# Patient Record
Sex: FEMALE | Race: BLACK OR AFRICAN AMERICAN | NOT HISPANIC OR LATINO | URBAN - METROPOLITAN AREA
[De-identification: names, ages, dates, MRNs, and addresses within clinical notes are randomized per-mention and may not be internally consistent; named-entity substitution may affect disease eponyms.]

---

## 2017-01-15 ENCOUNTER — INPATIENT (INPATIENT)
Facility: HOSPITAL | Age: 77
LOS: 5 days | Discharge: HOME | End: 2017-01-21
Attending: INTERNAL MEDICINE | Admitting: INTERNAL MEDICINE
Payer: MEDICARE

## 2017-01-15 VITALS
RESPIRATION RATE: 18 BRPM | OXYGEN SATURATION: 93 % | DIASTOLIC BLOOD PRESSURE: 122 MMHG | TEMPERATURE: 100 F | HEART RATE: 93 BPM | SYSTOLIC BLOOD PRESSURE: 152 MMHG

## 2017-01-15 DIAGNOSIS — R06.09 OTHER FORMS OF DYSPNEA: ICD-10-CM

## 2017-01-15 LAB
ALBUMIN SERPL ELPH-MCNC: 3.9 G/DL — SIGNIFICANT CHANGE UP (ref 3.3–5)
ALP SERPL-CCNC: 50 U/L — SIGNIFICANT CHANGE UP (ref 40–120)
ALT FLD-CCNC: 8 U/L — SIGNIFICANT CHANGE UP (ref 4–33)
APPEARANCE UR: SIGNIFICANT CHANGE UP
APTT BLD: 27.5 SEC — SIGNIFICANT CHANGE UP (ref 27.5–37.4)
AST SERPL-CCNC: 21 U/L — SIGNIFICANT CHANGE UP (ref 4–32)
B PERT DNA SPEC QL NAA+PROBE: SIGNIFICANT CHANGE UP
BASE EXCESS BLDV CALC-SCNC: -2.5 MMOL/L — SIGNIFICANT CHANGE UP
BASOPHILS # BLD AUTO: 0.01 K/UL — SIGNIFICANT CHANGE UP (ref 0–0.2)
BASOPHILS NFR BLD AUTO: 0.2 % — SIGNIFICANT CHANGE UP (ref 0–2)
BILIRUB SERPL-MCNC: 0.2 MG/DL — SIGNIFICANT CHANGE UP (ref 0.2–1.2)
BILIRUB UR-MCNC: NEGATIVE — SIGNIFICANT CHANGE UP
BLOOD GAS VENOUS - CREATININE: 3.63 MG/DL — HIGH (ref 0.5–1.3)
BLOOD UR QL VISUAL: NEGATIVE — SIGNIFICANT CHANGE UP
BUN SERPL-MCNC: 48 MG/DL — HIGH (ref 7–23)
C PNEUM DNA SPEC QL NAA+PROBE: NOT DETECTED — SIGNIFICANT CHANGE UP
CALCIUM SERPL-MCNC: 10.1 MG/DL — SIGNIFICANT CHANGE UP (ref 8.4–10.5)
CHLORIDE BLDV-SCNC: 105 MMOL/L — SIGNIFICANT CHANGE UP (ref 96–108)
CHLORIDE SERPL-SCNC: 102 MMOL/L — SIGNIFICANT CHANGE UP (ref 98–107)
CK MB BLD-MCNC: 5 NG/ML — HIGH (ref 1–4.7)
CK SERPL-CCNC: 106 U/L — SIGNIFICANT CHANGE UP (ref 25–170)
CO2 SERPL-SCNC: 19 MMOL/L — LOW (ref 22–31)
COLOR SPEC: YELLOW — SIGNIFICANT CHANGE UP
CREAT SERPL-MCNC: 3.5 MG/DL — HIGH (ref 0.5–1.3)
EOSINOPHIL # BLD AUTO: 0.02 K/UL — SIGNIFICANT CHANGE UP (ref 0–0.5)
EOSINOPHIL NFR BLD AUTO: 0.4 % — SIGNIFICANT CHANGE UP (ref 0–6)
FLUAV H1 2009 PAND RNA SPEC QL NAA+PROBE: POSITIVE — HIGH
FLUAV H1 RNA SPEC QL NAA+PROBE: NOT DETECTED — SIGNIFICANT CHANGE UP
FLUAV H3 RNA SPEC QL NAA+PROBE: NOT DETECTED — SIGNIFICANT CHANGE UP
FLUBV RNA SPEC QL NAA+PROBE: NOT DETECTED — SIGNIFICANT CHANGE UP
GAS PNL BLDV: 134 MMOL/L — LOW (ref 136–146)
GLUCOSE BLDV-MCNC: 139 — HIGH (ref 70–99)
GLUCOSE SERPL-MCNC: 147 MG/DL — HIGH (ref 70–99)
GLUCOSE UR-MCNC: 150 — SIGNIFICANT CHANGE UP
HADV DNA SPEC QL NAA+PROBE: NOT DETECTED — SIGNIFICANT CHANGE UP
HCO3 BLDV-SCNC: 22 MMOL/L — SIGNIFICANT CHANGE UP (ref 20–27)
HCOV 229E RNA SPEC QL NAA+PROBE: NOT DETECTED — SIGNIFICANT CHANGE UP
HCOV HKU1 RNA SPEC QL NAA+PROBE: NOT DETECTED — SIGNIFICANT CHANGE UP
HCOV NL63 RNA SPEC QL NAA+PROBE: NOT DETECTED — SIGNIFICANT CHANGE UP
HCOV OC43 RNA SPEC QL NAA+PROBE: NOT DETECTED — SIGNIFICANT CHANGE UP
HCT VFR BLD CALC: 32.6 % — LOW (ref 34.5–45)
HCT VFR BLDV CALC: 30.3 % — LOW (ref 34.5–45)
HGB BLD-MCNC: 9.7 G/DL — LOW (ref 11.5–15.5)
HGB BLDV-MCNC: 9.8 G/DL — LOW (ref 11.5–15.5)
HMPV RNA SPEC QL NAA+PROBE: NOT DETECTED — SIGNIFICANT CHANGE UP
HPIV1 RNA SPEC QL NAA+PROBE: NOT DETECTED — SIGNIFICANT CHANGE UP
HPIV2 RNA SPEC QL NAA+PROBE: NOT DETECTED — SIGNIFICANT CHANGE UP
HPIV3 RNA SPEC QL NAA+PROBE: NOT DETECTED — SIGNIFICANT CHANGE UP
HPIV4 RNA SPEC QL NAA+PROBE: NOT DETECTED — SIGNIFICANT CHANGE UP
IMM GRANULOCYTES NFR BLD AUTO: 0.2 % — SIGNIFICANT CHANGE UP (ref 0–1.5)
INR BLD: 1.06 — SIGNIFICANT CHANGE UP (ref 0.87–1.18)
KETONES UR-MCNC: NEGATIVE — SIGNIFICANT CHANGE UP
LACTATE BLDV-MCNC: 1.2 MMOL/L — SIGNIFICANT CHANGE UP (ref 0.5–2)
LEUKOCYTE ESTERASE UR-ACNC: NEGATIVE — SIGNIFICANT CHANGE UP
LYMPHOCYTES # BLD AUTO: 0.6 K/UL — LOW (ref 1–3.3)
LYMPHOCYTES # BLD AUTO: 10.7 % — LOW (ref 13–44)
M PNEUMO DNA SPEC QL NAA+PROBE: NOT DETECTED — SIGNIFICANT CHANGE UP
MCHC RBC-ENTMCNC: 22.4 PG — LOW (ref 27–34)
MCHC RBC-ENTMCNC: 29.8 % — LOW (ref 32–36)
MCV RBC AUTO: 75.3 FL — LOW (ref 80–100)
MONOCYTES # BLD AUTO: 0.31 K/UL — SIGNIFICANT CHANGE UP (ref 0–0.9)
MONOCYTES NFR BLD AUTO: 5.5 % — SIGNIFICANT CHANGE UP (ref 2–14)
NEUTROPHILS # BLD AUTO: 4.66 K/UL — SIGNIFICANT CHANGE UP (ref 1.8–7.4)
NEUTROPHILS NFR BLD AUTO: 83 % — HIGH (ref 43–77)
NITRITE UR-MCNC: NEGATIVE — SIGNIFICANT CHANGE UP
NT-PROBNP SERPL-SCNC: SIGNIFICANT CHANGE UP PG/ML
PCO2 BLDV: 42 MMHG — SIGNIFICANT CHANGE UP (ref 41–51)
PH BLDV: 7.35 PH — SIGNIFICANT CHANGE UP (ref 7.32–7.43)
PH UR: 6 — SIGNIFICANT CHANGE UP (ref 4.6–8)
PLATELET # BLD AUTO: 147 K/UL — LOW (ref 150–400)
PMV BLD: 10.1 FL — SIGNIFICANT CHANGE UP (ref 7–13)
PO2 BLDV: 41 MMHG — HIGH (ref 35–40)
POTASSIUM BLDV-SCNC: 5.1 MMOL/L — HIGH (ref 3.4–4.5)
POTASSIUM SERPL-MCNC: 4.7 MMOL/L — SIGNIFICANT CHANGE UP (ref 3.5–5.3)
POTASSIUM SERPL-SCNC: 4.7 MMOL/L — SIGNIFICANT CHANGE UP (ref 3.5–5.3)
PROT SERPL-MCNC: 7 G/DL — SIGNIFICANT CHANGE UP (ref 6–8.3)
PROT UR-MCNC: 500 — HIGH
PROTHROM AB SERPL-ACNC: 12.1 SEC — SIGNIFICANT CHANGE UP (ref 10–13.1)
RBC # BLD: 4.33 M/UL — SIGNIFICANT CHANGE UP (ref 3.8–5.2)
RBC # FLD: 15 % — HIGH (ref 10.3–14.5)
RSV RNA SPEC QL NAA+PROBE: NOT DETECTED — SIGNIFICANT CHANGE UP
RV+EV RNA SPEC QL NAA+PROBE: NOT DETECTED — SIGNIFICANT CHANGE UP
SAO2 % BLDV: 71.8 % — SIGNIFICANT CHANGE UP (ref 60–85)
SODIUM SERPL-SCNC: 137 MMOL/L — SIGNIFICANT CHANGE UP (ref 135–145)
SP GR SPEC: 1.02 — SIGNIFICANT CHANGE UP (ref 1–1.03)
SQUAMOUS # UR AUTO: SIGNIFICANT CHANGE UP
TROPONIN T SERPL-MCNC: 0.28 NG/ML — HIGH (ref 0–0.06)
UROBILINOGEN FLD QL: NORMAL E.U. — SIGNIFICANT CHANGE UP (ref 0.1–0.2)
WBC # BLD: 5.61 K/UL — SIGNIFICANT CHANGE UP (ref 3.8–10.5)
WBC # FLD AUTO: 5.61 K/UL — SIGNIFICANT CHANGE UP (ref 3.8–10.5)
WBC UR QL: SIGNIFICANT CHANGE UP (ref 0–?)

## 2017-01-15 PROCEDURE — 71010: CPT | Mod: 26

## 2017-01-15 RX ORDER — ASPIRIN/CALCIUM CARB/MAGNESIUM 324 MG
324 TABLET ORAL DAILY
Qty: 0 | Refills: 0 | Status: DISCONTINUED | OUTPATIENT
Start: 2017-01-15 | End: 2017-01-16

## 2017-01-15 RX ORDER — ACETAMINOPHEN 500 MG
650 TABLET ORAL ONCE
Qty: 0 | Refills: 0 | Status: COMPLETED | OUTPATIENT
Start: 2017-01-15 | End: 2017-01-15

## 2017-01-15 RX ORDER — SODIUM CHLORIDE 9 MG/ML
1000 INJECTION INTRAMUSCULAR; INTRAVENOUS; SUBCUTANEOUS ONCE
Qty: 0 | Refills: 0 | Status: COMPLETED | OUTPATIENT
Start: 2017-01-15 | End: 2017-01-15

## 2017-01-15 RX ADMIN — Medication 650 MILLIGRAM(S): at 20:34

## 2017-01-15 RX ADMIN — SODIUM CHLORIDE 500 MILLILITER(S): 9 INJECTION INTRAMUSCULAR; INTRAVENOUS; SUBCUTANEOUS at 20:34

## 2017-01-15 NOTE — H&P ADULT. - PROBLEM SELECTOR PLAN 1
Differential could be underline URI(Flu positive) vs mild volume overload vs possible ACS  Will admit to telemetry, serial EKG, serial CE prn for chest pain/SOB  f/u MD note   HgbA1C, TSH, lipid profile, CBC, CMP in am   TTE ordered to evaluate LVEF   Continue with Aspirin 81mg and Plavix 75mg daily

## 2017-01-15 NOTE — ED PROVIDER NOTE - OBJECTIVE STATEMENT
78 yo female with PMH of CABG, HTN, anemia, presents to the ED with URI symptoms x 3 days and SOB today. +productive cough, chest pain when coughing, rhinorrhea and congestion x 3 days, SOB and ONOFRE since today. States she had difficulty walking up her 8 stairs to her home today and can walk about 1/2 a city block before having to stop for SOB. +nausea and vomiting x1. Denies LOC, palpitations, back pain, headache, abdominal pain, diarrhea, constipation, trauma.

## 2017-01-15 NOTE — H&P ADULT. - HISTORY OF PRESENT ILLNESS
78 y/o female with a PMHx of HTN, anemia, DM, CAD s/p CABG x3 (2007), stents x3, presents to ED with productive cough, sneezing, rhinitis, congestion and headache and fatigue x 3 days. Pt reports SOB and pleuritic chest pain started today.  Pt reports fever of 100.7 orally and intermittent chills. Pt reports difficulty walking up her stairs at home and can walk half a block before having to catch her breath.  Pt reports 1 episode of nausea and vomiting today after trying to eat, and 1 episode of diarrhea. Pt denies changes in vision, lightheadedness, dizziness, numbness, tingling, sore throat, ear pain, sinus pressure, palpitations, abdominal pain, dysuria, urinary frequency/urgency, rash, recent travel, swelling, claudication. 76 y/o F with PMH of CAD(s/p 3V-CABG and 3 stents), HTN, Anemia, Gout, ESRD(with left AVF but not on dialysis, still makes urine), DM, HLD presented with productive cough, SOB and diffuse body ache. As per the patient her grand-daughter was sick last week with the common cold with abdominal pain. Patient stated that she has been experiencing productive cough with yellow sputum production, with SOB and generalized weakness and fatigue. Patient stated that each day she was getting worse and then decided to come to the ER. Patient also reported a fever of 100.2 and then 100.7 this morning. On Thursday patient developed SOB with minimal exertion, and with climbing a flight of stairs. Patient also endorsed decreased PO intake and multiple episodes of nausea and one episode of diarrhea earlier today. Patient denied any CP, abdominal pain, dysuria, recent travel, pleuritic or positional chest pain.     On ED admission EKG revealed Sinus rhythm with 1st degree AV block at a rate of 82, with LBBB with Qtc of 502, CE x 1: Trop: 0.28, CKMB: 5.0, BNP: 10273, RVP: Influenza positive, H&H: 9.7/32.2, Plt: 144, BUN/Cr: 48/3.50, Gluc: 147, UA: Negative. Prelim CXR: Rotated film. No focal consolidation. Patient received 1x dose of Aspirin 324mg in the ED. When examined patient is resting in the stretcher and complained of cough, denied any CP or SOB.             76 y/o female with a PMHx of HTN, anemia, DM, CAD s/p CABG x3 (2007), stents x3, presents to ED with productive cough, sneezing, rhinitis, congestion and headache and fatigue x 3 days. Pt reports SOB and pleuritic chest pain started today.  Pt reports fever of 100.7 orally and intermittent chills. Pt reports difficulty walking up her stairs at home and can walk half a block before having to catch her breath.  Pt reports 1 episode of nausea and vomiting today after trying to eat, and 1 episode of diarrhea. Pt denies changes in vision, lightheadedness, dizziness, numbness, tingling, sore throat, ear pain, sinus pressure, palpitations, abdominal pain, dysuria, urinary frequency/urgency, rash, recent travel, swelling, claudication.

## 2017-01-15 NOTE — H&P ADULT. - GASTROINTESTINAL DETAILS
normal/soft/nontender/bowel sounds normal/no masses palpable/no distention no distention/nontender/no rebound tenderness/soft/no rigidity/no guarding/normal/bowel sounds normal

## 2017-01-15 NOTE — H&P ADULT. - NEGATIVE GASTROINTESTINAL SYMPTOMS
no abdominal pain no melena/no abdominal pain/no hematochezia/no change in bowel habits/no constipation

## 2017-01-15 NOTE — H&P ADULT. - NEGATIVE ENMT SYMPTOMS
no hearing difficulty/no tinnitus/no ear pain/no throat pain/no sinus symptoms no sinus symptoms/no ear pain/no nasal congestion/no hearing difficulty/no vertigo/no tinnitus/no nasal discharge

## 2017-01-15 NOTE — ED ADULT NURSE NOTE - OBJECTIVE STATEMENT
Pt presents to room 11, A&Ox3, ambulatory at baseline without assistance, coming in for evaluation of cough, shortness of breath, weakness and fever (Tmax 101.7) last tylenol 4 hours ago.  Pt states symptoms began 2 days ago and have gotten progressively worse.  States chest pain present with coughing.  Pt denies any dizziness, nausea, palpitations, diarrhea, constipation, or chills. Pt presents to room 11, A&Ox3, ambulatory at baseline with a cane, coming in for evaluation of cough, shortness of breath, weakness and fever (Tmax 101.7) last tylenol 4 hours ago.  Pt states symptoms began 2 days ago and have gotten progressively worse.  States chest pain present with coughing.  Pt denies any dizziness, nausea, palpitations, diarrhea, constipation, or chills.  Pt has left av fistula x 3 years, never been dialyzed.  rectal temp 101.2, no skin breakdown.  IV established in right ac with a 20g, labs drawn and sent, call bell in reach, side rails up, bed in locked position, md evaluation in progress, pt on telemetry-NSR @ 82 noted, will continue to monitor.

## 2017-01-15 NOTE — ED ADULT NURSE NOTE - PMH
Anemia Associated with Chronic Renal Failure    Arthritis    CAD (coronary artery disease)    Diabetes    Gout    HTN (Hypertension)

## 2017-01-15 NOTE — H&P ADULT. - PROBLEM SELECTOR PLAN 3
RVP positive for Influenza   Will start patient on Tamiflu 30mg every other day for a total of 5 doses  On droplet precautions

## 2017-01-15 NOTE — H&P ADULT. - PROBLEM SELECTOR PLAN 2
On admission EKG: Sinus rhythm with 1st degree AV block at a rate of 82, with LBBB with Qtc of 502 unchanged from 11/12. Negative for Sgarbossa criteria   Will trend cardiac enzymes x 2 more as per attending, and if elevated will start patient on heparin drip  Continue with Aspirin 81mg and Plavix 75mg daily

## 2017-01-15 NOTE — H&P ADULT. - NEGATIVE OPHTHALMOLOGIC SYMPTOMS
no lacrimation L/no loss of vision L/no discharge R/no loss of vision R/no lacrimation R/no photophobia/no blurred vision R/no diplopia/no discharge L/no blurred vision L no blurred vision L/no lacrimation L/no discharge L/no lacrimation R/no diplopia/no photophobia/no discharge R/no blurred vision R

## 2017-01-15 NOTE — H&P ADULT. - NEGATIVE NEUROLOGICAL SYMPTOMS
no syncope/no weakness/no paresthesias/no focal seizures/no generalized seizures/no transient paralysis no difficulty walking/no vertigo/no paresthesias/no generalized seizures/no confusion/no transient paralysis/no headache/no hemiparesis/no focal seizures/no loss of consciousness/no syncope/no tremors/no loss of sensation/no weakness

## 2017-01-15 NOTE — H&P ADULT. - NEUROLOGICAL DETAILS
responds to verbal commands/sensation intact/alert and oriented x 3/cranial nerves intact/normal strength/no spontaneous movement responds to verbal commands/sensation intact/deep reflexes intact/responds to pain/alert and oriented x 3

## 2017-01-15 NOTE — ED PROVIDER NOTE - MEDICAL DECISION MAKING DETAILS
76 yo female with PMH of CABG, HTN, anemia, presents to the ED with URI symptoms x 3 days and SOB today. Initial plan: EKG, labs including cardiac labs, CXR, RVP Chris att: 78 yo female with PMH of CABG, HTN, anemia, presents to the ED with URI symptoms x 3 days and SOB today. DDX pna, viral, nstemi Initial plan: EKG, labs including cardiac labs, CXR, RVP

## 2017-01-15 NOTE — H&P ADULT. - MUSCULOSKELETAL
details… No joint pain, swelling or deformity; no limitation of movement no joint erythema/no calf tenderness/no joint warmth/no joint swelling detailed exam

## 2017-01-15 NOTE — ED PROVIDER NOTE - ATTENDING CONTRIBUTION TO CARE
Dr. Perez: I have personally seen and examined this patient at the bedside. I have fully participated in the care of this patient. I have reviewed all pertinent clinical information, including history, physical exam, plan and the Resident's note and agree except as noted. 76 yo female with PMH of CABG, HTN, anemia, presents to the ED with URI symptoms x 3 days and SOB today. DDX pna, viral, nstemi Initial plan: EKG, labs including cardiac labs, CXR, RVP, admit for severe dyspnea on exertion.

## 2017-01-15 NOTE — H&P ADULT. - PROBLEM SELECTOR PLAN 4
BUN/Cr: 48/3.50 with AVF in the LUE not currently on dialysis  Avoid nephrotoxic medications   Consider Renal consult in am(sees Dr. Orellana as outpatient)

## 2017-01-15 NOTE — H&P ADULT. - NEGATIVE MUSCULOSKELETAL SYMPTOMS
no muscle cramps/no arthritis/no joint swelling/no myalgia/no arthralgia/no muscle weakness no arthralgia/no joint swelling/no arthritis/no stiffness/no muscle cramps/no muscle weakness/no neck pain

## 2017-01-15 NOTE — ED PROVIDER NOTE - CARE PLAN
Principal Discharge DX:	Dyspnea on exertion  Secondary Diagnosis:	CAD (coronary artery disease)  Secondary Diagnosis:	S/P CABG (coronary artery bypass graft)

## 2017-01-15 NOTE — H&P ADULT. - RS GEN PE MLT RESP DETAILS PC
breath sounds equal/respirations non-labored/clear to auscultation bilaterally/no subcutaneous emphysema/no wheezes/no intercostal retractions/no rhonchi/no chest wall tenderness/normal/no rales no wheezes/normal/rales/airway patent/rhonchi/respirations non-labored/no intercostal retractions/no chest wall tenderness

## 2017-01-15 NOTE — H&P ADULT. - ASSESSMENT
76 y/o F with PMH of CAD(s/p 3V-CABG and 3 stents), HTN, Anemia, Gout, ESRD(with left AVF but not on dialysis, still makes urine), DM, HLD presented with productive cough, SOB and diffuse body ache. R/o Flu    +EKG with LBBB but negative for Sgarbossa, with positive troponin-As per Dr. Damico trend enzymes if elevated then start heparin drip, otherwise Aspirin and Plavix for now  +Flu Positive-On Tamiflu and Droplet precautions

## 2017-01-15 NOTE — H&P ADULT. - NEGATIVE CARDIOVASCULAR SYMPTOMS
no palpitations/no paroxysmal nocturnal dyspnea/no claudication/no peripheral edema no orthopnea/no chest pain/no peripheral edema/no paroxysmal nocturnal dyspnea/no palpitations

## 2017-01-15 NOTE — H&P ADULT. - NEGATIVE GENERAL GENITOURINARY SYMPTOMS
no dysuria/no urinary hesitancy/no flank pain L/no flank pain R/normal urinary frequency/no incontinence/no hematuria no hematuria/no flank pain L/no flank pain R/no renal colic/no dysuria

## 2017-01-16 DIAGNOSIS — R94.31 ABNORMAL ELECTROCARDIOGRAM [ECG] [EKG]: ICD-10-CM

## 2017-01-16 DIAGNOSIS — E11.9 TYPE 2 DIABETES MELLITUS WITHOUT COMPLICATIONS: ICD-10-CM

## 2017-01-16 DIAGNOSIS — I12.0 HYPERTENSIVE CHRONIC KIDNEY DISEASE WITH STAGE 5 CHRONIC KIDNEY DISEASE OR END STAGE RENAL DISEASE: ICD-10-CM

## 2017-01-16 DIAGNOSIS — Z41.8 ENCOUNTER FOR OTHER PROCEDURES FOR PURPOSES OTHER THAN REMEDYING HEALTH STATE: ICD-10-CM

## 2017-01-16 DIAGNOSIS — I10 ESSENTIAL (PRIMARY) HYPERTENSION: ICD-10-CM

## 2017-01-16 DIAGNOSIS — J11.1 INFLUENZA DUE TO UNIDENTIFIED INFLUENZA VIRUS WITH OTHER RESPIRATORY MANIFESTATIONS: ICD-10-CM

## 2017-01-16 DIAGNOSIS — M10.9 GOUT, UNSPECIFIED: ICD-10-CM

## 2017-01-16 DIAGNOSIS — R06.09 OTHER FORMS OF DYSPNEA: ICD-10-CM

## 2017-01-16 LAB
ALBUMIN SERPL ELPH-MCNC: 3.4 G/DL — SIGNIFICANT CHANGE UP (ref 3.3–5)
ALP SERPL-CCNC: 43 U/L — SIGNIFICANT CHANGE UP (ref 40–120)
ALT FLD-CCNC: 8 U/L — SIGNIFICANT CHANGE UP (ref 4–33)
AST SERPL-CCNC: 22 U/L — SIGNIFICANT CHANGE UP (ref 4–32)
BILIRUB SERPL-MCNC: 0.2 MG/DL — SIGNIFICANT CHANGE UP (ref 0.2–1.2)
BUN SERPL-MCNC: 46 MG/DL — HIGH (ref 7–23)
CALCIUM SERPL-MCNC: 9.7 MG/DL — SIGNIFICANT CHANGE UP (ref 8.4–10.5)
CHLORIDE SERPL-SCNC: 103 MMOL/L — SIGNIFICANT CHANGE UP (ref 98–107)
CHOLEST SERPL-MCNC: 186 MG/DL — SIGNIFICANT CHANGE UP (ref 120–199)
CK MB BLD-MCNC: 3.7 — HIGH (ref 0–2.5)
CK MB BLD-MCNC: 6.35 NG/ML — HIGH (ref 1–4.7)
CK MB BLD-MCNC: 6.35 NG/ML — HIGH (ref 1–4.7)
CK MB BLD-MCNC: SIGNIFICANT CHANGE UP (ref 0–2.5)
CK SERPL-CCNC: 149 U/L — SIGNIFICANT CHANGE UP (ref 25–170)
CK SERPL-CCNC: 170 U/L — SIGNIFICANT CHANGE UP (ref 25–170)
CO2 SERPL-SCNC: 18 MMOL/L — LOW (ref 22–31)
CREAT SERPL-MCNC: 3.39 MG/DL — HIGH (ref 0.5–1.3)
GLUCOSE SERPL-MCNC: 108 MG/DL — HIGH (ref 70–99)
HBA1C BLD-MCNC: 6.7 % — HIGH (ref 4–5.6)
HCT VFR BLD CALC: 29.9 % — LOW (ref 34.5–45)
HDLC SERPL-MCNC: 57 MG/DL — SIGNIFICANT CHANGE UP (ref 45–65)
HGB BLD-MCNC: 9.1 G/DL — LOW (ref 11.5–15.5)
LIPID PNL WITH DIRECT LDL SERPL: 117 MG/DL — SIGNIFICANT CHANGE UP
MAGNESIUM SERPL-MCNC: 1.8 MG/DL — SIGNIFICANT CHANGE UP (ref 1.6–2.6)
MCHC RBC-ENTMCNC: 22.8 PG — LOW (ref 27–34)
MCHC RBC-ENTMCNC: 30.4 % — LOW (ref 32–36)
MCV RBC AUTO: 74.9 FL — LOW (ref 80–100)
PHOSPHATE SERPL-MCNC: 3.9 MG/DL — SIGNIFICANT CHANGE UP (ref 2.5–4.5)
PLATELET # BLD AUTO: 132 K/UL — LOW (ref 150–400)
PMV BLD: 9.9 FL — SIGNIFICANT CHANGE UP (ref 7–13)
POTASSIUM SERPL-MCNC: 4.1 MMOL/L — SIGNIFICANT CHANGE UP (ref 3.5–5.3)
POTASSIUM SERPL-SCNC: 4.1 MMOL/L — SIGNIFICANT CHANGE UP (ref 3.5–5.3)
PROT SERPL-MCNC: 6.3 G/DL — SIGNIFICANT CHANGE UP (ref 6–8.3)
RBC # BLD: 3.99 M/UL — SIGNIFICANT CHANGE UP (ref 3.8–5.2)
RBC # FLD: 15.1 % — HIGH (ref 10.3–14.5)
SODIUM SERPL-SCNC: 137 MMOL/L — SIGNIFICANT CHANGE UP (ref 135–145)
SPECIMEN SOURCE: SIGNIFICANT CHANGE UP
SPECIMEN SOURCE: SIGNIFICANT CHANGE UP
TRIGL SERPL-MCNC: 71 MG/DL — SIGNIFICANT CHANGE UP (ref 10–149)
TROPONIN T SERPL-MCNC: 0.24 NG/ML — HIGH (ref 0–0.06)
TROPONIN T SERPL-MCNC: 0.27 NG/ML — HIGH (ref 0–0.06)
TSH SERPL-MCNC: 0.88 UIU/ML — SIGNIFICANT CHANGE UP (ref 0.27–4.2)
WBC # BLD: 3.48 K/UL — LOW (ref 3.8–10.5)
WBC # FLD AUTO: 3.48 K/UL — LOW (ref 3.8–10.5)

## 2017-01-16 PROCEDURE — 99222 1ST HOSP IP/OBS MODERATE 55: CPT

## 2017-01-16 RX ORDER — CARVEDILOL PHOSPHATE 80 MG/1
25 CAPSULE, EXTENDED RELEASE ORAL EVERY 12 HOURS
Qty: 0 | Refills: 0 | Status: DISCONTINUED | OUTPATIENT
Start: 2017-01-16 | End: 2017-01-21

## 2017-01-16 RX ORDER — DEXTROSE 50 % IN WATER 50 %
25 SYRINGE (ML) INTRAVENOUS ONCE
Qty: 0 | Refills: 0 | Status: DISCONTINUED | OUTPATIENT
Start: 2017-01-16 | End: 2017-01-21

## 2017-01-16 RX ORDER — INSULIN LISPRO 100/ML
VIAL (ML) SUBCUTANEOUS
Qty: 0 | Refills: 0 | Status: DISCONTINUED | OUTPATIENT
Start: 2017-01-16 | End: 2017-01-21

## 2017-01-16 RX ORDER — GLUCAGON INJECTION, SOLUTION 0.5 MG/.1ML
1 INJECTION, SOLUTION SUBCUTANEOUS ONCE
Qty: 0 | Refills: 0 | Status: DISCONTINUED | OUTPATIENT
Start: 2017-01-16 | End: 2017-01-21

## 2017-01-16 RX ORDER — DEXTROSE 50 % IN WATER 50 %
12.5 SYRINGE (ML) INTRAVENOUS ONCE
Qty: 0 | Refills: 0 | Status: DISCONTINUED | OUTPATIENT
Start: 2017-01-16 | End: 2017-01-21

## 2017-01-16 RX ORDER — DEXTROSE 50 % IN WATER 50 %
1 SYRINGE (ML) INTRAVENOUS ONCE
Qty: 0 | Refills: 0 | Status: DISCONTINUED | OUTPATIENT
Start: 2017-01-16 | End: 2017-01-21

## 2017-01-16 RX ORDER — SODIUM CHLORIDE 9 MG/ML
1000 INJECTION, SOLUTION INTRAVENOUS
Qty: 0 | Refills: 0 | Status: DISCONTINUED | OUTPATIENT
Start: 2017-01-16 | End: 2017-01-21

## 2017-01-16 RX ORDER — ASPIRIN/CALCIUM CARB/MAGNESIUM 324 MG
81 TABLET ORAL DAILY
Qty: 0 | Refills: 0 | Status: DISCONTINUED | OUTPATIENT
Start: 2017-01-16 | End: 2017-01-21

## 2017-01-16 RX ORDER — DOXAZOSIN MESYLATE 4 MG
2 TABLET ORAL AT BEDTIME
Qty: 0 | Refills: 0 | Status: DISCONTINUED | OUTPATIENT
Start: 2017-01-16 | End: 2017-01-21

## 2017-01-16 RX ORDER — HYDRALAZINE HCL 50 MG
1 TABLET ORAL
Qty: 0 | Refills: 0 | COMMUNITY

## 2017-01-16 RX ORDER — DOXAZOSIN MESYLATE 4 MG
2 TABLET ORAL AT BEDTIME
Qty: 0 | Refills: 0 | Status: DISCONTINUED | OUTPATIENT
Start: 2017-01-16 | End: 2017-01-16

## 2017-01-16 RX ORDER — HYDRALAZINE HCL 50 MG
100 TABLET ORAL THREE TIMES A DAY
Qty: 0 | Refills: 0 | Status: DISCONTINUED | OUTPATIENT
Start: 2017-01-16 | End: 2017-01-21

## 2017-01-16 RX ORDER — ISOSORBIDE MONONITRATE 60 MG/1
60 TABLET, EXTENDED RELEASE ORAL DAILY
Qty: 0 | Refills: 0 | Status: DISCONTINUED | OUTPATIENT
Start: 2017-01-16 | End: 2017-01-21

## 2017-01-16 RX ORDER — ACETAMINOPHEN 500 MG
650 TABLET ORAL EVERY 6 HOURS
Qty: 0 | Refills: 0 | Status: DISCONTINUED | OUTPATIENT
Start: 2017-01-16 | End: 2017-01-21

## 2017-01-16 RX ORDER — CARVEDILOL PHOSPHATE 80 MG/1
1 CAPSULE, EXTENDED RELEASE ORAL
Qty: 0 | Refills: 0 | COMMUNITY

## 2017-01-16 RX ORDER — TERAZOSIN HYDROCHLORIDE 10 MG/1
0 CAPSULE ORAL
Qty: 0 | Refills: 0 | COMMUNITY

## 2017-01-16 RX ORDER — ATORVASTATIN CALCIUM 80 MG/1
40 TABLET, FILM COATED ORAL AT BEDTIME
Qty: 0 | Refills: 0 | Status: DISCONTINUED | OUTPATIENT
Start: 2017-01-16 | End: 2017-01-21

## 2017-01-16 RX ORDER — SODIUM BICARBONATE 1 MEQ/ML
650 SYRINGE (ML) INTRAVENOUS
Qty: 0 | Refills: 0 | Status: DISCONTINUED | OUTPATIENT
Start: 2017-01-16 | End: 2017-01-21

## 2017-01-16 RX ORDER — HEPARIN SODIUM 5000 [USP'U]/ML
5000 INJECTION INTRAVENOUS; SUBCUTANEOUS EVERY 12 HOURS
Qty: 0 | Refills: 0 | Status: DISCONTINUED | OUTPATIENT
Start: 2017-01-16 | End: 2017-01-21

## 2017-01-16 RX ORDER — CHOLECALCIFEROL (VITAMIN D3) 125 MCG
1000 CAPSULE ORAL DAILY
Qty: 0 | Refills: 0 | Status: DISCONTINUED | OUTPATIENT
Start: 2017-01-16 | End: 2017-01-21

## 2017-01-16 RX ORDER — CLOPIDOGREL BISULFATE 75 MG/1
75 TABLET, FILM COATED ORAL DAILY
Qty: 0 | Refills: 0 | Status: DISCONTINUED | OUTPATIENT
Start: 2017-01-16 | End: 2017-01-21

## 2017-01-16 RX ORDER — FUROSEMIDE 40 MG
40 TABLET ORAL DAILY
Qty: 0 | Refills: 0 | Status: DISCONTINUED | OUTPATIENT
Start: 2017-01-16 | End: 2017-01-17

## 2017-01-16 RX ORDER — CALCITRIOL 0.5 UG/1
0.25 CAPSULE ORAL DAILY
Qty: 0 | Refills: 0 | Status: DISCONTINUED | OUTPATIENT
Start: 2017-01-16 | End: 2017-01-21

## 2017-01-16 RX ORDER — INSULIN LISPRO 100/ML
VIAL (ML) SUBCUTANEOUS AT BEDTIME
Qty: 0 | Refills: 0 | Status: DISCONTINUED | OUTPATIENT
Start: 2017-01-16 | End: 2017-01-21

## 2017-01-16 RX ORDER — ALLOPURINOL 300 MG
100 TABLET ORAL DAILY
Qty: 0 | Refills: 0 | Status: DISCONTINUED | OUTPATIENT
Start: 2017-01-16 | End: 2017-01-21

## 2017-01-16 RX ADMIN — CARVEDILOL PHOSPHATE 25 MILLIGRAM(S): 80 CAPSULE, EXTENDED RELEASE ORAL at 20:20

## 2017-01-16 RX ADMIN — Medication 100 MILLIGRAM(S): at 05:32

## 2017-01-16 RX ADMIN — ATORVASTATIN CALCIUM 40 MILLIGRAM(S): 80 TABLET, FILM COATED ORAL at 21:03

## 2017-01-16 RX ADMIN — Medication 650 MILLIGRAM(S): at 20:20

## 2017-01-16 RX ADMIN — CLOPIDOGREL BISULFATE 75 MILLIGRAM(S): 75 TABLET, FILM COATED ORAL at 13:12

## 2017-01-16 RX ADMIN — CARVEDILOL PHOSPHATE 25 MILLIGRAM(S): 80 CAPSULE, EXTENDED RELEASE ORAL at 05:31

## 2017-01-16 RX ADMIN — Medication 650 MILLIGRAM(S): at 14:32

## 2017-01-16 RX ADMIN — Medication 650 MILLIGRAM(S): at 05:31

## 2017-01-16 RX ADMIN — Medication 0.2 MILLIGRAM(S): at 21:03

## 2017-01-16 RX ADMIN — Medication 40 MILLIGRAM(S): at 05:31

## 2017-01-16 RX ADMIN — Medication 650 MILLIGRAM(S): at 13:52

## 2017-01-16 RX ADMIN — Medication 100 MILLIGRAM(S): at 14:35

## 2017-01-16 RX ADMIN — Medication 30 MILLIGRAM(S): at 13:13

## 2017-01-16 RX ADMIN — Medication 1000 UNIT(S): at 13:12

## 2017-01-16 RX ADMIN — HEPARIN SODIUM 5000 UNIT(S): 5000 INJECTION INTRAVENOUS; SUBCUTANEOUS at 05:32

## 2017-01-16 RX ADMIN — Medication 100 MILLIGRAM(S): at 14:32

## 2017-01-16 RX ADMIN — CALCITRIOL 0.25 MICROGRAM(S): 0.5 CAPSULE ORAL at 13:12

## 2017-01-16 RX ADMIN — Medication 100 MILLIGRAM(S): at 21:03

## 2017-01-16 RX ADMIN — Medication 2 MILLIGRAM(S): at 21:03

## 2017-01-16 RX ADMIN — HEPARIN SODIUM 5000 UNIT(S): 5000 INJECTION INTRAVENOUS; SUBCUTANEOUS at 20:20

## 2017-01-16 RX ADMIN — Medication 81 MILLIGRAM(S): at 13:12

## 2017-01-16 RX ADMIN — Medication 200 MILLIGRAM(S): at 09:59

## 2017-01-16 RX ADMIN — ISOSORBIDE MONONITRATE 60 MILLIGRAM(S): 60 TABLET, EXTENDED RELEASE ORAL at 13:13

## 2017-01-17 LAB
BUN SERPL-MCNC: 48 MG/DL — HIGH (ref 7–23)
CALCIUM SERPL-MCNC: 9.6 MG/DL — SIGNIFICANT CHANGE UP (ref 8.4–10.5)
CHLORIDE SERPL-SCNC: 102 MMOL/L — SIGNIFICANT CHANGE UP (ref 98–107)
CO2 SERPL-SCNC: 17 MMOL/L — LOW (ref 22–31)
CREAT SERPL-MCNC: 3.5 MG/DL — HIGH (ref 0.5–1.3)
GLUCOSE SERPL-MCNC: 75 MG/DL — SIGNIFICANT CHANGE UP (ref 70–99)
HCT VFR BLD CALC: 29.3 % — LOW (ref 34.5–45)
HGB BLD-MCNC: 9.1 G/DL — LOW (ref 11.5–15.5)
MAGNESIUM SERPL-MCNC: 1.9 MG/DL — SIGNIFICANT CHANGE UP (ref 1.6–2.6)
MCHC RBC-ENTMCNC: 22.8 PG — LOW (ref 27–34)
MCHC RBC-ENTMCNC: 31.1 % — LOW (ref 32–36)
MCV RBC AUTO: 73.4 FL — LOW (ref 80–100)
PLATELET # BLD AUTO: 131 K/UL — LOW (ref 150–400)
PMV BLD: 10.8 FL — SIGNIFICANT CHANGE UP (ref 7–13)
POTASSIUM SERPL-MCNC: 4.8 MMOL/L — SIGNIFICANT CHANGE UP (ref 3.5–5.3)
POTASSIUM SERPL-SCNC: 4.8 MMOL/L — SIGNIFICANT CHANGE UP (ref 3.5–5.3)
RBC # BLD: 3.99 M/UL — SIGNIFICANT CHANGE UP (ref 3.8–5.2)
RBC # FLD: 15.2 % — HIGH (ref 10.3–14.5)
SODIUM SERPL-SCNC: 136 MMOL/L — SIGNIFICANT CHANGE UP (ref 135–145)
SPECIMEN SOURCE: SIGNIFICANT CHANGE UP
WBC # BLD: 3.5 K/UL — LOW (ref 3.8–10.5)
WBC # FLD AUTO: 3.5 K/UL — LOW (ref 3.8–10.5)

## 2017-01-17 PROCEDURE — 71020: CPT | Mod: 26

## 2017-01-17 PROCEDURE — 99233 SBSQ HOSP IP/OBS HIGH 50: CPT | Mod: GC

## 2017-01-17 PROCEDURE — 99222 1ST HOSP IP/OBS MODERATE 55: CPT

## 2017-01-17 RX ORDER — FUROSEMIDE 40 MG
60 TABLET ORAL DAILY
Qty: 0 | Refills: 0 | Status: DISCONTINUED | OUTPATIENT
Start: 2017-01-17 | End: 2017-01-18

## 2017-01-17 RX ORDER — FUROSEMIDE 40 MG
40 TABLET ORAL DAILY
Qty: 0 | Refills: 0 | Status: DISCONTINUED | OUTPATIENT
Start: 2017-01-17 | End: 2017-01-17

## 2017-01-17 RX ADMIN — Medication 60 MILLIGRAM(S): at 18:30

## 2017-01-17 RX ADMIN — HEPARIN SODIUM 5000 UNIT(S): 5000 INJECTION INTRAVENOUS; SUBCUTANEOUS at 07:50

## 2017-01-17 RX ADMIN — ATORVASTATIN CALCIUM 40 MILLIGRAM(S): 80 TABLET, FILM COATED ORAL at 22:50

## 2017-01-17 RX ADMIN — Medication 0.2 MILLIGRAM(S): at 22:50

## 2017-01-17 RX ADMIN — CARVEDILOL PHOSPHATE 25 MILLIGRAM(S): 80 CAPSULE, EXTENDED RELEASE ORAL at 18:30

## 2017-01-17 RX ADMIN — ISOSORBIDE MONONITRATE 60 MILLIGRAM(S): 60 TABLET, EXTENDED RELEASE ORAL at 11:54

## 2017-01-17 RX ADMIN — Medication 650 MILLIGRAM(S): at 00:33

## 2017-01-17 RX ADMIN — Medication 40 MILLIGRAM(S): at 11:54

## 2017-01-17 RX ADMIN — HEPARIN SODIUM 5000 UNIT(S): 5000 INJECTION INTRAVENOUS; SUBCUTANEOUS at 18:30

## 2017-01-17 RX ADMIN — Medication 40 MILLIGRAM(S): at 06:29

## 2017-01-17 RX ADMIN — Medication 650 MILLIGRAM(S): at 06:30

## 2017-01-17 RX ADMIN — Medication: at 12:06

## 2017-01-17 RX ADMIN — Medication 100 MILLIGRAM(S): at 06:29

## 2017-01-17 RX ADMIN — Medication 650 MILLIGRAM(S): at 22:44

## 2017-01-17 RX ADMIN — Medication 100 MILLIGRAM(S): at 22:50

## 2017-01-17 RX ADMIN — Medication 100 MILLIGRAM(S): at 11:54

## 2017-01-17 RX ADMIN — Medication 81 MILLIGRAM(S): at 11:54

## 2017-01-17 RX ADMIN — Medication 2 MILLIGRAM(S): at 22:50

## 2017-01-17 RX ADMIN — Medication 100 MILLIGRAM(S): at 14:24

## 2017-01-17 RX ADMIN — Medication 1000 UNIT(S): at 11:54

## 2017-01-17 RX ADMIN — Medication 650 MILLIGRAM(S): at 11:54

## 2017-01-17 RX ADMIN — CARVEDILOL PHOSPHATE 25 MILLIGRAM(S): 80 CAPSULE, EXTENDED RELEASE ORAL at 06:29

## 2017-01-17 RX ADMIN — CLOPIDOGREL BISULFATE 75 MILLIGRAM(S): 75 TABLET, FILM COATED ORAL at 11:54

## 2017-01-17 RX ADMIN — CALCITRIOL 0.25 MICROGRAM(S): 0.5 CAPSULE ORAL at 11:54

## 2017-01-18 LAB
BACTERIA UR CULT: SIGNIFICANT CHANGE UP
BUN SERPL-MCNC: 54 MG/DL — HIGH (ref 7–23)
CALCIUM SERPL-MCNC: 10.1 MG/DL — SIGNIFICANT CHANGE UP (ref 8.4–10.5)
CHLORIDE SERPL-SCNC: 100 MMOL/L — SIGNIFICANT CHANGE UP (ref 98–107)
CO2 SERPL-SCNC: 19 MMOL/L — LOW (ref 22–31)
CREAT SERPL-MCNC: 3.72 MG/DL — HIGH (ref 0.5–1.3)
GLUCOSE SERPL-MCNC: 89 MG/DL — SIGNIFICANT CHANGE UP (ref 70–99)
HCT VFR BLD CALC: 30.5 % — LOW (ref 34.5–45)
HGB BLD-MCNC: 9.2 G/DL — LOW (ref 11.5–15.5)
MCHC RBC-ENTMCNC: 22.4 PG — LOW (ref 27–34)
MCHC RBC-ENTMCNC: 30.2 % — LOW (ref 32–36)
MCV RBC AUTO: 74.4 FL — LOW (ref 80–100)
PLATELET # BLD AUTO: 128 K/UL — LOW (ref 150–400)
PMV BLD: 10.6 FL — SIGNIFICANT CHANGE UP (ref 7–13)
POTASSIUM SERPL-MCNC: 4.4 MMOL/L — SIGNIFICANT CHANGE UP (ref 3.5–5.3)
POTASSIUM SERPL-SCNC: 4.4 MMOL/L — SIGNIFICANT CHANGE UP (ref 3.5–5.3)
RBC # BLD: 4.1 M/UL — SIGNIFICANT CHANGE UP (ref 3.8–5.2)
RBC # FLD: 15.4 % — HIGH (ref 10.3–14.5)
SODIUM SERPL-SCNC: 136 MMOL/L — SIGNIFICANT CHANGE UP (ref 135–145)
WBC # BLD: 3.37 K/UL — LOW (ref 3.8–10.5)
WBC # FLD AUTO: 3.37 K/UL — LOW (ref 3.8–10.5)

## 2017-01-18 RX ORDER — SODIUM CHLORIDE 9 MG/ML
1000 INJECTION INTRAMUSCULAR; INTRAVENOUS; SUBCUTANEOUS
Qty: 0 | Refills: 0 | Status: DISCONTINUED | OUTPATIENT
Start: 2017-01-18 | End: 2017-01-18

## 2017-01-18 RX ORDER — ACETAMINOPHEN 500 MG
650 TABLET ORAL EVERY 6 HOURS
Qty: 0 | Refills: 0 | Status: DISCONTINUED | OUTPATIENT
Start: 2017-01-18 | End: 2017-01-21

## 2017-01-18 RX ORDER — SODIUM CHLORIDE 9 MG/ML
250 INJECTION INTRAMUSCULAR; INTRAVENOUS; SUBCUTANEOUS ONCE
Qty: 0 | Refills: 0 | Status: COMPLETED | OUTPATIENT
Start: 2017-01-18 | End: 2017-01-18

## 2017-01-18 RX ORDER — SODIUM CHLORIDE 9 MG/ML
259 INJECTION INTRAMUSCULAR; INTRAVENOUS; SUBCUTANEOUS ONCE
Qty: 0 | Refills: 0 | Status: DISCONTINUED | OUTPATIENT
Start: 2017-01-18 | End: 2017-01-18

## 2017-01-18 RX ADMIN — Medication 1000 UNIT(S): at 12:19

## 2017-01-18 RX ADMIN — ATORVASTATIN CALCIUM 40 MILLIGRAM(S): 80 TABLET, FILM COATED ORAL at 22:10

## 2017-01-18 RX ADMIN — Medication 100 MILLIGRAM(S): at 05:34

## 2017-01-18 RX ADMIN — ISOSORBIDE MONONITRATE 60 MILLIGRAM(S): 60 TABLET, EXTENDED RELEASE ORAL at 12:19

## 2017-01-18 RX ADMIN — Medication 60 MILLIGRAM(S): at 05:35

## 2017-01-18 RX ADMIN — SODIUM CHLORIDE 1000 MILLILITER(S): 9 INJECTION INTRAMUSCULAR; INTRAVENOUS; SUBCUTANEOUS at 15:00

## 2017-01-18 RX ADMIN — Medication 0.2 MILLIGRAM(S): at 22:10

## 2017-01-18 RX ADMIN — Medication 200 MILLIGRAM(S): at 00:02

## 2017-01-18 RX ADMIN — Medication 81 MILLIGRAM(S): at 12:18

## 2017-01-18 RX ADMIN — Medication 100 MILLIGRAM(S): at 13:25

## 2017-01-18 RX ADMIN — Medication 650 MILLIGRAM(S): at 12:19

## 2017-01-18 RX ADMIN — Medication 650 MILLIGRAM(S): at 17:36

## 2017-01-18 RX ADMIN — Medication 200 MILLIGRAM(S): at 15:56

## 2017-01-18 RX ADMIN — Medication 650 MILLIGRAM(S): at 05:35

## 2017-01-18 RX ADMIN — Medication 100 MILLIGRAM(S): at 12:18

## 2017-01-18 RX ADMIN — Medication 30 MILLIGRAM(S): at 12:19

## 2017-01-18 RX ADMIN — HEPARIN SODIUM 5000 UNIT(S): 5000 INJECTION INTRAVENOUS; SUBCUTANEOUS at 17:36

## 2017-01-18 RX ADMIN — Medication 650 MILLIGRAM(S): at 11:18

## 2017-01-18 RX ADMIN — Medication 100 MILLIGRAM(S): at 22:10

## 2017-01-18 RX ADMIN — HEPARIN SODIUM 5000 UNIT(S): 5000 INJECTION INTRAVENOUS; SUBCUTANEOUS at 05:35

## 2017-01-18 RX ADMIN — Medication 650 MILLIGRAM(S): at 12:20

## 2017-01-18 RX ADMIN — Medication 650 MILLIGRAM(S): at 00:02

## 2017-01-18 RX ADMIN — CALCITRIOL 0.25 MICROGRAM(S): 0.5 CAPSULE ORAL at 12:19

## 2017-01-18 RX ADMIN — Medication 2 MILLIGRAM(S): at 22:10

## 2017-01-18 RX ADMIN — CLOPIDOGREL BISULFATE 75 MILLIGRAM(S): 75 TABLET, FILM COATED ORAL at 12:19

## 2017-01-19 LAB
BUN SERPL-MCNC: 53 MG/DL — HIGH (ref 7–23)
CALCIUM SERPL-MCNC: 10 MG/DL — SIGNIFICANT CHANGE UP (ref 8.4–10.5)
CHLORIDE SERPL-SCNC: 100 MMOL/L — SIGNIFICANT CHANGE UP (ref 98–107)
CO2 SERPL-SCNC: 22 MMOL/L — SIGNIFICANT CHANGE UP (ref 22–31)
CREAT SERPL-MCNC: 3.49 MG/DL — HIGH (ref 0.5–1.3)
GLUCOSE SERPL-MCNC: 94 MG/DL — SIGNIFICANT CHANGE UP (ref 70–99)
HCT VFR BLD CALC: 27.6 % — LOW (ref 34.5–45)
HGB BLD-MCNC: 8.5 G/DL — LOW (ref 11.5–15.5)
MCHC RBC-ENTMCNC: 22.8 PG — LOW (ref 27–34)
MCHC RBC-ENTMCNC: 30.8 % — LOW (ref 32–36)
MCV RBC AUTO: 74 FL — LOW (ref 80–100)
PLATELET # BLD AUTO: 121 K/UL — LOW (ref 150–400)
PMV BLD: 9.9 FL — SIGNIFICANT CHANGE UP (ref 7–13)
POTASSIUM SERPL-MCNC: 4 MMOL/L — SIGNIFICANT CHANGE UP (ref 3.5–5.3)
POTASSIUM SERPL-SCNC: 4 MMOL/L — SIGNIFICANT CHANGE UP (ref 3.5–5.3)
RBC # BLD: 3.73 M/UL — LOW (ref 3.8–5.2)
RBC # FLD: 15.3 % — HIGH (ref 10.3–14.5)
SODIUM SERPL-SCNC: 135 MMOL/L — SIGNIFICANT CHANGE UP (ref 135–145)
WBC # BLD: 3.95 K/UL — SIGNIFICANT CHANGE UP (ref 3.8–10.5)
WBC # FLD AUTO: 3.95 K/UL — SIGNIFICANT CHANGE UP (ref 3.8–10.5)

## 2017-01-19 PROCEDURE — 99233 SBSQ HOSP IP/OBS HIGH 50: CPT | Mod: GC

## 2017-01-19 PROCEDURE — 93306 TTE W/DOPPLER COMPLETE: CPT | Mod: 26

## 2017-01-19 RX ORDER — ACETYLCYSTEINE 200 MG/ML
1200 VIAL (ML) MISCELLANEOUS
Qty: 0 | Refills: 0 | Status: COMPLETED | OUTPATIENT
Start: 2017-01-19 | End: 2017-01-21

## 2017-01-19 RX ADMIN — ISOSORBIDE MONONITRATE 60 MILLIGRAM(S): 60 TABLET, EXTENDED RELEASE ORAL at 12:11

## 2017-01-19 RX ADMIN — Medication 100 MILLIGRAM(S): at 21:11

## 2017-01-19 RX ADMIN — Medication 100 MILLIGRAM(S): at 15:50

## 2017-01-19 RX ADMIN — Medication 1200 MILLIGRAM(S): at 21:18

## 2017-01-19 RX ADMIN — CARVEDILOL PHOSPHATE 25 MILLIGRAM(S): 80 CAPSULE, EXTENDED RELEASE ORAL at 05:27

## 2017-01-19 RX ADMIN — Medication 100 MILLIGRAM(S): at 12:10

## 2017-01-19 RX ADMIN — Medication 200 MILLIGRAM(S): at 21:11

## 2017-01-19 RX ADMIN — CALCITRIOL 0.25 MICROGRAM(S): 0.5 CAPSULE ORAL at 12:11

## 2017-01-19 RX ADMIN — Medication 81 MILLIGRAM(S): at 12:10

## 2017-01-19 RX ADMIN — Medication 650 MILLIGRAM(S): at 12:11

## 2017-01-19 RX ADMIN — CARVEDILOL PHOSPHATE 25 MILLIGRAM(S): 80 CAPSULE, EXTENDED RELEASE ORAL at 17:23

## 2017-01-19 RX ADMIN — HEPARIN SODIUM 5000 UNIT(S): 5000 INJECTION INTRAVENOUS; SUBCUTANEOUS at 05:27

## 2017-01-19 RX ADMIN — Medication 200 MILLIGRAM(S): at 00:08

## 2017-01-19 RX ADMIN — Medication 200 MILLIGRAM(S): at 12:39

## 2017-01-19 RX ADMIN — Medication 650 MILLIGRAM(S): at 05:27

## 2017-01-19 RX ADMIN — CLOPIDOGREL BISULFATE 75 MILLIGRAM(S): 75 TABLET, FILM COATED ORAL at 12:10

## 2017-01-19 RX ADMIN — Medication 100 MILLIGRAM(S): at 05:27

## 2017-01-19 RX ADMIN — Medication 650 MILLIGRAM(S): at 00:08

## 2017-01-19 RX ADMIN — HEPARIN SODIUM 5000 UNIT(S): 5000 INJECTION INTRAVENOUS; SUBCUTANEOUS at 17:23

## 2017-01-19 RX ADMIN — Medication 650 MILLIGRAM(S): at 17:23

## 2017-01-19 RX ADMIN — Medication 0.2 MILLIGRAM(S): at 21:11

## 2017-01-19 RX ADMIN — Medication 650 MILLIGRAM(S): at 23:47

## 2017-01-19 RX ADMIN — Medication 1000 UNIT(S): at 12:11

## 2017-01-19 RX ADMIN — ATORVASTATIN CALCIUM 40 MILLIGRAM(S): 80 TABLET, FILM COATED ORAL at 21:11

## 2017-01-19 RX ADMIN — Medication 2 MILLIGRAM(S): at 21:11

## 2017-01-20 LAB
ANISOCYTOSIS BLD QL: SLIGHT — SIGNIFICANT CHANGE UP
BACTERIA BLD CULT: SIGNIFICANT CHANGE UP
BACTERIA BLD CULT: SIGNIFICANT CHANGE UP
BASOPHILS # BLD AUTO: 0.02 K/UL — SIGNIFICANT CHANGE UP (ref 0–0.2)
BASOPHILS NFR BLD AUTO: 0.8 % — SIGNIFICANT CHANGE UP (ref 0–2)
BASOPHILS NFR SPEC: 0 % — SIGNIFICANT CHANGE UP (ref 0–2)
BUN SERPL-MCNC: 51 MG/DL — HIGH (ref 7–23)
CALCIUM SERPL-MCNC: 10.1 MG/DL — SIGNIFICANT CHANGE UP (ref 8.4–10.5)
CHLORIDE SERPL-SCNC: 101 MMOL/L — SIGNIFICANT CHANGE UP (ref 98–107)
CO2 SERPL-SCNC: 21 MMOL/L — LOW (ref 22–31)
CREAT SERPL-MCNC: 3.44 MG/DL — HIGH (ref 0.5–1.3)
EOSINOPHIL # BLD AUTO: 0.1 K/UL — SIGNIFICANT CHANGE UP (ref 0–0.5)
EOSINOPHIL NFR BLD AUTO: 4 % — SIGNIFICANT CHANGE UP (ref 0–6)
EOSINOPHIL NFR FLD: 1.8 % — SIGNIFICANT CHANGE UP (ref 0–6)
GIANT PLATELETS BLD QL SMEAR: PRESENT — SIGNIFICANT CHANGE UP
GLUCOSE SERPL-MCNC: 105 MG/DL — HIGH (ref 70–99)
HCT VFR BLD CALC: 28.4 % — LOW (ref 34.5–45)
HGB BLD-MCNC: 8.8 G/DL — LOW (ref 11.5–15.5)
HYPOCHROMIA BLD QL: SIGNIFICANT CHANGE UP
IMM GRANULOCYTES NFR BLD AUTO: 0.4 % — SIGNIFICANT CHANGE UP (ref 0–1.5)
LYMPHOCYTES # BLD AUTO: 1.21 K/UL — SIGNIFICANT CHANGE UP (ref 1–3.3)
LYMPHOCYTES # BLD AUTO: 49 % — HIGH (ref 13–44)
LYMPHOCYTES NFR SPEC AUTO: 36 % — SIGNIFICANT CHANGE UP (ref 13–44)
MAGNESIUM SERPL-MCNC: 1.9 MG/DL — SIGNIFICANT CHANGE UP (ref 1.6–2.6)
MCHC RBC-ENTMCNC: 22.9 PG — LOW (ref 27–34)
MCHC RBC-ENTMCNC: 31 % — LOW (ref 32–36)
MCV RBC AUTO: 73.8 FL — LOW (ref 80–100)
MICROCYTES BLD QL: SLIGHT — SIGNIFICANT CHANGE UP
MONOCYTES # BLD AUTO: 0.28 K/UL — SIGNIFICANT CHANGE UP (ref 0–0.9)
MONOCYTES NFR BLD AUTO: 11.3 % — SIGNIFICANT CHANGE UP (ref 2–14)
MONOCYTES NFR BLD: 8.8 % — SIGNIFICANT CHANGE UP (ref 2–9)
MYELOCYTES NFR BLD: 0.9 % — HIGH (ref 0–0)
NEUTROPHIL AB SER-ACNC: 50.7 % — SIGNIFICANT CHANGE UP (ref 43–77)
NEUTROPHILS # BLD AUTO: 0.85 K/UL — LOW (ref 1.8–7.4)
NEUTROPHILS NFR BLD AUTO: 34.5 % — LOW (ref 43–77)
PLATELET # BLD AUTO: 123 K/UL — LOW (ref 150–400)
PLATELET COUNT - ESTIMATE: SIGNIFICANT CHANGE UP
PMV BLD: 10.1 FL — SIGNIFICANT CHANGE UP (ref 7–13)
POTASSIUM SERPL-MCNC: 4.4 MMOL/L — SIGNIFICANT CHANGE UP (ref 3.5–5.3)
POTASSIUM SERPL-SCNC: 4.4 MMOL/L — SIGNIFICANT CHANGE UP (ref 3.5–5.3)
RBC # BLD: 3.85 M/UL — SIGNIFICANT CHANGE UP (ref 3.8–5.2)
RBC # FLD: 15.2 % — HIGH (ref 10.3–14.5)
SODIUM SERPL-SCNC: 139 MMOL/L — SIGNIFICANT CHANGE UP (ref 135–145)
VARIANT LYMPHS # BLD: 1.8 % — SIGNIFICANT CHANGE UP
WBC # BLD: 2.47 K/UL — LOW (ref 3.8–10.5)
WBC # FLD AUTO: 2.47 K/UL — LOW (ref 3.8–10.5)

## 2017-01-20 RX ORDER — HYDRALAZINE HCL 50 MG
10 TABLET ORAL ONCE
Qty: 0 | Refills: 0 | Status: COMPLETED | OUTPATIENT
Start: 2017-01-20 | End: 2017-01-20

## 2017-01-20 RX ORDER — SODIUM CHLORIDE 9 MG/ML
1000 INJECTION INTRAMUSCULAR; INTRAVENOUS; SUBCUTANEOUS
Qty: 0 | Refills: 0 | Status: DISCONTINUED | OUTPATIENT
Start: 2017-01-20 | End: 2017-01-21

## 2017-01-20 RX ORDER — MIDAZOLAM HYDROCHLORIDE 1 MG/ML
1 INJECTION, SOLUTION INTRAMUSCULAR; INTRAVENOUS ONCE
Qty: 0 | Refills: 0 | Status: DISCONTINUED | OUTPATIENT
Start: 2017-01-20 | End: 2017-01-20

## 2017-01-20 RX ORDER — LABETALOL HCL 100 MG
10 TABLET ORAL ONCE
Qty: 0 | Refills: 0 | Status: COMPLETED | OUTPATIENT
Start: 2017-01-20 | End: 2017-01-20

## 2017-01-20 RX ADMIN — Medication 1200 MILLIGRAM(S): at 17:29

## 2017-01-20 RX ADMIN — Medication 200 MILLIGRAM(S): at 06:08

## 2017-01-20 RX ADMIN — MIDAZOLAM HYDROCHLORIDE 1 MILLIGRAM(S): 1 INJECTION, SOLUTION INTRAMUSCULAR; INTRAVENOUS at 14:09

## 2017-01-20 RX ADMIN — Medication 1200 MILLIGRAM(S): at 06:08

## 2017-01-20 RX ADMIN — Medication 0.2 MILLIGRAM(S): at 21:29

## 2017-01-20 RX ADMIN — Medication 2 MILLIGRAM(S): at 21:29

## 2017-01-20 RX ADMIN — Medication 650 MILLIGRAM(S): at 06:09

## 2017-01-20 RX ADMIN — ATORVASTATIN CALCIUM 40 MILLIGRAM(S): 80 TABLET, FILM COATED ORAL at 21:29

## 2017-01-20 RX ADMIN — Medication 650 MILLIGRAM(S): at 17:28

## 2017-01-20 RX ADMIN — Medication 100 MILLIGRAM(S): at 21:29

## 2017-01-20 RX ADMIN — Medication 100 MILLIGRAM(S): at 13:49

## 2017-01-20 RX ADMIN — Medication 10 MILLIGRAM(S): at 12:43

## 2017-01-20 RX ADMIN — Medication 100 MILLIGRAM(S): at 06:08

## 2017-01-20 RX ADMIN — CARVEDILOL PHOSPHATE 25 MILLIGRAM(S): 80 CAPSULE, EXTENDED RELEASE ORAL at 17:28

## 2017-01-20 RX ADMIN — Medication 200 MILLIGRAM(S): at 21:29

## 2017-01-20 RX ADMIN — CLOPIDOGREL BISULFATE 75 MILLIGRAM(S): 75 TABLET, FILM COATED ORAL at 08:38

## 2017-01-20 RX ADMIN — Medication 10 MILLIGRAM(S): at 13:04

## 2017-01-20 RX ADMIN — ISOSORBIDE MONONITRATE 60 MILLIGRAM(S): 60 TABLET, EXTENDED RELEASE ORAL at 13:48

## 2017-01-20 RX ADMIN — Medication 81 MILLIGRAM(S): at 08:38

## 2017-01-20 RX ADMIN — CARVEDILOL PHOSPHATE 25 MILLIGRAM(S): 80 CAPSULE, EXTENDED RELEASE ORAL at 06:08

## 2017-01-21 VITALS
SYSTOLIC BLOOD PRESSURE: 142 MMHG | RESPIRATION RATE: 18 BRPM | DIASTOLIC BLOOD PRESSURE: 42 MMHG | TEMPERATURE: 99 F | OXYGEN SATURATION: 100 % | HEART RATE: 57 BPM

## 2017-01-21 LAB
BASOPHILS # BLD AUTO: 0.01 K/UL — SIGNIFICANT CHANGE UP (ref 0–0.2)
BASOPHILS NFR BLD AUTO: 0.3 % — SIGNIFICANT CHANGE UP (ref 0–2)
BUN SERPL-MCNC: 52 MG/DL — HIGH (ref 7–23)
CALCIUM SERPL-MCNC: 10 MG/DL — SIGNIFICANT CHANGE UP (ref 8.4–10.5)
CHLORIDE SERPL-SCNC: 100 MMOL/L — SIGNIFICANT CHANGE UP (ref 98–107)
CO2 SERPL-SCNC: 22 MMOL/L — SIGNIFICANT CHANGE UP (ref 22–31)
CREAT SERPL-MCNC: 3.34 MG/DL — HIGH (ref 0.5–1.3)
EOSINOPHIL # BLD AUTO: 0.11 K/UL — SIGNIFICANT CHANGE UP (ref 0–0.5)
EOSINOPHIL NFR BLD AUTO: 3.5 % — SIGNIFICANT CHANGE UP (ref 0–6)
GLUCOSE SERPL-MCNC: 81 MG/DL — SIGNIFICANT CHANGE UP (ref 70–99)
HCT VFR BLD CALC: 28 % — LOW (ref 34.5–45)
HGB BLD-MCNC: 8.6 G/DL — LOW (ref 11.5–15.5)
IMM GRANULOCYTES NFR BLD AUTO: 0.3 % — SIGNIFICANT CHANGE UP (ref 0–1.5)
LYMPHOCYTES # BLD AUTO: 1.18 K/UL — SIGNIFICANT CHANGE UP (ref 1–3.3)
LYMPHOCYTES # BLD AUTO: 37.8 % — SIGNIFICANT CHANGE UP (ref 13–44)
MAGNESIUM SERPL-MCNC: 1.9 MG/DL — SIGNIFICANT CHANGE UP (ref 1.6–2.6)
MCHC RBC-ENTMCNC: 22.5 PG — LOW (ref 27–34)
MCHC RBC-ENTMCNC: 30.7 % — LOW (ref 32–36)
MCV RBC AUTO: 73.3 FL — LOW (ref 80–100)
MONOCYTES # BLD AUTO: 0.38 K/UL — SIGNIFICANT CHANGE UP (ref 0–0.9)
MONOCYTES NFR BLD AUTO: 12.2 % — SIGNIFICANT CHANGE UP (ref 2–14)
NEUTROPHILS # BLD AUTO: 1.43 K/UL — LOW (ref 1.8–7.4)
NEUTROPHILS NFR BLD AUTO: 45.9 % — SIGNIFICANT CHANGE UP (ref 43–77)
PLATELET # BLD AUTO: 139 K/UL — LOW (ref 150–400)
PMV BLD: 10.1 FL — SIGNIFICANT CHANGE UP (ref 7–13)
POTASSIUM SERPL-MCNC: 4.1 MMOL/L — SIGNIFICANT CHANGE UP (ref 3.5–5.3)
POTASSIUM SERPL-SCNC: 4.1 MMOL/L — SIGNIFICANT CHANGE UP (ref 3.5–5.3)
RBC # BLD: 3.82 M/UL — SIGNIFICANT CHANGE UP (ref 3.8–5.2)
RBC # FLD: 15.1 % — HIGH (ref 10.3–14.5)
SODIUM SERPL-SCNC: 136 MMOL/L — SIGNIFICANT CHANGE UP (ref 135–145)
WBC # BLD: 3.12 K/UL — LOW (ref 3.8–10.5)
WBC # FLD AUTO: 3.12 K/UL — LOW (ref 3.8–10.5)

## 2017-01-21 PROCEDURE — 99232 SBSQ HOSP IP/OBS MODERATE 35: CPT | Mod: GC

## 2017-01-21 RX ORDER — FUROSEMIDE 40 MG
1 TABLET ORAL
Qty: 30 | Refills: 0 | COMMUNITY

## 2017-01-21 RX ORDER — ALLOPURINOL 300 MG
2.5 TABLET ORAL
Qty: 0 | Refills: 0 | COMMUNITY
Start: 2017-01-21

## 2017-01-21 RX ORDER — ALLOPURINOL 300 MG
1 TABLET ORAL
Qty: 0 | Refills: 0 | COMMUNITY
Start: 2017-01-21

## 2017-01-21 RX ORDER — CARVEDILOL PHOSPHATE 80 MG/1
1 CAPSULE, EXTENDED RELEASE ORAL
Qty: 60 | Refills: 0 | OUTPATIENT
Start: 2017-01-21 | End: 2017-02-20

## 2017-01-21 RX ORDER — SODIUM BICARBONATE 1 MEQ/ML
1 SYRINGE (ML) INTRAVENOUS
Qty: 0 | Refills: 0 | COMMUNITY

## 2017-01-21 RX ORDER — SODIUM BICARBONATE 1 MEQ/ML
1 SYRINGE (ML) INTRAVENOUS
Qty: 0 | Refills: 0 | COMMUNITY
Start: 2017-01-21

## 2017-01-21 RX ORDER — ALLOPURINOL 300 MG
1 TABLET ORAL
Qty: 0 | Refills: 0 | COMMUNITY

## 2017-01-21 RX ADMIN — Medication 650 MILLIGRAM(S): at 00:59

## 2017-01-21 RX ADMIN — HEPARIN SODIUM 5000 UNIT(S): 5000 INJECTION INTRAVENOUS; SUBCUTANEOUS at 05:19

## 2017-01-21 RX ADMIN — Medication 650 MILLIGRAM(S): at 05:19

## 2017-01-21 RX ADMIN — Medication 1000 UNIT(S): at 11:09

## 2017-01-21 RX ADMIN — Medication 650 MILLIGRAM(S): at 11:10

## 2017-01-21 RX ADMIN — Medication 100 MILLIGRAM(S): at 11:09

## 2017-01-21 RX ADMIN — CALCITRIOL 0.25 MICROGRAM(S): 0.5 CAPSULE ORAL at 11:09

## 2017-01-21 RX ADMIN — Medication 1200 MILLIGRAM(S): at 05:18

## 2017-01-21 RX ADMIN — ISOSORBIDE MONONITRATE 60 MILLIGRAM(S): 60 TABLET, EXTENDED RELEASE ORAL at 11:10

## 2017-01-21 RX ADMIN — CLOPIDOGREL BISULFATE 75 MILLIGRAM(S): 75 TABLET, FILM COATED ORAL at 11:10

## 2017-01-21 RX ADMIN — Medication 81 MILLIGRAM(S): at 11:09

## 2017-01-21 RX ADMIN — CARVEDILOL PHOSPHATE 25 MILLIGRAM(S): 80 CAPSULE, EXTENDED RELEASE ORAL at 05:19

## 2017-01-21 RX ADMIN — Medication 100 MILLIGRAM(S): at 05:19

## 2017-01-21 RX ADMIN — Medication 30 MILLIGRAM(S): at 11:10

## 2017-01-21 RX ADMIN — Medication 200 MILLIGRAM(S): at 11:10

## 2017-01-21 NOTE — DISCHARGE NOTE ADULT - SECONDARY DIAGNOSIS.
HTN (Hypertension) Gout Diabetes CKD stage 5 secondary to hypertension CAD (coronary artery disease) Aortic stenosis, severe

## 2017-01-21 NOTE — DISCHARGE NOTE ADULT - MEDICATION SUMMARY - MEDICATIONS TO CHANGE
I will SWITCH the dose or number of times a day I take the medications listed below when I get home from the hospital:    hydrALAZINE 25 mg tablet  -- 1 tab(s) by mouth 2 times a day x 30 days    hydrALAZINE 100 mg oral tablet  -- 1 tab(s) by mouth 4 times a day    allopurinol 100 mg oral tablet  -- 1 tab(s) by mouth 2 times a day

## 2017-01-21 NOTE — DISCHARGE NOTE ADULT - MEDICATION SUMMARY - MEDICATIONS TO TAKE
I will START or STAY ON the medications listed below when I get home from the hospital:    Aspir-Low 81 mg enteric coated tablet  -- 1 tab(s) by mouth once a day x 30 days   -- Indication: For CAD (coronary artery disease)    sodium bicarbonate 650 mg oral tablet  -- 1 tab(s) by mouth 4 times a day  -- Indication: For Supplement    cloNIDine 0.2 mg oral tablet  -- 1 tab(s) by mouth once a day (at bedtime)  -- Indication: For HTN (Hypertension)    terazosin 2 mg oral capsule  -- 1 cap(s) by mouth once a day (at bedtime)  -- Indication: For HTN (Hypertension)    Imdur 60 mg oral tablet, extended release  -- 1 tab(s) by mouth once a day (in the morning)  -- Indication: For CAD (coronary artery disease)    Onglyza 2.5 mg oral tablet  -- 1 tab(s) by mouth , As Needed  -- Indication: For Diabetes    allopurinol 100 mg oral tablet  -- 1 tab(s) by mouth once a day  -- Indication: For Gout    Crestor 10 mg oral tablet  -- 1 tab(s) by mouth once a day (at bedtime)  -- Indication: For HLD    Plavix 75 mg tablet  -- 1 tab(s) by mouth once a day x 30 days   -- Indication: For CAD (coronary artery disease)    benzonatate 100 mg oral capsule  -- 1 cap(s) by mouth 3 times a day  -- May cause drowsiness.  Alcohol may intensify this effect.  Use care when operating dangerous machinery.  Swallow whole.  Do not crush.    -- Indication: For Cough    oseltamivir 30 mg oral capsule  -- 1 cap(s) by mouth every 48 hours  -- Check with your doctor before becoming pregnant.  Finish all this medication unless otherwise directed by prescriber.    -- Indication: For influenza    carvedilol 25 mg oral tablet  -- 1 tab(s) by mouth every 12 hours  -- Indication: For HTN (Hypertension)    furosemide 40 mg tablet  -- 1 tab(s) by mouth once a day  Do not start till 1/24  -- Indication: For Fluid overload    hydrALAZINE 100 mg oral tablet  -- 1 tab(s) by mouth 3 times a day  -- Indication: For HTN (Hypertension)    calcitriol 0.25 mcg oral capsule  -- 1 cap(s) by mouth once a day  -- Indication: For Supplement    Vitamin D3 1000 intl units oral capsule  -- 1 cap(s) by mouth once a day  -- Indication: For Supplement

## 2017-01-21 NOTE — DISCHARGE NOTE ADULT - PATIENT PORTAL LINK FT
“You can access the [facility] Patient Portal, offered by [facility], by registering with the following website: [email or web address]”

## 2017-01-21 NOTE — DISCHARGE NOTE ADULT - PLAN OF CARE
treat with medication finish course of Tamiflu  Wash your hands often. ...  Cover your mouth when you sneeze or cough. ...  Clean shared items with a germ-killing . ...  Wear a mask over your mouth and nose if you are sick or are near anyone who is sick.  Stay away from others if you are sick.  Influenza vaccine helps prevent influenza (flu). low salt diet  Continue current meds continue current meds low carb diet  continue current meds Do not restart lasix till 1/24-Tuesday  Keep scheduled appt for Dr. Dixon on 2/8  Check kidney function by bloodwork in 1 week with your doctor Follow up with Dr. Damico as scheduled for outpatient GEMMA

## 2017-01-21 NOTE — DISCHARGE NOTE ADULT - CARE PROVIDER_API CALL
Markell Dixon), Internal Medicine; Nephrology  [address] [place] [address]  Phone: [phone]  Fax: [phone]    Casey Damico), Cardiovascular Disease; Internal Medicine; Interventional Cardiology; Nuclear Cardiology  [address]  [place] [address]  Phone: [phone]  Fax: [phone]

## 2017-01-21 NOTE — DISCHARGE NOTE ADULT - CARE PROVIDERS DIRECT ADDRESSES
[email or web address],DirectAddress_Unknown

## 2017-01-21 NOTE — DISCHARGE NOTE ADULT - MEDICATION SUMMARY - MEDICATIONS TO STOP TAKING
I will STOP taking the medications listed below when I get home from the hospital:    furosemide 20 mg tablet  -- 1 tab(s) by mouth once a day (in the evening) x 30 days

## 2017-01-21 NOTE — DISCHARGE NOTE ADULT - CARE PLAN
Principal Discharge DX:	Influenza  Goal:	treat with medication  Instructions for follow-up, activity and diet:	finish course of Tamiflu  Wash your hands often. ...  Cover your mouth when you sneeze or cough. ...  Clean shared items with a germ-killing . ...  Wear a mask over your mouth and nose if you are sick or are near anyone who is sick.  Stay away from others if you are sick.  Influenza vaccine helps prevent influenza (flu).  Secondary Diagnosis:	HTN (Hypertension)  Instructions for follow-up, activity and diet:	low salt diet  Continue current meds  Secondary Diagnosis:	Gout  Instructions for follow-up, activity and diet:	continue current meds  Secondary Diagnosis:	Diabetes  Instructions for follow-up, activity and diet:	low carb diet  continue current meds  Secondary Diagnosis:	CKD stage 5 secondary to hypertension  Instructions for follow-up, activity and diet:	Do not restart lasix till 1/24-Tuesday  Keep scheduled appt for Dr. Dixon on 2/8  Check kidney function by bloodwork in 1 week with your doctor  Secondary Diagnosis:	CAD (coronary artery disease)  Instructions for follow-up, activity and diet:	continue current meds  Secondary Diagnosis:	Aortic stenosis, severe  Instructions for follow-up, activity and diet:	Follow up with Dr. Damico as scheduled for outpatient GEMMA

## 2017-01-21 NOTE — DISCHARGE NOTE ADULT - HOSPITAL COURSE
76 y/o F with PMH of CAD(s/p 3V-CABG and 3 stents), HTN, Anemia, Gout, ESRD(with left AVF but not on dialysis, still makes urine), DM, HLD presented with productive cough, SOB and diffuse body ache. As per the patient her grand-daughter was sick last week with the common cold with abdominal pain. Patient stated that she has been experiencing productive cough with yellow sputum production, with SOB and generalized weakness and fatigue. Patient stated that each day she was getting worse and then decided to come to the ER. Patient also reported a fever of 100.2 and then 100.7 this morning. On Thursday patient developed SOB with minimal exertion, and with climbing a flight of stairs. Patient also endorsed decreased PO intake and multiple episodes of nausea and one episode of diarrhea earlier today. Patient denied any CP, abdominal pain, dysuria, recent travel, pleuritic or positional chest pain.   On ED admission EKG revealed Sinus rhythm with 1st degree AV block at a rate of 82, with LBBB with Qtc of 502, CE x 1: Trop: 0.28, CKMB: 5.0, BNP: 77945, RVP: Influenza positive, H&H: 9.7/32.2, Plt: 144, BUN/Cr: 48/3.50, Gluc: 147, UA: Negative. Prelim CXR: Rotated film. No focal consolidation. Patient received 1x dose of Aspirin 324mg in the ED. When examined patient is resting in the stretcher and complained of cough, denied any CP or SOB.   The following was the hospital course:  EKG: NSR at 82 bpm with 1st degree AV block, LBBB, QTc 502  CE x2: Trop 0.28-->0.27-->0.24, CK negative  CXR: Clear lungs   ProBNP: 46618  H/H: 9.7/32.6  Platelets: 147  BUN/Cr: 48/3.50  Glucose: 147  HgA1C: 6.7  UA: Negative  RVP: Influenza AH3  1/16 ID consulted  1/16 Cards note: CAD. CKD. CV stable. ID eval. Tamiflu. Renal eval. Echo. Pulm eval. Trop elevation likely chronic secondary to CAD and CKD.   1/16 Renal consult (house): CKD stage V. Long standing HTN and DM. Pt with stable renal function. No plan for acute HD. Monitor BMP. Strict I&Os. Avoid nephrotoxins. Metabolic acidosis in setting of CKD. Continue oral bicarb. Anemia in setting of CKD. Continue Aranesp outpatient. Consider increasing Imdur.   1/17 medicine consult Dr York following   Pul Dr Tay Following   1/17 Cardio increased Lasix to 60 PO daily  1/18 Repeat CXR Clear, Urine cx NEG.  Lasix dose reduced 2/2 c/o dizziness and rising in Cr.  Orthostatics were positive-pt. was given a fluid bolus 250 NS.  Repeat orthostatics were negative but patient not feeling back to baseline.  Lasix held for AM  2D Echo:   1. Mitral annular calcification, otherwise normal mitral  valve. Mild mitral regurgitation.  2. Calcified trileaflet aortic valve with decreased  opening. Peak transaortic valve gradient equals 78 mm Hg,  mean transaortic valve gradient equals 39 mm Hg, estimated  aortic valve area equals 0.7 sqcm (by continuity equation),  consistent with severe aortic stenosis. Moderate aortic  regurgitation.  3. Moderately dilated left atrium.  LA volume index = 44  cc/m2.  4. Mild concentric left ventricular hypertrophy.  5. Normal left ventricular systolic function. No segmental  wall motion abnormalities.  6. Mild diastolic dysfunction (Stage I).  7. Normal right ventricular size and function.  *** No previous Echo exam.  1/19 Renal: Stable CKD 5, Orthostasis agree with holding Lasix and resume for discharge.   1/19 Med: Orthostasis resolved, Severe AS needs GEMMA, CKD.   1/20 CATH: SVG to OM1 patent, LIMA to LAD patent, SVG to RCA patent; PA 49/20, PCW 15, CI 4.52, CO 7.45; RFA/V sheaths removed, post cath with persitent 's given labetalol 10mg IVx2, hydralazine 10mg IV x1, Hydralazine 100mg po x1 and Versed 1mg IV x1 with improvmenet   1/20 Med: Severe AS, Cath, GEMMA + possible TAVR.   Blood cultures from 1/15 neg x 48hrs  1/21 The patient was cleared for DC by attending.  She will follow up with Dr. Damico the cardiologist for outpatient GEMMA and follow up with Dr. Dixon her renal doctor.

## 2017-01-23 ENCOUNTER — OUTPATIENT (OUTPATIENT)
Dept: OUTPATIENT SERVICES | Facility: HOSPITAL | Age: 77
LOS: 1 days | Discharge: HOME | End: 2017-01-23

## 2017-01-23 DIAGNOSIS — D46.1 REFRACTORY ANEMIA WITH RING SIDEROBLASTS: ICD-10-CM

## 2017-01-24 ENCOUNTER — RESULT REVIEW (OUTPATIENT)
Age: 77
End: 2017-01-24

## 2017-01-25 ENCOUNTER — APPOINTMENT (OUTPATIENT)
Dept: INFUSION THERAPY | Facility: HOSPITAL | Age: 77
End: 2017-01-25

## 2017-01-25 ENCOUNTER — APPOINTMENT (OUTPATIENT)
Dept: HEMATOLOGY ONCOLOGY | Facility: CLINIC | Age: 77
End: 2017-01-25

## 2017-01-25 VITALS
SYSTOLIC BLOOD PRESSURE: 111 MMHG | WEIGHT: 141.09 LBS | RESPIRATION RATE: 16 BRPM | HEART RATE: 57 BPM | OXYGEN SATURATION: 97 % | TEMPERATURE: 98.7 F | BODY MASS INDEX: 26.66 KG/M2 | DIASTOLIC BLOOD PRESSURE: 42 MMHG

## 2017-01-25 LAB
HCT VFR BLD CALC: 26.8 % — LOW (ref 34.5–45)
HGB BLD-MCNC: 8.4 G/DL — LOW (ref 11.5–15.5)
MCHC RBC-ENTMCNC: 23.3 PG — LOW (ref 27–34)
MCHC RBC-ENTMCNC: 31.3 GM/DL — LOW (ref 32–36)
MCV RBC AUTO: 74.4 FL — LOW (ref 80–100)
PLATELET # BLD AUTO: 186 K/UL — SIGNIFICANT CHANGE UP (ref 150–400)
RBC # BLD: 3.61 M/UL — LOW (ref 3.8–5.2)
RBC # FLD: 13.9 % — SIGNIFICANT CHANGE UP (ref 10.3–14.5)
WBC # BLD: 3.6 K/UL — LOW (ref 3.8–10.5)
WBC # FLD AUTO: 3.6 K/UL — LOW (ref 3.8–10.5)

## 2017-01-26 DIAGNOSIS — N18.5 CHRONIC KIDNEY DISEASE, STAGE 5: ICD-10-CM

## 2017-01-27 ENCOUNTER — APPOINTMENT (OUTPATIENT)
Dept: CV DIAGNOSITCS | Facility: HOSPITAL | Age: 77
End: 2017-01-27

## 2017-01-27 ENCOUNTER — OUTPATIENT (OUTPATIENT)
Dept: OUTPATIENT SERVICES | Facility: HOSPITAL | Age: 77
LOS: 1 days | End: 2017-01-27
Payer: MEDICARE

## 2017-01-27 DIAGNOSIS — R07.89 OTHER CHEST PAIN: ICD-10-CM

## 2017-01-27 PROCEDURE — 93312 ECHO TRANSESOPHAGEAL: CPT | Mod: 26

## 2017-01-27 PROCEDURE — 93320 DOPPLER ECHO COMPLETE: CPT | Mod: 26,GC

## 2017-01-27 PROCEDURE — 93325 DOPPLER ECHO COLOR FLOW MAPG: CPT | Mod: 26,GC

## 2017-02-08 NOTE — ED ADULT NURSE REASSESSMENT NOTE - NS ED NURSE REASSESS COMMENT FT1
PSR pt needs follow up apt for further refills   alert no distress  report given to Kathleen in [facility]   SR on scope    Droplet precautions maintained  right 20g heplock intact

## 2017-02-15 ENCOUNTER — APPOINTMENT (OUTPATIENT)
Dept: NEPHROLOGY | Facility: CLINIC | Age: 77
End: 2017-02-15

## 2017-02-15 ENCOUNTER — LABORATORY RESULT (OUTPATIENT)
Age: 77
End: 2017-02-15

## 2017-02-15 VITALS
HEIGHT: 61 IN | BODY MASS INDEX: 26.81 KG/M2 | HEART RATE: 63 BPM | WEIGHT: 142 LBS | OXYGEN SATURATION: 99 % | DIASTOLIC BLOOD PRESSURE: 64 MMHG | SYSTOLIC BLOOD PRESSURE: 140 MMHG

## 2017-02-16 LAB
25(OH)D3 SERPL-MCNC: 35.7 NG/ML
ALBUMIN SERPL ELPH-MCNC: 4 G/DL
ANION GAP SERPL CALC-SCNC: 16 MMOL/L
APPEARANCE: CLEAR
BACTERIA: ABNORMAL
BASOPHILS # BLD AUTO: 0.05 K/UL
BASOPHILS NFR BLD AUTO: 1.7 %
BILIRUBIN URINE: NEGATIVE
BLOOD URINE: NEGATIVE
BUN SERPL-MCNC: 54 MG/DL
CALCIUM SERPL-MCNC: 9.6 MG/DL
CALCIUM SERPL-MCNC: 9.6 MG/DL
CHLORIDE SERPL-SCNC: 101 MMOL/L
CO2 SERPL-SCNC: 22 MMOL/L
COLOR: YELLOW
CREAT SERPL-MCNC: 3.87 MG/DL
CREAT SPEC-SCNC: 84 MG/DL
CREAT/PROT UR: 1.2 RATIO
EOSINOPHIL # BLD AUTO: 0.17 K/UL
EOSINOPHIL NFR BLD AUTO: 6.1 %
GLUCOSE QUALITATIVE U: 100 MG/DL
GLUCOSE SERPL-MCNC: 120 MG/DL
HCT VFR BLD CALC: 30.2 %
HGB BLD-MCNC: 8.6 G/DL
HYALINE CASTS: 3 /LPF
KETONES URINE: NEGATIVE
LEUKOCYTE ESTERASE URINE: ABNORMAL
LYMPHOCYTES # BLD AUTO: 0.55 K/UL
LYMPHOCYTES NFR BLD AUTO: 20 %
MAN DIFF?: NORMAL
MCHC RBC-ENTMCNC: 22.9 PG
MCHC RBC-ENTMCNC: 28.5 GM/DL
MCV RBC AUTO: 80.3 FL
MICROSCOPIC-UA: NORMAL
MONOCYTES # BLD AUTO: 0.28 K/UL
MONOCYTES NFR BLD AUTO: 10.4 %
NEUTROPHILS # BLD AUTO: 1.59 K/UL
NEUTROPHILS NFR BLD AUTO: 58.3 %
NITRITE URINE: NEGATIVE
PARATHYROID HORMONE INTACT: 140 PG/ML
PH URINE: 6.5
PHOSPHATE SERPL-MCNC: 4.5 MG/DL
PLATELET # BLD AUTO: 218 K/UL
POTASSIUM SERPL-SCNC: 4.3 MMOL/L
PROT UR-MCNC: 104 MG/DL
PROTEIN URINE: 100 MG/DL
RBC # BLD: 3.76 M/UL
RBC # FLD: 17.9 %
RED BLOOD CELLS URINE: 3 /HPF
SODIUM SERPL-SCNC: 139 MMOL/L
SPECIFIC GRAVITY URINE: 1.02
SQUAMOUS EPITHELIAL CELLS: 6 /HPF
UROBILINOGEN URINE: NORMAL MG/DL
WBC # FLD AUTO: 2.73 K/UL
WHITE BLOOD CELLS URINE: 5 /HPF

## 2017-02-21 ENCOUNTER — NON-APPOINTMENT (OUTPATIENT)
Age: 77
End: 2017-02-21

## 2017-02-21 ENCOUNTER — APPOINTMENT (OUTPATIENT)
Dept: CARDIOTHORACIC SURGERY | Facility: CLINIC | Age: 77
End: 2017-02-21

## 2017-02-21 VITALS
RESPIRATION RATE: 14 BRPM | WEIGHT: 139 LBS | SYSTOLIC BLOOD PRESSURE: 182 MMHG | DIASTOLIC BLOOD PRESSURE: 65 MMHG | HEIGHT: 61 IN | TEMPERATURE: 98.2 F | BODY MASS INDEX: 26.24 KG/M2 | OXYGEN SATURATION: 100 % | HEART RATE: 80 BPM

## 2017-03-03 ENCOUNTER — APPOINTMENT (OUTPATIENT)
Dept: CARDIOLOGY | Facility: HOSPITAL | Age: 77
End: 2017-03-03

## 2017-03-06 ENCOUNTER — APPOINTMENT (OUTPATIENT)
Dept: CARDIOLOGY | Facility: HOSPITAL | Age: 77
End: 2017-03-06

## 2017-03-06 ENCOUNTER — OUTPATIENT (OUTPATIENT)
Dept: OUTPATIENT SERVICES | Facility: HOSPITAL | Age: 77
LOS: 1 days | Discharge: HOME | End: 2017-03-06

## 2017-03-06 DIAGNOSIS — D46.1 REFRACTORY ANEMIA WITH RING SIDEROBLASTS: ICD-10-CM

## 2017-03-08 ENCOUNTER — APPOINTMENT (OUTPATIENT)
Dept: INFUSION THERAPY | Facility: HOSPITAL | Age: 77
End: 2017-03-08

## 2017-03-08 ENCOUNTER — APPOINTMENT (OUTPATIENT)
Dept: HEMATOLOGY ONCOLOGY | Facility: CLINIC | Age: 77
End: 2017-03-08

## 2017-03-22 ENCOUNTER — APPOINTMENT (OUTPATIENT)
Dept: CARDIOLOGY | Facility: HOSPITAL | Age: 77
End: 2017-03-22

## 2017-03-22 ENCOUNTER — OUTPATIENT (OUTPATIENT)
Dept: OUTPATIENT SERVICES | Facility: HOSPITAL | Age: 77
LOS: 1 days | End: 2017-03-22
Payer: MEDICARE

## 2017-03-22 DIAGNOSIS — I35.0 NONRHEUMATIC AORTIC (VALVE) STENOSIS: ICD-10-CM

## 2017-03-22 DIAGNOSIS — R07.9 CHEST PAIN, UNSPECIFIED: ICD-10-CM

## 2017-03-22 PROCEDURE — 75572 CT HRT W/3D IMAGE: CPT

## 2017-03-22 PROCEDURE — 75572 CT HRT W/3D IMAGE: CPT | Mod: 26

## 2017-03-27 ENCOUNTER — CLINICAL ADVICE (OUTPATIENT)
Age: 77
End: 2017-03-27

## 2017-03-27 ENCOUNTER — APPOINTMENT (OUTPATIENT)
Dept: ULTRASOUND IMAGING | Facility: HOSPITAL | Age: 77
End: 2017-03-27

## 2017-03-27 ENCOUNTER — OUTPATIENT (OUTPATIENT)
Dept: OUTPATIENT SERVICES | Facility: HOSPITAL | Age: 77
LOS: 1 days | End: 2017-03-27
Payer: MEDICARE

## 2017-03-27 VITALS
DIASTOLIC BLOOD PRESSURE: 82 MMHG | RESPIRATION RATE: 16 BRPM | TEMPERATURE: 98 F | SYSTOLIC BLOOD PRESSURE: 140 MMHG | HEART RATE: 56 BPM | WEIGHT: 145.51 LBS | OXYGEN SATURATION: 100 % | HEIGHT: 61 IN

## 2017-03-27 DIAGNOSIS — I77.0 ARTERIOVENOUS FISTULA, ACQUIRED: Chronic | ICD-10-CM

## 2017-03-27 DIAGNOSIS — Z01.818 ENCOUNTER FOR OTHER PREPROCEDURAL EXAMINATION: ICD-10-CM

## 2017-03-27 DIAGNOSIS — I35.0 NONRHEUMATIC AORTIC (VALVE) STENOSIS: ICD-10-CM

## 2017-03-27 DIAGNOSIS — I10 ESSENTIAL (PRIMARY) HYPERTENSION: ICD-10-CM

## 2017-03-27 DIAGNOSIS — E11.9 TYPE 2 DIABETES MELLITUS WITHOUT COMPLICATIONS: ICD-10-CM

## 2017-03-27 DIAGNOSIS — Z90.49 ACQUIRED ABSENCE OF OTHER SPECIFIED PARTS OF DIGESTIVE TRACT: Chronic | ICD-10-CM

## 2017-03-27 DIAGNOSIS — N18.4 CHRONIC KIDNEY DISEASE, STAGE 4 (SEVERE): ICD-10-CM

## 2017-03-27 LAB
ANION GAP SERPL CALC-SCNC: 16 MMOL/L — SIGNIFICANT CHANGE UP (ref 5–17)
BLD GP AB SCN SERPL QL: NEGATIVE — SIGNIFICANT CHANGE UP
BUN SERPL-MCNC: 54 MG/DL — HIGH (ref 7–23)
CALCIUM SERPL-MCNC: 9.9 MG/DL — SIGNIFICANT CHANGE UP (ref 8.4–10.5)
CHLORIDE SERPL-SCNC: 103 MMOL/L — SIGNIFICANT CHANGE UP (ref 96–108)
CO2 SERPL-SCNC: 21 MMOL/L — LOW (ref 22–31)
CREAT SERPL-MCNC: 4.09 MG/DL — HIGH (ref 0.5–1.3)
GLUCOSE SERPL-MCNC: 98 MG/DL — SIGNIFICANT CHANGE UP (ref 70–99)
HCT VFR BLD CALC: 22.4 % — LOW (ref 34.5–45)
HGB BLD-MCNC: 6.8 G/DL — CRITICAL LOW (ref 11.5–15.5)
MCHC RBC-ENTMCNC: 23.3 PG — LOW (ref 27–34)
MCHC RBC-ENTMCNC: 30.4 GM/DL — LOW (ref 32–36)
MCV RBC AUTO: 76.7 FL — LOW (ref 80–100)
MRSA PCR RESULT.: SIGNIFICANT CHANGE UP
PLATELET # BLD AUTO: 160 K/UL — SIGNIFICANT CHANGE UP (ref 150–400)
POTASSIUM SERPL-MCNC: 4.8 MMOL/L — SIGNIFICANT CHANGE UP (ref 3.5–5.3)
POTASSIUM SERPL-SCNC: 4.8 MMOL/L — SIGNIFICANT CHANGE UP (ref 3.5–5.3)
RBC # BLD: 2.92 M/UL — LOW (ref 3.8–5.2)
RBC # FLD: 15.4 % — HIGH (ref 10.3–14.5)
RH IG SCN BLD-IMP: POSITIVE — SIGNIFICANT CHANGE UP
S AUREUS DNA NOSE QL NAA+PROBE: SIGNIFICANT CHANGE UP
SODIUM SERPL-SCNC: 140 MMOL/L — SIGNIFICANT CHANGE UP (ref 135–145)
WBC # BLD: 2.92 K/UL — LOW (ref 3.8–10.5)
WBC # FLD AUTO: 2.92 K/UL — LOW (ref 3.8–10.5)

## 2017-03-27 PROCEDURE — 71020: CPT | Mod: 26

## 2017-03-27 PROCEDURE — 93880 EXTRACRANIAL BILAT STUDY: CPT | Mod: 26

## 2017-03-27 RX ORDER — HYDRALAZINE HCL 50 MG
1 TABLET ORAL
Qty: 0 | Refills: 0 | COMMUNITY

## 2017-03-27 RX ORDER — CHOLECALCIFEROL (VITAMIN D3) 125 MCG
1 CAPSULE ORAL
Qty: 0 | Refills: 0 | COMMUNITY

## 2017-03-27 RX ORDER — SODIUM CHLORIDE 9 MG/ML
3 INJECTION INTRAMUSCULAR; INTRAVENOUS; SUBCUTANEOUS EVERY 8 HOURS
Qty: 0 | Refills: 0 | Status: DISCONTINUED | OUTPATIENT
Start: 2017-03-30 | End: 2017-04-02

## 2017-03-27 RX ORDER — ISOSORBIDE MONONITRATE 60 MG/1
1 TABLET, EXTENDED RELEASE ORAL
Qty: 0 | Refills: 0 | COMMUNITY

## 2017-03-27 RX ORDER — CEFUROXIME AXETIL 250 MG
1500 TABLET ORAL ONCE
Qty: 0 | Refills: 0 | Status: COMPLETED | OUTPATIENT
Start: 2017-03-30 | End: 2017-03-30

## 2017-03-27 NOTE — H&P PST ADULT - ACTIVITY
grocery shopping, carries bags, can walk 2 blocks, one flight of stairs slow grocery shopping, carries bags, can walk 1 block with cane, then has to stop 2/2 SOB

## 2017-03-27 NOTE — H&P PST ADULT - NSANTHOSAYNRD_GEN_A_CORE
No. DANIELA screening performed.  STOP BANG Legend: 0-2 = LOW Risk; 3-4 = INTERMEDIATE Risk; 5-8 = HIGH Risk

## 2017-03-27 NOTE — H&P PST ADULT - PSH
S/P CABG (coronary artery bypass graft)  triple in 2007  Stented coronary artery  s/p 3 stents A-V fistula  left arm  S/P CABG (coronary artery bypass graft)  triple in 2007  S/P cholecystectomy    Stented coronary artery  s/p 3 stents, then CABG 2007

## 2017-03-27 NOTE — H&P PST ADULT - VISION (WITH CORRECTIVE LENSES IF THE PATIENT USUALLY WEARS THEM):
Normal vision: sees adequately in most situations; can see medication labels, newsprint/corrective glasses

## 2017-03-27 NOTE — H&P PST ADULT - MALLAMPATI CLASS
Class III - visualization of the soft palate and the base of the uvula Class IV (difficult) - the soft palate is not visible at all

## 2017-03-27 NOTE — H&P PST ADULT - HISTORY OF PRESENT ILLNESS
76 y/o F with PMH of CAD(s/p 3V-CABG and 3 stents), HTN, Anemia, Gout, ESRD(with left AVF but not on dialysis, still makes urine), DM, HLD presented with productive cough, SOB and diffuse body ache. As per the patient her grand-daughter was sick last week with the common cold with abdominal pain. Patient stated that she has been experiencing productive cough with yellow sputum production, with SOB and generalized weakness and fatigue. Patient stated that each day she was getting worse and then decided to come to the ER. Patient also reported a fever of 100.2 and then 100.7 this morning. On Thursday patient developed SOB with minimal exertion, and with climbing a flight of stairs. Patient also endorsed decreased PO intake and multiple episodes of nausea and one episode of diarrhea earlier today. Patient denied any CP, abdominal pain, dysuria, recent travel, pleuritic or positional chest pain.     On ED admission EKG revealed Sinus rhythm with 1st degree AV block at a rate of 82, with LBBB with Qtc of 502, CE x 1: Trop: 0.28, CKMB: 5.0, BNP: 12834, RVP: Influenza positive, H&H: 9.7/32.2, Plt: 144, BUN/Cr: 48/3.50, Gluc: 147, UA: Negative. Prelim CXR: Rotated film. No focal consolidation. Patient received 1x dose of Aspirin 324mg in the ED. When examined patient is resting in the stretcher and complained of cough, denied any CP or SOB.             76 y/o female with a PMHx of HTN, anemia, DM, CAD s/p CABG x3 (2007), stents x3, presents to ED with productive cough, sneezing, rhinitis, congestion and headache and fatigue x 3 days. Pt reports SOB and pleuritic chest pain started today.  Pt reports fever of 100.7 orally and intermittent chills. Pt reports difficulty walking up her stairs at home and can walk half a block before having to catch her breath.  Pt reports 1 episode of nausea and vomiting today after trying to eat, and 1 episode of diarrhea. Pt denies changes in vision, lightheadedness, dizziness, numbness, tingling, sore throat, ear pain, sinus pressure, palpitations, abdominal pain, dysuria, urinary frequency/urgency, rash, recent travel, swelling, claudication. 76 yo black female, PMH CAD s/p cardiac stents X3, CABG X 3 2007 (In [place]), CKD Stage IV s/p AVF on left arm - not on dialysis at this time, HTN, DM2. Pt. reports increased SOB since she fell ill on January 2017 and hospitalized for the flu at [facility]. Pt. reports that she has to stop after walking one block to catch her breath, had GEMMA and Cardiac Cath in January - which revealed severe AS. Pt. presents to PST today for TAVR. Pt. denies recent fever, chills, chest pain, changes in bowel/urinary habits. 76 yo black female, PMH CAD s/p cardiac stents X3, CABG X 3 2007 (In [place]), CKD Stage IV s/p AVF on left arm - not on dialysis at this time, HTN, DM2. Pt. reports increased SOB since she fell ill on January 2017 and hospitalized for the flu at [facility]. Pt. reports that she has to stop after walking one block to catch her breath, had GEMMA and Cardiac Cath in January - which revealed severe AS. Pt. presents to PST today for TAVR. Pt. denies recent fever, chills, chest pain, changes in bowel/urinary habits.     Lab called at 1645 with an H&H 6.8/22.4. Pt. was called and denies any palpitations, dizziness. Pt. will come back tomorrow morning (3/28/17) for a CBC repeat. TELLO Dubois in CT Surgery is aware and agrees with plan. 78 yo black female, PMH CAD s/p cardiac stents X3, CABG X 3 2007, CKD Stage IV s/p AVF on left arm - not on dialysis at this time, HTN, DM2. Pt. reports increased SOB since she fell ill on January 2017 and hospitalized for the flu at [facility]. Pt. reports that she has to stop after walking one block to catch her breath, had GEMMA and Cardiac Cath in January - which revealed severe AS. Pt. presents to PST today for TAVR. Pt. denies recent fever, chills, chest pain, changes in bowel/urinary habits.     Lab called at 1645 with an H&H 6.8/22.4. Pt. was called and denies any palpitations, dizziness, or signs of any active bleed. Pt. agreed to come back tomorrow morning (3/28/17) for a CBC repeat. TELLO Dubois in CT Surgery is aware and agrees with plan.

## 2017-03-27 NOTE — H&P PST ADULT - PMH
Anemia Associated with Chronic Renal Failure    Arthritis    CAD (coronary artery disease)    Diabetes    Gout    HTN (Hypertension) Anemia Associated with Chronic Renal Failure    Arthritis    CAD (coronary artery disease)    CKD (chronic kidney disease) stage 4, GFR 15-29 ml/min    DM2 (diabetes mellitus, type 2)    Gout    HTN (Hypertension)    Osteoarthritis  bilateral knees  Severe aortic stenosis Anemia Associated with Chronic Renal Failure    Arthritis    CAD (coronary artery disease)    CKD (chronic kidney disease) stage 4, GFR 15-29 ml/min    DM2 (diabetes mellitus, type 2)    Gout    HTN (Hypertension)    Osteoarthritis  bilateral knees  Severe aortic stenosis    Thyroid nodule  awaiting FNB in the near future

## 2017-03-27 NOTE — H&P PST ADULT - PROBLEM SELECTOR PLAN 1
TAVR   Pre-op education, including Chlorhexidine soap, provided - all questions answered  As per Silvino, in CT Sx, pt. will continue ASA and Plavix during the alexis-op period and am of surgery   CXR, carotid dopplers done today  Pt. had PFT re-scheduled for tomorrow 3/28/17

## 2017-03-27 NOTE — H&P PST ADULT - CIGARETTES, PACKS/DAY
Bedside and Verbal shift change report given to Roseanna Mckeon RN by Margery Mcardle, RN. Report included the following information SBAR, Kardex, Intake/Output, MAR and Recent Results.
TRANSFER - OUT REPORT:    Verbal report given to Gerardo Schulz RN (name) on Lendel Snellen  being transferred to (unit) for routine progression of care       Report consisted of patients Situation, Background, Assessment and   Recommendations(SBAR). Information from the following report(s) SBAR, ED Summary, [facility] and Recent Results was reviewed with the receiving nurse. Lines:   Peripheral IV 03/18/17 Left Antecubital (Active)       Peripheral IV 03/18/17 Left Wrist (Active)        Opportunity for questions and clarification was provided.       Patient transported with:   [facility]
1

## 2017-03-27 NOTE — H&P PST ADULT - NEGATIVE CARDIOVASCULAR SYMPTOMS
no paroxysmal nocturnal dyspnea/no chest pain/no claudication/no orthopnea/no peripheral edema/no palpitations

## 2017-03-28 ENCOUNTER — APPOINTMENT (OUTPATIENT)
Dept: PULMONOLOGY | Facility: CLINIC | Age: 77
End: 2017-03-28

## 2017-03-28 LAB
HCT VFR BLD CALC: 23 % — LOW (ref 34.5–45)
HGB BLD-MCNC: 7.3 G/DL — LOW (ref 11.5–15.5)
MCHC RBC-ENTMCNC: 24.7 PG — LOW (ref 27–34)
MCHC RBC-ENTMCNC: 31.7 GM/DL — LOW (ref 32–36)
MCV RBC AUTO: 77.8 FL — LOW (ref 80–100)
PLATELET # BLD AUTO: 121 K/UL — LOW (ref 150–400)
RBC # BLD: 2.96 M/UL — LOW (ref 3.8–5.2)
RBC # FLD: 13.8 % — SIGNIFICANT CHANGE UP (ref 10.3–14.5)
WBC # BLD: 2.4 K/UL — LOW (ref 3.8–10.5)
WBC # FLD AUTO: 2.4 K/UL — LOW (ref 3.8–10.5)

## 2017-03-29 ENCOUNTER — APPOINTMENT (OUTPATIENT)
Dept: NEPHROLOGY | Facility: CLINIC | Age: 77
End: 2017-03-29

## 2017-03-30 ENCOUNTER — INPATIENT (INPATIENT)
Facility: HOSPITAL | Age: 77
LOS: 2 days | Discharge: HOME | DRG: 267 | End: 2017-04-02
Attending: THORACIC SURGERY (CARDIOTHORACIC VASCULAR SURGERY) | Admitting: THORACIC SURGERY (CARDIOTHORACIC VASCULAR SURGERY)
Payer: MEDICARE

## 2017-03-30 ENCOUNTER — APPOINTMENT (OUTPATIENT)
Dept: CARDIOTHORACIC SURGERY | Facility: HOSPITAL | Age: 77
End: 2017-03-30

## 2017-03-30 VITALS
WEIGHT: 140.21 LBS | RESPIRATION RATE: 18 BRPM | HEART RATE: 90 BPM | HEIGHT: 61 IN | TEMPERATURE: 98 F | DIASTOLIC BLOOD PRESSURE: 69 MMHG | OXYGEN SATURATION: 99 % | SYSTOLIC BLOOD PRESSURE: 165 MMHG

## 2017-03-30 DIAGNOSIS — I35.0 NONRHEUMATIC AORTIC (VALVE) STENOSIS: ICD-10-CM

## 2017-03-30 DIAGNOSIS — Z90.49 ACQUIRED ABSENCE OF OTHER SPECIFIED PARTS OF DIGESTIVE TRACT: Chronic | ICD-10-CM

## 2017-03-30 DIAGNOSIS — I77.0 ARTERIOVENOUS FISTULA, ACQUIRED: Chronic | ICD-10-CM

## 2017-03-30 LAB
ALBUMIN SERPL ELPH-MCNC: 3.8 G/DL — SIGNIFICANT CHANGE UP (ref 3.3–5)
ALP SERPL-CCNC: 36 U/L — LOW (ref 40–120)
ALT FLD-CCNC: 7 U/L RC — LOW (ref 10–45)
ANION GAP SERPL CALC-SCNC: 17 MMOL/L — SIGNIFICANT CHANGE UP (ref 5–17)
ANISOCYTOSIS BLD QL: SLIGHT — SIGNIFICANT CHANGE UP
APTT BLD: 27.6 SEC — SIGNIFICANT CHANGE UP (ref 27.5–37.4)
AST SERPL-CCNC: 15 U/L — SIGNIFICANT CHANGE UP (ref 10–40)
BASOPHILS # BLD AUTO: 0 K/UL — SIGNIFICANT CHANGE UP (ref 0–0.2)
BASOPHILS NFR BLD AUTO: 0.6 % — SIGNIFICANT CHANGE UP (ref 0–2)
BILIRUB SERPL-MCNC: 0.5 MG/DL — SIGNIFICANT CHANGE UP (ref 0.2–1.2)
BUN SERPL-MCNC: 43 MG/DL — HIGH (ref 7–23)
BURR CELLS BLD QL SMEAR: PRESENT — SIGNIFICANT CHANGE UP
CALCIUM SERPL-MCNC: 9.3 MG/DL — SIGNIFICANT CHANGE UP (ref 8.4–10.5)
CHLORIDE SERPL-SCNC: 106 MMOL/L — SIGNIFICANT CHANGE UP (ref 96–108)
CO2 SERPL-SCNC: 18 MMOL/L — LOW (ref 22–31)
CREAT SERPL-MCNC: 3.21 MG/DL — HIGH (ref 0.5–1.3)
DACRYOCYTES BLD QL SMEAR: SLIGHT — SIGNIFICANT CHANGE UP
ELLIPTOCYTES BLD QL SMEAR: SLIGHT — SIGNIFICANT CHANGE UP
EOSINOPHIL # BLD AUTO: 0.1 K/UL — SIGNIFICANT CHANGE UP (ref 0–0.5)
EOSINOPHIL NFR BLD AUTO: 3.4 % — SIGNIFICANT CHANGE UP (ref 0–6)
GAS PNL BLDA: SIGNIFICANT CHANGE UP
GLUCOSE SERPL-MCNC: 129 MG/DL — HIGH (ref 70–99)
HCT VFR BLD CALC: 23.4 % — LOW (ref 34.5–45)
HCT VFR BLD CALC: 26.9 % — LOW (ref 34.5–45)
HGB BLD-MCNC: 7.4 G/DL — LOW (ref 11.5–15.5)
HGB BLD-MCNC: 8.8 G/DL — LOW (ref 11.5–15.5)
INR BLD: 1.17 RATIO — HIGH (ref 0.88–1.16)
LYMPHOCYTES # BLD AUTO: 0.8 K/UL — LOW (ref 1–3.3)
LYMPHOCYTES # BLD AUTO: 26.1 % — SIGNIFICANT CHANGE UP (ref 13–44)
MCHC RBC-ENTMCNC: 24.2 PG — LOW (ref 27–34)
MCHC RBC-ENTMCNC: 26 PG — LOW (ref 27–34)
MCHC RBC-ENTMCNC: 31.5 GM/DL — LOW (ref 32–36)
MCHC RBC-ENTMCNC: 32.8 GM/DL — SIGNIFICANT CHANGE UP (ref 32–36)
MCV RBC AUTO: 76.8 FL — LOW (ref 80–100)
MCV RBC AUTO: 79.1 FL — LOW (ref 80–100)
MICROCYTES BLD QL: SLIGHT — SIGNIFICANT CHANGE UP
MONOCYTES # BLD AUTO: 0.2 K/UL — SIGNIFICANT CHANGE UP (ref 0–0.9)
MONOCYTES NFR BLD AUTO: 6.6 % — SIGNIFICANT CHANGE UP (ref 2–14)
NEUTROPHILS # BLD AUTO: 1.8 K/UL — SIGNIFICANT CHANGE UP (ref 1.8–7.4)
NEUTROPHILS NFR BLD AUTO: 63.3 % — SIGNIFICANT CHANGE UP (ref 43–77)
OVALOCYTES BLD QL SMEAR: SLIGHT — SIGNIFICANT CHANGE UP
PLAT MORPH BLD: NORMAL — SIGNIFICANT CHANGE UP
PLATELET # BLD AUTO: 134 K/UL — LOW (ref 150–400)
PLATELET # BLD AUTO: 94 K/UL — LOW (ref 150–400)
POIKILOCYTOSIS BLD QL AUTO: SLIGHT — SIGNIFICANT CHANGE UP
POTASSIUM SERPL-MCNC: 4 MMOL/L — SIGNIFICANT CHANGE UP (ref 3.5–5.3)
POTASSIUM SERPL-MCNC: 4.9 MMOL/L — SIGNIFICANT CHANGE UP (ref 3.5–5.3)
POTASSIUM SERPL-SCNC: 4 MMOL/L — SIGNIFICANT CHANGE UP (ref 3.5–5.3)
POTASSIUM SERPL-SCNC: 4.9 MMOL/L — SIGNIFICANT CHANGE UP (ref 3.5–5.3)
PROT SERPL-MCNC: 6.1 G/DL — SIGNIFICANT CHANGE UP (ref 6–8.3)
PROTHROM AB SERPL-ACNC: 12.8 SEC — HIGH (ref 9.8–12.7)
RBC # BLD: 3.05 M/UL — LOW (ref 3.8–5.2)
RBC # BLD: 3.4 M/UL — LOW (ref 3.8–5.2)
RBC # FLD: 13.6 % — SIGNIFICANT CHANGE UP (ref 10.3–14.5)
RBC # FLD: 14.6 % — HIGH (ref 10.3–14.5)
RBC BLD AUTO: ABNORMAL
RH IG SCN BLD-IMP: POSITIVE — SIGNIFICANT CHANGE UP
SCHISTOCYTES BLD QL AUTO: SLIGHT — SIGNIFICANT CHANGE UP
SODIUM SERPL-SCNC: 141 MMOL/L — SIGNIFICANT CHANGE UP (ref 135–145)
SPHEROCYTES BLD QL SMEAR: SLIGHT — SIGNIFICANT CHANGE UP
WBC # BLD: 2.9 K/UL — LOW (ref 3.8–10.5)
WBC # BLD: 2.9 K/UL — LOW (ref 3.8–10.5)
WBC # FLD AUTO: 2.9 K/UL — LOW (ref 3.8–10.5)
WBC # FLD AUTO: 2.9 K/UL — LOW (ref 3.8–10.5)

## 2017-03-30 PROCEDURE — 93010 ELECTROCARDIOGRAM REPORT: CPT

## 2017-03-30 PROCEDURE — 71010: CPT | Mod: 26

## 2017-03-30 PROCEDURE — 99222 1ST HOSP IP/OBS MODERATE 55: CPT

## 2017-03-30 PROCEDURE — 33361 REPLACE AORTIC VALVE PERQ: CPT | Mod: 62,Q0

## 2017-03-30 RX ORDER — INSULIN LISPRO 100/ML
VIAL (ML) SUBCUTANEOUS
Qty: 0 | Refills: 0 | Status: DISCONTINUED | OUTPATIENT
Start: 2017-03-30 | End: 2017-04-02

## 2017-03-30 RX ORDER — INSULIN LISPRO 100/ML
VIAL (ML) SUBCUTANEOUS AT BEDTIME
Qty: 0 | Refills: 0 | Status: DISCONTINUED | OUTPATIENT
Start: 2017-03-30 | End: 2017-04-02

## 2017-03-30 RX ORDER — FUROSEMIDE 40 MG
40 TABLET ORAL DAILY
Qty: 0 | Refills: 0 | Status: DISCONTINUED | OUTPATIENT
Start: 2017-03-31 | End: 2017-03-31

## 2017-03-30 RX ORDER — PANTOPRAZOLE SODIUM 20 MG/1
40 TABLET, DELAYED RELEASE ORAL DAILY
Qty: 0 | Refills: 0 | Status: DISCONTINUED | OUTPATIENT
Start: 2017-03-30 | End: 2017-04-01

## 2017-03-30 RX ORDER — ATORVASTATIN CALCIUM 80 MG/1
40 TABLET, FILM COATED ORAL AT BEDTIME
Qty: 0 | Refills: 0 | Status: DISCONTINUED | OUTPATIENT
Start: 2017-03-30 | End: 2017-04-02

## 2017-03-30 RX ORDER — ALLOPURINOL 300 MG
100 TABLET ORAL
Qty: 0 | Refills: 0 | Status: DISCONTINUED | OUTPATIENT
Start: 2017-03-31 | End: 2017-04-02

## 2017-03-30 RX ORDER — SODIUM CHLORIDE 9 MG/ML
1000 INJECTION, SOLUTION INTRAVENOUS
Qty: 0 | Refills: 0 | Status: DISCONTINUED | OUTPATIENT
Start: 2017-03-30 | End: 2017-04-02

## 2017-03-30 RX ORDER — DOCUSATE SODIUM 100 MG
100 CAPSULE ORAL THREE TIMES A DAY
Qty: 0 | Refills: 0 | Status: DISCONTINUED | OUTPATIENT
Start: 2017-03-30 | End: 2017-04-02

## 2017-03-30 RX ORDER — CHLORHEXIDINE GLUCONATE 213 G/1000ML
15 SOLUTION TOPICAL
Qty: 0 | Refills: 0 | Status: DISCONTINUED | OUTPATIENT
Start: 2017-03-30 | End: 2017-03-30

## 2017-03-30 RX ORDER — GLUCAGON INJECTION, SOLUTION 0.5 MG/.1ML
1 INJECTION, SOLUTION SUBCUTANEOUS ONCE
Qty: 0 | Refills: 0 | Status: DISCONTINUED | OUTPATIENT
Start: 2017-03-30 | End: 2017-04-02

## 2017-03-30 RX ORDER — CALCITRIOL 0.5 UG/1
0.25 CAPSULE ORAL DAILY
Qty: 0 | Refills: 0 | Status: DISCONTINUED | OUTPATIENT
Start: 2017-03-31 | End: 2017-04-02

## 2017-03-30 RX ORDER — DEXTROSE 50 % IN WATER 50 %
12.5 SYRINGE (ML) INTRAVENOUS ONCE
Qty: 0 | Refills: 0 | Status: DISCONTINUED | OUTPATIENT
Start: 2017-03-30 | End: 2017-04-02

## 2017-03-30 RX ORDER — CEFUROXIME AXETIL 250 MG
1500 TABLET ORAL ONCE
Qty: 0 | Refills: 0 | Status: COMPLETED | OUTPATIENT
Start: 2017-03-31 | End: 2017-03-31

## 2017-03-30 RX ORDER — DEXTROSE 50 % IN WATER 50 %
1 SYRINGE (ML) INTRAVENOUS ONCE
Qty: 0 | Refills: 0 | Status: DISCONTINUED | OUTPATIENT
Start: 2017-03-30 | End: 2017-04-02

## 2017-03-30 RX ORDER — DEXTROSE 50 % IN WATER 50 %
25 SYRINGE (ML) INTRAVENOUS ONCE
Qty: 0 | Refills: 0 | Status: DISCONTINUED | OUTPATIENT
Start: 2017-03-30 | End: 2017-04-02

## 2017-03-30 RX ORDER — FENTANYL CITRATE 50 UG/ML
25 INJECTION INTRAVENOUS ONCE
Qty: 0 | Refills: 0 | Status: DISCONTINUED | OUTPATIENT
Start: 2017-03-30 | End: 2017-03-30

## 2017-03-30 RX ORDER — NICARDIPINE HYDROCHLORIDE 30 MG/1
3 CAPSULE, EXTENDED RELEASE ORAL
Qty: 40 | Refills: 0 | Status: DISCONTINUED | OUTPATIENT
Start: 2017-03-30 | End: 2017-04-01

## 2017-03-30 RX ORDER — CLOPIDOGREL BISULFATE 75 MG/1
75 TABLET, FILM COATED ORAL DAILY
Qty: 0 | Refills: 0 | Status: DISCONTINUED | OUTPATIENT
Start: 2017-03-30 | End: 2017-04-02

## 2017-03-30 RX ORDER — SODIUM BICARBONATE 1 MEQ/ML
650 SYRINGE (ML) INTRAVENOUS
Qty: 0 | Refills: 0 | Status: DISCONTINUED | OUTPATIENT
Start: 2017-03-31 | End: 2017-04-02

## 2017-03-30 RX ORDER — ASPIRIN/CALCIUM CARB/MAGNESIUM 324 MG
81 TABLET ORAL DAILY
Qty: 0 | Refills: 0 | Status: DISCONTINUED | OUTPATIENT
Start: 2017-03-30 | End: 2017-04-02

## 2017-03-30 RX ADMIN — Medication 100 MILLIGRAM(S): at 21:09

## 2017-03-30 RX ADMIN — CLOPIDOGREL BISULFATE 75 MILLIGRAM(S): 75 TABLET, FILM COATED ORAL at 20:33

## 2017-03-30 RX ADMIN — ATORVASTATIN CALCIUM 40 MILLIGRAM(S): 80 TABLET, FILM COATED ORAL at 22:15

## 2017-03-30 RX ADMIN — NICARDIPINE HYDROCHLORIDE 15 MG/HR: 30 CAPSULE, EXTENDED RELEASE ORAL at 14:55

## 2017-03-30 RX ADMIN — FENTANYL CITRATE 25 MICROGRAM(S): 50 INJECTION INTRAVENOUS at 16:15

## 2017-03-30 RX ADMIN — FENTANYL CITRATE 25 MICROGRAM(S): 50 INJECTION INTRAVENOUS at 16:30

## 2017-03-30 RX ADMIN — SODIUM CHLORIDE 3 MILLILITER(S): 9 INJECTION INTRAMUSCULAR; INTRAVENOUS; SUBCUTANEOUS at 21:08

## 2017-03-30 RX ADMIN — Medication 81 MILLIGRAM(S): at 20:33

## 2017-03-30 NOTE — BRIEF OPERATIVE NOTE - OPERATION/FINDINGS
tavr percutaneously right femoral percutaneously  29 core valve tavr percutaneously right femoral percutaneously  29 core valve temporary pacemaker

## 2017-03-30 NOTE — PATIENT PROFILE ADULT. - VISION (WITH CORRECTIVE LENSES IF THE PATIENT USUALLY WEARS THEM):
corrective glasses/Normal vision: sees adequately in most situations; can see medication labels, newsprint

## 2017-03-30 NOTE — AIRWAY REMOVAL NOTE  ADULT & PEDS - ARTIFICAL AIRWAY REMOVAL COMMENTS
Pt was extubated as per Dr. Cheney.  Pt was identified by name, MRN and date of birth.  Present during the extubation was Dr Cheney and Marylin Best RN  Pt tolerated exxtubation well.

## 2017-03-30 NOTE — PATIENT PROFILE ADULT. - PMH
Anemia Associated with Chronic Renal Failure    Arthritis    CAD (coronary artery disease)    CKD (chronic kidney disease) stage 4, GFR 15-29 ml/min    DM2 (diabetes mellitus, type 2)    Gout    HTN (Hypertension)    Osteoarthritis  bilateral knees  Severe aortic stenosis    Thyroid nodule  awaiting FNB in the near future

## 2017-03-31 LAB
ALBUMIN SERPL ELPH-MCNC: 3.6 G/DL — SIGNIFICANT CHANGE UP (ref 3.3–5)
ALBUMIN SERPL ELPH-MCNC: 3.8 G/DL — SIGNIFICANT CHANGE UP (ref 3.3–5)
ALP SERPL-CCNC: 37 U/L — LOW (ref 40–120)
ALP SERPL-CCNC: 38 U/L — LOW (ref 40–120)
ALT FLD-CCNC: 6 U/L RC — LOW (ref 10–45)
ALT FLD-CCNC: 8 U/L RC — LOW (ref 10–45)
ANION GAP SERPL CALC-SCNC: 14 MMOL/L — SIGNIFICANT CHANGE UP (ref 5–17)
ANION GAP SERPL CALC-SCNC: 17 MMOL/L — SIGNIFICANT CHANGE UP (ref 5–17)
APTT BLD: 25.3 SEC — LOW (ref 27.5–37.4)
APTT BLD: 27.9 SEC — SIGNIFICANT CHANGE UP (ref 27.5–37.4)
AST SERPL-CCNC: 17 U/L — SIGNIFICANT CHANGE UP (ref 10–40)
AST SERPL-CCNC: 20 U/L — SIGNIFICANT CHANGE UP (ref 10–40)
BASOPHILS # BLD AUTO: 0 K/UL — SIGNIFICANT CHANGE UP (ref 0–0.2)
BASOPHILS NFR BLD AUTO: 0 % — SIGNIFICANT CHANGE UP (ref 0–2)
BILIRUB SERPL-MCNC: 0.3 MG/DL — SIGNIFICANT CHANGE UP (ref 0.2–1.2)
BILIRUB SERPL-MCNC: 0.4 MG/DL — SIGNIFICANT CHANGE UP (ref 0.2–1.2)
BUN SERPL-MCNC: 40 MG/DL — HIGH (ref 7–23)
BUN SERPL-MCNC: 44 MG/DL — HIGH (ref 7–23)
CALCIUM SERPL-MCNC: 9.2 MG/DL — SIGNIFICANT CHANGE UP (ref 8.4–10.5)
CALCIUM SERPL-MCNC: 9.3 MG/DL — SIGNIFICANT CHANGE UP (ref 8.4–10.5)
CHLORIDE SERPL-SCNC: 105 MMOL/L — SIGNIFICANT CHANGE UP (ref 96–108)
CHLORIDE SERPL-SCNC: 107 MMOL/L — SIGNIFICANT CHANGE UP (ref 96–108)
CO2 SERPL-SCNC: 19 MMOL/L — LOW (ref 22–31)
CO2 SERPL-SCNC: 20 MMOL/L — LOW (ref 22–31)
CREAT SERPL-MCNC: 3.25 MG/DL — HIGH (ref 0.5–1.3)
CREAT SERPL-MCNC: 3.45 MG/DL — HIGH (ref 0.5–1.3)
EOSINOPHIL # BLD AUTO: 0 K/UL — SIGNIFICANT CHANGE UP (ref 0–0.5)
EOSINOPHIL NFR BLD AUTO: 0.2 % — SIGNIFICANT CHANGE UP (ref 0–6)
GAS PNL BLDA: SIGNIFICANT CHANGE UP
GLUCOSE SERPL-MCNC: 135 MG/DL — HIGH (ref 70–99)
GLUCOSE SERPL-MCNC: 75 MG/DL — SIGNIFICANT CHANGE UP (ref 70–99)
HCT VFR BLD CALC: 25.7 % — LOW (ref 34.5–45)
HCT VFR BLD CALC: 26.4 % — LOW (ref 34.5–45)
HGB BLD-MCNC: 8.5 G/DL — LOW (ref 11.5–15.5)
HGB BLD-MCNC: 8.6 G/DL — LOW (ref 11.5–15.5)
INR BLD: 1.07 RATIO — SIGNIFICANT CHANGE UP (ref 0.88–1.16)
INR BLD: 1.09 RATIO — SIGNIFICANT CHANGE UP (ref 0.88–1.16)
LYMPHOCYTES # BLD AUTO: 0.5 K/UL — LOW (ref 1–3.3)
LYMPHOCYTES # BLD AUTO: 9.8 % — LOW (ref 13–44)
MCHC RBC-ENTMCNC: 25.9 PG — LOW (ref 27–34)
MCHC RBC-ENTMCNC: 26.1 PG — LOW (ref 27–34)
MCHC RBC-ENTMCNC: 32.8 GM/DL — SIGNIFICANT CHANGE UP (ref 32–36)
MCHC RBC-ENTMCNC: 33 GM/DL — SIGNIFICANT CHANGE UP (ref 32–36)
MCV RBC AUTO: 79 FL — LOW (ref 80–100)
MCV RBC AUTO: 79.2 FL — LOW (ref 80–100)
MONOCYTES # BLD AUTO: 0.2 K/UL — SIGNIFICANT CHANGE UP (ref 0–0.9)
MONOCYTES NFR BLD AUTO: 4.3 % — SIGNIFICANT CHANGE UP (ref 2–14)
NEUTROPHILS # BLD AUTO: 4.7 K/UL — SIGNIFICANT CHANGE UP (ref 1.8–7.4)
NEUTROPHILS NFR BLD AUTO: 85.7 % — HIGH (ref 43–77)
PLATELET # BLD AUTO: 103 K/UL — LOW (ref 150–400)
PLATELET # BLD AUTO: 99 K/UL — LOW (ref 150–400)
POTASSIUM SERPL-MCNC: 4.1 MMOL/L — SIGNIFICANT CHANGE UP (ref 3.5–5.3)
POTASSIUM SERPL-MCNC: 4.2 MMOL/L — SIGNIFICANT CHANGE UP (ref 3.5–5.3)
POTASSIUM SERPL-SCNC: 4.1 MMOL/L — SIGNIFICANT CHANGE UP (ref 3.5–5.3)
POTASSIUM SERPL-SCNC: 4.2 MMOL/L — SIGNIFICANT CHANGE UP (ref 3.5–5.3)
PROT SERPL-MCNC: 5.9 G/DL — LOW (ref 6–8.3)
PROT SERPL-MCNC: 6.2 G/DL — SIGNIFICANT CHANGE UP (ref 6–8.3)
PROTHROM AB SERPL-ACNC: 11.6 SEC — SIGNIFICANT CHANGE UP (ref 9.8–12.7)
PROTHROM AB SERPL-ACNC: 11.9 SEC — SIGNIFICANT CHANGE UP (ref 9.8–12.7)
RBC # BLD: 3.26 M/UL — LOW (ref 3.8–5.2)
RBC # BLD: 3.33 M/UL — LOW (ref 3.8–5.2)
RBC # FLD: 14.5 % — SIGNIFICANT CHANGE UP (ref 10.3–14.5)
RBC # FLD: 14.8 % — HIGH (ref 10.3–14.5)
SODIUM SERPL-SCNC: 139 MMOL/L — SIGNIFICANT CHANGE UP (ref 135–145)
SODIUM SERPL-SCNC: 143 MMOL/L — SIGNIFICANT CHANGE UP (ref 135–145)
TSH SERPL-MCNC: 1.06 UIU/ML — SIGNIFICANT CHANGE UP (ref 0.27–4.2)
WBC # BLD: 4.2 K/UL — SIGNIFICANT CHANGE UP (ref 3.8–10.5)
WBC # BLD: 5.5 K/UL — SIGNIFICANT CHANGE UP (ref 3.8–10.5)
WBC # FLD AUTO: 4.2 K/UL — SIGNIFICANT CHANGE UP (ref 3.8–10.5)
WBC # FLD AUTO: 5.5 K/UL — SIGNIFICANT CHANGE UP (ref 3.8–10.5)

## 2017-03-31 PROCEDURE — 99232 SBSQ HOSP IP/OBS MODERATE 35: CPT

## 2017-03-31 PROCEDURE — 71010: CPT | Mod: 26,77

## 2017-03-31 PROCEDURE — 33208 INSRT HEART PM ATRIAL & VENT: CPT | Mod: KX

## 2017-03-31 PROCEDURE — 71010: CPT | Mod: 26

## 2017-03-31 PROCEDURE — 93010 ELECTROCARDIOGRAM REPORT: CPT

## 2017-03-31 PROCEDURE — 93306 TTE W/DOPPLER COMPLETE: CPT | Mod: 26

## 2017-03-31 RX ORDER — ACETAMINOPHEN 500 MG
1000 TABLET ORAL ONCE
Qty: 0 | Refills: 0 | Status: COMPLETED | OUTPATIENT
Start: 2017-03-31 | End: 2017-03-31

## 2017-03-31 RX ORDER — SODIUM BICARBONATE 1 MEQ/ML
50 SYRINGE (ML) INTRAVENOUS ONCE
Qty: 0 | Refills: 0 | Status: COMPLETED | OUTPATIENT
Start: 2017-03-31 | End: 2017-03-31

## 2017-03-31 RX ORDER — HYDRALAZINE HCL 50 MG
25 TABLET ORAL ONCE
Qty: 0 | Refills: 0 | Status: COMPLETED | OUTPATIENT
Start: 2017-03-31 | End: 2017-03-31

## 2017-03-31 RX ORDER — CARVEDILOL PHOSPHATE 80 MG/1
12.5 CAPSULE, EXTENDED RELEASE ORAL EVERY 12 HOURS
Qty: 0 | Refills: 0 | Status: DISCONTINUED | OUTPATIENT
Start: 2017-03-31 | End: 2017-04-02

## 2017-03-31 RX ORDER — HYDRALAZINE HCL 50 MG
5 TABLET ORAL ONCE
Qty: 0 | Refills: 0 | Status: COMPLETED | OUTPATIENT
Start: 2017-03-31 | End: 2017-03-31

## 2017-03-31 RX ORDER — DARBEPOETIN ALFA IN POLYSORBAT 200MCG/0.4
40 PEN INJECTOR (ML) SUBCUTANEOUS ONCE
Qty: 0 | Refills: 0 | Status: COMPLETED | OUTPATIENT
Start: 2017-03-31 | End: 2017-03-31

## 2017-03-31 RX ORDER — VANCOMYCIN HCL 1 G
1000 VIAL (EA) INTRAVENOUS EVERY 12 HOURS
Qty: 0 | Refills: 0 | Status: DISCONTINUED | OUTPATIENT
Start: 2017-04-01 | End: 2017-04-01

## 2017-03-31 RX ORDER — HYDRALAZINE HCL 50 MG
50 TABLET ORAL EVERY 6 HOURS
Qty: 0 | Refills: 0 | Status: DISCONTINUED | OUTPATIENT
Start: 2017-03-31 | End: 2017-04-02

## 2017-03-31 RX ADMIN — SODIUM CHLORIDE 3 MILLILITER(S): 9 INJECTION INTRAMUSCULAR; INTRAVENOUS; SUBCUTANEOUS at 22:08

## 2017-03-31 RX ADMIN — Medication 100 MILLIGRAM(S): at 19:23

## 2017-03-31 RX ADMIN — Medication 81 MILLIGRAM(S): at 19:23

## 2017-03-31 RX ADMIN — SODIUM CHLORIDE 3 MILLILITER(S): 9 INJECTION INTRAMUSCULAR; INTRAVENOUS; SUBCUTANEOUS at 13:08

## 2017-03-31 RX ADMIN — SODIUM CHLORIDE 3 MILLILITER(S): 9 INJECTION INTRAMUSCULAR; INTRAVENOUS; SUBCUTANEOUS at 05:04

## 2017-03-31 RX ADMIN — ATORVASTATIN CALCIUM 40 MILLIGRAM(S): 80 TABLET, FILM COATED ORAL at 22:09

## 2017-03-31 RX ADMIN — Medication 5 MILLIGRAM(S): at 19:20

## 2017-03-31 RX ADMIN — Medication 100 MILLIGRAM(S): at 05:07

## 2017-03-31 RX ADMIN — Medication 100 MILLIGRAM(S): at 22:12

## 2017-03-31 RX ADMIN — Medication 650 MILLIGRAM(S): at 19:23

## 2017-03-31 RX ADMIN — Medication 650 MILLIGRAM(S): at 05:07

## 2017-03-31 RX ADMIN — Medication 40 MILLIGRAM(S): at 05:07

## 2017-03-31 RX ADMIN — Medication 40 MICROGRAM(S): at 11:12

## 2017-03-31 RX ADMIN — Medication 50 MILLIGRAM(S): at 19:02

## 2017-03-31 RX ADMIN — CLOPIDOGREL BISULFATE 75 MILLIGRAM(S): 75 TABLET, FILM COATED ORAL at 19:23

## 2017-03-31 RX ADMIN — Medication 100 MILLIGRAM(S): at 11:41

## 2017-03-31 RX ADMIN — PANTOPRAZOLE SODIUM 40 MILLIGRAM(S): 20 TABLET, DELAYED RELEASE ORAL at 11:41

## 2017-03-31 RX ADMIN — Medication 400 MILLIGRAM(S): at 15:45

## 2017-03-31 RX ADMIN — Medication 50 MILLIGRAM(S): at 11:51

## 2017-03-31 RX ADMIN — Medication 50 MILLIEQUIVALENT(S): at 03:52

## 2017-03-31 RX ADMIN — Medication 25 MILLIGRAM(S): at 05:07

## 2017-03-31 RX ADMIN — CARVEDILOL PHOSPHATE 12.5 MILLIGRAM(S): 80 CAPSULE, EXTENDED RELEASE ORAL at 22:09

## 2017-03-31 RX ADMIN — Medication 0.2 MILLIGRAM(S): at 22:12

## 2017-03-31 RX ADMIN — CALCITRIOL 0.25 MICROGRAM(S): 0.5 CAPSULE ORAL at 19:23

## 2017-03-31 RX ADMIN — Medication 1000 MILLIGRAM(S): at 16:15

## 2017-03-31 NOTE — DIETITIAN INITIAL EVALUATION ADULT. - DIET TYPE
DASH/TLC (sodium and cholesterol restricted diet)/renal replacement pts:no protein restr,no conc K & phos, low sodium/consistent carbohydrate (no snacks)

## 2017-03-31 NOTE — DIETITIAN INITIAL EVALUATION ADULT. - NS AS NUTRI INTERV MEALS SNACK
Encourage PO intake with all meals. Provide food preferences within therapeutic diet when requested. Recommend change diet to consistent CHO, no concentrated potassium, no concentrated phosphorus, DASH when medically feasible.

## 2017-03-31 NOTE — DIETITIAN INITIAL EVALUATION ADULT. - ENERGY NEEDS
Ht:5ft1in, Wt:140.8 (dosing), BMI:26.7,   IBW:105pounds (+/-10%), 134%IBW  Pertinent Information: Pt s/p TAVR (POD#1), plan for PPM placement as per chart. No pressure ulcers or edema noted.

## 2017-03-31 NOTE — DIETITIAN INITIAL EVALUATION ADULT. - OTHER INFO
Pt currently NPO for a procedure, pt states she has a good appetite. Denies GI distress with last BM passed 2 days go and reports no difficulty chewing/swallowing. No known food allergies or intolerances reported. Supplementation PTA consisted of calcitriol, iron, and vitamin D. Pt report self monitoring morning fasting blood glucose levels daily with results typically 95-99mg/dL. Pt receptive to instruction on diet education on nutrition therapy for CKD and DM, accepted written materials.

## 2017-03-31 NOTE — DIETITIAN INITIAL EVALUATION ADULT. - NS AS NUTRI INTERV ED CONTENT
Purpose of the nutrition education/Nutrition relationship to health/disease/Provided verbal and written instruction on consistent CHO, low sodium, low potassium, low phosphorus diet. Reviewed food sources of potassium and phosphorus./Recommended modifications

## 2017-03-31 NOTE — DIETITIAN INITIAL EVALUATION ADULT. - ADHERENCE
fair/diabetic, low sodium/potassium/phosphorus diet. Pt able to state some foods high in potassium and phosphorus that she avoids. Pt admits to occasionally eating concentrated sweets and regular soda (but only drinks clear soda due to phosphorus). Pt states she is not on a fluid restriction as instructed by her physician.

## 2017-04-01 LAB
ALBUMIN SERPL ELPH-MCNC: 3.2 G/DL — LOW (ref 3.3–5)
ALP SERPL-CCNC: 36 U/L — LOW (ref 40–120)
ALT FLD-CCNC: 7 U/L RC — LOW (ref 10–45)
ANION GAP SERPL CALC-SCNC: 14 MMOL/L — SIGNIFICANT CHANGE UP (ref 5–17)
AST SERPL-CCNC: 18 U/L — SIGNIFICANT CHANGE UP (ref 10–40)
BILIRUB SERPL-MCNC: 0.2 MG/DL — SIGNIFICANT CHANGE UP (ref 0.2–1.2)
BUN SERPL-MCNC: 41 MG/DL — HIGH (ref 7–23)
CALCIUM SERPL-MCNC: 8.9 MG/DL — SIGNIFICANT CHANGE UP (ref 8.4–10.5)
CHLORIDE SERPL-SCNC: 107 MMOL/L — SIGNIFICANT CHANGE UP (ref 96–108)
CO2 SERPL-SCNC: 19 MMOL/L — LOW (ref 22–31)
CREAT SERPL-MCNC: 3.23 MG/DL — HIGH (ref 0.5–1.3)
GLUCOSE SERPL-MCNC: 162 MG/DL — HIGH (ref 70–99)
HCT VFR BLD CALC: 24.7 % — LOW (ref 34.5–45)
HGB BLD-MCNC: 8.2 G/DL — LOW (ref 11.5–15.5)
MCHC RBC-ENTMCNC: 26.4 PG — LOW (ref 27–34)
MCHC RBC-ENTMCNC: 33.2 GM/DL — SIGNIFICANT CHANGE UP (ref 32–36)
MCV RBC AUTO: 79.4 FL — LOW (ref 80–100)
PLATELET # BLD AUTO: 102 K/UL — LOW (ref 150–400)
POTASSIUM SERPL-MCNC: 4 MMOL/L — SIGNIFICANT CHANGE UP (ref 3.5–5.3)
POTASSIUM SERPL-SCNC: 4 MMOL/L — SIGNIFICANT CHANGE UP (ref 3.5–5.3)
PROT SERPL-MCNC: 5.4 G/DL — LOW (ref 6–8.3)
RBC # BLD: 3.11 M/UL — LOW (ref 3.8–5.2)
RBC # FLD: 14.4 % — SIGNIFICANT CHANGE UP (ref 10.3–14.5)
SODIUM SERPL-SCNC: 140 MMOL/L — SIGNIFICANT CHANGE UP (ref 135–145)
VANCOMYCIN TROUGH SERPL-MCNC: 12.5 UG/ML — SIGNIFICANT CHANGE UP (ref 10–20)
WBC # BLD: 4.7 K/UL — SIGNIFICANT CHANGE UP (ref 3.8–10.5)
WBC # FLD AUTO: 4.7 K/UL — SIGNIFICANT CHANGE UP (ref 3.8–10.5)

## 2017-04-01 PROCEDURE — 71010: CPT | Mod: 26

## 2017-04-01 RX ORDER — METOPROLOL TARTRATE 50 MG
2.5 TABLET ORAL ONCE
Qty: 0 | Refills: 0 | Status: COMPLETED | OUTPATIENT
Start: 2017-04-01 | End: 2017-04-01

## 2017-04-01 RX ORDER — ACETAMINOPHEN 500 MG
650 TABLET ORAL EVERY 6 HOURS
Qty: 0 | Refills: 0 | Status: DISCONTINUED | OUTPATIENT
Start: 2017-04-01 | End: 2017-04-02

## 2017-04-01 RX ORDER — PANTOPRAZOLE SODIUM 20 MG/1
40 TABLET, DELAYED RELEASE ORAL
Qty: 0 | Refills: 0 | Status: DISCONTINUED | OUTPATIENT
Start: 2017-04-01 | End: 2017-04-02

## 2017-04-01 RX ORDER — VANCOMYCIN HCL 1 G
1000 VIAL (EA) INTRAVENOUS ONCE
Qty: 0 | Refills: 0 | Status: DISCONTINUED | OUTPATIENT
Start: 2017-04-01 | End: 2017-04-01

## 2017-04-01 RX ORDER — ACETAMINOPHEN 500 MG
650 TABLET ORAL ONCE
Qty: 0 | Refills: 0 | Status: COMPLETED | OUTPATIENT
Start: 2017-04-01 | End: 2017-04-01

## 2017-04-01 RX ADMIN — Medication 2.5 MILLIGRAM(S): at 09:15

## 2017-04-01 RX ADMIN — Medication 100 MILLIGRAM(S): at 15:19

## 2017-04-01 RX ADMIN — Medication 650 MILLIGRAM(S): at 08:00

## 2017-04-01 RX ADMIN — CARVEDILOL PHOSPHATE 12.5 MILLIGRAM(S): 80 CAPSULE, EXTENDED RELEASE ORAL at 05:59

## 2017-04-01 RX ADMIN — Medication 50 MILLIGRAM(S): at 11:12

## 2017-04-01 RX ADMIN — Medication 0.2 MILLIGRAM(S): at 21:42

## 2017-04-01 RX ADMIN — Medication 50 MILLIGRAM(S): at 05:58

## 2017-04-01 RX ADMIN — Medication 650 MILLIGRAM(S): at 06:34

## 2017-04-01 RX ADMIN — CARVEDILOL PHOSPHATE 12.5 MILLIGRAM(S): 80 CAPSULE, EXTENDED RELEASE ORAL at 17:48

## 2017-04-01 RX ADMIN — ATORVASTATIN CALCIUM 40 MILLIGRAM(S): 80 TABLET, FILM COATED ORAL at 21:42

## 2017-04-01 RX ADMIN — Medication 50 MILLIGRAM(S): at 00:11

## 2017-04-01 RX ADMIN — PANTOPRAZOLE SODIUM 40 MILLIGRAM(S): 20 TABLET, DELAYED RELEASE ORAL at 06:34

## 2017-04-01 RX ADMIN — Medication 50 MILLIGRAM(S): at 17:47

## 2017-04-01 RX ADMIN — Medication 100 MILLIGRAM(S): at 11:08

## 2017-04-01 RX ADMIN — Medication 81 MILLIGRAM(S): at 11:12

## 2017-04-01 RX ADMIN — Medication 2: at 11:41

## 2017-04-01 RX ADMIN — Medication 100 MILLIGRAM(S): at 05:58

## 2017-04-01 RX ADMIN — Medication 650 MILLIGRAM(S): at 08:30

## 2017-04-01 RX ADMIN — Medication 100 MILLIGRAM(S): at 21:42

## 2017-04-01 RX ADMIN — SODIUM CHLORIDE 3 MILLILITER(S): 9 INJECTION INTRAMUSCULAR; INTRAVENOUS; SUBCUTANEOUS at 05:53

## 2017-04-01 RX ADMIN — CLOPIDOGREL BISULFATE 75 MILLIGRAM(S): 75 TABLET, FILM COATED ORAL at 11:12

## 2017-04-01 RX ADMIN — Medication 50 MILLIGRAM(S): at 21:42

## 2017-04-01 RX ADMIN — SODIUM CHLORIDE 3 MILLILITER(S): 9 INJECTION INTRAMUSCULAR; INTRAVENOUS; SUBCUTANEOUS at 21:40

## 2017-04-01 RX ADMIN — SODIUM CHLORIDE 3 MILLILITER(S): 9 INJECTION INTRAMUSCULAR; INTRAVENOUS; SUBCUTANEOUS at 15:17

## 2017-04-01 RX ADMIN — Medication 650 MILLIGRAM(S): at 17:48

## 2017-04-01 RX ADMIN — CALCITRIOL 0.25 MICROGRAM(S): 0.5 CAPSULE ORAL at 11:08

## 2017-04-01 RX ADMIN — Medication 100 MILLIGRAM(S): at 17:48

## 2017-04-02 ENCOUNTER — TRANSCRIPTION ENCOUNTER (OUTPATIENT)
Age: 77
End: 2017-04-02

## 2017-04-02 VITALS
HEART RATE: 96 BPM | RESPIRATION RATE: 18 BRPM | DIASTOLIC BLOOD PRESSURE: 65 MMHG | TEMPERATURE: 98 F | SYSTOLIC BLOOD PRESSURE: 122 MMHG | OXYGEN SATURATION: 100 %

## 2017-04-02 LAB
ANION GAP SERPL CALC-SCNC: 14 MMOL/L — SIGNIFICANT CHANGE UP (ref 5–17)
BASOPHILS # BLD AUTO: 0 K/UL — SIGNIFICANT CHANGE UP (ref 0–0.2)
BASOPHILS NFR BLD AUTO: 0.3 % — SIGNIFICANT CHANGE UP (ref 0–2)
BUN SERPL-MCNC: 43 MG/DL — HIGH (ref 7–23)
CALCIUM SERPL-MCNC: 10 MG/DL — SIGNIFICANT CHANGE UP (ref 8.4–10.5)
CHLORIDE SERPL-SCNC: 106 MMOL/L — SIGNIFICANT CHANGE UP (ref 96–108)
CO2 SERPL-SCNC: 20 MMOL/L — LOW (ref 22–31)
CREAT SERPL-MCNC: 3.25 MG/DL — HIGH (ref 0.5–1.3)
EOSINOPHIL # BLD AUTO: 0.1 K/UL — SIGNIFICANT CHANGE UP (ref 0–0.5)
EOSINOPHIL NFR BLD AUTO: 2.6 % — SIGNIFICANT CHANGE UP (ref 0–6)
GLUCOSE SERPL-MCNC: 105 MG/DL — HIGH (ref 70–99)
HCT VFR BLD CALC: 27 % — LOW (ref 34.5–45)
HGB BLD-MCNC: 8.7 G/DL — LOW (ref 11.5–15.5)
LYMPHOCYTES # BLD AUTO: 1 K/UL — SIGNIFICANT CHANGE UP (ref 1–3.3)
LYMPHOCYTES # BLD AUTO: 20.3 % — SIGNIFICANT CHANGE UP (ref 13–44)
MCHC RBC-ENTMCNC: 26.2 PG — LOW (ref 27–34)
MCHC RBC-ENTMCNC: 32.1 GM/DL — SIGNIFICANT CHANGE UP (ref 32–36)
MCV RBC AUTO: 81.6 FL — SIGNIFICANT CHANGE UP (ref 80–100)
MONOCYTES # BLD AUTO: 0.5 K/UL — SIGNIFICANT CHANGE UP (ref 0–0.9)
MONOCYTES NFR BLD AUTO: 11 % — SIGNIFICANT CHANGE UP (ref 2–14)
NEUTROPHILS # BLD AUTO: 3.1 K/UL — SIGNIFICANT CHANGE UP (ref 1.8–7.4)
NEUTROPHILS NFR BLD AUTO: 65.8 % — SIGNIFICANT CHANGE UP (ref 43–77)
PLATELET # BLD AUTO: 95 K/UL — LOW (ref 150–400)
POTASSIUM SERPL-MCNC: 4.4 MMOL/L — SIGNIFICANT CHANGE UP (ref 3.5–5.3)
POTASSIUM SERPL-SCNC: 4.4 MMOL/L — SIGNIFICANT CHANGE UP (ref 3.5–5.3)
RBC # BLD: 3.32 M/UL — LOW (ref 3.8–5.2)
RBC # FLD: 14.8 % — HIGH (ref 10.3–14.5)
SODIUM SERPL-SCNC: 140 MMOL/L — SIGNIFICANT CHANGE UP (ref 135–145)
WBC # BLD: 4.8 K/UL — SIGNIFICANT CHANGE UP (ref 3.8–10.5)
WBC # FLD AUTO: 4.8 K/UL — SIGNIFICANT CHANGE UP (ref 3.8–10.5)

## 2017-04-02 PROCEDURE — 85014 HEMATOCRIT: CPT

## 2017-04-02 PROCEDURE — 94002 VENT MGMT INPAT INIT DAY: CPT

## 2017-04-02 PROCEDURE — C1751: CPT

## 2017-04-02 PROCEDURE — 93306 TTE W/DOPPLER COMPLETE: CPT

## 2017-04-02 PROCEDURE — 87640 STAPH A DNA AMP PROBE: CPT

## 2017-04-02 PROCEDURE — C1726: CPT

## 2017-04-02 PROCEDURE — 99238 HOSP IP/OBS DSCHRG MGMT 30/<: CPT

## 2017-04-02 PROCEDURE — P9016: CPT

## 2017-04-02 PROCEDURE — C1898: CPT

## 2017-04-02 PROCEDURE — C1769: CPT

## 2017-04-02 PROCEDURE — 86850 RBC ANTIBODY SCREEN: CPT

## 2017-04-02 PROCEDURE — 31720 CLEARANCE OF AIRWAYS: CPT

## 2017-04-02 PROCEDURE — C1889: CPT

## 2017-04-02 PROCEDURE — 87641 MR-STAPH DNA AMP PROBE: CPT

## 2017-04-02 PROCEDURE — 85730 THROMBOPLASTIN TIME PARTIAL: CPT

## 2017-04-02 PROCEDURE — 86900 BLOOD TYPING SEROLOGIC ABO: CPT

## 2017-04-02 PROCEDURE — 86923 COMPATIBILITY TEST ELECTRIC: CPT

## 2017-04-02 PROCEDURE — 71046 X-RAY EXAM CHEST 2 VIEWS: CPT

## 2017-04-02 PROCEDURE — C1725: CPT

## 2017-04-02 PROCEDURE — 86901 BLOOD TYPING SEROLOGIC RH(D): CPT

## 2017-04-02 PROCEDURE — 82947 ASSAY GLUCOSE BLOOD QUANT: CPT

## 2017-04-02 PROCEDURE — C1760: CPT

## 2017-04-02 PROCEDURE — 93880 EXTRACRANIAL BILAT STUDY: CPT

## 2017-04-02 PROCEDURE — 80202 ASSAY OF VANCOMYCIN: CPT

## 2017-04-02 PROCEDURE — 82435 ASSAY OF BLOOD CHLORIDE: CPT

## 2017-04-02 PROCEDURE — 82330 ASSAY OF CALCIUM: CPT

## 2017-04-02 PROCEDURE — 85610 PROTHROMBIN TIME: CPT

## 2017-04-02 PROCEDURE — 83605 ASSAY OF LACTIC ACID: CPT

## 2017-04-02 PROCEDURE — 36430 TRANSFUSION BLD/BLD COMPNT: CPT

## 2017-04-02 PROCEDURE — 33208 INSRT HEART PM ATRIAL & VENT: CPT | Mod: KX

## 2017-04-02 PROCEDURE — C1785: CPT

## 2017-04-02 PROCEDURE — 82803 BLOOD GASES ANY COMBINATION: CPT

## 2017-04-02 PROCEDURE — 93005 ELECTROCARDIOGRAM TRACING: CPT

## 2017-04-02 PROCEDURE — C1892: CPT

## 2017-04-02 PROCEDURE — 84443 ASSAY THYROID STIM HORMONE: CPT

## 2017-04-02 PROCEDURE — 85027 COMPLETE CBC AUTOMATED: CPT

## 2017-04-02 PROCEDURE — G0463: CPT

## 2017-04-02 PROCEDURE — 82565 ASSAY OF CREATININE: CPT

## 2017-04-02 PROCEDURE — 80053 COMPREHEN METABOLIC PANEL: CPT

## 2017-04-02 PROCEDURE — 71045 X-RAY EXAM CHEST 1 VIEW: CPT

## 2017-04-02 PROCEDURE — 80048 BASIC METABOLIC PNL TOTAL CA: CPT

## 2017-04-02 PROCEDURE — P9047: CPT

## 2017-04-02 PROCEDURE — C1887: CPT

## 2017-04-02 PROCEDURE — 84132 ASSAY OF SERUM POTASSIUM: CPT

## 2017-04-02 PROCEDURE — C1894: CPT

## 2017-04-02 PROCEDURE — 84295 ASSAY OF SERUM SODIUM: CPT

## 2017-04-02 RX ORDER — CARVEDILOL PHOSPHATE 80 MG/1
1 CAPSULE, EXTENDED RELEASE ORAL
Qty: 60 | Refills: 0 | OUTPATIENT
Start: 2017-04-02 | End: 2017-05-02

## 2017-04-02 RX ORDER — DOCUSATE SODIUM 100 MG
1 CAPSULE ORAL
Qty: 21 | Refills: 0 | OUTPATIENT
Start: 2017-04-02 | End: 2017-04-09

## 2017-04-02 RX ORDER — HYDRALAZINE HCL 50 MG
1 TABLET ORAL
Qty: 120 | Refills: 0 | OUTPATIENT
Start: 2017-04-02 | End: 2017-05-02

## 2017-04-02 RX ORDER — HYDRALAZINE HCL 50 MG
1 TABLET ORAL
Qty: 0 | Refills: 0 | COMMUNITY

## 2017-04-02 RX ORDER — ACETAMINOPHEN 500 MG
2 TABLET ORAL
Qty: 0 | Refills: 0 | COMMUNITY
Start: 2017-04-02

## 2017-04-02 RX ADMIN — Medication 100 MILLIGRAM(S): at 05:32

## 2017-04-02 RX ADMIN — CLOPIDOGREL BISULFATE 75 MILLIGRAM(S): 75 TABLET, FILM COATED ORAL at 11:13

## 2017-04-02 RX ADMIN — PANTOPRAZOLE SODIUM 40 MILLIGRAM(S): 20 TABLET, DELAYED RELEASE ORAL at 05:32

## 2017-04-02 RX ADMIN — Medication 650 MILLIGRAM(S): at 05:32

## 2017-04-02 RX ADMIN — CARVEDILOL PHOSPHATE 12.5 MILLIGRAM(S): 80 CAPSULE, EXTENDED RELEASE ORAL at 05:32

## 2017-04-02 RX ADMIN — Medication 50 MILLIGRAM(S): at 11:13

## 2017-04-02 RX ADMIN — SODIUM CHLORIDE 3 MILLILITER(S): 9 INJECTION INTRAMUSCULAR; INTRAVENOUS; SUBCUTANEOUS at 05:27

## 2017-04-02 RX ADMIN — Medication 50 MILLIGRAM(S): at 05:32

## 2017-04-02 RX ADMIN — Medication 100 MILLIGRAM(S): at 08:14

## 2017-04-02 RX ADMIN — Medication 650 MILLIGRAM(S): at 05:31

## 2017-04-02 RX ADMIN — Medication 650 MILLIGRAM(S): at 06:05

## 2017-04-02 RX ADMIN — Medication 81 MILLIGRAM(S): at 11:12

## 2017-04-02 RX ADMIN — CALCITRIOL 0.25 MICROGRAM(S): 0.5 CAPSULE ORAL at 11:12

## 2017-04-02 NOTE — DISCHARGE NOTE ADULT - PROVIDER TOKENS
TOKCHERI:'[id number]' TOKEN:'[id number]:'[id number]:'[id number]:'[id number]'

## 2017-04-02 NOTE — DISCHARGE NOTE ADULT - ADDITIONAL INSTRUCTIONS
Follow up with Dr. Emery in 1 week at [facility] 1st floor CTS office. Call [phone] to schedule appointment. Follow up with Dr. Emery in 1 week at [facility] 1st floor CTS office. Call [phone] to schedule appointment.  Follow up with  in 30 days for repeat echocardiogram. Call [phone] to schedule appointment.  Follow up with EP clinic for PPM wound check.  Dr. Dario Knott  - PCP and cardiology  Dr. Solorzano - nephrologist  Follow up with Dr. Emery in 1 week at [facility] 1st floor CTS office. Call [phone] to schedule appointment.  Follow up with Dr. Davalos in 30 days for repeat echocardiogram. Call [phone] to schedule appointment.  Follow up with EP clinic for PPM wound check in 1 week. Call [phone] to schedule appointment.  Follow up with Dr. Dario Knott PCP and cardiologist in 1-2 weeks. Call [phone] to schedule appointment.  Follow up with Dr. Dixon - nephrologist in 1-2 weeks. Call [phone] to schedule appointment.

## 2017-04-02 NOTE — DISCHARGE NOTE ADULT - HOSPITAL COURSE
76 y/o female s/p 3/30/17 TAVR.   PMHx - CAD s/p cardiac stents X3, CABG X 3 2007, CKD Stage IV s/p AVF on left arm - not on dialysis at this time, HTN, DM2. This is a 76 y/o female s/p 3/30/17 TAVR.   PMHx - CAD s/p cardiac stents X3, CABG X 3 2007, CKD Stage IV s/p AVF on left arm - not on dialysis at this time, HTN, DM2.   Post-op Course:  LBBB s/p right sided PPM. EP following. Pt resumed on beta-blockers.  Renal following for CKD.  3/31 TTE: EF 66%, mild MR, negative pericardial effusion.  Discharged home on 4/2/17 per Dr. Emery.

## 2017-04-02 NOTE — DISCHARGE NOTE ADULT - MEDICATION SUMMARY - MEDICATIONS TO CHANGE
I will SWITCH the dose or number of times a day I take the medications listed below when I get home from the hospital:    carvedilol 25 mg oral tablet  -- 1 tab(s) by mouth every 12 hours    hydrALAZINE 100 mg oral tablet  -- 1 tab(s) by mouth 4 times a day

## 2017-04-02 NOTE — DISCHARGE NOTE ADULT - NS AS ACTIVITY OBS
Stairs allowed/Walking-Indoors allowed/Sex allowed/Walking-Outdoors allowed/Driving allowed/Showering allowed/Return to Work/School allowed

## 2017-04-02 NOTE — DISCHARGE NOTE ADULT - CARE PROVIDERS DIRECT ADDRESSES
,[email or web address]

## 2017-04-02 NOTE — DISCHARGE NOTE ADULT - CARE PROVIDER_API CALL
Taj Emery), Thoracic and Cardiac Surgery  [address] [place] [address]  Phone: [phone]  Fax: [phone] Taj Emery (MD), Thoracic and Cardiac Surgery  [address] [place] [address]  Phone: [phone]  Fax: [phone]    Kb Davalos (MD), Cardiovascular Disease; Interventional Cardiology  [address] [place] [address]  Phone: [phone]  Fax: [phone]    Alek Gay (MD), Cardiac Electrophysiology; Cardiology; Internal Medicine  [address] [place] [address]  Phone: [phone]  Fax: [phone]    Markell Dixon), Internal Medicine; Nephrology  [address] [place] [address]  Phone: [phone]  Fax: [phone]

## 2017-04-02 NOTE — DISCHARGE NOTE ADULT - INSTRUCTIONS
Regular diet - low fat, low cholesterol, no added salt. Diabetic diet - low fat, low cholesterol, no added salt. Report signs and symptoms of distress, such as chest pain, shortness of breath  to your health-care provider promptly; if necessary call 911. Take medications as instructed. Keep follow-up appointments as scheduled.

## 2017-04-02 NOTE — DISCHARGE NOTE ADULT - PATIENT PORTAL LINK FT
“You can access the FollowHealth Patient Portal, offered by [facility], by registering with the following website: [email or web address]”

## 2017-04-02 NOTE — DISCHARGE NOTE ADULT - MEDICATION SUMMARY - MEDICATIONS TO TAKE
I will START or STAY ON the medications listed below when I get home from the hospital:    Iron  -- 65 milligram(s) by mouth once a day  -- Indication: For Supplement    acetaminophen 325 mg oral tablet  -- 2 tab(s) by mouth every 6 hours, As needed, Moderate Pain (4 - 6)  -- Indication: For pain    Aspir-Low 81 mg enteric coated tablet  -- 1 tab(s) by mouth once a day   -- Indication: For Anti-platelet    sodium bicarbonate 650 mg oral tablet  -- 1 tab(s) by mouth 2 times a day  -- Indication: For CKD    cloNIDine 0.2 mg oral tablet  -- 1 tab(s) by mouth once a day (at bedtime)  -- Indication: For blood pressure    terazosin 2 mg oral capsule  -- 1 cap(s) by mouth once a day (at bedtime)  -- Indication: For blood pressure    Imdur 60 mg oral tablet, extended release  -- 1 tab(s) by mouth 2 times a day  -- Indication: For blood pressure    Onglyza 2.5 mg oral tablet  -- 1 tab(s) by mouth , As Needed - for FBS >100  -- Indication: For Diabetes    allopurinol 100 mg oral tablet  -- 2.5 tab(s) by mouth once a day  -- Indication: For gout    Crestor 10 mg oral tablet  -- 1 tab(s) by mouth once a day (at bedtime)  -- Indication: For Cholesterol    Plavix 75 mg tablet  -- 1 tab(s) by mouth once a day   -- Indication: For Anti-platelet    carvedilol 12.5 mg oral tablet  -- 1 tab(s) by mouth every 12 hours  -- Indication: For heart rate    furosemide 40 mg tablet  -- 1 tab(s) by mouth once a day  -- Indication: For Diuretic    docusate sodium 100 mg oral capsule  -- 1 cap(s) by mouth 3 times a day  -- Indication: For Stool softener    hydrALAZINE 50 mg oral tablet  -- 1 tab(s) by mouth every 6 hours  -- Indication: For blood pressure    calcitriol 0.25 mcg oral capsule  -- 1 cap(s) by mouth once a day  -- Indication: For Supplement

## 2017-04-02 NOTE — DISCHARGE NOTE ADULT - HOME CARE AGENCY
[facility]- visiting nurse. [phone]. They will call you to set up 1st appointment, requested for 4/3.

## 2017-04-02 NOTE — DISCHARGE NOTE ADULT - PLAN OF CARE
Complete Recovery 1. Daily Shower  2. Weight yourself daily and notify any weight gain greater than 2-3 pounds in 24 hours.  3. Regular diet - low fat, low cholesterol, no added salt. 1. Daily shower. Keep PPM site incision dry for 5 days.  2. Weight yourself daily and notify any weight gain greater than 2-3 pounds in 24 hours.  3. Diabetic diet - low fat, low cholesterol, no added salt.

## 2017-04-02 NOTE — DISCHARGE NOTE ADULT - CARE PLAN
Principal Discharge DX:	S/P TAVR (transcatheter aortic valve replacement)  Goal:	Complete Recovery  Instructions for follow-up, activity and diet:	1. Daily Shower  2. Weight yourself daily and notify any weight gain greater than 2-3 pounds in 24 hours.  3. Regular diet - low fat, low cholesterol, no added salt. Principal Discharge DX:	S/P TAVR (transcatheter aortic valve replacement)  Goal:	Complete Recovery  Instructions for follow-up, activity and diet:	1. Daily shower. Keep PPM site incision dry for 5 days.  2. Weight yourself daily and notify any weight gain greater than 2-3 pounds in 24 hours.  3. Diabetic diet - low fat, low cholesterol, no added salt.

## 2017-04-06 RX ORDER — ACETAMINOPHEN 325 MG/1
325 TABLET, FILM COATED ORAL
Refills: 0 | Status: ACTIVE | COMMUNITY

## 2017-04-06 RX ORDER — ACETYLCYSTEINE 200 MG/ML
20 SOLUTION ORAL; RESPIRATORY (INHALATION)
Qty: 24 | Refills: 1 | Status: DISCONTINUED | COMMUNITY
Start: 2017-02-21 | End: 2017-04-06

## 2017-04-06 RX ORDER — METHYLPREDNISOLONE 4 MG/1
4 TABLET ORAL
Qty: 21 | Refills: 0 | Status: DISCONTINUED | COMMUNITY
Start: 2017-01-24

## 2017-04-06 RX ORDER — OSELTAMIVIR PHOSPHATE 30 MG/1
30 CAPSULE ORAL
Qty: 2 | Refills: 0 | Status: DISCONTINUED | COMMUNITY
Start: 2017-01-21

## 2017-04-07 ENCOUNTER — APPOINTMENT (OUTPATIENT)
Dept: ELECTROPHYSIOLOGY | Facility: CLINIC | Age: 77
End: 2017-04-07

## 2017-04-07 VITALS
SYSTOLIC BLOOD PRESSURE: 179 MMHG | DIASTOLIC BLOOD PRESSURE: 81 MMHG | OXYGEN SATURATION: 100 % | HEART RATE: 81 BPM | HEIGHT: 61 IN

## 2017-04-07 DIAGNOSIS — I44.30 UNSPECIFIED ATRIOVENTRICULAR BLOCK: ICD-10-CM

## 2017-04-11 ENCOUNTER — APPOINTMENT (OUTPATIENT)
Dept: CARDIOTHORACIC SURGERY | Facility: CLINIC | Age: 77
End: 2017-04-11

## 2017-04-11 ENCOUNTER — NON-APPOINTMENT (OUTPATIENT)
Age: 77
End: 2017-04-11

## 2017-04-11 VITALS
HEIGHT: 61 IN | HEART RATE: 60 BPM | DIASTOLIC BLOOD PRESSURE: 62 MMHG | OXYGEN SATURATION: 99 % | TEMPERATURE: 98.9 F | BODY MASS INDEX: 24.55 KG/M2 | RESPIRATION RATE: 15 BRPM | SYSTOLIC BLOOD PRESSURE: 146 MMHG | WEIGHT: 130 LBS

## 2017-04-11 DIAGNOSIS — Z09 ENCOUNTER FOR FOLLOW-UP EXAMINATION AFTER COMPLETED TREATMENT FOR CONDITIONS OTHER THAN MALIGNANT NEOPLASM: ICD-10-CM

## 2017-04-11 RX ORDER — DOCUSATE SODIUM 100 MG/1
100 CAPSULE ORAL 3 TIMES DAILY
Refills: 0 | Status: DISCONTINUED | COMMUNITY
End: 2017-04-11

## 2017-04-11 RX ORDER — CHROMIUM 200 MCG
TABLET ORAL
Refills: 0 | Status: DISCONTINUED | COMMUNITY
End: 2017-04-11

## 2017-04-14 ENCOUNTER — OUTPATIENT (OUTPATIENT)
Dept: OUTPATIENT SERVICES | Facility: HOSPITAL | Age: 77
LOS: 1 days | Discharge: HOME | End: 2017-04-14

## 2017-04-14 DIAGNOSIS — I77.0 ARTERIOVENOUS FISTULA, ACQUIRED: Chronic | ICD-10-CM

## 2017-04-14 DIAGNOSIS — D46.1 REFRACTORY ANEMIA WITH RING SIDEROBLASTS: ICD-10-CM

## 2017-04-14 DIAGNOSIS — Z90.49 ACQUIRED ABSENCE OF OTHER SPECIFIED PARTS OF DIGESTIVE TRACT: Chronic | ICD-10-CM

## 2017-04-18 ENCOUNTER — RESULT REVIEW (OUTPATIENT)
Age: 77
End: 2017-04-18

## 2017-04-19 ENCOUNTER — APPOINTMENT (OUTPATIENT)
Dept: INFUSION THERAPY | Facility: HOSPITAL | Age: 77
End: 2017-04-19

## 2017-04-19 ENCOUNTER — APPOINTMENT (OUTPATIENT)
Dept: HEMATOLOGY ONCOLOGY | Facility: CLINIC | Age: 77
End: 2017-04-19

## 2017-04-19 VITALS
DIASTOLIC BLOOD PRESSURE: 80 MMHG | RESPIRATION RATE: 16 BRPM | TEMPERATURE: 98.2 F | SYSTOLIC BLOOD PRESSURE: 140 MMHG | BODY MASS INDEX: 27.08 KG/M2 | WEIGHT: 143.3 LBS | HEART RATE: 64 BPM

## 2017-04-19 LAB
HCT VFR BLD CALC: 24.8 % — LOW (ref 34.5–45)
HGB BLD-MCNC: 8 G/DL — LOW (ref 11.5–15.5)
MCHC RBC-ENTMCNC: 25.8 PG — LOW (ref 27–34)
MCHC RBC-ENTMCNC: 32.1 G/DL — SIGNIFICANT CHANGE UP (ref 32–36)
MCV RBC AUTO: 80.2 FL — SIGNIFICANT CHANGE UP (ref 80–100)
PLATELET # BLD AUTO: 168 K/UL — SIGNIFICANT CHANGE UP (ref 150–400)
RBC # BLD: 3.09 M/UL — LOW (ref 3.8–5.2)
RBC # FLD: 14.4 % — SIGNIFICANT CHANGE UP (ref 10.3–14.5)
WBC # BLD: 3.4 K/UL — LOW (ref 3.8–10.5)
WBC # FLD AUTO: 3.4 K/UL — LOW (ref 3.8–10.5)

## 2017-04-20 DIAGNOSIS — N18.5 CHRONIC KIDNEY DISEASE, STAGE 5: ICD-10-CM

## 2017-04-20 DIAGNOSIS — D63.1 ANEMIA IN CHRONIC KIDNEY DISEASE: ICD-10-CM

## 2017-05-01 ENCOUNTER — APPOINTMENT (OUTPATIENT)
Dept: CARDIOLOGY | Facility: CLINIC | Age: 77
End: 2017-05-01

## 2017-05-01 ENCOUNTER — NON-APPOINTMENT (OUTPATIENT)
Age: 77
End: 2017-05-01

## 2017-05-01 ENCOUNTER — APPOINTMENT (OUTPATIENT)
Dept: CV DIAGNOSITCS | Facility: HOSPITAL | Age: 77
End: 2017-05-01

## 2017-05-01 ENCOUNTER — OUTPATIENT (OUTPATIENT)
Dept: OUTPATIENT SERVICES | Facility: HOSPITAL | Age: 77
LOS: 1 days | End: 2017-05-01
Payer: MEDICARE

## 2017-05-01 VITALS
BODY MASS INDEX: 27 KG/M2 | HEIGHT: 61 IN | WEIGHT: 143 LBS | SYSTOLIC BLOOD PRESSURE: 195 MMHG | OXYGEN SATURATION: 98 % | HEART RATE: 81 BPM | DIASTOLIC BLOOD PRESSURE: 68 MMHG

## 2017-05-01 DIAGNOSIS — I25.10 ATHEROSCLEROTIC HEART DISEASE OF NATIVE CORONARY ARTERY W/OUT ANGINA PECTORIS: ICD-10-CM

## 2017-05-01 DIAGNOSIS — I77.0 ARTERIOVENOUS FISTULA, ACQUIRED: Chronic | ICD-10-CM

## 2017-05-01 DIAGNOSIS — Z90.49 ACQUIRED ABSENCE OF OTHER SPECIFIED PARTS OF DIGESTIVE TRACT: Chronic | ICD-10-CM

## 2017-05-01 DIAGNOSIS — I35.0 NONRHEUMATIC AORTIC (VALVE) STENOSIS: ICD-10-CM

## 2017-05-01 PROCEDURE — 93306 TTE W/DOPPLER COMPLETE: CPT

## 2017-05-01 PROCEDURE — 93306 TTE W/DOPPLER COMPLETE: CPT | Mod: 26

## 2017-05-02 ENCOUNTER — APPOINTMENT (OUTPATIENT)
Dept: NEPHROLOGY | Facility: CLINIC | Age: 77
End: 2017-05-02

## 2017-05-02 VITALS
WEIGHT: 141.09 LBS | HEIGHT: 61 IN | SYSTOLIC BLOOD PRESSURE: 154 MMHG | HEART RATE: 80 BPM | DIASTOLIC BLOOD PRESSURE: 73 MMHG | BODY MASS INDEX: 26.64 KG/M2 | OXYGEN SATURATION: 99 %

## 2017-05-04 ENCOUNTER — NON-APPOINTMENT (OUTPATIENT)
Age: 77
End: 2017-05-04

## 2017-05-26 ENCOUNTER — OUTPATIENT (OUTPATIENT)
Dept: OUTPATIENT SERVICES | Facility: HOSPITAL | Age: 77
LOS: 1 days | Discharge: HOME | End: 2017-05-26

## 2017-05-26 ENCOUNTER — EMERGENCY (EMERGENCY)
Facility: HOSPITAL | Age: 77
LOS: 1 days | Discharge: HOME | End: 2017-05-26
Attending: EMERGENCY MEDICINE | Admitting: EMERGENCY MEDICINE
Payer: MEDICARE

## 2017-05-26 VITALS
OXYGEN SATURATION: 100 % | HEART RATE: 86 BPM | SYSTOLIC BLOOD PRESSURE: 192 MMHG | RESPIRATION RATE: 16 BRPM | DIASTOLIC BLOOD PRESSURE: 61 MMHG | TEMPERATURE: 99 F

## 2017-05-26 VITALS
DIASTOLIC BLOOD PRESSURE: 90 MMHG | HEART RATE: 95 BPM | OXYGEN SATURATION: 98 % | RESPIRATION RATE: 17 BRPM | SYSTOLIC BLOOD PRESSURE: 191 MMHG | TEMPERATURE: 98 F

## 2017-05-26 DIAGNOSIS — I77.0 ARTERIOVENOUS FISTULA, ACQUIRED: Chronic | ICD-10-CM

## 2017-05-26 DIAGNOSIS — D46.1 REFRACTORY ANEMIA WITH RING SIDEROBLASTS: ICD-10-CM

## 2017-05-26 DIAGNOSIS — Z90.49 ACQUIRED ABSENCE OF OTHER SPECIFIED PARTS OF DIGESTIVE TRACT: Chronic | ICD-10-CM

## 2017-05-26 LAB
ALBUMIN SERPL ELPH-MCNC: 4.2 G/DL — SIGNIFICANT CHANGE UP (ref 3.3–5)
ALP SERPL-CCNC: 49 U/L — SIGNIFICANT CHANGE UP (ref 40–120)
ALT FLD-CCNC: 4 U/L — SIGNIFICANT CHANGE UP (ref 4–33)
APTT BLD: 29.2 SEC — SIGNIFICANT CHANGE UP (ref 27.5–37.4)
AST SERPL-CCNC: 15 U/L — SIGNIFICANT CHANGE UP (ref 4–32)
BASOPHILS # BLD AUTO: 0.02 K/UL — SIGNIFICANT CHANGE UP (ref 0–0.2)
BASOPHILS NFR BLD AUTO: 0.5 % — SIGNIFICANT CHANGE UP (ref 0–2)
BILIRUB SERPL-MCNC: 0.2 MG/DL — SIGNIFICANT CHANGE UP (ref 0.2–1.2)
BUN SERPL-MCNC: 64 MG/DL — HIGH (ref 7–23)
CALCIUM SERPL-MCNC: 10.7 MG/DL — HIGH (ref 8.4–10.5)
CHLORIDE SERPL-SCNC: 102 MMOL/L — SIGNIFICANT CHANGE UP (ref 98–107)
CK MB BLD-MCNC: 1.63 NG/ML — SIGNIFICANT CHANGE UP (ref 1–4.7)
CK SERPL-CCNC: 43 U/L — SIGNIFICANT CHANGE UP (ref 25–170)
CO2 SERPL-SCNC: 22 MMOL/L — SIGNIFICANT CHANGE UP (ref 22–31)
CREAT SERPL-MCNC: 3.61 MG/DL — HIGH (ref 0.5–1.3)
EOSINOPHIL # BLD AUTO: 0.16 K/UL — SIGNIFICANT CHANGE UP (ref 0–0.5)
EOSINOPHIL NFR BLD AUTO: 4 % — SIGNIFICANT CHANGE UP (ref 0–6)
GLUCOSE SERPL-MCNC: 110 MG/DL — HIGH (ref 70–99)
HCT VFR BLD CALC: 31.5 % — LOW (ref 34.5–45)
HGB BLD-MCNC: 9.4 G/DL — LOW (ref 11.5–15.5)
IMM GRANULOCYTES NFR BLD AUTO: 0.3 % — SIGNIFICANT CHANGE UP (ref 0–1.5)
INR BLD: 0.93 — SIGNIFICANT CHANGE UP (ref 0.88–1.17)
LYMPHOCYTES # BLD AUTO: 1.25 K/UL — SIGNIFICANT CHANGE UP (ref 1–3.3)
LYMPHOCYTES # BLD AUTO: 31.4 % — SIGNIFICANT CHANGE UP (ref 13–44)
MCHC RBC-ENTMCNC: 23.4 PG — LOW (ref 27–34)
MCHC RBC-ENTMCNC: 29.8 % — LOW (ref 32–36)
MCV RBC AUTO: 78.6 FL — LOW (ref 80–100)
MONOCYTES # BLD AUTO: 0.26 K/UL — SIGNIFICANT CHANGE UP (ref 0–0.9)
MONOCYTES NFR BLD AUTO: 6.5 % — SIGNIFICANT CHANGE UP (ref 2–14)
NEUTROPHILS # BLD AUTO: 2.28 K/UL — SIGNIFICANT CHANGE UP (ref 1.8–7.4)
NEUTROPHILS NFR BLD AUTO: 57.3 % — SIGNIFICANT CHANGE UP (ref 43–77)
PLATELET # BLD AUTO: 155 K/UL — SIGNIFICANT CHANGE UP (ref 150–400)
PMV BLD: 9.4 FL — SIGNIFICANT CHANGE UP (ref 7–13)
POTASSIUM SERPL-MCNC: 4.3 MMOL/L — SIGNIFICANT CHANGE UP (ref 3.5–5.3)
POTASSIUM SERPL-SCNC: 4.3 MMOL/L — SIGNIFICANT CHANGE UP (ref 3.5–5.3)
PROT SERPL-MCNC: 7.4 G/DL — SIGNIFICANT CHANGE UP (ref 6–8.3)
PROTHROM AB SERPL-ACNC: 10.4 SEC — SIGNIFICANT CHANGE UP (ref 9.8–13.1)
RBC # BLD: 4.01 M/UL — SIGNIFICANT CHANGE UP (ref 3.8–5.2)
RBC # FLD: 14.5 % — SIGNIFICANT CHANGE UP (ref 10.3–14.5)
SODIUM SERPL-SCNC: 142 MMOL/L — SIGNIFICANT CHANGE UP (ref 135–145)
TROPONIN T SERPL-MCNC: < 0.06 NG/ML — SIGNIFICANT CHANGE UP (ref 0–0.06)
WBC # BLD: 3.98 K/UL — SIGNIFICANT CHANGE UP (ref 3.8–10.5)
WBC # FLD AUTO: 3.98 K/UL — SIGNIFICANT CHANGE UP (ref 3.8–10.5)

## 2017-05-26 PROCEDURE — 99285 EMERGENCY DEPT VISIT HI MDM: CPT | Mod: GC

## 2017-05-26 PROCEDURE — 71020: CPT | Mod: 26

## 2017-05-26 NOTE — ED ADULT NURSE NOTE - CAS ELECT INFOMATION PROVIDED
Pt. BP elevated throughout ED stay without accompanying symptoms; pt. verbalized she did not take her medications today; states she will take it when she gets home after discharge.

## 2017-05-26 NOTE — ED PROVIDER NOTE - ATTENDING CONTRIBUTION TO CARE
77f, ppm  insertion 2 months ago, tavr. presents due to pain over the ppm site. started yesterday, but worse today so came to ED. last >6 h ago. pain only when she moves. no pain at rest. no radiation. non-exertional or pleuritic. no f/c, sob, palps, syncope. ros otherwise neg. exam, vs unremarkable. nad. tender only over ppm site, reproduces her pain. lungs clear, adbo benign, legs normal. Seen by Dr. Damico in ED. ok with trop x 21, cxr which are normal and agrees with d/c. I have no concern for acs. advised to f/u in the next week weith her ppm MD to see if any further mgmt needed. no need for admission.

## 2017-05-26 NOTE — ED ADULT TRIAGE NOTE - CHIEF COMPLAINT QUOTE
pt s/p ppm placement on 4/30 c/o of Right sided cp pain. pt has a/v fistulla to Left forearm, states she has never received dialysis.

## 2017-05-26 NOTE — ED ADULT NURSE NOTE - PSH
A-V fistula  left arm  S/P CABG (coronary artery bypass graft)  triple in 2007  S/P cholecystectomy    Stented coronary artery  s/p 3 stents, then CABG 2007

## 2017-05-26 NOTE — ED ADULT NURSE NOTE - OBJECTIVE STATEMENT
Pt received to rm 1, a&ox3, referred by cardiologist for eval of R sided chest pain since yesterday - pt states pain is at the location of pacemaker that was placed on 3/31/17, worsening with movement and repositioning. Denies any sob, syncope, palpitations, or chest pain at present. Labs drawn and sent, IVL placed. NSR on cm. EKG performed in triage. L fistula in place to LUE from many years ago as per pt, never used for dilaysis before. Will monitor.

## 2017-05-26 NOTE — ED PROVIDER NOTE - OBJECTIVE STATEMENT
78 y/o female w/ hx of  TAVR 3/2017,  CAD s/p cardiac stents X3, CABG X 3 2007, CKD Stage IV s/p AVF on left arm, HTN, DM2, LBBB s/p right sided PPM presents w/ worsening right chest pain symptoms started yesterday described as dull-sharp worse w/ movement, and radiating to back. No new numbness or tingling. No pleurisy. No recent sickness or travel hx. No hx of blood clots.

## 2017-05-31 ENCOUNTER — APPOINTMENT (OUTPATIENT)
Dept: INFUSION THERAPY | Facility: HOSPITAL | Age: 77
End: 2017-05-31

## 2017-05-31 ENCOUNTER — APPOINTMENT (OUTPATIENT)
Dept: HEMATOLOGY ONCOLOGY | Facility: CLINIC | Age: 77
End: 2017-05-31

## 2017-05-31 ENCOUNTER — RESULT REVIEW (OUTPATIENT)
Age: 77
End: 2017-05-31

## 2017-05-31 VITALS
HEART RATE: 60 BPM | BODY MASS INDEX: 27.08 KG/M2 | SYSTOLIC BLOOD PRESSURE: 182 MMHG | RESPIRATION RATE: 16 BRPM | TEMPERATURE: 98.5 F | WEIGHT: 143.3 LBS | OXYGEN SATURATION: 100 % | DIASTOLIC BLOOD PRESSURE: 64 MMHG

## 2017-05-31 DIAGNOSIS — N25.81 SECONDARY HYPERPARATHYROIDISM OF RENAL ORIGIN: ICD-10-CM

## 2017-05-31 DIAGNOSIS — Z95.2 PRESENCE OF PROSTHETIC HEART VALVE: ICD-10-CM

## 2017-05-31 LAB
HCT VFR BLD CALC: 29.1 % — LOW (ref 34.5–45)
HGB BLD-MCNC: 9.3 G/DL — LOW (ref 11.5–15.5)
MCHC RBC-ENTMCNC: 24.9 PG — LOW (ref 27–34)
MCHC RBC-ENTMCNC: 32 G/DL — SIGNIFICANT CHANGE UP (ref 32–36)
MCV RBC AUTO: 77.8 FL — LOW (ref 80–100)
PLATELET # BLD AUTO: 125 K/UL — LOW (ref 150–400)
RBC # BLD: 3.74 M/UL — LOW (ref 3.8–5.2)
RBC # FLD: 13.3 % — SIGNIFICANT CHANGE UP (ref 10.3–14.5)
WBC # BLD: 3.4 K/UL — LOW (ref 3.8–10.5)
WBC # FLD AUTO: 3.4 K/UL — LOW (ref 3.8–10.5)

## 2017-06-06 ENCOUNTER — NON-APPOINTMENT (OUTPATIENT)
Age: 77
End: 2017-06-06

## 2017-06-06 ENCOUNTER — APPOINTMENT (OUTPATIENT)
Dept: ELECTROPHYSIOLOGY | Facility: CLINIC | Age: 77
End: 2017-06-06

## 2017-06-06 VITALS
SYSTOLIC BLOOD PRESSURE: 173 MMHG | HEART RATE: 86 BPM | HEIGHT: 61 IN | WEIGHT: 143 LBS | DIASTOLIC BLOOD PRESSURE: 70 MMHG | BODY MASS INDEX: 27 KG/M2 | OXYGEN SATURATION: 99 %

## 2017-06-07 ENCOUNTER — APPOINTMENT (OUTPATIENT)
Dept: NEPHROLOGY | Facility: CLINIC | Age: 77
End: 2017-06-07

## 2017-06-07 VITALS
BODY MASS INDEX: 27 KG/M2 | OXYGEN SATURATION: 98 % | HEIGHT: 61 IN | SYSTOLIC BLOOD PRESSURE: 148 MMHG | DIASTOLIC BLOOD PRESSURE: 67 MMHG | HEART RATE: 60 BPM | WEIGHT: 143 LBS

## 2017-06-21 ENCOUNTER — RESULT REVIEW (OUTPATIENT)
Age: 77
End: 2017-06-21

## 2017-06-21 ENCOUNTER — APPOINTMENT (OUTPATIENT)
Dept: HEMATOLOGY ONCOLOGY | Facility: CLINIC | Age: 77
End: 2017-06-21

## 2017-06-21 ENCOUNTER — APPOINTMENT (OUTPATIENT)
Dept: INFUSION THERAPY | Facility: HOSPITAL | Age: 77
End: 2017-06-21

## 2017-06-21 VITALS
TEMPERATURE: 98.5 F | HEART RATE: 66 BPM | OXYGEN SATURATION: 98 % | WEIGHT: 145.06 LBS | HEIGHT: 60.98 IN | RESPIRATION RATE: 16 BRPM | BODY MASS INDEX: 27.39 KG/M2 | DIASTOLIC BLOOD PRESSURE: 62 MMHG | SYSTOLIC BLOOD PRESSURE: 156 MMHG

## 2017-06-21 LAB
HCT VFR BLD CALC: 21.9 % — LOW (ref 34.5–45)
HGB BLD-MCNC: 7.2 G/DL — LOW (ref 11.5–15.5)
MCHC RBC-ENTMCNC: 25.5 PG — LOW (ref 27–34)
MCHC RBC-ENTMCNC: 32.8 G/DL — SIGNIFICANT CHANGE UP (ref 32–36)
MCV RBC AUTO: 77.5 FL — LOW (ref 80–100)
PLATELET # BLD AUTO: 124 K/UL — LOW (ref 150–400)
RBC # BLD: 2.83 M/UL — LOW (ref 3.8–5.2)
RBC # FLD: 13.3 % — SIGNIFICANT CHANGE UP (ref 10.3–14.5)
WBC # BLD: 3.4 K/UL — LOW (ref 3.8–10.5)
WBC # FLD AUTO: 3.4 K/UL — LOW (ref 3.8–10.5)

## 2017-06-22 DIAGNOSIS — N18.5 CHRONIC KIDNEY DISEASE, STAGE 5: ICD-10-CM

## 2017-06-22 DIAGNOSIS — D63.1 ANEMIA IN CHRONIC KIDNEY DISEASE: ICD-10-CM

## 2017-07-07 ENCOUNTER — APPOINTMENT (OUTPATIENT)
Dept: ELECTROPHYSIOLOGY | Facility: CLINIC | Age: 77
End: 2017-07-07

## 2017-07-21 ENCOUNTER — OUTPATIENT (OUTPATIENT)
Dept: OUTPATIENT SERVICES | Facility: HOSPITAL | Age: 77
LOS: 1 days | Discharge: HOME | End: 2017-07-21

## 2017-07-21 DIAGNOSIS — I77.0 ARTERIOVENOUS FISTULA, ACQUIRED: Chronic | ICD-10-CM

## 2017-07-21 DIAGNOSIS — Z90.49 ACQUIRED ABSENCE OF OTHER SPECIFIED PARTS OF DIGESTIVE TRACT: Chronic | ICD-10-CM

## 2017-07-21 DIAGNOSIS — D46.1 REFRACTORY ANEMIA WITH RING SIDEROBLASTS: ICD-10-CM

## 2017-07-26 ENCOUNTER — RESULT REVIEW (OUTPATIENT)
Age: 77
End: 2017-07-26

## 2017-07-26 ENCOUNTER — APPOINTMENT (OUTPATIENT)
Dept: HEMATOLOGY ONCOLOGY | Facility: CLINIC | Age: 77
End: 2017-07-26

## 2017-07-26 ENCOUNTER — APPOINTMENT (OUTPATIENT)
Dept: INFUSION THERAPY | Facility: HOSPITAL | Age: 77
End: 2017-07-26

## 2017-07-26 VITALS
BODY MASS INDEX: 27.3 KG/M2 | TEMPERATURE: 98.8 F | WEIGHT: 144.4 LBS | RESPIRATION RATE: 16 BRPM | HEART RATE: 60 BPM | SYSTOLIC BLOOD PRESSURE: 170 MMHG | DIASTOLIC BLOOD PRESSURE: 63 MMHG | OXYGEN SATURATION: 99 %

## 2017-07-26 VITALS — SYSTOLIC BLOOD PRESSURE: 150 MMHG | DIASTOLIC BLOOD PRESSURE: 70 MMHG

## 2017-07-26 LAB
HCT VFR BLD CALC: 27.2 % — LOW (ref 34.5–45)
HGB BLD-MCNC: 8.7 G/DL — LOW (ref 11.5–15.5)
MCHC RBC-ENTMCNC: 24.7 PG — LOW (ref 27–34)
MCHC RBC-ENTMCNC: 32.1 G/DL — SIGNIFICANT CHANGE UP (ref 32–36)
MCV RBC AUTO: 77.1 FL — LOW (ref 80–100)
PLATELET # BLD AUTO: 130 K/UL — LOW (ref 150–400)
RBC # BLD: 3.53 M/UL — LOW (ref 3.8–5.2)
RBC # FLD: 13.6 % — SIGNIFICANT CHANGE UP (ref 10.3–14.5)
WBC # BLD: 3.1 K/UL — LOW (ref 3.8–10.5)
WBC # FLD AUTO: 3.1 K/UL — LOW (ref 3.8–10.5)

## 2017-07-27 DIAGNOSIS — N18.5 CHRONIC KIDNEY DISEASE, STAGE 5: ICD-10-CM

## 2017-07-27 DIAGNOSIS — D63.1 ANEMIA IN CHRONIC KIDNEY DISEASE: ICD-10-CM

## 2017-08-03 ENCOUNTER — RX RENEWAL (OUTPATIENT)
Age: 77
End: 2017-08-03

## 2017-08-09 ENCOUNTER — APPOINTMENT (OUTPATIENT)
Dept: NEPHROLOGY | Facility: CLINIC | Age: 77
End: 2017-08-09
Payer: MEDICARE

## 2017-08-09 VITALS
HEART RATE: 83 BPM | DIASTOLIC BLOOD PRESSURE: 71 MMHG | WEIGHT: 145 LBS | BODY MASS INDEX: 27.38 KG/M2 | OXYGEN SATURATION: 100 % | SYSTOLIC BLOOD PRESSURE: 136 MMHG | HEIGHT: 60.95 IN

## 2017-08-09 PROCEDURE — 99215 OFFICE O/P EST HI 40 MIN: CPT | Mod: GC

## 2017-08-14 DIAGNOSIS — R74.8 ABNORMAL LEVELS OF OTHER SERUM ENZYMES: ICD-10-CM

## 2017-08-28 ENCOUNTER — OUTPATIENT (OUTPATIENT)
Dept: OUTPATIENT SERVICES | Facility: HOSPITAL | Age: 77
LOS: 1 days | Discharge: HOME | End: 2017-08-28

## 2017-08-28 DIAGNOSIS — D46.1 REFRACTORY ANEMIA WITH RING SIDEROBLASTS: ICD-10-CM

## 2017-08-28 DIAGNOSIS — I77.0 ARTERIOVENOUS FISTULA, ACQUIRED: Chronic | ICD-10-CM

## 2017-08-28 DIAGNOSIS — Z90.49 ACQUIRED ABSENCE OF OTHER SPECIFIED PARTS OF DIGESTIVE TRACT: Chronic | ICD-10-CM

## 2017-09-05 ENCOUNTER — APPOINTMENT (OUTPATIENT)
Dept: ELECTROPHYSIOLOGY | Facility: CLINIC | Age: 77
End: 2017-09-05

## 2017-09-06 ENCOUNTER — RESULT REVIEW (OUTPATIENT)
Age: 77
End: 2017-09-06

## 2017-09-06 ENCOUNTER — APPOINTMENT (OUTPATIENT)
Dept: HEMATOLOGY ONCOLOGY | Facility: CLINIC | Age: 77
End: 2017-09-06
Payer: MEDICARE

## 2017-09-06 ENCOUNTER — APPOINTMENT (OUTPATIENT)
Dept: INFUSION THERAPY | Facility: HOSPITAL | Age: 77
End: 2017-09-06

## 2017-09-06 VITALS
TEMPERATURE: 98.9 F | OXYGEN SATURATION: 98 % | RESPIRATION RATE: 16 BRPM | BODY MASS INDEX: 27.25 KG/M2 | HEART RATE: 84 BPM | WEIGHT: 143.96 LBS | SYSTOLIC BLOOD PRESSURE: 160 MMHG | DIASTOLIC BLOOD PRESSURE: 70 MMHG

## 2017-09-06 LAB
HCT VFR BLD CALC: 27.8 % — LOW (ref 34.5–45)
HGB BLD-MCNC: 9 G/DL — LOW (ref 11.5–15.5)
MCHC RBC-ENTMCNC: 24.7 PG — LOW (ref 27–34)
MCHC RBC-ENTMCNC: 32.3 G/DL — SIGNIFICANT CHANGE UP (ref 32–36)
MCV RBC AUTO: 76.4 FL — LOW (ref 80–100)
PLATELET # BLD AUTO: 120 K/UL — LOW (ref 150–400)
RBC # BLD: 3.64 M/UL — LOW (ref 3.8–5.2)
RBC # FLD: 13.8 % — SIGNIFICANT CHANGE UP (ref 10.3–14.5)
WBC # BLD: 3.4 K/UL — LOW (ref 3.8–10.5)
WBC # FLD AUTO: 3.4 K/UL — LOW (ref 3.8–10.5)

## 2017-09-06 PROCEDURE — 99215 OFFICE O/P EST HI 40 MIN: CPT

## 2017-09-07 DIAGNOSIS — I10 ESSENTIAL (PRIMARY) HYPERTENSION: ICD-10-CM

## 2017-10-04 ENCOUNTER — APPOINTMENT (OUTPATIENT)
Dept: NEPHROLOGY | Facility: CLINIC | Age: 77
End: 2017-10-04
Payer: MEDICARE

## 2017-10-04 VITALS
BODY MASS INDEX: 27.7 KG/M2 | HEART RATE: 80 BPM | DIASTOLIC BLOOD PRESSURE: 68 MMHG | OXYGEN SATURATION: 98 % | SYSTOLIC BLOOD PRESSURE: 122 MMHG | WEIGHT: 141.09 LBS | HEIGHT: 60 IN

## 2017-10-04 PROCEDURE — 99213 OFFICE O/P EST LOW 20 MIN: CPT | Mod: GC

## 2017-10-04 RX ORDER — SODIUM BICARBONATE 650 MG/1
650 TABLET ORAL
Qty: 270 | Refills: 3 | Status: DISCONTINUED | COMMUNITY
Start: 2017-02-15 | End: 2017-10-04

## 2017-10-26 ENCOUNTER — OUTPATIENT (OUTPATIENT)
Dept: OUTPATIENT SERVICES | Facility: HOSPITAL | Age: 77
LOS: 1 days | Discharge: HOME | End: 2017-10-26

## 2017-10-26 DIAGNOSIS — Z90.49 ACQUIRED ABSENCE OF OTHER SPECIFIED PARTS OF DIGESTIVE TRACT: Chronic | ICD-10-CM

## 2017-10-26 DIAGNOSIS — I77.0 ARTERIOVENOUS FISTULA, ACQUIRED: Chronic | ICD-10-CM

## 2017-10-26 DIAGNOSIS — D46.1 REFRACTORY ANEMIA WITH RING SIDEROBLASTS: ICD-10-CM

## 2017-11-01 ENCOUNTER — APPOINTMENT (OUTPATIENT)
Dept: INFUSION THERAPY | Facility: HOSPITAL | Age: 77
End: 2017-11-01

## 2017-11-01 ENCOUNTER — APPOINTMENT (OUTPATIENT)
Dept: HEMATOLOGY ONCOLOGY | Facility: CLINIC | Age: 77
End: 2017-11-01
Payer: MEDICARE

## 2017-11-01 ENCOUNTER — RESULT REVIEW (OUTPATIENT)
Age: 77
End: 2017-11-01

## 2017-11-01 VITALS
SYSTOLIC BLOOD PRESSURE: 169 MMHG | BODY MASS INDEX: 27.99 KG/M2 | DIASTOLIC BLOOD PRESSURE: 67 MMHG | TEMPERATURE: 98.8 F | RESPIRATION RATE: 16 BRPM | HEART RATE: 69 BPM | OXYGEN SATURATION: 97 % | WEIGHT: 143.3 LBS

## 2017-11-01 LAB
FERRITIN SERPL-MCNC: 438 NG/ML
HCT VFR BLD CALC: 25 % — LOW (ref 34.5–45)
HGB BLD-MCNC: 7.9 G/DL — LOW (ref 11.5–15.5)
MCHC RBC-ENTMCNC: 23.8 PG — LOW (ref 27–34)
MCHC RBC-ENTMCNC: 31.4 G/DL — LOW (ref 32–36)
MCV RBC AUTO: 75.9 FL — LOW (ref 80–100)
PLATELET # BLD AUTO: 130 K/UL — LOW (ref 150–400)
RBC # BLD: 3.3 M/UL — LOW (ref 3.8–5.2)
RBC # FLD: 14 % — SIGNIFICANT CHANGE UP (ref 10.3–14.5)
WBC # BLD: 3.2 K/UL — LOW (ref 3.8–10.5)
WBC # FLD AUTO: 3.2 K/UL — LOW (ref 3.8–10.5)

## 2017-11-01 PROCEDURE — 99215 OFFICE O/P EST HI 40 MIN: CPT

## 2017-11-02 DIAGNOSIS — N18.5 CHRONIC KIDNEY DISEASE, STAGE 5: ICD-10-CM

## 2017-11-02 DIAGNOSIS — D63.1 ANEMIA IN CHRONIC KIDNEY DISEASE: ICD-10-CM

## 2017-11-02 LAB
ALBUMIN SERPL ELPH-MCNC: 4.1 G/DL
ALP BLD-CCNC: 42 U/L
ALT SERPL-CCNC: 6 U/L
ANION GAP SERPL CALC-SCNC: 19 MMOL/L
AST SERPL-CCNC: 13 U/L
BILIRUB SERPL-MCNC: 0.3 MG/DL
BUN SERPL-MCNC: 68 MG/DL
CALCIUM SERPL-MCNC: 10.8 MG/DL
CHLORIDE SERPL-SCNC: 104 MMOL/L
CO2 SERPL-SCNC: 21 MMOL/L
CREAT SERPL-MCNC: 4.41 MG/DL
GLUCOSE SERPL-MCNC: 197 MG/DL
IRON SATN MFR SERPL: 35 %
IRON SERPL-MCNC: 86 UG/DL
POTASSIUM SERPL-SCNC: 4.9 MMOL/L
PROT SERPL-MCNC: 6.6 G/DL
SODIUM SERPL-SCNC: 144 MMOL/L
TIBC SERPL-MCNC: 244 UG/DL
UIBC SERPL-MCNC: 158 UG/DL

## 2017-11-22 ENCOUNTER — APPOINTMENT (OUTPATIENT)
Dept: ORTHOPEDIC SURGERY | Facility: CLINIC | Age: 77
End: 2017-11-22
Payer: MEDICARE

## 2017-11-22 VITALS
HEART RATE: 60 BPM | TEMPERATURE: 98.3 F | BODY MASS INDEX: 28.07 KG/M2 | SYSTOLIC BLOOD PRESSURE: 156 MMHG | WEIGHT: 143 LBS | DIASTOLIC BLOOD PRESSURE: 74 MMHG | HEIGHT: 60 IN

## 2017-11-22 PROCEDURE — 20610 DRAIN/INJ JOINT/BURSA W/O US: CPT | Mod: 50

## 2017-11-22 PROCEDURE — 73562 X-RAY EXAM OF KNEE 3: CPT | Mod: 50

## 2017-11-22 PROCEDURE — 99204 OFFICE O/P NEW MOD 45 MIN: CPT | Mod: 25

## 2017-11-30 ENCOUNTER — OUTPATIENT (OUTPATIENT)
Dept: OUTPATIENT SERVICES | Facility: HOSPITAL | Age: 77
LOS: 1 days | End: 2017-11-30
Payer: MEDICARE

## 2017-11-30 DIAGNOSIS — Z51.89 ENCOUNTER FOR OTHER SPECIFIED AFTERCARE: ICD-10-CM

## 2017-11-30 DIAGNOSIS — M17.0 BILATERAL PRIMARY OSTEOARTHRITIS OF KNEE: ICD-10-CM

## 2017-11-30 DIAGNOSIS — Z90.49 ACQUIRED ABSENCE OF OTHER SPECIFIED PARTS OF DIGESTIVE TRACT: Chronic | ICD-10-CM

## 2017-11-30 DIAGNOSIS — I77.0 ARTERIOVENOUS FISTULA, ACQUIRED: Chronic | ICD-10-CM

## 2017-12-05 ENCOUNTER — NON-APPOINTMENT (OUTPATIENT)
Age: 77
End: 2017-12-05

## 2017-12-05 ENCOUNTER — APPOINTMENT (OUTPATIENT)
Dept: ELECTROPHYSIOLOGY | Facility: CLINIC | Age: 77
End: 2017-12-05
Payer: MEDICARE

## 2017-12-05 VITALS — OXYGEN SATURATION: 100 % | DIASTOLIC BLOOD PRESSURE: 70 MMHG | SYSTOLIC BLOOD PRESSURE: 192 MMHG | HEART RATE: 61 BPM

## 2017-12-05 PROCEDURE — 93280 PM DEVICE PROGR EVAL DUAL: CPT

## 2017-12-06 ENCOUNTER — APPOINTMENT (OUTPATIENT)
Dept: NEPHROLOGY | Facility: CLINIC | Age: 77
End: 2017-12-06
Payer: MEDICARE

## 2017-12-06 VITALS
WEIGHT: 147.71 LBS | OXYGEN SATURATION: 96 % | DIASTOLIC BLOOD PRESSURE: 74 MMHG | HEIGHT: 60 IN | HEART RATE: 69 BPM | BODY MASS INDEX: 29 KG/M2 | SYSTOLIC BLOOD PRESSURE: 152 MMHG

## 2017-12-06 PROCEDURE — G0008: CPT | Mod: GC

## 2017-12-06 PROCEDURE — 90686 IIV4 VACC NO PRSV 0.5 ML IM: CPT | Mod: GC

## 2017-12-06 PROCEDURE — 99214 OFFICE O/P EST MOD 30 MIN: CPT | Mod: 25,GC

## 2017-12-07 ENCOUNTER — OUTPATIENT (OUTPATIENT)
Dept: OUTPATIENT SERVICES | Facility: HOSPITAL | Age: 77
LOS: 1 days | Discharge: HOME | End: 2017-12-07

## 2017-12-07 DIAGNOSIS — I77.0 ARTERIOVENOUS FISTULA, ACQUIRED: Chronic | ICD-10-CM

## 2017-12-07 DIAGNOSIS — N18.5 CHRONIC KIDNEY DISEASE, STAGE 5: ICD-10-CM

## 2017-12-07 DIAGNOSIS — D46.1 REFRACTORY ANEMIA WITH RING SIDEROBLASTS: ICD-10-CM

## 2017-12-07 DIAGNOSIS — Z90.49 ACQUIRED ABSENCE OF OTHER SPECIFIED PARTS OF DIGESTIVE TRACT: Chronic | ICD-10-CM

## 2017-12-13 ENCOUNTER — APPOINTMENT (OUTPATIENT)
Dept: INFUSION THERAPY | Facility: HOSPITAL | Age: 77
End: 2017-12-13

## 2017-12-13 ENCOUNTER — APPOINTMENT (OUTPATIENT)
Dept: HEMATOLOGY ONCOLOGY | Facility: CLINIC | Age: 77
End: 2017-12-13
Payer: MEDICARE

## 2017-12-13 ENCOUNTER — APPOINTMENT (OUTPATIENT)
Dept: HEMATOLOGY ONCOLOGY | Facility: CLINIC | Age: 77
End: 2017-12-13

## 2017-12-13 ENCOUNTER — RESULT REVIEW (OUTPATIENT)
Age: 77
End: 2017-12-13

## 2017-12-13 VITALS
BODY MASS INDEX: 29.62 KG/M2 | SYSTOLIC BLOOD PRESSURE: 167 MMHG | TEMPERATURE: 97.7 F | RESPIRATION RATE: 16 BRPM | OXYGEN SATURATION: 100 % | HEART RATE: 68 BPM | WEIGHT: 151.68 LBS | DIASTOLIC BLOOD PRESSURE: 69 MMHG

## 2017-12-13 LAB
HCT VFR BLD CALC: 24.9 % — LOW (ref 34.5–45)
HGB BLD-MCNC: 7.8 G/DL — LOW (ref 11.5–15.5)
MCHC RBC-ENTMCNC: 24.5 PG — LOW (ref 27–34)
MCHC RBC-ENTMCNC: 31.4 G/DL — LOW (ref 32–36)
MCV RBC AUTO: 78.1 FL — LOW (ref 80–100)
PLATELET # BLD AUTO: 106 K/UL — LOW (ref 150–400)
RBC # BLD: 3.19 M/UL — LOW (ref 3.8–5.2)
RBC # FLD: 14.4 % — SIGNIFICANT CHANGE UP (ref 10.3–14.5)
WBC # BLD: 4.1 K/UL — SIGNIFICANT CHANGE UP (ref 3.8–10.5)
WBC # FLD AUTO: 4.1 K/UL — SIGNIFICANT CHANGE UP (ref 3.8–10.5)

## 2017-12-13 PROCEDURE — 99214 OFFICE O/P EST MOD 30 MIN: CPT

## 2017-12-14 DIAGNOSIS — D63.1 ANEMIA IN CHRONIC KIDNEY DISEASE: ICD-10-CM

## 2017-12-29 ENCOUNTER — OUTPATIENT (OUTPATIENT)
Dept: OUTPATIENT SERVICES | Facility: HOSPITAL | Age: 77
LOS: 1 days | Discharge: HOME | End: 2017-12-29

## 2017-12-29 DIAGNOSIS — D46.1 REFRACTORY ANEMIA WITH RING SIDEROBLASTS: ICD-10-CM

## 2017-12-29 DIAGNOSIS — Z90.49 ACQUIRED ABSENCE OF OTHER SPECIFIED PARTS OF DIGESTIVE TRACT: Chronic | ICD-10-CM

## 2017-12-29 DIAGNOSIS — N18.5 CHRONIC KIDNEY DISEASE, STAGE 5: ICD-10-CM

## 2017-12-29 DIAGNOSIS — I77.0 ARTERIOVENOUS FISTULA, ACQUIRED: Chronic | ICD-10-CM

## 2018-01-03 ENCOUNTER — APPOINTMENT (OUTPATIENT)
Dept: ORTHOPEDIC SURGERY | Facility: CLINIC | Age: 78
End: 2018-01-03
Payer: MEDICARE

## 2018-01-03 VITALS
HEIGHT: 60 IN | TEMPERATURE: 98.4 F | WEIGHT: 150 LBS | DIASTOLIC BLOOD PRESSURE: 76 MMHG | BODY MASS INDEX: 29.45 KG/M2 | SYSTOLIC BLOOD PRESSURE: 161 MMHG | HEART RATE: 60 BPM

## 2018-01-03 PROCEDURE — 99213 OFFICE O/P EST LOW 20 MIN: CPT

## 2018-01-09 ENCOUNTER — RESULT REVIEW (OUTPATIENT)
Age: 78
End: 2018-01-09

## 2018-01-09 ENCOUNTER — APPOINTMENT (OUTPATIENT)
Dept: INFUSION THERAPY | Facility: HOSPITAL | Age: 78
End: 2018-01-09

## 2018-01-09 ENCOUNTER — APPOINTMENT (OUTPATIENT)
Dept: HEMATOLOGY ONCOLOGY | Facility: CLINIC | Age: 78
End: 2018-01-09
Payer: MEDICARE

## 2018-01-09 ENCOUNTER — APPOINTMENT (OUTPATIENT)
Dept: NEPHROLOGY | Facility: CLINIC | Age: 78
End: 2018-01-09

## 2018-01-09 VITALS
RESPIRATION RATE: 16 BRPM | SYSTOLIC BLOOD PRESSURE: 190 MMHG | OXYGEN SATURATION: 100 % | BODY MASS INDEX: 28.85 KG/M2 | TEMPERATURE: 99 F | WEIGHT: 147.71 LBS | DIASTOLIC BLOOD PRESSURE: 71 MMHG | HEART RATE: 77 BPM

## 2018-01-09 VITALS — SYSTOLIC BLOOD PRESSURE: 165 MMHG | DIASTOLIC BLOOD PRESSURE: 90 MMHG

## 2018-01-09 LAB
BASOPHILS # BLD AUTO: 0 K/UL — SIGNIFICANT CHANGE UP (ref 0–0.2)
BASOPHILS NFR BLD AUTO: 0.9 % — SIGNIFICANT CHANGE UP (ref 0–2)
EOSINOPHIL # BLD AUTO: 0.2 K/UL — SIGNIFICANT CHANGE UP (ref 0–0.5)
EOSINOPHIL NFR BLD AUTO: 5.6 % — SIGNIFICANT CHANGE UP (ref 0–6)
HCT VFR BLD CALC: 27.5 % — LOW (ref 34.5–45)
HGB BLD-MCNC: 8.4 G/DL — LOW (ref 11.5–15.5)
LYMPHOCYTES # BLD AUTO: 0.8 K/UL — LOW (ref 1–3.3)
LYMPHOCYTES # BLD AUTO: 29.6 % — SIGNIFICANT CHANGE UP (ref 13–44)
MCHC RBC-ENTMCNC: 24.4 PG — LOW (ref 27–34)
MCHC RBC-ENTMCNC: 30.7 G/DL — LOW (ref 32–36)
MCV RBC AUTO: 79.3 FL — LOW (ref 80–100)
MONOCYTES # BLD AUTO: 0.2 K/UL — SIGNIFICANT CHANGE UP (ref 0–0.9)
MONOCYTES NFR BLD AUTO: 9 % — SIGNIFICANT CHANGE UP (ref 2–14)
NEUTROPHILS # BLD AUTO: 1.5 K/UL — LOW (ref 1.8–7.4)
NEUTROPHILS NFR BLD AUTO: 54.9 % — SIGNIFICANT CHANGE UP (ref 43–77)
PLAT MORPH BLD: NORMAL — SIGNIFICANT CHANGE UP
PLATELET # BLD AUTO: 136 K/UL — LOW (ref 150–400)
RBC # BLD: 3.47 M/UL — LOW (ref 3.8–5.2)
RBC # FLD: 14.9 % — HIGH (ref 10.3–14.5)
RBC BLD AUTO: SIGNIFICANT CHANGE UP
WBC # BLD: 2.8 K/UL — LOW (ref 3.8–10.5)
WBC # FLD AUTO: 2.8 K/UL — LOW (ref 3.8–10.5)

## 2018-01-09 PROCEDURE — 99215 OFFICE O/P EST HI 40 MIN: CPT

## 2018-01-16 DIAGNOSIS — N18.4 CHRONIC KIDNEY DISEASE, STAGE 4 (SEVERE): ICD-10-CM

## 2018-01-16 DIAGNOSIS — D63.1 ANEMIA IN CHRONIC KIDNEY DISEASE: ICD-10-CM

## 2018-02-06 ENCOUNTER — APPOINTMENT (OUTPATIENT)
Dept: NEPHROLOGY | Facility: CLINIC | Age: 78
End: 2018-02-06
Payer: MEDICARE

## 2018-02-06 ENCOUNTER — LABORATORY RESULT (OUTPATIENT)
Age: 78
End: 2018-02-06

## 2018-02-06 VITALS
HEIGHT: 60 IN | SYSTOLIC BLOOD PRESSURE: 149 MMHG | DIASTOLIC BLOOD PRESSURE: 70 MMHG | WEIGHT: 143.3 LBS | BODY MASS INDEX: 28.13 KG/M2 | OXYGEN SATURATION: 98 % | HEART RATE: 72 BPM

## 2018-02-06 LAB
ALBUMIN SERPL ELPH-MCNC: 3.9 G/DL
ANION GAP SERPL CALC-SCNC: 16 MMOL/L
BUN SERPL-MCNC: 58 MG/DL
CALCIUM SERPL-MCNC: 10 MG/DL
CHLORIDE SERPL-SCNC: 103 MMOL/L
CO2 SERPL-SCNC: 21 MMOL/L
CREAT SERPL-MCNC: 4.07 MG/DL
GLUCOSE SERPL-MCNC: 136 MG/DL
PHOSPHATE SERPL-MCNC: 4.2 MG/DL
POTASSIUM SERPL-SCNC: 4.7 MMOL/L
SODIUM SERPL-SCNC: 140 MMOL/L

## 2018-02-06 PROCEDURE — 99214 OFFICE O/P EST MOD 30 MIN: CPT | Mod: GC

## 2018-02-07 LAB
BASOPHILS # BLD AUTO: 0.05 K/UL
BASOPHILS NFR BLD AUTO: 1.8 %
CALCIUM SERPL-MCNC: 10 MG/DL
EOSINOPHIL # BLD AUTO: 0.05 K/UL
EOSINOPHIL NFR BLD AUTO: 1.8
HAV IGM SER QL: NONREACTIVE
HBV CORE IGM SER QL: NONREACTIVE
HBV SURFACE AG SER QL: NONREACTIVE
HCT VFR BLD CALC: 30.6 %
HCV AB SER QL: NONREACTIVE
HCV S/CO RATIO: 0.08 S/CO
HGB BLD-MCNC: 9.1 G/DL
LYMPHOCYTES # BLD AUTO: 0.85 K/UL
LYMPHOCYTES NFR BLD AUTO: 32.4 %
MAN DIFF?: NORMAL
MCHC RBC-ENTMCNC: 23.3 PG
MCHC RBC-ENTMCNC: 29.7 GM/DL
MCV RBC AUTO: 78.5 FL
MONOCYTES # BLD AUTO: 0.23 K/UL
MONOCYTES NFR BLD AUTO: 9 %
NEUTROPHILS # BLD AUTO: 1.39 K/UL
NEUTROPHILS NFR BLD AUTO: 53.2 %
PARATHYROID HORMONE INTACT: 168 PG/ML
PLATELET # BLD AUTO: 166 K/UL
RBC # BLD: 3.9 M/UL
RBC # FLD: 15.3 %
WBC # FLD AUTO: 2.61 K/UL

## 2018-02-14 ENCOUNTER — APPOINTMENT (OUTPATIENT)
Dept: ORTHOPEDIC SURGERY | Facility: CLINIC | Age: 78
End: 2018-02-14
Payer: MEDICARE

## 2018-02-14 PROCEDURE — 99214 OFFICE O/P EST MOD 30 MIN: CPT

## 2018-02-15 ENCOUNTER — OUTPATIENT (OUTPATIENT)
Dept: OUTPATIENT SERVICES | Facility: HOSPITAL | Age: 78
LOS: 1 days | Discharge: HOME | End: 2018-02-15

## 2018-02-15 DIAGNOSIS — I77.0 ARTERIOVENOUS FISTULA, ACQUIRED: Chronic | ICD-10-CM

## 2018-02-15 DIAGNOSIS — D63.1 ANEMIA IN CHRONIC KIDNEY DISEASE: ICD-10-CM

## 2018-02-15 DIAGNOSIS — N18.4 CHRONIC KIDNEY DISEASE, STAGE 4 (SEVERE): ICD-10-CM

## 2018-02-15 DIAGNOSIS — Z90.49 ACQUIRED ABSENCE OF OTHER SPECIFIED PARTS OF DIGESTIVE TRACT: Chronic | ICD-10-CM

## 2018-02-15 DIAGNOSIS — D46.1 REFRACTORY ANEMIA WITH RING SIDEROBLASTS: ICD-10-CM

## 2018-02-21 ENCOUNTER — RESULT REVIEW (OUTPATIENT)
Age: 78
End: 2018-02-21

## 2018-02-21 ENCOUNTER — APPOINTMENT (OUTPATIENT)
Dept: INFUSION THERAPY | Facility: HOSPITAL | Age: 78
End: 2018-02-21

## 2018-02-21 ENCOUNTER — APPOINTMENT (OUTPATIENT)
Dept: HEMATOLOGY ONCOLOGY | Facility: CLINIC | Age: 78
End: 2018-02-21
Payer: MEDICARE

## 2018-02-21 VITALS
WEIGHT: 145 LBS | HEART RATE: 66 BPM | SYSTOLIC BLOOD PRESSURE: 140 MMHG | OXYGEN SATURATION: 100 % | TEMPERATURE: 98.9 F | BODY MASS INDEX: 28.32 KG/M2 | RESPIRATION RATE: 16 BRPM | DIASTOLIC BLOOD PRESSURE: 70 MMHG

## 2018-02-21 LAB
BASOPHILS # BLD AUTO: 0 K/UL — SIGNIFICANT CHANGE UP (ref 0–0.2)
BASOPHILS NFR BLD AUTO: 0 % — SIGNIFICANT CHANGE UP (ref 0–2)
EOSINOPHIL # BLD AUTO: 0.1 K/UL — SIGNIFICANT CHANGE UP (ref 0–0.5)
EOSINOPHIL NFR BLD AUTO: 3.5 % — SIGNIFICANT CHANGE UP (ref 0–6)
HCT VFR BLD CALC: 27.3 % — LOW (ref 34.5–45)
HGB BLD-MCNC: 8.7 G/DL — LOW (ref 11.5–15.5)
LYMPHOCYTES # BLD AUTO: 1 K/UL — SIGNIFICANT CHANGE UP (ref 1–3.3)
LYMPHOCYTES # BLD AUTO: 32.7 % — SIGNIFICANT CHANGE UP (ref 13–44)
MCHC RBC-ENTMCNC: 24.6 PG — LOW (ref 27–34)
MCHC RBC-ENTMCNC: 31.8 G/DL — LOW (ref 32–36)
MCV RBC AUTO: 77.4 FL — LOW (ref 80–100)
MONOCYTES # BLD AUTO: 0.3 K/UL — SIGNIFICANT CHANGE UP (ref 0–0.9)
MONOCYTES NFR BLD AUTO: 9.1 % — SIGNIFICANT CHANGE UP (ref 2–14)
NEUTROPHILS # BLD AUTO: 1.6 K/UL — LOW (ref 1.8–7.4)
NEUTROPHILS NFR BLD AUTO: 54.6 % — SIGNIFICANT CHANGE UP (ref 43–77)
PLATELET # BLD AUTO: 105 K/UL — LOW (ref 150–400)
RBC # BLD: 3.52 M/UL — LOW (ref 3.8–5.2)
RBC # FLD: 13.6 % — SIGNIFICANT CHANGE UP (ref 10.3–14.5)
WBC # BLD: 2.9 K/UL — LOW (ref 3.8–10.5)
WBC # FLD AUTO: 2.9 K/UL — LOW (ref 3.8–10.5)

## 2018-02-21 PROCEDURE — 99214 OFFICE O/P EST MOD 30 MIN: CPT

## 2018-02-22 DIAGNOSIS — D63.8 ANEMIA IN OTHER CHRONIC DISEASES CLASSIFIED ELSEWHERE: ICD-10-CM

## 2018-03-24 PROCEDURE — G8979: CPT | Mod: CK

## 2018-03-24 PROCEDURE — 97140 MANUAL THERAPY 1/> REGIONS: CPT

## 2018-03-24 PROCEDURE — G8978: CPT | Mod: CL

## 2018-03-24 PROCEDURE — 97110 THERAPEUTIC EXERCISES: CPT

## 2018-03-24 PROCEDURE — 97162 PT EVAL MOD COMPLEX 30 MIN: CPT

## 2018-03-24 PROCEDURE — 97010 HOT OR COLD PACKS THERAPY: CPT

## 2018-03-28 ENCOUNTER — INPATIENT (INPATIENT)
Facility: HOSPITAL | Age: 78
LOS: 3 days | Discharge: HOME | End: 2018-04-01
Attending: INTERNAL MEDICINE | Admitting: INTERNAL MEDICINE
Payer: MEDICARE

## 2018-03-28 VITALS
OXYGEN SATURATION: 100 % | DIASTOLIC BLOOD PRESSURE: 67 MMHG | RESPIRATION RATE: 18 BRPM | SYSTOLIC BLOOD PRESSURE: 205 MMHG | TEMPERATURE: 98 F | HEART RATE: 72 BPM

## 2018-03-28 DIAGNOSIS — Z29.9 ENCOUNTER FOR PROPHYLACTIC MEASURES, UNSPECIFIED: ICD-10-CM

## 2018-03-28 DIAGNOSIS — Z95.2 PRESENCE OF PROSTHETIC HEART VALVE: Chronic | ICD-10-CM

## 2018-03-28 DIAGNOSIS — I25.10 ATHEROSCLEROTIC HEART DISEASE OF NATIVE CORONARY ARTERY WITHOUT ANGINA PECTORIS: ICD-10-CM

## 2018-03-28 DIAGNOSIS — Z95.0 PRESENCE OF CARDIAC PACEMAKER: Chronic | ICD-10-CM

## 2018-03-28 DIAGNOSIS — R06.02 SHORTNESS OF BREATH: ICD-10-CM

## 2018-03-28 DIAGNOSIS — I77.0 ARTERIOVENOUS FISTULA, ACQUIRED: Chronic | ICD-10-CM

## 2018-03-28 DIAGNOSIS — N18.5 CHRONIC KIDNEY DISEASE, STAGE 5: ICD-10-CM

## 2018-03-28 DIAGNOSIS — E11.9 TYPE 2 DIABETES MELLITUS WITHOUT COMPLICATIONS: ICD-10-CM

## 2018-03-28 DIAGNOSIS — I16.0 HYPERTENSIVE URGENCY: ICD-10-CM

## 2018-03-28 DIAGNOSIS — Z90.49 ACQUIRED ABSENCE OF OTHER SPECIFIED PARTS OF DIGESTIVE TRACT: Chronic | ICD-10-CM

## 2018-03-28 DIAGNOSIS — D63.1 ANEMIA IN CHRONIC KIDNEY DISEASE: ICD-10-CM

## 2018-03-28 LAB
ALBUMIN SERPL ELPH-MCNC: 4.3 G/DL — SIGNIFICANT CHANGE UP (ref 3.3–5)
ALP SERPL-CCNC: 50 U/L — SIGNIFICANT CHANGE UP (ref 40–120)
ALT FLD-CCNC: 8 U/L — SIGNIFICANT CHANGE UP (ref 4–33)
AST SERPL-CCNC: 14 U/L — SIGNIFICANT CHANGE UP (ref 4–32)
BASOPHILS # BLD AUTO: 0.02 K/UL — SIGNIFICANT CHANGE UP (ref 0–0.2)
BASOPHILS NFR BLD AUTO: 0.6 % — SIGNIFICANT CHANGE UP (ref 0–2)
BILIRUB SERPL-MCNC: 0.2 MG/DL — SIGNIFICANT CHANGE UP (ref 0.2–1.2)
BUN SERPL-MCNC: 54 MG/DL — HIGH (ref 7–23)
CALCIUM SERPL-MCNC: 10 MG/DL — SIGNIFICANT CHANGE UP (ref 8.4–10.5)
CHLORIDE SERPL-SCNC: 100 MMOL/L — SIGNIFICANT CHANGE UP (ref 98–107)
CK SERPL-CCNC: 48 U/L — SIGNIFICANT CHANGE UP (ref 25–170)
CO2 SERPL-SCNC: 24 MMOL/L — SIGNIFICANT CHANGE UP (ref 22–31)
CREAT SERPL-MCNC: 3.97 MG/DL — HIGH (ref 0.5–1.3)
EOSINOPHIL # BLD AUTO: 0.1 K/UL — SIGNIFICANT CHANGE UP (ref 0–0.5)
EOSINOPHIL NFR BLD AUTO: 2.8 % — SIGNIFICANT CHANGE UP (ref 0–6)
GLUCOSE SERPL-MCNC: 124 MG/DL — HIGH (ref 70–99)
HCT VFR BLD CALC: 32.1 % — LOW (ref 34.5–45)
HGB BLD-MCNC: 9.3 G/DL — LOW (ref 11.5–15.5)
IMM GRANULOCYTES # BLD AUTO: 0.01 # — SIGNIFICANT CHANGE UP
IMM GRANULOCYTES NFR BLD AUTO: 0.3 % — SIGNIFICANT CHANGE UP (ref 0–1.5)
LYMPHOCYTES # BLD AUTO: 0.99 K/UL — LOW (ref 1–3.3)
LYMPHOCYTES # BLD AUTO: 27.3 % — SIGNIFICANT CHANGE UP (ref 13–44)
MCHC RBC-ENTMCNC: 23 PG — LOW (ref 27–34)
MCHC RBC-ENTMCNC: 29 % — LOW (ref 32–36)
MCV RBC AUTO: 79.5 FL — LOW (ref 80–100)
MONOCYTES # BLD AUTO: 0.25 K/UL — SIGNIFICANT CHANGE UP (ref 0–0.9)
MONOCYTES NFR BLD AUTO: 6.9 % — SIGNIFICANT CHANGE UP (ref 2–14)
NEUTROPHILS # BLD AUTO: 2.26 K/UL — SIGNIFICANT CHANGE UP (ref 1.8–7.4)
NEUTROPHILS NFR BLD AUTO: 62.1 % — SIGNIFICANT CHANGE UP (ref 43–77)
NRBC # FLD: 0 — SIGNIFICANT CHANGE UP
PLATELET # BLD AUTO: 163 K/UL — SIGNIFICANT CHANGE UP (ref 150–400)
PMV BLD: 11 FL — SIGNIFICANT CHANGE UP (ref 7–13)
POTASSIUM SERPL-MCNC: 4.3 MMOL/L — SIGNIFICANT CHANGE UP (ref 3.5–5.3)
POTASSIUM SERPL-SCNC: 4.3 MMOL/L — SIGNIFICANT CHANGE UP (ref 3.5–5.3)
PROT SERPL-MCNC: 7.2 G/DL — SIGNIFICANT CHANGE UP (ref 6–8.3)
RBC # BLD: 4.04 M/UL — SIGNIFICANT CHANGE UP (ref 3.8–5.2)
RBC # FLD: 14.4 % — SIGNIFICANT CHANGE UP (ref 10.3–14.5)
SODIUM SERPL-SCNC: 140 MMOL/L — SIGNIFICANT CHANGE UP (ref 135–145)
TROPONIN T SERPL-MCNC: < 0.06 NG/ML — SIGNIFICANT CHANGE UP (ref 0–0.06)
TROPONIN T SERPL-MCNC: < 0.06 NG/ML — SIGNIFICANT CHANGE UP (ref 0–0.06)
WBC # BLD: 3.63 K/UL — LOW (ref 3.8–10.5)
WBC # FLD AUTO: 3.63 K/UL — LOW (ref 3.8–10.5)

## 2018-03-28 PROCEDURE — 71045 X-RAY EXAM CHEST 1 VIEW: CPT | Mod: 26

## 2018-03-28 RX ORDER — FUROSEMIDE 40 MG
40 TABLET ORAL DAILY
Qty: 0 | Refills: 0 | Status: DISCONTINUED | OUTPATIENT
Start: 2018-03-28 | End: 2018-03-30

## 2018-03-28 RX ORDER — INSULIN LISPRO 100/ML
VIAL (ML) SUBCUTANEOUS
Qty: 0 | Refills: 0 | Status: DISCONTINUED | OUTPATIENT
Start: 2018-03-28 | End: 2018-04-01

## 2018-03-28 RX ORDER — DOXAZOSIN MESYLATE 4 MG
2 TABLET ORAL AT BEDTIME
Qty: 0 | Refills: 0 | Status: DISCONTINUED | OUTPATIENT
Start: 2018-03-28 | End: 2018-04-01

## 2018-03-28 RX ORDER — ACETAMINOPHEN 500 MG
650 TABLET ORAL ONCE
Qty: 0 | Refills: 0 | Status: COMPLETED | OUTPATIENT
Start: 2018-03-28 | End: 2018-03-28

## 2018-03-28 RX ORDER — ASPIRIN/CALCIUM CARB/MAGNESIUM 324 MG
81 TABLET ORAL DAILY
Qty: 0 | Refills: 0 | Status: DISCONTINUED | OUTPATIENT
Start: 2018-03-29 | End: 2018-04-01

## 2018-03-28 RX ORDER — SODIUM BICARBONATE 1 MEQ/ML
650 SYRINGE (ML) INTRAVENOUS
Qty: 0 | Refills: 0 | Status: DISCONTINUED | OUTPATIENT
Start: 2018-03-28 | End: 2018-04-01

## 2018-03-28 RX ORDER — DEXTROSE 50 % IN WATER 50 %
1 SYRINGE (ML) INTRAVENOUS ONCE
Qty: 0 | Refills: 0 | Status: DISCONTINUED | OUTPATIENT
Start: 2018-03-28 | End: 2018-04-01

## 2018-03-28 RX ORDER — HYDRALAZINE HCL 50 MG
100 TABLET ORAL EVERY 12 HOURS
Qty: 0 | Refills: 0 | Status: DISCONTINUED | OUTPATIENT
Start: 2018-03-28 | End: 2018-03-30

## 2018-03-28 RX ORDER — DOCUSATE SODIUM 100 MG
100 CAPSULE ORAL THREE TIMES A DAY
Qty: 0 | Refills: 0 | Status: DISCONTINUED | OUTPATIENT
Start: 2018-03-28 | End: 2018-04-01

## 2018-03-28 RX ORDER — CALCITRIOL 0.5 UG/1
1 CAPSULE ORAL
Qty: 0 | Refills: 0 | COMMUNITY

## 2018-03-28 RX ORDER — ATORVASTATIN CALCIUM 80 MG/1
10 TABLET, FILM COATED ORAL AT BEDTIME
Qty: 0 | Refills: 0 | Status: DISCONTINUED | OUTPATIENT
Start: 2018-03-28 | End: 2018-03-28

## 2018-03-28 RX ORDER — HEPARIN SODIUM 5000 [USP'U]/ML
5000 INJECTION INTRAVENOUS; SUBCUTANEOUS EVERY 12 HOURS
Qty: 0 | Refills: 0 | Status: DISCONTINUED | OUTPATIENT
Start: 2018-03-28 | End: 2018-04-01

## 2018-03-28 RX ORDER — ISOSORBIDE MONONITRATE 60 MG/1
90 TABLET, EXTENDED RELEASE ORAL DAILY
Qty: 0 | Refills: 0 | Status: DISCONTINUED | OUTPATIENT
Start: 2018-03-28 | End: 2018-03-29

## 2018-03-28 RX ORDER — CLOPIDOGREL BISULFATE 75 MG/1
75 TABLET, FILM COATED ORAL DAILY
Qty: 0 | Refills: 0 | Status: DISCONTINUED | OUTPATIENT
Start: 2018-03-28 | End: 2018-04-01

## 2018-03-28 RX ORDER — DEXTROSE 50 % IN WATER 50 %
25 SYRINGE (ML) INTRAVENOUS ONCE
Qty: 0 | Refills: 0 | Status: DISCONTINUED | OUTPATIENT
Start: 2018-03-28 | End: 2018-04-01

## 2018-03-28 RX ORDER — CALCITRIOL 0.5 UG/1
0.25 CAPSULE ORAL
Qty: 0 | Refills: 0 | Status: DISCONTINUED | OUTPATIENT
Start: 2018-03-28 | End: 2018-04-01

## 2018-03-28 RX ORDER — INSULIN LISPRO 100/ML
VIAL (ML) SUBCUTANEOUS AT BEDTIME
Qty: 0 | Refills: 0 | Status: DISCONTINUED | OUTPATIENT
Start: 2018-03-28 | End: 2018-04-01

## 2018-03-28 RX ORDER — CARVEDILOL PHOSPHATE 80 MG/1
37.5 CAPSULE, EXTENDED RELEASE ORAL EVERY 12 HOURS
Qty: 0 | Refills: 0 | Status: DISCONTINUED | OUTPATIENT
Start: 2018-03-28 | End: 2018-03-30

## 2018-03-28 RX ORDER — GLUCAGON INJECTION, SOLUTION 0.5 MG/.1ML
1 INJECTION, SOLUTION SUBCUTANEOUS ONCE
Qty: 0 | Refills: 0 | Status: DISCONTINUED | OUTPATIENT
Start: 2018-03-28 | End: 2018-04-01

## 2018-03-28 RX ORDER — DEXTROSE 50 % IN WATER 50 %
12.5 SYRINGE (ML) INTRAVENOUS ONCE
Qty: 0 | Refills: 0 | Status: DISCONTINUED | OUTPATIENT
Start: 2018-03-28 | End: 2018-04-01

## 2018-03-28 RX ORDER — SODIUM CHLORIDE 9 MG/ML
1000 INJECTION, SOLUTION INTRAVENOUS
Qty: 0 | Refills: 0 | Status: DISCONTINUED | OUTPATIENT
Start: 2018-03-28 | End: 2018-04-01

## 2018-03-28 RX ORDER — ASPIRIN/CALCIUM CARB/MAGNESIUM 324 MG
162 TABLET ORAL ONCE
Qty: 0 | Refills: 0 | Status: COMPLETED | OUTPATIENT
Start: 2018-03-28 | End: 2018-03-28

## 2018-03-28 RX ORDER — ATORVASTATIN CALCIUM 80 MG/1
40 TABLET, FILM COATED ORAL AT BEDTIME
Qty: 0 | Refills: 0 | Status: DISCONTINUED | OUTPATIENT
Start: 2018-03-28 | End: 2018-03-28

## 2018-03-28 RX ORDER — ASPIRIN/CALCIUM CARB/MAGNESIUM 324 MG
162 TABLET ORAL ONCE
Qty: 0 | Refills: 0 | Status: DISCONTINUED | OUTPATIENT
Start: 2018-03-28 | End: 2018-03-28

## 2018-03-28 RX ORDER — ATORVASTATIN CALCIUM 80 MG/1
40 TABLET, FILM COATED ORAL AT BEDTIME
Qty: 0 | Refills: 0 | Status: DISCONTINUED | OUTPATIENT
Start: 2018-03-28 | End: 2018-04-01

## 2018-03-28 RX ORDER — ALLOPURINOL 300 MG
100 TABLET ORAL DAILY
Qty: 0 | Refills: 0 | Status: DISCONTINUED | OUTPATIENT
Start: 2018-03-28 | End: 2018-04-01

## 2018-03-28 RX ADMIN — Medication 100 MILLIGRAM(S): at 18:35

## 2018-03-28 RX ADMIN — ATORVASTATIN CALCIUM 40 MILLIGRAM(S): 80 TABLET, FILM COATED ORAL at 22:00

## 2018-03-28 RX ADMIN — CARVEDILOL PHOSPHATE 37.5 MILLIGRAM(S): 80 CAPSULE, EXTENDED RELEASE ORAL at 18:35

## 2018-03-28 RX ADMIN — Medication 650 MILLIGRAM(S): at 17:35

## 2018-03-28 RX ADMIN — Medication 650 MILLIGRAM(S): at 18:47

## 2018-03-28 RX ADMIN — Medication 162 MILLIGRAM(S): at 17:35

## 2018-03-28 NOTE — H&P ADULT - NEGATIVE OPHTHALMOLOGIC SYMPTOMS
no blurred vision L/no photophobia/no discharge L/no blurred vision R/no discharge R/no lacrimation L/no lacrimation R

## 2018-03-28 NOTE — ED PROVIDER NOTE - MEDICAL DECISION MAKING DETAILS
78 Y f with hx HTN DM CKD who presents with dizziness and HTN outpatient still hypertensive here. DDx: Uncontrolled HTN, worsening renal failure, hypertensive emergency. Pt with no complaints concerning for HTN emergency at this point, given hx of CAD and risk factors likely admission for workup.

## 2018-03-28 NOTE — H&P ADULT - PROBLEM SELECTOR PLAN 6
Plan per cardio consult:  cv stable   no s/s of decompensated HF on exam   denies cp/sob/nugent on exam  EKG pending  Trops pending   f/u cxr report   resume low dose ASA, plavix, statin, BB,

## 2018-03-28 NOTE — H&P ADULT - PROBLEM SELECTOR PLAN 1
Plan as per cardio consult.  1. HTN   BP noted to be elevated despite compliance with current medications  EKG pending  monitor Cardiac enzymes   Admit to tele  no evidence of decompensated CHF on exam, cv stable    increase coreg to 37.5 mg BID, hydralazine to 100 mg po BID, Imdur 90 mg po Daily  continue with clonidine

## 2018-03-28 NOTE — ED PROVIDER NOTE - ATTENDING CONTRIBUTION TO CARE
Attending Note (Katharine): patient complaining of dizziness and uncontrolled htn at home.  denies chest pain.  dizziness is vaguely described. also complaining of shortness of breath.  normal neuro exam.  +cad.   high risk acs.  admit.

## 2018-03-28 NOTE — H&P ADULT - GASTROINTESTINAL DETAILS
bowel sounds normal/no bruit/no rebound tenderness/no masses palpable/nontender/soft/no rigidity/no guarding/no organomegaly

## 2018-03-28 NOTE — H&P ADULT - PSH
A-V fistula  left arm  History of pacemaker    S/P AVR  TAVR  S/P CABG (coronary artery bypass graft)  triple in 2007  S/P cholecystectomy    Stented coronary artery  s/p 3 stents, then CABG 2007

## 2018-03-28 NOTE — ED PROVIDER NOTE - PHYSICAL EXAMINATION
Neuro: A&Ox4, MS +5/5 in UE and LE BL, finger to nose smooth and rapid, gross sensation intact in UE and LE BL, gait smooth and coordinated, negative rhomberg, negative pronator drift

## 2018-03-28 NOTE — CONSULT NOTE ADULT - ATTENDING COMMENTS
Patient seen and examined, agree with the above assessment and plan by TRISHA Prieto.  Pt presenting with dizziness and headache due hypertensive urgency  Increase Carvedilol to 37.5mg Q12  Increase hydralzine to 100mg PO BID  Continue clonidine  Increase imdur to 90 mg daily  Admit for observation  If blood pressure improves will consider DC tomorrow

## 2018-03-28 NOTE — H&P ADULT - NEGATIVE ENMT SYMPTOMS
no tinnitus/no sinus symptoms/no nasal discharge/no ear pain/no nasal obstruction/no nose bleeds/no recurrent cold sores/no post-nasal discharge/no nasal congestion

## 2018-03-28 NOTE — H&P ADULT - NEUROLOGICAL DETAILS
normal strength/no spontaneous movement/sensation intact/alert and oriented x 3/responds to verbal commands

## 2018-03-28 NOTE — CONSULT NOTE ADULT - SUBJECTIVE AND OBJECTIVE BOX
CARDIOLOGY CONSULT - Dr. Damico     CHIEF COMPLAINT: HTN    HPI: 78 Y F with PMHx as stated below presents with Elevated BP. Pt. was at physical therapy this am when she started to feel dizzy and c/o upset stomach. She checked her BP which was elevated SBP > 200. Prior to arriving to [facility], Pt. was told to take extra dose of carvedilol 25mg and 50mg of hydralazine. BP did not improve and patient was told to come to ED. Pt. states she is compliant with her medications. Pt. denies cp/sob/nugent, denies palpitations, orthopnea, lower extremity edema Pt. c/o Slight headache. Denies vision changes, fever, recent illness, abdominal pain.  ROS otherwise negative.       PAST MEDICAL & SURGICAL HISTORY:  Thyroid nodule: awaiting FNB in the near future  Severe aortic stenosis  Osteoarthritis: bilateral knees  CKD (chronic kidney disease) stage 4, GFR 15-29 ml/min  DM2 (diabetes mellitus, type 2)  Arthritis  CAD (coronary artery disease)  Gout  Anemia Associated with Chronic Renal Failure  HTN (Hypertension)  A-V fistula: left arm  S/P cholecystectomy  Stented coronary artery: s/p 3 stents, then CABG 2007  S/P CABG (coronary artery bypass graft): triple in 2007          PREVIOUS DIAGNOSTIC TESTING:    [ ] Echocardiogram:  < from: Transthoracic Echocardiogram (05.01.17 @ 13:26) >  Conclusions:  1. Mitral annular calcification, otherwise normal mitral  valve. Mild mitral regurgitation.  2. Transcatheter aortic valve replacement. Peak transaortic  valve gradient equals 23 mm Hg, mean transaortic valve  gradient equals 11 mm Hg, which is probably normal in the  presence of a transcatheter aortic valve replacement. Trace  paravalvular aortic regurgitation.  3. Mild concentric left ventricular hypertrophy.  4. Normal left ventricular systolic function. The basal  inferior andbasal inferoseptum are mildly hypokinetic.  5. Normal right ventricular size and function.  6. Estimated pulmonary artery systolic pressure equals 52  mm Hg, assuming right atrial pressure equals 8 mm Hg,  consistent with moderate pulmonary pressures.  *** Compared with echocardiogram of 3/31/2017, estimated PA  systolic pressures are slightly higher on today's study.    < end of copied text >    [ ]  Catheterization:<.    < from: Cardiac Cath Lab - Adult (03.30.17 @ 10:49) >  LEFT LOWER EXTREMITY VESSELS: Left common iliac: The vessel was medium to  large. Left common iliac: The vessel was patent. Left internal iliac: The  vessel was patent. Left external iliac: The vessel was patent. Left common  femoral: The vessel was patent. Proximal left superficial femoral: The  vessel was patent. Proximal left deep femoral: The vessel was patent.  RIGHT LOWER EXTREMITY VESSELS: Right common iliac: Normal. Right lower  extremity vessels: Right common iliac: The vessel was patent. Right  internal iliac: The vessel was patent. Right external iliac: The vessel  was patent. Right common femoral: The vessel was patent. Angiography  showed moderate atherosclerosis. Proximal right superficial femoral: The  vessel was patent. Proximal right deep femoral: The vessel was patent.  COMPLICATIONS: There were no complications.  DIAGNOSTIC IMPRESSIONS: Diagnostic recommendations: Proceed with TAVR with  a 29mm Evolute transcatheter valve in context of symptomatic, severe  aortic stenosis. The patient was evaluated by 2 cardiac surgeons on our  Heart Team .  Interventional Impression: Successful TAVR with a 29mm Evolute right  femoral percutaneous access; there was no PVL post TAVR as assessed by  GEMMA, aortography, and hemodynamics.  DIAGNOSTIC RECOMMENDATIONS: Post TAVR management in the CTU; TTE in the  morning  Continue a low dose aspirin and Clopidogrel 75mg po daily.  INTERVENTIONAL IMPRESSIONS: Diagnostic recommendations: Proceed with TAVR  with a 29mm Evolute transcatheter valve in context of symptomatic, severe  aortic stenosis. The patient was evaluated by 2 cardiac surgeons on our  Heart Team .  Interventional Impression: Successful TAVR with a 29mm Evolute right  femoral percutaneous access; there was no PVL post TAVR as assessed by  GEMMA, aortography, and hemodynamics.    < end of copied text >      [ ] Stress Test:  	    MEDICATIONS:  MEDICATIONS  (STANDING):      FAMILY HISTORY:  No pertinent family history in first degree relatives      SOCIAL HISTORY:    [ x] Non-smoker  [ ] Smoker  [ ] Alcohol    Allergies    codeine (Other)  Lortab (Other)  morphine (Other)  Percocet 10/325 (Other)  Reglan (Other; Flushing (Skin))  sulfa drugs (Flushing (Skin))    Intolerances    	    REVIEW OF SYSTEMS:  CONSTITUTIONAL: No fever, weight loss, or fatigue  EYES: No eye pain, visual disturbances, or discharge  ENMT:  No difficulty hearing, tinnitus, vertigo; No sinus or throat pain  NECK: No pain or stiffness  RESPIRATORY: No cough, wheezing, chills or hemoptysis; No Shortness of Breath  CARDIOVASCULAR: No chest pain, palpitations, passing out, complains of dizziness prior to elevated BP this am  GASTROINTESTINAL: No abdominal or epigastric pain. No nausea, vomiting, or hematemesis; No diarrhea or constipation. No melena or hematochezia.  GENITOURINARY: No dysuria, frequency, hematuria, or incontinence  NEUROLOGICAL: c/o slight headaches, denies memory loss, loss of strength, numbness, or tremors  SKIN: No itching, burning, rashes, or lesions   	    	    PHYSICAL EXAM:  T(C): 36.7 (03-28-18 @ 11:52), Max: 36.7 (03-28-18 @ 11:52)  HR: 78 (03-28-18 @ 13:10) (72 - 78)  BP: 195/70 (03-28-18 @ 13:10) (195/70 - 205/67)  RR: 16 (03-28-18 @ 13:10) (16 - 18)  SpO2: 100% (03-28-18 @ 13:10) (100% - 100%)  Wt(kg): --  I&O's Summary      Appearance: Normal	  Psychiatry: A & O x 3, Mood & affect appropriate  HEENT:   Normal oral mucosa, PERRL, EOMI	  Lymphatic: No lymphadenopathy  Cardiovascular: Normal S1 S2,RRR, No JVD, +sys murmur  Respiratory: Lungs clear to auscultation	  Gastrointestinal:  Soft, Non-tender, + BS	  Skin: No rashes, No ecchymoses, No cyanosis	  Neurologic: Non-focal  Extremities: Normal range of motion, No clubbing, cyanosis or edema  Vascular: Peripheral pulses palpable 2+ bilaterally    TELEMETRY: 	    ECG: pending  RADIOLOGY:  OTHER: 	  	  LABS:	 	    CARDIAC MARKERS:    trops pending                             9.3    3.63  )-----------( 163      ( 28 Mar 2018 13:10 )             32.1     03-28    140  |  100  |  54<H>  ----------------------------<  124<H>  4.3   |  24  |  3.97<H>    Ca    10.0      28 Mar 2018 13:20    TPro  7.2  /  Alb  4.3  /  TBili  0.2  /  DBili  x   /  AST  14  /  ALT  8   /  AlkPhos  50  03-28      proBNP:   Lipid Profile:   HgA1c:   TSH:

## 2018-03-28 NOTE — ED PROVIDER NOTE - OBJECTIVE STATEMENT
Pt is 78 Y F with hx HTN, CKD with fistula but never used, DM who presents with dizziness and elevated BP. Pt states was dizzy at PT today and found to be hypertensive so sent to ED. Symptoms now resolved but worried about BP. Spoke with  before ed visit told to take extra medications, 50 mg hydralazine and 25 carvedilol. She normally takes 100 hydralazine, 25 carvedilol and 60 isosorbide every morning. She admits to SOB and mild HA here in the ED. She denies fever, nausea, vomiting, chest pain, numbness, weakness, tingling.

## 2018-03-28 NOTE — H&P ADULT - RS GEN PE MLT RESP DETAILS PC
breath sounds equal/no rales/clear to auscultation bilaterally/airway patent/no subcutaneous emphysema/no rhonchi/no chest wall tenderness/no intercostal retractions/no wheezes/good air movement/respirations non-labored

## 2018-03-28 NOTE — ED ADULT TRIAGE NOTE - CHIEF COMPLAINT QUOTE
Pt was at physical therapy this morning and was told her BP was 220/80. Spoke to Dr Saba who advised her to take carvedilol 25mg and hydralazine 50mg and then to go to ED. /67

## 2018-03-28 NOTE — ED ADULT NURSE NOTE - OBJECTIVE STATEMENT
Patient received AA&Ox3 sent by PMD for dizziness and elevated BP. Patient sent to ED after elevated BP did not resolve after taking extra BP medications per MD. Patient denies chest pain, N/V, SOB, fever, chills, dyspnea, dizziness, weakness at this time. Patient presents to ED with AVF to LUE but pt. states that she is not on HD. Patient ambulates with cane, skin intact. 20g PIV in place to right AC, labs drawn, medications administered per orders. Bed in lowest position, side rails up, NAD noted - will continue to monitor.

## 2018-03-28 NOTE — H&P ADULT - HISTORY OF PRESENT ILLNESS
78F, ambulates with a cane, has a history of HTN, CKD stage 5, with a LUE fistula which has never been used, DM2, CAD s/p CABG, PCI (3/2017), Aortic stenosis s/p TAVR/PPM (3/2017),  experienced dizziness, SOB and HA at PT and was found to have an elevated SBP > 200. She called her cardiologist and was advised to go to an ED and take extra dose of carvedilol 25mg and Hydralazine 50mg.  The pt denies chest pain, SOB, ONOFRE,  palpitations, orthopnea, lower extremity edema, headache, vision changes, fever, recent illness, abdominal pain, nausea, vomit, dysuria. In the Ed, the pt was seen by the edu team.  Their consult and recommendations are in the chart.

## 2018-03-28 NOTE — ED PROVIDER NOTE - NOTES
Pt does have extensive cardiac hx, aware that she is having SOB and that we would like to have her admitted. They agree with that and NP will call me back shortly.

## 2018-03-28 NOTE — H&P ADULT - NSHPLABSRESULTS_GEN_ALL_CORE
CXR preliminary Clear lungs. No pleural effusions or pneumothorax.  H & H= 9.3/ 32.1  BUN/ Creatinine= 54/ 3.97. Stage 5  CE negative x 1  EKG NSR @ 64b/ min , TWI 1,2, AVL,AVF, V4 to V6, QT/ QTC= 460/ 474

## 2018-03-28 NOTE — CONSULT NOTE ADULT - ASSESSMENT
78 y F with PMHx HTN, HLD, CAD s/p CABG, PCI (3/2017), Aortic stenosis s/p TAVR/PPM (3/2017) presenting with uncontrolled HTN.     1. HTN   BP noted to be elevated despite compliance with current medications  EKG pending  monitor Cardiac enzymes   Admit to tele  no evidence of decompensated CHF on exam, cv stable    resume outpatient antihypertensive meds   if BP remains elevated, can up titrate hydralazine to 100mg TID     2. CAD s/p CABG, PCI   cv stable   no s/s of decompensated HF on exam   denies cp/sob/nugent on exam  EKG pending  Trops pending   f/u cxr report   resume low dose ASA, plavix, statin, BB,     3. Aortic Stenosis s/p TAVR/ppm   Stable  resume current medication regimen     4. CKD  renal f/u 78 y F with PMHx HTN, HLD, CAD s/p CABG, PCI (3/2017), Aortic stenosis s/p TAVR/PPM (3/2017) presenting with uncontrolled HTN.     1. HTN   BP noted to be elevated despite compliance with current medications  EKG pending  monitor Cardiac enzymes   Admit to tele  no evidence of decompensated CHF on exam, cv stable    increase coreg to 37.5 mg BID, hydralazine to 100 mg po BID, Imdur 90 mg po Daily  continue with clonidine       2. CAD s/p CABG, PCI   cv stable   no s/s of decompensated HF on exam   denies cp/sob/nugent on exam  EKG pending  Trops pending   f/u cxr report   resume low dose ASA, plavix, statin, BB,     3. Aortic Stenosis s/p TAVR/ppm   Stable  resume current medication regimen     4. CKD  renal f/u

## 2018-03-29 ENCOUNTER — TRANSCRIPTION ENCOUNTER (OUTPATIENT)
Age: 78
End: 2018-03-29

## 2018-03-29 DIAGNOSIS — N18.5 CHRONIC KIDNEY DISEASE, STAGE 5: ICD-10-CM

## 2018-03-29 DIAGNOSIS — I10 ESSENTIAL (PRIMARY) HYPERTENSION: ICD-10-CM

## 2018-03-29 LAB
BUN SERPL-MCNC: 56 MG/DL — HIGH (ref 7–23)
CALCIUM SERPL-MCNC: 9.8 MG/DL — SIGNIFICANT CHANGE UP (ref 8.4–10.5)
CHLORIDE SERPL-SCNC: 103 MMOL/L — SIGNIFICANT CHANGE UP (ref 98–107)
CHOLEST SERPL-MCNC: 160 MG/DL — SIGNIFICANT CHANGE UP (ref 120–199)
CO2 SERPL-SCNC: 23 MMOL/L — SIGNIFICANT CHANGE UP (ref 22–31)
CREAT SERPL-MCNC: 4.02 MG/DL — HIGH (ref 0.5–1.3)
GLUCOSE SERPL-MCNC: 95 MG/DL — SIGNIFICANT CHANGE UP (ref 70–99)
HBA1C BLD-MCNC: 5.2 % — SIGNIFICANT CHANGE UP (ref 4–5.6)
HCT VFR BLD CALC: 29.8 % — LOW (ref 34.5–45)
HDLC SERPL-MCNC: 54 MG/DL — SIGNIFICANT CHANGE UP (ref 45–65)
HGB BLD-MCNC: 8.7 G/DL — LOW (ref 11.5–15.5)
LIPID PNL WITH DIRECT LDL SERPL: 92 MG/DL — SIGNIFICANT CHANGE UP
MAGNESIUM SERPL-MCNC: 2.2 MG/DL — SIGNIFICANT CHANGE UP (ref 1.6–2.6)
MCHC RBC-ENTMCNC: 22.8 PG — LOW (ref 27–34)
MCHC RBC-ENTMCNC: 29.2 % — LOW (ref 32–36)
MCV RBC AUTO: 78.2 FL — LOW (ref 80–100)
NRBC # FLD: 0 — SIGNIFICANT CHANGE UP
PHOSPHATE SERPL-MCNC: 3.9 MG/DL — SIGNIFICANT CHANGE UP (ref 2.5–4.5)
PLATELET # BLD AUTO: 131 K/UL — LOW (ref 150–400)
PMV BLD: 9.8 FL — SIGNIFICANT CHANGE UP (ref 7–13)
POTASSIUM SERPL-MCNC: 4.5 MMOL/L — SIGNIFICANT CHANGE UP (ref 3.5–5.3)
POTASSIUM SERPL-SCNC: 4.5 MMOL/L — SIGNIFICANT CHANGE UP (ref 3.5–5.3)
RBC # BLD: 3.81 M/UL — SIGNIFICANT CHANGE UP (ref 3.8–5.2)
RBC # FLD: 14.4 % — SIGNIFICANT CHANGE UP (ref 10.3–14.5)
SODIUM SERPL-SCNC: 139 MMOL/L — SIGNIFICANT CHANGE UP (ref 135–145)
TRIGL SERPL-MCNC: 64 MG/DL — SIGNIFICANT CHANGE UP (ref 10–149)
TSH SERPL-MCNC: 1.21 UIU/ML — SIGNIFICANT CHANGE UP (ref 0.27–4.2)
WBC # BLD: 2.81 K/UL — LOW (ref 3.8–10.5)
WBC # FLD AUTO: 2.81 K/UL — LOW (ref 3.8–10.5)

## 2018-03-29 PROCEDURE — 99223 1ST HOSP IP/OBS HIGH 75: CPT | Mod: GC

## 2018-03-29 RX ORDER — ACETAMINOPHEN 500 MG
650 TABLET ORAL ONCE
Qty: 0 | Refills: 0 | Status: COMPLETED | OUTPATIENT
Start: 2018-03-29 | End: 2018-03-29

## 2018-03-29 RX ORDER — ISOSORBIDE MONONITRATE 60 MG/1
60 TABLET, EXTENDED RELEASE ORAL
Qty: 0 | Refills: 0 | Status: DISCONTINUED | OUTPATIENT
Start: 2018-03-29 | End: 2018-03-30

## 2018-03-29 RX ADMIN — Medication 650 MILLIGRAM(S): at 05:18

## 2018-03-29 RX ADMIN — ATORVASTATIN CALCIUM 40 MILLIGRAM(S): 80 TABLET, FILM COATED ORAL at 22:37

## 2018-03-29 RX ADMIN — Medication 100 MILLIGRAM(S): at 05:18

## 2018-03-29 RX ADMIN — Medication 100 MILLIGRAM(S): at 12:39

## 2018-03-29 RX ADMIN — Medication 81 MILLIGRAM(S): at 12:40

## 2018-03-29 RX ADMIN — ISOSORBIDE MONONITRATE 60 MILLIGRAM(S): 60 TABLET, EXTENDED RELEASE ORAL at 17:37

## 2018-03-29 RX ADMIN — Medication 2 MILLIGRAM(S): at 22:38

## 2018-03-29 RX ADMIN — Medication 650 MILLIGRAM(S): at 10:56

## 2018-03-29 RX ADMIN — CARVEDILOL PHOSPHATE 37.5 MILLIGRAM(S): 80 CAPSULE, EXTENDED RELEASE ORAL at 17:36

## 2018-03-29 RX ADMIN — Medication 650 MILLIGRAM(S): at 17:37

## 2018-03-29 RX ADMIN — Medication 650 MILLIGRAM(S): at 11:37

## 2018-03-29 RX ADMIN — HEPARIN SODIUM 5000 UNIT(S): 5000 INJECTION INTRAVENOUS; SUBCUTANEOUS at 05:18

## 2018-03-29 RX ADMIN — CLOPIDOGREL BISULFATE 75 MILLIGRAM(S): 75 TABLET, FILM COATED ORAL at 12:39

## 2018-03-29 RX ADMIN — CARVEDILOL PHOSPHATE 37.5 MILLIGRAM(S): 80 CAPSULE, EXTENDED RELEASE ORAL at 05:17

## 2018-03-29 RX ADMIN — Medication 100 MILLIGRAM(S): at 17:36

## 2018-03-29 RX ADMIN — HEPARIN SODIUM 5000 UNIT(S): 5000 INJECTION INTRAVENOUS; SUBCUTANEOUS at 17:36

## 2018-03-29 RX ADMIN — Medication 40 MILLIGRAM(S): at 05:17

## 2018-03-29 RX ADMIN — Medication 0.3 MILLIGRAM(S): at 22:38

## 2018-03-29 RX ADMIN — ISOSORBIDE MONONITRATE 90 MILLIGRAM(S): 60 TABLET, EXTENDED RELEASE ORAL at 10:13

## 2018-03-29 NOTE — CONSULT NOTE ADULT - PROBLEM SELECTOR RECOMMENDATION 3
Pt with Uncontrolled hypertension on admission which had improved with medication. However, Pt with Elevated BP this AM. Monitor BP closely BP not well controlled despite multiple BP medications. Dose of BP medications recently adjusted. Low salt diet. Monitor BP closely

## 2018-03-29 NOTE — DISCHARGE NOTE ADULT - MEDICATION SUMMARY - MEDICATIONS TO STOP TAKING
I will STOP taking the medications listed below when I get home from the hospital:    hydrALAZINE 50 mg oral tablet  -- 1 tab(s) by mouth once a day in the afternoon

## 2018-03-29 NOTE — CONSULT NOTE ADULT - ASSESSMENT
78 year old female with a PMH of HTN, CKD Stage V (with a LUE fistula in place for 6 years, not used), DM Type II, CAD s/p CABG, PCI (3/2017), Aortic stenosis s/p TAVR/PPM (3/2017) admitted for evaluation of elevated BP and dizziness. Pt. with advanced CKD stage 5 admitted for HTN management.

## 2018-03-29 NOTE — CONSULT NOTE ADULT - PROBLEM SELECTOR RECOMMENDATION 2
Pt with Anemia in the setting of CKD stage V. Pt receives Aranesp 200mg [id number] by hematology as outpatient. Monitor Hb closely Pt. with anemia in the setting of advanced CKD stage 5. Pt. receives outpatient JEISON treatment by her hematologist. Monitor Hb

## 2018-03-29 NOTE — DISCHARGE NOTE ADULT - PLAN OF CARE
To maintain a normal blood pressure to prevent heart attack, stroke and renal failure. Low sodium and fat diet, continue anti-hypertensive medications, and follow up with primary care physician. To maintain a normal blood glucose level and HgA1C level < 5.7 and to prevent diabetic complications such as uncontrolled diabetes, diabetic coma, blindness, renal failure, and amputations. Monitor finger sticks pre-meal and bedtime, low salt, fat and carbohydrate diet, minimize glucose intake.  Exercise daily for at least 30 minutes and weight loss.  Follow up with primary care physician and endocrinologist for routine Hemoglobin A1C checks and management.  Follow up with your ophthalmologist for routine yearly vision exams. To be asymptomatic, to reduce risks factors such as hypertension, diabetes and hyperlipidemia to lower the risk of blood clots formation; and to prevent complications of coronary artery disease such as worsening chest pain, heart attack and death. Continue aspirin and Plavix, do not stop unless instructed by your physician.  Continue low salt, fat, cholesterol and carbohydrate diet. Follow up with cardiologist and primary care physician's routine appointment. To prevent shortness of breath, fluid overload, and electrolytes imbalance, and to slow down worsening kidney disease. Continue blood pressure, cholesterol and diabetic medications. Goal of hemoglobin A1C (HgbA1C) < 7%.  Avoid nephrotoxic drugs such as nonsteroidal anti-inflammatory agents (NSAIDs).   Please follow up with your nephrologist to monitor your kidney function, continue with low protein and potassium diet. Low sodium and fat diet, continue anti-hypertensive medications as prescribed, and follow up with primary care physician. Continue meclizine as needed. Please follow up with your primary care doctor for further management.

## 2018-03-29 NOTE — CONSULT NOTE ADULT - PROBLEM SELECTOR RECOMMENDATION 9
Pt with CKD stage V. Pt follows closely with her outpatient nephrologist Dr. Dixon. Her baseline Scr is 4-4.4 in office. Pt currently with Scr stable at 4.02. Pt currently clinically stable. Continue to monitor BMP Pt. with advanced CKD stage 5 in setting of HTN and DM. Scr elevated but stable at 4.02 today. Baseline Scr usually ranges from 4-4.4 as outpatient. Pt. clinically stable. Continue to monitor labs and urine output. Avoid potential nephrotoxins

## 2018-03-29 NOTE — DISCHARGE NOTE ADULT - SECONDARY DIAGNOSIS.
DM2 (diabetes mellitus, type 2) CAD (coronary artery disease) Chronic kidney disease (CKD), stage V Dizziness

## 2018-03-29 NOTE — DISCHARGE NOTE ADULT - PROVIDER TOKENS
TOKEN:'[id number]' TOKEN:'[id number]',FREE:[LAST:[Dr. Garcia - renal],PHONE:[(   )    -],FAX:[(   )    -]]

## 2018-03-29 NOTE — DISCHARGE NOTE ADULT - CARE PLAN
Principal Discharge DX:	Hypertensive urgency  Goal:	To maintain a normal blood pressure to prevent heart attack, stroke and renal failure.  Assessment and plan of treatment:	Low sodium and fat diet, continue anti-hypertensive medications, and follow up with primary care physician.  Secondary Diagnosis:	DM2 (diabetes mellitus, type 2)  Goal:	To maintain a normal blood glucose level and HgA1C level < 5.7 and to prevent diabetic complications such as uncontrolled diabetes, diabetic coma, blindness, renal failure, and amputations.  Assessment and plan of treatment:	Monitor finger sticks pre-meal and bedtime, low salt, fat and carbohydrate diet, minimize glucose intake.  Exercise daily for at least 30 minutes and weight loss.  Follow up with primary care physician and endocrinologist for routine Hemoglobin A1C checks and management.  Follow up with your ophthalmologist for routine yearly vision exams.  Secondary Diagnosis:	CAD (coronary artery disease)  Goal:	To be asymptomatic, to reduce risks factors such as hypertension, diabetes and hyperlipidemia to lower the risk of blood clots formation; and to prevent complications of coronary artery disease such as worsening chest pain, heart attack and death.  Assessment and plan of treatment:	Continue aspirin and Plavix, do not stop unless instructed by your physician.  Continue low salt, fat, cholesterol and carbohydrate diet. Follow up with cardiologist and primary care physician's routine appointment.  Secondary Diagnosis:	Chronic kidney disease (CKD), stage V  Goal:	To prevent shortness of breath, fluid overload, and electrolytes imbalance, and to slow down worsening kidney disease.  Assessment and plan of treatment:	Continue blood pressure, cholesterol and diabetic medications. Goal of hemoglobin A1C (HgbA1C) < 7%.  Avoid nephrotoxic drugs such as nonsteroidal anti-inflammatory agents (NSAIDs).   Please follow up with your nephrologist to monitor your kidney function, continue with low protein and potassium diet. Principal Discharge DX:	Hypertensive urgency  Goal:	To maintain a normal blood pressure to prevent heart attack, stroke and renal failure.  Assessment and plan of treatment:	Low sodium and fat diet, continue anti-hypertensive medications as prescribed, and follow up with primary care physician.  Secondary Diagnosis:	DM2 (diabetes mellitus, type 2)  Goal:	To maintain a normal blood glucose level and HgA1C level < 5.7 and to prevent diabetic complications such as uncontrolled diabetes, diabetic coma, blindness, renal failure, and amputations.  Assessment and plan of treatment:	Monitor finger sticks pre-meal and bedtime, low salt, fat and carbohydrate diet, minimize glucose intake.  Exercise daily for at least 30 minutes and weight loss.  Follow up with primary care physician and endocrinologist for routine Hemoglobin A1C checks and management.  Follow up with your ophthalmologist for routine yearly vision exams.  Secondary Diagnosis:	CAD (coronary artery disease)  Goal:	To be asymptomatic, to reduce risks factors such as hypertension, diabetes and hyperlipidemia to lower the risk of blood clots formation; and to prevent complications of coronary artery disease such as worsening chest pain, heart attack and death.  Assessment and plan of treatment:	Continue aspirin and Plavix, do not stop unless instructed by your physician.  Continue low salt, fat, cholesterol and carbohydrate diet. Follow up with cardiologist and primary care physician's routine appointment.  Secondary Diagnosis:	Chronic kidney disease (CKD), stage V  Goal:	To prevent shortness of breath, fluid overload, and electrolytes imbalance, and to slow down worsening kidney disease.  Assessment and plan of treatment:	Continue blood pressure, cholesterol and diabetic medications. Goal of hemoglobin A1C (HgbA1C) < 7%.  Avoid nephrotoxic drugs such as nonsteroidal anti-inflammatory agents (NSAIDs).   Please follow up with your nephrologist to monitor your kidney function, continue with low protein and potassium diet.  Secondary Diagnosis:	Dizziness  Assessment and plan of treatment:	Continue meclizine as needed. Please follow up with your primary care doctor for further management.

## 2018-03-29 NOTE — DISCHARGE NOTE ADULT - MEDICATION SUMMARY - MEDICATIONS TO TAKE
I will START or STAY ON the medications listed below when I get home from the hospital:    aspirin 81 mg oral delayed release tablet  -- 1 tab(s) by mouth once a day  -- Indication: For CAD (coronary artery disease)    sodium bicarbonate 650 mg oral tablet  -- 1 tab(s) by mouth 2 times a day  -- Indication: For Antacid    cloNIDine 0.2 mg oral tablet  -- 1 tab(s) by mouth 2 times a day  -- Indication: For Hypertension, unspecified type    terazosin 2 mg oral capsule  -- 1 cap(s) by mouth once a day (at bedtime)  -- Indication: For Hypertension, unspecified type    Imdur 60 mg oral tablet, extended release  -- 1 tab(s) by mouth 2 times a day  -- Indication: For Hypertension, unspecified type    Onglyza 2.5 mg oral tablet  -- 1 tab(s) by mouth , As Needed - for FBS >100  -- Indication: For DM2 (diabetes mellitus, type 2)    meclizine 25 mg oral tablet  -- 1 tab(s) by mouth once a day, As needed, Dizziness  -- Indication: For Dizziness    allopurinol 100 mg oral tablet  -- 1 tab(s) by mouth once a day  -- Indication: For Gout    Crestor 10 mg oral tablet  -- 1 tab(s) by mouth once a day (at bedtime)  -- Indication: For HLD    Plavix 75 mg tablet  -- 1 tab(s) by mouth once a day   -- Indication: For CAD (coronary artery disease)    carvedilol 25 mg oral tablet  -- 1 tab(s) by mouth 2 times a day  -- Indication: For Hypertension, unspecified type    furosemide 40 mg tablet  -- 1 tab(s) by mouth once a day  -- Indication: For ChF    docusate sodium 100 mg oral capsule  -- 1 cap(s) by mouth 3 times a day, As Needed  -- Indication: For Constipation     hydrALAZINE 100 mg oral tablet  -- 1 tab(s) by mouth 2 times a day in the morning and evening   -- Indication: For Hypertension, unspecified type    calcitriol 0.25 mcg oral capsule  -- 1 cap(s) by mouth 3 times a week on Monday, Wednesday, and Friday   -- Indication: For Supplement

## 2018-03-29 NOTE — DISCHARGE NOTE ADULT - MEDICATION SUMMARY - MEDICATIONS TO CHANGE
I will SWITCH the dose or number of times a day I take the medications listed below when I get home from the hospital:    cloNIDine 0.2 mg oral tablet  -- 1 tab(s) by mouth once a day (at bedtime)

## 2018-03-29 NOTE — PROGRESS NOTE ADULT - ASSESSMENT
78 y F with PMHx HTN, HLD, CAD s/p CABG, PCI (3/2017), Aortic stenosis s/p TAVR/PPM (3/2017) presenting with uncontrolled HTN.     1. HTN   sbp still remains slightly elevated  Increase clonidine to 0.3mg BID  change imdur to 60mg BID  continue with hydralazine, lasix and coreg doses   EKG no evidence of ACS  C/E negative                                                                                                                                                                                                No evidence of decompensated CHF on exam, cv stable    d/c home      2. CAD s/p CABG, PCI   cv stable   no s/s of decompensated HF on exam   denies cp/sob/nugent on exam  continue with current cardiac meds    3. Aortic Stenosis s/p TAVR/ppm   Stable  continue with current medication regimen     4. CKD  renal f/u 78 y F with PMHx HTN, HLD, CAD s/p CABG, PCI (3/2017), Aortic stenosis s/p TAVR/PPM (3/2017) presenting with uncontrolled HTN.     1. HTN   sbp still remains slightly elevated  Increase clonidine to 0.3mg BID  change imdur to 60mg BID  continue with hydralazine, lasix and coreg doses   EKG no evidence of ACS  C/E negative                                                                                                                                                                                                No evidence of decompensated CHF on exam, cv stable          2. CAD s/p CABG, PCI   cv stable   no s/s of decompensated HF on exam   denies cp/sob/nugent on exam  continue with current cardiac meds    3. Aortic Stenosis s/p TAVR/ppm   Stable  continue with current medication regimen     4. CKD  renal f/u      Dc home, f/u appointment for 4/10/18

## 2018-03-29 NOTE — CONSULT NOTE ADULT - SUBJECTIVE AND OBJECTIVE BOX
[facility] DIVISION OF KIDNEY DISEASES AND HYPERTENSION -- INITIAL CONSULT NOTE  --------------------------------------------------------------------------------    HPI: 78 year old female with a PMH of HTN, CKD Stage V (with a LUE fistula in place for 6 years, not used), DM Type II, CAD s/p CABG, PCI (3/2017), Aortic stenosis s/p TAVR/PPM (3/2017) admitted for evaluation of elevated BP and dizziness. Pt felt dizzy at PT and was found to have an elevated BP. She states she called her cardiologist and was advised take her dose of oral carvedilol 25mg and Hydralazine 50mg. BP remained elevated therefore she was instructed to go to the ER. Pt outpatient nephrologist is Dr. Dixon. Pt was last seen in nephrology clinic on 2/6/18 with her Scr stable at 4.07. Pt admits to compliance with her medications. Pt had a LUE AVF which was placed 6 years prior however it has not been used. Pt states she never had a renal biopsy in the past. Pt seen and examined in the ER. Pt currently denies CP, SOB or LE edema.     PAST HISTORY  --------------------------------------------------------------------------------  PAST MEDICAL & SURGICAL HISTORY:  Thyroid nodule: awaiting FNB in the near future  Severe aortic stenosis  Osteoarthritis: bilateral knees  CKD (chronic kidney disease) stage 4, GFR 15-29 ml/min  DM2 (diabetes mellitus, type 2)  Arthritis  CAD (coronary artery disease)  Gout  Anemia Associated with Chronic Renal Failure  HTN (Hypertension)  S/P AVR: TAVR  History of pacemaker  A-V fistula: left arm  S/P cholecystectomy  Stented coronary artery: s/p 3 stents, then CABG 2007  S/P CABG (coronary artery bypass graft): triple in 2007    FAMILY HISTORY:  No pertinent family history in first degree relatives    PAST SOCIAL HISTORY:    ALLERGIES & MEDICATIONS  --------------------------------------------------------------------------------  Allergies    codeine (Other)  Lortab (Other)  morphine (Other)  Percocet 10/325 (Other)  Reglan (Other; Flushing (Skin))  sulfa drugs (Flushing (Skin))    Intolerances      Standing Inpatient Medications  allopurinol 100 milliGRAM(s) Oral daily  aspirin enteric coated 81 milliGRAM(s) Oral daily  atorvastatin 40 milliGRAM(s) Oral at bedtime  calcitriol   Capsule 0.25 MICROGram(s) Oral <User Schedule>  carvedilol 37.5 milliGRAM(s) Oral every 12 hours  cloNIDine 0.2 milliGRAM(s) Oral at bedtime  clopidogrel Tablet 75 milliGRAM(s) Oral daily  dextrose 5%. 1000 milliLiter(s) IV Continuous <Continuous>  dextrose 50% Injectable 12.5 Gram(s) IV Push once  dextrose 50% Injectable 25 Gram(s) IV Push once  dextrose 50% Injectable 25 Gram(s) IV Push once  doxazosin 2 milliGRAM(s) Oral at bedtime  furosemide    Tablet 40 milliGRAM(s) Oral daily  heparin  Injectable 5000 Unit(s) SubCutaneous every 12 hours  hydrALAZINE 100 milliGRAM(s) Oral every 12 hours  insulin lispro (HumaLOG) corrective regimen sliding scale   SubCutaneous three times a day before meals  insulin lispro (HumaLOG) corrective regimen sliding scale   SubCutaneous at bedtime  isosorbide   mononitrate ER Tablet (IMDUR) 90 milliGRAM(s) Oral daily  sodium bicarbonate 650 milliGRAM(s) Oral two times a day    PRN Inpatient Medications  dextrose Gel 1 Dose(s) Oral once PRN  docusate sodium 100 milliGRAM(s) Oral three times a day PRN  glucagon  Injectable 1 milliGRAM(s) IntraMuscular once PRN      REVIEW OF SYSTEMS  --------------------------------------------------------------------------------  Gen: No weakness  Skin: No rashes  Head/Eyes/Ears/Mouth: No headache  Respiratory: No dyspnea  CV: No chest pain, PND, orthopnea  GI: No abdominal pain, diarrhea  : No increased frequency  MSK: No edema  Neuro: + dizziness/lightheadedness  Heme: No easy bruising or bleeding    All other systems were reviewed and are negative, except as noted.    VITALS/PHYSICAL EXAM  --------------------------------------------------------------------------------  T(C): 37 (03-29-18 @ 05:15), Max: 37.4 (03-28-18 @ 17:41)  HR: 74 (03-29-18 @ 05:15) (62 - 78)  BP: 180/80 (03-29-18 @ 05:15) (130/48 - 205/67)  RR: 16 (03-29-18 @ 05:15) (16 - 18)  SpO2: 100% (03-29-18 @ 05:15) (97% - 100%)  Wt(kg): --  Height (cm): 154.94 (03-28-18 @ 20:45)  Weight (kg): 65.8 (03-28-18 @ 20:45)  BMI (kg/m2): 27.4 (03-28-18 @ 20:45)  BSA (m2): 1.65 (03-28-18 @ 20:45)      Physical Exam:  	Gen: NAD, well-appearing  	HEENT: supple neck no JVD  	Pulm: CTA B/L  	CV: RRR, S1S2; no rub  	Abd: +BS, soft, nontender/nondistended  	: No suprapubic tenderness  	UE: Warm, no asterixis  	LE: Warm, no edema  	Psych: Normal affect and mood  	Skin: Warm, without rashes  	Vascular access: + LUE AVF, + thrill + bruit, skin intact     LABS/STUDIES  --------------------------------------------------------------------------------              8.7    2.81  >-----------<  131      [03-29-18 @ 06:38]              29.8     139  |  103  |  56  ----------------------------<  95      [03-29-18 @ 06:38]  4.5   |  23  |  4.02        Ca     9.8     [03-29-18 @ 06:38]      Mg     2.2     [03-29-18 @ 06:38]      Phos  3.9     [03-29-18 @ 06:38]    TPro  7.2  /  Alb  4.3  /  TBili  0.2  /  DBili  x   /  AST  14  /  ALT  8   /  AlkPhos  50  [03-28-18 @ 13:20]      Troponin < 0.06      [03-28-18 @ 20:39]  CK 48      [03-28-18 @ 20:39]    Creatinine Trend:  SCr 4.02 [03-29 @ 06:38]  SCr 3.97 [03-28 @ 13:20]    Urinalysis - [01-15-17 @ 21:00]      Color YELLOW / Appearance HAZY / SG 1.016 / pH 6.0      Gluc 150 / Ketone NEGATIVE  / Bili NEGATIVE / Urobili NORMAL       Blood NEGATIVE / Protein 500 / Leuk Est NEGATIVE / Nitrite NEGATIVE      RBC  / WBC 2-5 / Hyaline  / Gran  / Sq Epi FEW / Non Sq Epi  / Bacteria       HbA1c 5.2      [03-29-18 @ 06:38]  TSH 1.21      [03-29-18 @ 06:38]  Lipid: chol 160, TG 64, HDL 54, LDL 92      [03-29-18 @ 06:38] [facility] DIVISION OF KIDNEY DISEASES AND HYPERTENSION -- INITIAL CONSULT NOTE  --------------------------------------------------------------------------------    HPI: 78 year old female with a PMH of HTN, CKD Stage V (with a LUE fistula in place for 6 years, not used), DM Type II, CAD s/p CABG, PCI (3/2017), Aortic stenosis s/p TAVR/PPM (3/2017) admitted for evaluation of elevated BP and dizziness. Pt felt dizzy at PT and was found to have an elevated BP. She states she called her cardiologist and was advised take her a dose of oral BP medication. BP remained elevated therefore she was instructed to go to the ER. Pt outpatient nephrologist is Dr. Dixon. Pt was last seen in nephrology clinic on 2/6/18 with her Scr stable at 4.07. Pt admits to compliance with her medications. Pt had a LUE AVF which was placed 6 years prior however it has not been used. Pt states she never had a renal biopsy in the past. Pt seen and examined in the ER. Pt currently denies CP, SOB or LE edema.     PAST HISTORY  --------------------------------------------------------------------------------  PAST MEDICAL & SURGICAL HISTORY:  Thyroid nodule: awaiting FNB in the near future  Severe aortic stenosis  Osteoarthritis: bilateral knees  CKD (chronic kidney disease) stage 4, GFR 15-29 ml/min  DM2 (diabetes mellitus, type 2)  Arthritis  CAD (coronary artery disease)  Gout  Anemia Associated with Chronic Renal Failure  HTN (Hypertension)  S/P AVR: TAVR  History of pacemaker  A-V fistula: left arm  S/P cholecystectomy  Stented coronary artery: s/p 3 stents, then CABG 2007  S/P CABG (coronary artery bypass graft): triple in 2007    FAMILY HISTORY:  No pertinent family history in first degree relatives    PAST SOCIAL HISTORY:    ALLERGIES & MEDICATIONS  --------------------------------------------------------------------------------  Allergies    codeine (Other)  Lortab (Other)  morphine (Other)  Percocet 10/325 (Other)  Reglan (Other; Flushing (Skin))  sulfa drugs (Flushing (Skin))    Intolerances      Standing Inpatient Medications  allopurinol 100 milliGRAM(s) Oral daily  aspirin enteric coated 81 milliGRAM(s) Oral daily  atorvastatin 40 milliGRAM(s) Oral at bedtime  calcitriol   Capsule 0.25 MICROGram(s) Oral <User Schedule>  carvedilol 37.5 milliGRAM(s) Oral every 12 hours  cloNIDine 0.2 milliGRAM(s) Oral at bedtime  clopidogrel Tablet 75 milliGRAM(s) Oral daily  dextrose 5%. 1000 milliLiter(s) IV Continuous <Continuous>  dextrose 50% Injectable 12.5 Gram(s) IV Push once  dextrose 50% Injectable 25 Gram(s) IV Push once  dextrose 50% Injectable 25 Gram(s) IV Push once  doxazosin 2 milliGRAM(s) Oral at bedtime  furosemide    Tablet 40 milliGRAM(s) Oral daily  heparin  Injectable 5000 Unit(s) SubCutaneous every 12 hours  hydrALAZINE 100 milliGRAM(s) Oral every 12 hours  insulin lispro (HumaLOG) corrective regimen sliding scale   SubCutaneous three times a day before meals  insulin lispro (HumaLOG) corrective regimen sliding scale   SubCutaneous at bedtime  isosorbide   mononitrate ER Tablet (IMDUR) 90 milliGRAM(s) Oral daily  sodium bicarbonate 650 milliGRAM(s) Oral two times a day    PRN Inpatient Medications  dextrose Gel 1 Dose(s) Oral once PRN  docusate sodium 100 milliGRAM(s) Oral three times a day PRN  glucagon  Injectable 1 milliGRAM(s) IntraMuscular once PRN      REVIEW OF SYSTEMS  --------------------------------------------------------------------------------  Gen: No weakness  Skin: No rashes  Head/Eyes/Ears/Mouth: No headache  Respiratory: No dyspnea  CV: No chest pain, PND, orthopnea  GI: No abdominal pain, diarrhea  : No increased frequency  MSK: No edema  Neuro: + dizziness/lightheadedness  Heme: No easy bruising or bleeding    All other systems were reviewed and are negative, except as noted.    VITALS/PHYSICAL EXAM  --------------------------------------------------------------------------------  T(C): 37 (03-29-18 @ 05:15), Max: 37.4 (03-28-18 @ 17:41)  HR: 74 (03-29-18 @ 05:15) (62 - 78)  BP: 180/80 (03-29-18 @ 05:15) (130/48 - 205/67)  RR: 16 (03-29-18 @ 05:15) (16 - 18)  SpO2: 100% (03-29-18 @ 05:15) (97% - 100%)  Wt(kg): --  Height (cm): 154.94 (03-28-18 @ 20:45)  Weight (kg): 65.8 (03-28-18 @ 20:45)  BMI (kg/m2): 27.4 (03-28-18 @ 20:45)  BSA (m2): 1.65 (03-28-18 @ 20:45)      Physical Exam:  	Gen: NAD, well-appearing  	HEENT: supple neck no JVD  	Pulm: CTA B/L  	CV: RRR, S1S2; no rub  	Abd: +BS, soft, nontender/nondistended  	: No suprapubic tenderness  	UE: Warm, no asterixis  	LE: Warm, no edema  	Psych: Normal affect and mood  	Skin: Warm, without rashes  	Vascular access: + LUE AVF, + thrill + bruit, skin intact     LABS/STUDIES  --------------------------------------------------------------------------------              8.7    2.81  >-----------<  131      [03-29-18 @ 06:38]              29.8     139  |  103  |  56  ----------------------------<  95      [03-29-18 @ 06:38]  4.5   |  23  |  4.02        Ca     9.8     [03-29-18 @ 06:38]      Mg     2.2     [03-29-18 @ 06:38]      Phos  3.9     [03-29-18 @ 06:38]    TPro  7.2  /  Alb  4.3  /  TBili  0.2  /  DBili  x   /  AST  14  /  ALT  8   /  AlkPhos  50  [03-28-18 @ 13:20]      Troponin < 0.06      [03-28-18 @ 20:39]  CK 48      [03-28-18 @ 20:39]    Creatinine Trend:  SCr 4.02 [03-29 @ 06:38]  SCr 3.97 [03-28 @ 13:20]    Urinalysis - [01-15-17 @ 21:00]      Color YELLOW / Appearance HAZY / SG 1.016 / pH 6.0      Gluc 150 / Ketone NEGATIVE  / Bili NEGATIVE / Urobili NORMAL       Blood NEGATIVE / Protein 500 / Leuk Est NEGATIVE / Nitrite NEGATIVE      RBC  / WBC 2-5 / Hyaline  / Gran  / Sq Epi FEW / Non Sq Epi  / Bacteria       HbA1c 5.2      [03-29-18 @ 06:38]  TSH 1.21      [03-29-18 @ 06:38]  Lipid: chol 160, TG 64, HDL 54, LDL 92      [03-29-18 @ 06:38] [facility] DIVISION OF KIDNEY DISEASES AND HYPERTENSION -- INITIAL CONSULT NOTE  --------------------------------------------------------------------------------  HPI: 78-year-old female with CKD Stage 5 s/p LUE AVF placement ~6 years ago, type 2 DM, HTN, CAD s/p CABG, PCI (3/2017), Aortic stenosis s/p TAVR/PPM (3/2017) admitted for elevated BP management. Pt. says that she felt dizzy at PT and was found to have an elevated BP. She states she called her cardiologist and was advised to take a dose of oral BP medication. BP remained elevated therefore she was instructed to go to the ER. Pt.'s outpatient nephrologist is Dr. Dixon. Pt. was last seen in nephrology clinic on 2/6/18 with stable Scr of 4.07. Pt. admits to compliance with her medications. Pt. feels well but complaints of intermittent HA. Pt. denies CP, SOB or LE edema.     PAST HISTORY  --------------------------------------------------------------------------------  PAST MEDICAL & SURGICAL HISTORY:  Thyroid nodule: awaiting FNB in the near future  Severe aortic stenosis  Osteoarthritis: bilateral knees  CKD (chronic kidney disease) stage 4, GFR 15-29 ml/min  DM2 (diabetes mellitus, type 2)  Arthritis  CAD (coronary artery disease)  Gout  Anemia Associated with Chronic Renal Failure  HTN (Hypertension)  S/P AVR: TAVR  History of pacemaker  A-V fistula: left arm  S/P cholecystectomy  Stented coronary artery: s/p 3 stents, then CABG 2007  S/P CABG (coronary artery bypass graft): triple in 2007    FAMILY HISTORY:  No pertinent family history in first degree relatives    PAST SOCIAL HISTORY:    ALLERGIES & MEDICATIONS  --------------------------------------------------------------------------------  Allergies    codeine (Other)  Lortab (Other)  morphine (Other)  Percocet 10/325 (Other)  Reglan (Other; Flushing (Skin))  sulfa drugs (Flushing (Skin))    Intolerances    Standing Inpatient Medications  allopurinol 100 milliGRAM(s) Oral daily  aspirin enteric coated 81 milliGRAM(s) Oral daily  atorvastatin 40 milliGRAM(s) Oral at bedtime  calcitriol   Capsule 0.25 MICROGram(s) Oral <User Schedule>  carvedilol 37.5 milliGRAM(s) Oral every 12 hours  cloNIDine 0.2 milliGRAM(s) Oral at bedtime  clopidogrel Tablet 75 milliGRAM(s) Oral daily  dextrose 5%. 1000 milliLiter(s) IV Continuous <Continuous>  dextrose 50% Injectable 12.5 Gram(s) IV Push once  dextrose 50% Injectable 25 Gram(s) IV Push once  dextrose 50% Injectable 25 Gram(s) IV Push once  doxazosin 2 milliGRAM(s) Oral at bedtime  furosemide    Tablet 40 milliGRAM(s) Oral daily  heparin  Injectable 5000 Unit(s) SubCutaneous every 12 hours  hydrALAZINE 100 milliGRAM(s) Oral every 12 hours  insulin lispro (HumaLOG) corrective regimen sliding scale   SubCutaneous three times a day before meals  insulin lispro (HumaLOG) corrective regimen sliding scale   SubCutaneous at bedtime  isosorbide   mononitrate ER Tablet (IMDUR) 90 milliGRAM(s) Oral daily  sodium bicarbonate 650 milliGRAM(s) Oral two times a day  REVIEW OF SYSTEMS  --------------------------------------------------------------------------------  Gen: No weakness  Skin: No rashs  Head: +headache  Respiratory: No dyspnea  CV: No chest pain  GI: No abdominal pain  MSK: No LE edema  Neuro: See HPI  Heme: No easy bruising or bleeding    All other systems were reviewed and are negative, except as noted.    VITALS/PHYSICAL EXAM  --------------------------------------------------------------------------------  T(C): 37 (03-29-18 @ 05:15), Max: 37.4 (03-28-18 @ 17:41)  HR: 74 (03-29-18 @ 05:15) (62 - 78)  BP: 180/80 (03-29-18 @ 05:15) (130/48 - 205/67)  RR: 16 (03-29-18 @ 05:15) (16 - 18)  SpO2: 100% (03-29-18 @ 05:15) (97% - 100%)  Wt(kg): --  Height (cm): 154.94 (03-28-18 @ 20:45)  Weight (kg): 65.8 (03-28-18 @ 20:45)  BMI (kg/m2): 27.4 (03-28-18 @ 20:45)  BSA (m2): 1.65 (03-28-18 @ 20:45)    Physical Exam:  	Gen: NAD, well-appearing  	HEENT: no JVD  	Pulm: CTA B/L  	CV: RRR, S1S2; no rub  	Abd: +BS, soft, nontender/nondistended  	: No suprapubic tenderness  	UE: Warm, no asterixis  	LE: Warm, no edema  	Psych: Normal affect and mood  	Skin: Warm  	Vascular access: LUE AVF, + thrill + bruit, skin intact     LABS/STUDIES  --------------------------------------------------------------------------------              8.7    2.81  >-----------<  131      [03-29-18 @ 06:38]              29.8     139  |  103  |  56  ----------------------------<  95      [03-29-18 @ 06:38]  4.5   |  23  |  4.02        Ca     9.8     [03-29-18 @ 06:38]      Mg     2.2     [03-29-18 @ 06:38]      Phos  3.9     [03-29-18 @ 06:38]    TPro  7.2  /  Alb  4.3  /  TBili  0.2  /  DBili  x   /  AST  14  /  ALT  8   /  AlkPhos  50  [03-28-18 @ 13:20]    Troponin < 0.06      [03-28-18 @ 20:39]  CK 48      [03-28-18 @ 20:39]    Creatinine Trend:  SCr 4.02 [03-29 @ 06:38]  SCr 3.97 [03-28 @ 13:20]    HbA1c 5.2      [03-29-18 @ 06:38] [facility] DIVISION OF KIDNEY DISEASES AND HYPERTENSION -- INITIAL CONSULT NOTE  --------------------------------------------------------------------------------  HPI: 78-year-old female with CKD Stage 5 s/p LUE AVF placement ~6 years ago, type 2 DM, HTN, CAD s/p CABG, PCI (3/2017), Aortic stenosis s/p TAVR/PPM (3/2017) admitted for elevated BP management. Pt. says that she felt dizzy at PT and was found to have an elevated BP. She states she called her cardiologist and was advised to take a dose of oral BP medication. BP remained elevated therefore she was instructed to go to the ER. Pt.'s outpatient nephrologist is Dr. Dixon. Pt. was last seen in nephrology clinic on 2/6/18 with stable Scr of 4.07. Pt. admits to compliance with her medications. Pt. feels well but complaints of intermittent HA. Pt. denies CP, SOB or LE edema.     PAST HISTORY  --------------------------------------------------------------------------------  PAST MEDICAL & SURGICAL HISTORY:  Thyroid nodule: awaiting FNB in the near future  Severe aortic stenosis  Osteoarthritis: bilateral knees  CKD (chronic kidney disease) stage 4, GFR 15-29 ml/min  DM2 (diabetes mellitus, type 2)  Arthritis  CAD (coronary artery disease)  Gout  Anemia Associated with Chronic Renal Failure  HTN (Hypertension)  S/P AVR: TAVR  History of pacemaker  A-V fistula: left arm  S/P cholecystectomy  Stented coronary artery: s/p 3 stents, then CABG 2007  S/P CABG (coronary artery bypass graft): triple in 2007    FAMILY HISTORY:  No pertinent family history in first degree relatives    PAST SOCIAL HISTORY:    ALLERGIES & MEDICATIONS  --------------------------------------------------------------------------------  Allergies    codeine (Other)  Lortab (Other)  morphine (Other)  Percocet 10/325 (Other)  Reglan (Other; Flushing (Skin))  sulfa drugs (Flushing (Skin))    Intolerances    Standing Inpatient Medications  allopurinol 100 milliGRAM(s) Oral daily  aspirin enteric coated 81 milliGRAM(s) Oral daily  atorvastatin 40 milliGRAM(s) Oral at bedtime  calcitriol   Capsule 0.25 MICROGram(s) Oral <User Schedule>  carvedilol 37.5 milliGRAM(s) Oral every 12 hours  cloNIDine 0.2 milliGRAM(s) Oral at bedtime  clopidogrel Tablet 75 milliGRAM(s) Oral daily  dextrose 5%. 1000 milliLiter(s) IV Continuous <Continuous>  dextrose 50% Injectable 12.5 Gram(s) IV Push once  dextrose 50% Injectable 25 Gram(s) IV Push once  dextrose 50% Injectable 25 Gram(s) IV Push once  doxazosin 2 milliGRAM(s) Oral at bedtime  furosemide    Tablet 40 milliGRAM(s) Oral daily  heparin  Injectable 5000 Unit(s) SubCutaneous every 12 hours  hydrALAZINE 100 milliGRAM(s) Oral every 12 hours  insulin lispro (HumaLOG) corrective regimen sliding scale   SubCutaneous three times a day before meals  insulin lispro (HumaLOG) corrective regimen sliding scale   SubCutaneous at bedtime  isosorbide   mononitrate ER Tablet (IMDUR) 90 milliGRAM(s) Oral daily  sodium bicarbonate 650 milliGRAM(s) Oral two times a day    REVIEW OF SYSTEMS  --------------------------------------------------------------------------------  Gen: No weakness  Skin: No rashs  Head: +headache  Respiratory: No dyspnea  CV: No chest pain  GI: No abdominal pain  MSK: No LE edema  Neuro: See HPI  Heme: No easy bruising or bleeding    All other systems were reviewed and are negative, except as noted.    VITALS/PHYSICAL EXAM  --------------------------------------------------------------------------------  T(C): 37 (03-29-18 @ 05:15), Max: 37.4 (03-28-18 @ 17:41)  HR: 74 (03-29-18 @ 05:15) (62 - 78)  BP: 180/80 (03-29-18 @ 05:15) (130/48 - 205/67)  RR: 16 (03-29-18 @ 05:15) (16 - 18)  SpO2: 100% (03-29-18 @ 05:15) (97% - 100%)  Wt(kg): --  Height (cm): 154.94 (03-28-18 @ 20:45)  Weight (kg): 65.8 (03-28-18 @ 20:45)  BMI (kg/m2): 27.4 (03-28-18 @ 20:45)  BSA (m2): 1.65 (03-28-18 @ 20:45)    Physical Exam:  	Gen: NAD, well-appearing  	HEENT: no JVD  	Pulm: CTA B/L  	CV: RRR, S1S2; no rub  	Abd: +BS, soft, nontender/nondistended  	: No suprapubic tenderness  	UE: Warm, no asterixis  	LE: Warm, no edema  	Psych: Normal affect and mood  	Skin: Warm  	Vascular access: LUE AVF, + thrill + bruit, skin intact     LABS/STUDIES  --------------------------------------------------------------------------------              8.7    2.81  >-----------<  131      [03-29-18 @ 06:38]              29.8     139  |  103  |  56  ----------------------------<  95      [03-29-18 @ 06:38]  4.5   |  23  |  4.02        Ca     9.8     [03-29-18 @ 06:38]      Mg     2.2     [03-29-18 @ 06:38]      Phos  3.9     [03-29-18 @ 06:38]    TPro  7.2  /  Alb  4.3  /  TBili  0.2  /  DBili  x   /  AST  14  /  ALT  8   /  AlkPhos  50  [03-28-18 @ 13:20]    Troponin < 0.06      [03-28-18 @ 20:39]  CK 48      [03-28-18 @ 20:39]    Creatinine Trend:  SCr 4.02 [03-29 @ 06:38]  SCr 3.97 [03-28 @ 13:20]    HbA1c 5.2      [03-29-18 @ 06:38]

## 2018-03-29 NOTE — PROGRESS NOTE ADULT - SUBJECTIVE AND OBJECTIVE BOX
CARDIOLOGY FOLLOW UP - Dr. Damico    CC denies cp/sob/nugent, pt. states she is feeling better       PHYSICAL EXAM:  T(C): 37.2 (03-29-18 @ 12:26), Max: 37.4 (03-28-18 @ 17:41)  HR: 76 (03-29-18 @ 12:26) (60 - 76)  BP: 170/69 (03-29-18 @ 12:26) (130/48 - 182/70)  RR: 18 (03-29-18 @ 12:26) (16 - 18)  SpO2: 100% (03-29-18 @ 12:26) (97% - 100%)  Wt(kg): --  I&O's Summary      Appearance: Normal	  Cardiovascular: Normal S1 S2,RRR, No JVD, +sys murmur  Respiratory: Lungs clear to auscultation	  Gastrointestinal:  Soft, Non-tender, + BS	  Extremities: Normal range of motion, No clubbing, cyanosis or edema        MEDICATIONS  (STANDING):  allopurinol 100 milliGRAM(s) Oral daily  aspirin enteric coated 81 milliGRAM(s) Oral daily  atorvastatin 40 milliGRAM(s) Oral at bedtime  calcitriol   Capsule 0.25 MICROGram(s) Oral <User Schedule>  carvedilol 37.5 milliGRAM(s) Oral every 12 hours  cloNIDine 0.2 milliGRAM(s) Oral at bedtime  clopidogrel Tablet 75 milliGRAM(s) Oral daily  dextrose 5%. 1000 milliLiter(s) (50 mL/Hr) IV Continuous <Continuous>  dextrose 50% Injectable 12.5 Gram(s) IV Push once  dextrose 50% Injectable 25 Gram(s) IV Push once  dextrose 50% Injectable 25 Gram(s) IV Push once  doxazosin 2 milliGRAM(s) Oral at bedtime  furosemide    Tablet 40 milliGRAM(s) Oral daily  heparin  Injectable 5000 Unit(s) SubCutaneous every 12 hours  hydrALAZINE 100 milliGRAM(s) Oral every 12 hours  insulin lispro (HumaLOG) corrective regimen sliding scale   SubCutaneous three times a day before meals  insulin lispro (HumaLOG) corrective regimen sliding scale   SubCutaneous at bedtime  isosorbide   mononitrate ER Tablet (IMDUR) 90 milliGRAM(s) Oral daily  sodium bicarbonate 650 milliGRAM(s) Oral two times a day      TELEMETRY: AV paced 60 	    ECG:  	  RADIOLOGY:   DIAGNOSTIC TESTING:  [ ] Echocardiogram:  [ ]  Catheterization:  [ ] Stress Test:    OTHER: 	    LABS:	 	                                8.7    2.81  )-----------( 131      ( 29 Mar 2018 06:38 )             29.8     03-29    139  |  103  |  56<H>  ----------------------------<  95  4.5   |  23  |  4.02<H>    Ca    9.8      29 Mar 2018 06:38  Phos  3.9     03-29  Mg     2.2     03-29    TPro  7.2  /  Alb  4.3  /  TBili  0.2  /  DBili  x   /  AST  14  /  ALT  8   /  AlkPhos  50  03-28

## 2018-03-29 NOTE — DISCHARGE NOTE ADULT - HOSPITAL COURSE
78F ambulates with a cane, has a history of HTN, CKD stage 5, with a LUE fistula which has never been used, DM2, CAD s/p CABG, PCI (3/2017), Aortic stenosis s/p TAVR/PPM (3/2017),  experienced dizziness, SOB and HA at PT and was found to have an elevated SBP > 200. She called her cardiologist and was advised to go to an ED and take extra dose of carvedilol 25mg and Hydralazine 50mg.    On admission: CXR: clear lungs. No pleural effusions or pneumothorax. H & H: 9.3/32.1. BUN/Creatinine, 54/ 3.97. Stage 5. ACS ruled out by negative cardiac enzymes. EKG NSR @ 64b/ min, TWI 1,2, AVL, AVF, V4 to V6, QT/ QTC= 460/ 474.    Renal: Pt with CKD stage V. Pt follows closely with her outpatient nephrologist Dr. Dixon. Her baseline Scr is 4-4.4 in office. Pt currently with Scr stable at 4.02. Pt currently clinically stable. Continue to monitor BMP. Anemia, gets Aranesp as outpt with hematologist.      ***incomplete 78F ambulates with a cane, has a history of HTN, CKD stage 5, with a LUE fistula which has never been used, DM2, CAD s/p CABG, PCI (3/2017), Aortic stenosis s/p TAVR/PPM (3/2017),  experienced dizziness, SOB and HA at PT and was found to have an elevated SBP > 200. She called her cardiologist and was advised to go to an ED and take extra dose of carvedilol 25mg and Hydralazine 50mg.    On admission: CXR: clear lungs. No pleural effusions or pneumothorax. H & H: 9.3/32.1. BUN/Creatinine, 54/ 3.97. Stage 5. ACS ruled out by negative cardiac enzymes. EKG NSR @ 64b/ min, TWI 1,2, AVL, AVF, V4 to V6, QT/ QTC= 460/ 474.    + HTN   bp overall improving   continue with clonidine to 0.2mg BID  continue with coreg  25mg BID    continue imdur to 60mg BID  continue with hydralazine, lasix  EKG no evidence of ACS  C/E negative                                                                                                                                                                                                No evidence of decompensated CHF on exam, cv stable      +CAD s/p CABG, PCI, cv stable, no s/s of decompensated HF on exam, denies cp/sob/nugent on exam  continue with current cardiac meds    + Aortic Stenosis s/p TAVR/ppm - Stable, continue with current medication regimen     +CKD - renal f/u  Renal: Pt with CKD stage V. Pt follows closely with her outpatient nephrologist Dr. Dixon. Her baseline Scr is 4-4.4 in office. Pt currently with Scr stable at 4.02. Pt currently clinically stable. Continue to monitor BMP. Anemia, gets Aranesp as outpt with hematologist.    +Dizziness - likely vertigo, bp stable, meclizine 25 mg PO PRN statrted 78F ambulates with a cane, has a history of HTN, CKD stage 5, with a LUE fistula which has never been used, DM2, CAD s/p CABG, PCI (3/2017), Aortic stenosis s/p TAVR/PPM (3/2017),  experienced dizziness, SOB and HA at PT and was found to have an elevated SBP > 200. She called her cardiologist and was advised to go to an ED and take extra dose of carvedilol 25mg and Hydralazine 50mg.    On admission: CXR: clear lungs. No pleural effusions or pneumothorax. H & H: 9.3/32.1. BUN/Creatinine, 54/ 3.97. Stage 5. ACS ruled out by negative cardiac enzymes. EKG NSR @ 64b/ min, TWI 1,2, AVL, AVF, V4 to V6, QT/ QTC= 460/ 474.    + HTN   bp overall improving   continue with clonidine to 0.2mg BID  continue with coreg  25mg BID    continue imdur to 60mg BID  continue with hydralazine, lasix  EKG no evidence of ACS  C/E negative                                                                                                                                                                                                No evidence of decompensated CHF on exam, cv stable      +CAD s/p CABG, PCI, cv stable, no s/s of decompensated HF on exam, denies cp/sob/nugent on exam  continue with current cardiac meds    + Aortic Stenosis s/p TAVR/ppm - Stable, continue with current medication regimen     +CKD - renal f/u  Renal: Pt with CKD stage V. Pt follows closely with her outpatient nephrologist Dr. Dixon. Her baseline Scr is 4-4.4 in office. Pt currently with Scr stable at 4.02. Pt currently clinically stable. Continue to monitor BMP. Anemia, gets Aranesp as outpt with hematologist.    +Dizziness - likely vertigo, bp stable, meclizine 25 mg PO PRN statrted with improvement     Discussed case with Dr. Still, pt cleared for discharge.

## 2018-03-29 NOTE — DISCHARGE NOTE ADULT - CARE PROVIDER_API CALL
Estrada Damico), Cardiology; Internal Medicine  [address] [facility] [address]  [address]  [place] [address]  Phone: [phone]  Fax: [phone] Estrada Damico), Cardiology; Internal Medicine  [address] [facility]  [address]  [place] [address]  Phone: [phone]  Fax: [phone]    Dr. Garcia - renal,   Phone: (   )    -  Fax: (   )    -

## 2018-03-29 NOTE — DISCHARGE NOTE ADULT - PATIENT PORTAL LINK FT
You can access the [facility] Patient Portal, offered by [facility] [place], by registering with the following website: [email or web address]

## 2018-03-30 LAB
BUN SERPL-MCNC: 66 MG/DL — HIGH (ref 7–23)
CALCIUM SERPL-MCNC: 9.7 MG/DL — SIGNIFICANT CHANGE UP (ref 8.4–10.5)
CHLORIDE SERPL-SCNC: 101 MMOL/L — SIGNIFICANT CHANGE UP (ref 98–107)
CO2 SERPL-SCNC: 23 MMOL/L — SIGNIFICANT CHANGE UP (ref 22–31)
CREAT SERPL-MCNC: 4.34 MG/DL — HIGH (ref 0.5–1.3)
GLUCOSE BLDC GLUCOMTR-MCNC: 112 MG/DL — HIGH (ref 70–99)
GLUCOSE BLDC GLUCOMTR-MCNC: 118 MG/DL — HIGH (ref 70–99)
GLUCOSE BLDC GLUCOMTR-MCNC: 131 MG/DL — HIGH (ref 70–99)
GLUCOSE BLDC GLUCOMTR-MCNC: 167 MG/DL — HIGH (ref 70–99)
GLUCOSE BLDC GLUCOMTR-MCNC: 63 MG/DL — LOW (ref 70–99)
GLUCOSE SERPL-MCNC: 108 MG/DL — HIGH (ref 70–99)
HCT VFR BLD CALC: 28 % — LOW (ref 34.5–45)
HGB BLD-MCNC: 8 G/DL — LOW (ref 11.5–15.5)
MAGNESIUM SERPL-MCNC: 2.4 MG/DL — SIGNIFICANT CHANGE UP (ref 1.6–2.6)
MCHC RBC-ENTMCNC: 22.7 PG — LOW (ref 27–34)
MCHC RBC-ENTMCNC: 28.6 % — LOW (ref 32–36)
MCV RBC AUTO: 79.5 FL — LOW (ref 80–100)
NRBC # FLD: 0 — SIGNIFICANT CHANGE UP
PLATELET # BLD AUTO: 127 K/UL — LOW (ref 150–400)
PMV BLD: 10.3 FL — SIGNIFICANT CHANGE UP (ref 7–13)
POTASSIUM SERPL-MCNC: 4.8 MMOL/L — SIGNIFICANT CHANGE UP (ref 3.5–5.3)
POTASSIUM SERPL-SCNC: 4.8 MMOL/L — SIGNIFICANT CHANGE UP (ref 3.5–5.3)
RBC # BLD: 3.52 M/UL — LOW (ref 3.8–5.2)
RBC # FLD: 14.4 % — SIGNIFICANT CHANGE UP (ref 10.3–14.5)
SODIUM SERPL-SCNC: 136 MMOL/L — SIGNIFICANT CHANGE UP (ref 135–145)
WBC # BLD: 2.42 K/UL — LOW (ref 3.8–10.5)
WBC # FLD AUTO: 2.42 K/UL — LOW (ref 3.8–10.5)

## 2018-03-30 PROCEDURE — 99232 SBSQ HOSP IP/OBS MODERATE 35: CPT | Mod: GC

## 2018-03-30 RX ORDER — FUROSEMIDE 40 MG
40 TABLET ORAL DAILY
Qty: 0 | Refills: 0 | Status: DISCONTINUED | OUTPATIENT
Start: 2018-03-30 | End: 2018-04-01

## 2018-03-30 RX ORDER — HYDRALAZINE HCL 50 MG
100 TABLET ORAL EVERY 12 HOURS
Qty: 0 | Refills: 0 | Status: DISCONTINUED | OUTPATIENT
Start: 2018-03-30 | End: 2018-04-01

## 2018-03-30 RX ORDER — CARVEDILOL PHOSPHATE 80 MG/1
25 CAPSULE, EXTENDED RELEASE ORAL EVERY 12 HOURS
Qty: 0 | Refills: 0 | Status: DISCONTINUED | OUTPATIENT
Start: 2018-03-30 | End: 2018-04-01

## 2018-03-30 RX ORDER — ISOSORBIDE MONONITRATE 60 MG/1
60 TABLET, EXTENDED RELEASE ORAL
Qty: 0 | Refills: 0 | Status: DISCONTINUED | OUTPATIENT
Start: 2018-03-30 | End: 2018-04-01

## 2018-03-30 RX ORDER — ACETAMINOPHEN 500 MG
650 TABLET ORAL ONCE
Qty: 0 | Refills: 0 | Status: COMPLETED | OUTPATIENT
Start: 2018-03-30 | End: 2018-03-30

## 2018-03-30 RX ADMIN — Medication 40 MILLIGRAM(S): at 05:50

## 2018-03-30 RX ADMIN — HEPARIN SODIUM 5000 UNIT(S): 5000 INJECTION INTRAVENOUS; SUBCUTANEOUS at 05:50

## 2018-03-30 RX ADMIN — Medication 2: at 12:53

## 2018-03-30 RX ADMIN — Medication 100 MILLIGRAM(S): at 12:53

## 2018-03-30 RX ADMIN — CARVEDILOL PHOSPHATE 37.5 MILLIGRAM(S): 80 CAPSULE, EXTENDED RELEASE ORAL at 05:50

## 2018-03-30 RX ADMIN — Medication 650 MILLIGRAM(S): at 17:20

## 2018-03-30 RX ADMIN — CALCITRIOL 0.25 MICROGRAM(S): 0.5 CAPSULE ORAL at 10:45

## 2018-03-30 RX ADMIN — HEPARIN SODIUM 5000 UNIT(S): 5000 INJECTION INTRAVENOUS; SUBCUTANEOUS at 17:21

## 2018-03-30 RX ADMIN — Medication 2 MILLIGRAM(S): at 22:54

## 2018-03-30 RX ADMIN — Medication 650 MILLIGRAM(S): at 17:21

## 2018-03-30 RX ADMIN — Medication 650 MILLIGRAM(S): at 18:20

## 2018-03-30 RX ADMIN — Medication 650 MILLIGRAM(S): at 05:51

## 2018-03-30 RX ADMIN — Medication 0.3 MILLIGRAM(S): at 05:50

## 2018-03-30 RX ADMIN — ATORVASTATIN CALCIUM 40 MILLIGRAM(S): 80 TABLET, FILM COATED ORAL at 22:54

## 2018-03-30 RX ADMIN — Medication 100 MILLIGRAM(S): at 07:33

## 2018-03-30 RX ADMIN — ISOSORBIDE MONONITRATE 60 MILLIGRAM(S): 60 TABLET, EXTENDED RELEASE ORAL at 05:51

## 2018-03-30 RX ADMIN — CLOPIDOGREL BISULFATE 75 MILLIGRAM(S): 75 TABLET, FILM COATED ORAL at 12:53

## 2018-03-30 RX ADMIN — Medication 81 MILLIGRAM(S): at 12:53

## 2018-03-30 NOTE — PROGRESS NOTE ADULT - PROBLEM SELECTOR PLAN 3
BP well controlled overnight and this morning on current medications. Low salt diet. Monitor BP closely. BP better controlled this morning. Low salt diet. Monitor BP

## 2018-03-30 NOTE — CHART NOTE - NSCHARTNOTEFT_GEN_A_CORE
Alerted by RN, Pt with dizziness upon standing, /45mmhg. She is now currently resting comfortably in bed without complaints. Currently denies CP, dizziness, weakness, fatigue, HA, lightheadedness, change in vision, palpitations. T(C): 36.3 , Max: 37.3 T(F): 97.3  Max: 99.2 HR: 59  (59 - 65) BP: 131/52  (108/45 - 182/67) RR: 18  (18 - 18) SpO2: 99% (99% - 100%). Physical exam is unremarkable and Pt is without neurologic deficits. Case discussed with Dr. Damico, recommend decrease Coreg to 25mg BID, decrease Clonidine to 0.2mg BID and adjust antihypertensive hold parameters to hold for SBP <140. Dr. Still aware and agrees to plan. BP improving 131/52mmhg, will continue to monitor closely.

## 2018-03-30 NOTE — PROGRESS NOTE ADULT - ASSESSMENT
Problem/Plan - 1:  ·  Problem: Hypertensive urgency.  Plan: Plan as per cardio consult.  BP noted to be elevated despite compliance with current medications  titrate as needed  cards fu    Problem/Plan - 2:  ·  Problem: Shortness of breath.  Plan: O2 PRN.     Problem/Plan - 3:  ·  Problem: Stage 5 chronic kidney disease not on chronic dialysis.  Plan: Call Renal Consult     dc planning in am if stable

## 2018-03-30 NOTE — PROGRESS NOTE ADULT - SUBJECTIVE AND OBJECTIVE BOX
[facility] DIVISION OF KIDNEY DISEASES AND HYPERTENSION -- FOLLOW UP NOTE  --------------------------------------------------------------------------------    HPI: 78-year-old female with CKD Stage 5 s/p LUE AVF placement ~6 years ago, type 2 DM, HTN, CAD s/p CABG, PCI (3/2017), Aortic stenosis s/p TAVR/PPM (3/2017) admitted for elevated BP management. Pt. says that she felt dizzy at PT and was found to have an elevated BP. She states she called her cardiologist and was advised to take a dose of oral BP medication. BP remained elevated therefore she was instructed to go to the ER. Pt.'s outpatient nephrologist is Dr. Dixon. Pt. was last seen in nephrology clinic on 2/6/18 with stable Scr of 4.07. Pt. admits to compliance with her medications. Pt. feels well but complaints of HA overnight. Pt. denies CP, SOB or LE edema.     PAST HISTORY  --------------------------------------------------------------------------------  No significant changes to PMH, PSH, FHx, SHx, unless otherwise noted    ALLERGIES & MEDICATIONS  --------------------------------------------------------------------------------  Allergies    codeine (Other)  Lortab (Other)  morphine (Other)  Percocet 10/325 (Other)  Reglan (Other; Flushing (Skin))  sulfa drugs (Flushing (Skin))    Intolerances      Standing Inpatient Medications  allopurinol 100 milliGRAM(s) Oral daily  aspirin enteric coated 81 milliGRAM(s) Oral daily  atorvastatin 40 milliGRAM(s) Oral at bedtime  calcitriol   Capsule 0.25 MICROGram(s) Oral <User Schedule>  carvedilol 37.5 milliGRAM(s) Oral every 12 hours  cloNIDine 0.3 milliGRAM(s) Oral two times a day  clopidogrel Tablet 75 milliGRAM(s) Oral daily  dextrose 5%. 1000 milliLiter(s) IV Continuous <Continuous>  dextrose 50% Injectable 12.5 Gram(s) IV Push once  dextrose 50% Injectable 25 Gram(s) IV Push once  dextrose 50% Injectable 25 Gram(s) IV Push once  doxazosin 2 milliGRAM(s) Oral at bedtime  furosemide    Tablet 40 milliGRAM(s) Oral daily  heparin  Injectable 5000 Unit(s) SubCutaneous every 12 hours  hydrALAZINE 100 milliGRAM(s) Oral every 12 hours  insulin lispro (HumaLOG) corrective regimen sliding scale   SubCutaneous three times a day before meals  insulin lispro (HumaLOG) corrective regimen sliding scale   SubCutaneous at bedtime  isosorbide   mononitrate ER Tablet (IMDUR) 60 milliGRAM(s) Oral <User Schedule>  sodium bicarbonate 650 milliGRAM(s) Oral two times a day    PRN Inpatient Medications  dextrose Gel 1 Dose(s) Oral once PRN  docusate sodium 100 milliGRAM(s) Oral three times a day PRN  glucagon  Injectable 1 milliGRAM(s) IntraMuscular once PRN      REVIEW OF SYSTEMS  --------------------------------------------------------------------------------  Gen: No weakness  Skin: No rashs  Head: +headache  Respiratory: No dyspnea  CV: No chest pain  GI: No abdominal pain  MSK: No LE edema  Neuro: See HPI  Heme: No easy bruising or bleeding    All other systems were reviewed and are negative, except as noted.    VITALS/PHYSICAL EXAM  --------------------------------------------------------------------------------  T(C): 37.3 (03-29-18 @ 20:43), Max: 37.3 (03-29-18 @ 20:43)  HR: 60 (03-30-18 @ 07:32) (60 - 76)  BP: 119/62 (03-30-18 @ 07:32) (114/48 - 182/70)  RR: 18 (03-30-18 @ 05:47) (17 - 18)  SpO2: 99% (03-30-18 @ 05:47) (99% - 100%)  Wt(kg): --  Height (cm): 154.94 (03-28-18 @ 20:45)  Weight (kg): 65.8 (03-28-18 @ 20:45)  BMI (kg/m2): 27.4 (03-28-18 @ 20:45)  BSA (m2): 1.65 (03-28-18 @ 20:45)    Physical Exam:  	Gen: NAD, well-appearing  	HEENT: no JVD  	Pulm: CTA B/L  	CV: RRR, S1S2; no rub  	Abd: +BS, soft, nontender/nondistended  	: No suprapubic tenderness  	UE: Warm, no asterixis  	LE: Warm, no edema  	Psych: Normal affect and mood  	Skin: Warm  	Vascular access: LUE AVF, + thrill + bruit, skin intact     LABS/STUDIES  --------------------------------------------------------------------------------              8.0    2.42  >-----------<  127      [03-30-18 @ 06:33]              28.0     136  |  101  |  66  ----------------------------<  108      [03-30-18 @ 06:33]  4.8   |  23  |  4.34        Ca     9.7     [03-30-18 @ 06:33]      Mg     2.4     [03-30-18 @ 06:33]      Phos  3.9     [03-29-18 @ 06:38]    TPro  7.2  /  Alb  4.3  /  TBili  0.2  /  DBili  x   /  AST  14  /  ALT  8   /  AlkPhos  50  [03-28-18 @ 13:20]    Troponin < 0.06      [03-28-18 @ 20:39]  CK 48      [03-28-18 @ 20:39]    Creatinine Trend:  SCr 4.34 [03-30 @ 06:33]  SCr 4.02 [03-29 @ 06:38]  SCr 3.97 [03-28 @ 13:20]    HbA1c 5.2      [03-29-18 @ 06:38]  TSH 1.21      [03-29-18 @ 06:38]  Lipid: chol 160, TG 64, HDL 54, LDL 92      [03-29-18 @ 06:38] [facility] DIVISION OF KIDNEY DISEASES AND HYPERTENSION -- FOLLOW UP NOTE  --------------------------------------------------------------------------------  HPI: 78-year-old female with CKD Stage 5 s/p LUE AVF placement ~6 years ago, type 2 DM, HTN, CAD s/p CABG, PCI (3/2017), Aortic stenosis s/p TAVR/PPM (3/2017) admitted for elevated BP management. Pt.'s outpatient nephrologist is Dr. Dixon. Pt. was last seen in nephrology clinic on 2/6/18 with stable Scr of 4.07. Pt. admits to compliance with her medications. Pt. feels better and wants to go home. Pt. denies CP, SOB or LE edema.     PAST HISTORY  --------------------------------------------------------------------------------  No significant changes to PMH, PSH, FHx, SHx, unless otherwise noted    ALLERGIES & MEDICATIONS  --------------------------------------------------------------------------------  Allergies    codeine (Other)  Lortab (Other)  morphine (Other)  Percocet 10/325 (Other)  Reglan (Other; Flushing (Skin))  sulfa drugs (Flushing (Skin))    Intolerances    Standing Inpatient Medications  allopurinol 100 milliGRAM(s) Oral daily  aspirin enteric coated 81 milliGRAM(s) Oral daily  atorvastatin 40 milliGRAM(s) Oral at bedtime  calcitriol   Capsule 0.25 MICROGram(s) Oral <User Schedule>  carvedilol 37.5 milliGRAM(s) Oral every 12 hours  cloNIDine 0.3 milliGRAM(s) Oral two times a day  clopidogrel Tablet 75 milliGRAM(s) Oral daily  dextrose 5%. 1000 milliLiter(s) IV Continuous <Continuous>  dextrose 50% Injectable 12.5 Gram(s) IV Push once  dextrose 50% Injectable 25 Gram(s) IV Push once  dextrose 50% Injectable 25 Gram(s) IV Push once  doxazosin 2 milliGRAM(s) Oral at bedtime  furosemide    Tablet 40 milliGRAM(s) Oral daily  heparin  Injectable 5000 Unit(s) SubCutaneous every 12 hours  hydrALAZINE 100 milliGRAM(s) Oral every 12 hours  insulin lispro (HumaLOG) corrective regimen sliding scale   SubCutaneous three times a day before meals  insulin lispro (HumaLOG) corrective regimen sliding scale   SubCutaneous at bedtime  isosorbide   mononitrate ER Tablet (IMDUR) 60 milliGRAM(s) Oral <User Schedule>  sodium bicarbonate 650 milliGRAM(s) Oral two times a day    REVIEW OF SYSTEMS  --------------------------------------------------------------------------------  Gen: No weakness  Skin: No rash  Head: +intermittent headache  Respiratory: No dyspnea  CV: No chest pain  GI: No abdominal pain  MSK: No LE edema  Neuro: See HPI  Heme: No bleeding    All other systems were reviewed and are negative, except as noted.    VITALS/PHYSICAL EXAM  --------------------------------------------------------------------------------  T(C): 37.3 (03-29-18 @ 20:43), Max: 37.3 (03-29-18 @ 20:43)  HR: 60 (03-30-18 @ 07:32) (60 - 76)  BP: 119/62 (03-30-18 @ 07:32) (114/48 - 182/70)  RR: 18 (03-30-18 @ 05:47) (17 - 18)  SpO2: 99% (03-30-18 @ 05:47) (99% - 100%)  Wt(kg): --  Height (cm): 154.94 (03-28-18 @ 20:45)  Weight (kg): 65.8 (03-28-18 @ 20:45)  BMI (kg/m2): 27.4 (03-28-18 @ 20:45)  BSA (m2): 1.65 (03-28-18 @ 20:45)    Physical Exam:  	Gen: NAD, well-appearing  	HEENT: no JVD  	Pulm: CTA B/L  	CV: RRR, S1S2; no rub  	Abd: +BS, soft, nontender/nondistended  	: No suprapubic tenderness  	UE: Warm, no asterixis  	LE: Warm, no edema  	Psych: Normal affect and mood  	Skin: Warm  	Vascular access: LUE AVF, + thrill + bruit, skin intact     LABS/STUDIES  --------------------------------------------------------------------------------              8.0    2.42  >-----------<  127      [03-30-18 @ 06:33]              28.0     136  |  101  |  66  ----------------------------<  108      [03-30-18 @ 06:33]  4.8   |  23  |  4.34        Ca     9.7     [03-30-18 @ 06:33]      Mg     2.4     [03-30-18 @ 06:33]      Phos  3.9     [03-29-18 @ 06:38]    TPro  7.2  /  Alb  4.3  /  TBili  0.2  /  DBili  x   /  AST  14  /  ALT  8   /  AlkPhos  50  [03-28-18 @ 13:20]    Troponin < 0.06      [03-28-18 @ 20:39]  CK 48      [03-28-18 @ 20:39]    Creatinine Trend:  SCr 4.34 [03-30 @ 06:33]  SCr 4.02 [03-29 @ 06:38]  SCr 3.97 [03-28 @ 13:20]    HbA1c 5.2      [03-29-18 @ 06:38]

## 2018-03-30 NOTE — PROGRESS NOTE ADULT - PROBLEM SELECTOR PLAN 2
Pt. with anemia in the setting of advanced CKD stage 5. Pt. receives outpatient JEISON treatment by her hematologist. Monitor Hb. Pt. with anemia in the setting of advanced CKD stage 5. Pt. receives outpatient JEISON treatment by her hematologist. Monitor Hb

## 2018-03-30 NOTE — PROGRESS NOTE ADULT - ASSESSMENT
78 y F with PMHx HTN, HLD, CAD s/p CABG, PCI (3/2017), Aortic stenosis s/p TAVR/PPM (3/2017) presenting with uncontrolled HTN.     1. HTN   bp low-normal, pt. asymtomatic   clonidine decreased to 0,2mg BID  coreg decreased to 25mg BID    continue  imdur to 60mg BID  continue with hydralazine, lasix  EKG no evidence of ACS  C/E negative                                                                                                                                                                                                No evidence of decompensated CHF on exam, cv stable          2. CAD s/p CABG, PCI   cv stable   no s/s of decompensated HF on exam   denies cp/sob/nugent on exam  continue with current cardiac meds    3. Aortic Stenosis s/p TAVR/ppm   Stable  continue with current medication regimen     4. CKD  renal f/u      Dc home, f/u appointment for 4/10/18 78 y F with PMHx HTN, HLD, CAD s/p CABG, PCI (3/2017), Aortic stenosis s/p TAVR/PPM (3/2017) presenting with uncontrolled HTN.     1. HTN   bp low-normal, pt. symptomatic at time, repeat improved    clonidine decreased to 0,2mg BID  coreg decreased to 25mg BID    continue imdur to 60mg BID  continue with hydralazine, lasix  EKG no evidence of ACS  C/E negative                                                                                                                                                                                                No evidence of decompensated CHF on exam, cv stable          2. CAD s/p CABG, PCI   cv stable   no s/s of decompensated HF on exam   denies cp/sob/nugent on exam  continue with current cardiac meds    3. Aortic Stenosis s/p TAVR/ppm   Stable  continue with current medication regimen     4. CKD  renal f/u      Dc home, f/u appointment for 4/10/18

## 2018-03-30 NOTE — PROGRESS NOTE ADULT - PROBLEM SELECTOR PLAN 1
pcp would like pt to follow up with her hepatologist. Please contact pt to schedule appt.   Thank you.   KIET Dillard     Pt. with advanced CKD stage 5 in setting of HTN and DM. Scr elevated but stable at 4.34 today. Baseline Scr usually ranges from 4-4.4 as outpatient. Pt. clinically stable. Continue to monitor labs and urine output. Avoid potential nephrotoxins. Pt. with advanced CKD stage 5 in setting of HTN and DM. Scr elevated but stable at 4.34 today. Baseline Scr usually ranges from 4-4.4 as outpatient. Pt. clinically stable. Continue to monitor labs and urine output. Avoid potential nephrotoxins

## 2018-03-30 NOTE — PROGRESS NOTE ADULT - SUBJECTIVE AND OBJECTIVE BOX
CARDIOLOGY FOLLOW UP - Dr. Damico    CC denies cp/sob/nugent        PHYSICAL EXAM:  T(C): 36.3 (03-30-18 @ 10:45), Max: 37.3 (03-29-18 @ 20:43)  HR: 59 (03-30-18 @ 14:38) (59 - 65)  BP: 131/52 (03-30-18 @ 14:38) (108/45 - 182/67)  RR: 18 (03-30-18 @ 14:38) (18 - 18)  SpO2: 99% (03-30-18 @ 14:38) (99% - 100%)  Wt(kg): --  I&O's Summary      Appearance: Normal	  Cardiovascular: Normal S1 S2,RRR, No JVD, +sys murmur   Respiratory: Lungs clear to auscultation	  Gastrointestinal:  Soft, Non-tender, + BS	  Extremities: Normal range of motion, No clubbing, cyanosis or edema        MEDICATIONS  (STANDING):  allopurinol 100 milliGRAM(s) Oral daily  aspirin enteric coated 81 milliGRAM(s) Oral daily  atorvastatin 40 milliGRAM(s) Oral at bedtime  calcitriol   Capsule 0.25 MICROGram(s) Oral <User Schedule>  carvedilol 25 milliGRAM(s) Oral every 12 hours  cloNIDine 0.2 milliGRAM(s) Oral two times a day  clopidogrel Tablet 75 milliGRAM(s) Oral daily  dextrose 5%. 1000 milliLiter(s) (50 mL/Hr) IV Continuous <Continuous>  dextrose 50% Injectable 12.5 Gram(s) IV Push once  dextrose 50% Injectable 25 Gram(s) IV Push once  dextrose 50% Injectable 25 Gram(s) IV Push once  doxazosin 2 milliGRAM(s) Oral at bedtime  furosemide    Tablet 40 milliGRAM(s) Oral daily  heparin  Injectable 5000 Unit(s) SubCutaneous every 12 hours  hydrALAZINE 100 milliGRAM(s) Oral every 12 hours  insulin lispro (HumaLOG) corrective regimen sliding scale   SubCutaneous three times a day before meals  insulin lispro (HumaLOG) corrective regimen sliding scale   SubCutaneous at bedtime  isosorbide   mononitrate ER Tablet (IMDUR) 60 milliGRAM(s) Oral <User Schedule>  sodium bicarbonate 650 milliGRAM(s) Oral two times a day      TELEMETRY: 	    ECG:  	  RADIOLOGY:   DIAGNOSTIC TESTING:  [ ] Echocardiogram:  [ ]  Catheterization:  [ ] Stress Test:    OTHER: 	    LABS:	 	                                8.0    2.42  )-----------( 127      ( 30 Mar 2018 06:33 )             28.0     03-30    136  |  101  |  66<H>  ----------------------------<  108<H>  4.8   |  23  |  4.34<H>    Ca    9.7      30 Mar 2018 06:33  Phos  3.9     03-29  Mg     2.4     03-30 CARDIOLOGY FOLLOW UP - Dr. Damico    CC denies cp/sob/nugent        PHYSICAL EXAM:  T(C): 36.3 (03-30-18 @ 10:45), Max: 37.3 (03-29-18 @ 20:43)  HR: 59 (03-30-18 @ 14:38) (59 - 65)  BP: 131/52 (03-30-18 @ 14:38) (108/45 - 182/67)  RR: 18 (03-30-18 @ 14:38) (18 - 18)  SpO2: 99% (03-30-18 @ 14:38) (99% - 100%)  Wt(kg): --  I&O's Summary      Appearance: Normal	  Cardiovascular: Normal S1 S2,RRR, No JVD, +sys murmur   Respiratory: Lungs clear to auscultation	  Gastrointestinal:  Soft, Non-tender, + BS	  Extremities: Normal range of motion, No clubbing, cyanosis or edema        MEDICATIONS  (STANDING):  allopurinol 100 milliGRAM(s) Oral daily  aspirin enteric coated 81 milliGRAM(s) Oral daily  atorvastatin 40 milliGRAM(s) Oral at bedtime  calcitriol   Capsule 0.25 MICROGram(s) Oral <User Schedule>  carvedilol 25 milliGRAM(s) Oral every 12 hours  cloNIDine 0.2 milliGRAM(s) Oral two times a day  clopidogrel Tablet 75 milliGRAM(s) Oral daily  dextrose 5%. 1000 milliLiter(s) (50 mL/Hr) IV Continuous <Continuous>  dextrose 50% Injectable 12.5 Gram(s) IV Push once  dextrose 50% Injectable 25 Gram(s) IV Push once  dextrose 50% Injectable 25 Gram(s) IV Push once  doxazosin 2 milliGRAM(s) Oral at bedtime  furosemide    Tablet 40 milliGRAM(s) Oral daily  heparin  Injectable 5000 Unit(s) SubCutaneous every 12 hours  hydrALAZINE 100 milliGRAM(s) Oral every 12 hours  insulin lispro (HumaLOG) corrective regimen sliding scale   SubCutaneous three times a day before meals  insulin lispro (HumaLOG) corrective regimen sliding scale   SubCutaneous at bedtime  isosorbide   mononitrate ER Tablet (IMDUR) 60 milliGRAM(s) Oral <User Schedule>  sodium bicarbonate 650 milliGRAM(s) Oral two times a day      TELEMETRY: AV paced 60	    ECG:  	   RADIOLOGY:   DIAGNOSTIC TESTING:  [ ] Echocardiogram:  [ ]  Catheterization:  [ ] Stress Test:    OTHER: 	    LABS:	 	                                8.0    2.42  )-----------( 127      ( 30 Mar 2018 06:33 )             28.0     03-30    136  |  101  |  66<H>  ----------------------------<  108<H>  4.8   |  23  |  4.34<H>    Ca    9.7      30 Mar 2018 06:33  Phos  3.9     03-29  Mg     2.4     03-30

## 2018-03-30 NOTE — PROGRESS NOTE ADULT - SUBJECTIVE AND OBJECTIVE BOX
Patient is a 78y old  Female who presents with a chief complaint of dizziness, elevated BP, SOB (29 Mar 2018 12:38)      INTERVAL HPI/OVERNIGHT EVENTS:  T(C): 36.3 (03-30-18 @ 10:45), Max: 37.3 (03-29-18 @ 20:43)  HR: 60 (03-30-18 @ 17:16) (59 - 65)  BP: 144/64 (03-30-18 @ 17:16) (108/45 - 144/64)  RR: 18 (03-30-18 @ 17:16) (18 - 18)  SpO2: 100% (03-30-18 @ 17:16) (99% - 100%)  Wt(kg): --  I&O's Summary      LABS:                        8.0    2.42  )-----------( 127      ( 30 Mar 2018 06:33 )             28.0     03-30    136  |  101  |  66<H>  ----------------------------<  108<H>  4.8   |  23  |  4.34<H>    Ca    9.7      30 Mar 2018 06:33  Phos  3.9     03-29  Mg     2.4     03-30          CAPILLARY BLOOD GLUCOSE      POCT Blood Glucose.: 63 mg/dL (30 Mar 2018 17:13)  POCT Blood Glucose.: 167 mg/dL (30 Mar 2018 12:03)  POCT Blood Glucose.: 131 mg/dL (30 Mar 2018 08:43)  POCT Blood Glucose.: 96 mg/dL (29 Mar 2018 22:22)            MEDICATIONS  (STANDING):  allopurinol 100 milliGRAM(s) Oral daily  aspirin enteric coated 81 milliGRAM(s) Oral daily  atorvastatin 40 milliGRAM(s) Oral at bedtime  calcitriol   Capsule 0.25 MICROGram(s) Oral <User Schedule>  carvedilol 25 milliGRAM(s) Oral every 12 hours  cloNIDine 0.2 milliGRAM(s) Oral two times a day  clopidogrel Tablet 75 milliGRAM(s) Oral daily  dextrose 5%. 1000 milliLiter(s) (50 mL/Hr) IV Continuous <Continuous>  dextrose 50% Injectable 12.5 Gram(s) IV Push once  dextrose 50% Injectable 25 Gram(s) IV Push once  dextrose 50% Injectable 25 Gram(s) IV Push once  doxazosin 2 milliGRAM(s) Oral at bedtime  furosemide    Tablet 40 milliGRAM(s) Oral daily  heparin  Injectable 5000 Unit(s) SubCutaneous every 12 hours  hydrALAZINE 100 milliGRAM(s) Oral every 12 hours  insulin lispro (HumaLOG) corrective regimen sliding scale   SubCutaneous three times a day before meals  insulin lispro (HumaLOG) corrective regimen sliding scale   SubCutaneous at bedtime  isosorbide   mononitrate ER Tablet (IMDUR) 60 milliGRAM(s) Oral <User Schedule>  sodium bicarbonate 650 milliGRAM(s) Oral two times a day    MEDICATIONS  (PRN):  dextrose Gel 1 Dose(s) Oral once PRN Blood Glucose LESS THAN 70 milliGRAM(s)/deciliter  docusate sodium 100 milliGRAM(s) Oral three times a day PRN Constipation  glucagon  Injectable 1 milliGRAM(s) IntraMuscular once PRN Glucose LESS THAN 70 milligrams/deciliter          PHYSICAL EXAM:  GENERAL: NAD, well-groomed, well-developed  HEAD:  Atraumatic, Normocephalic  CHEST/LUNG: Clear to percussion bilaterally; No rales, rhonchi, wheezing, or rubs  HEART: Regular rate and rhythm; No murmurs, rubs, or gallops  ABDOMEN: Soft, Nontender, Nondistended; Bowel sounds present  EXTREMITIES:  2+ Peripheral Pulses, No clubbing, cyanosis, or edema  LYMPH: No lymphadenopathy noted  SKIN: No rashes or lesions    Care Discussed with Consultants/Other Providers [+ ] YES  [ ] NO

## 2018-03-31 LAB
BLD GP AB SCN SERPL QL: NEGATIVE — SIGNIFICANT CHANGE UP
BUN SERPL-MCNC: 73 MG/DL — HIGH (ref 7–23)
CALCIUM SERPL-MCNC: 9.7 MG/DL — SIGNIFICANT CHANGE UP (ref 8.4–10.5)
CHLORIDE SERPL-SCNC: 102 MMOL/L — SIGNIFICANT CHANGE UP (ref 98–107)
CO2 SERPL-SCNC: 24 MMOL/L — SIGNIFICANT CHANGE UP (ref 22–31)
CREAT SERPL-MCNC: 4.46 MG/DL — HIGH (ref 0.5–1.3)
GLUCOSE BLDC GLUCOMTR-MCNC: 106 MG/DL — HIGH (ref 70–99)
GLUCOSE BLDC GLUCOMTR-MCNC: 121 MG/DL — HIGH (ref 70–99)
GLUCOSE BLDC GLUCOMTR-MCNC: 141 MG/DL — HIGH (ref 70–99)
GLUCOSE BLDC GLUCOMTR-MCNC: 75 MG/DL — SIGNIFICANT CHANGE UP (ref 70–99)
GLUCOSE SERPL-MCNC: 92 MG/DL — SIGNIFICANT CHANGE UP (ref 70–99)
HCT VFR BLD CALC: 28.6 % — LOW (ref 34.5–45)
HGB BLD-MCNC: 8.4 G/DL — LOW (ref 11.5–15.5)
MAGNESIUM SERPL-MCNC: 2.5 MG/DL — SIGNIFICANT CHANGE UP (ref 1.6–2.6)
MCHC RBC-ENTMCNC: 23 PG — LOW (ref 27–34)
MCHC RBC-ENTMCNC: 29.4 % — LOW (ref 32–36)
MCV RBC AUTO: 78.1 FL — LOW (ref 80–100)
NRBC # FLD: 0 — SIGNIFICANT CHANGE UP
PHOSPHATE SERPL-MCNC: 3.9 MG/DL — SIGNIFICANT CHANGE UP (ref 2.5–4.5)
PLATELET # BLD AUTO: 127 K/UL — LOW (ref 150–400)
PMV BLD: 10.5 FL — SIGNIFICANT CHANGE UP (ref 7–13)
POTASSIUM SERPL-MCNC: 4.8 MMOL/L — SIGNIFICANT CHANGE UP (ref 3.5–5.3)
POTASSIUM SERPL-SCNC: 4.8 MMOL/L — SIGNIFICANT CHANGE UP (ref 3.5–5.3)
RBC # BLD: 3.66 M/UL — LOW (ref 3.8–5.2)
RBC # FLD: 14.4 % — SIGNIFICANT CHANGE UP (ref 10.3–14.5)
RH IG SCN BLD-IMP: POSITIVE — SIGNIFICANT CHANGE UP
SODIUM SERPL-SCNC: 136 MMOL/L — SIGNIFICANT CHANGE UP (ref 135–145)
WBC # BLD: 2.6 K/UL — LOW (ref 3.8–10.5)
WBC # FLD AUTO: 2.6 K/UL — LOW (ref 3.8–10.5)

## 2018-03-31 RX ORDER — MECLIZINE HCL 12.5 MG
25 TABLET ORAL DAILY
Qty: 0 | Refills: 0 | Status: DISCONTINUED | OUTPATIENT
Start: 2018-03-31 | End: 2018-04-01

## 2018-03-31 RX ORDER — ACETAMINOPHEN 500 MG
650 TABLET ORAL EVERY 6 HOURS
Qty: 0 | Refills: 0 | Status: DISCONTINUED | OUTPATIENT
Start: 2018-03-31 | End: 2018-04-01

## 2018-03-31 RX ADMIN — Medication 0.2 MILLIGRAM(S): at 07:35

## 2018-03-31 RX ADMIN — Medication 650 MILLIGRAM(S): at 13:46

## 2018-03-31 RX ADMIN — Medication 0.2 MILLIGRAM(S): at 17:28

## 2018-03-31 RX ADMIN — Medication 650 MILLIGRAM(S): at 17:27

## 2018-03-31 RX ADMIN — Medication 650 MILLIGRAM(S): at 06:51

## 2018-03-31 RX ADMIN — CLOPIDOGREL BISULFATE 75 MILLIGRAM(S): 75 TABLET, FILM COATED ORAL at 11:43

## 2018-03-31 RX ADMIN — CARVEDILOL PHOSPHATE 25 MILLIGRAM(S): 80 CAPSULE, EXTENDED RELEASE ORAL at 17:28

## 2018-03-31 RX ADMIN — ISOSORBIDE MONONITRATE 60 MILLIGRAM(S): 60 TABLET, EXTENDED RELEASE ORAL at 17:28

## 2018-03-31 RX ADMIN — ISOSORBIDE MONONITRATE 60 MILLIGRAM(S): 60 TABLET, EXTENDED RELEASE ORAL at 06:50

## 2018-03-31 RX ADMIN — Medication 2 MILLIGRAM(S): at 21:07

## 2018-03-31 RX ADMIN — ATORVASTATIN CALCIUM 40 MILLIGRAM(S): 80 TABLET, FILM COATED ORAL at 21:07

## 2018-03-31 RX ADMIN — HEPARIN SODIUM 5000 UNIT(S): 5000 INJECTION INTRAVENOUS; SUBCUTANEOUS at 06:49

## 2018-03-31 RX ADMIN — HEPARIN SODIUM 5000 UNIT(S): 5000 INJECTION INTRAVENOUS; SUBCUTANEOUS at 17:28

## 2018-03-31 RX ADMIN — Medication 100 MILLIGRAM(S): at 09:42

## 2018-03-31 RX ADMIN — Medication 650 MILLIGRAM(S): at 12:46

## 2018-03-31 RX ADMIN — CARVEDILOL PHOSPHATE 25 MILLIGRAM(S): 80 CAPSULE, EXTENDED RELEASE ORAL at 06:54

## 2018-03-31 RX ADMIN — Medication 81 MILLIGRAM(S): at 11:43

## 2018-03-31 RX ADMIN — Medication 40 MILLIGRAM(S): at 06:49

## 2018-03-31 RX ADMIN — Medication 100 MILLIGRAM(S): at 11:43

## 2018-03-31 RX ADMIN — Medication 100 MILLIGRAM(S): at 17:28

## 2018-03-31 RX ADMIN — Medication 25 MILLIGRAM(S): at 13:47

## 2018-03-31 RX ADMIN — Medication 100 MILLIGRAM(S): at 06:50

## 2018-03-31 RX ADMIN — Medication 100 MILLIGRAM(S): at 17:27

## 2018-03-31 NOTE — DIETITIAN INITIAL EVALUATION ADULT. - OTHER INFO
----- Message from Laine King sent at 1/13/2017 10:36 AM CST -----  Contact: Pt [phone]  Patient is experiencing frequent urination and pressure. Patient states she feels like sh can't empty her bladder. Daughter dropped of urine sample.   
Prescription of Cipro sent in do not culture  
Pt notified that rx called into pharmacy  
Pt urine cloudy  Large leukocytes and trace protein and trace blood  Please advise  Culture?  Treat?    
Patient seen for consult.  Patient was unaware of weight increase.  Patient reports stable weight at 145 pounds for 6 months and prior to this a long term trend of intentional weight loss.  In the hospital patient reports taking 60-75% of tray, however, she reports that this is her normal level of intake.  Patient had no additional preferences.  Current diet order with 60 gram protein restriction was explained to patient.  Education was also provided on T2DM, low salt was encouraged, and a food list for Potassium content was provided, as patient mentioned having high potassium in the past.  Patient reports no BM since last Thursday, and patient is on a bowel regimen  Patient No known food allergies.

## 2018-03-31 NOTE — DIETITIAN INITIAL EVALUATION ADULT. - DIET TYPE
DASH/TLC (sodium and cholesterol restricted diet)/consistent carbohydrate (no snacks)/regular/60 gm protein/Low sodium

## 2018-03-31 NOTE — DIETITIAN INITIAL EVALUATION ADULT. - NS AS NUTRI INTERV ED CONTENT
Encouraged low salt diet and foods to watch from diet recall, provided handout on T2DM and portions, explained her current 60g protein restriction, and provided refresh on T2DM.  Handouts provided for T2DM and a food list for Potassium content, as patient mentioned she had high potassium in the past./Nutrition relationship to health/disease/Recommended modifications

## 2018-03-31 NOTE — DIETITIAN INITIAL EVALUATION ADULT. - ORAL INTAKE PTA
fair/Patient reports normal appetite and intake with some days better than others.  Patient reports intentionally changing to a healthier diet to loose weight over the last 6 years.  Typical foods include pasta with meat sauce, pasta with shrimp, burgers and hot dogs, bagels, blake with eggs and cheese, and cereal.

## 2018-03-31 NOTE — PROGRESS NOTE ADULT - ASSESSMENT
Problem/Plan - 1:  ·  Problem: Hypertensive urgency.  Plan: Plan as per cardio consult.  titrate as needed  cards fu  will follow    Problem/Plan - 2:  ·  Problem: Shortness of breath.  Plan: O2 PRN.     Problem/Plan - 3:  ·  Problem: Stage 5 chronic kidney disease not on chronic dialysis.  Plan: Call Renal Consult

## 2018-03-31 NOTE — PROGRESS NOTE ADULT - SUBJECTIVE AND OBJECTIVE BOX
CARDIOLOGY FOLLOW UP - Dr. Damico    CC no chest pain or sob   c.o dizziness when turning her head       PHYSICAL EXAM:  T(C): --  HR: 60 (03-31-18 @ 06:46) (59 - 60)  BP: 166/71 (03-31-18 @ 06:46) (131/52 - 166/71)  RR: 18 (03-31-18 @ 06:46) (18 - 18)  SpO2: 100% (03-31-18 @ 06:46) (99% - 100%)  Wt(kg): --  I&O's Summary      Appearance: Normal	  Cardiovascular: Normal S1 S2,RRR, No JVD, No murmurs  Respiratory: Lungs clear to auscultation	  Gastrointestinal:  Soft, Non-tender, + BS	  Extremities: Normal range of motion, No clubbing, cyanosis or edema        MEDICATIONS  (STANDING):  allopurinol 100 milliGRAM(s) Oral daily  aspirin enteric coated 81 milliGRAM(s) Oral daily  atorvastatin 40 milliGRAM(s) Oral at bedtime  calcitriol   Capsule 0.25 MICROGram(s) Oral <User Schedule>  carvedilol 25 milliGRAM(s) Oral every 12 hours  cloNIDine 0.2 milliGRAM(s) Oral two times a day  clopidogrel Tablet 75 milliGRAM(s) Oral daily  dextrose 5%. 1000 milliLiter(s) (50 mL/Hr) IV Continuous <Continuous>  dextrose 50% Injectable 12.5 Gram(s) IV Push once  dextrose 50% Injectable 25 Gram(s) IV Push once  dextrose 50% Injectable 25 Gram(s) IV Push once  doxazosin 2 milliGRAM(s) Oral at bedtime  furosemide    Tablet 40 milliGRAM(s) Oral daily  heparin  Injectable 5000 Unit(s) SubCutaneous every 12 hours  hydrALAZINE 100 milliGRAM(s) Oral every 12 hours  insulin lispro (HumaLOG) corrective regimen sliding scale   SubCutaneous three times a day before meals  insulin lispro (HumaLOG) corrective regimen sliding scale   SubCutaneous at bedtime  isosorbide   mononitrate ER Tablet (IMDUR) 60 milliGRAM(s) Oral <User Schedule>  sodium bicarbonate 650 milliGRAM(s) Oral two times a day      TELEMETRY: av paced 	    ECG:  	  RADIOLOGY:   DIAGNOSTIC TESTING:  [ ] Echocardiogram:  [ ]  Catheterization:  [ ] Stress Test:    OTHER: 	    LABS:	 	                                8.4    2.60  )-----------( 127      ( 31 Mar 2018 06:18 )             28.6     03-31    136  |  102  |  73<H>  ----------------------------<  92  4.8   |  24  |  4.46<H>    Ca    9.7      31 Mar 2018 06:18  Phos  3.9     03-31  Mg     2.5     03-31

## 2018-03-31 NOTE — PROGRESS NOTE ADULT - ATTENDING COMMENTS
agree with NP note above  cont current tx  meclizine prn
Agree with above NP note.  cv stable  feels better  sbp still elevated  some meds increased  cont to monitor   will add norvasc martín if needed
Agree with above NP note.  cv stable  bp improved   bp lower this am due to inc in bp meds  now better  d/c planning

## 2018-03-31 NOTE — PROGRESS NOTE ADULT - SUBJECTIVE AND OBJECTIVE BOX
Patient is a 78y old  Female who presents with a chief complaint of dizziness, elevated BP, SOB (29 Mar 2018 12:38)  still with dizziness      INTERVAL HPI/OVERNIGHT EVENTS:  T(C): --  HR: 60 (03-31-18 @ 06:46) (59 - 60)  BP: 166/71 (03-31-18 @ 06:46) (131/52 - 166/71)  RR: 18 (03-31-18 @ 06:46) (18 - 18)  SpO2: 100% (03-31-18 @ 06:46) (99% - 100%)  Wt(kg): --  I&O's Summary      LABS:                        8.4    2.60  )-----------( 127      ( 31 Mar 2018 06:18 )             28.6     03-31    136  |  102  |  73<H>  ----------------------------<  92  4.8   |  24  |  4.46<H>    Ca    9.7      31 Mar 2018 06:18  Phos  3.9     03-31  Mg     2.5     03-31          CAPILLARY BLOOD GLUCOSE      POCT Blood Glucose.: 141 mg/dL (31 Mar 2018 12:25)  POCT Blood Glucose.: 106 mg/dL (31 Mar 2018 08:29)  POCT Blood Glucose.: 118 mg/dL (30 Mar 2018 21:31)  POCT Blood Glucose.: 112 mg/dL (30 Mar 2018 19:25)  POCT Blood Glucose.: 63 mg/dL (30 Mar 2018 17:13)            MEDICATIONS  (STANDING):  allopurinol 100 milliGRAM(s) Oral daily  aspirin enteric coated 81 milliGRAM(s) Oral daily  atorvastatin 40 milliGRAM(s) Oral at bedtime  calcitriol   Capsule 0.25 MICROGram(s) Oral <User Schedule>  carvedilol 25 milliGRAM(s) Oral every 12 hours  cloNIDine 0.2 milliGRAM(s) Oral two times a day  clopidogrel Tablet 75 milliGRAM(s) Oral daily  dextrose 5%. 1000 milliLiter(s) (50 mL/Hr) IV Continuous <Continuous>  dextrose 50% Injectable 12.5 Gram(s) IV Push once  dextrose 50% Injectable 25 Gram(s) IV Push once  dextrose 50% Injectable 25 Gram(s) IV Push once  doxazosin 2 milliGRAM(s) Oral at bedtime  furosemide    Tablet 40 milliGRAM(s) Oral daily  heparin  Injectable 5000 Unit(s) SubCutaneous every 12 hours  hydrALAZINE 100 milliGRAM(s) Oral every 12 hours  insulin lispro (HumaLOG) corrective regimen sliding scale   SubCutaneous three times a day before meals  insulin lispro (HumaLOG) corrective regimen sliding scale   SubCutaneous at bedtime  isosorbide   mononitrate ER Tablet (IMDUR) 60 milliGRAM(s) Oral <User Schedule>  sodium bicarbonate 650 milliGRAM(s) Oral two times a day    MEDICATIONS  (PRN):  acetaminophen   Tablet. 650 milliGRAM(s) Oral every 6 hours PRN mild, moderate, pain  dextrose Gel 1 Dose(s) Oral once PRN Blood Glucose LESS THAN 70 milliGRAM(s)/deciliter  docusate sodium 100 milliGRAM(s) Oral three times a day PRN Constipation  glucagon  Injectable 1 milliGRAM(s) IntraMuscular once PRN Glucose LESS THAN 70 milligrams/deciliter  meclizine 25 milliGRAM(s) Oral daily PRN Dizziness          PHYSICAL EXAM:  GENERAL: NAD, well-groomed, well-developed  HEAD:  Atraumatic, Normocephalic  CHEST/LUNG: Clear to percussion bilaterally; No rales, rhonchi, wheezing, or rubs  HEART: Regular rate and rhythm; No murmurs, rubs, or gallops  ABDOMEN: Soft, Nontender, Nondistended; Bowel sounds present  EXTREMITIES:  2+ Peripheral Pulses, No clubbing, cyanosis, or edema  LYMPH: No lymphadenopathy noted  SKIN: No rashes or lesions    Care Discussed with Consultants/Other Providers [+ ] YES  [ ] NO

## 2018-03-31 NOTE — DIETITIAN INITIAL EVALUATION ADULT. - ENERGY NEEDS
Ht: 61", Wt: 156.7 pounds,  pounds, %%, .6  No edema, Aniket Score 21, skin intact.  Fluids per medical team.

## 2018-03-31 NOTE — PROGRESS NOTE ADULT - ASSESSMENT
78 y F with PMHx HTN, HLD, CAD s/p CABG, PCI (3/2017), Aortic stenosis s/p TAVR/PPM (3/2017) presenting with uncontrolled HTN.     1. HTN   bp overall improving   continue with clonidine to 0.2mg BID  continue with coreg  25mg BID    continue imdur to 60mg BID  continue with hydralazine, lasix  EKG no evidence of ACS  C/E negative                                                                                                                                                                                                No evidence of decompensated CHF on exam, cv stable        2. CAD s/p CABG, PCI   cv stable   no s/s of decompensated HF on exam   denies cp/sob/nugent on exam  continue with current cardiac meds    3. Aortic Stenosis s/p TAVR/ppm   Stable  continue with current medication regimen     4. CKD  renal f/u    5. dizziness   likely vertigo   bp stable   start meclizine 12.5 mg PO PRN    Dc planning, f/u appointment for 4/10/18 78 y F with PMHx HTN, HLD, CAD s/p CABG, PCI (3/2017), Aortic stenosis s/p TAVR/PPM (3/2017) presenting with uncontrolled HTN.     1. HTN   bp overall improving   continue with clonidine to 0.2mg BID  continue with coreg  25mg BID    continue imdur to 60mg BID  continue with hydralazine, lasix  EKG no evidence of ACS  C/E negative                                                                                                                                                                                                No evidence of decompensated CHF on exam, cv stable        2. CAD s/p CABG, PCI   cv stable   no s/s of decompensated HF on exam   denies cp/sob/nugent on exam  continue with current cardiac meds    3. Aortic Stenosis s/p TAVR/ppm   Stable  continue with current medication regimen     4. CKD  renal f/u    5. dizziness   likely vertigo   bp stable   start meclizine 25 mg PO PRN    Dc planning, f/u appointment for 4/10/18

## 2018-03-31 NOTE — DIETITIAN INITIAL EVALUATION ADULT. - ADHERENCE
good/Watches diet for her kidneys.  Finger sticks at home are monitored and if over 100 she takes a pill.

## 2018-04-01 VITALS
DIASTOLIC BLOOD PRESSURE: 56 MMHG | SYSTOLIC BLOOD PRESSURE: 166 MMHG | RESPIRATION RATE: 18 BRPM | TEMPERATURE: 98 F | OXYGEN SATURATION: 100 % | HEART RATE: 77 BPM

## 2018-04-01 LAB
BUN SERPL-MCNC: 77 MG/DL — HIGH (ref 7–23)
CALCIUM SERPL-MCNC: 9.1 MG/DL — SIGNIFICANT CHANGE UP (ref 8.4–10.5)
CHLORIDE SERPL-SCNC: 102 MMOL/L — SIGNIFICANT CHANGE UP (ref 98–107)
CO2 SERPL-SCNC: 23 MMOL/L — SIGNIFICANT CHANGE UP (ref 22–31)
CREAT SERPL-MCNC: 4.63 MG/DL — HIGH (ref 0.5–1.3)
GLUCOSE BLDC GLUCOMTR-MCNC: 106 MG/DL — HIGH (ref 70–99)
GLUCOSE BLDC GLUCOMTR-MCNC: 148 MG/DL — HIGH (ref 70–99)
GLUCOSE BLDC GLUCOMTR-MCNC: 72 MG/DL — SIGNIFICANT CHANGE UP (ref 70–99)
GLUCOSE SERPL-MCNC: 191 MG/DL — HIGH (ref 70–99)
HCT VFR BLD CALC: 25.9 % — LOW (ref 34.5–45)
HGB BLD-MCNC: 7.6 G/DL — LOW (ref 11.5–15.5)
MAGNESIUM SERPL-MCNC: 2.4 MG/DL — SIGNIFICANT CHANGE UP (ref 1.6–2.6)
MCHC RBC-ENTMCNC: 23.2 PG — LOW (ref 27–34)
MCHC RBC-ENTMCNC: 29.3 % — LOW (ref 32–36)
MCV RBC AUTO: 79.2 FL — LOW (ref 80–100)
NRBC # FLD: 0 — SIGNIFICANT CHANGE UP
PHOSPHATE SERPL-MCNC: 4.5 MG/DL — SIGNIFICANT CHANGE UP (ref 2.5–4.5)
PLATELET # BLD AUTO: 110 K/UL — LOW (ref 150–400)
PMV BLD: 10 FL — SIGNIFICANT CHANGE UP (ref 7–13)
POTASSIUM SERPL-MCNC: 4.9 MMOL/L — SIGNIFICANT CHANGE UP (ref 3.5–5.3)
POTASSIUM SERPL-SCNC: 4.9 MMOL/L — SIGNIFICANT CHANGE UP (ref 3.5–5.3)
RBC # BLD: 3.27 M/UL — LOW (ref 3.8–5.2)
RBC # FLD: 14.4 % — SIGNIFICANT CHANGE UP (ref 10.3–14.5)
SODIUM SERPL-SCNC: 137 MMOL/L — SIGNIFICANT CHANGE UP (ref 135–145)
WBC # BLD: 2.24 K/UL — LOW (ref 3.8–10.5)
WBC # FLD AUTO: 2.24 K/UL — LOW (ref 3.8–10.5)

## 2018-04-01 PROCEDURE — 99232 SBSQ HOSP IP/OBS MODERATE 35: CPT | Mod: GC

## 2018-04-01 RX ORDER — HYDRALAZINE HCL 50 MG
1 TABLET ORAL
Qty: 0 | Refills: 0 | COMMUNITY

## 2018-04-01 RX ORDER — MECLIZINE HCL 12.5 MG
1 TABLET ORAL
Qty: 7 | Refills: 0 | OUTPATIENT
Start: 2018-04-01 | End: 2018-04-07

## 2018-04-01 RX ADMIN — Medication 650 MILLIGRAM(S): at 17:08

## 2018-04-01 RX ADMIN — Medication 650 MILLIGRAM(S): at 07:02

## 2018-04-01 RX ADMIN — Medication 100 MILLIGRAM(S): at 17:08

## 2018-04-01 RX ADMIN — Medication 81 MILLIGRAM(S): at 12:32

## 2018-04-01 RX ADMIN — Medication 100 MILLIGRAM(S): at 12:32

## 2018-04-01 RX ADMIN — Medication 0.2 MILLIGRAM(S): at 17:07

## 2018-04-01 RX ADMIN — Medication 40 MILLIGRAM(S): at 05:50

## 2018-04-01 RX ADMIN — CARVEDILOL PHOSPHATE 25 MILLIGRAM(S): 80 CAPSULE, EXTENDED RELEASE ORAL at 05:50

## 2018-04-01 RX ADMIN — HEPARIN SODIUM 5000 UNIT(S): 5000 INJECTION INTRAVENOUS; SUBCUTANEOUS at 05:51

## 2018-04-01 RX ADMIN — CARVEDILOL PHOSPHATE 25 MILLIGRAM(S): 80 CAPSULE, EXTENDED RELEASE ORAL at 17:07

## 2018-04-01 RX ADMIN — HEPARIN SODIUM 5000 UNIT(S): 5000 INJECTION INTRAVENOUS; SUBCUTANEOUS at 17:07

## 2018-04-01 RX ADMIN — ISOSORBIDE MONONITRATE 60 MILLIGRAM(S): 60 TABLET, EXTENDED RELEASE ORAL at 17:08

## 2018-04-01 RX ADMIN — CLOPIDOGREL BISULFATE 75 MILLIGRAM(S): 75 TABLET, FILM COATED ORAL at 12:32

## 2018-04-01 RX ADMIN — Medication 25 MILLIGRAM(S): at 13:47

## 2018-04-01 RX ADMIN — Medication 100 MILLIGRAM(S): at 05:49

## 2018-04-01 RX ADMIN — ISOSORBIDE MONONITRATE 60 MILLIGRAM(S): 60 TABLET, EXTENDED RELEASE ORAL at 05:51

## 2018-04-01 RX ADMIN — Medication 0.2 MILLIGRAM(S): at 05:50

## 2018-04-01 NOTE — PROGRESS NOTE ADULT - PROBLEM SELECTOR PLAN 2
Pt. with anemia in the setting of advanced CKD stage 5. Pt. receives outpatient JEISON treatment by her hematologist. Monitor Hb

## 2018-04-01 NOTE — PROGRESS NOTE ADULT - ASSESSMENT
Problem/Plan - 1:  ·  Problem: Hypertensive urgency.  Plan: Plan as per cardio consult.  titrate as needed  cards fu  will follow    Problem/Plan - 2:  ·  Problem: dizziness Plan resolved with meclizine    Problem/Plan - 3:  ·  Problem: Stage 5 chronic kidney disease not on chronic dialysis.  Plan: renal fu     dc planning today

## 2018-04-01 NOTE — PROGRESS NOTE ADULT - SUBJECTIVE AND OBJECTIVE BOX
[facility] Division of Kidney Diseases & Hypertension  FOLLOW UP NOTE  [phone]--------------------------------------------------------------------------------    HPI: 78-year-old female with CKD Stage 5 s/p LUE AVF placement ~6 years ago, type 2 DM, HTN, CAD s/p CABG, PCI (3/2017), Aortic stenosis s/p TAVR/PPM (3/2017) admitted for elevated BP management. Pt.'s outpatient nephrologist is Dr. Dixon. Pt. was last seen in nephrology clinic on 2/6/18 with stable Scr of 4.07. Pt. admits to compliance with her medications. Pt. denies CP, SOB or LE edema.     PAST HISTORY  --------------------------------------------------------------------------------  No significant changes to PMH, PSH, FHx, SHx, unless otherwise noted    ALLERGIES & MEDICATIONS  --------------------------------------------------------------------------------  Allergies    codeine (Other)  Lortab (Other)  morphine (Other)  Percocet 10/325 (Other)  Reglan (Other; Flushing (Skin))  sulfa drugs (Flushing (Skin))    Intolerances      Standing Inpatient Medications  allopurinol 100 milliGRAM(s) Oral daily  aspirin enteric coated 81 milliGRAM(s) Oral daily  atorvastatin 40 milliGRAM(s) Oral at bedtime  calcitriol   Capsule 0.25 MICROGram(s) Oral <User Schedule>  carvedilol 25 milliGRAM(s) Oral every 12 hours  cloNIDine 0.2 milliGRAM(s) Oral two times a day  clopidogrel Tablet 75 milliGRAM(s) Oral daily  dextrose 5%. 1000 milliLiter(s) IV Continuous <Continuous>  dextrose 50% Injectable 12.5 Gram(s) IV Push once  dextrose 50% Injectable 25 Gram(s) IV Push once  dextrose 50% Injectable 25 Gram(s) IV Push once  doxazosin 2 milliGRAM(s) Oral at bedtime  furosemide    Tablet 40 milliGRAM(s) Oral daily  heparin  Injectable 5000 Unit(s) SubCutaneous every 12 hours  hydrALAZINE 100 milliGRAM(s) Oral every 12 hours  insulin lispro (HumaLOG) corrective regimen sliding scale   SubCutaneous three times a day before meals  insulin lispro (HumaLOG) corrective regimen sliding scale   SubCutaneous at bedtime  isosorbide   mononitrate ER Tablet (IMDUR) 60 milliGRAM(s) Oral <User Schedule>  sodium bicarbonate 650 milliGRAM(s) Oral two times a day    PRN Inpatient Medications  acetaminophen   Tablet. 650 milliGRAM(s) Oral every 6 hours PRN  dextrose Gel 1 Dose(s) Oral once PRN  docusate sodium 100 milliGRAM(s) Oral three times a day PRN  glucagon  Injectable 1 milliGRAM(s) IntraMuscular once PRN  meclizine 25 milliGRAM(s) Oral daily PRN      REVIEW OF SYSTEMS  --------------------------------------------------------------------------------    Gen: No weakness  Skin: No rash  Head: +intermittent headache  Respiratory: No dyspnea  CV: No chest pain  GI: No abdominal pain  MSK: No LE edema  Neuro: See HPI  Heme: No bleeding      VITALS/PHYSICAL EXAM  --------------------------------------------------------------------------------  T(C): 36.8 (04-01-18 @ 05:45), Max: 37.1 (03-31-18 @ 17:10)  HR: 71 (04-01-18 @ 05:45) (71 - 75)  BP: 151/68 (04-01-18 @ 05:45) (113/60 - 165/79)  RR: 18 (04-01-18 @ 05:45) (18 - 18)  SpO2: 100% (04-01-18 @ 05:45) (100% - 100%)  Wt(kg): --        Physical Exam:  	  Gen: NAD, well-appearing  	HEENT: no JVD  	Pulm: CTA B/L  	CV: RRR, S1S2; no rub  	Abd: +BS, soft, nontender/nondistended  	: No suprapubic tenderness  	UE: Warm, no asterixis  	LE: Warm, no edema  	Psych: Normal affect and mood  	Skin: Warm  	Vascular access: LUE AVF, + thrill + bruit, skin intact     LABS/STUDIES  --------------------------------------------------------------------------------              8.4    2.60  >-----------<  127      [03-31-18 @ 06:18]              28.6     136  |  102  |  73  ----------------------------<  92      [03-31-18 @ 06:18]  4.8   |  24  |  4.46        Ca     9.7     [03-31-18 @ 06:18]      Mg     2.5     [03-31-18 @ 06:18]      Phos  3.9     [03-31-18 @ 06:18]            Creatinine Trend:  SCr 4.46 [03-31 @ 06:18]  SCr 4.34 [03-30 @ 06:33]  SCr 4.02 [03-29 @ 06:38]  SCr 3.97 [03-28 @ 13:20]        HbA1c 5.2      [03-29-18 @ 06:38]  TSH 1.21      [03-29-18 @ 06:38]  Lipid: chol 160, TG 64, HDL 54, LDL 92      [03-29-18 @ 06:38] [facility] Division of Kidney Diseases & Hypertension  FOLLOW UP NOTE  [phone]--------------------------------------------------------------------------------    HPI: 78-year-old female with CKD Stage 5 s/p LUE AVF placement ~6 years ago, type 2 DM, HTN, CAD s/p CABG, PCI (3/2017), aortic stenosis s/p TAVR/PPM (3/2017) admitted for elevated BP management. Pt.'s outpatient nephrologist is Dr. Dixon. Pt. was last seen in nephrology clinic on 2/6/18. Pt. feels well and denies CP, SOB or LE edema.     PAST HISTORY  --------------------------------------------------------------------------------  No significant changes to PMH, PSH, FHx, SHx, unless otherwise noted    ALLERGIES & MEDICATIONS  --------------------------------------------------------------------------------  Allergies    codeine (Other)  Lortab (Other)  morphine (Other)  Percocet 10/325 (Other)  Reglan (Other; Flushing (Skin))  sulfa drugs (Flushing (Skin))    Intolerances    Standing Inpatient Medications  allopurinol 100 milliGRAM(s) Oral daily  aspirin enteric coated 81 milliGRAM(s) Oral daily  atorvastatin 40 milliGRAM(s) Oral at bedtime  calcitriol   Capsule 0.25 MICROGram(s) Oral <User Schedule>  carvedilol 25 milliGRAM(s) Oral every 12 hours  cloNIDine 0.2 milliGRAM(s) Oral two times a day  clopidogrel Tablet 75 milliGRAM(s) Oral daily  dextrose 5%. 1000 milliLiter(s) IV Continuous <Continuous>  dextrose 50% Injectable 12.5 Gram(s) IV Push once  dextrose 50% Injectable 25 Gram(s) IV Push once  dextrose 50% Injectable 25 Gram(s) IV Push once  doxazosin 2 milliGRAM(s) Oral at bedtime  furosemide    Tablet 40 milliGRAM(s) Oral daily  heparin  Injectable 5000 Unit(s) SubCutaneous every 12 hours  hydrALAZINE 100 milliGRAM(s) Oral every 12 hours  insulin lispro (HumaLOG) corrective regimen sliding scale   SubCutaneous three times a day before meals  insulin lispro (HumaLOG) corrective regimen sliding scale   SubCutaneous at bedtime  isosorbide   mononitrate ER Tablet (IMDUR) 60 milliGRAM(s) Oral <User Schedule>  sodium bicarbonate 650 milliGRAM(s) Oral two times a day    PRN Inpatient Medications  acetaminophen   Tablet. 650 milliGRAM(s) Oral every 6 hours PRN  dextrose Gel 1 Dose(s) Oral once PRN  docusate sodium 100 milliGRAM(s) Oral three times a day PRN  glucagon  Injectable 1 milliGRAM(s) IntraMuscular once PRN  meclizine 25 milliGRAM(s) Oral daily PRN    REVIEW OF SYSTEMS  --------------------------------------------------------------------------------    Gen: No weakness  Skin: No rash  Head: +intermittent headache  Respiratory: No dyspnea  CV: No chest pain  GI: No abdominal pain  MSK: No LE edema  Neuro: See HPI  Heme: No bleeding    VITALS/PHYSICAL EXAM  --------------------------------------------------------------------------------  T(C): 36.8 (04-01-18 @ 05:45), Max: 37.1 (03-31-18 @ 17:10)  HR: 71 (04-01-18 @ 05:45) (71 - 75)  BP: 151/68 (04-01-18 @ 05:45) (113/60 - 165/79)  RR: 18 (04-01-18 @ 05:45) (18 - 18)  SpO2: 100% (04-01-18 @ 05:45) (100% - 100%)  Wt(kg): --    Physical Exam:  	Gen: NAD, well-appearing  	HEENT: no JVD  	Pulm: CTA B/L  	CV: S1S2+  	Abd: +BS, soft, nontender/nondistended  	: No suprapubic tenderness  	UE: Warm, no asterixis  	LE: Warm, no edema  	Psych: Normal affect and mood  	Skin: Warm  	Vascular access: LUE AVF, + thrill + bruit, skin intact     LABS/STUDIES  --------------------------------------------------------------------------------              8.4    2.60  >-----------<  127      [03-31-18 @ 06:18]              28.6     136  |  102  |  73  ----------------------------<  92      [03-31-18 @ 06:18]  4.8   |  24  |  4.46        Ca     9.7     [03-31-18 @ 06:18]      Mg     2.5     [03-31-18 @ 06:18]      Phos  3.9     [03-31-18 @ 06:18]    Creatinine Trend:  SCr 4.46 [03-31 @ 06:18]  SCr 4.34 [03-30 @ 06:33]  SCr 4.02 [03-29 @ 06:38]  SCr 3.97 [03-28 @ 13:20]

## 2018-04-01 NOTE — PROGRESS NOTE ADULT - SUBJECTIVE AND OBJECTIVE BOX
Patient is a 78y old  Female who presents with a chief complaint of dizziness, elevated BP, SOB (29 Mar 2018 12:38)  no dizziness present    INTERVAL HPI/OVERNIGHT EVENTS:  T(C): 36.7 (04-01-18 @ 16:55), Max: 36.8 (03-31-18 @ 20:30)  HR: 77 (04-01-18 @ 16:55) (71 - 77)  BP: 166/56 (04-01-18 @ 16:55) (113/60 - 166/56)  RR: 18 (04-01-18 @ 16:55) (18 - 18)  SpO2: 100% (04-01-18 @ 16:55) (100% - 100%)  Wt(kg): --  I&O's Summary      LABS:                        7.6    2.24  )-----------( 110      ( 01 Apr 2018 10:13 )             25.9     04-01    137  |  102  |  77<H>  ----------------------------<  191<H>  4.9   |  23  |  4.63<H>    Ca    9.1      01 Apr 2018 10:13  Phos  4.5     04-01  Mg     2.4     04-01          CAPILLARY BLOOD GLUCOSE      POCT Blood Glucose.: 72 mg/dL (01 Apr 2018 17:27)  POCT Blood Glucose.: 148 mg/dL (01 Apr 2018 12:13)  POCT Blood Glucose.: 106 mg/dL (01 Apr 2018 08:34)  POCT Blood Glucose.: 121 mg/dL (31 Mar 2018 21:20)            MEDICATIONS  (STANDING):  allopurinol 100 milliGRAM(s) Oral daily  aspirin enteric coated 81 milliGRAM(s) Oral daily  atorvastatin 40 milliGRAM(s) Oral at bedtime  calcitriol   Capsule 0.25 MICROGram(s) Oral <User Schedule>  carvedilol 25 milliGRAM(s) Oral every 12 hours  cloNIDine 0.2 milliGRAM(s) Oral two times a day  clopidogrel Tablet 75 milliGRAM(s) Oral daily  dextrose 5%. 1000 milliLiter(s) (50 mL/Hr) IV Continuous <Continuous>  dextrose 50% Injectable 12.5 Gram(s) IV Push once  dextrose 50% Injectable 25 Gram(s) IV Push once  dextrose 50% Injectable 25 Gram(s) IV Push once  doxazosin 2 milliGRAM(s) Oral at bedtime  furosemide    Tablet 40 milliGRAM(s) Oral daily  heparin  Injectable 5000 Unit(s) SubCutaneous every 12 hours  hydrALAZINE 100 milliGRAM(s) Oral every 12 hours  insulin lispro (HumaLOG) corrective regimen sliding scale   SubCutaneous three times a day before meals  insulin lispro (HumaLOG) corrective regimen sliding scale   SubCutaneous at bedtime  isosorbide   mononitrate ER Tablet (IMDUR) 60 milliGRAM(s) Oral <User Schedule>  sodium bicarbonate 650 milliGRAM(s) Oral two times a day    MEDICATIONS  (PRN):  acetaminophen   Tablet. 650 milliGRAM(s) Oral every 6 hours PRN mild, moderate, pain  dextrose Gel 1 Dose(s) Oral once PRN Blood Glucose LESS THAN 70 milliGRAM(s)/deciliter  docusate sodium 100 milliGRAM(s) Oral three times a day PRN Constipation  glucagon  Injectable 1 milliGRAM(s) IntraMuscular once PRN Glucose LESS THAN 70 milligrams/deciliter  meclizine 25 milliGRAM(s) Oral daily PRN Dizziness          PHYSICAL EXAM:  GENERAL: NAD, well-groomed, well-developed  HEAD:  Atraumatic, Normocephalic  CHEST/LUNG: Clear to percussion bilaterally; No rales, rhonchi, wheezing, or rubs  HEART: Regular rate and rhythm; No murmurs, rubs, or gallops  ABDOMEN: Soft, Nontender, Nondistended; Bowel sounds present  EXTREMITIES:  2+ Peripheral Pulses, No clubbing, cyanosis, or edema  LYMPH: No lymphadenopathy noted  SKIN: No rashes or lesions    Care Discussed with Consultants/Other Providers [+ ] YES  [ ] NO

## 2018-04-01 NOTE — PROGRESS NOTE ADULT - ASSESSMENT
78 y F with PMHx HTN, HLD, CAD s/p CABG, PCI (3/2017), Aortic stenosis s/p TAVR/PPM (3/2017) presenting with uncontrolled HTN.     1. HTN   bp overall improving   continue with clonidine to 0.2mg BID  continue with coreg  25mg BID    continue imdur to 60mg BID  continue with hydralazine, lasix  EKG no evidence of ACS  C/E negative                                                                                                                                                                                                No evidence of decompensated CHF on exam, cv stable        2. CAD s/p CABG, PCI   cv stable   no s/s of decompensated HF on exam   denies cp/sob/nugent on exam  continue with current cardiac meds    3. Aortic Stenosis s/p TAVR/ppm   Stable  continue with current medication regimen     4. CKD  renal f/u    5. dizziness   likely vertigo   bp stable   start meclizine 25 mg PO PRN    Dc planning, f/u appointment for 4/10/18

## 2018-04-01 NOTE — PROGRESS NOTE ADULT - PROBLEM SELECTOR PLAN 1
Pt. with advanced CKD stage 5 in setting of HTN and DM. Scr elevated but stable at 4.46 yesterday. Baseline Scr usually ranges from 4-4.4 as outpatient. Pt. clinically stable. Continue to monitor labs and urine output. Avoid potential nephrotoxins Pt. with advanced CKD stage 5 in setting of HTN and DM. Scr elevated at 4.46 yesterday. Baseline Scr usually ranges from 4-4.4 as outpatient. Pt. clinically stable. Continue to monitor labs and urine output. Avoid potential nephrotoxins

## 2018-04-01 NOTE — PROGRESS NOTE ADULT - SUBJECTIVE AND OBJECTIVE BOX
CC: no new complaints    TELEMETRY:     PHYSICAL EXAM:    T(C): 36.8 (04-01-18 @ 05:45), Max: 37.1 (03-31-18 @ 17:10)  HR: 71 (04-01-18 @ 05:45) (71 - 75)  BP: 151/68 (04-01-18 @ 05:45) (113/60 - 165/79)  RR: 18 (04-01-18 @ 05:45) (18 - 18)  SpO2: 100% (04-01-18 @ 05:45) (100% - 100%)  Wt(kg): --  I&O's Summary      Appearance: Normal	  Cardiovascular: Normal S1 S2,RRR, No JVD, No murmurs  Respiratory: Lungs clear to auscultation	  Gastrointestinal:  Soft, Non-tender, + BS	  Extremities: Normal range of motion, No clubbing, cyanosis or edema  Vascular: Peripheral pulses palpable 2+ bilaterally     LABS:	 	                          8.4    2.60  )-----------( 127      ( 31 Mar 2018 06:18 )             28.6     03-31    136  |  102  |  73<H>  ----------------------------<  92  4.8   |  24  |  4.46<H>    Ca    9.7      31 Mar 2018 06:18  Phos  3.9     03-31  Mg     2.5     03-31            CARDIAC MARKERS:

## 2018-04-02 ENCOUNTER — OUTPATIENT (OUTPATIENT)
Dept: OUTPATIENT SERVICES | Facility: HOSPITAL | Age: 78
LOS: 1 days | Discharge: HOME | End: 2018-04-02

## 2018-04-02 DIAGNOSIS — Z95.2 PRESENCE OF PROSTHETIC HEART VALVE: Chronic | ICD-10-CM

## 2018-04-02 DIAGNOSIS — Z95.0 PRESENCE OF CARDIAC PACEMAKER: Chronic | ICD-10-CM

## 2018-04-02 DIAGNOSIS — I77.0 ARTERIOVENOUS FISTULA, ACQUIRED: Chronic | ICD-10-CM

## 2018-04-02 DIAGNOSIS — N18.4 CHRONIC KIDNEY DISEASE, STAGE 4 (SEVERE): ICD-10-CM

## 2018-04-02 DIAGNOSIS — D46.1 REFRACTORY ANEMIA WITH RING SIDEROBLASTS: ICD-10-CM

## 2018-04-02 DIAGNOSIS — Z90.49 ACQUIRED ABSENCE OF OTHER SPECIFIED PARTS OF DIGESTIVE TRACT: Chronic | ICD-10-CM

## 2018-04-02 DIAGNOSIS — D63.1 ANEMIA IN CHRONIC KIDNEY DISEASE: ICD-10-CM

## 2018-04-04 ENCOUNTER — APPOINTMENT (OUTPATIENT)
Dept: HEMATOLOGY ONCOLOGY | Facility: CLINIC | Age: 78
End: 2018-04-04

## 2018-04-06 ENCOUNTER — RESULT REVIEW (OUTPATIENT)
Age: 78
End: 2018-04-06

## 2018-04-06 ENCOUNTER — APPOINTMENT (OUTPATIENT)
Dept: INFUSION THERAPY | Facility: HOSPITAL | Age: 78
End: 2018-04-06

## 2018-04-06 LAB
HCT VFR BLD CALC: 26.2 % — LOW (ref 34.5–45)
HGB BLD-MCNC: 8.3 G/DL — LOW (ref 11.5–15.5)
MCHC RBC-ENTMCNC: 24.2 PG — LOW (ref 27–34)
MCHC RBC-ENTMCNC: 31.7 G/DL — LOW (ref 32–36)
MCV RBC AUTO: 76.5 FL — LOW (ref 80–100)
PLATELET # BLD AUTO: 121 K/UL — LOW (ref 150–400)
RBC # BLD: 3.43 M/UL — LOW (ref 3.8–5.2)
RBC # FLD: 13.6 % — SIGNIFICANT CHANGE UP (ref 10.3–14.5)
WBC # BLD: 3.7 K/UL — LOW (ref 3.8–10.5)
WBC # FLD AUTO: 3.7 K/UL — LOW (ref 3.8–10.5)

## 2018-04-11 ENCOUNTER — APPOINTMENT (OUTPATIENT)
Dept: ORTHOPEDIC SURGERY | Facility: CLINIC | Age: 78
End: 2018-04-11

## 2018-04-18 ENCOUNTER — RESULT REVIEW (OUTPATIENT)
Age: 78
End: 2018-04-18

## 2018-04-18 ENCOUNTER — APPOINTMENT (OUTPATIENT)
Dept: HEMATOLOGY ONCOLOGY | Facility: CLINIC | Age: 78
End: 2018-04-18
Payer: MEDICARE

## 2018-04-18 ENCOUNTER — APPOINTMENT (OUTPATIENT)
Dept: INFUSION THERAPY | Facility: HOSPITAL | Age: 78
End: 2018-04-18

## 2018-04-18 VITALS
OXYGEN SATURATION: 99 % | HEART RATE: 90 BPM | RESPIRATION RATE: 16 BRPM | TEMPERATURE: 97.9 F | WEIGHT: 149.47 LBS | DIASTOLIC BLOOD PRESSURE: 70 MMHG | BODY MASS INDEX: 29.19 KG/M2 | SYSTOLIC BLOOD PRESSURE: 166 MMHG

## 2018-04-18 LAB
HCT VFR BLD CALC: 24.7 % — LOW (ref 34.5–45)
HGB BLD-MCNC: 7.9 G/DL — LOW (ref 11.5–15.5)
MCHC RBC-ENTMCNC: 24.3 PG — LOW (ref 27–34)
MCHC RBC-ENTMCNC: 32.1 G/DL — SIGNIFICANT CHANGE UP (ref 32–36)
MCV RBC AUTO: 75.6 FL — LOW (ref 80–100)
PLATELET # BLD AUTO: 113 K/UL — LOW (ref 150–400)
RBC # BLD: 3.26 M/UL — LOW (ref 3.8–5.2)
RBC # FLD: 13.8 % — SIGNIFICANT CHANGE UP (ref 10.3–14.5)
WBC # BLD: 3 K/UL — LOW (ref 3.8–10.5)
WBC # FLD AUTO: 3 K/UL — LOW (ref 3.8–10.5)

## 2018-04-18 PROCEDURE — 99214 OFFICE O/P EST MOD 30 MIN: CPT

## 2018-04-24 ENCOUNTER — APPOINTMENT (OUTPATIENT)
Dept: NEPHROLOGY | Facility: CLINIC | Age: 78
End: 2018-04-24
Payer: MEDICARE

## 2018-04-24 VITALS
BODY MASS INDEX: 29.45 KG/M2 | OXYGEN SATURATION: 98 % | SYSTOLIC BLOOD PRESSURE: 129 MMHG | DIASTOLIC BLOOD PRESSURE: 52 MMHG | WEIGHT: 150 LBS | HEIGHT: 60 IN | HEART RATE: 61 BPM

## 2018-04-24 PROCEDURE — 99214 OFFICE O/P EST MOD 30 MIN: CPT | Mod: 25,GC

## 2018-04-24 PROCEDURE — 90732 PPSV23 VACC 2 YRS+ SUBQ/IM: CPT | Mod: GC

## 2018-04-24 PROCEDURE — G0009: CPT | Mod: GC

## 2018-04-24 RX ORDER — CALCIUM CARBONATE 500 MG/1
500 TABLET, CHEWABLE ORAL
Qty: 100 | Refills: 0 | Status: DISCONTINUED | COMMUNITY
Start: 2017-08-14 | End: 2018-04-24

## 2018-04-24 RX ORDER — METHYLPRED ACET/NACL,ISO-OS/PF 40 MG/ML
40 VIAL (ML) INJECTION
Qty: 2 | Refills: 0 | Status: DISCONTINUED | COMMUNITY
Start: 2017-11-22 | End: 2018-04-24

## 2018-05-11 ENCOUNTER — OUTPATIENT (OUTPATIENT)
Dept: OUTPATIENT SERVICES | Facility: HOSPITAL | Age: 78
LOS: 1 days | Discharge: HOME | End: 2018-05-11

## 2018-05-11 DIAGNOSIS — Z95.0 PRESENCE OF CARDIAC PACEMAKER: Chronic | ICD-10-CM

## 2018-05-11 DIAGNOSIS — N18.4 CHRONIC KIDNEY DISEASE, STAGE 4 (SEVERE): ICD-10-CM

## 2018-05-11 DIAGNOSIS — Z90.49 ACQUIRED ABSENCE OF OTHER SPECIFIED PARTS OF DIGESTIVE TRACT: Chronic | ICD-10-CM

## 2018-05-11 DIAGNOSIS — D63.1 ANEMIA IN CHRONIC KIDNEY DISEASE: ICD-10-CM

## 2018-05-11 DIAGNOSIS — D46.1 REFRACTORY ANEMIA WITH RING SIDEROBLASTS: ICD-10-CM

## 2018-05-11 DIAGNOSIS — Z95.2 PRESENCE OF PROSTHETIC HEART VALVE: Chronic | ICD-10-CM

## 2018-05-11 DIAGNOSIS — I77.0 ARTERIOVENOUS FISTULA, ACQUIRED: Chronic | ICD-10-CM

## 2018-05-16 ENCOUNTER — APPOINTMENT (OUTPATIENT)
Dept: HEMATOLOGY ONCOLOGY | Facility: CLINIC | Age: 78
End: 2018-05-16
Payer: MEDICARE

## 2018-05-16 ENCOUNTER — RESULT REVIEW (OUTPATIENT)
Age: 78
End: 2018-05-16

## 2018-05-16 ENCOUNTER — APPOINTMENT (OUTPATIENT)
Dept: INFUSION THERAPY | Facility: HOSPITAL | Age: 78
End: 2018-05-16

## 2018-05-16 VITALS
BODY MASS INDEX: 29.36 KG/M2 | SYSTOLIC BLOOD PRESSURE: 113 MMHG | OXYGEN SATURATION: 96 % | WEIGHT: 150.33 LBS | RESPIRATION RATE: 16 BRPM | TEMPERATURE: 98.4 F | HEART RATE: 67 BPM | DIASTOLIC BLOOD PRESSURE: 61 MMHG

## 2018-05-16 LAB
BASOPHILS # BLD AUTO: 0 K/UL — SIGNIFICANT CHANGE UP (ref 0–0.2)
BASOPHILS NFR BLD AUTO: 0.1 % — SIGNIFICANT CHANGE UP (ref 0–2)
EOSINOPHIL # BLD AUTO: 0.1 K/UL — SIGNIFICANT CHANGE UP (ref 0–0.5)
EOSINOPHIL NFR BLD AUTO: 3.8 % — SIGNIFICANT CHANGE UP (ref 0–6)
HCT VFR BLD CALC: 25.2 % — LOW (ref 34.5–45)
HGB BLD-MCNC: 8.1 G/DL — LOW (ref 11.5–15.5)
LYMPHOCYTES # BLD AUTO: 0.9 K/UL — LOW (ref 1–3.3)
LYMPHOCYTES # BLD AUTO: 32.7 % — SIGNIFICANT CHANGE UP (ref 13–44)
MCHC RBC-ENTMCNC: 24.9 PG — LOW (ref 27–34)
MCHC RBC-ENTMCNC: 32.3 G/DL — SIGNIFICANT CHANGE UP (ref 32–36)
MCV RBC AUTO: 77.3 FL — LOW (ref 80–100)
MONOCYTES # BLD AUTO: 0.2 K/UL — SIGNIFICANT CHANGE UP (ref 0–0.9)
MONOCYTES NFR BLD AUTO: 7.6 % — SIGNIFICANT CHANGE UP (ref 2–14)
NEUTROPHILS # BLD AUTO: 1.5 K/UL — LOW (ref 1.8–7.4)
NEUTROPHILS NFR BLD AUTO: 55.8 % — SIGNIFICANT CHANGE UP (ref 43–77)
PLAT MORPH BLD: NORMAL — SIGNIFICANT CHANGE UP
PLATELET # BLD AUTO: 143 K/UL — LOW (ref 150–400)
RBC # BLD: 3.26 M/UL — LOW (ref 3.8–5.2)
RBC # FLD: 14.3 % — SIGNIFICANT CHANGE UP (ref 10.3–14.5)
RBC BLD AUTO: SIGNIFICANT CHANGE UP
WBC # BLD: 2.8 K/UL — LOW (ref 3.8–10.5)
WBC # FLD AUTO: 2.8 K/UL — LOW (ref 3.8–10.5)

## 2018-05-16 PROCEDURE — 99215 OFFICE O/P EST HI 40 MIN: CPT

## 2018-05-21 ENCOUNTER — RX RENEWAL (OUTPATIENT)
Age: 78
End: 2018-05-21

## 2018-06-11 ENCOUNTER — OUTPATIENT (OUTPATIENT)
Dept: OUTPATIENT SERVICES | Facility: HOSPITAL | Age: 78
LOS: 1 days | Discharge: HOME | End: 2018-06-11

## 2018-06-11 DIAGNOSIS — I77.0 ARTERIOVENOUS FISTULA, ACQUIRED: Chronic | ICD-10-CM

## 2018-06-11 DIAGNOSIS — Z95.0 PRESENCE OF CARDIAC PACEMAKER: Chronic | ICD-10-CM

## 2018-06-11 DIAGNOSIS — Z90.49 ACQUIRED ABSENCE OF OTHER SPECIFIED PARTS OF DIGESTIVE TRACT: Chronic | ICD-10-CM

## 2018-06-11 DIAGNOSIS — Z95.2 PRESENCE OF PROSTHETIC HEART VALVE: Chronic | ICD-10-CM

## 2018-06-11 DIAGNOSIS — D63.1 ANEMIA IN CHRONIC KIDNEY DISEASE: ICD-10-CM

## 2018-06-27 ENCOUNTER — APPOINTMENT (OUTPATIENT)
Dept: NEPHROLOGY | Facility: CLINIC | Age: 78
End: 2018-06-27
Payer: MEDICARE

## 2018-06-27 VITALS
BODY MASS INDEX: 29.45 KG/M2 | SYSTOLIC BLOOD PRESSURE: 161 MMHG | HEIGHT: 60 IN | DIASTOLIC BLOOD PRESSURE: 83 MMHG | OXYGEN SATURATION: 98 % | HEART RATE: 80 BPM | WEIGHT: 150 LBS

## 2018-06-27 PROCEDURE — 99214 OFFICE O/P EST MOD 30 MIN: CPT | Mod: GC

## 2018-06-29 ENCOUNTER — APPOINTMENT (OUTPATIENT)
Dept: INFUSION THERAPY | Facility: HOSPITAL | Age: 78
End: 2018-06-29

## 2018-06-29 ENCOUNTER — RESULT REVIEW (OUTPATIENT)
Age: 78
End: 2018-06-29

## 2018-06-29 ENCOUNTER — APPOINTMENT (OUTPATIENT)
Dept: HEMATOLOGY ONCOLOGY | Facility: CLINIC | Age: 78
End: 2018-06-29
Payer: MEDICARE

## 2018-06-29 VITALS
OXYGEN SATURATION: 95 % | DIASTOLIC BLOOD PRESSURE: 77 MMHG | RESPIRATION RATE: 16 BRPM | WEIGHT: 148.81 LBS | TEMPERATURE: 99 F | HEART RATE: 80 BPM | BODY MASS INDEX: 29.06 KG/M2 | SYSTOLIC BLOOD PRESSURE: 174 MMHG

## 2018-06-29 LAB
ANISOCYTOSIS BLD QL: SLIGHT — SIGNIFICANT CHANGE UP
ELLIPTOCYTES BLD QL SMEAR: SLIGHT — SIGNIFICANT CHANGE UP
EOSINOPHIL NFR BLD AUTO: 6 % — SIGNIFICANT CHANGE UP (ref 0–6)
HCT VFR BLD CALC: 27.1 % — LOW (ref 34.5–45)
HGB BLD-MCNC: 8.6 G/DL — LOW (ref 11.5–15.5)
LYMPHOCYTES # BLD AUTO: 1.1 K/UL — SIGNIFICANT CHANGE UP (ref 1–3.3)
LYMPHOCYTES # BLD AUTO: 31 % — SIGNIFICANT CHANGE UP (ref 13–44)
MCHC RBC-ENTMCNC: 24 PG — LOW (ref 27–34)
MCHC RBC-ENTMCNC: 31.7 G/DL — LOW (ref 32–36)
MCV RBC AUTO: 75.6 FL — LOW (ref 80–100)
MONOCYTES # BLD AUTO: 0.2 K/UL — SIGNIFICANT CHANGE UP (ref 0–0.9)
MONOCYTES NFR BLD AUTO: 2 % — SIGNIFICANT CHANGE UP (ref 2–14)
NEUTROPHILS # BLD AUTO: 1.8 K/UL — SIGNIFICANT CHANGE UP (ref 1.8–7.4)
NEUTROPHILS NFR BLD AUTO: 60 % — SIGNIFICANT CHANGE UP (ref 43–77)
NEUTS BAND # BLD: 1 % — SIGNIFICANT CHANGE UP (ref 0–8)
PLAT MORPH BLD: NORMAL — SIGNIFICANT CHANGE UP
PLATELET # BLD AUTO: 121 K/UL — LOW (ref 150–400)
POIKILOCYTOSIS BLD QL AUTO: SLIGHT — SIGNIFICANT CHANGE UP
RBC # BLD: 3.59 M/UL — LOW (ref 3.8–5.2)
RBC # FLD: 13.3 % — SIGNIFICANT CHANGE UP (ref 10.3–14.5)
RBC BLD AUTO: ABNORMAL
SCHISTOCYTES BLD QL AUTO: SLIGHT — SIGNIFICANT CHANGE UP
WBC # BLD: 3.1 K/UL — LOW (ref 3.8–10.5)
WBC # FLD AUTO: 3.1 K/UL — LOW (ref 3.8–10.5)

## 2018-06-29 PROCEDURE — 99214 OFFICE O/P EST MOD 30 MIN: CPT

## 2018-07-02 DIAGNOSIS — D46.1 REFRACTORY ANEMIA WITH RING SIDEROBLASTS: ICD-10-CM

## 2018-07-02 DIAGNOSIS — D63.8 ANEMIA IN OTHER CHRONIC DISEASES CLASSIFIED ELSEWHERE: ICD-10-CM

## 2018-07-02 DIAGNOSIS — N18.4 CHRONIC KIDNEY DISEASE, STAGE 4 (SEVERE): ICD-10-CM

## 2018-07-20 PROBLEM — I35.0 NONRHEUMATIC AORTIC (VALVE) STENOSIS: Chronic | Status: ACTIVE | Noted: 2017-03-27

## 2018-07-20 PROBLEM — E11.9 TYPE 2 DIABETES MELLITUS WITHOUT COMPLICATIONS: Chronic | Status: ACTIVE | Noted: 2017-03-27

## 2018-07-20 PROBLEM — E04.1 NONTOXIC SINGLE THYROID NODULE: Chronic | Status: ACTIVE | Noted: 2017-03-28

## 2018-07-20 PROBLEM — N18.4 CHRONIC KIDNEY DISEASE, STAGE 4 (SEVERE): Chronic | Status: ACTIVE | Noted: 2017-03-27

## 2018-07-20 PROBLEM — M19.90 UNSPECIFIED OSTEOARTHRITIS, UNSPECIFIED SITE: Chronic | Status: ACTIVE | Noted: 2017-03-27

## 2018-08-01 ENCOUNTER — OUTPATIENT (OUTPATIENT)
Dept: OUTPATIENT SERVICES | Facility: HOSPITAL | Age: 78
LOS: 1 days | Discharge: HOME | End: 2018-08-01

## 2018-08-01 DIAGNOSIS — Z95.0 PRESENCE OF CARDIAC PACEMAKER: Chronic | ICD-10-CM

## 2018-08-01 DIAGNOSIS — I77.0 ARTERIOVENOUS FISTULA, ACQUIRED: Chronic | ICD-10-CM

## 2018-08-01 DIAGNOSIS — Z95.2 PRESENCE OF PROSTHETIC HEART VALVE: Chronic | ICD-10-CM

## 2018-08-01 DIAGNOSIS — Z90.49 ACQUIRED ABSENCE OF OTHER SPECIFIED PARTS OF DIGESTIVE TRACT: Chronic | ICD-10-CM

## 2018-08-01 DIAGNOSIS — D63.1 ANEMIA IN CHRONIC KIDNEY DISEASE: ICD-10-CM

## 2018-08-02 ENCOUNTER — OUTPATIENT (OUTPATIENT)
Dept: OUTPATIENT SERVICES | Facility: HOSPITAL | Age: 78
LOS: 1 days | End: 2018-08-02
Payer: MEDICARE

## 2018-08-02 DIAGNOSIS — I77.0 ARTERIOVENOUS FISTULA, ACQUIRED: Chronic | ICD-10-CM

## 2018-08-02 DIAGNOSIS — M17.0 BILATERAL PRIMARY OSTEOARTHRITIS OF KNEE: ICD-10-CM

## 2018-08-02 DIAGNOSIS — Z90.49 ACQUIRED ABSENCE OF OTHER SPECIFIED PARTS OF DIGESTIVE TRACT: Chronic | ICD-10-CM

## 2018-08-02 DIAGNOSIS — Z51.89 ENCOUNTER FOR OTHER SPECIFIED AFTERCARE: ICD-10-CM

## 2018-08-02 DIAGNOSIS — Z95.0 PRESENCE OF CARDIAC PACEMAKER: Chronic | ICD-10-CM

## 2018-08-02 DIAGNOSIS — Z95.2 PRESENCE OF PROSTHETIC HEART VALVE: Chronic | ICD-10-CM

## 2018-08-04 DIAGNOSIS — M17.0 BILATERAL PRIMARY OSTEOARTHRITIS OF KNEE: ICD-10-CM

## 2018-08-06 ENCOUNTER — EMERGENCY (EMERGENCY)
Facility: HOSPITAL | Age: 78
LOS: 1 days | Discharge: HOME | End: 2018-08-06
Attending: EMERGENCY MEDICINE
Payer: MEDICARE

## 2018-08-06 VITALS
WEIGHT: 151.02 LBS | OXYGEN SATURATION: 100 % | TEMPERATURE: 99 F | SYSTOLIC BLOOD PRESSURE: 189 MMHG | DIASTOLIC BLOOD PRESSURE: 96 MMHG | RESPIRATION RATE: 18 BRPM | HEART RATE: 98 BPM | HEIGHT: 61 IN

## 2018-08-06 DIAGNOSIS — Z90.49 ACQUIRED ABSENCE OF OTHER SPECIFIED PARTS OF DIGESTIVE TRACT: Chronic | ICD-10-CM

## 2018-08-06 DIAGNOSIS — Z95.0 PRESENCE OF CARDIAC PACEMAKER: Chronic | ICD-10-CM

## 2018-08-06 DIAGNOSIS — Z95.2 PRESENCE OF PROSTHETIC HEART VALVE: Chronic | ICD-10-CM

## 2018-08-06 DIAGNOSIS — I77.0 ARTERIOVENOUS FISTULA, ACQUIRED: Chronic | ICD-10-CM

## 2018-08-06 LAB — GAS PNL BLDV: SIGNIFICANT CHANGE UP

## 2018-08-06 PROCEDURE — 85014 HEMATOCRIT: CPT

## 2018-08-06 PROCEDURE — 82947 ASSAY GLUCOSE BLOOD QUANT: CPT

## 2018-08-06 PROCEDURE — 84295 ASSAY OF SERUM SODIUM: CPT

## 2018-08-06 PROCEDURE — 83605 ASSAY OF LACTIC ACID: CPT

## 2018-08-06 PROCEDURE — 82435 ASSAY OF BLOOD CHLORIDE: CPT

## 2018-08-06 PROCEDURE — 82330 ASSAY OF CALCIUM: CPT

## 2018-08-06 PROCEDURE — 87086 URINE CULTURE/COLONY COUNT: CPT

## 2018-08-06 PROCEDURE — 82803 BLOOD GASES ANY COMBINATION: CPT

## 2018-08-06 PROCEDURE — 74176 CT ABD & PELVIS W/O CONTRAST: CPT | Mod: 26

## 2018-08-06 PROCEDURE — 99284 EMERGENCY DEPT VISIT MOD MDM: CPT | Mod: GC

## 2018-08-06 PROCEDURE — 85027 COMPLETE CBC AUTOMATED: CPT

## 2018-08-06 PROCEDURE — 84132 ASSAY OF SERUM POTASSIUM: CPT

## 2018-08-06 PROCEDURE — 96375 TX/PRO/DX INJ NEW DRUG ADDON: CPT

## 2018-08-06 PROCEDURE — 82565 ASSAY OF CREATININE: CPT

## 2018-08-06 PROCEDURE — 96374 THER/PROPH/DIAG INJ IV PUSH: CPT

## 2018-08-06 PROCEDURE — 99284 EMERGENCY DEPT VISIT MOD MDM: CPT | Mod: 25

## 2018-08-06 PROCEDURE — 81001 URINALYSIS AUTO W/SCOPE: CPT

## 2018-08-06 PROCEDURE — 74176 CT ABD & PELVIS W/O CONTRAST: CPT

## 2018-08-06 PROCEDURE — 80053 COMPREHEN METABOLIC PANEL: CPT

## 2018-08-06 RX ORDER — FAMOTIDINE 10 MG/ML
20 INJECTION INTRAVENOUS ONCE
Qty: 0 | Refills: 0 | Status: COMPLETED | OUTPATIENT
Start: 2018-08-06 | End: 2018-08-06

## 2018-08-06 RX ORDER — ACETAMINOPHEN 500 MG
975 TABLET ORAL ONCE
Qty: 0 | Refills: 0 | Status: COMPLETED | OUTPATIENT
Start: 2018-08-06 | End: 2018-08-06

## 2018-08-06 RX ORDER — ONDANSETRON 8 MG/1
4 TABLET, FILM COATED ORAL ONCE
Qty: 0 | Refills: 0 | Status: COMPLETED | OUTPATIENT
Start: 2018-08-06 | End: 2018-08-06

## 2018-08-06 RX ORDER — SODIUM CHLORIDE 9 MG/ML
500 INJECTION INTRAMUSCULAR; INTRAVENOUS; SUBCUTANEOUS ONCE
Qty: 0 | Refills: 0 | Status: COMPLETED | OUTPATIENT
Start: 2018-08-06 | End: 2018-08-06

## 2018-08-06 RX ADMIN — FAMOTIDINE 20 MILLIGRAM(S): 10 INJECTION INTRAVENOUS at 23:18

## 2018-08-06 RX ADMIN — SODIUM CHLORIDE 666.67 MILLILITER(S): 9 INJECTION INTRAMUSCULAR; INTRAVENOUS; SUBCUTANEOUS at 23:18

## 2018-08-06 RX ADMIN — Medication 975 MILLIGRAM(S): at 23:18

## 2018-08-06 RX ADMIN — ONDANSETRON 4 MILLIGRAM(S): 8 TABLET, FILM COATED ORAL at 23:18

## 2018-08-06 NOTE — ED PROVIDER NOTE - PROGRESS NOTE DETAILS
Feeling better, tolerating PO, took home meds. Labs wnl, will send with rx for zofran and have reassess -SM

## 2018-08-06 NOTE — ED ADULT NURSE NOTE - NSIMPLEMENTINTERV_GEN_ALL_ED
Implemented All Universal Safety Interventions:  [place] to call system. Call bell, personal items and telephone within reach. Instruct patient to call for assistance. Room bathroom lighting operational. Non-slip footwear when patient is off stretcher. Physically safe environment: no spills, clutter or unnecessary equipment. Stretcher in lowest position, wheels locked, appropriate side rails in place.

## 2018-08-06 NOTE — ED ADULT TRIAGE NOTE - CHIEF COMPLAINT QUOTE
diarrhea began yesterday, vomiting began today, abdominal pain, not able to tolerate PO including HTN meds  h/o CKD, fistula to left arm but pt is not on dialysis

## 2018-08-06 NOTE — ED ADULT NURSE NOTE - CAS EDN DISCHARGE ASSESSMENT
Alert and oriented to person, place and time/discharged by MD Radha Joyner, RN not present at discharge

## 2018-08-06 NOTE — ED ADULT NURSE NOTE - OBJECTIVE STATEMENT
79 yo f reporting to ED for abdominal pain. PMH of HTN, CKD (fistula in left arm, not on dialysis), Triple Bypass (2007), Valve replacement and pacemaker (march 2017). Pt has had 5/10 abdominal pain for 1 day. Pt has had nausea, vomiting (3 episodes of water), & diarrhea (4 episodes). Pt reports she called her kidney doctor whom told her to report to ED. A&Ox4. Respirations spontaneous, non-labored. Abdomen soft, non-distended, tender in the periumbilical region. Capillary refill <2seconds, bilateral pedal pulses, skin warm & dry. Pt denies chest pain, SOB, dizziness, blood in vomit, blood in stool, headache, fevers, chills, & weakness. 18 gauge established in the RAC. Will continue to reassess.

## 2018-08-06 NOTE — ED ADULT NURSE NOTE - CHPI ED NUR SYMPTOMS NEG
07/13/21        Adelaida Stock  [address] [facility]      Dear Rey Tanner records indicate that you have outstanding lab work and or testing that was ordered for you and has not yet been completed:  Orders Placed This Encounter      Glu no dysuria/no fever/no blood in stool/no burning urination/no chills/no hematuria/no abdominal distension

## 2018-08-06 NOTE — ED PROVIDER NOTE - OBJECTIVE STATEMENT
Patient is 78 y F with PMH CAD with CABG and stent with pacemaker, ckd with av fistula, anemia on arinest, gout, DM on oral meds, aortic stenosis s/p TAVR, htn presenting with diarrhea since yesterday, vomiting today, not tolerating po. Has crampy periumbilical pain that is better after diarrhea, 5/10 at worst. Tried popsicles, and soup, did not tolerate.   +LUE fistula but not on HD  No recent hospitalization or abx use  EMR lists cholecystectomy, patient denies     PMD: Hedy Bishop  Nephro: Destiny  Cards: Margaux  ROS: Denies fever, palpitations, chills, recent sickness, HA, vision changes, cough, SOB, chest pain, dysuria, hematuria, rash, new joint aches, sick contacts, and recent travel.

## 2018-08-06 NOTE — ED PROVIDER NOTE - MEDICAL DECISION MAKING DETAILS
MICHAEL Joyner MD: Patient is 79 y/o female with PMH CAD s/p CABG, and stent, s/p pacemaker, ckd with av fistula (not on HD), anemia on arinest, gout, DM on oral meds, aortic stenosis s/p TAVR, htn who p/w diarrhea since yesterday, vomiting today, not tolerating po. Has crampy periumbilical pain that is better after diarrhea, 5/10 at worst. Tried popsicles, and soup, did not tolerate. DDx: gastroenteritis, viral syndrome, appy, diverticulitis. Plan: basic labs, IVF, antiemetics, pain control, CTAP, re-assess

## 2018-08-07 VITALS
RESPIRATION RATE: 16 BRPM | DIASTOLIC BLOOD PRESSURE: 95 MMHG | HEART RATE: 91 BPM | SYSTOLIC BLOOD PRESSURE: 200 MMHG | OXYGEN SATURATION: 98 % | TEMPERATURE: 98 F

## 2018-08-07 LAB
ALBUMIN SERPL ELPH-MCNC: 4.4 G/DL — SIGNIFICANT CHANGE UP (ref 3.3–5)
ALP SERPL-CCNC: 73 U/L — SIGNIFICANT CHANGE UP (ref 40–120)
ALT FLD-CCNC: 7 U/L — LOW (ref 10–45)
ANION GAP SERPL CALC-SCNC: 14 MMOL/L — SIGNIFICANT CHANGE UP (ref 5–17)
APPEARANCE UR: CLEAR — SIGNIFICANT CHANGE UP
AST SERPL-CCNC: 15 U/L — SIGNIFICANT CHANGE UP (ref 10–40)
BACTERIA # UR AUTO: ABNORMAL /HPF
BASOPHILS # BLD AUTO: 0 K/UL — SIGNIFICANT CHANGE UP (ref 0–0.2)
BILIRUB SERPL-MCNC: 0.2 MG/DL — SIGNIFICANT CHANGE UP (ref 0.2–1.2)
BILIRUB UR-MCNC: NEGATIVE — SIGNIFICANT CHANGE UP
BUN SERPL-MCNC: 50 MG/DL — HIGH (ref 7–23)
CALCIUM SERPL-MCNC: 10.5 MG/DL — SIGNIFICANT CHANGE UP (ref 8.4–10.5)
CHLORIDE SERPL-SCNC: 104 MMOL/L — SIGNIFICANT CHANGE UP (ref 96–108)
CO2 SERPL-SCNC: 22 MMOL/L — SIGNIFICANT CHANGE UP (ref 22–31)
COLOR SPEC: YELLOW — SIGNIFICANT CHANGE UP
COMMENT - URINE: SIGNIFICANT CHANGE UP
CREAT SERPL-MCNC: 4.12 MG/DL — HIGH (ref 0.5–1.3)
CULTURE RESULTS: NO GROWTH — SIGNIFICANT CHANGE UP
DIFF PNL FLD: NEGATIVE — SIGNIFICANT CHANGE UP
EOSINOPHIL # BLD AUTO: 0.1 K/UL — SIGNIFICANT CHANGE UP (ref 0–0.5)
EOSINOPHIL NFR BLD AUTO: 1 % — SIGNIFICANT CHANGE UP (ref 0–6)
EPI CELLS # UR: SIGNIFICANT CHANGE UP /HPF
GLUCOSE SERPL-MCNC: 138 MG/DL — HIGH (ref 70–99)
GLUCOSE UR QL: 50 MG/DL
HCT VFR BLD CALC: 33 % — LOW (ref 34.5–45)
HGB BLD-MCNC: 9.9 G/DL — LOW (ref 11.5–15.5)
HYALINE CASTS # UR AUTO: ABNORMAL
KETONES UR-MCNC: NEGATIVE — SIGNIFICANT CHANGE UP
LEUKOCYTE ESTERASE UR-ACNC: ABNORMAL
LYMPHOCYTES # BLD AUTO: 0.8 K/UL — LOW (ref 1–3.3)
LYMPHOCYTES # BLD AUTO: 26 % — SIGNIFICANT CHANGE UP (ref 13–44)
MCHC RBC-ENTMCNC: 22.9 PG — LOW (ref 27–34)
MCHC RBC-ENTMCNC: 30 GM/DL — LOW (ref 32–36)
MCV RBC AUTO: 76.5 FL — LOW (ref 80–100)
MONOCYTES # BLD AUTO: 0.2 K/UL — SIGNIFICANT CHANGE UP (ref 0–0.9)
MONOCYTES NFR BLD AUTO: 6 % — SIGNIFICANT CHANGE UP (ref 2–14)
NEUTROPHILS # BLD AUTO: 2.5 K/UL — SIGNIFICANT CHANGE UP (ref 1.8–7.4)
NEUTROPHILS NFR BLD AUTO: 65 % — SIGNIFICANT CHANGE UP (ref 43–77)
NITRITE UR-MCNC: NEGATIVE — SIGNIFICANT CHANGE UP
PH UR: 7 — SIGNIFICANT CHANGE UP (ref 5–8)
PLATELET # BLD AUTO: 140 K/UL — LOW (ref 150–400)
POTASSIUM SERPL-MCNC: 4.5 MMOL/L — SIGNIFICANT CHANGE UP (ref 3.5–5.3)
POTASSIUM SERPL-SCNC: 4.5 MMOL/L — SIGNIFICANT CHANGE UP (ref 3.5–5.3)
PROT SERPL-MCNC: 7.7 G/DL — SIGNIFICANT CHANGE UP (ref 6–8.3)
PROT UR-MCNC: 500 MG/DL
RBC # BLD: 4.31 M/UL — SIGNIFICANT CHANGE UP (ref 3.8–5.2)
RBC # FLD: 13.9 % — SIGNIFICANT CHANGE UP (ref 10.3–14.5)
RBC CASTS # UR COMP ASSIST: SIGNIFICANT CHANGE UP /HPF (ref 0–2)
SODIUM SERPL-SCNC: 140 MMOL/L — SIGNIFICANT CHANGE UP (ref 135–145)
SP GR SPEC: 1.01 — SIGNIFICANT CHANGE UP (ref 1.01–1.02)
SPECIMEN SOURCE: SIGNIFICANT CHANGE UP
UROBILINOGEN FLD QL: NEGATIVE — SIGNIFICANT CHANGE UP
WBC # BLD: 3.6 K/UL — LOW (ref 3.8–10.5)
WBC # FLD AUTO: 3.6 K/UL — LOW (ref 3.8–10.5)
WBC UR QL: SIGNIFICANT CHANGE UP /HPF (ref 0–5)

## 2018-08-07 RX ORDER — ONDANSETRON 8 MG/1
1 TABLET, FILM COATED ORAL
Qty: 18 | Refills: 0 | OUTPATIENT
Start: 2018-08-07 | End: 2018-08-09

## 2018-08-07 RX ADMIN — Medication 975 MILLIGRAM(S): at 04:13

## 2018-08-07 NOTE — ED ADULT NURSE REASSESSMENT NOTE - NS ED NURSE REASSESS COMMENT FT1
02:45. A&Ox3. PERRL. No facial droop noted. No slurred speech. Pt upper and lower extremities bilateral in strength. Pt denies headache, dizziness, blurred vision, weakness, numbness/tingling, chest pain, SOB. n/v, abdominal pain, fevers, & chills. Pt tolerating PO intake at this time.

## 2018-08-07 NOTE — ED ADULT NURSE REASSESSMENT NOTE - NS ED NURSE REASSESS COMMENT FT1
02:16. MD Joyner aware of elevated blood pressure. As per MD Joyner, pt to take home blood pressure medications.

## 2018-08-10 ENCOUNTER — APPOINTMENT (OUTPATIENT)
Dept: HEMATOLOGY ONCOLOGY | Facility: CLINIC | Age: 78
End: 2018-08-10
Payer: MEDICARE

## 2018-08-10 ENCOUNTER — RESULT REVIEW (OUTPATIENT)
Age: 78
End: 2018-08-10

## 2018-08-10 ENCOUNTER — APPOINTMENT (OUTPATIENT)
Dept: INFUSION THERAPY | Facility: HOSPITAL | Age: 78
End: 2018-08-10

## 2018-08-10 VITALS
TEMPERATURE: 98.9 F | WEIGHT: 149.91 LBS | RESPIRATION RATE: 16 BRPM | DIASTOLIC BLOOD PRESSURE: 72 MMHG | HEART RATE: 83 BPM | OXYGEN SATURATION: 100 % | SYSTOLIC BLOOD PRESSURE: 136 MMHG | BODY MASS INDEX: 29.28 KG/M2

## 2018-08-10 LAB
HCT VFR BLD CALC: 26.2 % — LOW (ref 34.5–45)
HGB BLD-MCNC: 8.1 G/DL — LOW (ref 11.5–15.5)
MCHC RBC-ENTMCNC: 23.5 PG — LOW (ref 27–34)
MCHC RBC-ENTMCNC: 31 G/DL — LOW (ref 32–36)
MCV RBC AUTO: 75.9 FL — LOW (ref 80–100)
PLATELET # BLD AUTO: 113 K/UL — LOW (ref 150–400)
RBC # BLD: 3.45 M/UL — LOW (ref 3.8–5.2)
RBC # FLD: 13.4 % — SIGNIFICANT CHANGE UP (ref 10.3–14.5)
WBC # BLD: 3.3 K/UL — LOW (ref 3.8–10.5)
WBC # FLD AUTO: 3.3 K/UL — LOW (ref 3.8–10.5)

## 2018-08-10 PROCEDURE — 99214 OFFICE O/P EST MOD 30 MIN: CPT

## 2018-08-11 NOTE — PATIENT PROFILE ADULT. - PRO PAIN EXPRESSION
Guillermo Sandovali 18 1555:
General Information and HPI
MD Statement:
I have seen and personally examined ASAF BASS and documented this H&P.
 
The patient is a 73 year old M who presented with a patient stated chief 
complaint of swelling and pain at the back of the neck
 
Source of Information: patient
History of Present Illness:
73-year-old male with PMH of hypertension, hyperlipidemia, CHF, asthma, GERD, 
chronic back pain, diabetes presents to the ED with chief complaint of red 
painful area on the back of his neck.  The patient noticed a "cyst with white 
top" the back of his neck last weekend.  He said that it changed into multiple 
cysts over the next 4 days.  On Monday, he saw his dermatologist who prescribed 
him Bactrim.  He states that he did not get better.  He states that he developed
fevers and chills with the back of his neck getting warmer and more tense, which
brought him to the ED.  He denies chest pain, palpitations, nausea, vomiting, 
diarrhea.  The patient is on high-dose opioids for chronic back pain.  He says 
he remains fairly constipated on account of that.
 
Allergies/Medications
Allergies:
Coded Allergies:
Penicillins (Intermediate, COUGH AND CONGESTION 18)
codeine (Intermediate, RASH 18)
 
Home Med list
Amlodipine (Norvasc 5MG Tab) 5 MG TABLET   0.5 TAB PO DAILY HTN  (Reported)
Aspirin 81 MG TAB.CHEW   1 TAB PO DAILY HEART HEALTH  (Reported)
Atorvastatin Calcium (Lipitor) 10 MG TABLET   1 TAB PO DAILY CHOLESTEROL  (
Reported)
Diazepam 5 MG TABLET   2 TAB PO DAILY CHRONIC PAIN  (Reported)
Fenofibric Acid (Trilipix 135 MG CAP) 135 MG CAPSULE.DR   145 MG PO DAILY 
CHOLESTEROL  (Reported)
Furosemide (Lasix) 40 MG TABLET   40 MG PO Q12 HEART FAILURE
Glyburide 2.5 MG TABLET   2 TAB PO DAILY DAIBETES  (Reported)
LOSARTAN/HYDROCHLOROTHIAZIDE (Hyzaar 100-25 Tablet) 1 EACH TABLET   1 TAB PO 
DAILY HTN  (Reported)
Magnesium Oxide 400 MG TABLET   1 TAB PO DAILY SUPPLEMENT  (Reported)
METFORMIN HCL (Metformin) 1,000 MG TABLET   1 TAB PO BID DIABETES  (Reported)
Metoprolol Tartrate (Lopressor) 50 MG TAB   50 MG PO QAM HTN  (Reported)
Metoprolol Tartrate (Lopressor) 25 MG TABLET   25 MG PO 1700 HEART  (Reported)
Mometasone Furoate (Nasonex) 0.05 MG/Actuation SPR   2 PUF LISA DAILY ALLERGY  (
Reported)
Niacin 500 MG TAB   1,000 MG PO DAILY SUPPLEMENT  (Reported)
Omega-3-Acid Ethyl Esters (Lovaza) 1 GRAM CAPSULE   2 CAP PO BID SUPPLEMENT  (
Reported)
Omeprazole 20 MG CAPSULE.DR   1 TAB PO DAILY HEARTBURN  (Reported)
Oxycodone Cr (OxyContin) 20 MG TAB.ER.12H   1 TAB PO BID PAIN  (Reported)
OXYCODONE HCL (Oxycodone Hydrochloride) 10 MG TABLET   1 TAB PO BID PRN PAIN  (
Reported)
SENNOSIDES (Senokot) 8.6 MG TABLET   2 TAB PO QHS CONSTIPATION  (Reported)
Sildenafil Citrate (Viagra) 100 MG TAB   100 MG PO PRN AS NEEDED  (Reported)
 
 
Past History
 
Travel History
Traveled to [place] past 21 day No
 
Medical History
Cardiovascular: CHF, hypertension, hyperlipidemia
Respiratory: asthma
Gastrointestinal: GERD
Musculoskeletal: chronic back pain
Endocrine: diabetes
History of MRSA: No
History of VRE: No
History of CDIFF: No
Pneumonia Vaccine: 10/01/13
Influenza Vaccine: 06
Tetanus Vaccine: 13
 
Surgical History
Surgical History: non-contributory
 
Past Family/Social History
 
Family History
Relations & Conditions if any
MOTHER (Stroke). , Age 60+.
BROTHER (HTN).
FATHER (Heart Issues).
 
 
Psychosocial History
ETOH Use: denies use
Illicit Drug Use: denies illicit drug use
 
Review of Systems
 
Review of Systems
Constitutional:
Reports: chills, fever.  Denies: malaise, weakness. 
Cardiovascular:
Reports: peripheral edema.  Denies: chest pain, orthopena, palpitations. 
Respiratory:
Reports: no symptoms. 
GI:
Reports: constipation.  Denies: diarrhea, distention, bowel incontinence, melena
, nausea, vomiting. 
Musculoskeletal:
Reports: back pain. 
Skin:
Reports: rash. 
 
Exam & Diagnostic Data
Last 24 Hrs of Vital Signs/I&O
 Vital Signs
 
 
Date Time Temp Pulse Resp B/P B/P Pulse O2 O2 Flow FiO2
 
     Mean Ox Delivery Rate 
 
 1639 99.3 96 18 1476/67  96 Room Air  
 
 1512       Room Air  
 
 1445 100.8 91 18 140/63  93 Room Air  
 
 1213 99.1 96 20 135/68  96 Room Air  
 
 
 Intake & Output
 
 
  1600  0800  0000
 
Intake Total 0  
 
Output Total 0  
 
Balance 0  
 
    
 
Intake, Oral 0  
 
Output, Urine 0  
 
Patient 255 lb  
 
Weight   
 
Weight Reported by Patient  
 
Measurement   
 
Method   
 
 
 
 
Physical Exam
General Appearance Alert, Oriented X3, Cooperative, No Acute Distress
Skin 10x8 cm of erythematoous area on the back of the neck, tense but fluctuant 
with a ruptured cyst in the middle
Neck Supple
Cardiovascular Regular Rate, Normal S1, Normal S2, murmur in the aortic area
Lungs Clear to Auscultation, Normal Air Movement
Abdomen Normal Bowel Sounds, Soft, No Tenderness
 
Assessment/Plan
Assessment:
73-year-old gentleman with past medical history of hypertension, anemia, CHF, 
asthma, GERD, chronic back pain, diabetes presents to the ED with posterior neck
cellulitis.  On examination he is a 10x 8 cm area of erythema, swelling at the 
back of his neck.  It is fluctuant and had a ruptured cyst in the center.  He 
has a number of ruptured furuncles on his head.
The patient is on high-dose opiate treatment at baseline for his chronic back 
pain
 
.
Vitals in the ED her fever: 100.8, pulse: 91, R: 18, BP: 140/63, pulse ox: 93% 
at room air.
 
Lab findings are WBC: 22.2, Na: 133, K: 4.2, BUN: 21, creatinine: 1, CRP: 9
 
Plan:
1.We will admit the patient general floor.
2.We will start the patient on Unasyn 3 g every 6.
3.It also given 1 dose of vancomycin to cover for possible MRSA infection
4.We would start the patient on his home meds in including the opioids as the 
patient is not opioid maria dolores and has been taking them for his chronic back pain 
for a long time.
5.We will monitor the patient for the development of chills, fever, worsening 
cellulitis.  We will trend his CBC.
6.ID consult with the patient does not improve
7.Accu-Cheks every 4 hours, ISS
8.We will continue his home meds for hypertension, hyperlipidemia, asthma
 
CODE STATUS: Full code
DVT prophylaxis
 
 
 
As Ranked By This Provider
Problem List:
 1. Leukocytosis
 
 2. Cellulitis
 
 
Core Measures/Misc ()
 
Acute Coronary Syndrome
ACS Diagnosis: No
 
Congestive Heart Failure
Congestive Heart Failure Diagnosis No
 
Cerebrovascular Accident
CVA/TIA Diagnosis: No
 
VTE (View Protocol)
VTE Risk Factors Age>40
No Mechanical VTE Prophylaxis d/t N/A MechProphylax Ordered
No VTE Pharm Prophylaxis d/t NA PharmProphylax ordered
 
Sepsis (View protocol)
Sepsis Present: Yes
If YES complete Sepsis Event Note If YES complete Sepsis Event Note
 
 
Corky QUINTANA,Lucius 18 1829:
Core Measures/Misc ()
 
Sepsis (View protocol)
If YES complete Sepsis Event Note If YES complete Sepsis Event Note
 
Resident Review Statement
Resident Statement: examined this patient, discussed with intern, amended to 
note
Other Findings:
A 73-year-old gentleman with past medical history significant for  Type 2 
diabetes, hypertension hyperlipidemia, CHF, presents with posterior neck 
cellulitis in the setting of leukocytosis despite a significant treatment with 
Bactrim from PCP.
 
Impression
* Sepsis as evident by Leukocytosis and Tmax of 100.8, and HR of 96,  in the 
setting of cellulitis
* Neck Cellulitis, failed outpatient antibiotics of 6 days of antibiotics as 
evident by increased erythema,pain,swelling, leukocytosis and elevated CRP.  The
area appears to have some fluctuating area which could be a concern for a 
carbuncle, given the area is located on the neck there is concern for spread to 
pharyngeal/sinus and meningeal areas. 
* Radiological finding suggestive of evolving PNA, however pt has no cough or 
shortness of breath.
* Hyponatremia. Mild
* Hx of chronic diseases:CHF,diabetes,HLD,HTN
 
Plan
* Admit to gen med
* Carb diet
* Start Vancomycin for now at 1.5 mg q12h
* Unasyn 3.5mg q 6h (to provide anerobe coverage)
* Trend CBC
* f/u blood cultures
* s/p 3.4 L NS ivf per sepsis protocol, he has hx of chf on lasix with current 
EF not known, given that lactic is not elevated, will hold off on further IVF 
and encourage increased oral fluids
* Accu check
* Novolog SSC
* Hold off oral hypoglycemic meds (GLP-1 agonist inj, SGLT-2 inhibitor, and 
biguanide)
* Continue pain meds (pt has oxycontin and oxycodone that he takes for his 
chronic back pain)
* Continue cardiac meds
* code status: Full
* DVT PPX: Enoxaparin
 
 
Loretta QUINTANA,Guillermo 186:
Core Measures/Misc ()
 
Sepsis (View protocol)
If YES complete Sepsis Event Note If YES complete Sepsis Event Note
 
Attending MD Review Statement
 
Attending Statement
Attending MD Statement: examined this patient, discuss w/resident/PA/NP, agreed 
w/resident/PA/NP, reviewed EMR data (avail)
Attending Assessment/Plan:
73M PMH Type 2 diabetes, hypertension hyperlipidemia, CHF presenting with 
posterior neck cellulitis.  Seen by dermatologist as an outpatient, prescribed 
Bactrim.  Reports posterior neck pain and warmth with fevers, chills, 
diaphoresis, and malaise.  Symptoms have not improved on Bactrim.  Febrile 100.8
here, stable BP/HR, WBC 22.  On exam, neck is erythematous, warm, without 
fluctuance or discharge.
 
1. Neck cellulitis
2. Failure of outpatient treatment
 
Plan
- Admit to general medicine
- Given severity of cellulitis to neck, will start Vancomycin and Unasyn 
- Blood cultures
- Warm compresses to neck
- Recheck CBC tomorrow
- Continue home medications
- SLiding scale insulin
- DVT PPx verbalization

## 2018-08-13 DIAGNOSIS — D63.8 ANEMIA IN OTHER CHRONIC DISEASES CLASSIFIED ELSEWHERE: ICD-10-CM

## 2018-08-13 DIAGNOSIS — N18.4 CHRONIC KIDNEY DISEASE, STAGE 4 (SEVERE): ICD-10-CM

## 2018-08-19 ENCOUNTER — RX RENEWAL (OUTPATIENT)
Age: 78
End: 2018-08-19

## 2018-08-20 LAB
25(OH)D3 SERPL-MCNC: 18.1 NG/ML
ALBUMIN SERPL ELPH-MCNC: 4.1 G/DL
ANION GAP SERPL CALC-SCNC: 15 MMOL/L
APPEARANCE: CLEAR
BACTERIA: NEGATIVE
BASOPHILS # BLD AUTO: 0.02 K/UL
BASOPHILS NFR BLD AUTO: 0.7 %
BILIRUBIN URINE: NEGATIVE
BLOOD URINE: NEGATIVE
BUN SERPL-MCNC: 64 MG/DL
CALCIUM SERPL-MCNC: 10 MG/DL
CALCIUM SERPL-MCNC: 10 MG/DL
CHLORIDE SERPL-SCNC: 102 MMOL/L
CO2 SERPL-SCNC: 22 MMOL/L
COLOR: YELLOW
CREAT SERPL-MCNC: 4.75 MG/DL
CREAT SPEC-SCNC: 62 MG/DL
CREAT/PROT UR: 2.4 RATIO
EOSINOPHIL # BLD AUTO: 0.08 K/UL
EOSINOPHIL NFR BLD AUTO: 2.8 %
GLUCOSE QUALITATIVE U: NEGATIVE MG/DL
GLUCOSE SERPL-MCNC: 109 MG/DL
HCT VFR BLD CALC: 27.4 %
HGB BLD-MCNC: 8.1 G/DL
HYALINE CASTS: 1 /LPF
IMM GRANULOCYTES NFR BLD AUTO: 0.3 %
KETONES URINE: NEGATIVE
LEUKOCYTE ESTERASE URINE: ABNORMAL
LYMPHOCYTES # BLD AUTO: 0.94 K/UL
LYMPHOCYTES NFR BLD AUTO: 32.5 %
MAN DIFF?: NORMAL
MCHC RBC-ENTMCNC: 22.8 PG
MCHC RBC-ENTMCNC: 29.6 GM/DL
MCV RBC AUTO: 77 FL
MICROSCOPIC-UA: NORMAL
MONOCYTES # BLD AUTO: 0.29 K/UL
MONOCYTES NFR BLD AUTO: 10 %
NEUTROPHILS # BLD AUTO: 1.55 K/UL
NEUTROPHILS NFR BLD AUTO: 53.7 %
NITRITE URINE: NEGATIVE
PARATHYROID HORMONE INTACT: 236 PG/ML
PH URINE: 6.5
PHOSPHATE SERPL-MCNC: 4.8 MG/DL
PLATELET # BLD AUTO: 140 K/UL
POTASSIUM SERPL-SCNC: 4.7 MMOL/L
PROT UR-MCNC: 149 MG/DL
PROTEIN URINE: 300 MG/DL
RBC # BLD: 3.56 M/UL
RBC # FLD: 15.1 %
RED BLOOD CELLS URINE: 4 /HPF
SODIUM SERPL-SCNC: 139 MMOL/L
SPECIFIC GRAVITY URINE: 1.01
SQUAMOUS EPITHELIAL CELLS: 6 /HPF
UROBILINOGEN URINE: NEGATIVE MG/DL
WBC # FLD AUTO: 2.89 K/UL
WHITE BLOOD CELLS URINE: 6 /HPF

## 2018-08-22 ENCOUNTER — APPOINTMENT (OUTPATIENT)
Dept: NEPHROLOGY | Facility: CLINIC | Age: 78
End: 2018-08-22
Payer: MEDICARE

## 2018-08-22 VITALS
DIASTOLIC BLOOD PRESSURE: 61 MMHG | HEART RATE: 71 BPM | SYSTOLIC BLOOD PRESSURE: 135 MMHG | HEIGHT: 60 IN | WEIGHT: 145.5 LBS | OXYGEN SATURATION: 100 % | BODY MASS INDEX: 28.57 KG/M2

## 2018-08-22 PROCEDURE — 99213 OFFICE O/P EST LOW 20 MIN: CPT

## 2018-09-08 PROCEDURE — G8980: CPT | Mod: CL

## 2018-09-08 PROCEDURE — G8979: CPT | Mod: CK

## 2018-09-08 PROCEDURE — 97140 MANUAL THERAPY 1/> REGIONS: CPT

## 2018-09-08 PROCEDURE — 97110 THERAPEUTIC EXERCISES: CPT

## 2018-09-08 PROCEDURE — 97010 HOT OR COLD PACKS THERAPY: CPT

## 2018-09-08 PROCEDURE — 97163 PT EVAL HIGH COMPLEX 45 MIN: CPT

## 2018-09-08 PROCEDURE — G8978: CPT | Mod: CL

## 2018-09-14 ENCOUNTER — OUTPATIENT (OUTPATIENT)
Dept: OUTPATIENT SERVICES | Facility: HOSPITAL | Age: 78
LOS: 1 days | Discharge: HOME | End: 2018-09-14

## 2018-09-14 DIAGNOSIS — Z95.2 PRESENCE OF PROSTHETIC HEART VALVE: Chronic | ICD-10-CM

## 2018-09-14 DIAGNOSIS — I77.0 ARTERIOVENOUS FISTULA, ACQUIRED: Chronic | ICD-10-CM

## 2018-09-14 DIAGNOSIS — D63.1 ANEMIA IN CHRONIC KIDNEY DISEASE: ICD-10-CM

## 2018-09-14 DIAGNOSIS — Z90.49 ACQUIRED ABSENCE OF OTHER SPECIFIED PARTS OF DIGESTIVE TRACT: Chronic | ICD-10-CM

## 2018-09-14 DIAGNOSIS — Z95.0 PRESENCE OF CARDIAC PACEMAKER: Chronic | ICD-10-CM

## 2018-09-21 ENCOUNTER — RESULT REVIEW (OUTPATIENT)
Age: 78
End: 2018-09-21

## 2018-09-21 ENCOUNTER — APPOINTMENT (OUTPATIENT)
Dept: HEMATOLOGY ONCOLOGY | Facility: CLINIC | Age: 78
End: 2018-09-21
Payer: MEDICARE

## 2018-09-21 ENCOUNTER — APPOINTMENT (OUTPATIENT)
Dept: INFUSION THERAPY | Facility: HOSPITAL | Age: 78
End: 2018-09-21

## 2018-09-21 VITALS
WEIGHT: 145.71 LBS | OXYGEN SATURATION: 100 % | BODY MASS INDEX: 28.46 KG/M2 | DIASTOLIC BLOOD PRESSURE: 75 MMHG | HEART RATE: 81 BPM | SYSTOLIC BLOOD PRESSURE: 156 MMHG | RESPIRATION RATE: 16 BRPM | TEMPERATURE: 98.5 F

## 2018-09-21 DIAGNOSIS — Z86.03 PERSONAL HISTORY OF NEOPLASM OF UNCERTAIN BEHAVIOR: ICD-10-CM

## 2018-09-21 DIAGNOSIS — M17.10 UNILATERAL PRIMARY OSTEOARTHRITIS, UNSPECIFIED KNEE: ICD-10-CM

## 2018-09-21 DIAGNOSIS — M17.0 BILATERAL PRIMARY OSTEOARTHRITIS OF KNEE: ICD-10-CM

## 2018-09-21 LAB
HCT VFR BLD CALC: 27.2 % — LOW (ref 34.5–45)
HGB BLD-MCNC: 8.6 G/DL — LOW (ref 11.5–15.5)
MCHC RBC-ENTMCNC: 23.9 PG — LOW (ref 27–34)
MCHC RBC-ENTMCNC: 31.8 G/DL — LOW (ref 32–36)
MCV RBC AUTO: 75.2 FL — LOW (ref 80–100)
PLATELET # BLD AUTO: 123 K/UL — LOW (ref 150–400)
RBC # BLD: 3.62 M/UL — LOW (ref 3.8–5.2)
RBC # FLD: 14.1 % — SIGNIFICANT CHANGE UP (ref 10.3–14.5)
WBC # BLD: 3.1 K/UL — LOW (ref 3.8–10.5)
WBC # FLD AUTO: 3.1 K/UL — LOW (ref 3.8–10.5)

## 2018-09-21 PROCEDURE — 99214 OFFICE O/P EST MOD 30 MIN: CPT

## 2018-09-24 DIAGNOSIS — D63.8 ANEMIA IN OTHER CHRONIC DISEASES CLASSIFIED ELSEWHERE: ICD-10-CM

## 2018-09-24 DIAGNOSIS — N18.4 CHRONIC KIDNEY DISEASE, STAGE 4 (SEVERE): ICD-10-CM

## 2018-09-25 LAB
ALBUMIN SERPL ELPH-MCNC: 4.7 G/DL
ANION GAP SERPL CALC-SCNC: 16 MMOL/L
APPEARANCE: ABNORMAL
BACTERIA: ABNORMAL
BASOPHILS # BLD AUTO: 0.01 K/UL
BASOPHILS NFR BLD AUTO: 0.3 %
BILIRUBIN URINE: NEGATIVE
BLOOD URINE: NEGATIVE
BUN SERPL-MCNC: 55 MG/DL
CALCIUM SERPL-MCNC: 10.4 MG/DL
CHLORIDE SERPL-SCNC: 105 MMOL/L
CO2 SERPL-SCNC: 21 MMOL/L
COLOR: YELLOW
CREAT SERPL-MCNC: 3.75 MG/DL
CREAT SPEC-SCNC: 89 MG/DL
CREAT/PROT UR: 5.3 RATIO
EOSINOPHIL # BLD AUTO: 0.03 K/UL
EOSINOPHIL NFR BLD AUTO: 0.8 %
GLUCOSE QUALITATIVE U: 100 MG/DL
GLUCOSE SERPL-MCNC: 147 MG/DL
HCT VFR BLD CALC: 30.2 %
HGB BLD-MCNC: 8.6 G/DL
HYALINE CASTS: 4 /LPF
IMM GRANULOCYTES NFR BLD AUTO: 0.3 %
KETONES URINE: NEGATIVE
LEUKOCYTE ESTERASE URINE: ABNORMAL
LYMPHOCYTES # BLD AUTO: 1.04 K/UL
LYMPHOCYTES NFR BLD AUTO: 26.1 %
MAN DIFF?: NORMAL
MCHC RBC-ENTMCNC: 21.7 PG
MCHC RBC-ENTMCNC: 28.5 GM/DL
MCV RBC AUTO: 76.3 FL
MICROSCOPIC-UA: NORMAL
MONOCYTES # BLD AUTO: 0.36 K/UL
MONOCYTES NFR BLD AUTO: 9 %
NEUTROPHILS # BLD AUTO: 2.54 K/UL
NEUTROPHILS NFR BLD AUTO: 63.5 %
NITRITE URINE: NEGATIVE
PH URINE: 6
PHOSPHATE SERPL-MCNC: 3.2 MG/DL
PLATELET # BLD AUTO: 152 K/UL
POTASSIUM SERPL-SCNC: 4.5 MMOL/L
PROT UR-MCNC: 470 MG/DL
PROTEIN URINE: 300 MG/DL
RBC # BLD: 3.96 M/UL
RBC # FLD: 15.8 %
RED BLOOD CELLS URINE: 1 /HPF
SODIUM SERPL-SCNC: 142 MMOL/L
SPECIFIC GRAVITY URINE: 1.02
SQUAMOUS EPITHELIAL CELLS: 15 /HPF
UROBILINOGEN URINE: NEGATIVE MG/DL
WBC # FLD AUTO: 3.99 K/UL
WHITE BLOOD CELLS URINE: 31 /HPF

## 2018-10-22 ENCOUNTER — RX RENEWAL (OUTPATIENT)
Age: 78
End: 2018-10-22

## 2018-10-25 ENCOUNTER — OUTPATIENT (OUTPATIENT)
Dept: OUTPATIENT SERVICES | Facility: HOSPITAL | Age: 78
LOS: 1 days | Discharge: HOME | End: 2018-10-25

## 2018-10-25 DIAGNOSIS — D46.1 REFRACTORY ANEMIA WITH RING SIDEROBLASTS: ICD-10-CM

## 2018-10-25 DIAGNOSIS — D63.1 ANEMIA IN CHRONIC KIDNEY DISEASE: ICD-10-CM

## 2018-10-25 DIAGNOSIS — I77.0 ARTERIOVENOUS FISTULA, ACQUIRED: Chronic | ICD-10-CM

## 2018-10-25 DIAGNOSIS — Z95.2 PRESENCE OF PROSTHETIC HEART VALVE: Chronic | ICD-10-CM

## 2018-10-25 DIAGNOSIS — N18.4 CHRONIC KIDNEY DISEASE, STAGE 4 (SEVERE): ICD-10-CM

## 2018-10-25 DIAGNOSIS — Z90.49 ACQUIRED ABSENCE OF OTHER SPECIFIED PARTS OF DIGESTIVE TRACT: Chronic | ICD-10-CM

## 2018-10-25 DIAGNOSIS — Z95.0 PRESENCE OF CARDIAC PACEMAKER: Chronic | ICD-10-CM

## 2018-11-02 ENCOUNTER — RESULT REVIEW (OUTPATIENT)
Age: 78
End: 2018-11-02

## 2018-11-02 ENCOUNTER — APPOINTMENT (OUTPATIENT)
Dept: HEMATOLOGY ONCOLOGY | Facility: CLINIC | Age: 78
End: 2018-11-02

## 2018-11-02 ENCOUNTER — APPOINTMENT (OUTPATIENT)
Dept: INFUSION THERAPY | Facility: HOSPITAL | Age: 78
End: 2018-11-02
Payer: MEDICARE

## 2018-11-02 VITALS
DIASTOLIC BLOOD PRESSURE: 70 MMHG | TEMPERATURE: 98.2 F | WEIGHT: 147.71 LBS | OXYGEN SATURATION: 100 % | HEART RATE: 70 BPM | SYSTOLIC BLOOD PRESSURE: 168 MMHG | BODY MASS INDEX: 28.85 KG/M2 | RESPIRATION RATE: 16 BRPM

## 2018-11-02 LAB
BASOPHILS # BLD AUTO: 0 K/UL — SIGNIFICANT CHANGE UP (ref 0–0.2)
BASOPHILS NFR BLD AUTO: 0.5 % — SIGNIFICANT CHANGE UP (ref 0–2)
EOSINOPHIL # BLD AUTO: 0.1 K/UL — SIGNIFICANT CHANGE UP (ref 0–0.5)
EOSINOPHIL NFR BLD AUTO: 3.9 % — SIGNIFICANT CHANGE UP (ref 0–6)
HCT VFR BLD CALC: 25.9 % — LOW (ref 34.5–45)
HGB BLD-MCNC: 7.9 G/DL — LOW (ref 11.5–15.5)
LYMPHOCYTES # BLD AUTO: 0.9 K/UL — LOW (ref 1–3.3)
LYMPHOCYTES # BLD AUTO: 28.2 % — SIGNIFICANT CHANGE UP (ref 13–44)
MCHC RBC-ENTMCNC: 23.5 PG — LOW (ref 27–34)
MCHC RBC-ENTMCNC: 30.5 G/DL — LOW (ref 32–36)
MCV RBC AUTO: 77 FL — LOW (ref 80–100)
MONOCYTES # BLD AUTO: 0.2 K/UL — SIGNIFICANT CHANGE UP (ref 0–0.9)
MONOCYTES NFR BLD AUTO: 6.1 % — SIGNIFICANT CHANGE UP (ref 2–14)
NEUTROPHILS # BLD AUTO: 1.9 K/UL — SIGNIFICANT CHANGE UP (ref 1.8–7.4)
NEUTROPHILS NFR BLD AUTO: 61.4 % — SIGNIFICANT CHANGE UP (ref 43–77)
PLATELET # BLD AUTO: 125 K/UL — LOW (ref 150–400)
RBC # BLD: 3.37 M/UL — LOW (ref 3.8–5.2)
RBC # FLD: 14 % — SIGNIFICANT CHANGE UP (ref 10.3–14.5)
WBC # BLD: 3.1 K/UL — LOW (ref 3.8–10.5)
WBC # FLD AUTO: 3.1 K/UL — LOW (ref 3.8–10.5)

## 2018-11-02 PROCEDURE — G0008: CPT

## 2018-11-02 NOTE — HISTORY OF PRESENT ILLNESS
[Cardiovascular] : Cardiovascular [Constitutional] : Constitutional [ENT] : ENT [Dermatologic] : Dermatologic [Endocrine] : Endocrine [Gastrointestinal] : Gastrointestinal [Genitourinary] : Genitourinary [Gynecologic] : Gynecologic [Infectious] : Infectious [Musculoskeletal] : Musculoskeletal [Neurologic] : Neurologic [Pain] : Pain [Pulmonary] : Pulmonary [Hematologic] : Hematologic [Date: ____________] : Patient's last distress assessment performed on [unfilled]. [0 - No Distress] : Distress Level: 0 [de-identified] : The patient has been followed by me for endstage renal disease for nearly 8 years. She does not require dialysis. She had a left sided fistula placed three years ago by her physician in [place]. She is followed in the renal clinic by Dr Dixon.\par She has significant hypertension and she has required over three medication for control. There is no prior history  of cerebrovascular accident or bleeding. \par She has type two diabetes mellitus. The patient has contribution of both illness in the causation of her Stage 4 chronic renal disease. \par She has had mildLy elevated ferritin and iron levels.\par The use of colony stimulating agents has been safe when given when hemoglobins are less than 10 g/dL which has led to a frequency of administration of one ejection every 4 to 6 weeks.\par \par She will continue on the antihypertensive medication.She is on treatment for diabetes [FreeTextEntry1] : darbepoetin 200 mg q 6 weeks; hydralazine 100 mg in AM; 50 at noon  and 100 mg PO in evening [de-identified] : Since her last visit she spoke with her primary physician who continues her antihypertension medication. She will also have a pacemaker check at home.\par She will have influenza vaccination today.

## 2018-11-02 NOTE — ASSESSMENT
[FreeTextEntry1] : Jonathan continues to take her antihypertension medication. \par She has intermittent elevation.She has taken an additional hydralazine in my presence \par RTC 6 weeks\par she will have darbepoetin 200 mg and influenza vaccine

## 2018-11-05 PROBLEM — R74.8 ELEVATED SERUM ALKALINE PHOSPHATASE LEVEL: Status: ACTIVE | Noted: 2017-08-14

## 2018-11-24 ENCOUNTER — LABORATORY RESULT (OUTPATIENT)
Age: 78
End: 2018-11-24

## 2018-11-26 LAB
25(OH)D3 SERPL-MCNC: 15.5 NG/ML
ALBUMIN SERPL ELPH-MCNC: 3.7 G/DL
ANION GAP SERPL CALC-SCNC: 13 MMOL/L
APPEARANCE: ABNORMAL
BACTERIA: ABNORMAL
BASOPHILS # BLD AUTO: 0.02 K/UL
BASOPHILS NFR BLD AUTO: 0.7 %
BILIRUBIN URINE: NEGATIVE
BLOOD URINE: NEGATIVE
BUN SERPL-MCNC: 49 MG/DL
CALCIUM SERPL-MCNC: 9.4 MG/DL
CALCIUM SERPL-MCNC: 9.4 MG/DL
CHLORIDE SERPL-SCNC: 108 MMOL/L
CO2 SERPL-SCNC: 18 MMOL/L
COLOR: YELLOW
CREAT SERPL-MCNC: 3.89 MG/DL
CREAT SPEC-SCNC: 89 MG/DL
CREAT/PROT UR: 3 RATIO
EOSINOPHIL # BLD AUTO: 0.11 K/UL
EOSINOPHIL NFR BLD AUTO: 3.7 %
GLUCOSE QUALITATIVE U: NEGATIVE MG/DL
GLUCOSE SERPL-MCNC: 219 MG/DL
HAV IGM SER QL: NONREACTIVE
HBV CORE IGM SER QL: NONREACTIVE
HBV SURFACE AB SER QL: REACTIVE
HBV SURFACE AG SER QL: NONREACTIVE
HCT VFR BLD CALC: 29 %
HCV AB SER QL: NONREACTIVE
HCV S/CO RATIO: 0.1 S/CO
HGB BLD-MCNC: 8.5 G/DL
HYALINE CASTS: 3 /LPF
IMM GRANULOCYTES NFR BLD AUTO: 0.3 %
KETONES URINE: NEGATIVE
LEUKOCYTE ESTERASE URINE: ABNORMAL
LYMPHOCYTES # BLD AUTO: 0.67 K/UL
LYMPHOCYTES NFR BLD AUTO: 22.8 %
MAN DIFF?: NORMAL
MCHC RBC-ENTMCNC: 23.4 PG
MCHC RBC-ENTMCNC: 29.3 GM/DL
MCV RBC AUTO: 79.9 FL
MICROSCOPIC-UA: NORMAL
MONOCYTES # BLD AUTO: 0.28 K/UL
MONOCYTES NFR BLD AUTO: 9.5 %
NEUTROPHILS # BLD AUTO: 1.85 K/UL
NEUTROPHILS NFR BLD AUTO: 63 %
NITRITE URINE: NEGATIVE
PARATHYROID HORMONE INTACT: 353 PG/ML
PH URINE: 5.5
PHOSPHATE SERPL-MCNC: 4.8 MG/DL
PLATELET # BLD AUTO: 170 K/UL
POTASSIUM SERPL-SCNC: 4.8 MMOL/L
PROT UR-MCNC: 272 MG/DL
PROTEIN URINE: 300 MG/DL
RBC # BLD: 3.63 M/UL
RBC # FLD: 17 %
RED BLOOD CELLS URINE: 2 /HPF
SODIUM SERPL-SCNC: 139 MMOL/L
SPECIFIC GRAVITY URINE: 1.02
SQUAMOUS EPITHELIAL CELLS: 23 /HPF
UROBILINOGEN URINE: NEGATIVE MG/DL
WBC # FLD AUTO: 2.94 K/UL
WHITE BLOOD CELLS URINE: 15 /HPF

## 2018-11-28 ENCOUNTER — APPOINTMENT (OUTPATIENT)
Dept: NEPHROLOGY | Facility: CLINIC | Age: 78
End: 2018-11-28
Payer: MEDICARE

## 2018-11-28 VITALS
SYSTOLIC BLOOD PRESSURE: 138 MMHG | OXYGEN SATURATION: 100 % | BODY MASS INDEX: 29 KG/M2 | HEIGHT: 60 IN | DIASTOLIC BLOOD PRESSURE: 63 MMHG | WEIGHT: 147.71 LBS | HEART RATE: 64 BPM

## 2018-11-28 PROCEDURE — 99213 OFFICE O/P EST LOW 20 MIN: CPT

## 2018-11-28 RX ORDER — MECLIZINE HYDROCHLORIDE 25 MG/1
25 TABLET ORAL
Qty: 7 | Refills: 0 | Status: DISCONTINUED | COMMUNITY
Start: 2018-04-01 | End: 2018-11-28

## 2018-12-10 ENCOUNTER — OUTPATIENT (OUTPATIENT)
Dept: OUTPATIENT SERVICES | Facility: HOSPITAL | Age: 78
LOS: 1 days | Discharge: HOME | End: 2018-12-10

## 2018-12-10 DIAGNOSIS — Z95.0 PRESENCE OF CARDIAC PACEMAKER: Chronic | ICD-10-CM

## 2018-12-10 DIAGNOSIS — Z95.2 PRESENCE OF PROSTHETIC HEART VALVE: Chronic | ICD-10-CM

## 2018-12-10 DIAGNOSIS — Z90.49 ACQUIRED ABSENCE OF OTHER SPECIFIED PARTS OF DIGESTIVE TRACT: Chronic | ICD-10-CM

## 2018-12-10 DIAGNOSIS — I77.0 ARTERIOVENOUS FISTULA, ACQUIRED: Chronic | ICD-10-CM

## 2018-12-10 DIAGNOSIS — D46.1 REFRACTORY ANEMIA WITH RING SIDEROBLASTS: ICD-10-CM

## 2018-12-10 DIAGNOSIS — N18.4 CHRONIC KIDNEY DISEASE, STAGE 4 (SEVERE): ICD-10-CM

## 2018-12-10 DIAGNOSIS — D63.1 ANEMIA IN CHRONIC KIDNEY DISEASE: ICD-10-CM

## 2018-12-14 ENCOUNTER — APPOINTMENT (OUTPATIENT)
Dept: HEMATOLOGY ONCOLOGY | Facility: CLINIC | Age: 78
End: 2018-12-14
Payer: MEDICARE

## 2018-12-14 ENCOUNTER — RESULT REVIEW (OUTPATIENT)
Age: 78
End: 2018-12-14

## 2018-12-14 ENCOUNTER — APPOINTMENT (OUTPATIENT)
Dept: INFUSION THERAPY | Facility: HOSPITAL | Age: 78
End: 2018-12-14

## 2018-12-14 VITALS
SYSTOLIC BLOOD PRESSURE: 176 MMHG | RESPIRATION RATE: 16 BRPM | WEIGHT: 146.6 LBS | HEART RATE: 65 BPM | TEMPERATURE: 98.12 F | BODY MASS INDEX: 28.63 KG/M2 | DIASTOLIC BLOOD PRESSURE: 70 MMHG | OXYGEN SATURATION: 97 %

## 2018-12-14 LAB
HCT VFR BLD CALC: 25.8 % — LOW (ref 34.5–45)
HGB BLD-MCNC: 8.1 G/DL — LOW (ref 11.5–15.5)
MCHC RBC-ENTMCNC: 23.8 PG — LOW (ref 27–34)
MCHC RBC-ENTMCNC: 31.4 G/DL — LOW (ref 32–36)
MCV RBC AUTO: 75.8 FL — LOW (ref 80–100)
PLATELET # BLD AUTO: 144 K/UL — LOW (ref 150–400)
RBC # BLD: 3.41 M/UL — LOW (ref 3.8–5.2)
RBC # FLD: 13.8 % — SIGNIFICANT CHANGE UP (ref 10.3–14.5)
WBC # BLD: 3 K/UL — LOW (ref 3.8–10.5)
WBC # FLD AUTO: 3 K/UL — LOW (ref 3.8–10.5)

## 2018-12-14 PROCEDURE — 99214 OFFICE O/P EST MOD 30 MIN: CPT

## 2018-12-14 NOTE — HISTORY OF PRESENT ILLNESS
[Disease:__________________________] : Disease: [unfilled] [Date: ____________] : Patient's last distress assessment performed on [unfilled]. [0 - No Distress] : Distress Level: 0 [Cardiovascular] : Cardiovascular [Constitutional] : Constitutional [ENT] : ENT [Dermatologic] : Dermatologic [Endocrine] : Endocrine [Gastrointestinal] : Gastrointestinal [Genitourinary] : Genitourinary [Gynecologic] : Gynecologic [Infectious] : Infectious [Musculoskeletal] : Musculoskeletal [Neurologic] : Neurologic [Pain] : Pain [Pulmonary] : Pulmonary [Hematologic] : Hematologic [de-identified] : The patient has been followed by me for endstage renal disease for nearly 8 years. She does not require dialysis. She had a left sided fistula placed three years ago by her physician in [place]. She is followed in the renal clinic by Dr Dixon.\par She has significant hypertension and she has required over three medication for control. There is no prior history  of cerebrovascular accident or bleeding. \par She has type two diabetes mellitus. The patient has contribution of both illness in the causation of her Stage 4 chronic renal disease. \par She has had mildLy elevated ferritin and iron levels.\par The use of colony stimulating agents has been safe when given when hemoglobins are less than 10 g/dL which has led to a frequency of administration of one ejection every 4 to 6 weeks.\par \par She will continue on the antihypertensive medication.She is on treatment for diabetes [FreeTextEntry1] : darbepoetin 200 mg q 6 weeks; hydralazine 100 mg in AM; 50 at noon  and 100 mg PO in evening [de-identified] : Patient has felt well. She remains on blood pressure control medication

## 2018-12-14 NOTE — ASSESSMENT
[Supportive] : Goals of care discussed with patient: Supportive [Palliative Care Plan] : not applicable at this time [FreeTextEntry1] : 78 year old female with a history of hypertension and chronic renal failure. She is not dialysis requiring although she has an A V fistula. Discussion with patient and Dr Bey about increasing frequency of darbepoetin. Patient will take her own Imdur in our office and will be cleared for treatment

## 2018-12-21 LAB
ALBUMIN SERPL ELPH-MCNC: 3.8 G/DL
ANION GAP SERPL CALC-SCNC: 12 MMOL/L
APPEARANCE: ABNORMAL
BACTERIA: ABNORMAL
BASOPHILS # BLD AUTO: 0.01 K/UL
BASOPHILS NFR BLD AUTO: 0.2 %
BILIRUBIN URINE: NEGATIVE
BLOOD URINE: NEGATIVE
BUN SERPL-MCNC: 35 MG/DL
CALCIUM SERPL-MCNC: 9.8 MG/DL
CHLORIDE SERPL-SCNC: 108 MMOL/L
CO2 SERPL-SCNC: 22 MMOL/L
COLOR: YELLOW
CREAT SERPL-MCNC: 3.74 MG/DL
EOSINOPHIL # BLD AUTO: 0.03 K/UL
EOSINOPHIL NFR BLD AUTO: 0.6 %
GLUCOSE QUALITATIVE U: NEGATIVE MG/DL
GLUCOSE SERPL-MCNC: 124 MG/DL
HCT VFR BLD CALC: 28 %
HGB BLD-MCNC: 8.2 G/DL
HYALINE CASTS: 6 /LPF
IMM GRANULOCYTES NFR BLD AUTO: 0.7 %
KETONES URINE: ABNORMAL
LEUKOCYTE ESTERASE URINE: ABNORMAL
LYMPHOCYTES # BLD AUTO: 0.76 K/UL
LYMPHOCYTES NFR BLD AUTO: 14.1 %
MAN DIFF?: NORMAL
MCHC RBC-ENTMCNC: 22.9 PG
MCHC RBC-ENTMCNC: 29.3 GM/DL
MCV RBC AUTO: 78.2 FL
MICROSCOPIC-UA: NORMAL
MONOCYTES # BLD AUTO: 0.38 K/UL
MONOCYTES NFR BLD AUTO: 7 %
NEUTROPHILS # BLD AUTO: 4.18 K/UL
NEUTROPHILS NFR BLD AUTO: 77.4 %
NITRITE URINE: NEGATIVE
PH URINE: 6
PHOSPHATE SERPL-MCNC: 2.4 MG/DL
PLATELET # BLD AUTO: 195 K/UL
POTASSIUM SERPL-SCNC: 4.7 MMOL/L
PROTEIN URINE: >300 MG/DL
RBC # BLD: 3.58 M/UL
RBC # FLD: 15.3 %
RED BLOOD CELLS URINE: 2 /HPF
SODIUM SERPL-SCNC: 142 MMOL/L
SPECIFIC GRAVITY URINE: 1.02
SQUAMOUS EPITHELIAL CELLS: 12 /HPF
UROBILINOGEN URINE: NEGATIVE MG/DL
WBC # FLD AUTO: 5.4 K/UL
WHITE BLOOD CELLS URINE: 24 /HPF

## 2018-12-29 NOTE — DIETITIAN INITIAL EVALUATION ADULT. - PROBLEM SELECTOR PLAN 3
Contusion of right knee, initial encounter
Call Renal Consult in am Dr Garcia @ [facility]

## 2019-01-12 ENCOUNTER — OUTPATIENT (OUTPATIENT)
Dept: OUTPATIENT SERVICES | Facility: HOSPITAL | Age: 79
LOS: 1 days | Discharge: HOME | End: 2019-01-12

## 2019-01-12 DIAGNOSIS — Z90.49 ACQUIRED ABSENCE OF OTHER SPECIFIED PARTS OF DIGESTIVE TRACT: Chronic | ICD-10-CM

## 2019-01-12 DIAGNOSIS — N18.4 CHRONIC KIDNEY DISEASE, STAGE 4 (SEVERE): ICD-10-CM

## 2019-01-12 DIAGNOSIS — D63.1 ANEMIA IN CHRONIC KIDNEY DISEASE: ICD-10-CM

## 2019-01-12 DIAGNOSIS — Z95.0 PRESENCE OF CARDIAC PACEMAKER: Chronic | ICD-10-CM

## 2019-01-12 DIAGNOSIS — I77.0 ARTERIOVENOUS FISTULA, ACQUIRED: Chronic | ICD-10-CM

## 2019-01-12 DIAGNOSIS — Z95.2 PRESENCE OF PROSTHETIC HEART VALVE: Chronic | ICD-10-CM

## 2019-01-12 DIAGNOSIS — D46.1 REFRACTORY ANEMIA WITH RING SIDEROBLASTS: ICD-10-CM

## 2019-01-18 ENCOUNTER — APPOINTMENT (OUTPATIENT)
Dept: INFUSION THERAPY | Facility: HOSPITAL | Age: 79
End: 2019-01-18

## 2019-01-18 ENCOUNTER — RESULT REVIEW (OUTPATIENT)
Age: 79
End: 2019-01-18

## 2019-01-18 ENCOUNTER — APPOINTMENT (OUTPATIENT)
Dept: HEMATOLOGY ONCOLOGY | Facility: CLINIC | Age: 79
End: 2019-01-18
Payer: MEDICARE

## 2019-01-18 VITALS
WEIGHT: 150.99 LBS | TEMPERATURE: 98.2 F | SYSTOLIC BLOOD PRESSURE: 160 MMHG | HEART RATE: 66 BPM | DIASTOLIC BLOOD PRESSURE: 70 MMHG | OXYGEN SATURATION: 99 % | RESPIRATION RATE: 16 BRPM | BODY MASS INDEX: 29.49 KG/M2

## 2019-01-18 LAB
HCT VFR BLD CALC: 24.2 % — LOW (ref 34.5–45)
HGB BLD-MCNC: 7.8 G/DL — LOW (ref 11.5–15.5)
MCHC RBC-ENTMCNC: 24.5 PG — LOW (ref 27–34)
MCHC RBC-ENTMCNC: 32.1 G/DL — SIGNIFICANT CHANGE UP (ref 32–36)
MCV RBC AUTO: 76.4 FL — LOW (ref 80–100)
PLATELET # BLD AUTO: 137 K/UL — LOW (ref 150–400)
RBC # BLD: 3.17 M/UL — LOW (ref 3.8–5.2)
RBC # FLD: 14.2 % — SIGNIFICANT CHANGE UP (ref 10.3–14.5)
WBC # BLD: 3.3 K/UL — LOW (ref 3.8–10.5)
WBC # FLD AUTO: 3.3 K/UL — LOW (ref 3.8–10.5)

## 2019-01-18 PROCEDURE — 99214 OFFICE O/P EST MOD 30 MIN: CPT

## 2019-01-18 NOTE — ASSESSMENT
[Supportive] : Goals of care discussed with patient: Supportive [Palliative Care Plan] : not applicable at this time [FreeTextEntry1] : Jonathan Seymour is a 79 year old lady with hypertension and chronic renal failure non dialysis requiring who will bew receiving darbepoetin 200 micrograms q 4 to 6 weeks. The patient will take additional antihypertensive medication today. manual recheck of blood pressure was taken. Patient seen by MARGARET JAVED

## 2019-01-18 NOTE — HISTORY OF PRESENT ILLNESS
[Disease:__________________________] : Disease: [unfilled] [Treatment Protocol] : Treatment Protocol [Cardiovascular] : Cardiovascular [Constitutional] : Constitutional [ENT] : ENT [Dermatologic] : Dermatologic [Endocrine] : Endocrine [Gastrointestinal] : Gastrointestinal [Genitourinary] : Genitourinary [Gynecologic] : Gynecologic [Infectious] : Infectious [Musculoskeletal] : Musculoskeletal [Neurologic] : Neurologic [Pain] : Pain [Pulmonary] : Pulmonary [Hematologic] : Hematologic [de-identified] : The patient has been followed by me for endstage renal disease for nearly 8 years. She does not require dialysis. She had a left sided fistula placed three years ago by her physician in [place]. She is followed in the renal clinic by Dr Dixon.\par She has significant hypertension and she has required over three medication for control. There is no prior history  of cerebrovascular accident or bleeding. \par She has type two diabetes mellitus. The patient has contribution of both illness in the causation of her Stage 4 chronic renal disease. \par She has had mildLy elevated ferritin and iron levels.\par The use of colony stimulating agents has been safe when given when hemoglobins are less than 10 g/dL which has led to a frequency of administration of one ejection every 4 to 6 weeks.\par \par She will continue on the antihypertensive medication.She is on treatment for diabetes [FreeTextEntry1] : darbepoetin 200 mg q 6 weeks; hydralazine 100 mg in AM; 50 at noon  and 100 mg PO in evening [de-identified] : Patient noted to have high BP reading today due to increased stress level. Her daughter was recently hospitalized for diabetic coma.  [Date: ____________] : Patient's last distress assessment performed on [unfilled]. [0 - No Distress] : Distress Level: 0

## 2019-01-18 NOTE — PHYSICAL EXAM
[Normal] : affect appropriate [Restricted in physically strenuous activity but ambulatory and able to carry out work of a light or sedentary nature] : Status 1- Restricted in physically strenuous activity but ambulatory and able to carry out work of a light or sedentary nature, e.g., light house work, office work [de-identified] : ambulating with cane [de-identified] : limited R knee ROM

## 2019-01-18 NOTE — REVIEW OF SYSTEMS
[Negative] : Allergic/Immunologic [Chills] : chills [Fatigue] : fatigue [SOB on Exertion] : shortness of breath during exertion [Joint Pain] : joint pain [Difficulty Walking] : difficulty walking [Fever] : no fever [Night Sweats] : no night sweats [Recent Change In Weight] : ~T no recent weight change [Eye Pain] : no eye pain [Red Eyes] : eyes not red [Dry Eyes] : no dryness of the eyes [Vision Problems] : no vision problems [Dysphagia] : no dysphagia [Loss of Hearing] : no loss of hearing [Nosebleeds] : no nosebleeds [Hoarseness] : no hoarseness [Odynophagia] : no odynophagia [Mucosal Pain] : no mucosal pain [Chest Pain] : no chest pain [Palpitations] : no palpitations [Leg Claudication] : no intermittent leg claudication [Lower Ext Edema] : no lower extremity edema [Shortness Of Breath] : no shortness of breath [Wheezing] : no wheezing [Cough] : no cough [Abdominal Pain] : no abdominal pain [Dysuria] : no dysuria [Incontinence] : no incontinence [Vaginal Discharge] : no vaginal discharge [Dysmenorrhea/Abn Vaginal Bleeding] : no dysmenorrhea/abnormal vaginal bleeding [Joint Stiffness] : no joint stiffness [Muscle Pain] : no muscle pain [Skin Rash] : no skin rash [Skin Wound] : no skin wound [Confused] : no confusion [Dizziness] : no dizziness [Fainting] : no fainting [Suicidal] : not suicidal [Insomnia] : no insomnia [Anxiety] : no anxiety [Depression] : no depression [Proptosis] : no proptosis [Hot Flashes] : no hot flashes [Muscle Weakness] : no muscle weakness [Deepening Of The Voice] : no deepening of the voice [Easy Bleeding] : no tendency for easy bleeding [Easy Bruising] : no tendency for easy bruising [Swollen Glands] : no swollen glands

## 2019-01-22 ENCOUNTER — INPATIENT (INPATIENT)
Facility: HOSPITAL | Age: 79
LOS: 6 days | Discharge: HOME | End: 2019-01-29
Attending: INTERNAL MEDICINE | Admitting: INTERNAL MEDICINE
Payer: MEDICARE

## 2019-01-22 ENCOUNTER — OUTPATIENT (OUTPATIENT)
Dept: OUTPATIENT SERVICES | Facility: HOSPITAL | Age: 79
LOS: 1 days | End: 2019-01-22
Payer: MEDICARE

## 2019-01-22 ENCOUNTER — APPOINTMENT (OUTPATIENT)
Dept: ULTRASOUND IMAGING | Facility: IMAGING CENTER | Age: 79
End: 2019-01-22

## 2019-01-22 VITALS
OXYGEN SATURATION: 100 % | DIASTOLIC BLOOD PRESSURE: 53 MMHG | HEART RATE: 60 BPM | RESPIRATION RATE: 16 BRPM | TEMPERATURE: 98 F | SYSTOLIC BLOOD PRESSURE: 174 MMHG

## 2019-01-22 DIAGNOSIS — Z90.49 ACQUIRED ABSENCE OF OTHER SPECIFIED PARTS OF DIGESTIVE TRACT: Chronic | ICD-10-CM

## 2019-01-22 DIAGNOSIS — I25.10 ATHEROSCLEROTIC HEART DISEASE OF NATIVE CORONARY ARTERY WITHOUT ANGINA PECTORIS: ICD-10-CM

## 2019-01-22 DIAGNOSIS — Z95.2 PRESENCE OF PROSTHETIC HEART VALVE: Chronic | ICD-10-CM

## 2019-01-22 DIAGNOSIS — I77.0 ARTERIOVENOUS FISTULA, ACQUIRED: Chronic | ICD-10-CM

## 2019-01-22 DIAGNOSIS — N18.4 CHRONIC KIDNEY DISEASE, STAGE 4 (SEVERE): ICD-10-CM

## 2019-01-22 DIAGNOSIS — I82.621 ACUTE EMBOLISM AND THROMBOSIS OF DEEP VEINS OF RIGHT UPPER EXTREMITY: ICD-10-CM

## 2019-01-22 DIAGNOSIS — E11.9 TYPE 2 DIABETES MELLITUS WITHOUT COMPLICATIONS: ICD-10-CM

## 2019-01-22 DIAGNOSIS — I10 ESSENTIAL (PRIMARY) HYPERTENSION: ICD-10-CM

## 2019-01-22 DIAGNOSIS — E78.5 HYPERLIPIDEMIA, UNSPECIFIED: ICD-10-CM

## 2019-01-22 DIAGNOSIS — Z95.0 PRESENCE OF CARDIAC PACEMAKER: Chronic | ICD-10-CM

## 2019-01-22 DIAGNOSIS — M10.9 GOUT, UNSPECIFIED: ICD-10-CM

## 2019-01-22 DIAGNOSIS — Z00.8 ENCOUNTER FOR OTHER GENERAL EXAMINATION: ICD-10-CM

## 2019-01-22 LAB
ALBUMIN SERPL ELPH-MCNC: 3.8 G/DL — SIGNIFICANT CHANGE UP (ref 3.3–5)
ALP SERPL-CCNC: 65 U/L — SIGNIFICANT CHANGE UP (ref 40–120)
ALT FLD-CCNC: 4 U/L — SIGNIFICANT CHANGE UP (ref 4–33)
ANION GAP SERPL CALC-SCNC: 14 MMO/L — SIGNIFICANT CHANGE UP (ref 7–14)
APTT BLD: 27.3 SEC — LOW (ref 27.5–36.3)
AST SERPL-CCNC: 13 U/L — SIGNIFICANT CHANGE UP (ref 4–32)
BASOPHILS # BLD AUTO: 0.03 K/UL — SIGNIFICANT CHANGE UP (ref 0–0.2)
BASOPHILS NFR BLD AUTO: 0.7 % — SIGNIFICANT CHANGE UP (ref 0–2)
BILIRUB SERPL-MCNC: < 0.2 MG/DL — LOW (ref 0.2–1.2)
BLD GP AB SCN SERPL QL: NEGATIVE — SIGNIFICANT CHANGE UP
BUN SERPL-MCNC: 50 MG/DL — HIGH (ref 7–23)
CALCIUM SERPL-MCNC: 9.5 MG/DL — SIGNIFICANT CHANGE UP (ref 8.4–10.5)
CHLORIDE SERPL-SCNC: 106 MMOL/L — SIGNIFICANT CHANGE UP (ref 98–107)
CO2 SERPL-SCNC: 20 MMOL/L — LOW (ref 22–31)
CREAT SERPL-MCNC: 4.21 MG/DL — HIGH (ref 0.5–1.3)
EOSINOPHIL # BLD AUTO: 0.1 K/UL — SIGNIFICANT CHANGE UP (ref 0–0.5)
EOSINOPHIL NFR BLD AUTO: 2.4 % — SIGNIFICANT CHANGE UP (ref 0–6)
GLUCOSE SERPL-MCNC: 123 MG/DL — HIGH (ref 70–99)
HCT VFR BLD CALC: 27.1 % — LOW (ref 34.5–45)
HGB BLD-MCNC: 7.5 G/DL — LOW (ref 11.5–15.5)
IMM GRANULOCYTES NFR BLD AUTO: 0.5 % — SIGNIFICANT CHANGE UP (ref 0–1.5)
INR BLD: 1.09 — SIGNIFICANT CHANGE UP (ref 0.88–1.17)
LYMPHOCYTES # BLD AUTO: 0.87 K/UL — LOW (ref 1–3.3)
LYMPHOCYTES # BLD AUTO: 20.8 % — SIGNIFICANT CHANGE UP (ref 13–44)
MCHC RBC-ENTMCNC: 22.5 PG — LOW (ref 27–34)
MCHC RBC-ENTMCNC: 27.7 % — LOW (ref 32–36)
MCV RBC AUTO: 81.4 FL — SIGNIFICANT CHANGE UP (ref 80–100)
MONOCYTES # BLD AUTO: 0.39 K/UL — SIGNIFICANT CHANGE UP (ref 0–0.9)
MONOCYTES NFR BLD AUTO: 9.3 % — SIGNIFICANT CHANGE UP (ref 2–14)
NEUTROPHILS # BLD AUTO: 2.77 K/UL — SIGNIFICANT CHANGE UP (ref 1.8–7.4)
NEUTROPHILS NFR BLD AUTO: 66.3 % — SIGNIFICANT CHANGE UP (ref 43–77)
NRBC # FLD: 0 K/UL — LOW (ref 25–125)
PLATELET # BLD AUTO: 157 K/UL — SIGNIFICANT CHANGE UP (ref 150–400)
PMV BLD: 10.9 FL — SIGNIFICANT CHANGE UP (ref 7–13)
POTASSIUM SERPL-MCNC: 4.7 MMOL/L — SIGNIFICANT CHANGE UP (ref 3.5–5.3)
POTASSIUM SERPL-SCNC: 4.7 MMOL/L — SIGNIFICANT CHANGE UP (ref 3.5–5.3)
PROT SERPL-MCNC: 6.5 G/DL — SIGNIFICANT CHANGE UP (ref 6–8.3)
PROTHROM AB SERPL-ACNC: 12.1 SEC — SIGNIFICANT CHANGE UP (ref 9.8–13.1)
RBC # BLD: 3.33 M/UL — LOW (ref 3.8–5.2)
RBC # FLD: 15.4 % — HIGH (ref 10.3–14.5)
RH IG SCN BLD-IMP: POSITIVE — SIGNIFICANT CHANGE UP
SODIUM SERPL-SCNC: 140 MMOL/L — SIGNIFICANT CHANGE UP (ref 135–145)
WBC # BLD: 4.18 K/UL — SIGNIFICANT CHANGE UP (ref 3.8–10.5)
WBC # FLD AUTO: 4.18 K/UL — SIGNIFICANT CHANGE UP (ref 3.8–10.5)

## 2019-01-22 PROCEDURE — 93971 EXTREMITY STUDY: CPT

## 2019-01-22 PROCEDURE — 99223 1ST HOSP IP/OBS HIGH 75: CPT | Mod: GC

## 2019-01-22 PROCEDURE — 99223 1ST HOSP IP/OBS HIGH 75: CPT

## 2019-01-22 PROCEDURE — 93971 EXTREMITY STUDY: CPT | Mod: 26,RT

## 2019-01-22 RX ORDER — GLUCAGON INJECTION, SOLUTION 0.5 MG/.1ML
1 INJECTION, SOLUTION SUBCUTANEOUS ONCE
Qty: 0 | Refills: 0 | Status: DISCONTINUED | OUTPATIENT
Start: 2019-01-22 | End: 2019-01-29

## 2019-01-22 RX ORDER — DEXTROSE 50 % IN WATER 50 %
12.5 SYRINGE (ML) INTRAVENOUS ONCE
Qty: 0 | Refills: 0 | Status: DISCONTINUED | OUTPATIENT
Start: 2019-01-22 | End: 2019-01-29

## 2019-01-22 RX ORDER — ACETAMINOPHEN 500 MG
325 TABLET ORAL ONCE
Qty: 0 | Refills: 0 | Status: DISCONTINUED | OUTPATIENT
Start: 2019-01-22 | End: 2019-01-23

## 2019-01-22 RX ORDER — FUROSEMIDE 40 MG
40 TABLET ORAL DAILY
Qty: 0 | Refills: 0 | Status: DISCONTINUED | OUTPATIENT
Start: 2019-01-22 | End: 2019-01-23

## 2019-01-22 RX ORDER — CARVEDILOL PHOSPHATE 80 MG/1
25 CAPSULE, EXTENDED RELEASE ORAL EVERY 12 HOURS
Qty: 0 | Refills: 0 | Status: DISCONTINUED | OUTPATIENT
Start: 2019-01-22 | End: 2019-01-29

## 2019-01-22 RX ORDER — HEPARIN SODIUM 5000 [USP'U]/ML
5500 INJECTION INTRAVENOUS; SUBCUTANEOUS EVERY 6 HOURS
Qty: 0 | Refills: 0 | Status: DISCONTINUED | OUTPATIENT
Start: 2019-01-22 | End: 2019-01-29

## 2019-01-22 RX ORDER — TERAZOSIN HYDROCHLORIDE 10 MG/1
2 CAPSULE ORAL AT BEDTIME
Qty: 0 | Refills: 0 | Status: DISCONTINUED | OUTPATIENT
Start: 2019-01-22 | End: 2019-01-27

## 2019-01-22 RX ORDER — CALCITRIOL 0.5 UG/1
0.25 CAPSULE ORAL
Qty: 0 | Refills: 0 | Status: DISCONTINUED | OUTPATIENT
Start: 2019-01-22 | End: 2019-01-29

## 2019-01-22 RX ORDER — DEXTROSE 50 % IN WATER 50 %
25 SYRINGE (ML) INTRAVENOUS ONCE
Qty: 0 | Refills: 0 | Status: DISCONTINUED | OUTPATIENT
Start: 2019-01-22 | End: 2019-01-29

## 2019-01-22 RX ORDER — SODIUM CHLORIDE 9 MG/ML
1000 INJECTION, SOLUTION INTRAVENOUS
Qty: 0 | Refills: 0 | Status: DISCONTINUED | OUTPATIENT
Start: 2019-01-22 | End: 2019-01-29

## 2019-01-22 RX ORDER — DEXTROSE 50 % IN WATER 50 %
15 SYRINGE (ML) INTRAVENOUS ONCE
Qty: 0 | Refills: 0 | Status: DISCONTINUED | OUTPATIENT
Start: 2019-01-22 | End: 2019-01-29

## 2019-01-22 RX ORDER — ATORVASTATIN CALCIUM 80 MG/1
40 TABLET, FILM COATED ORAL AT BEDTIME
Qty: 0 | Refills: 0 | Status: DISCONTINUED | OUTPATIENT
Start: 2019-01-22 | End: 2019-01-29

## 2019-01-22 RX ORDER — INSULIN LISPRO 100/ML
VIAL (ML) SUBCUTANEOUS
Qty: 0 | Refills: 0 | Status: DISCONTINUED | OUTPATIENT
Start: 2019-01-22 | End: 2019-01-29

## 2019-01-22 RX ORDER — HEPARIN SODIUM 5000 [USP'U]/ML
INJECTION INTRAVENOUS; SUBCUTANEOUS
Qty: 25000 | Refills: 0 | Status: DISCONTINUED | OUTPATIENT
Start: 2019-01-22 | End: 2019-01-23

## 2019-01-22 RX ORDER — HEPARIN SODIUM 5000 [USP'U]/ML
2500 INJECTION INTRAVENOUS; SUBCUTANEOUS EVERY 6 HOURS
Qty: 0 | Refills: 0 | Status: DISCONTINUED | OUTPATIENT
Start: 2019-01-22 | End: 2019-01-29

## 2019-01-22 RX ORDER — CLOPIDOGREL BISULFATE 75 MG/1
75 TABLET, FILM COATED ORAL DAILY
Qty: 0 | Refills: 0 | Status: DISCONTINUED | OUTPATIENT
Start: 2019-01-22 | End: 2019-01-28

## 2019-01-22 RX ORDER — ISOSORBIDE MONONITRATE 60 MG/1
60 TABLET, EXTENDED RELEASE ORAL DAILY
Qty: 0 | Refills: 0 | Status: DISCONTINUED | OUTPATIENT
Start: 2019-01-22 | End: 2019-01-23

## 2019-01-22 RX ORDER — ALLOPURINOL 300 MG
100 TABLET ORAL DAILY
Qty: 0 | Refills: 0 | Status: DISCONTINUED | OUTPATIENT
Start: 2019-01-22 | End: 2019-01-29

## 2019-01-22 RX ORDER — SODIUM BICARBONATE 1 MEQ/ML
650 SYRINGE (ML) INTRAVENOUS
Qty: 0 | Refills: 0 | Status: DISCONTINUED | OUTPATIENT
Start: 2019-01-22 | End: 2019-01-29

## 2019-01-22 RX ORDER — INSULIN LISPRO 100/ML
VIAL (ML) SUBCUTANEOUS
Qty: 0 | Refills: 0 | Status: DISCONTINUED | OUTPATIENT
Start: 2019-01-22 | End: 2019-01-23

## 2019-01-22 RX ORDER — ASPIRIN/CALCIUM CARB/MAGNESIUM 324 MG
81 TABLET ORAL DAILY
Qty: 0 | Refills: 0 | Status: DISCONTINUED | OUTPATIENT
Start: 2019-01-22 | End: 2019-01-29

## 2019-01-22 RX ORDER — HYDRALAZINE HCL 50 MG
100 TABLET ORAL EVERY 12 HOURS
Qty: 0 | Refills: 0 | Status: DISCONTINUED | OUTPATIENT
Start: 2019-01-22 | End: 2019-01-23

## 2019-01-22 RX ORDER — HEPARIN SODIUM 5000 [USP'U]/ML
5500 INJECTION INTRAVENOUS; SUBCUTANEOUS ONCE
Qty: 0 | Refills: 0 | Status: COMPLETED | OUTPATIENT
Start: 2019-01-22 | End: 2019-01-22

## 2019-01-22 RX ORDER — INSULIN LISPRO 100/ML
VIAL (ML) SUBCUTANEOUS AT BEDTIME
Qty: 0 | Refills: 0 | Status: DISCONTINUED | OUTPATIENT
Start: 2019-01-22 | End: 2019-01-29

## 2019-01-22 RX ADMIN — ATORVASTATIN CALCIUM 40 MILLIGRAM(S): 80 TABLET, FILM COATED ORAL at 21:33

## 2019-01-22 RX ADMIN — HEPARIN SODIUM 1200 UNIT(S)/HR: 5000 INJECTION INTRAVENOUS; SUBCUTANEOUS at 21:37

## 2019-01-22 RX ADMIN — HEPARIN SODIUM 5500 UNIT(S): 5000 INJECTION INTRAVENOUS; SUBCUTANEOUS at 21:33

## 2019-01-22 NOTE — ED PROVIDER NOTE - PHYSICAL EXAMINATION
left arm +thrill in fistula  right arm +edema from shoulder to wrist, 2+ radial pulse, +ecchymosis over lateral posterior bicep (injection site as per pt). +tn to palpation around ecchymosis  from of elbow and hand/wrist with normal strength, decreased rom of shoulder due to pain.

## 2019-01-22 NOTE — CONSULT NOTE ADULT - ASSESSMENT
1. Acute RUE DVT  2. CAD s/p CABG/PCI  3. AS s/p TAVR  4. s/p PPM (post-TAVR)  5. HTN/CKD  6. DM  7. Anemia    -New diagnosis of RUE DVT  -start IV anticoagulation  -heme eval  -vascular eval  -continue outpt cardiac meds  -full labs pending  -dispo per ED

## 2019-01-22 NOTE — CONSULT NOTE ADULT - SUBJECTIVE AND OBJECTIVE BOX
SICU Consultation Note  =====================================================  HPI: 79y female  ***    Surgery Information  ***  Case Duration: 	EBL: 	IV Fluids: 	Blood Products: 	Urine Output:   Complications:     HISTORY  79y Female  HPI:    Allergies: codeine (Other)  Lortab (Other)  morphine (Other)  Percocet 10/325 (Other)  Reglan (Other; Flushing (Skin))  sulfa drugs (Flushing (Skin))    PAST MEDICAL & SURGICAL HISTORY:  Thyroid nodule: awaiting FNB in the near future  Severe aortic stenosis  Osteoarthritis: bilateral knees  CKD (chronic kidney disease) stage 4, GFR 15-29 ml/min  DM2 (diabetes mellitus, type 2)  Arthritis  CAD (coronary artery disease)  Gout  Anemia Associated with Chronic Renal Failure  HTN (Hypertension)  S/P AVR: TAVR  History of pacemaker  A-V fistula: left arm  S/P cholecystectomy  Stented coronary artery: s/p 3 stents, then CABG 2007  S/P CABG (coronary artery bypass graft): triple in 2007    FAMILY HISTORY:  No pertinent family history in first degree relatives      SOCIAL HISTORY:  ***    ADVANCE DIRECTIVES: Presumed Full Code  ***    REVIEW OF SYSTEMS:  ***  General: Non-Contributory  Skin/Breast: Non-Contributory  Ophthalmologic: Non-Contributory  ENMT: Non-Contributory  Respiratory and Thorax: Non-Contributory  Cardiovascular: Non-Contributory  Gastrointestinal: Non-Contributory  Genitourinary: Non-Contributory  Musculoskeletal: Non-Contributory  Neurological: Non-Contributory  Psychiatric: Non-Contributory  Hematology/Lymphatics: Non-Contributory  Endocrine: Non-Contributory  Allergic/Immunologic: Non-Contributory    HOME MEDICATIONS:  ***    CURRENT MEDICATIONS:   --------------------------------------------------------------------------------------  Neurologic Medications    Respiratory Medications    Cardiovascular Medications    Gastrointestinal Medications  dextrose 5%. 1000 milliLiter(s) IV Continuous <Continuous>    Genitourinary Medications    Hematologic/Oncologic Medications    Antimicrobial/Immunologic Medications    Endocrine/Metabolic Medications  dextrose 40% Gel 15 Gram(s) Oral once PRN Blood Glucose LESS THAN 70 milliGRAM(s)/deciliter  dextrose 50% Injectable 12.5 Gram(s) IV Push once  dextrose 50% Injectable 25 Gram(s) IV Push once  dextrose 50% Injectable 25 Gram(s) IV Push once  glucagon  Injectable 1 milliGRAM(s) IntraMuscular once PRN Glucose LESS THAN 70 milligrams/deciliter  insulin lispro (HumaLOG) corrective regimen sliding scale   SubCutaneous Before meals and at bedtime    Topical/Other Medications    --------------------------------------------------------------------------------------  VITAL SIGNS, INS/OUTS (last 24 hours):  --------------------------------------------------------------------------------------  Vital Signs Last 24 Hrs  T(C): 36.4 (22 Jan 2019 14:49), Max: 36.4 (22 Jan 2019 14:49)  T(F): 97.6 (22 Jan 2019 14:49), Max: 97.6 (22 Jan 2019 14:49)  HR: 60 (22 Jan 2019 14:49) (60 - 60)  BP: 174/53 (22 Jan 2019 14:49) (174/53 - 174/53)  BP(mean): --  RR: 16 (22 Jan 2019 14:49) (16 - 16)  SpO2: 100% (22 Jan 2019 14:49) (100% - 100%)  --------------------------------------------------------------------------------------    EXAM  General: NAD, resting comfortably in chair  Resp: unlabored breathing on RA  CV: HDS, rrr  Abd: soft nt nd  Extr:     LABS  --------------------------------------------------------------------------------------  CBC (01-22 @ 17:42)                          7.5<L>                   4.18    )--------------(  157        66.3  % Neuts, 20.8  % Lymphs, ANC: 2.77                            27.1<L>    BMP (01-22 @ 17:42)       140     |  106     |  50<H> 			Ca++ --      Ca 9.5          ---------------------------------( 123<H>		Mg --           4.7     |  20<L>   |  4.21<H>			Ph --        LFTs (01-22 @ 17:42)      TPro 6.5 / Alb 3.8 / TBili < 0.2<L> / DBili -- / AST 13 / ALT -- / AlkPhos 65    Coags (01-22 @ 17:42)  aPTT 27.3<L> / INR 1.09 / PT 12.1  --------------------------------------------------------------------------------------    OTHER LABS    IMAGING RESULTS  < from: US Duplex Venous Upper Ext Ltd, Right (01.22.19 @ 14:18) >  IMPRESSION:     Right upper extremity DVT extending from the subclavian vein to the   radial vein.      Findings were discussed with Dr. Damico at the conclusion of the   examination     FAHEEM SOOD M.D., ATTENDING RADIOLOGIST   This document has been electronically signed. Jan 22 2019  2:11PM        < end of copied text > Vascular Surgery Consultation Note  =====================================================  HPI:  79F w/ aortic stenosis s/p aortic valve replacement, CAD s/p CABG x3, pacemaker, CKD w/ AVF LUE (unused, not on HD), OA, gout, presents with RUE swelling concerning for acute DVT. Patient reports that she gets a regular weekly injection of Epo which she got at the end of last week. She began to notice RUE arm swelling immediately afterwards, which was most painful in the shoulder. She reports that she is able to move the arm without issue. She denies any history of DVT and reports that she has never had this problem before. Patient is currently on an ASA/plavix. Patient notes that she had a LUE fistula placed in 2012 in [place] but has never required it as her kidney function improved.    In ED, patient had duplex RUE demonstrating RUE DVT of subclavian, axillary, brachial, and radial veins. Vascular surgery is consulted for management of acute RUE DVT.     Allergies: codeine (Other)  Lortab (Other)  morphine (Other)  Percocet 10/325 (Other)  Reglan (Other; Flushing (Skin))  sulfa drugs (Flushing (Skin))    PAST MEDICAL & SURGICAL HISTORY:  Thyroid nodule: awaiting FNB in the near future  Severe aortic stenosis  Osteoarthritis: bilateral knees  CKD (chronic kidney disease) stage 4, GFR 15-29 ml/min  DM2 (diabetes mellitus, type 2)  Arthritis  CAD (coronary artery disease)  Gout  Anemia Associated with Chronic Renal Failure  HTN (Hypertension)  S/P AVR: TAVR  History of pacemaker  A-V fistula: left arm  S/P cholecystectomy  Stented coronary artery: s/p 3 stents, then CABG 2007  S/P CABG (coronary artery bypass graft): triple in 2007    FAMILY HISTORY:  No pertinent family history in first degree relatives    ADVANCE DIRECTIVES: Presumed Full Code     REVIEW OF SYSTEMS:   General: Non-Contributory  Skin/Breast: Non-Contributory  Ophthalmologic: Non-Contributory  ENMT: Non-Contributory  Respiratory and Thorax: Non-Contributory  Cardiovascular: Non-Contributory  Gastrointestinal: Non-Contributory  Genitourinary: Non-Contributory  Musculoskeletal: Non-Contributory  Neurological: Non-Contributory  Psychiatric: Non-Contributory  Hematology/Lymphatics: Non-Contributory  Endocrine: Non-Contributory  Allergic/Immunologic: Non-Contributory    HOME MEDICATIONS:   Home Medications:  allopurinol 100 mg oral tablet: 1 tab(s) orally once a day (28 Mar 2018 17:00)  aspirin 81 mg oral delayed release tablet: 1 tab(s) orally once a day (01 Apr 2018 15:15)  calcitriol 0.25 mcg oral capsule: 1 cap(s) orally 3 times a week on Monday, Wednesday, and Friday  (28 Mar 2018 17:00)  carvedilol 25 mg oral tablet: 1 tab(s) orally 2 times a day (28 Mar 2018 17:00)  cloNIDine 0.2 mg oral tablet: 1 tab(s) orally 2 times a day (01 Apr 2018 15:15)  Crestor 10 mg oral tablet: 1 tab(s) orally once a day (at bedtime) (28 Mar 2018 17:00)  furosemide 40 mg tablet: 1 tab(s) orally once a day (28 Mar 2018 17:00)  hydrALAZINE 100 mg oral tablet: 1 tab(s) orally 2 times a day in the morning and evening  (28 Mar 2018 17:00)  Imdur 60 mg oral tablet, extended release: 1 tab(s) orally 2 times a day (28 Mar 2018 17:00)  Onglyza 2.5 mg oral tablet: 1 tab(s) orally , As Needed - for FBS &gt;100 (28 Mar 2018 17:00)  Plavix 75 mg tablet: 1 tab(s) orally once a day  (28 Mar 2018 17:00)  sodium bicarbonate 650 mg oral tablet: 1 tab(s) orally 2 times a day (28 Mar 2018 17:00)  terazosin 2 mg oral capsule: 1 cap(s) orally once a day (at bedtime) (28 Mar 2018 17:00)    CURRENT MEDICATIONS:   --------------------------------------------------------------------------------------  Neurologic Medications    Respiratory Medications    Cardiovascular Medications    Gastrointestinal Medications  dextrose 5%. 1000 milliLiter(s) IV Continuous <Continuous>    Genitourinary Medications    Hematologic/Oncologic Medications    Antimicrobial/Immunologic Medications    Endocrine/Metabolic Medications  dextrose 40% Gel 15 Gram(s) Oral once PRN Blood Glucose LESS THAN 70 milliGRAM(s)/deciliter  dextrose 50% Injectable 12.5 Gram(s) IV Push once  dextrose 50% Injectable 25 Gram(s) IV Push once  dextrose 50% Injectable 25 Gram(s) IV Push once  glucagon  Injectable 1 milliGRAM(s) IntraMuscular once PRN Glucose LESS THAN 70 milligrams/deciliter  insulin lispro (HumaLOG) corrective regimen sliding scale   SubCutaneous Before meals and at bedtime    Topical/Other Medications    --------------------------------------------------------------------------------------  VITAL SIGNS, INS/OUTS (last 24 hours):  --------------------------------------------------------------------------------------  Vital Signs Last 24 Hrs  T(C): 36.4 (22 Jan 2019 14:49), Max: 36.4 (22 Jan 2019 14:49)  T(F): 97.6 (22 Jan 2019 14:49), Max: 97.6 (22 Jan 2019 14:49)  HR: 60 (22 Jan 2019 14:49) (60 - 60)  BP: 174/53 (22 Jan 2019 14:49) (174/53 - 174/53)  BP(mean): --  RR: 16 (22 Jan 2019 14:49) (16 - 16)  SpO2: 100% (22 Jan 2019 14:49) (100% - 100%)  --------------------------------------------------------------------------------------    EXAM  General: NAD, resting comfortably in chair  Resp: unlabored breathing on RA  CV: HDS, rrr  Abd: soft nt nd  Extr: LUE fistula in place with palpable thrill, radial/ulnar arteries palpable; RUE forearm swollen w/ soft compartments, arm w/o swelling, radial/ulnar pulses palpable    LABS  --------------------------------------------------------------------------------------  CBC (01-22 @ 17:42)                          7.5<L>                   4.18    )--------------(  157        66.3  % Neuts, 20.8  % Lymphs, ANC: 2.77                            27.1<L>    BMP (01-22 @ 17:42)       140     |  106     |  50<H> 			Ca++ --      Ca 9.5          ---------------------------------( 123<H>		Mg --           4.7     |  20<L>   |  4.21<H>			Ph --        LFTs (01-22 @ 17:42)      TPro 6.5 / Alb 3.8 / TBili < 0.2<L> / DBili -- / AST 13 / ALT -- / AlkPhos 65    Coags (01-22 @ 17:42)  aPTT 27.3<L> / INR 1.09 / PT 12.1  --------------------------------------------------------------------------------------    OTHER LABS    IMAGING RESULTS  < from: US Duplex Venous Upper Ext Ltd, Right (01.22.19 @ 14:18) >  IMPRESSION:     Right upper extremity DVT extending from the subclavian vein to the   radial vein.      Findings were discussed with Dr. Damico at the conclusion of the   examination     FAHEEM SOOD M.D., ATTENDING RADIOLOGIST   This document has been electronically signed. Jan 22 2019  2:11PM        < end of copied text >

## 2019-01-22 NOTE — ED ADULT NURSE NOTE - OBJECTIVE STATEMENT
79 y female presents to the ER with the complaint of DVT of the right arm. Pt states since Friday, she has started experiencing swelling and edema of the whole right arm. Pt normally gets a shot for her anemia on the right back arm monthly. Went for a CT scan today and was told to come to the ED due to the confirmation of a blood clot. Pt a small ecchymotic patch on the right upper arm that is about 2X2 she believes is from her last shot. Pt has a non-accessed AV fistula on the left arm. Never had dialysis as she was told her creatinine levels have been stable. Due to fistula on left arm and clot on right, unable to attain IV access on both. Pt to be seen in the main ED as per ED attending Dr. Drummond.

## 2019-01-22 NOTE — H&P ADULT - PROBLEM SELECTOR PLAN 3
Renal consult in am, dose meds renally, avoid nephrotoxins  cont sodium bicarb for metabolic acidosis

## 2019-01-22 NOTE — H&P ADULT - HISTORY OF PRESENT ILLNESS
79F with h/o  HTN, CKD stage 5, with a LUE fistula placed 6 yrs ago in [place] which has never been used, DM2, CAD s/p CABG, PCI (3/2017), Aortic stenosis s/p TAVR/PPM (3/2017),  sent in for evaluation after RUE ultrasound reveals an extensive DVT of the right subclavian. Pt reports that she had aranesp injection in the rt arm on Friday and started to notice swelling and pain of the rt arm since Monday, went for a f/u with her cardiologist had US which revealed dvt and sent here. Pt denies chest pain, SOB,  palpitations, orthopnea, lower extremity edema, headache, vision changes, fever, recent illness, abdominal pain, nausea, vomit, dysuria or any other complaints.    In the ED had US which revealed dvt  Vital Signs Last 24 Hrs  T(C): 36.4 (22 Jan 2019 14:49), Max: 36.4 (22 Jan 2019 14:49)  T(F): 97.6 (22 Jan 2019 14:49), Max: 97.6 (22 Jan 2019 14:49)  HR: 60 (22 Jan 2019 14:49) (60 - 60)  BP: 174/53 (22 Jan 2019 14:49) (174/53 - 174/53)  BP(mean): --  RR: 16 (22 Jan 2019 14:49) (16 - 16)  SpO2: 100% (22 Jan 2019 14:49) (100% - 100%) 79F with h/o  HTN, CKD stage 5, with a LUE fistula placed 6 yrs ago in [place] which has never been used, DM2, CAD s/p CABG, PCI (3/2017), Aortic stenosis s/p TAVR/PPM (3/2017),  sent in for evaluation after RUE ultrasound reveals an extensive DVT of the right subclavian. Pt reports that she had aranesp injection in the rt arm on Friday and started to notice swelling and pain of the rt arm since Monday, went for a f/u with her cardiologist had US which revealed dvt and sent here. Pt denies chest pain, SOB,  palpitations, orthopnea, lower extremity edema, headache, vision changes, fever, recent illness, abdominal pain, nausea, vomit, dysuria or any other complaints.    In the ED had US which revealed RUE dvt extending from the subclavian to radial vein.  Vital Signs Last 24 Hrs  T(C): 36.4 (22 Jan 2019 14:49), Max: 36.4 (22 Jan 2019 14:49)  T(F): 97.6 (22 Jan 2019 14:49), Max: 97.6 (22 Jan 2019 14:49)  HR: 60 (22 Jan 2019 14:49) (60 - 60)  BP: 174/53 (22 Jan 2019 14:49) (174/53 - 174/53)  BP(mean): --  RR: 16 (22 Jan 2019 14:49) (16 - 16)  SpO2: 100% (22 Jan 2019 14:49) (100% - 100%)

## 2019-01-22 NOTE — CHART NOTE - NSCHARTNOTEFT_GEN_A_CORE
ADS NIGHT COVERAGE:    Pt's weight entered. Heparin drip ordered for RUE DVT.     NITZA Aviles ADS PA-C  [id number]

## 2019-01-22 NOTE — H&P ADULT - PROBLEM SELECTOR PLAN 5
cont home meds ( asa, plavix, clonidine, imdur, hydralazine, coreg, lasix) cont home meds ( asa, plavix, clonidine, imdur, hydralazine, coreg, terazosin, lasix), cards filomena

## 2019-01-22 NOTE — H&P ADULT - ASSESSMENT
79F with h/o  HTN, CKD stage 5, with a LUE fistula placed 6 yrs ago in [place] which has never been used, DM2, CAD s/p CABG, PCI (3/2017), Aortic stenosis s/p TAVR/PPM (3/2017), a/w extensive dvt of 79F with h/o  HTN, CKD stage 5, with a LUE fistula placed 6 yrs ago in [place] which has never been used, DM2, CAD s/p CABG, PCI (3/2017), Aortic stenosis s/p TAVR/PPM (3/2017), a/w RUE dvt extending from subclavian to radial vein.

## 2019-01-22 NOTE — H&P ADULT - EXTREMITIES COMMENTS
RUE+ edema, + tenderness, decreased ROM due to pain, LUE with avf + thrill RUE+ edema, + tenderness, + warmth, decreased ROM due to pain, LUE with avf + thrill

## 2019-01-22 NOTE — H&P ADULT - PROBLEM SELECTOR PLAN 1
will start on ac with heparin will start on ac with heparin, monitor pt/ptt, will need to discuss with Nephrology for other options given ckd, vascular consulted, f/u recs, heme consulted  ED, f/u recs, per vascular rec to get MR venogram. will start on ac with heparin, monitor pt/ptt, will need to discuss with Nephrology for other options given ckd, vascular consulted, f/u recs, heme consulted by ED, f/u recs, per vascular rec to get MR venogram, discussed with Vascular will need to coordinate with nephrology in am.

## 2019-01-22 NOTE — ED ADULT NURSE NOTE - CHIEF COMPLAINT QUOTE
Pt c/o right arm pain since Friday with edema.  Sent from imaging center at [address] for DVT.  R AV fistula.  Not on dialysis

## 2019-01-22 NOTE — ED ADULT NURSE NOTE - NSIMPLEMENTINTERV_GEN_ALL_ED
Implemented All Universal Safety Interventions:  [place] to call system. Call bell, personal items and telephone within reach. Instruct patient to call for assistance. Room bathroom lighting operational. Non-slip footwear when patient is off stretcher. Physically safe environment: no spills, clutter or unnecessary equipment. Stretcher in lowest position, wheels locked, appropriate side rails in place.

## 2019-01-22 NOTE — ED PROVIDER NOTE - OBJECTIVE STATEMENT
79 yof with CRI baseline creatinine 4, anemia on epo shots. Pt had injection in right arm on 4 days ago. Developed right arm pain and swelling. Pt was seen by PMD today and had a +DVT on US extending from subclavian to radial vein. no fever, no previous clots. no cp, no sob.   Left arm fistula placed 6 years and never used.

## 2019-01-22 NOTE — ED PROVIDER NOTE - MEDICAL DECISION MAKING DETAILS
extensive DVT - case discussed with vascular surgery - recommend heparin IV, permission given to place IV distal to fistula  renal - aware of pt and will consult  heme - also spoke with fellow and recommend possible bridge to eliquis if renal function allows.  ADMIT

## 2019-01-22 NOTE — CONSULT NOTE ADULT - SUBJECTIVE AND OBJECTIVE BOX
CHIEF COMPLAINT:    HPI:   79F well known to our practice, PMH of HTN, CKD stage 5, with a LUE fistula which has never been used, DM2, CAD s/p CABG, PCI (3/2017), Aortic stenosis s/p TAVR/PPM (3/2017),  sent in for evaluation after a recent RUE ultrasound reveals an extensive DVT of the right subclavian. Pt complains of several days of right arm swelling. The pt denies chest pain, SOB, ONOFRE,  palpitations, orthopnea, lower extremity edema, headache, vision changes, fever, recent illness, abdominal pain, nausea, vomit, dysuria. In the Ed, the pt was seen by the edu team.  Their consult and recommendations are in the chart.       PAST MEDICAL & SURGICAL HISTORY:  Thyroid nodule: awaiting FNB in the near future  Severe aortic stenosis  Osteoarthritis: bilateral knees  CKD (chronic kidney disease) stage 4, GFR 15-29 ml/min  DM2 (diabetes mellitus, type 2)  Arthritis  CAD (coronary artery disease)  Gout  Anemia Associated with Chronic Renal Failure  HTN (Hypertension)  S/P AVR: TAVR  History of pacemaker  A-V fistula: left arm  S/P cholecystectomy  Stented coronary artery: s/p 3 stents, then CABG 2007  S/P CABG (coronary artery bypass graft): triple in 2007          PREVIOUS DIAGNOSTIC TESTING:    [ ] Echocardiogram:  [ ]  Catheterization:  [ ] Stress Test:  	    MEDICATIONS:  MEDICATIONS  (STANDING):      FAMILY HISTORY:  No pertinent family history in first degree relatives      SOCIAL HISTORY:    [ ] Non-smoker  [ ] Smoker  [ ] Alcohol    Allergies    codeine (Other)  Lortab (Other)  morphine (Other)  Percocet 10/325 (Other)  Reglan (Other; Flushing (Skin))  sulfa drugs (Flushing (Skin))    Intolerances    	    REVIEW OF SYSTEMS:  CONSTITUTIONAL: No fever, weight loss, or fatigue  EYES: No eye pain, visual disturbances, or discharge  ENMT:  No difficulty hearing, tinnitus, vertigo; No sinus or throat pain  NECK: No pain or stiffness  RESPIRATORY: No cough, wheezing, chills or hemoptysis; No Shortness of Breath  CARDIOVASCULAR: No chest pain, palpitations, passing out, dizziness, or leg swelling  GASTROINTESTINAL: No abdominal or epigastric pain. No nausea, vomiting, or hematemesis; No diarrhea or constipation. No melena or hematochezia.  GENITOURINARY: No dysuria, frequency, hematuria, or incontinence  NEUROLOGICAL: No headaches, memory loss, loss of strength, numbness, or tremors  SKIN: No itching, burning, rashes, or lesions   	    [ ] All others negative	  [ ] Unable to obtain    PHYSICAL EXAM:  T(C): 36.4 (01-22-19 @ 14:49), Max: 36.4 (01-22-19 @ 14:49)  HR: 60 (01-22-19 @ 14:49) (60 - 60)  BP: 174/53 (01-22-19 @ 14:49) (174/53 - 174/53)  RR: 16 (01-22-19 @ 14:49) (16 - 16)  SpO2: 100% (01-22-19 @ 14:49) (100% - 100%)  Wt(kg): --  I&O's Summary      Appearance: Normal	  Psychiatry: A & O x 3, Mood & affect appropriate  HEENT:   Normal oral mucosa, PERRL, EOMI	  Lymphatic: No lymphadenopathy  Cardiovascular: Normal S1 S2,RRR, No JVD, No murmurs  Respiratory: Lungs clear to auscultation	  Gastrointestinal:  Soft, Non-tender, + BS	  Skin: No rashes, No ecchymoses, No cyanosis	  Neurologic: Non-focal  Extremities: RUE edema  Vascular: Peripheral pulses palpable 2+ bilaterally    TELEMETRY: 	    ECG:  	  RADIOLOGY:  OTHER: 	  	  LABS:	 	    CARDIAC MARKERS:                      proBNP:   Lipid Profile:   HgA1c:   TSH:     ASSESSMENT/PLAN:

## 2019-01-22 NOTE — CONSULT NOTE ADULT - ASSESSMENT
ASSESSMENT: ASSESSMENT:  79F w/ aortic stenosis s/p aortic valve replacement, CAD s/p CABG x3, pacemaker, CKD w/ AVF LUE (unused, not on HD), OA, gout, presents with RUE swelling concerning for acute DVT. Duplex RUE demonstrates RUE DVT of subclavian, axillary, brachial, and radial veins. Vascular surgery is consulted for management of acute RUE DVT.     - Agree with admission to medicine service  - Recommend MR venogram of chest to evaluate for possible thoracic outlet syndrome  - Anticoagulate as able for acute DVT  - Vascular surgery will continue to follow    Discussed with Dr. Mark Velez PGY-2  Surgery Pager [phone] ASSESSMENT:  79F w/ aortic stenosis s/p aortic valve replacement, CAD s/p CABG x3, pacemaker, CKD w/ AVF LUE (unused, not on HD), OA, gout, presents with RUE swelling concerning for acute DVT. Duplex RUE demonstrates RUE DVT of subclavian, axillary, brachial, and radial veins. Vascular surgery is consulted for management of acute RUE DVT.     - Agree with admission to medicine service  - Recommend MR venogram of chest to evaluate for possible venous  thoracic outlet syndrome  - Anticoagulate as able for acute DVT  - Vascular surgery will continue to follow    Discussed with Dr. Mark Velez PGY-2  Surgery Pager [phone]

## 2019-01-23 DIAGNOSIS — I82.621 ACUTE EMBOLISM AND THROMBOSIS OF DEEP VEINS OF RIGHT UPPER EXTREMITY: ICD-10-CM

## 2019-01-23 DIAGNOSIS — N18.9 CHRONIC KIDNEY DISEASE, UNSPECIFIED: ICD-10-CM

## 2019-01-23 DIAGNOSIS — M79.89 OTHER SPECIFIED SOFT TISSUE DISORDERS: ICD-10-CM

## 2019-01-23 DIAGNOSIS — I10 ESSENTIAL (PRIMARY) HYPERTENSION: ICD-10-CM

## 2019-01-23 LAB
ANION GAP SERPL CALC-SCNC: 14 MMO/L — SIGNIFICANT CHANGE UP (ref 7–14)
APTT BLD: 108.4 SEC — HIGH (ref 27.5–36.3)
APTT BLD: 76.9 SEC — HIGH (ref 27.5–36.3)
APTT BLD: > 200 SEC — CRITICAL HIGH (ref 27.5–36.3)
BLD GP AB SCN SERPL QL: NEGATIVE — SIGNIFICANT CHANGE UP
BUN SERPL-MCNC: 46 MG/DL — HIGH (ref 7–23)
CALCIUM SERPL-MCNC: 9.2 MG/DL — SIGNIFICANT CHANGE UP (ref 8.4–10.5)
CHLORIDE SERPL-SCNC: 110 MMOL/L — HIGH (ref 98–107)
CO2 SERPL-SCNC: 19 MMOL/L — LOW (ref 22–31)
CREAT SERPL-MCNC: 4.06 MG/DL — HIGH (ref 0.5–1.3)
GLUCOSE BLDC GLUCOMTR-MCNC: 108 MG/DL — HIGH (ref 70–99)
GLUCOSE BLDC GLUCOMTR-MCNC: 108 MG/DL — HIGH (ref 70–99)
GLUCOSE BLDC GLUCOMTR-MCNC: 124 MG/DL — HIGH (ref 70–99)
GLUCOSE BLDC GLUCOMTR-MCNC: 151 MG/DL — HIGH (ref 70–99)
GLUCOSE BLDC GLUCOMTR-MCNC: 89 MG/DL — SIGNIFICANT CHANGE UP (ref 70–99)
GLUCOSE SERPL-MCNC: 107 MG/DL — HIGH (ref 70–99)
HBA1C BLD-MCNC: 5.1 % — SIGNIFICANT CHANGE UP (ref 4–5.6)
HCT VFR BLD CALC: 24.3 % — LOW (ref 34.5–45)
HCT VFR BLD CALC: 24.8 % — LOW (ref 34.5–45)
HGB BLD-MCNC: 7 G/DL — CRITICAL LOW (ref 11.5–15.5)
HGB BLD-MCNC: 7 G/DL — CRITICAL LOW (ref 11.5–15.5)
MCHC RBC-ENTMCNC: 22.5 PG — LOW (ref 27–34)
MCHC RBC-ENTMCNC: 23 PG — LOW (ref 27–34)
MCHC RBC-ENTMCNC: 28.2 % — LOW (ref 32–36)
MCHC RBC-ENTMCNC: 28.8 % — LOW (ref 32–36)
MCV RBC AUTO: 79.7 FL — LOW (ref 80–100)
MCV RBC AUTO: 79.9 FL — LOW (ref 80–100)
NRBC # FLD: 0 K/UL — LOW (ref 25–125)
NRBC # FLD: 0 K/UL — LOW (ref 25–125)
PLATELET # BLD AUTO: 125 K/UL — LOW (ref 150–400)
PLATELET # BLD AUTO: 137 K/UL — LOW (ref 150–400)
PMV BLD: 10.3 FL — SIGNIFICANT CHANGE UP (ref 7–13)
PMV BLD: 9.7 FL — SIGNIFICANT CHANGE UP (ref 7–13)
POTASSIUM SERPL-MCNC: 4.8 MMOL/L — SIGNIFICANT CHANGE UP (ref 3.5–5.3)
POTASSIUM SERPL-SCNC: 4.8 MMOL/L — SIGNIFICANT CHANGE UP (ref 3.5–5.3)
RBC # BLD: 3.04 M/UL — LOW (ref 3.8–5.2)
RBC # BLD: 3.11 M/UL — LOW (ref 3.8–5.2)
RBC # FLD: 15.3 % — HIGH (ref 10.3–14.5)
RBC # FLD: 15.5 % — HIGH (ref 10.3–14.5)
RH IG SCN BLD-IMP: POSITIVE — SIGNIFICANT CHANGE UP
SODIUM SERPL-SCNC: 143 MMOL/L — SIGNIFICANT CHANGE UP (ref 135–145)
WBC # BLD: 3.61 K/UL — LOW (ref 3.8–10.5)
WBC # BLD: 3.9 K/UL — SIGNIFICANT CHANGE UP (ref 3.8–10.5)
WBC # FLD AUTO: 3.61 K/UL — LOW (ref 3.8–10.5)
WBC # FLD AUTO: 3.9 K/UL — SIGNIFICANT CHANGE UP (ref 3.8–10.5)

## 2019-01-23 PROCEDURE — 99222 1ST HOSP IP/OBS MODERATE 55: CPT | Mod: GC

## 2019-01-23 PROCEDURE — 99232 SBSQ HOSP IP/OBS MODERATE 35: CPT

## 2019-01-23 RX ORDER — HYDRALAZINE HCL 50 MG
5 TABLET ORAL ONCE
Qty: 0 | Refills: 0 | Status: COMPLETED | OUTPATIENT
Start: 2019-01-23 | End: 2019-01-23

## 2019-01-23 RX ORDER — ISOSORBIDE MONONITRATE 60 MG/1
60 TABLET, EXTENDED RELEASE ORAL
Qty: 0 | Refills: 0 | Status: DISCONTINUED | OUTPATIENT
Start: 2019-01-23 | End: 2019-01-29

## 2019-01-23 RX ORDER — FUROSEMIDE 40 MG
40 TABLET ORAL DAILY
Qty: 0 | Refills: 0 | Status: DISCONTINUED | OUTPATIENT
Start: 2019-01-24 | End: 2019-01-27

## 2019-01-23 RX ORDER — HEPARIN SODIUM 5000 [USP'U]/ML
900 INJECTION INTRAVENOUS; SUBCUTANEOUS
Qty: 25000 | Refills: 0 | Status: DISCONTINUED | OUTPATIENT
Start: 2019-01-23 | End: 2019-01-29

## 2019-01-23 RX ORDER — HYDRALAZINE HCL 50 MG
100 TABLET ORAL THREE TIMES A DAY
Qty: 0 | Refills: 0 | Status: DISCONTINUED | OUTPATIENT
Start: 2019-01-23 | End: 2019-01-29

## 2019-01-23 RX ORDER — SODIUM CHLORIDE 9 MG/ML
1000 INJECTION INTRAMUSCULAR; INTRAVENOUS; SUBCUTANEOUS
Qty: 0 | Refills: 0 | Status: DISCONTINUED | OUTPATIENT
Start: 2019-01-23 | End: 2019-01-23

## 2019-01-23 RX ADMIN — HEPARIN SODIUM 900 UNIT(S)/HR: 5000 INJECTION INTRAVENOUS; SUBCUTANEOUS at 22:37

## 2019-01-23 RX ADMIN — CARVEDILOL PHOSPHATE 25 MILLIGRAM(S): 80 CAPSULE, EXTENDED RELEASE ORAL at 17:22

## 2019-01-23 RX ADMIN — Medication 100 MILLIGRAM(S): at 21:49

## 2019-01-23 RX ADMIN — SODIUM CHLORIDE 75 MILLILITER(S): 9 INJECTION INTRAMUSCULAR; INTRAVENOUS; SUBCUTANEOUS at 10:21

## 2019-01-23 RX ADMIN — CALCITRIOL 0.25 MICROGRAM(S): 0.5 CAPSULE ORAL at 09:41

## 2019-01-23 RX ADMIN — Medication 0.2 MILLIGRAM(S): at 06:26

## 2019-01-23 RX ADMIN — Medication 650 MILLIGRAM(S): at 17:22

## 2019-01-23 RX ADMIN — Medication 0.2 MILLIGRAM(S): at 17:22

## 2019-01-23 RX ADMIN — Medication 650 MILLIGRAM(S): at 06:26

## 2019-01-23 RX ADMIN — TERAZOSIN HYDROCHLORIDE 2 MILLIGRAM(S): 10 CAPSULE ORAL at 01:51

## 2019-01-23 RX ADMIN — ATORVASTATIN CALCIUM 40 MILLIGRAM(S): 80 TABLET, FILM COATED ORAL at 21:50

## 2019-01-23 RX ADMIN — TERAZOSIN HYDROCHLORIDE 2 MILLIGRAM(S): 10 CAPSULE ORAL at 23:11

## 2019-01-23 RX ADMIN — CLOPIDOGREL BISULFATE 75 MILLIGRAM(S): 75 TABLET, FILM COATED ORAL at 12:17

## 2019-01-23 RX ADMIN — Medication 40 MILLIGRAM(S): at 06:26

## 2019-01-23 RX ADMIN — Medication 100 MILLIGRAM(S): at 12:16

## 2019-01-23 RX ADMIN — HEPARIN SODIUM 900 UNIT(S)/HR: 5000 INJECTION INTRAVENOUS; SUBCUTANEOUS at 14:51

## 2019-01-23 RX ADMIN — Medication 100 MILLIGRAM(S): at 06:26

## 2019-01-23 RX ADMIN — Medication 1: at 13:25

## 2019-01-23 RX ADMIN — HEPARIN SODIUM 0 UNIT(S)/HR: 5000 INJECTION INTRAVENOUS; SUBCUTANEOUS at 05:03

## 2019-01-23 RX ADMIN — HEPARIN SODIUM 1000 UNIT(S)/HR: 5000 INJECTION INTRAVENOUS; SUBCUTANEOUS at 06:12

## 2019-01-23 RX ADMIN — CARVEDILOL PHOSPHATE 25 MILLIGRAM(S): 80 CAPSULE, EXTENDED RELEASE ORAL at 06:26

## 2019-01-23 RX ADMIN — Medication 81 MILLIGRAM(S): at 12:16

## 2019-01-23 RX ADMIN — ISOSORBIDE MONONITRATE 60 MILLIGRAM(S): 60 TABLET, EXTENDED RELEASE ORAL at 17:22

## 2019-01-23 RX ADMIN — ISOSORBIDE MONONITRATE 60 MILLIGRAM(S): 60 TABLET, EXTENDED RELEASE ORAL at 12:16

## 2019-01-23 NOTE — CHART NOTE - NSCHARTNOTEFT_GEN_A_CORE
ADS NIGHT COVERAGE:    Notified by RN H/H 7.0/24.8 -- drop from 7.5/27.1. Pt seen at bedside. Pt with new RUE DVT, heparin started last night. Pt seen at bedside. Pt stated she had received 1 transfusion in past years ago with no reaction that she can recall. Explained risks/benefits, pt verbalized understanding. Consent signed and in chart.     **Repeat CBC, Type and screen x2 sent. Will order prbc if still low.     NITZA Aviles ADS PA-C  [id number]

## 2019-01-23 NOTE — PROGRESS NOTE ADULT - ASSESSMENT
ASSESSMENT:  79F w/ aortic stenosis s/p aortic valve replacement, CAD s/p CABG x3, pacemaker, CKD w/ AVF LUE (unused, not on HD), OA, gout, presents with RUE swelling concerning for acute DVT. Duplex RUE demonstrates RUE DVT of subclavian, axillary, brachial, and radial veins. Vascular surgery is consulted for management of acute RUE DVT.     - Recommend MR venogram of chest to evaluate for possible thoracic outlet syndrome  - Anticoagulate as able for acute DVT  - Monitor for signs of bleeding (decrease in H/H - patient may require transfusion)  - Vascular surgery will continue to follow      Surgery Pager [phone] ASSESSMENT:  79F w/ aortic stenosis s/p aortic valve replacement, CAD s/p CABG x3, pacemaker, CKD w/ AVF LUE (unused, not on HD), OA, gout, presents with RUE swelling concerning for acute DVT. Duplex RUE demonstrates RUE DVT of subclavian, axillary, brachial, and radial veins. Vascular surgery is consulted for management of acute RUE DVT.     - Recommend MR venogram of chest to evaluate for possible thoracic outlet syndrome  - Anticoagulate as able for acute DVT  - will follow

## 2019-01-23 NOTE — CONSULT NOTE ADULT - PROBLEM SELECTOR RECOMMENDATION 2
BP elevated. BP meds may need to be adjusted if BP remains elevated. Low salt diet. Monitor BP
I performed a history and physical exam of the patient and discussed  the findings and plan with the house officer. I reviewed the resident note and agree with the findings and plan

## 2019-01-23 NOTE — PROGRESS NOTE ADULT - SUBJECTIVE AND OBJECTIVE BOX
Patient is a 79y old  Female who presents with a chief complaint of Right arm swelling (23 Jan 2019 09:31)      INTERVAL HPI/OVERNIGHT EVENTS:  T(C): 37 (01-23-19 @ 12:48), Max: 37 (01-23-19 @ 06:20)  HR: 66 (01-23-19 @ 17:21) (59 - 74)  BP: 187/65 (01-23-19 @ 17:21) (154/84 - 202/74)  RR: 18 (01-23-19 @ 17:21) (15 - 18)  SpO2: 100% (01-23-19 @ 17:21) (100% - 100%)  Wt(kg): --  I&O's Summary      LABS:                        7.0    3.61  )-----------( 137      ( 23 Jan 2019 04:54 )             24.3     01-23    143  |  110<H>  |  46<H>  ----------------------------<  107<H>  4.8   |  19<L>  |  4.06<H>    Ca    9.2      23 Jan 2019 03:54    TPro  6.5  /  Alb  3.8  /  TBili  < 0.2<L>  /  DBili  x   /  AST  13  /  ALT  4   /  AlkPhos  65  01-22    PT/INR - ( 22 Jan 2019 17:42 )   PT: 12.1 SEC;   INR: 1.09          PTT - ( 23 Jan 2019 13:53 )  PTT:108.4 SEC    CAPILLARY BLOOD GLUCOSE      POCT Blood Glucose.: 89 mg/dL (23 Jan 2019 17:17)  POCT Blood Glucose.: 151 mg/dL (23 Jan 2019 13:03)  POCT Blood Glucose.: 108 mg/dL (23 Jan 2019 09:31)  POCT Blood Glucose.: 108 mg/dL (23 Jan 2019 01:11)            MEDICATIONS  (STANDING):  allopurinol 100 milliGRAM(s) Oral daily  aspirin enteric coated 81 milliGRAM(s) Oral daily  atorvastatin 40 milliGRAM(s) Oral at bedtime  calcitriol   Capsule 0.25 MICROGram(s) Oral <User Schedule>  carvedilol 25 milliGRAM(s) Oral every 12 hours  cloNIDine 0.2 milliGRAM(s) Oral two times a day  clopidogrel Tablet 75 milliGRAM(s) Oral daily  dextrose 5%. 1000 milliLiter(s) (50 mL/Hr) IV Continuous <Continuous>  dextrose 5%. 1000 milliLiter(s) (50 mL/Hr) IV Continuous <Continuous>  dextrose 50% Injectable 12.5 Gram(s) IV Push once  dextrose 50% Injectable 25 Gram(s) IV Push once  dextrose 50% Injectable 25 Gram(s) IV Push once  dextrose 50% Injectable 12.5 Gram(s) IV Push once  dextrose 50% Injectable 25 Gram(s) IV Push once  dextrose 50% Injectable 25 Gram(s) IV Push once  heparin  Infusion.  Unit(s)/Hr (12 mL/Hr) IV Continuous <Continuous>  hydrALAZINE 100 milliGRAM(s) Oral three times a day  insulin lispro (HumaLOG) corrective regimen sliding scale   SubCutaneous three times a day before meals  insulin lispro (HumaLOG) corrective regimen sliding scale   SubCutaneous at bedtime  isosorbide   mononitrate ER Tablet (IMDUR) 60 milliGRAM(s) Oral <User Schedule>  sodium bicarbonate 650 milliGRAM(s) Oral two times a day  terazosin 2 milliGRAM(s) Oral at bedtime    MEDICATIONS  (PRN):  dextrose 40% Gel 15 Gram(s) Oral once PRN Blood Glucose LESS THAN 70 milliGRAM(s)/deciliter  dextrose 40% Gel 15 Gram(s) Oral once PRN Blood Glucose LESS THAN 70 milliGRAM(s)/deciliter  glucagon  Injectable 1 milliGRAM(s) IntraMuscular once PRN Glucose LESS THAN 70 milligrams/deciliter  glucagon  Injectable 1 milliGRAM(s) IntraMuscular once PRN Glucose LESS THAN 70 milligrams/deciliter  heparin  Injectable 5500 Unit(s) IV Push every 6 hours PRN For aPTT less than 40  heparin  Injectable 2500 Unit(s) IV Push every 6 hours PRN For aPTT between 40 - 57          PHYSICAL EXAM:  GENERAL: NAD, well-groomed, well-developed  HEAD:  Atraumatic, Normocephalic  CHEST/LUNG: Clear to percussion bilaterally; No rales, rhonchi, wheezing, or rubs  HEART: Regular rate and rhythm; No murmurs, rubs, or gallops  ABDOMEN: Soft, Nontender, Nondistended; Bowel sounds present  EXTREMITIES:  2+ Peripheral Pulses, No clubbing, cyanosis, or edema  LYMPH: No lymphadenopathy noted  SKIN: No rashes or lesions    Care Discussed with Consultants/Other Providers [ ] YES  [ ] NO

## 2019-01-23 NOTE — PROGRESS NOTE ADULT - ASSESSMENT
Problem/Plan - 1:  ·  Problem: Acute deep vein thrombosis (DVT) of right upper extremity, unspecified vein.  Plan: will start on ac with heparin, monitor pt/ptt, will need to discuss with Nephrology for other options given ckd, vascular consulted, f/u recs, heme consulted by ED, f/u recs, per vascular rec to get MR venogram,.    Problem/Plan - 2:  ·  Problem: DM2 (diabetes mellitus, type 2).  Plan: hold oral hypoglycemics, check a1c, SS insulin for now.     Problem/Plan - 3:  ·  Problem: CKD (chronic kidney disease) stage 4, GFR 15-29 ml/min.  Plan: Renal consult in am, dose meds renally, avoid nephrotoxins  cont sodium bicarb for metabolic acidosis.     Problem/Plan - 4:  ·  Problem: HTN (Hypertension).  Plan: cont home meds.     Problem/Plan - 5:  ·  Problem: CAD (coronary artery disease).  Plan: cont home meds ( asa, plavix, clonidine, imdur, hydralazine, coreg, terazosin, lasix), cards follg.     Problem/Plan - 6:  Problem: HLD (hyperlipidemia). Plan: crestor to lipitor.    Problem/Plan - 7:  ·  Problem: Gout.  Plan: cont allopurinol.

## 2019-01-23 NOTE — CHART NOTE - NSCHARTNOTEFT_GEN_A_CORE
Unable to order MR venogran of the chest.   Spoke with radiology and to order CTA chest instead.   Spoke with renal regarding contrast as patient has CKD.   Will hydrate patient 4 hours before and after CT and to hold lasix for 48 hours.     ADS   [id number]

## 2019-01-23 NOTE — CONSULT NOTE ADULT - SUBJECTIVE AND OBJECTIVE BOX
[facility] DIVISION OF KIDNEY DISEASES AND HYPERTENSION -- [phone]  -- INITIAL CONSULT NOTE  --------------------------------------------------------------------------------  HPI: 79 year old female with medical history of HTN, CKD stage 5, with a LUE AVF, DM2, CAD s/p CABG, PCI (3/2017), Aortic stenosis s/p TAVR/PPM (3/2017), admitted with extensive DVT of the right subclavian. Nephrology consulted due to history of CKD. Pt. initially complaining of right upper extremity pain after routine Aranesp injection 4 days before admission and subsequently developed edema and worsening pain. Had US that showed DVT from right radial to subclavian vein. Pt. has been aware of CKD diagnosed 6 years ago and follow with Dr. Markell Dixon as outpatient. Pt. has history of long - standing HTN and DM. Pt. has LUE AVF created 6 years ago as per patient in [place] and has never been use.   On lab review of [facility] SCr elevated since 2015 ranging from 3.5 - 4.3. Latest outpatient SCr elevated at 3.74 on 12/19/18. At admission SCr elevated at 4.21 on 1/22/19.      PAST HISTORY  --------------------------------------------------------------------------------  PAST MEDICAL & SURGICAL HISTORY:  Thyroid nodule: awaiting FNB in the near future  Severe aortic stenosis  Osteoarthritis: bilateral knees  CKD (chronic kidney disease) stage 4, GFR 15-29 ml/min  DM2 (diabetes mellitus, type 2)  Arthritis  CAD (coronary artery disease)  Gout  Anemia Associated with Chronic Renal Failure  HTN (Hypertension)  S/P AVR: TAVR  History of pacemaker  A-V fistula: left arm  S/P cholecystectomy  Stented coronary artery: s/p 3 stents, then CABG 2007  S/P CABG (coronary artery bypass graft): triple in 2007    FAMILY HISTORY:  No pertinent family history in first degree relatives    PAST SOCIAL HISTORY:    ALLERGIES & MEDICATIONS  --------------------------------------------------------------------------------  Allergies    codeine (Other)  Lortab (Other)  morphine (Other)  Percocet 10/325 (Other)  Reglan (Other; Flushing (Skin))  sulfa drugs (Flushing (Skin))    Intolerances      Standing Inpatient Medications  acetaminophen   Tablet .. 325 milliGRAM(s) Oral once  allopurinol 100 milliGRAM(s) Oral daily  aspirin enteric coated 81 milliGRAM(s) Oral daily  atorvastatin 40 milliGRAM(s) Oral at bedtime  calcitriol   Capsule 0.25 MICROGram(s) Oral <User Schedule>  carvedilol 25 milliGRAM(s) Oral every 12 hours  cloNIDine 0.2 milliGRAM(s) Oral two times a day  clopidogrel Tablet 75 milliGRAM(s) Oral daily  dextrose 5%. 1000 milliLiter(s) IV Continuous <Continuous>  dextrose 5%. 1000 milliLiter(s) IV Continuous <Continuous>  dextrose 50% Injectable 12.5 Gram(s) IV Push once  dextrose 50% Injectable 25 Gram(s) IV Push once  dextrose 50% Injectable 25 Gram(s) IV Push once  dextrose 50% Injectable 12.5 Gram(s) IV Push once  dextrose 50% Injectable 25 Gram(s) IV Push once  dextrose 50% Injectable 25 Gram(s) IV Push once  furosemide    Tablet 40 milliGRAM(s) Oral daily  heparin  Infusion.  Unit(s)/Hr IV Continuous <Continuous>  hydrALAZINE 100 milliGRAM(s) Oral every 12 hours  insulin lispro (HumaLOG) corrective regimen sliding scale   SubCutaneous Before meals and at bedtime  insulin lispro (HumaLOG) corrective regimen sliding scale   SubCutaneous three times a day before meals  insulin lispro (HumaLOG) corrective regimen sliding scale   SubCutaneous at bedtime  isosorbide   mononitrate ER Tablet (IMDUR) 60 milliGRAM(s) Oral daily  sodium bicarbonate 650 milliGRAM(s) Oral two times a day  terazosin 2 milliGRAM(s) Oral at bedtime    PRN Inpatient Medications  dextrose 40% Gel 15 Gram(s) Oral once PRN  dextrose 40% Gel 15 Gram(s) Oral once PRN  glucagon  Injectable 1 milliGRAM(s) IntraMuscular once PRN  glucagon  Injectable 1 milliGRAM(s) IntraMuscular once PRN  heparin  Injectable 5500 Unit(s) IV Push every 6 hours PRN  heparin  Injectable 2500 Unit(s) IV Push every 6 hours PRN      REVIEW OF SYSTEMS  --------------------------------------------------------------------------------  Gen: No fevers  Skin: No rashes  Respiratory: No dyspnea, cough  CV: No chest pain  GI: No abdominal pain, diarrhea  : No dysuria, hematuria  MSK: + RUE  edema  Heme: No easy bruising or bleeding  Psych: No significant depression    All other systems were reviewed and are negative, except as noted.    VITALS/PHYSICAL EXAM  --------------------------------------------------------------------------------  T(C): 36.4 (01-22-19 @ 14:49), Max: 36.4 (01-22-19 @ 14:49)  HR: 60 (01-22-19 @ 14:49) (60 - 60)  BP: 174/53 (01-22-19 @ 14:49) (174/53 - 174/53)  RR: 16 (01-22-19 @ 14:49) (16 - 16)  SpO2: 100% (01-22-19 @ 14:49) (100% - 100%)  Wt(kg): --    Weight (kg): 67.4 (01-22-19 @ 20:48)      Physical Exam:  	Gen: NAD  	HEENT: MMM  	Pulm: CTA B/L  	CV: S1S2  	Abd: Soft, +BS   	Ext: No LE edema B/L  	Neuro: Awake  	Skin: Warm and dry  	Vascular access: PRITI AVF +thrill +bruit    LABS/STUDIES  --------------------------------------------------------------------------------              7.5    4.18  >-----------<  157      [01-22-19 @ 17:42]              27.1     140  |  106  |  50  ----------------------------<  123      [01-22-19 @ 17:42]  4.7   |  20  |  4.21        Ca     9.5     [01-22-19 @ 17:42]    TPro  6.5  /  Alb  3.8  /  TBili  < 0.2  /  DBili  x   /  AST  13  /  ALT  4   /  AlkPhos  65  [01-22-19 @ 17:42]    PT/INR: PT 12.1 , INR 1.09       [01-22-19 @ 17:42]  PTT: 27.3       [01-22-19 @ 17:42]      Creatinine Trend:  SCr 4.21 [01-22 @ 17:42]    Urinalysis - [08-06-18 @ 23:39]      Color Yellow / Appearance Clear / SG 1.012 / pH 7.0      Gluc 50 / Ketone Negative  / Bili Negative / Urobili Negative       Blood Negative / Protein 500 / Leuk Est Moderate / Nitrite Negative      RBC 0-2 / WBC 3-5 / Hyaline 0-2 / Gran  / Sq Epi  / Non Sq Epi OCC / Bacteria Few      HbA1c 5.2      [03-29-18 @ 06:38]  TSH 1.21      [03-29-18 @ 06:38]  Lipid: chol 160, TG 64, HDL 54, LDL 92      [03-29-18 @ 06:38] [facility] DIVISION OF KIDNEY DISEASES AND HYPERTENSION -- [phone]  -- INITIAL CONSULT NOTE  --------------------------------------------------------------------------------  HPI: 79 year old female with medical history of HTN, CKD stage 5, with a LUE AVF, DM2, CAD s/p CABG, PCI (3/2017), Aortic stenosis s/p TAVR/PPM (3/2017), admitted with extensive DVT of the right subclavian. Nephrology consulted due to history of CKD. Pt. initially complaining of right upper extremity pain after routine Aranesp injection 4 days before admission and subsequently developed edema and worsening pain. Had US that showed DVT from right radial to subclavian vein. Pt. has been aware of CKD diagnosed 6 years ago and follow with Dr. Markell Dixon as outpatient. Pt. has history of long - standing HTN and DM. Pt. has LUE AVF created 6 years ago as per patient in [place] and has never been use.   On lab review of [facility] SCr elevated since 2015 ranging from 3.5 - 4.3. Latest outpatient SCr elevated at 3.74 on 12/19/18. At admission SCr elevated at 4.21 on 1/22/19.      PAST HISTORY  --------------------------------------------------------------------------------  PAST MEDICAL & SURGICAL HISTORY:  Thyroid nodule: awaiting FNB in the near future  Severe aortic stenosis  Osteoarthritis: bilateral knees  CKD (chronic kidney disease) stage 4, GFR 15-29 ml/min  DM2 (diabetes mellitus, type 2)  Arthritis  CAD (coronary artery disease)  Gout  Anemia Associated with Chronic Renal Failure  HTN (Hypertension)  S/P AVR: TAVR  History of pacemaker  A-V fistula: left arm  S/P cholecystectomy  Stented coronary artery: s/p 3 stents, then CABG 2007  S/P CABG (coronary artery bypass graft): triple in 2007    FAMILY HISTORY:  No pertinent family history in first degree relatives    PAST SOCIAL HISTORY:    ALLERGIES & MEDICATIONS  --------------------------------------------------------------------------------  Allergies    codeine (Other)  Lortab (Other)  morphine (Other)  Percocet 10/325 (Other)  Reglan (Other; Flushing (Skin))  sulfa drugs (Flushing (Skin))    Intolerances      Standing Inpatient Medications  acetaminophen   Tablet .. 325 milliGRAM(s) Oral once  allopurinol 100 milliGRAM(s) Oral daily  aspirin enteric coated 81 milliGRAM(s) Oral daily  atorvastatin 40 milliGRAM(s) Oral at bedtime  calcitriol   Capsule 0.25 MICROGram(s) Oral <User Schedule>  carvedilol 25 milliGRAM(s) Oral every 12 hours  cloNIDine 0.2 milliGRAM(s) Oral two times a day  clopidogrel Tablet 75 milliGRAM(s) Oral daily  dextrose 5%. 1000 milliLiter(s) IV Continuous <Continuous>  dextrose 5%. 1000 milliLiter(s) IV Continuous <Continuous>  dextrose 50% Injectable 12.5 Gram(s) IV Push once  dextrose 50% Injectable 25 Gram(s) IV Push once  dextrose 50% Injectable 25 Gram(s) IV Push once  dextrose 50% Injectable 12.5 Gram(s) IV Push once  dextrose 50% Injectable 25 Gram(s) IV Push once  dextrose 50% Injectable 25 Gram(s) IV Push once  furosemide    Tablet 40 milliGRAM(s) Oral daily  heparin  Infusion.  Unit(s)/Hr IV Continuous <Continuous>  hydrALAZINE 100 milliGRAM(s) Oral every 12 hours  insulin lispro (HumaLOG) corrective regimen sliding scale   SubCutaneous Before meals and at bedtime  insulin lispro (HumaLOG) corrective regimen sliding scale   SubCutaneous three times a day before meals  insulin lispro (HumaLOG) corrective regimen sliding scale   SubCutaneous at bedtime  isosorbide   mononitrate ER Tablet (IMDUR) 60 milliGRAM(s) Oral daily  sodium bicarbonate 650 milliGRAM(s) Oral two times a day  terazosin 2 milliGRAM(s) Oral at bedtime    PRN Inpatient Medications  dextrose 40% Gel 15 Gram(s) Oral once PRN  dextrose 40% Gel 15 Gram(s) Oral once PRN  glucagon  Injectable 1 milliGRAM(s) IntraMuscular once PRN  glucagon  Injectable 1 milliGRAM(s) IntraMuscular once PRN  heparin  Injectable 5500 Unit(s) IV Push every 6 hours PRN  heparin  Injectable 2500 Unit(s) IV Push every 6 hours PRN      REVIEW OF SYSTEMS  --------------------------------------------------------------------------------  Gen: No fevers  Skin: No rashes  Respiratory: No dyspnea, cough  CV: No chest pain  GI: No abdominal pain, diarrhea  : No dysuria, hematuria  MSK: + RUE  edema  Heme: No easy bruising or bleeding  Psych: No significant depression    All other systems were reviewed and are negative, except as noted.    VITALS/PHYSICAL EXAM  --------------------------------------------------------------------------------  T(C): 36.4 (01-22-19 @ 14:49), Max: 36.4 (01-22-19 @ 14:49)  HR: 60 (01-22-19 @ 14:49) (60 - 60)  BP: 174/53 (01-22-19 @ 14:49) (174/53 - 174/53)  RR: 16 (01-22-19 @ 14:49) (16 - 16)  SpO2: 100% (01-22-19 @ 14:49) (100% - 100%)  Wt(kg): --    Weight (kg): 67.4 (01-22-19 @ 20:48)      Physical Exam:  	Gen: NAD  	HEENT: MMM  	Pulm: CTA B/L  	CV: S1S2  	Abd: Soft, +BS   	Ext: No LE edema B/L. RUE edema+  	Neuro: Awake  	Skin: Warm and dry  	Vascular access: PRITI AVF +thrill +bruit    LABS/STUDIES  --------------------------------------------------------------------------------              7.5    4.18  >-----------<  157      [01-22-19 @ 17:42]              27.1     140  |  106  |  50  ----------------------------<  123      [01-22-19 @ 17:42]  4.7   |  20  |  4.21        Ca     9.5     [01-22-19 @ 17:42]    TPro  6.5  /  Alb  3.8  /  TBili  < 0.2  /  DBili  x   /  AST  13  /  ALT  4   /  AlkPhos  65  [01-22-19 @ 17:42]    PT/INR: PT 12.1 , INR 1.09       [01-22-19 @ 17:42]  PTT: 27.3       [01-22-19 @ 17:42]      Creatinine Trend:  SCr 4.21 [01-22 @ 17:42]    Urinalysis - [08-06-18 @ 23:39]      Color Yellow / Appearance Clear / SG 1.012 / pH 7.0      Gluc 50 / Ketone Negative  / Bili Negative / Urobili Negative       Blood Negative / Protein 500 / Leuk Est Moderate / Nitrite Negative      RBC 0-2 / WBC 3-5 / Hyaline 0-2 / Gran  / Sq Epi  / Non Sq Epi OCC / Bacteria Few      HbA1c 5.2      [03-29-18 @ 06:38]  TSH 1.21      [03-29-18 @ 06:38]  Lipid: chol 160, TG 64, HDL 54, LDL 92      [03-29-18 @ 06:38] [facility] DIVISION OF KIDNEY DISEASES AND HYPERTENSION -- [phone]  -- INITIAL CONSULT NOTE  --------------------------------------------------------------------------------  HPI: 79-year-old female with longstanding history of HTN, DM2, CKD stage 5, s/p LUE AVF (created ~6 years ago in [place], never used), CAD s/p CABG, PCI (3/2017), and aortic stenosis s/p TAVR/PPM (3/2017), admitted with RUE DVT. Nephrology consulted due to history of CKD. Pt. seen and examined in ER. Pt. says that she developed right upper extremity pain after routine Aranesp injection (4 days before admission) and subsequently developed edema and worsening pain. Had US study that showed DVT from right radial to subclavian vein. Pt. with longstanding history of CKD and follow with nephrologist Dr. Markell Dixon as outpatient. On review of previous labs on [facility], Scr has ranged between 3.5 to 4.3 since 2015. Scr elevated at 3.74 on 12/19/18 and is 4.21 on this admission (1/22/19). Scr stable at 4.06 today (1/23/19). Pt. denies CP, SOB, HA or dizziness.     PAST HISTORY  --------------------------------------------------------------------------------  PAST MEDICAL & SURGICAL HISTORY:  Thyroid nodule: awaiting FNB in the near future  Severe aortic stenosis  Osteoarthritis: bilateral knees  CKD (chronic kidney disease) stage 4, GFR 15-29 ml/min  DM2 (diabetes mellitus, type 2)  Arthritis  CAD (coronary artery disease)  Gout  Anemia Associated with Chronic Renal Failure  HTN (Hypertension)  S/P AVR: TAVR  History of pacemaker  A-V fistula: left arm  S/P cholecystectomy  Stented coronary artery: s/p 3 stents, then CABG 2007  S/P CABG (coronary artery bypass graft): triple in 2007    FAMILY HISTORY:  No pertinent family history in first degree relatives    PAST SOCIAL HISTORY:    ALLERGIES & MEDICATIONS  --------------------------------------------------------------------------------  Allergies    codeine (Other)  Lortab (Other)  morphine (Other)  Percocet 10/325 (Other)  Reglan (Other; Flushing (Skin))  sulfa drugs (Flushing (Skin))    Intolerances    Standing Inpatient Medications  acetaminophen   Tablet .. 325 milliGRAM(s) Oral once  allopurinol 100 milliGRAM(s) Oral daily  aspirin enteric coated 81 milliGRAM(s) Oral daily  atorvastatin 40 milliGRAM(s) Oral at bedtime  calcitriol   Capsule 0.25 MICROGram(s) Oral <User Schedule>  carvedilol 25 milliGRAM(s) Oral every 12 hours  cloNIDine 0.2 milliGRAM(s) Oral two times a day  clopidogrel Tablet 75 milliGRAM(s) Oral daily  dextrose 5%. 1000 milliLiter(s) IV Continuous <Continuous>  dextrose 5%. 1000 milliLiter(s) IV Continuous <Continuous>  dextrose 50% Injectable 12.5 Gram(s) IV Push once  dextrose 50% Injectable 25 Gram(s) IV Push once  dextrose 50% Injectable 25 Gram(s) IV Push once  dextrose 50% Injectable 12.5 Gram(s) IV Push once  dextrose 50% Injectable 25 Gram(s) IV Push once  dextrose 50% Injectable 25 Gram(s) IV Push once  furosemide    Tablet 40 milliGRAM(s) Oral daily  heparin  Infusion.  Unit(s)/Hr IV Continuous <Continuous>  hydrALAZINE 100 milliGRAM(s) Oral every 12 hours  insulin lispro (HumaLOG) corrective regimen sliding scale   SubCutaneous Before meals and at bedtime  insulin lispro (HumaLOG) corrective regimen sliding scale   SubCutaneous three times a day before meals  insulin lispro (HumaLOG) corrective regimen sliding scale   SubCutaneous at bedtime  isosorbide   mononitrate ER Tablet (IMDUR) 60 milliGRAM(s) Oral daily  sodium bicarbonate 650 milliGRAM(s) Oral two times a day  terazosin 2 milliGRAM(s) Oral at bedtime    PRN Inpatient Medications  dextrose 40% Gel 15 Gram(s) Oral once PRN  dextrose 40% Gel 15 Gram(s) Oral once PRN  glucagon  Injectable 1 milliGRAM(s) IntraMuscular once PRN  glucagon  Injectable 1 milliGRAM(s) IntraMuscular once PRN  heparin  Injectable 5500 Unit(s) IV Push every 6 hours PRN  heparin  Injectable 2500 Unit(s) IV Push every 6 hours PRN    REVIEW OF SYSTEMS  --------------------------------------------------------------------------------  Gen: No fever  Skin: No rash  Respiratory: No dyspnea, cough  CV: No chest pain  GI: No abdominal pain, diarrhea  : No dysuria, hematuria  MSK: + RUE swelling  Heme: No easy bruising or bleeding  Psych: No significant depression    All other systems were reviewed and are negative, except as noted.    VITALS/PHYSICAL EXAM  --------------------------------------------------------------------------------  T(C): 36.4 (01-22-19 @ 14:49), Max: 36.4 (01-22-19 @ 14:49)  HR: 60 (01-22-19 @ 14:49) (60 - 60)  BP: 174/53 (01-22-19 @ 14:49) (174/53 - 174/53)  RR: 16 (01-22-19 @ 14:49) (16 - 16)  SpO2: 100% (01-22-19 @ 14:49) (100% - 100%)  Wt(kg): --    Weight (kg): 67.4 (01-22-19 @ 20:48)    Physical Exam:  	Gen: resting, NAD  	HEENT: No JVD  	Pulm: CTA B/L  	CV: S1S2+  	Abd: Soft, +BS   	Ext: RUE swelling seen, No LE edema B/L  	Neuro: Awake, alert  	Skin: Warm and dry  	Vascular access: LUE AVF site: +thrill +bruit    LABS/STUDIES  --------------------------------------------------------------------------------              7.5    4.18  >-----------<  157      [01-22-19 @ 17:42]              27.1     140  |  106  |  50  ----------------------------<  123      [01-22-19 @ 17:42]  4.7   |  20  |  4.21        Ca     9.5     [01-22-19 @ 17:42]    TPro  6.5  /  Alb  3.8  /  TBili  < 0.2  /  DBili  x   /  AST  13  /  ALT  4   /  AlkPhos  65  [01-22-19 @ 17:42]    Creatinine Trend:  SCr 4.21 [01-22 @ 17:42]

## 2019-01-23 NOTE — PROGRESS NOTE ADULT - ASSESSMENT
79F with  PMH of HTN, CKD stage 5, with a LUE fistula which has never been used, DM2, CAD s/p CABG, PCI (3/2017), Aortic stenosis s/p TAVR/PPM (3/2017) presenting with RUE DVT    1. RUE DVT  a/c on hep gtt   heme eval  vascular eval noted pending MR venogram of chest   hem eval noted- will eventually start coumadin    2. CAD s/p CABG, PCI   c/o atypical chest pain this morning, positional likely musculoskeletal  pending MR venogram of chest   cv stable no sob or decomp CHF on exam  asa/plavix/bb/statin    3.AS s/p TAVR/ PPM  cv stable     4. HTN   elevated bp   increase  hydralazine 100 mg TID, imdur 60 mg BID (outpt Dose) 79F with  PMH of HTN, CKD stage 5, with a LUE fistula which has never been used, DM2, CAD s/p CABG, PCI (3/2017), Aortic stenosis s/p TAVR/PPM (3/2017) presenting with RUE DVT    1. RUE DVT  a/c on hep gtt   heme eval  vascular eval noted pending MR venogram of chest, PPM compatible for MRI   hem eval noted- will eventually start coumadin    2. CAD s/p CABG, PCI   c/o atypical chest pain this morning, positional likely musculoskeletal  pending MR venogram of chest   cv stable no sob or decomp CHF on exam  asa/plavix/bb/statin    3. AS s/p TAVR/ PPM  cv stable     4. HTN   elevated bp   increase  hydralazine 100 mg TID, imdur 60 mg BID (outpt Dose)    5. anemia   on hep gtt  ? prbc  hem f/u

## 2019-01-23 NOTE — CONSULT NOTE ADULT - SUBJECTIVE AND OBJECTIVE BOX
HPI:  79F with h/o  HTN, CKD stage 5, with a LUE fistula placed 6 yrs ago in [place] which has never been used, DM2, CAD s/p CABG, PCI (3/2017), Aortic stenosis s/p TAVR/PPM (3/2017),  sent in for evaluation after RUE ultrasound reveals an extensive DVT of the right subclavian. Pt reports that she had aranesp injection in the rt arm on Friday and started to notice swelling and pain of the rt arm since Monday, went for a f/u with her cardiologist had US which revealed dvt and sent here. Pt denies chest pain, SOB,  palpitations, orthopnea, lower extremity edema, headache, vision changes, fever, recent illness, abdominal pain, nausea, vomit, dysuria or any other complaints.    In the ED had US which revealed RUE dvt extending from the subclavian to radial vein.  Vital Signs Last 24 Hrs  T(C): 36.4 (22 Jan 2019 14:49), Max: 36.4 (22 Jan 2019 14:49)  T(F): 97.6 (22 Jan 2019 14:49), Max: 97.6 (22 Jan 2019 14:49)  HR: 60 (22 Jan 2019 14:49) (60 - 60)  BP: 174/53 (22 Jan 2019 14:49) (174/53 - 174/53)  BP(mean): --  RR: 16 (22 Jan 2019 14:49) (16 - 16)  SpO2: 100% (22 Jan 2019 14:49) (100% - 100%) (22 Jan 2019 19:27)    PAST MEDICAL & SURGICAL HISTORY:  Thyroid nodule: awaiting FNB in the near future  Severe aortic stenosis  Osteoarthritis: bilateral knees  CKD (chronic kidney disease) stage 4, GFR 15-29 ml/min  DM2 (diabetes mellitus, type 2)  Arthritis  CAD (coronary artery disease)  Gout  Anemia Associated with Chronic Renal Failure  HTN (Hypertension)  S/P AVR: TAVR  History of pacemaker  A-V fistula: left arm  S/P cholecystectomy  Stented coronary artery: s/p 3 stents, then CABG 2007  S/P CABG (coronary artery bypass graft): triple in 2007    FAMILY HISTORY:  No pertinent family history in first degree relatives    Social History  MEDICATIONS  (STANDING):  allopurinol 100 milliGRAM(s) Oral daily  aspirin enteric coated 81 milliGRAM(s) Oral daily  atorvastatin 40 milliGRAM(s) Oral at bedtime  calcitriol   Capsule 0.25 MICROGram(s) Oral <User Schedule>  carvedilol 25 milliGRAM(s) Oral every 12 hours  cloNIDine 0.2 milliGRAM(s) Oral two times a day  clopidogrel Tablet 75 milliGRAM(s) Oral daily  dextrose 5%. 1000 milliLiter(s) (50 mL/Hr) IV Continuous <Continuous>  dextrose 5%. 1000 milliLiter(s) (50 mL/Hr) IV Continuous <Continuous>  dextrose 50% Injectable 12.5 Gram(s) IV Push once  dextrose 50% Injectable 25 Gram(s) IV Push once  dextrose 50% Injectable 25 Gram(s) IV Push once  dextrose 50% Injectable 12.5 Gram(s) IV Push once  dextrose 50% Injectable 25 Gram(s) IV Push once  dextrose 50% Injectable 25 Gram(s) IV Push once  furosemide    Tablet 40 milliGRAM(s) Oral daily  heparin  Infusion.  Unit(s)/Hr (12 mL/Hr) IV Continuous <Continuous>  hydrALAZINE 100 milliGRAM(s) Oral every 12 hours  insulin lispro (HumaLOG) corrective regimen sliding scale   SubCutaneous three times a day before meals  insulin lispro (HumaLOG) corrective regimen sliding scale   SubCutaneous at bedtime  isosorbide   mononitrate ER Tablet (IMDUR) 60 milliGRAM(s) Oral daily  sodium bicarbonate 650 milliGRAM(s) Oral two times a day  terazosin 2 milliGRAM(s) Oral at bedtime    MEDICATIONS  (PRN):  dextrose 40% Gel 15 Gram(s) Oral once PRN Blood Glucose LESS THAN 70 milliGRAM(s)/deciliter  dextrose 40% Gel 15 Gram(s) Oral once PRN Blood Glucose LESS THAN 70 milliGRAM(s)/deciliter  glucagon  Injectable 1 milliGRAM(s) IntraMuscular once PRN Glucose LESS THAN 70 milligrams/deciliter  glucagon  Injectable 1 milliGRAM(s) IntraMuscular once PRN Glucose LESS THAN 70 milligrams/deciliter  heparin  Injectable 5500 Unit(s) IV Push every 6 hours PRN For aPTT less than 40  heparin  Injectable 2500 Unit(s) IV Push every 6 hours PRN For aPTT between 40 - 57    codeine (Other)  Lortab (Other)  morphine (Other)  Percocet 10/325 (Other)  Reglan (Other; Flushing (Skin))  sulfa drugs (Flushing (Skin))    REVIEW OF SYSTEMS      General:	    Skin/Breast:  	  Ophthalmologic:  	  ENMT:	    Respiratory and Thorax:  	  Cardiovascular:	    Gastrointestinal:	    Genitourinary:	    Musculoskeletal:	    Neurological:	    Psychiatric:	    Hematology/Lymphatics:	    Endocrine:	    Allergic/Immunologic:	  Vital Signs Last 24 Hrs  T(C): 36.9 (23 Jan 2019 09:00), Max: 37 (23 Jan 2019 06:20)  T(F): 98.4 (23 Jan 2019 09:00), Max: 98.6 (23 Jan 2019 06:20)  HR: 59 (23 Jan 2019 09:00) (59 - 74)  BP: 154/84 (23 Jan 2019 09:00) (154/84 - 202/74)  BP(mean): --  RR: 18 (23 Jan 2019 09:00) (15 - 18)  SpO2: 100% (23 Jan 2019 09:00) (100% - 100%)  Physical Examination  Constitutional: Alert, oriented X3  Eyes: Normal  ENMT: normal  Neck: No lymphadneopathy  Respiratory: b/l clear to auscultation  Cardiovascular: S1,S2,M0  Abdomen: Soft, non tender, BS present  Gastrointestinal: soft, non tender, BS present  Extremities: soft, no edema  Neurological: intact  Skin: no petechiae  Musculoskeletal: normal  Psychiatric: normal                            7.0    3.61  )-----------( 137      ( 23 Jan 2019 04:54 )             24.3     01-23    143  |  110<H>  |  46<H>  ----------------------------<  107<H>  4.8   |  19<L>  |  4.06<H>    Ca    9.2      23 Jan 2019 03:54    TPro  6.5  /  Alb  3.8  /  TBili  < 0.2<L>  /  DBili  x   /  AST  13  /  ALT  4   /  AlkPhos  65  01-22      Radiology  Assessment and Plan HPI:  79F with h/o  HTN, CKD stage 5, with a LUE fistula placed 6 yrs ago in [place] which has never been used, DM2, CAD s/p CABG, PCI (3/2017), Aortic stenosis s/p TAVR/PPM (3/2017),  sent in for evaluation after RUE ultrasound reveals an extensive DVT of the right subclavian.  Hematology consulted for anticoagulation. Pt reports that she had aranesp injection in the rt arm on Friday and started to notice swelling and pain of the rt arm since Monday, went for a f/u with her cardiologist had US which revealed dvt and sent here. Pt denies chest pain, SOB,  palpitations, orthopnea, lower extremity edema, headache, vision changes, fever, recent illness, abdominal pain, nausea, vomit, dysuria or any other complaints.    US which revealed RUE dvt extending from the subclavian to radial vein.  Vital Signs Last 24 Hrs  T(C): 36.4 (22 Jan 2019 14:49), Max: 36.4 (22 Jan 2019 14:49)  T(F): 97.6 (22 Jan 2019 14:49), Max: 97.6 (22 Jan 2019 14:49)  HR: 60 (22 Jan 2019 14:49) (60 - 60)  BP: 174/53 (22 Jan 2019 14:49) (174/53 - 174/53)  BP(mean): --  RR: 16 (22 Jan 2019 14:49) (16 - 16)  SpO2: 100% (22 Jan 2019 14:49) (100% - 100%) (22 Jan 2019 19:27)    PAST MEDICAL & SURGICAL HISTORY:  Thyroid nodule: awaiting FNB in the near future  Severe aortic stenosis  Osteoarthritis: bilateral knees  CKD (chronic kidney disease) stage 4, GFR 15-29 ml/min  DM2 (diabetes mellitus, type 2)  Arthritis  CAD (coronary artery disease)  Gout  Anemia Associated with Chronic Renal Failure  HTN (Hypertension)  S/P AVR: TAVR  History of pacemaker  A-V fistula: left arm  S/P cholecystectomy  Stented coronary artery: s/p 3 stents, then CABG 2007  S/P CABG (coronary artery bypass graft): triple in 2007    FAMILY HISTORY:  No pertinent family history in first degree relatives    Social History  MEDICATIONS  (STANDING):  allopurinol 100 milliGRAM(s) Oral daily  aspirin enteric coated 81 milliGRAM(s) Oral daily  atorvastatin 40 milliGRAM(s) Oral at bedtime  calcitriol   Capsule 0.25 MICROGram(s) Oral <User Schedule>  carvedilol 25 milliGRAM(s) Oral every 12 hours  cloNIDine 0.2 milliGRAM(s) Oral two times a day  clopidogrel Tablet 75 milliGRAM(s) Oral daily  dextrose 5%. 1000 milliLiter(s) (50 mL/Hr) IV Continuous <Continuous>  dextrose 5%. 1000 milliLiter(s) (50 mL/Hr) IV Continuous <Continuous>  dextrose 50% Injectable 12.5 Gram(s) IV Push once  dextrose 50% Injectable 25 Gram(s) IV Push once  dextrose 50% Injectable 25 Gram(s) IV Push once  dextrose 50% Injectable 12.5 Gram(s) IV Push once  dextrose 50% Injectable 25 Gram(s) IV Push once  dextrose 50% Injectable 25 Gram(s) IV Push once  furosemide    Tablet 40 milliGRAM(s) Oral daily  heparin  Infusion.  Unit(s)/Hr (12 mL/Hr) IV Continuous <Continuous>  hydrALAZINE 100 milliGRAM(s) Oral every 12 hours  insulin lispro (HumaLOG) corrective regimen sliding scale   SubCutaneous three times a day before meals  insulin lispro (HumaLOG) corrective regimen sliding scale   SubCutaneous at bedtime  isosorbide   mononitrate ER Tablet (IMDUR) 60 milliGRAM(s) Oral daily  sodium bicarbonate 650 milliGRAM(s) Oral two times a day  terazosin 2 milliGRAM(s) Oral at bedtime    MEDICATIONS  (PRN):  dextrose 40% Gel 15 Gram(s) Oral once PRN Blood Glucose LESS THAN 70 milliGRAM(s)/deciliter  dextrose 40% Gel 15 Gram(s) Oral once PRN Blood Glucose LESS THAN 70 milliGRAM(s)/deciliter  glucagon  Injectable 1 milliGRAM(s) IntraMuscular once PRN Glucose LESS THAN 70 milligrams/deciliter  glucagon  Injectable 1 milliGRAM(s) IntraMuscular once PRN Glucose LESS THAN 70 milligrams/deciliter  heparin  Injectable 5500 Unit(s) IV Push every 6 hours PRN For aPTT less than 40  heparin  Injectable 2500 Unit(s) IV Push every 6 hours PRN For aPTT between 40 - 57    codeine (Other)  Lortab (Other)  morphine (Other)  Percocet 10/325 (Other)  Reglan (Other; Flushing (Skin))  sulfa drugs (Flushing (Skin))    Vital Signs Last 24 Hrs  T(C): 36.9 (23 Jan 2019 09:00), Max: 37 (23 Jan 2019 06:20)  T(F): 98.4 (23 Jan 2019 09:00), Max: 98.6 (23 Jan 2019 06:20)  HR: 59 (23 Jan 2019 09:00) (59 - 74)  BP: 154/84 (23 Jan 2019 09:00) (154/84 - 202/74)  BP(mean): --  RR: 18 (23 Jan 2019 09:00) (15 - 18)  SpO2: 100% (23 Jan 2019 09:00) (100% - 100%)  Physical Examination  Constitutional: Alert, oriented X3  Eyes: Normal  ENMT: normal  Neck: No lymphadneopathy  Respiratory: b/l clear to auscultation  Cardiovascular: S1,S2,M0  Abdomen: Soft, non tender, BS present  Gastrointestinal: soft, non tender, BS present  Extremities: soft, no edema  Neurological: intact  Skin: no petechiae  Musculoskeletal: normal  Psychiatric: normal                            7.0    3.61  )-----------( 137      ( 23 Jan 2019 04:54 )             24.3     01-23    143  |  110<H>  |  46<H>  ----------------------------<  107<H>  4.8   |  19<L>  |  4.06<H>    Ca    9.2      23 Jan 2019 03:54    TPro  6.5  /  Alb  3.8  /  TBili  < 0.2<L>  /  DBili  x   /  AST  13  /  ALT  4   /  AlkPhos  65  01-22      Radiology  Assessment and Plan

## 2019-01-23 NOTE — CONSULT NOTE ADULT - PROBLEM SELECTOR RECOMMENDATION 9
Pt. with known history of CKD in the setting of long standing HTN and DM. On lab review of [facility] SCr elevated since 2015 ranging from 3.5 - 4.3. Latest outpatient SCr elevated at 3.74 on 12/19/18. At admission SCr elevated at 4.21 on 1/22/19.   Monitor UOP and daily weights. Avoid volume depletion, NSAIDs, ARB/ACE-I. Dose medications as per eGFR. Pt. with known history of CKD in the setting of long standing HTN and DM. On review of previous labs on [facility], Scr has ranged between 3.5 to 4.3 since 2015. Scr elevated at 3.74 on 12/19/18 and is 4.21 on this admission (1/22/19). Scr stable at 4.06 today (1/23/19). Monitor labs and urine output. Avoid any potential nephrotoxins. Dose medications as per eGFR

## 2019-01-23 NOTE — CHART NOTE - NSCHARTNOTEFT_GEN_A_CORE
Clarified with vascular regarding MR venogram of the chest to R/O thoracic outlet syndrome.  Vascular wanted MR venogram instead of CT angio because the contrast is better for the kidneys.   Spoke with radiology and can order MRI angio of the chest and just write in the indication.   Patient has a PPM.   Spoke with her cardiologist Dr Damico to determine if this is MRI compatible and IT IS.     ADS [id number]

## 2019-01-23 NOTE — PROGRESS NOTE ADULT - SUBJECTIVE AND OBJECTIVE BOX
CARDIOLOGY FOLLOW UP - Dr. Damico    CC mild cp this am  no sob or palps      PHYSICAL EXAM:  T(C): 36.9 (01-23-19 @ 09:00), Max: 37 (01-23-19 @ 06:20)  HR: 59 (01-23-19 @ 12:12) (59 - 74)  BP: 159/59 (01-23-19 @ 12:12) (154/84 - 202/74)  RR: 18 (01-23-19 @ 09:00) (15 - 18)  SpO2: 100% (01-23-19 @ 09:00) (100% - 100%)  Wt(kg): --  I&O's Summary      Appearance: Normal	  Cardiovascular: Normal S1 S2,RRR, + murmurs  Respiratory: Lungs clear to auscultation	  Gastrointestinal:  Soft, Non-tender, + BS	  Extremities: Normal range of motion, RUE+1 edema        MEDICATIONS  (STANDING):  allopurinol 100 milliGRAM(s) Oral daily  aspirin enteric coated 81 milliGRAM(s) Oral daily  atorvastatin 40 milliGRAM(s) Oral at bedtime  calcitriol   Capsule 0.25 MICROGram(s) Oral <User Schedule>  carvedilol 25 milliGRAM(s) Oral every 12 hours  cloNIDine 0.2 milliGRAM(s) Oral two times a day  clopidogrel Tablet 75 milliGRAM(s) Oral daily  dextrose 5%. 1000 milliLiter(s) (50 mL/Hr) IV Continuous <Continuous>  dextrose 5%. 1000 milliLiter(s) (50 mL/Hr) IV Continuous <Continuous>  dextrose 50% Injectable 12.5 Gram(s) IV Push once  dextrose 50% Injectable 25 Gram(s) IV Push once  dextrose 50% Injectable 25 Gram(s) IV Push once  dextrose 50% Injectable 12.5 Gram(s) IV Push once  dextrose 50% Injectable 25 Gram(s) IV Push once  dextrose 50% Injectable 25 Gram(s) IV Push once  heparin  Infusion.  Unit(s)/Hr (12 mL/Hr) IV Continuous <Continuous>  hydrALAZINE 100 milliGRAM(s) Oral every 12 hours  insulin lispro (HumaLOG) corrective regimen sliding scale   SubCutaneous three times a day before meals  insulin lispro (HumaLOG) corrective regimen sliding scale   SubCutaneous at bedtime  isosorbide   mononitrate ER Tablet (IMDUR) 60 milliGRAM(s) Oral daily  sodium bicarbonate 650 milliGRAM(s) Oral two times a day  sodium chloride 0.9%. 1000 milliLiter(s) (75 mL/Hr) IV Continuous <Continuous>  terazosin 2 milliGRAM(s) Oral at bedtime      TELEMETRY: 	    ECG:  	  RADIOLOGY:   DIAGNOSTIC TESTING:  [ ] Echocardiogram:  [ ]  Catheterization:  [ ] Stress Test:    OTHER: 	    LABS:	 	                                7.0    3.61  )-----------( 137      ( 23 Jan 2019 04:54 )             24.3     01-23    143  |  110<H>  |  46<H>  ----------------------------<  107<H>  4.8   |  19<L>  |  4.06<H>    Ca    9.2      23 Jan 2019 03:54    TPro  6.5  /  Alb  3.8  /  TBili  < 0.2<L>  /  DBili  x   /  AST  13  /  ALT  4   /  AlkPhos  65  01-22    PT/INR - ( 22 Jan 2019 17:42 )   PT: 12.1 SEC;   INR: 1.09          PTT - ( 23 Jan 2019 03:54 )  PTT:> 200.0 SEC

## 2019-01-23 NOTE — CONSULT NOTE ADULT - ASSESSMENT
79F with h/o  HTN, CKD stage 5, with a LUE fistula placed 6 yrs ago in [place] which has never been used, DM2, CAD s/p CABG, PCI (3/2017), Aortic stenosis s/p TAVR/PPM (3/2017),  sent in for evaluation after RUE ultrasound reveals an extensive DVT of the right subclavian.  Hematology consulted for anticoagulation.    RUE DVT  - agree with heparin gtt for now  - patient is on ASA, Plavix for cardiac history, will need to closely monitor for bleeding with addition of heparin  - vascular surgery on board, recommends MR venogram to assess for thoracic outlet syndrome.  f/u recommendations  - once stable for discharge, would switch patient to coumadin    Jojo Locke DO  Hematology/Oncology Fellow, PGY4  Pager: [phone]

## 2019-01-23 NOTE — ED ADULT NURSE REASSESSMENT NOTE - NS ED NURSE REASSESS COMMENT FT1
ENDORSED to [facility] rn. pt a&o x3, nad noted. no c/o pain. heparin infusing as orderd, bleeding precautions in place with no s/s of bleeding. safety maintained nad noted

## 2019-01-23 NOTE — PROGRESS NOTE ADULT - PROBLEM SELECTOR PLAN 2
I have seen and examined the patient today and agree with  the  evaluation, assessment and plan of the surgical house officer

## 2019-01-23 NOTE — PROGRESS NOTE ADULT - SUBJECTIVE AND OBJECTIVE BOX
Vascular Surgery Progress Note    SUBJECTIVE: Pt seen and examined at bedside. Patient comfortable and in no-apparent distress. Patient has some pain and swelling in RUE.       Vital Signs Last 24 Hrs  T(C): 37 (23 Jan 2019 06:20), Max: 37 (23 Jan 2019 06:20)  T(F): 98.6 (23 Jan 2019 06:20), Max: 98.6 (23 Jan 2019 06:20)  HR: 74 (23 Jan 2019 06:20) (60 - 74)  BP: 198/81 (23 Jan 2019 07:31) (161/64 - 202/74)  BP(mean): --  RR: 18 (23 Jan 2019 07:31) (15 - 18)  SpO2: 100% (23 Jan 2019 07:31) (100% - 100%)    Physical Exam:  General Appearance: Appears well, NAD  Respiratory: No labored breathing  CV: Pulse regularly present  Abdomen: Soft, nontense  Extr: LUE fistula in place with palpable thrill, radial/ulnar arteries palpable; RUE forearm swollen w/ soft compartments, arm w/o swelling, radial/ulnar pulses palpable    LABS:                        7.0    3.61  )-----------( 137      ( 23 Jan 2019 04:54 )             24.3     01-23    143  |  110<H>  |  46<H>  ----------------------------<  107<H>  4.8   |  19<L>  |  4.06<H>    Ca    9.2      23 Jan 2019 03:54    TPro  6.5  /  Alb  3.8  /  TBili  < 0.2<L>  /  DBili  x   /  AST  13  /  ALT  4   /  AlkPhos  65  01-22    PT/INR - ( 22 Jan 2019 17:42 )   PT: 12.1 SEC;   INR: 1.09          PTT - ( 23 Jan 2019 03:54 )  PTT:> 200.0 SEC      INs and OUTs: Vascular Surgery Progress Note    SUBJECTIVE: Pt seen and examined at bedside. Patient  states right arm swelling and discomfort have improved     Vital Signs Last 24 Hrs  T(C): 37 (23 Jan 2019 06:20), Max: 37 (23 Jan 2019 06:20)  T(F): 98.6 (23 Jan 2019 06:20), Max: 98.6 (23 Jan 2019 06:20)  HR: 74 (23 Jan 2019 06:20) (60 - 74)  BP: 198/81 (23 Jan 2019 07:31) (161/64 - 202/74)  BP(mean): --  RR: 18 (23 Jan 2019 07:31) (15 - 18)  SpO2: 100% (23 Jan 2019 07:31) (100% - 100%)    Physical Exam:  General Appearance: Appears well, NAD  Respiratory: No labored breathing  CV: Pulse regularly present  Abdomen: Soft, nontense  Extr: LUE fistula in place with palpable thrill, radial/ulnar arteries palpable; RUE forearm and brachia w mild to mod edema radial/ulnar pulses palpable    LABS:                        7.0    3.61  )-----------( 137      ( 23 Jan 2019 04:54 )             24.3     01-23    143  |  110<H>  |  46<H>  ----------------------------<  107<H>  4.8   |  19<L>  |  4.06<H>    Ca    9.2      23 Jan 2019 03:54    TPro  6.5  /  Alb  3.8  /  TBili  < 0.2<L>  /  DBili  x   /  AST  13  /  ALT  4   /  AlkPhos  65  01-22    PT/INR - ( 22 Jan 2019 17:42 )   PT: 12.1 SEC;   INR: 1.09          PTT - ( 23 Jan 2019 03:54 )  PTT:> 200.0 SEC      INs and OUTs:

## 2019-01-24 DIAGNOSIS — N18.5 CHRONIC KIDNEY DISEASE, STAGE 5: ICD-10-CM

## 2019-01-24 LAB
ANION GAP SERPL CALC-SCNC: 16 MMO/L — HIGH (ref 7–14)
APTT BLD: 78 SEC — HIGH (ref 27.5–36.3)
BUN SERPL-MCNC: 47 MG/DL — HIGH (ref 7–23)
CALCIUM SERPL-MCNC: 8.9 MG/DL — SIGNIFICANT CHANGE UP (ref 8.4–10.5)
CHLORIDE SERPL-SCNC: 108 MMOL/L — HIGH (ref 98–107)
CO2 SERPL-SCNC: 17 MMOL/L — LOW (ref 22–31)
CREAT SERPL-MCNC: 4.12 MG/DL — HIGH (ref 0.5–1.3)
FERRITIN SERPL-MCNC: 102.9 NG/ML — SIGNIFICANT CHANGE UP (ref 15–150)
GLUCOSE BLDC GLUCOMTR-MCNC: 132 MG/DL — HIGH (ref 70–99)
GLUCOSE BLDC GLUCOMTR-MCNC: 134 MG/DL — HIGH (ref 70–99)
GLUCOSE BLDC GLUCOMTR-MCNC: 139 MG/DL — HIGH (ref 70–99)
GLUCOSE BLDC GLUCOMTR-MCNC: 190 MG/DL — HIGH (ref 70–99)
GLUCOSE SERPL-MCNC: 103 MG/DL — HIGH (ref 70–99)
HCT VFR BLD CALC: 22.6 % — LOW (ref 34.5–45)
HCT VFR BLD CALC: 25.5 % — LOW (ref 34.5–45)
HGB BLD-MCNC: 6.5 G/DL — CRITICAL LOW (ref 11.5–15.5)
HGB BLD-MCNC: 7.7 G/DL — LOW (ref 11.5–15.5)
IRON SATN MFR SERPL: 26 UG/DL — LOW (ref 30–160)
MAGNESIUM SERPL-MCNC: 2.1 MG/DL — SIGNIFICANT CHANGE UP (ref 1.6–2.6)
MCHC RBC-ENTMCNC: 23 PG — LOW (ref 27–34)
MCHC RBC-ENTMCNC: 24 PG — LOW (ref 27–34)
MCHC RBC-ENTMCNC: 28.8 % — LOW (ref 32–36)
MCHC RBC-ENTMCNC: 30.2 % — LOW (ref 32–36)
MCV RBC AUTO: 79.4 FL — LOW (ref 80–100)
MCV RBC AUTO: 79.9 FL — LOW (ref 80–100)
NRBC # FLD: 0.03 K/UL — LOW (ref 25–125)
NRBC # FLD: 0.06 K/UL — LOW (ref 25–125)
NRBC FLD-RTO: 1.3 — SIGNIFICANT CHANGE UP
OB PNL STL: NEGATIVE — SIGNIFICANT CHANGE UP
PHOSPHATE SERPL-MCNC: 3.7 MG/DL — SIGNIFICANT CHANGE UP (ref 2.5–4.5)
PLATELET # BLD AUTO: 133 K/UL — LOW (ref 150–400)
PLATELET # BLD AUTO: 137 K/UL — LOW (ref 150–400)
PMV BLD: 10.3 FL — SIGNIFICANT CHANGE UP (ref 7–13)
PMV BLD: 10.5 FL — SIGNIFICANT CHANGE UP (ref 7–13)
POTASSIUM SERPL-MCNC: 4.9 MMOL/L — SIGNIFICANT CHANGE UP (ref 3.5–5.3)
POTASSIUM SERPL-SCNC: 4.9 MMOL/L — SIGNIFICANT CHANGE UP (ref 3.5–5.3)
RBC # BLD: 2.83 M/UL — LOW (ref 3.8–5.2)
RBC # BLD: 3.21 M/UL — LOW (ref 3.8–5.2)
RBC # FLD: 15.1 % — HIGH (ref 10.3–14.5)
RBC # FLD: 15.2 % — HIGH (ref 10.3–14.5)
SODIUM SERPL-SCNC: 141 MMOL/L — SIGNIFICANT CHANGE UP (ref 135–145)
WBC # BLD: 3.66 K/UL — LOW (ref 3.8–10.5)
WBC # BLD: 4.47 K/UL — SIGNIFICANT CHANGE UP (ref 3.8–10.5)
WBC # FLD AUTO: 3.66 K/UL — LOW (ref 3.8–10.5)
WBC # FLD AUTO: 4.47 K/UL — SIGNIFICANT CHANGE UP (ref 3.8–10.5)

## 2019-01-24 PROCEDURE — 99232 SBSQ HOSP IP/OBS MODERATE 35: CPT

## 2019-01-24 PROCEDURE — 99232 SBSQ HOSP IP/OBS MODERATE 35: CPT | Mod: GC

## 2019-01-24 RX ORDER — ACETAMINOPHEN 500 MG
650 TABLET ORAL ONCE
Qty: 0 | Refills: 0 | Status: COMPLETED | OUTPATIENT
Start: 2019-01-24 | End: 2019-01-24

## 2019-01-24 RX ADMIN — Medication 100 MILLIGRAM(S): at 06:57

## 2019-01-24 RX ADMIN — Medication 40 MILLIGRAM(S): at 06:57

## 2019-01-24 RX ADMIN — CLOPIDOGREL BISULFATE 75 MILLIGRAM(S): 75 TABLET, FILM COATED ORAL at 12:03

## 2019-01-24 RX ADMIN — Medication 100 MILLIGRAM(S): at 21:08

## 2019-01-24 RX ADMIN — Medication 650 MILLIGRAM(S): at 17:31

## 2019-01-24 RX ADMIN — Medication 100 MILLIGRAM(S): at 14:13

## 2019-01-24 RX ADMIN — TERAZOSIN HYDROCHLORIDE 2 MILLIGRAM(S): 10 CAPSULE ORAL at 21:08

## 2019-01-24 RX ADMIN — CARVEDILOL PHOSPHATE 25 MILLIGRAM(S): 80 CAPSULE, EXTENDED RELEASE ORAL at 06:57

## 2019-01-24 RX ADMIN — CARVEDILOL PHOSPHATE 25 MILLIGRAM(S): 80 CAPSULE, EXTENDED RELEASE ORAL at 17:31

## 2019-01-24 RX ADMIN — ISOSORBIDE MONONITRATE 60 MILLIGRAM(S): 60 TABLET, EXTENDED RELEASE ORAL at 17:31

## 2019-01-24 RX ADMIN — Medication 81 MILLIGRAM(S): at 12:02

## 2019-01-24 RX ADMIN — Medication 0.2 MILLIGRAM(S): at 06:57

## 2019-01-24 RX ADMIN — Medication 0.2 MILLIGRAM(S): at 17:31

## 2019-01-24 RX ADMIN — Medication 650 MILLIGRAM(S): at 16:49

## 2019-01-24 RX ADMIN — Medication 100 MILLIGRAM(S): at 12:02

## 2019-01-24 RX ADMIN — Medication 650 MILLIGRAM(S): at 17:36

## 2019-01-24 RX ADMIN — Medication 650 MILLIGRAM(S): at 10:45

## 2019-01-24 RX ADMIN — ATORVASTATIN CALCIUM 40 MILLIGRAM(S): 80 TABLET, FILM COATED ORAL at 21:08

## 2019-01-24 RX ADMIN — HEPARIN SODIUM 900 UNIT(S)/HR: 5000 INJECTION INTRAVENOUS; SUBCUTANEOUS at 05:04

## 2019-01-24 RX ADMIN — ISOSORBIDE MONONITRATE 60 MILLIGRAM(S): 60 TABLET, EXTENDED RELEASE ORAL at 10:45

## 2019-01-24 NOTE — PROGRESS NOTE ADULT - ASSESSMENT
Problem/Plan - 1:  ·  Problem: Acute deep vein thrombosis (DVT) of right upper extremity, unspecified vein.  Plan: check  MR venogram,.  cw heparin gtt    Problem/Plan - 2:  ·  Problem: DM2 (diabetes mellitus, type 2).  Plan: hold oral hypoglycemics, check a1c, SS insulin for now.     Problem/Plan - 3:  ·  Problem: CKD (chronic kidney disease) stage 4, GFR 15-29 ml/min.  Plan: Renal consult in am, dose meds renally, avoid nephrotoxins  cont sodium bicarb for metabolic acidosis.     Problem/Plan - 4:  ·  Problem: HTN (Hypertension).  Plan: cont home meds.     Problem/Plan - 5:  ·  Problem: CAD (coronary artery disease).  Plan: cont home meds ( asa, plavix, clonidine, imdur, hydralazine, coreg, terazosin, lasix), cards follg.     Problem/Plan - 6:  Problem: HLD (hyperlipidemia). Plan: crestor to lipitor.    Problem/Plan - 7:  ·  Problem: Gout.  Plan: cont allopurinol.

## 2019-01-24 NOTE — PROGRESS NOTE ADULT - PROBLEM SELECTOR PLAN 2
Recommendation: BP elevated. BP meds may need to be adjusted if BP remains elevated. Low salt diet. Monitor BP. BP at acceptable range. Monitor BP on current medications. Low salt diet. Monitor BP. BP elevated earlier today. BP meds may need to be adjusted, if BP remains elevated. Low salt diet. Monitor BP

## 2019-01-24 NOTE — PROGRESS NOTE ADULT - SUBJECTIVE AND OBJECTIVE BOX
Vascular Surgery    Patient seen and examined at bedside. Still complaining of arm swelling.     Vital Signs Last 24 Hrs  T(C): 37 (01-24-19 @ 06:25), Max: 37 (01-23-19 @ 12:48)  T(F): 98.6 (01-24-19 @ 06:25), Max: 98.6 (01-23-19 @ 12:48)  HR: 67 (01-24-19 @ 06:25) (59 - 67)  BP: 152/100 (01-24-19 @ 06:25) (139/48 - 187/65)  BP(mean): --  RR: 18 (01-24-19 @ 06:25) (18 - 18)  SpO2: 100% (01-24-19 @ 06:25) (100% - 100%)  I&O's Detail    Physical Exam:  General Appearance: Appears well, NAD  Respiratory: No labored breathing  CV: Pulse regularly present  Abdomen: Soft, nontense  Extr:; RUE forearm and brachia w mild to mod edema radial/ulnar pulses palpable                        6.5    3.66  )-----------( 137      ( 24 Jan 2019 04:20 )             22.6     01-24    141  |  108<H>  |  47<H>  ----------------------------<  103<H>  4.9   |  17<L>  |  4.12<H>    Ca    8.9      24 Jan 2019 04:20  Phos  3.7     01-24  Mg     2.1     01-24    TPro  6.5  /  Alb  3.8  /  TBili  < 0.2<L>  /  DBili  x   /  AST  13  /  ALT  4   /  AlkPhos  65  01-22    PT/INR - ( 22 Jan 2019 17:42 )   PT: 12.1 SEC;   INR: 1.09          PTT - ( 24 Jan 2019 04:20 )  PTT:78.0 SEC  CAPILLARY BLOOD GLUCOSE      POCT Blood Glucose.: 139 mg/dL (24 Jan 2019 08:20)  POCT Blood Glucose.: 124 mg/dL (23 Jan 2019 22:00)  POCT Blood Glucose.: 89 mg/dL (23 Jan 2019 17:17)  POCT Blood Glucose.: 151 mg/dL (23 Jan 2019 13:03)      MEDICATIONS  (STANDING):  allopurinol 100 milliGRAM(s) Oral daily  aspirin enteric coated 81 milliGRAM(s) Oral daily  atorvastatin 40 milliGRAM(s) Oral at bedtime  calcitriol   Capsule 0.25 MICROGram(s) Oral <User Schedule>  carvedilol 25 milliGRAM(s) Oral every 12 hours  cloNIDine 0.2 milliGRAM(s) Oral two times a day  clopidogrel Tablet 75 milliGRAM(s) Oral daily  dextrose 5%. 1000 milliLiter(s) (50 mL/Hr) IV Continuous <Continuous>  dextrose 5%. 1000 milliLiter(s) (50 mL/Hr) IV Continuous <Continuous>  dextrose 50% Injectable 12.5 Gram(s) IV Push once  dextrose 50% Injectable 25 Gram(s) IV Push once  dextrose 50% Injectable 25 Gram(s) IV Push once  dextrose 50% Injectable 12.5 Gram(s) IV Push once  dextrose 50% Injectable 25 Gram(s) IV Push once  dextrose 50% Injectable 25 Gram(s) IV Push once  furosemide    Tablet 40 milliGRAM(s) Oral daily  heparin  Infusion. 900 Unit(s)/Hr (9 mL/Hr) IV Continuous <Continuous>  hydrALAZINE 100 milliGRAM(s) Oral three times a day  insulin lispro (HumaLOG) corrective regimen sliding scale   SubCutaneous three times a day before meals  insulin lispro (HumaLOG) corrective regimen sliding scale   SubCutaneous at bedtime  isosorbide   mononitrate ER Tablet (IMDUR) 60 milliGRAM(s) Oral <User Schedule>  sodium bicarbonate 650 milliGRAM(s) Oral two times a day  terazosin 2 milliGRAM(s) Oral at bedtime    MEDICATIONS  (PRN):  dextrose 40% Gel 15 Gram(s) Oral once PRN Blood Glucose LESS THAN 70 milliGRAM(s)/deciliter  dextrose 40% Gel 15 Gram(s) Oral once PRN Blood Glucose LESS THAN 70 milliGRAM(s)/deciliter  glucagon  Injectable 1 milliGRAM(s) IntraMuscular once PRN Glucose LESS THAN 70 milligrams/deciliter  glucagon  Injectable 1 milliGRAM(s) IntraMuscular once PRN Glucose LESS THAN 70 milligrams/deciliter  heparin  Injectable 5500 Unit(s) IV Push every 6 hours PRN For aPTT less than 40  heparin  Injectable 2500 Unit(s) IV Push every 6 hours PRN For aPTT between 40 - 57 Vascular Surgery    Patient seen and examined at bedside. Still complaining of arm swelling.     Vital Signs Last 24 Hrs  T(C): 37 (01-24-19 @ 06:25), Max: 37 (01-23-19 @ 12:48)  T(F): 98.6 (01-24-19 @ 06:25), Max: 98.6 (01-23-19 @ 12:48)  HR: 67 (01-24-19 @ 06:25) (59 - 67)  BP: 152/100 (01-24-19 @ 06:25) (139/48 - 187/65)  BP(mean): --  RR: 18 (01-24-19 @ 06:25) (18 - 18)  SpO2: 100% (01-24-19 @ 06:25) (100% - 100%)  I&O's Detail    Physical Exam:  General Appearance: Appears well, NAD  Respiratory: No labored breathing  CV: Pulse regularly present  Abdomen: Soft, nontense  Extr:; RUE forearm and brachia w mild to mod edema overall unchanged  radial/ulnar pulses palpable                        6.5    3.66  )-----------( 137      ( 24 Jan 2019 04:20 )             22.6     01-24    141  |  108<H>  |  47<H>  ----------------------------<  103<H>  4.9   |  17<L>  |  4.12<H>    Ca    8.9      24 Jan 2019 04:20  Phos  3.7     01-24  Mg     2.1     01-24    TPro  6.5  /  Alb  3.8  /  TBili  < 0.2<L>  /  DBili  x   /  AST  13  /  ALT  4   /  AlkPhos  65  01-22    PT/INR - ( 22 Jan 2019 17:42 )   PT: 12.1 SEC;   INR: 1.09          PTT - ( 24 Jan 2019 04:20 )  PTT:78.0 SEC  CAPILLARY BLOOD GLUCOSE      POCT Blood Glucose.: 139 mg/dL (24 Jan 2019 08:20)  POCT Blood Glucose.: 124 mg/dL (23 Jan 2019 22:00)  POCT Blood Glucose.: 89 mg/dL (23 Jan 2019 17:17)  POCT Blood Glucose.: 151 mg/dL (23 Jan 2019 13:03)      MEDICATIONS  (STANDING):  allopurinol 100 milliGRAM(s) Oral daily  aspirin enteric coated 81 milliGRAM(s) Oral daily  atorvastatin 40 milliGRAM(s) Oral at bedtime  calcitriol   Capsule 0.25 MICROGram(s) Oral <User Schedule>  carvedilol 25 milliGRAM(s) Oral every 12 hours  cloNIDine 0.2 milliGRAM(s) Oral two times a day  clopidogrel Tablet 75 milliGRAM(s) Oral daily  dextrose 5%. 1000 milliLiter(s) (50 mL/Hr) IV Continuous <Continuous>  dextrose 5%. 1000 milliLiter(s) (50 mL/Hr) IV Continuous <Continuous>  dextrose 50% Injectable 12.5 Gram(s) IV Push once  dextrose 50% Injectable 25 Gram(s) IV Push once  dextrose 50% Injectable 25 Gram(s) IV Push once  dextrose 50% Injectable 12.5 Gram(s) IV Push once  dextrose 50% Injectable 25 Gram(s) IV Push once  dextrose 50% Injectable 25 Gram(s) IV Push once  furosemide    Tablet 40 milliGRAM(s) Oral daily  heparin  Infusion. 900 Unit(s)/Hr (9 mL/Hr) IV Continuous <Continuous>  hydrALAZINE 100 milliGRAM(s) Oral three times a day  insulin lispro (HumaLOG) corrective regimen sliding scale   SubCutaneous three times a day before meals  insulin lispro (HumaLOG) corrective regimen sliding scale   SubCutaneous at bedtime  isosorbide   mononitrate ER Tablet (IMDUR) 60 milliGRAM(s) Oral <User Schedule>  sodium bicarbonate 650 milliGRAM(s) Oral two times a day  terazosin 2 milliGRAM(s) Oral at bedtime    MEDICATIONS  (PRN):  dextrose 40% Gel 15 Gram(s) Oral once PRN Blood Glucose LESS THAN 70 milliGRAM(s)/deciliter  dextrose 40% Gel 15 Gram(s) Oral once PRN Blood Glucose LESS THAN 70 milliGRAM(s)/deciliter  glucagon  Injectable 1 milliGRAM(s) IntraMuscular once PRN Glucose LESS THAN 70 milligrams/deciliter  glucagon  Injectable 1 milliGRAM(s) IntraMuscular once PRN Glucose LESS THAN 70 milligrams/deciliter  heparin  Injectable 5500 Unit(s) IV Push every 6 hours PRN For aPTT less than 40  heparin  Injectable 2500 Unit(s) IV Push every 6 hours PRN For aPTT between 40 - 57

## 2019-01-24 NOTE — CHART NOTE - NSCHARTNOTEFT_GEN_A_CORE
informed by MRI manager that PPM is not compatible with MRI,   s/w Vascular surg for further recs, CT Venogram chest recommended and ordered.'  s/w Nephrology for plan since patient is CKD stg 4- plan to give NS @ 75cc/hr for 4 hours before and 4 hours after contrast study  Need to schedule test with CT , no answer from CT at this time for scheduling purposes will continue to follow up and attempt to schedule for tomorrow

## 2019-01-24 NOTE — PROGRESS NOTE ADULT - ASSESSMENT
79F w/ aortic stenosis s/p aortic valve replacement, CAD s/p CABG x3, pacemaker, CKD w/ AVF LUE (unused, not on HD), OA, gout, presents with RUE swelling concerning for acute DVT. Duplex RUE demonstrates RUE DVT of subclavian, axillary, brachial, and radial veins. Vascular surgery is consulted for management of acute RUE DVT.     - Recommend MR venogram of chest to evaluate for possible thoracic outlet syndrome  - Continue heparin gtt for acute DVT    ANIBAL Jaquez PGY 3  [id number]

## 2019-01-24 NOTE — PROGRESS NOTE ADULT - PROBLEM SELECTOR PLAN 1
Pt. with known history of CKD in the setting of long standing HTN and DM. On review of previous labs on [facility], Scr has ranged between 3.5 to 4.3 since 2015. Scr elevated at 3.74 on 12/19/18 and is 4.21 on this admission (1/22/19). Scr stable at 4.12 today (1/24/19). Monitor labs and urine output. Avoid any potential nephrotoxins. Dose medications as per eGFR. Pt. with known history of advanced CKD in the setting of long standing HTN and DM. On review of previous labs on [facility], Scr has ranged between 3.5 to 4.3 since 2015. Scr elevated at 3.74 on 12/19/18 and is 4.21 on this admission (1/22/19). Scr stable at 4.12 today (1/24/19). Monitor labs and urine output. Avoid any potential nephrotoxins. Dose medications as per eGFR

## 2019-01-24 NOTE — CHART NOTE - NSCHARTNOTEFT_GEN_A_CORE
ADS NIGHT COVERAGE    Pt with Hgb 6.5. Discussed with medical attending. Order stool guaiac and 1 u PRBC. Continue heparin gtt for now. Will continue to monitor patient.     Devin EMERY  [id number]

## 2019-01-24 NOTE — PROGRESS NOTE ADULT - SUBJECTIVE AND OBJECTIVE BOX
[facility] DIVISION OF KIDNEY DISEASES AND HYPERTENSION -- FOLLOW UP NOTE  --------------------------------------------------------------------------------  HPI: 79-year-old female with longstanding history of HTN, DM2, CKD stage 5, s/p LUE AVF (created ~6 years ago in [place], never used), CAD s/p CABG, PCI (3/2017), and aortic stenosis s/p TAVR/PPM (3/2017), admitted with RUE DVT. Nephrology consulted due to history of CKD. Found to have US study that showed DVT from right radial to subclavian vein. Pt. with longstanding history of CKD. Of note, pt. with worsening anemia during this hospitalization. On review of previous labs on [facility], Scr has ranged between 3.5 to 4.3 since 2015. Scr elevated at 3.74 on 12/19/18 and is 4.21 on this admission (1/22/19). Scr stable at 4.12 today (1/24/19). Pt. seen and examined at bedside. Pt. was received blood transfusion during examination. Pt. denies any complains, tolerating oral diet. No CP, SOB, HA or dizziness.     PAST HISTORY  --------------------------------------------------------------------------------  No significant changes to PMH, PSH, FHx, SHx, unless otherwise noted    ALLERGIES & MEDICATIONS  --------------------------------------------------------------------------------  Allergies    codeine (Other)  Lortab (Other)  morphine (Other)  Percocet 10/325 (Other)  Reglan (Other; Flushing (Skin))  sulfa drugs (Flushing (Skin))    Intolerances      Standing Inpatient Medications  allopurinol 100 milliGRAM(s) Oral daily  aspirin enteric coated 81 milliGRAM(s) Oral daily  atorvastatin 40 milliGRAM(s) Oral at bedtime  calcitriol   Capsule 0.25 MICROGram(s) Oral <User Schedule>  carvedilol 25 milliGRAM(s) Oral every 12 hours  cloNIDine 0.2 milliGRAM(s) Oral two times a day  clopidogrel Tablet 75 milliGRAM(s) Oral daily  dextrose 5%. 1000 milliLiter(s) IV Continuous <Continuous>  dextrose 5%. 1000 milliLiter(s) IV Continuous <Continuous>  dextrose 50% Injectable 12.5 Gram(s) IV Push once  dextrose 50% Injectable 25 Gram(s) IV Push once  dextrose 50% Injectable 25 Gram(s) IV Push once  dextrose 50% Injectable 12.5 Gram(s) IV Push once  dextrose 50% Injectable 25 Gram(s) IV Push once  dextrose 50% Injectable 25 Gram(s) IV Push once  furosemide    Tablet 40 milliGRAM(s) Oral daily  heparin  Infusion. 900 Unit(s)/Hr IV Continuous <Continuous>  hydrALAZINE 100 milliGRAM(s) Oral three times a day  insulin lispro (HumaLOG) corrective regimen sliding scale   SubCutaneous three times a day before meals  insulin lispro (HumaLOG) corrective regimen sliding scale   SubCutaneous at bedtime  isosorbide   mononitrate ER Tablet (IMDUR) 60 milliGRAM(s) Oral <User Schedule>  sodium bicarbonate 650 milliGRAM(s) Oral two times a day  terazosin 2 milliGRAM(s) Oral at bedtime    REVIEW OF SYSTEMS  --------------------------------------------------------------------------------  Gen: No fever  Skin: No rash  Respiratory: No dyspnea  CV: No chest pain  GI: No abdominal pain  : No dysuria, hematuria  MSK: + RUE swelling  Heme: No easy bruising or bleeding  Psych: No significant depression    All other systems were reviewed and are negative, except as noted.    VITALS/PHYSICAL EXAM  --------------------------------------------------------------------------------  T(C): 37 (01-24-19 @ 06:25), Max: 37 (01-23-19 @ 12:48)  HR: 67 (01-24-19 @ 06:25) (59 - 67)  BP: 152/100 (01-24-19 @ 06:25) (139/48 - 187/65)  RR: 18 (01-24-19 @ 06:25) (18 - 18)  SpO2: 100% (01-24-19 @ 06:25) (100% - 100%)  Wt(kg): --  Height (cm): 154.94 (01-23-19 @ 12:48)  Weight (kg): 68.5 (01-23-19 @ 12:48)  BMI (kg/m2): 28.5 (01-23-19 @ 12:48)  BSA (m2): 1.68 (01-23-19 @ 12:48)        Physical Exam:  	Gen: resting, NAD  	HEENT: No JVD  	Pulm: CTA B/L  	CV: S1S2+  	Abd: Soft, +BS   	Ext: RUE swelling seen, No LE edema B/L  	Neuro: Awake, alert  	Skin: Warm and dry  	Vascular access: LUE AVF site: +thrill +bruit    LABS/STUDIES  --------------------------------------------------------------------------------              6.5    3.66  >-----------<  137      [01-24-19 @ 04:20]              22.6     141  |  108  |  47  ----------------------------<  103      [01-24-19 @ 04:20]  4.9   |  17  |  4.12        Ca     8.9     [01-24-19 @ 04:20]      Mg     2.1     [01-24-19 @ 04:20]      Phos  3.7     [01-24-19 @ 04:20]    TPro  6.5  /  Alb  3.8  /  TBili  < 0.2  /  DBili  x   /  AST  13  /  ALT  4   /  AlkPhos  65  [01-22-19 @ 17:42]    PT/INR: PT 12.1 , INR 1.09       [01-22-19 @ 17:42]  PTT: 78.0       [01-24-19 @ 04:20]      Creatinine Trend:  SCr 4.12 [01-24 @ 04:20]  SCr 4.06 [01-23 @ 03:54]  SCr 4.21 [01-22 @ 17:42]        Iron 26, TIBC --, %sat --      [01-24-19 @ 04:20]  Ferritin 102.9      [01-24-19 @ 04:20]  HbA1c 5.1      [01-23-19 @ 03:54]  TSH 1.21      [03-29-18 @ 06:38]  Lipid: chol 160, TG 64, HDL 54, LDL 92      [03-29-18 @ 06:38] [facility] DIVISION OF KIDNEY DISEASES AND HYPERTENSION -- FOLLOW UP NOTE  --------------------------------------------------------------------------------  HPI: 79-year-old female with longstanding history of HTN, DM2, CKD stage 5, s/p LUE AVF (created ~6 years ago in [place], never used), CAD s/p CABG, PCI (3/2017), and aortic stenosis s/p TAVR/PPM (3/2017), admitted with RUE DVT. Nephrology consulted due to history of CKD. Found to have US study that showed DVT from right radial to subclavian vein. Pt. with longstanding history of CKD. Of note, pt. with worsening anemia during this hospitalization. On review of previous labs on [facility], Scr has ranged between 3.5 to 4.3 since 2015. Scr elevated at 3.74 on 12/19/18 and is 4.21 on this admission (1/22/19). Scr stable at 4.12 today (1/24/19). Pt. seen and examined at bedside. Pt. was received blood transfusion during examination. Pt. denies any complains, tolerating oral diet. No CP, SOB, HA or dizziness.     PAST HISTORY  --------------------------------------------------------------------------------  No significant changes to PMH, PSH, FHx, SHx, unless otherwise noted    ALLERGIES & MEDICATIONS  --------------------------------------------------------------------------------  Allergies    codeine (Other)  Lortab (Other)  morphine (Other)  Percocet 10/325 (Other)  Reglan (Other; Flushing (Skin))  sulfa drugs (Flushing (Skin))    Intolerances    Standing Inpatient Medications  allopurinol 100 milliGRAM(s) Oral daily  aspirin enteric coated 81 milliGRAM(s) Oral daily  atorvastatin 40 milliGRAM(s) Oral at bedtime  calcitriol   Capsule 0.25 MICROGram(s) Oral <User Schedule>  carvedilol 25 milliGRAM(s) Oral every 12 hours  cloNIDine 0.2 milliGRAM(s) Oral two times a day  clopidogrel Tablet 75 milliGRAM(s) Oral daily  dextrose 5%. 1000 milliLiter(s) IV Continuous <Continuous>  dextrose 5%. 1000 milliLiter(s) IV Continuous <Continuous>  dextrose 50% Injectable 12.5 Gram(s) IV Push once  dextrose 50% Injectable 25 Gram(s) IV Push once  dextrose 50% Injectable 25 Gram(s) IV Push once  dextrose 50% Injectable 12.5 Gram(s) IV Push once  dextrose 50% Injectable 25 Gram(s) IV Push once  dextrose 50% Injectable 25 Gram(s) IV Push once  furosemide    Tablet 40 milliGRAM(s) Oral daily  heparin  Infusion. 900 Unit(s)/Hr IV Continuous <Continuous>  hydrALAZINE 100 milliGRAM(s) Oral three times a day  insulin lispro (HumaLOG) corrective regimen sliding scale   SubCutaneous three times a day before meals  insulin lispro (HumaLOG) corrective regimen sliding scale   SubCutaneous at bedtime  isosorbide   mononitrate ER Tablet (IMDUR) 60 milliGRAM(s) Oral <User Schedule>  sodium bicarbonate 650 milliGRAM(s) Oral two times a day  terazosin 2 milliGRAM(s) Oral at bedtime    REVIEW OF SYSTEMS  --------------------------------------------------------------------------------  Gen: No fever  Skin: No rash  Respiratory: No dyspnea  CV: No chest pain  GI: No abdominal pain  : No dysuria, hematuria  MSK: + RUE swelling  Heme: No easy bruising or bleeding  Psych: No significant depression    All other systems were reviewed and are negative, except as noted.    VITALS/PHYSICAL EXAM  --------------------------------------------------------------------------------  T(C): 37 (01-24-19 @ 06:25), Max: 37 (01-23-19 @ 12:48)  HR: 67 (01-24-19 @ 06:25) (59 - 67)  BP: 152/100 (01-24-19 @ 06:25) (139/48 - 187/65)  RR: 18 (01-24-19 @ 06:25) (18 - 18)  SpO2: 100% (01-24-19 @ 06:25) (100% - 100%)  Wt(kg): --  Height (cm): 154.94 (01-23-19 @ 12:48)  Weight (kg): 68.5 (01-23-19 @ 12:48)  BMI (kg/m2): 28.5 (01-23-19 @ 12:48)  BSA (m2): 1.68 (01-23-19 @ 12:48)    Physical Exam:  	Gen: resting, NAD  	HEENT: No JVD  	Pulm: CTA B/L  	CV: S1S2+  	Abd: Soft, +BS   	Ext: RUE swelling seen, No LE edema B/L  	Neuro: Awake, alert  	Skin: Warm and dry  	Vascular access: LUE AVF site: +thrill +bruit    LABS/STUDIES  --------------------------------------------------------------------------------              6.5    3.66  >-----------<  137      [01-24-19 @ 04:20]              22.6     141  |  108  |  47  ----------------------------<  103      [01-24-19 @ 04:20]  4.9   |  17  |  4.12        Ca     8.9     [01-24-19 @ 04:20]      Mg     2.1     [01-24-19 @ 04:20]      Phos  3.7     [01-24-19 @ 04:20]    TPro  6.5  /  Alb  3.8  /  TBili  < 0.2  /  DBili  x   /  AST  13  /  ALT  4   /  AlkPhos  65  [01-22-19 @ 17:42]    Creatinine Trend:  SCr 4.12 [01-24 @ 04:20]  SCr 4.06 [01-23 @ 03:54]  SCr 4.21 [01-22 @ 17:42]

## 2019-01-24 NOTE — PROGRESS NOTE ADULT - ASSESSMENT
79F with  PMH of HTN, CKD stage 5, with a LUE fistula which has never been used, DM2, CAD s/p CABG, PCI (3/2017), Aortic stenosis s/p TAVR/PPM (3/2017) presenting with RUE DVT    1. RUE DVT  a/c on hep gtt   heme eval  vascular eval noted pending MR venogram of chest to evaluate for possible thoracic outlet syndrome   PPM Is compatible for MRI   hem eval noted- will eventually start coumadin    2. CAD s/p CABG, PCI   no cp or sob   asa/plavix/bb/statin    3. AS s/p TAVR/ PPM  cv stable     4. HTN   elevated bp this am, outpt meds resumed yesterday   trend bp, continue with current anti-htn meds    5. anemia   guaiac negative   currently receiving 1 unit PRBCs  continue with  hep gtt  hem f/u , monitor CBC     6. CKD   renal f/u 79F with PMH of HTN, CKD stage 5, with a LUE fistula which has never been used, DM2, CAD s/p CABG, PCI (3/2017), Aortic stenosis s/p TAVR/PPM (3/2017) presenting with RUE DVT    1. RUE DVT  a/c on hep gtt   heme eval  vascular eval noted pending MR venogram of chest to evaluate for possible thoracic outlet syndrome   PPM Is compatible for MRI   hem eval noted- will eventually start coumadin    2. CAD s/p CABG, PCI   no cp or sob   asa/plavix/bb/statin    3. AS s/p TAVR/ PPM  cv stable     4. HTN   elevated bp this am, outpt meds resumed yesterday   trend bp, continue with current anti-htn meds    5. anemia   guaiac negative   currently receiving 1 unit PRBCs  continue with  hep gtt  hem f/u , monitor CBC     6. CKD   renal f/u

## 2019-01-24 NOTE — PROGRESS NOTE ADULT - SUBJECTIVE AND OBJECTIVE BOX
Patient is a 79y old  Female who presents with a chief complaint of ue swelling (23 Jan 2019 20:07)      INTERVAL HPI/OVERNIGHT EVENTS:  T(C): 36.9 (01-24-19 @ 13:37), Max: 37 (01-24-19 @ 06:25)  HR: 67 (01-24-19 @ 19:16) (61 - 71)  BP: 149/47 (01-24-19 @ 19:16) (139/48 - 190/59)  RR: 18 (01-24-19 @ 17:29) (18 - 18)  SpO2: 100% (01-24-19 @ 17:29) (100% - 100%)  Wt(kg): --  I&O's Summary      LABS:                        7.7    4.47  )-----------( 133      ( 24 Jan 2019 12:08 )             25.5     01-24    141  |  108<H>  |  47<H>  ----------------------------<  103<H>  4.9   |  17<L>  |  4.12<H>    Ca    8.9      24 Jan 2019 04:20  Phos  3.7     01-24  Mg     2.1     01-24      PTT - ( 24 Jan 2019 04:20 )  PTT:78.0 SEC    CAPILLARY BLOOD GLUCOSE      POCT Blood Glucose.: 132 mg/dL (24 Jan 2019 17:11)  POCT Blood Glucose.: 134 mg/dL (24 Jan 2019 12:37)  POCT Blood Glucose.: 139 mg/dL (24 Jan 2019 08:20)  POCT Blood Glucose.: 124 mg/dL (23 Jan 2019 22:00)            MEDICATIONS  (STANDING):  allopurinol 100 milliGRAM(s) Oral daily  aspirin enteric coated 81 milliGRAM(s) Oral daily  atorvastatin 40 milliGRAM(s) Oral at bedtime  calcitriol   Capsule 0.25 MICROGram(s) Oral <User Schedule>  carvedilol 25 milliGRAM(s) Oral every 12 hours  cloNIDine 0.2 milliGRAM(s) Oral two times a day  clopidogrel Tablet 75 milliGRAM(s) Oral daily  dextrose 5%. 1000 milliLiter(s) (50 mL/Hr) IV Continuous <Continuous>  dextrose 5%. 1000 milliLiter(s) (50 mL/Hr) IV Continuous <Continuous>  dextrose 50% Injectable 12.5 Gram(s) IV Push once  dextrose 50% Injectable 25 Gram(s) IV Push once  dextrose 50% Injectable 25 Gram(s) IV Push once  dextrose 50% Injectable 12.5 Gram(s) IV Push once  dextrose 50% Injectable 25 Gram(s) IV Push once  dextrose 50% Injectable 25 Gram(s) IV Push once  furosemide    Tablet 40 milliGRAM(s) Oral daily  heparin  Infusion. 900 Unit(s)/Hr (9 mL/Hr) IV Continuous <Continuous>  hydrALAZINE 100 milliGRAM(s) Oral three times a day  insulin lispro (HumaLOG) corrective regimen sliding scale   SubCutaneous three times a day before meals  insulin lispro (HumaLOG) corrective regimen sliding scale   SubCutaneous at bedtime  isosorbide   mononitrate ER Tablet (IMDUR) 60 milliGRAM(s) Oral <User Schedule>  sodium bicarbonate 650 milliGRAM(s) Oral two times a day  terazosin 2 milliGRAM(s) Oral at bedtime    MEDICATIONS  (PRN):  dextrose 40% Gel 15 Gram(s) Oral once PRN Blood Glucose LESS THAN 70 milliGRAM(s)/deciliter  dextrose 40% Gel 15 Gram(s) Oral once PRN Blood Glucose LESS THAN 70 milliGRAM(s)/deciliter  glucagon  Injectable 1 milliGRAM(s) IntraMuscular once PRN Glucose LESS THAN 70 milligrams/deciliter  glucagon  Injectable 1 milliGRAM(s) IntraMuscular once PRN Glucose LESS THAN 70 milligrams/deciliter  heparin  Injectable 5500 Unit(s) IV Push every 6 hours PRN For aPTT less than 40  heparin  Injectable 2500 Unit(s) IV Push every 6 hours PRN For aPTT between 40 - 57          PHYSICAL EXAM:  GENERAL: NAD, well-groomed, well-developed  HEAD:  Atraumatic, Normocephalic  CHEST/LUNG: Clear to percussion bilaterally; No rales, rhonchi, wheezing, or rubs  HEART: Regular rate and rhythm; No murmurs, rubs, or gallops  ABDOMEN: Soft, Nontender, Nondistended; Bowel sounds present  EXTREMITIES:  2+ Peripheral Pulses, No clubbing, cyanosis, or edema  LYMPH: No lymphadenopathy noted  SKIN: No rashes or lesions    Care Discussed with Consultants/Other Providers [ ] YES  [ ] NO

## 2019-01-24 NOTE — PROGRESS NOTE ADULT - SUBJECTIVE AND OBJECTIVE BOX
CARDIOLOGY FOLLOW UP - Dr. Damico    CC no cp or sob       PHYSICAL EXAM:  T(C): 37 (01-24-19 @ 06:25), Max: 37 (01-23-19 @ 12:48)  HR: 67 (01-24-19 @ 06:25) (59 - 67)  BP: 152/100 (01-24-19 @ 06:25) (139/48 - 187/65)  RR: 18 (01-24-19 @ 06:25) (18 - 18)  SpO2: 100% (01-24-19 @ 06:25) (100% - 100%)  Wt(kg): --  I&O's Summary      Appearance: Normal	  Cardiovascular: Normal S1 S2,RRR, + murmur   Respiratory: Lungs clear to auscultation	  Gastrointestinal:  Soft, Non-tender, + BS	  Extremities: Normal range of motion, RUE r edema        MEDICATIONS  (STANDING):  allopurinol 100 milliGRAM(s) Oral daily  aspirin enteric coated 81 milliGRAM(s) Oral daily  atorvastatin 40 milliGRAM(s) Oral at bedtime  calcitriol   Capsule 0.25 MICROGram(s) Oral <User Schedule>  carvedilol 25 milliGRAM(s) Oral every 12 hours  cloNIDine 0.2 milliGRAM(s) Oral two times a day  clopidogrel Tablet 75 milliGRAM(s) Oral daily  dextrose 5%. 1000 milliLiter(s) (50 mL/Hr) IV Continuous <Continuous>  dextrose 5%. 1000 milliLiter(s) (50 mL/Hr) IV Continuous <Continuous>  dextrose 50% Injectable 12.5 Gram(s) IV Push once  dextrose 50% Injectable 25 Gram(s) IV Push once  dextrose 50% Injectable 25 Gram(s) IV Push once  dextrose 50% Injectable 12.5 Gram(s) IV Push once  dextrose 50% Injectable 25 Gram(s) IV Push once  dextrose 50% Injectable 25 Gram(s) IV Push once  furosemide    Tablet 40 milliGRAM(s) Oral daily  heparin  Infusion. 900 Unit(s)/Hr (9 mL/Hr) IV Continuous <Continuous>  hydrALAZINE 100 milliGRAM(s) Oral three times a day  insulin lispro (HumaLOG) corrective regimen sliding scale   SubCutaneous three times a day before meals  insulin lispro (HumaLOG) corrective regimen sliding scale   SubCutaneous at bedtime  isosorbide   mononitrate ER Tablet (IMDUR) 60 milliGRAM(s) Oral <User Schedule>  sodium bicarbonate 650 milliGRAM(s) Oral two times a day  terazosin 2 milliGRAM(s) Oral at bedtime      TELEMETRY: 	    ECG:  	  RADIOLOGY:   DIAGNOSTIC TESTING:  [ ] Echocardiogram:  [ ]  Catheterization:  [ ] Stress Test:    OTHER: 	    LABS:	 	                                6.5    3.66  )-----------( 137      ( 24 Jan 2019 04:20 )             22.6     01-24    141  |  108<H>  |  47<H>  ----------------------------<  103<H>  4.9   |  17<L>  |  4.12<H>    Ca    8.9      24 Jan 2019 04:20  Phos  3.7     01-24  Mg     2.1     01-24    TPro  6.5  /  Alb  3.8  /  TBili  < 0.2<L>  /  DBili  x   /  AST  13  /  ALT  4   /  AlkPhos  65  01-22    PT/INR - ( 22 Jan 2019 17:42 )   PT: 12.1 SEC;   INR: 1.09          PTT - ( 24 Jan 2019 04:20 )  PTT:78.0 SEC

## 2019-01-25 ENCOUNTER — TRANSCRIPTION ENCOUNTER (OUTPATIENT)
Age: 79
End: 2019-01-25

## 2019-01-25 LAB
ANION GAP SERPL CALC-SCNC: 13 MMO/L — SIGNIFICANT CHANGE UP (ref 7–14)
APTT BLD: 62.9 SEC — HIGH (ref 27.5–36.3)
BUN SERPL-MCNC: 45 MG/DL — HIGH (ref 7–23)
CALCIUM SERPL-MCNC: 9.4 MG/DL — SIGNIFICANT CHANGE UP (ref 8.4–10.5)
CHLORIDE SERPL-SCNC: 107 MMOL/L — SIGNIFICANT CHANGE UP (ref 98–107)
CO2 SERPL-SCNC: 19 MMOL/L — LOW (ref 22–31)
CREAT SERPL-MCNC: 4.13 MG/DL — HIGH (ref 0.5–1.3)
GLUCOSE BLDC GLUCOMTR-MCNC: 121 MG/DL — HIGH (ref 70–99)
GLUCOSE BLDC GLUCOMTR-MCNC: 146 MG/DL — HIGH (ref 70–99)
GLUCOSE BLDC GLUCOMTR-MCNC: 93 MG/DL — SIGNIFICANT CHANGE UP (ref 70–99)
GLUCOSE BLDC GLUCOMTR-MCNC: 96 MG/DL — SIGNIFICANT CHANGE UP (ref 70–99)
GLUCOSE SERPL-MCNC: 109 MG/DL — HIGH (ref 70–99)
HCT VFR BLD CALC: 27.3 % — LOW (ref 34.5–45)
HGB BLD-MCNC: 7.9 G/DL — LOW (ref 11.5–15.5)
INR BLD: 1.11 — SIGNIFICANT CHANGE UP (ref 0.88–1.17)
MAGNESIUM SERPL-MCNC: 2.1 MG/DL — SIGNIFICANT CHANGE UP (ref 1.6–2.6)
MCHC RBC-ENTMCNC: 23 PG — LOW (ref 27–34)
MCHC RBC-ENTMCNC: 28.9 % — LOW (ref 32–36)
MCV RBC AUTO: 79.6 FL — LOW (ref 80–100)
NRBC # FLD: 0.06 K/UL — LOW (ref 25–125)
NRBC FLD-RTO: 1.3 — SIGNIFICANT CHANGE UP
PHOSPHATE SERPL-MCNC: 3.7 MG/DL — SIGNIFICANT CHANGE UP (ref 2.5–4.5)
PLATELET # BLD AUTO: 152 K/UL — SIGNIFICANT CHANGE UP (ref 150–400)
PMV BLD: 10.2 FL — SIGNIFICANT CHANGE UP (ref 7–13)
POTASSIUM SERPL-MCNC: 4.7 MMOL/L — SIGNIFICANT CHANGE UP (ref 3.5–5.3)
POTASSIUM SERPL-SCNC: 4.7 MMOL/L — SIGNIFICANT CHANGE UP (ref 3.5–5.3)
PROTHROM AB SERPL-ACNC: 12.4 SEC — SIGNIFICANT CHANGE UP (ref 9.8–13.1)
RBC # BLD: 3.43 M/UL — LOW (ref 3.8–5.2)
RBC # FLD: 15.8 % — HIGH (ref 10.3–14.5)
SODIUM SERPL-SCNC: 139 MMOL/L — SIGNIFICANT CHANGE UP (ref 135–145)
WBC # BLD: 4.45 K/UL — SIGNIFICANT CHANGE UP (ref 3.8–10.5)
WBC # FLD AUTO: 4.45 K/UL — SIGNIFICANT CHANGE UP (ref 3.8–10.5)

## 2019-01-25 PROCEDURE — 99232 SBSQ HOSP IP/OBS MODERATE 35: CPT | Mod: GC

## 2019-01-25 PROCEDURE — 99232 SBSQ HOSP IP/OBS MODERATE 35: CPT

## 2019-01-25 RX ORDER — WARFARIN SODIUM 2.5 MG/1
5 TABLET ORAL ONCE
Qty: 0 | Refills: 0 | Status: COMPLETED | OUTPATIENT
Start: 2019-01-25 | End: 2019-01-25

## 2019-01-25 RX ORDER — SODIUM CHLORIDE 9 MG/ML
1000 INJECTION INTRAMUSCULAR; INTRAVENOUS; SUBCUTANEOUS
Qty: 0 | Refills: 0 | Status: DISCONTINUED | OUTPATIENT
Start: 2019-01-25 | End: 2019-01-25

## 2019-01-25 RX ADMIN — CALCITRIOL 0.25 MICROGRAM(S): 0.5 CAPSULE ORAL at 08:29

## 2019-01-25 RX ADMIN — Medication 100 MILLIGRAM(S): at 05:55

## 2019-01-25 RX ADMIN — Medication 81 MILLIGRAM(S): at 12:27

## 2019-01-25 RX ADMIN — ATORVASTATIN CALCIUM 40 MILLIGRAM(S): 80 TABLET, FILM COATED ORAL at 21:27

## 2019-01-25 RX ADMIN — Medication 650 MILLIGRAM(S): at 17:32

## 2019-01-25 RX ADMIN — Medication 650 MILLIGRAM(S): at 05:55

## 2019-01-25 RX ADMIN — Medication 100 MILLIGRAM(S): at 12:28

## 2019-01-25 RX ADMIN — CLOPIDOGREL BISULFATE 75 MILLIGRAM(S): 75 TABLET, FILM COATED ORAL at 12:27

## 2019-01-25 RX ADMIN — Medication 0.2 MILLIGRAM(S): at 05:55

## 2019-01-25 RX ADMIN — ISOSORBIDE MONONITRATE 60 MILLIGRAM(S): 60 TABLET, EXTENDED RELEASE ORAL at 17:32

## 2019-01-25 RX ADMIN — CARVEDILOL PHOSPHATE 25 MILLIGRAM(S): 80 CAPSULE, EXTENDED RELEASE ORAL at 05:55

## 2019-01-25 RX ADMIN — ISOSORBIDE MONONITRATE 60 MILLIGRAM(S): 60 TABLET, EXTENDED RELEASE ORAL at 08:30

## 2019-01-25 RX ADMIN — CARVEDILOL PHOSPHATE 25 MILLIGRAM(S): 80 CAPSULE, EXTENDED RELEASE ORAL at 17:32

## 2019-01-25 RX ADMIN — WARFARIN SODIUM 5 MILLIGRAM(S): 2.5 TABLET ORAL at 17:36

## 2019-01-25 RX ADMIN — SODIUM CHLORIDE 75 MILLILITER(S): 9 INJECTION INTRAMUSCULAR; INTRAVENOUS; SUBCUTANEOUS at 09:04

## 2019-01-25 RX ADMIN — TERAZOSIN HYDROCHLORIDE 2 MILLIGRAM(S): 10 CAPSULE ORAL at 21:27

## 2019-01-25 RX ADMIN — Medication 0.2 MILLIGRAM(S): at 17:32

## 2019-01-25 RX ADMIN — Medication 40 MILLIGRAM(S): at 05:55

## 2019-01-25 RX ADMIN — HEPARIN SODIUM 900 UNIT(S)/HR: 5000 INJECTION INTRAVENOUS; SUBCUTANEOUS at 08:39

## 2019-01-25 RX ADMIN — Medication 100 MILLIGRAM(S): at 12:27

## 2019-01-25 RX ADMIN — Medication 100 MILLIGRAM(S): at 21:27

## 2019-01-25 NOTE — DISCHARGE NOTE ADULT - HOSPITAL COURSE
HPI:  79F with h/o  HTN, CKD stage 5, with a LUE fistula placed 6 yrs ago in [place] which has never been used, DM2, CAD s/p CABG, PCI (3/2017), Aortic stenosis s/p TAVR/PPM (3/2017),  sent in for evaluation after RUE ultrasound reveals an extensive DVT of the right subclavian. Pt reports that she had aranesp injection in the rt arm on Friday and started to notice swelling and pain of the rt arm since Monday, went for a f/u with her cardiologist had US which revealed dvt and sent here. Pt denies chest pain, SOB,  palpitations, orthopnea, lower extremity edema, headache, vision changes, fever, recent illness, abdominal pain, nausea, vomit, dysuria or any other complaints.    In the ED had US which revealed RUE dvt extending from the subclavian to radial vein.    COURSE_________________________________________________ Mrs. Seymour is an 78 YO F with PMH of HTN, CKD Stage 5 with a LUE fistula placed 6 years ago in [place] which has never been used, DM2, CAD s/p CABG, s/p PCI (3/2017) and Aortic Stenosis s/p TAVR/ PPM (3/2017) presented for evaluation after RUE ultrasound revealed an extensive DVT of the right subclavian. Patient reports Aranesp injection to the right arm on Friday prior to admission and then started noticing pain and swelling of her right arm. The patient went to her Cardiologist where an US UE was performed with DVT and was sent to the ER. Repeat US in ER confirmed DVT and Mrs. Seymour was admitted for RUE DVT management and monitoring. The patient was started Heparin GTT bridged to Coumadin and monitored with Goal PTT  while on Heparin GTT and Goal INR 2-3. Mrs. Seymour was evaluated by Vascular for possible Thoracic Outlet Obstruction who initially recommended a CT Venogram to be performed. CT held second to CKD. MRI discussed however patient with PPM Not compatible with MRI. Mrs. Seymour is an 78 YO F with PMH of HTN, CKD Stage 5 with a LUE fistula placed 6 years ago in [place] which has never been used, DM2, CAD s/p CABG, s/p PCI (3/2017) and Aortic Stenosis s/p TAVR/ PPM (3/2017) presented for evaluation after RUE ultrasound revealed an extensive DVT of the right subclavian. Patient reports Aranesp injection to the right arm on Friday prior to admission and then started noticing pain and swelling of her right arm. The patient went to her Cardiologist where an US UE was performed with DVT and was sent to the ER. Repeat US in ER confirmed DVT and Mrs. Seymour was admitted for RUE DVT management and monitoring. The patient was started Heparin GTT bridged to Coumadin and monitored with Goal PTT  while on Heparin GTT and Goal INR 2-3. Mrs. Seymour was evaluated by Vascular for possible Thoracic Outlet Obstruction who initially recommended a CT Venogram to be performed. CT held second to CKD. MRI discussed however patient with PPM Not compatible with MRI. As per discussion with Vascular, no imaging needed at this time.     On ______, case discussed with Dr. Still and patient is medically stable for discharge. Mrs. Seymour is an 78 YO F with PMH of HTN, CKD Stage 5 with a LUE fistula placed 6 years ago in [place] which has never been used, DM2, CAD s/p CABG, s/p PCI (3/2017) and Aortic Stenosis s/p TAVR/ PPM (3/2017) presented for evaluation after RUE ultrasound revealed an extensive DVT of the right subclavian. Patient reports Aranesp injection to the right arm on Friday prior to admission and then started noticing pain and swelling of her right arm. The patient went to her Cardiologist where an US UE was performed with DVT and was sent to the ER. Repeat US in ER confirmed DVT and Mrs. Seymour was admitted for RUE DVT management and monitoring. The patient was started Heparin GTT bridged to Coumadin and monitored with Goal PTT  while on Heparin GTT and Goal INR 2-3. INR on discharge ______. Heparin drip dc'ed on _____. Continue on Coumadin _____mg PO QD and follow up with your primary care physician outpatient. Mrs. Seymour was evaluated by Vascular for possible Thoracic Outlet Obstruction who initially recommended a CT Venogram to be performed. CT held second to CKD. MRI discussed however patient with PPM Not compatible with MRI. As per discussion with Vascular, no imaging needed at this time.     On ______, case discussed with Dr. Still and patient is medically stable for discharge. Mrs. Seymour is an 78 YO F with PMH of HTN, CKD Stage 5 with a LUE fistula placed 6 years ago in [place] which has never been used, DM2, CAD s/p CABG, s/p PCI (3/2017) and Aortic Stenosis s/p TAVR/ PPM (3/2017) presented for evaluation after RUE ultrasound revealed an extensive DVT of the right subclavian. Patient reports Aranesp injection to the right arm on Friday prior to admission and then started noticing pain and swelling of her right arm. The patient went to her Cardiologist where an US UE was performed with DVT and was sent to the ER. Repeat US in ER confirmed DVT and Mrs. Seymour was admitted for RUE DVT management and monitoring. The patient was started Heparin GTT bridged to Coumadin and monitored with Goal PTT  while on Heparin GTT and Goal INR 2-3. INR on discharge 2.17. Heparin drip dc'ed on 1/29. Continue on Coumadin 3 mg PO QD and follow up with your primary care physician outpatient. Followup with Dr Damico in 3 days Friday 2/1/19 And 2/14/19 at 3 pm. Mrs. Seymour was evaluated by Vascular for possible Thoracic Outlet Obstruction who initially recommended a CT Venogram to be performed. CT held second to CKD. MRI discussed however patient with PPM Not compatible with MRI. As per discussion with Vascular, no imaging needed at this time.     On 1/29/19, case discussed with Dr. Damico and patient is medically stable for discharge.

## 2019-01-25 NOTE — DISCHARGE NOTE ADULT - INSTRUCTIONS
regular renal low sodium no concentrated potassium or phosphorus  maintain consistent level of vitamin K in diet each day regular renal low sodium Low carb no concentrated potassium or phosphorus  maintain consistent level of vitamin K in diet each day

## 2019-01-25 NOTE — DISCHARGE NOTE ADULT - MEDICATION SUMMARY - MEDICATIONS TO CHANGE
I will SWITCH the dose or number of times a day I take the medications listed below when I get home from the hospital:    terazosin 2 mg oral capsule  -- 1 cap(s) by mouth once a day (at bedtime)    furosemide 40 mg tablet  -- 1 tab(s) by mouth once a day    hydrALAZINE 100 mg oral tablet  -- 1 tab(s) by mouth 2 times a day in the morning and evening

## 2019-01-25 NOTE — DISCHARGE NOTE ADULT - MEDICATION SUMMARY - MEDICATIONS TO STOP TAKING
I will STOP taking the medications listed below when I get home from the hospital:    Plavix 75 mg tablet  -- 1 tab(s) by mouth once a day

## 2019-01-25 NOTE — DISCHARGE NOTE ADULT - ADDITIONAL INSTRUCTIONS
*****  follow up with your primary care doctor within one week if you do not have one you can call the medicine clinic at [phone] to make an appointment Follow up with your primary care doctor within one week if you do not have one you can call the medicine clinic at [phone] to make an appointment Follow up with Cardiologist and PMD within one week of discharge. Call for appointment. Return to ED for any concerning symptoms. Continue medications as prescribed. Low salt, low fat, low cholesterol diet. Followup with Dr Damico in 3 days Friday 2/1/19  And 2/14/19 at 3 pm.

## 2019-01-25 NOTE — PROGRESS NOTE ADULT - PROBLEM SELECTOR PLAN 1
Pt. with known history of advanced CKD in the setting of long standing HTN and DM. On review of previous labs on [facility], Scr has ranged between 3.5 to 4.3 since 2015. Scr elevated at 3.74 on 12/19/18 and is 4.21 on this admission (1/22/19). Scr stable at 4.13 today (1/25/19). Monitor labs and urine output. Avoid any potential nephrotoxins. Dose medications as per eGFR. Pt. at increase risk for radio-induced contrast nephropathy. Pt. with known history of advanced CKD in the setting of long standing HTN and DM. On review of previous labs on [facility], Scr has ranged between 3.5 to 4.3 since 2015. Scr elevated at 3.74 on 12/19/18 and was 4.21 on this admission (1/22/19). Scr stable at 4.13 today (1/25/19). Pt. at increased risk for radiocontrast nephropathy. Monitor labs and urine output. Avoid any potential nephrotoxins. Dose medications as per eGFR

## 2019-01-25 NOTE — DISCHARGE NOTE ADULT - PATIENT PORTAL LINK FT
You can access the [facility] Patient Portal, offered by [facility], by registering with the following website: [email or web address]

## 2019-01-25 NOTE — DISCHARGE NOTE ADULT - PLAN OF CARE
follow up -continue coumadin****ADD COUMADIN TEACHING Continue blood pressure, cholesterol and diabetic medications. Goal of hemoglobin A1C (HgbA1C) < 7%.  Avoid nephrotoxic drugs such as nonsteroidal anti-inflammatory agents (NSAIDs).   Please follow up with your nephrologist to monitor your kidney function, continue with low protein and potassium diet. Monitor finger sticks pre-meal and bedtime, low salt, fat and carbohydrate diet, minimize glucose intake.  Exercise daily for at least 30 minutes and weight loss.  Follow up with primary care physician and endocrinologist for routine Hemoglobin A1C checks and management.  Follow up with your ophthalmologist for routine yearly vision exams. Continue aspirin and Plavix, do not stop unless instructed by your physician.  Continue low salt, fat, cholesterol and carbohydrate diet. Follow up with cardiologist and primary care physician's routine appointment. Low fat diet, exercise daily and continue current medications. Follow up with primary care physician and cardiologist for management. your pacemaker is NOT COMPATIBLE WITH MRI! MRI Spoke with [employer] to confirm.  You can not undergo MRI's.   F/u with your cardiologist - Continue on Coumadn *** ADD COUMADIN TEACHING and DOSE! your pacemaker is NOT COMPATIBLE WITH MRI! MRI Spoke with [email or web address] to confirm.  You can not undergo MRI's. Follow up with Cardiologist. - Continue on Coumadin *** ADD COUMADIN TEACHING and DOSE! - Continue on Coumadin 3mg daily. Followup with Dr Damico in 3 days Friday 2/1/19  And 2/14/19 at 3 pm. Followup with PMD and take all medications prescribed.

## 2019-01-25 NOTE — DISCHARGE NOTE ADULT - MEDICATION SUMMARY - MEDICATIONS TO TAKE
I will START or STAY ON the medications listed below when I get home from the hospital:    aspirin 81 mg oral delayed release tablet  -- 1 tab(s) by mouth once a day  -- Indication: For CAD (coronary artery disease)    acetaminophen 325 mg oral tablet  -- 2 tab(s) by mouth every 6 hours, As needed, Temp greater or equal to 38C (100.4F), Moderate Pain (4 - 6)  -- Indication: For Pain reliever    sodium bicarbonate 650 mg oral tablet  -- 1 tab(s) by mouth 2 times a day  -- Indication: For CKD (chronic kidney disease) stage 4, GFR 15-29 ml/min    cloNIDine 0.2 mg oral tablet  -- 1 tab(s) by mouth 2 times a day  -- Indication: For HTN (Hypertension)    terazosin 1 mg oral capsule  -- 3 cap(s) by mouth once a day (at bedtime)  -- Indication: For Home meds    Imdur 60 mg oral tablet, extended release  -- 1 tab(s) by mouth 2 times a day  -- Indication: For CAD (coronary artery disease)    warfarin 3 mg oral tablet  -- 1 tab(s) by mouth once a day (at bedtime)   -- Indication: For DVT    Onglyza 2.5 mg oral tablet  -- 1 tab(s) by mouth , As Needed - for FBS >100  -- Indication: For DM2 (diabetes mellitus, type 2)    allopurinol 100 mg oral tablet  -- 1 tab(s) by mouth once a day  -- Indication: For Gout    Crestor 10 mg oral tablet  -- 1 tab(s) by mouth once a day (at bedtime)  -- Indication: For HLD (hyperlipidemia)    carvedilol 25 mg oral tablet  -- 1 tab(s) by mouth 2 times a day  -- Indication: For HTN (Hypertension)    furosemide 20 mg oral tablet  -- 1 tab(s) by mouth once a day  -- Indication: For HTN (Hypertension)    hydrALAZINE 100 mg oral tablet  -- 1 tab(s) by mouth 3 times a day  -- Indication: For HTN (Hypertension)    calcitriol 0.25 mcg oral capsule  -- 1 cap(s) by mouth 3 times a week on Monday, Wednesday, and Friday   -- Indication: For Vitamin

## 2019-01-25 NOTE — PROGRESS NOTE ADULT - ASSESSMENT
Problem/Plan - 1:  ·  Problem: Acute deep vein thrombosis (DVT) of right upper extremity, unspecified vein.  Plan: check  MR venogram,.  cw heparin gtt  will start dosing coumadin    Problem/Plan - 2:  ·  Problem: DM2 (diabetes mellitus, type 2).  Plan: hold oral hypoglycemics, check a1c, SS insulin for now.     Problem/Plan - 3:  ·  Problem: CKD (chronic kidney disease) stage 4, GFR 15-29 ml/min.  Plan: Renal consult in am, dose meds renally, avoid nephrotoxins  cont sodium bicarb for metabolic acidosis.     Problem/Plan - 4:  ·  Problem: HTN (Hypertension).  Plan: cont home meds.     Problem/Plan - 5:  ·  Problem: CAD (coronary artery disease).  Plan: cont home meds ( asa, plavix, clonidine, imdur, hydralazine, coreg, terazosin, lasix), cards follg.     Problem/Plan - 6:  Problem: HLD (hyperlipidemia). Plan: crestor to lipitor.    Problem/Plan - 7:  ·  Problem: Gout.  Plan: cont allopurinol.

## 2019-01-25 NOTE — PROGRESS NOTE ADULT - SUBJECTIVE AND OBJECTIVE BOX
Surgery Progress Note    SUBJECTIVE: Pt seen and examined at bedside. Patient comfortable and in no-apparent distress. Patient does not have pain/numbness/tingling      Vital Signs Last 24 Hrs  T(C): 37 (25 Jan 2019 05:52), Max: 37 (25 Jan 2019 05:52)  T(F): 98.6 (25 Jan 2019 05:52), Max: 98.6 (25 Jan 2019 05:52)  HR: 66 (25 Jan 2019 05:52) (60 - 71)  BP: 157/65 (25 Jan 2019 05:52) (149/47 - 190/59)  BP(mean): --  RR: 18 (25 Jan 2019 05:52) (18 - 18)  SpO2: 100% (25 Jan 2019 05:52) (100% - 100%)    Physical Exam:  General Appearance: Appears well, NAD  Respiratory: No labored breathing  CV: Pulse regularly present  Abdomen: Soft, nontense  Extr:; RUE forearm and brachia w mild to mod edema overall unchanged  radial/ulnar pulses palpable             LABS:                        7.9    4.45  )-----------( 152      ( 25 Jan 2019 06:21 )             27.3     01-25    139  |  107  |  45<H>  ----------------------------<  109<H>  4.7   |  19<L>  |  4.13<H>    Ca    9.4      25 Jan 2019 06:21  Phos  3.7     01-25  Mg     2.1     01-25      PTT - ( 25 Jan 2019 06:21 )  PTT:62.9 SEC      INs and OUTs: Surgery Progress Note    SUBJECTIVE: Pt seen and examined at bedside. Patient comfortable and in no-apparent distress. Patient does not have pain/numbness/tingling      Vital Signs Last 24 Hrs  T(C): 37 (25 Jan 2019 05:52), Max: 37 (25 Jan 2019 05:52)  T(F): 98.6 (25 Jan 2019 05:52), Max: 98.6 (25 Jan 2019 05:52)  HR: 66 (25 Jan 2019 05:52) (60 - 71)  BP: 157/65 (25 Jan 2019 05:52) (149/47 - 190/59)  BP(mean): --  RR: 18 (25 Jan 2019 05:52) (18 - 18)  SpO2: 100% (25 Jan 2019 05:52) (100% - 100%)    Physical Exam:  General Appearance: Appears well, NAD  Respiratory: No labored breathing  CV: Pulse regularly present  Abdomen: Soft, nontense  Extr:; RUE forearm and brachia w mild to mod edema w slight improvment   radial/ulnar pulses palpable  no ischemia or phlegmasia             LABS:                        7.9    4.45  )-----------( 152      ( 25 Jan 2019 06:21 )             27.3     01-25    139  |  107  |  45<H>  ----------------------------<  109<H>  4.7   |  19<L>  |  4.13<H>    Ca    9.4      25 Jan 2019 06:21  Phos  3.7     01-25  Mg     2.1     01-25      PTT - ( 25 Jan 2019 06:21 )  PTT:62.9 SEC      INs and OUTs:

## 2019-01-25 NOTE — DISCHARGE NOTE ADULT - CARE PLAN
Principal Discharge DX:	Acute deep vein thrombosis (DVT) of right upper extremity, unspecified vein  Goal:	follow up  Assessment and plan of treatment:	-continue coumadin****ADD COUMADIN TEACHING  Secondary Diagnosis:	Chronic kidney disease  Assessment and plan of treatment:	Continue blood pressure, cholesterol and diabetic medications. Goal of hemoglobin A1C (HgbA1C) < 7%.  Avoid nephrotoxic drugs such as nonsteroidal anti-inflammatory agents (NSAIDs).   Please follow up with your nephrologist to monitor your kidney function, continue with low protein and potassium diet.  Secondary Diagnosis:	DM2 (diabetes mellitus, type 2)  Assessment and plan of treatment:	Monitor finger sticks pre-meal and bedtime, low salt, fat and carbohydrate diet, minimize glucose intake.  Exercise daily for at least 30 minutes and weight loss.  Follow up with primary care physician and endocrinologist for routine Hemoglobin A1C checks and management.  Follow up with your ophthalmologist for routine yearly vision exams.  Secondary Diagnosis:	CAD (coronary artery disease)  Assessment and plan of treatment:	Continue aspirin and Plavix, do not stop unless instructed by your physician.  Continue low salt, fat, cholesterol and carbohydrate diet. Follow up with cardiologist and primary care physician's routine appointment.  Secondary Diagnosis:	HLD (hyperlipidemia)  Assessment and plan of treatment:	Low fat diet, exercise daily and continue current medications. Follow up with primary care physician and cardiologist for management.  Secondary Diagnosis:	History of pacemaker  Assessment and plan of treatment:	your pacemaker is NOT COMPATIBLE WITH MRI! MRI Spoke with [employer] to confirm.  You can not undergo MRI's.   F/u with your cardiologist Principal Discharge DX:	Acute deep vein thrombosis (DVT) of right upper extremity, unspecified vein  Goal:	follow up  Assessment and plan of treatment:	- Continue on Coumadn *** ADD COUMADIN TEACHING and DOSE!  Secondary Diagnosis:	Chronic kidney disease  Assessment and plan of treatment:	Continue blood pressure, cholesterol and diabetic medications. Goal of hemoglobin A1C (HgbA1C) < 7%.  Avoid nephrotoxic drugs such as nonsteroidal anti-inflammatory agents (NSAIDs).   Please follow up with your nephrologist to monitor your kidney function, continue with low protein and potassium diet.  Secondary Diagnosis:	DM2 (diabetes mellitus, type 2)  Assessment and plan of treatment:	Monitor finger sticks pre-meal and bedtime, low salt, fat and carbohydrate diet, minimize glucose intake.  Exercise daily for at least 30 minutes and weight loss.  Follow up with primary care physician and endocrinologist for routine Hemoglobin A1C checks and management.  Follow up with your ophthalmologist for routine yearly vision exams.  Secondary Diagnosis:	CAD (coronary artery disease)  Assessment and plan of treatment:	Continue aspirin and Plavix, do not stop unless instructed by your physician.  Continue low salt, fat, cholesterol and carbohydrate diet. Follow up with cardiologist and primary care physician's routine appointment.  Secondary Diagnosis:	HLD (hyperlipidemia)  Assessment and plan of treatment:	Low fat diet, exercise daily and continue current medications. Follow up with primary care physician and cardiologist for management.  Secondary Diagnosis:	History of pacemaker  Assessment and plan of treatment:	your pacemaker is NOT COMPATIBLE WITH MRI! MRI Spoke with [employer] to confirm.  You can not undergo MRI's. Follow up with Cardiologist. Principal Discharge DX:	Acute deep vein thrombosis (DVT) of right upper extremity, unspecified vein  Goal:	follow up  Assessment and plan of treatment:	- Continue on Coumadin *** ADD COUMADIN TEACHING and DOSE!  Secondary Diagnosis:	Chronic kidney disease  Assessment and plan of treatment:	Continue blood pressure, cholesterol and diabetic medications. Goal of hemoglobin A1C (HgbA1C) < 7%.  Avoid nephrotoxic drugs such as nonsteroidal anti-inflammatory agents (NSAIDs).   Please follow up with your nephrologist to monitor your kidney function, continue with low protein and potassium diet.  Secondary Diagnosis:	DM2 (diabetes mellitus, type 2)  Assessment and plan of treatment:	Monitor finger sticks pre-meal and bedtime, low salt, fat and carbohydrate diet, minimize glucose intake.  Exercise daily for at least 30 minutes and weight loss.  Follow up with primary care physician and endocrinologist for routine Hemoglobin A1C checks and management.  Follow up with your ophthalmologist for routine yearly vision exams.  Secondary Diagnosis:	CAD (coronary artery disease)  Assessment and plan of treatment:	Continue aspirin and Plavix, do not stop unless instructed by your physician.  Continue low salt, fat, cholesterol and carbohydrate diet. Follow up with cardiologist and primary care physician's routine appointment.  Secondary Diagnosis:	HLD (hyperlipidemia)  Assessment and plan of treatment:	Low fat diet, exercise daily and continue current medications. Follow up with primary care physician and cardiologist for management.  Secondary Diagnosis:	History of pacemaker  Assessment and plan of treatment:	your pacemaker is NOT COMPATIBLE WITH MRI! MRI Spoke with [employer] to confirm.  You can not undergo MRI's. Follow up with Cardiologist. Principal Discharge DX:	Acute deep vein thrombosis (DVT) of right upper extremity, unspecified vein  Goal:	follow up  Assessment and plan of treatment:	- Continue on Coumadin 3mg daily. Followup with Dr Damico in 3 days Friday 2/1/19  And 2/14/19 at 3 pm.  Secondary Diagnosis:	Chronic kidney disease  Goal:	Followup with PMD and take all medications prescribed.  Assessment and plan of treatment:	Continue blood pressure, cholesterol and diabetic medications. Goal of hemoglobin A1C (HgbA1C) < 7%.  Avoid nephrotoxic drugs such as nonsteroidal anti-inflammatory agents (NSAIDs).   Please follow up with your nephrologist to monitor your kidney function, continue with low protein and potassium diet.  Secondary Diagnosis:	DM2 (diabetes mellitus, type 2)  Goal:	Followup with PMD and take all medications prescribed.  Assessment and plan of treatment:	Monitor finger sticks pre-meal and bedtime, low salt, fat and carbohydrate diet, minimize glucose intake.  Exercise daily for at least 30 minutes and weight loss.  Follow up with primary care physician and endocrinologist for routine Hemoglobin A1C checks and management.  Follow up with your ophthalmologist for routine yearly vision exams.  Secondary Diagnosis:	CAD (coronary artery disease)  Goal:	Followup with PMD and take all medications prescribed.  Assessment and plan of treatment:	Continue aspirin and Plavix, do not stop unless instructed by your physician.  Continue low salt, fat, cholesterol and carbohydrate diet. Follow up with cardiologist and primary care physician's routine appointment.  Secondary Diagnosis:	HLD (hyperlipidemia)  Goal:	Followup with PMD and take all medications prescribed.  Assessment and plan of treatment:	Low fat diet, exercise daily and continue current medications. Follow up with primary care physician and cardiologist for management.  Secondary Diagnosis:	History of pacemaker  Goal:	Followup with PMD and take all medications prescribed.  Assessment and plan of treatment:	your pacemaker is NOT COMPATIBLE WITH MRI! MRI Spoke with [employer] to confirm.  You can not undergo MRI's. Follow up with Cardiologist.

## 2019-01-25 NOTE — PROGRESS NOTE ADULT - ASSESSMENT
79F w/ aortic stenosis s/p aortic valve replacement, CAD s/p CABG x3, pacemaker, CKD w/ AVF LUE (unused, not on HD), OA, gout, presents with RUE swelling concerning for acute DVT. Duplex RUE demonstrates RUE DVT of subclavian, axillary, brachial, and radial veins. Vascular surgery is consulted for management of acute RUE DVT.     - Awaiting venous imaging to work-up possible thoracic outlet syndrome  - Continue heparin gtt for acute DVT  - Will follow  - Please page with questions    [facility]  [id number] 79F w/ aortic stenosis s/p aortic valve replacement, CAD s/p CABG x3, pacemaker, CKD w/ AVF LUE (unused, not on HD), OA, gout, presents with RUE swelling concerning for acute DVT. Duplex RUE demonstrates RUE DVT of subclavian, axillary, brachial, and radial veins. Vascular surgery is consulted for management of acute RUE DVT.     - Awaiting venous imaging to work-up possible thoracic outlet syndrome  - Continue heparin gtt for acute DVT  - Will follow

## 2019-01-25 NOTE — PHYSICAL THERAPY INITIAL EVALUATION ADULT - ACTIVE RANGE OF MOTION EXAMINATION, REHAB EVAL
bilateral  lower extremity Active ROM was WFL (within functional limits)/right UE not assess/Left UE Active ROM was WFL (within functional limits)

## 2019-01-25 NOTE — PROGRESS NOTE ADULT - ASSESSMENT
79F with PMH of HTN, CKD stage 5, with a LUE fistula which has never been used, DM2, CAD s/p CABG, PCI (3/2017), Aortic stenosis s/p TAVR/PPM (3/2017) presenting with RUE DVT    1. RUE DVT  a/c on hep gtt   heme eval  vascular f/u  await ct venogram  ppm with [employer] lead not mri compat per mri manager/device company  r/o possible thoracic outlet syndrome   start coumadin tonight pending ct and no further testing/procedures planned    2. CAD s/p CABG, PCI   no cp or sob   asa/plavix/bb/statin  will d/c plavix likely in 1-2 months to min bleed risk     3. AS s/p TAVR/ PPM  cv stable     4. HTN   continue with current anti-htn meds    5. anemia   guaiac negative   s/p prbc   hem f/u , monitor CBC     6. CKD   renal f/u

## 2019-01-25 NOTE — CHART NOTE - NSCHARTNOTEFT_GEN_A_CORE
s/w nephrology- pt is high risk for contrast induced nephropathy, might need HD if go through with contrast study  s/w vascular- Fellow s/w Dr Flores- pt is improving , continue AC and do not need to do imaging right now  s/w Dr Damico- awaiting response for AC plan  discussed plan Dr Still & RN s/w nephrology- pt is high risk for contrast induced nephropathy, might need HD if go through with contrast study  s/w vascular- Fellow s/w Dr Flores- pt is improving , continue AC and do not need to do imaging right now  s/w Dr Damico-  AC plan: transition to coumadin  discussed plan Dr Still & RN

## 2019-01-25 NOTE — DISCHARGE NOTE ADULT - CARE PROVIDER_API CALL
[facility],   Phone: [phone]  Fax: (   )    - Casey Damico (MD), Cardiovascular Disease; Internal Medicine; Interventional Cardiology; Nuclear Cardiology  [address]  [place] [address]  Phone: [phone]  Fax: [phone]

## 2019-01-25 NOTE — DISCHARGE NOTE ADULT - PROVIDER TOKENS
FREE:[LAST:[[facility]],PHONE:[phone]],FAX:[(   )    -]] TOKCHERI:'[id number]'

## 2019-01-25 NOTE — CHART NOTE - NSCHARTNOTEFT_GEN_A_CORE
Vascular Surgery Communication Note    Spoke with Dr. Flores regarding venogram planning. Renal feels contrast load may push patient to require hemodialysis. Since patient improving slowing on therapeutic anticoagulation, will hold off on venogram for time being. Will readdress imaging if condition worsens or new symptoms present. Vascular to follow.     RUPERT Elder Team / [id number]

## 2019-01-25 NOTE — PROGRESS NOTE ADULT - SUBJECTIVE AND OBJECTIVE BOX
CARDIOLOGY FOLLOW UP NOTE - DR. NAVARRO    Subjective:    no chest pain, sob    PHYSICAL EXAM:  T(C): 37 (01-25-19 @ 05:52), Max: 37 (01-25-19 @ 05:52)  HR: 66 (01-25-19 @ 05:52) (60 - 71)  BP: 157/65 (01-25-19 @ 05:52) (149/47 - 190/59)  RR: 18 (01-25-19 @ 05:52) (18 - 18)  SpO2: 100% (01-25-19 @ 05:52) (100% - 100%)  Wt(kg): --  I&O's Summary      Appearance: Normal	  Cardiovascular: Normal S1 S2,RRR, No JVD, No murmurs  Respiratory: Lungs clear to auscultation	  Gastrointestinal:  Soft, Non-tender, + BS	  Extremities: Normal range of motion,right uext edema    MEDICATIONS  (STANDING):  allopurinol 100 milliGRAM(s) Oral daily  aspirin enteric coated 81 milliGRAM(s) Oral daily  atorvastatin 40 milliGRAM(s) Oral at bedtime  calcitriol   Capsule 0.25 MICROGram(s) Oral <User Schedule>  carvedilol 25 milliGRAM(s) Oral every 12 hours  cloNIDine 0.2 milliGRAM(s) Oral two times a day  clopidogrel Tablet 75 milliGRAM(s) Oral daily  dextrose 5%. 1000 milliLiter(s) (50 mL/Hr) IV Continuous <Continuous>  dextrose 5%. 1000 milliLiter(s) (50 mL/Hr) IV Continuous <Continuous>  dextrose 50% Injectable 12.5 Gram(s) IV Push once  dextrose 50% Injectable 25 Gram(s) IV Push once  dextrose 50% Injectable 25 Gram(s) IV Push once  dextrose 50% Injectable 12.5 Gram(s) IV Push once  dextrose 50% Injectable 25 Gram(s) IV Push once  dextrose 50% Injectable 25 Gram(s) IV Push once  furosemide    Tablet 40 milliGRAM(s) Oral daily  heparin  Infusion. 900 Unit(s)/Hr (9 mL/Hr) IV Continuous <Continuous>  hydrALAZINE 100 milliGRAM(s) Oral three times a day  insulin lispro (HumaLOG) corrective regimen sliding scale   SubCutaneous three times a day before meals  insulin lispro (HumaLOG) corrective regimen sliding scale   SubCutaneous at bedtime  isosorbide   mononitrate ER Tablet (IMDUR) 60 milliGRAM(s) Oral <User Schedule>  sodium bicarbonate 650 milliGRAM(s) Oral two times a day  sodium chloride 0.9%. 1000 milliLiter(s) (75 mL/Hr) IV Continuous <Continuous>  sodium chloride 0.9%. 1000 milliLiter(s) (75 mL/Hr) IV Continuous <Continuous>  terazosin 2 milliGRAM(s) Oral at bedtime      TELEMETRY: 	    ECG:  	  RADIOLOGY:   DIAGNOSTIC TESTING:  [ ] Echocardiogram:  [ ] Catheterization:  [ ] Stress Test:    OTHER: 	    LABS:	 	    CARDIAC MARKERS:                                7.9    4.45  )-----------( 152      ( 25 Jan 2019 06:21 )             27.3     01-25    139  |  107  |  45<H>  ----------------------------<  109<H>  4.7   |  19<L>  |  4.13<H>    Ca    9.4      25 Jan 2019 06:21  Phos  3.7     01-25  Mg     2.1     01-25      proBNP:   PTT - ( 25 Jan 2019 06:21 )  PTT:62.9 SEC  Lipid Profile:   HgA1c:

## 2019-01-25 NOTE — DISCHARGE NOTE ADULT - SECONDARY DIAGNOSIS.
Chronic kidney disease DM2 (diabetes mellitus, type 2) CAD (coronary artery disease) HLD (hyperlipidemia) History of pacemaker

## 2019-01-25 NOTE — PROGRESS NOTE ADULT - SUBJECTIVE AND OBJECTIVE BOX
[facility] DIVISION OF KIDNEY DISEASES AND HYPERTENSION -- FOLLOW UP NOTE  --------------------------------------------------------------------------------  Chief Complaint:    24 hour events/subjective:        PAST HISTORY  --------------------------------------------------------------------------------  No significant changes to PMH, PSH, FHx, SHx, unless otherwise noted    ALLERGIES & MEDICATIONS  --------------------------------------------------------------------------------  Allergies    codeine (Other)  Lortab (Other)  morphine (Other)  Percocet 10/325 (Other)  PPM not compatible with  MRI (Other (Fatal))  Reglan (Other; Flushing (Skin))  sulfa drugs (Flushing (Skin))    Intolerances      Standing Inpatient Medications  allopurinol 100 milliGRAM(s) Oral daily  aspirin enteric coated 81 milliGRAM(s) Oral daily  atorvastatin 40 milliGRAM(s) Oral at bedtime  calcitriol   Capsule 0.25 MICROGram(s) Oral <User Schedule>  carvedilol 25 milliGRAM(s) Oral every 12 hours  cloNIDine 0.2 milliGRAM(s) Oral two times a day  clopidogrel Tablet 75 milliGRAM(s) Oral daily  dextrose 5%. 1000 milliLiter(s) IV Continuous <Continuous>  dextrose 5%. 1000 milliLiter(s) IV Continuous <Continuous>  dextrose 50% Injectable 12.5 Gram(s) IV Push once  dextrose 50% Injectable 25 Gram(s) IV Push once  dextrose 50% Injectable 25 Gram(s) IV Push once  dextrose 50% Injectable 12.5 Gram(s) IV Push once  dextrose 50% Injectable 25 Gram(s) IV Push once  dextrose 50% Injectable 25 Gram(s) IV Push once  furosemide    Tablet 40 milliGRAM(s) Oral daily  heparin  Infusion. 900 Unit(s)/Hr IV Continuous <Continuous>  hydrALAZINE 100 milliGRAM(s) Oral three times a day  insulin lispro (HumaLOG) corrective regimen sliding scale   SubCutaneous three times a day before meals  insulin lispro (HumaLOG) corrective regimen sliding scale   SubCutaneous at bedtime  isosorbide   mononitrate ER Tablet (IMDUR) 60 milliGRAM(s) Oral <User Schedule>  sodium bicarbonate 650 milliGRAM(s) Oral two times a day  sodium chloride 0.9%. 1000 milliLiter(s) IV Continuous <Continuous>  sodium chloride 0.9%. 1000 milliLiter(s) IV Continuous <Continuous>  terazosin 2 milliGRAM(s) Oral at bedtime    PRN Inpatient Medications  dextrose 40% Gel 15 Gram(s) Oral once PRN  dextrose 40% Gel 15 Gram(s) Oral once PRN  glucagon  Injectable 1 milliGRAM(s) IntraMuscular once PRN  glucagon  Injectable 1 milliGRAM(s) IntraMuscular once PRN  heparin  Injectable 5500 Unit(s) IV Push every 6 hours PRN  heparin  Injectable 2500 Unit(s) IV Push every 6 hours PRN      REVIEW OF SYSTEMS  --------------------------------------------------------------------------------  Gen: No fevers/chills  Skin: No rashes  Head/Eyes/Ears: Normal hearing,   Respiratory: No dyspnea, cough  CV: No chest pain  GI: No abdominal pain, diarrhea  : No dysuria, hematuria  MSK: No  edema  Heme: No easy bruising or bleeding  Psych: No significant depression      All other systems were reviewed and are negative, except as noted.    VITALS/PHYSICAL EXAM  --------------------------------------------------------------------------------  T(C): 37 (01-25-19 @ 05:52), Max: 37 (01-25-19 @ 05:52)  HR: 66 (01-25-19 @ 05:52) (60 - 71)  BP: 157/65 (01-25-19 @ 05:52) (149/47 - 190/59)  RR: 18 (01-25-19 @ 05:52) (18 - 18)  SpO2: 100% (01-25-19 @ 05:52) (100% - 100%)  Wt(kg): --  Height (cm): 154.94 (01-23-19 @ 12:48)  Weight (kg): 68.5 (01-23-19 @ 12:48)  BMI (kg/m2): 28.5 (01-23-19 @ 12:48)  BSA (m2): 1.68 (01-23-19 @ 12:48)        Physical Exam:  	Gen: resting, NAD  	HEENT: No JVD  	Pulm: CTA B/L  	CV: S1S2+  	Abd: Soft, +BS   	Ext: RUE swelling seen (improved), No LE edema B/L  	Neuro: Awake, alert  	Skin: Warm and dry  	Vascular access: LUE AVF site: +thrill +bruit    LABS/STUDIES  --------------------------------------------------------------------------------              7.9    4.45  >-----------<  152      [01-25-19 @ 06:21]              27.3     139  |  107  |  45  ----------------------------<  109      [01-25-19 @ 06:21]  4.7   |  19  |  4.13        Ca     9.4     [01-25-19 @ 06:21]      Mg     2.1     [01-25-19 @ 06:21]      Phos  3.7     [01-25-19 @ 06:21]    PTT: 62.9       [01-25-19 @ 06:21]    Creatinine Trend:  SCr 4.13 [01-25 @ 06:21]  SCr 4.12 [01-24 @ 04:20]  SCr 4.06 [01-23 @ 03:54]  SCr 4.21 [01-22 @ 17:42] [facility] DIVISION OF KIDNEY DISEASES AND HYPERTENSION -- FOLLOW UP NOTE  --------------------------------------------------------------------------------  HPI: 79-year-old female with longstanding history of HTN, DM2, CKD stage 5, s/p LUE AVF (created ~6 years ago in [place], never used), CAD s/p CABG, PCI (3/2017), and aortic stenosis s/p TAVR/PPM (3/2017), admitted with RUE DVT. Nephrology consulted due to history of CKD. Found to have US study that showed DVT from right radial to subclavian vein. Pt. with longstanding history of CKD. Of note, pt. with worsening anemia during this hospitalization and has received blood transfusion (2 U PRBC). On review of previous labs on [facility], Scr has ranged between 3.5 to 4.3 since 2015. Scr elevated at 3.74 on 12/19/18 and is 4.21 on this admission (1/22/19). Scr stable at 4.13 today (1/25/19). Pt. seen and examined at bedside. Pt. denies any complains, tolerating oral diet. No CP, SOB, HA or dizziness.     PAST HISTORY  --------------------------------------------------------------------------------  No significant changes to PMH, PSH, FHx, SHx, unless otherwise noted    ALLERGIES & MEDICATIONS  --------------------------------------------------------------------------------  Allergies    codeine (Other)  Lortab (Other)  morphine (Other)  Percocet 10/325 (Other)  PPM not compatible with  MRI (Other (Fatal))  Reglan (Other; Flushing (Skin))  sulfa drugs (Flushing (Skin))    Intolerances    Standing Inpatient Medications  allopurinol 100 milliGRAM(s) Oral daily  aspirin enteric coated 81 milliGRAM(s) Oral daily  atorvastatin 40 milliGRAM(s) Oral at bedtime  calcitriol   Capsule 0.25 MICROGram(s) Oral <User Schedule>  carvedilol 25 milliGRAM(s) Oral every 12 hours  cloNIDine 0.2 milliGRAM(s) Oral two times a day  clopidogrel Tablet 75 milliGRAM(s) Oral daily  dextrose 5%. 1000 milliLiter(s) IV Continuous <Continuous>  dextrose 5%. 1000 milliLiter(s) IV Continuous <Continuous>  dextrose 50% Injectable 12.5 Gram(s) IV Push once  dextrose 50% Injectable 25 Gram(s) IV Push once  dextrose 50% Injectable 25 Gram(s) IV Push once  dextrose 50% Injectable 12.5 Gram(s) IV Push once  dextrose 50% Injectable 25 Gram(s) IV Push once  dextrose 50% Injectable 25 Gram(s) IV Push once  furosemide    Tablet 40 milliGRAM(s) Oral daily  heparin  Infusion. 900 Unit(s)/Hr IV Continuous <Continuous>  hydrALAZINE 100 milliGRAM(s) Oral three times a day  insulin lispro (HumaLOG) corrective regimen sliding scale   SubCutaneous three times a day before meals  insulin lispro (HumaLOG) corrective regimen sliding scale   SubCutaneous at bedtime  isosorbide   mononitrate ER Tablet (IMDUR) 60 milliGRAM(s) Oral <User Schedule>  sodium bicarbonate 650 milliGRAM(s) Oral two times a day  sodium chloride 0.9%. 1000 milliLiter(s) IV Continuous <Continuous>  sodium chloride 0.9%. 1000 milliLiter(s) IV Continuous <Continuous>  terazosin 2 milliGRAM(s) Oral at bedtime    REVIEW OF SYSTEMS  --------------------------------------------------------------------------------  Gen: No fever  Respiratory: No dyspnea  CV: No chest pain  GI: No abdominal pain  : No dysuria, hematuria  MSK: + RUE swelling (improving)  Heme: No easy bruising or bleeding    All other systems were reviewed and are negative, except as noted.    VITALS/PHYSICAL EXAM  --------------------------------------------------------------------------------  T(C): 37 (01-25-19 @ 05:52), Max: 37 (01-25-19 @ 05:52)  HR: 66 (01-25-19 @ 05:52) (60 - 71)  BP: 157/65 (01-25-19 @ 05:52) (149/47 - 190/59)  RR: 18 (01-25-19 @ 05:52) (18 - 18)  SpO2: 100% (01-25-19 @ 05:52) (100% - 100%)  Wt(kg): --  Height (cm): 154.94 (01-23-19 @ 12:48)  Weight (kg): 68.5 (01-23-19 @ 12:48)  BMI (kg/m2): 28.5 (01-23-19 @ 12:48)  BSA (m2): 1.68 (01-23-19 @ 12:48)    Physical Exam:  	Gen: resting, NAD  	HEENT: No JVD  	Pulm: CTA B/L  	CV: S1S2+  	Abd: Soft, +BS   	Ext: RUE swelling seen (improved), No LE edema B/L  	Neuro: Awake, alert  	Skin: Warm and dry  	Vascular access: LUE AVF site: +thrill +bruit    LABS/STUDIES  --------------------------------------------------------------------------------              7.9    4.45  >-----------<  152      [01-25-19 @ 06:21]              27.3     139  |  107  |  45  ----------------------------<  109      [01-25-19 @ 06:21]  4.7   |  19  |  4.13        Ca     9.4     [01-25-19 @ 06:21]      Mg     2.1     [01-25-19 @ 06:21]      Phos  3.7     [01-25-19 @ 06:21]    PTT: 62.9       [01-25-19 @ 06:21]    Creatinine Trend:  SCr 4.13 [01-25 @ 06:21]  SCr 4.12 [01-24 @ 04:20]  SCr 4.06 [01-23 @ 03:54]  SCr 4.21 [01-22 @ 17:42] [facility] DIVISION OF KIDNEY DISEASES AND HYPERTENSION -- FOLLOW UP NOTE  --------------------------------------------------------------------------------  HPI: 79-year-old female with longstanding history of HTN, DM2, CKD stage 5, s/p LUE AVF (created ~6 years ago in [place], never used), CAD s/p CABG, PCI (3/2017), and aortic stenosis s/p TAVR/PPM (3/2017), admitted with RUE DVT. Nephrology consulted due to history of CKD. Found to have US study that showed DVT from right radial to subclavian vein. Pt. with longstanding history of CKD. Of note, pt. with worsening anemia during this hospitalization and has received blood transfusion (2 U PRBC). On review of previous labs on [facility], Scr has ranged between 3.5 to 4.3 since 2015. Scr elevated at 3.74 on 12/19/18 and was 4.21 on this admission (1/22/19). Scr stable at 4.13 today (1/25/19). Pt. seen and examined at bedside. Pt. denies any complains, tolerating oral diet. No CP, SOB, HA or dizziness.     PAST HISTORY  --------------------------------------------------------------------------------  No significant changes to PMH, PSH, FHx, SHx, unless otherwise noted    ALLERGIES & MEDICATIONS  --------------------------------------------------------------------------------  Allergies    codeine (Other)  Lortab (Other)  morphine (Other)  Percocet 10/325 (Other)  PPM not compatible with  MRI (Other (Fatal))  Reglan (Other; Flushing (Skin))  sulfa drugs (Flushing (Skin))    Intolerances    Standing Inpatient Medications  allopurinol 100 milliGRAM(s) Oral daily  aspirin enteric coated 81 milliGRAM(s) Oral daily  atorvastatin 40 milliGRAM(s) Oral at bedtime  calcitriol   Capsule 0.25 MICROGram(s) Oral <User Schedule>  carvedilol 25 milliGRAM(s) Oral every 12 hours  cloNIDine 0.2 milliGRAM(s) Oral two times a day  clopidogrel Tablet 75 milliGRAM(s) Oral daily  dextrose 5%. 1000 milliLiter(s) IV Continuous <Continuous>  dextrose 5%. 1000 milliLiter(s) IV Continuous <Continuous>  dextrose 50% Injectable 12.5 Gram(s) IV Push once  dextrose 50% Injectable 25 Gram(s) IV Push once  dextrose 50% Injectable 25 Gram(s) IV Push once  dextrose 50% Injectable 12.5 Gram(s) IV Push once  dextrose 50% Injectable 25 Gram(s) IV Push once  dextrose 50% Injectable 25 Gram(s) IV Push once  furosemide    Tablet 40 milliGRAM(s) Oral daily  heparin  Infusion. 900 Unit(s)/Hr IV Continuous <Continuous>  hydrALAZINE 100 milliGRAM(s) Oral three times a day  insulin lispro (HumaLOG) corrective regimen sliding scale   SubCutaneous three times a day before meals  insulin lispro (HumaLOG) corrective regimen sliding scale   SubCutaneous at bedtime  isosorbide   mononitrate ER Tablet (IMDUR) 60 milliGRAM(s) Oral <User Schedule>  sodium bicarbonate 650 milliGRAM(s) Oral two times a day  sodium chloride 0.9%. 1000 milliLiter(s) IV Continuous <Continuous>  sodium chloride 0.9%. 1000 milliLiter(s) IV Continuous <Continuous>  terazosin 2 milliGRAM(s) Oral at bedtime    REVIEW OF SYSTEMS  --------------------------------------------------------------------------------  Gen: No fever  Respiratory: No dyspnea  CV: No chest pain  GI: No abdominal pain  : No dysuria, hematuria  MSK: + RUE swelling (improving)  Heme: No easy bruising or bleeding    All other systems were reviewed and are negative, except as noted.    VITALS/PHYSICAL EXAM  --------------------------------------------------------------------------------  T(C): 37 (01-25-19 @ 05:52), Max: 37 (01-25-19 @ 05:52)  HR: 66 (01-25-19 @ 05:52) (60 - 71)  BP: 157/65 (01-25-19 @ 05:52) (149/47 - 190/59)  RR: 18 (01-25-19 @ 05:52) (18 - 18)  SpO2: 100% (01-25-19 @ 05:52) (100% - 100%)  Wt(kg): --  Height (cm): 154.94 (01-23-19 @ 12:48)  Weight (kg): 68.5 (01-23-19 @ 12:48)  BMI (kg/m2): 28.5 (01-23-19 @ 12:48)  BSA (m2): 1.68 (01-23-19 @ 12:48)    Physical Exam:  	Gen: resting, NAD  	HEENT: No JVD  	Pulm: CTA B/L  	CV: S1S2+  	Abd: Soft, +BS   	Ext: RUE swelling seen (improved), No LE edema B/L  	Neuro: Awake, alert  	Skin: Warm and dry  	Vascular access: LUE AVF site: +thrill +bruit    LABS/STUDIES  --------------------------------------------------------------------------------              7.9    4.45  >-----------<  152      [01-25-19 @ 06:21]              27.3     139  |  107  |  45  ----------------------------<  109      [01-25-19 @ 06:21]  4.7   |  19  |  4.13        Ca     9.4     [01-25-19 @ 06:21]      Mg     2.1     [01-25-19 @ 06:21]      Phos  3.7     [01-25-19 @ 06:21]    PTT: 62.9       [01-25-19 @ 06:21]    Creatinine Trend:  SCr 4.13 [01-25 @ 06:21]  SCr 4.12 [01-24 @ 04:20]  SCr 4.06 [01-23 @ 03:54]  SCr 4.21 [01-22 @ 17:42]

## 2019-01-25 NOTE — PROGRESS NOTE ADULT - SUBJECTIVE AND OBJECTIVE BOX
Patient is a 79y old  Female who presents with a chief complaint of ue swelling (25 Jan 2019 15:36)      INTERVAL HPI/OVERNIGHT EVENTS:  T(C): 37.1 (01-25-19 @ 14:29), Max: 37.1 (01-25-19 @ 14:29)  HR: 66 (01-25-19 @ 14:29) (60 - 67)  BP: 153/54 (01-25-19 @ 14:29) (149/47 - 163/71)  RR: 18 (01-25-19 @ 14:29) (18 - 18)  SpO2: 100% (01-25-19 @ 14:29) (100% - 100%)  Wt(kg): --  I&O's Summary      LABS:                        7.9    4.45  )-----------( 152      ( 25 Jan 2019 06:21 )             27.3     01-25    139  |  107  |  45<H>  ----------------------------<  109<H>  4.7   |  19<L>  |  4.13<H>    Ca    9.4      25 Jan 2019 06:21  Phos  3.7     01-25  Mg     2.1     01-25      PT/INR - ( 25 Jan 2019 06:21 )   PT: 12.4 SEC;   INR: 1.11          PTT - ( 25 Jan 2019 06:21 )  PTT:62.9 SEC    CAPILLARY BLOOD GLUCOSE      POCT Blood Glucose.: 96 mg/dL (25 Jan 2019 17:04)  POCT Blood Glucose.: 93 mg/dL (25 Jan 2019 12:21)  POCT Blood Glucose.: 121 mg/dL (25 Jan 2019 08:22)  POCT Blood Glucose.: 190 mg/dL (24 Jan 2019 22:15)            MEDICATIONS  (STANDING):  allopurinol 100 milliGRAM(s) Oral daily  aspirin enteric coated 81 milliGRAM(s) Oral daily  atorvastatin 40 milliGRAM(s) Oral at bedtime  calcitriol   Capsule 0.25 MICROGram(s) Oral <User Schedule>  carvedilol 25 milliGRAM(s) Oral every 12 hours  cloNIDine 0.2 milliGRAM(s) Oral two times a day  clopidogrel Tablet 75 milliGRAM(s) Oral daily  dextrose 5%. 1000 milliLiter(s) (50 mL/Hr) IV Continuous <Continuous>  dextrose 5%. 1000 milliLiter(s) (50 mL/Hr) IV Continuous <Continuous>  dextrose 50% Injectable 12.5 Gram(s) IV Push once  dextrose 50% Injectable 25 Gram(s) IV Push once  dextrose 50% Injectable 25 Gram(s) IV Push once  dextrose 50% Injectable 12.5 Gram(s) IV Push once  dextrose 50% Injectable 25 Gram(s) IV Push once  dextrose 50% Injectable 25 Gram(s) IV Push once  furosemide    Tablet 40 milliGRAM(s) Oral daily  heparin  Infusion. 900 Unit(s)/Hr (9 mL/Hr) IV Continuous <Continuous>  hydrALAZINE 100 milliGRAM(s) Oral three times a day  insulin lispro (HumaLOG) corrective regimen sliding scale   SubCutaneous three times a day before meals  insulin lispro (HumaLOG) corrective regimen sliding scale   SubCutaneous at bedtime  isosorbide   mononitrate ER Tablet (IMDUR) 60 milliGRAM(s) Oral <User Schedule>  sodium bicarbonate 650 milliGRAM(s) Oral two times a day  terazosin 2 milliGRAM(s) Oral at bedtime    MEDICATIONS  (PRN):  dextrose 40% Gel 15 Gram(s) Oral once PRN Blood Glucose LESS THAN 70 milliGRAM(s)/deciliter  dextrose 40% Gel 15 Gram(s) Oral once PRN Blood Glucose LESS THAN 70 milliGRAM(s)/deciliter  glucagon  Injectable 1 milliGRAM(s) IntraMuscular once PRN Glucose LESS THAN 70 milligrams/deciliter  glucagon  Injectable 1 milliGRAM(s) IntraMuscular once PRN Glucose LESS THAN 70 milligrams/deciliter  heparin  Injectable 5500 Unit(s) IV Push every 6 hours PRN For aPTT less than 40  heparin  Injectable 2500 Unit(s) IV Push every 6 hours PRN For aPTT between 40 - 57          PHYSICAL EXAM:  GENERAL: NAD, well-groomed, well-developed  HEAD:  Atraumatic, Normocephalic  CHEST/LUNG: Clear to percussion bilaterally; No rales, rhonchi, wheezing, or rubs  HEART: Regular rate and rhythm; No murmurs, rubs, or gallops  ABDOMEN: Soft, Nontender, Nondistended; Bowel sounds present  EXTREMITIES:  2+ Peripheral Pulses, No clubbing, cyanosis, or edema  LYMPH: No lymphadenopathy noted  SKIN: No rashes or lesions    Care Discussed with Consultants/Other Providers [ ] YES  [ ] NO

## 2019-01-26 LAB
ANION GAP SERPL CALC-SCNC: 12 MMO/L — SIGNIFICANT CHANGE UP (ref 7–14)
APTT BLD: 82.3 SEC — HIGH (ref 27.5–36.3)
BUN SERPL-MCNC: 43 MG/DL — HIGH (ref 7–23)
CALCIUM SERPL-MCNC: 9.5 MG/DL — SIGNIFICANT CHANGE UP (ref 8.4–10.5)
CHLORIDE SERPL-SCNC: 106 MMOL/L — SIGNIFICANT CHANGE UP (ref 98–107)
CO2 SERPL-SCNC: 21 MMOL/L — LOW (ref 22–31)
CREAT SERPL-MCNC: 4.21 MG/DL — HIGH (ref 0.5–1.3)
GLUCOSE BLDC GLUCOMTR-MCNC: 105 MG/DL — HIGH (ref 70–99)
GLUCOSE BLDC GLUCOMTR-MCNC: 136 MG/DL — HIGH (ref 70–99)
GLUCOSE BLDC GLUCOMTR-MCNC: 148 MG/DL — HIGH (ref 70–99)
GLUCOSE BLDC GLUCOMTR-MCNC: 165 MG/DL — HIGH (ref 70–99)
GLUCOSE SERPL-MCNC: 105 MG/DL — HIGH (ref 70–99)
HCT VFR BLD CALC: 26.7 % — LOW (ref 34.5–45)
HGB BLD-MCNC: 7.7 G/DL — LOW (ref 11.5–15.5)
INR BLD: 1.09 — SIGNIFICANT CHANGE UP (ref 0.88–1.17)
MAGNESIUM SERPL-MCNC: 2 MG/DL — SIGNIFICANT CHANGE UP (ref 1.6–2.6)
MCHC RBC-ENTMCNC: 23.1 PG — LOW (ref 27–34)
MCHC RBC-ENTMCNC: 28.8 % — LOW (ref 32–36)
MCV RBC AUTO: 79.9 FL — LOW (ref 80–100)
NRBC # FLD: 0.05 K/UL — LOW (ref 25–125)
NRBC FLD-RTO: 1.3 — SIGNIFICANT CHANGE UP
PHOSPHATE SERPL-MCNC: 3.7 MG/DL — SIGNIFICANT CHANGE UP (ref 2.5–4.5)
PLATELET # BLD AUTO: 150 K/UL — SIGNIFICANT CHANGE UP (ref 150–400)
PMV BLD: 10.2 FL — SIGNIFICANT CHANGE UP (ref 7–13)
POTASSIUM SERPL-MCNC: 4.8 MMOL/L — SIGNIFICANT CHANGE UP (ref 3.5–5.3)
POTASSIUM SERPL-SCNC: 4.8 MMOL/L — SIGNIFICANT CHANGE UP (ref 3.5–5.3)
PROTHROM AB SERPL-ACNC: 12.1 SEC — SIGNIFICANT CHANGE UP (ref 9.8–13.1)
RBC # BLD: 3.34 M/UL — LOW (ref 3.8–5.2)
RBC # FLD: 16 % — HIGH (ref 10.3–14.5)
SODIUM SERPL-SCNC: 139 MMOL/L — SIGNIFICANT CHANGE UP (ref 135–145)
WBC # BLD: 3.95 K/UL — SIGNIFICANT CHANGE UP (ref 3.8–10.5)
WBC # FLD AUTO: 3.95 K/UL — SIGNIFICANT CHANGE UP (ref 3.8–10.5)

## 2019-01-26 RX ORDER — WARFARIN SODIUM 2.5 MG/1
5 TABLET ORAL ONCE
Qty: 0 | Refills: 0 | Status: COMPLETED | OUTPATIENT
Start: 2019-01-26 | End: 2019-01-26

## 2019-01-26 RX ORDER — ACETAMINOPHEN 500 MG
650 TABLET ORAL EVERY 6 HOURS
Qty: 0 | Refills: 0 | Status: DISCONTINUED | OUTPATIENT
Start: 2019-01-26 | End: 2019-01-29

## 2019-01-26 RX ADMIN — Medication 100 MILLIGRAM(S): at 21:00

## 2019-01-26 RX ADMIN — TERAZOSIN HYDROCHLORIDE 2 MILLIGRAM(S): 10 CAPSULE ORAL at 21:01

## 2019-01-26 RX ADMIN — Medication 0.2 MILLIGRAM(S): at 05:22

## 2019-01-26 RX ADMIN — ISOSORBIDE MONONITRATE 60 MILLIGRAM(S): 60 TABLET, EXTENDED RELEASE ORAL at 16:46

## 2019-01-26 RX ADMIN — CARVEDILOL PHOSPHATE 25 MILLIGRAM(S): 80 CAPSULE, EXTENDED RELEASE ORAL at 05:22

## 2019-01-26 RX ADMIN — Medication 650 MILLIGRAM(S): at 17:15

## 2019-01-26 RX ADMIN — Medication 0.2 MILLIGRAM(S): at 16:46

## 2019-01-26 RX ADMIN — Medication 650 MILLIGRAM(S): at 16:45

## 2019-01-26 RX ADMIN — CLOPIDOGREL BISULFATE 75 MILLIGRAM(S): 75 TABLET, FILM COATED ORAL at 12:40

## 2019-01-26 RX ADMIN — HEPARIN SODIUM 900 UNIT(S)/HR: 5000 INJECTION INTRAVENOUS; SUBCUTANEOUS at 06:10

## 2019-01-26 RX ADMIN — Medication 100 MILLIGRAM(S): at 12:40

## 2019-01-26 RX ADMIN — Medication 81 MILLIGRAM(S): at 12:40

## 2019-01-26 RX ADMIN — CARVEDILOL PHOSPHATE 25 MILLIGRAM(S): 80 CAPSULE, EXTENDED RELEASE ORAL at 16:46

## 2019-01-26 RX ADMIN — ATORVASTATIN CALCIUM 40 MILLIGRAM(S): 80 TABLET, FILM COATED ORAL at 21:00

## 2019-01-26 RX ADMIN — Medication 100 MILLIGRAM(S): at 12:41

## 2019-01-26 RX ADMIN — WARFARIN SODIUM 5 MILLIGRAM(S): 2.5 TABLET ORAL at 17:01

## 2019-01-26 RX ADMIN — Medication 40 MILLIGRAM(S): at 05:22

## 2019-01-26 RX ADMIN — Medication 650 MILLIGRAM(S): at 05:22

## 2019-01-26 RX ADMIN — Medication 100 MILLIGRAM(S): at 05:22

## 2019-01-26 RX ADMIN — ISOSORBIDE MONONITRATE 60 MILLIGRAM(S): 60 TABLET, EXTENDED RELEASE ORAL at 12:40

## 2019-01-26 NOTE — PROGRESS NOTE ADULT - SUBJECTIVE AND OBJECTIVE BOX
Patient is a 79y old  Female who presents with a chief complaint of ue swelling (25 Jan 2019 15:36)      INTERVAL HPI/OVERNIGHT EVENTS:  T(C): 37.1 (01-26-19 @ 12:32), Max: 37.1 (01-26-19 @ 12:32)  HR: 61 (01-26-19 @ 12:32) (60 - 66)  BP: 147/58 (01-26-19 @ 12:32) (144/48 - 155/85)  RR: 18 (01-26-19 @ 12:32) (18 - 18)  SpO2: 100% (01-26-19 @ 12:32) (100% - 100%)  Wt(kg): --  I&O's Summary      LABS:                        7.7    3.95  )-----------( 150      ( 26 Jan 2019 05:17 )             26.7     01-26    139  |  106  |  43<H>  ----------------------------<  105<H>  4.8   |  21<L>  |  4.21<H>    Ca    9.5      26 Jan 2019 05:17  Phos  3.7     01-26  Mg     2.0     01-26      PT/INR - ( 26 Jan 2019 05:17 )   PT: 12.1 SEC;   INR: 1.09          PTT - ( 26 Jan 2019 05:17 )  PTT:82.3 SEC    CAPILLARY BLOOD GLUCOSE      POCT Blood Glucose.: 105 mg/dL (26 Jan 2019 17:10)  POCT Blood Glucose.: 136 mg/dL (26 Jan 2019 11:50)  POCT Blood Glucose.: 148 mg/dL (26 Jan 2019 08:16)  POCT Blood Glucose.: 146 mg/dL (25 Jan 2019 22:14)            MEDICATIONS  (STANDING):  allopurinol 100 milliGRAM(s) Oral daily  aspirin enteric coated 81 milliGRAM(s) Oral daily  atorvastatin 40 milliGRAM(s) Oral at bedtime  calcitriol   Capsule 0.25 MICROGram(s) Oral <User Schedule>  carvedilol 25 milliGRAM(s) Oral every 12 hours  cloNIDine 0.2 milliGRAM(s) Oral two times a day  clopidogrel Tablet 75 milliGRAM(s) Oral daily  dextrose 5%. 1000 milliLiter(s) (50 mL/Hr) IV Continuous <Continuous>  dextrose 5%. 1000 milliLiter(s) (50 mL/Hr) IV Continuous <Continuous>  dextrose 50% Injectable 12.5 Gram(s) IV Push once  dextrose 50% Injectable 25 Gram(s) IV Push once  dextrose 50% Injectable 25 Gram(s) IV Push once  dextrose 50% Injectable 12.5 Gram(s) IV Push once  dextrose 50% Injectable 25 Gram(s) IV Push once  dextrose 50% Injectable 25 Gram(s) IV Push once  furosemide    Tablet 40 milliGRAM(s) Oral daily  heparin  Infusion. 900 Unit(s)/Hr (9 mL/Hr) IV Continuous <Continuous>  hydrALAZINE 100 milliGRAM(s) Oral three times a day  insulin lispro (HumaLOG) corrective regimen sliding scale   SubCutaneous three times a day before meals  insulin lispro (HumaLOG) corrective regimen sliding scale   SubCutaneous at bedtime  isosorbide   mononitrate ER Tablet (IMDUR) 60 milliGRAM(s) Oral <User Schedule>  sodium bicarbonate 650 milliGRAM(s) Oral two times a day  terazosin 2 milliGRAM(s) Oral at bedtime    MEDICATIONS  (PRN):  acetaminophen   Tablet .. 650 milliGRAM(s) Oral every 6 hours PRN Temp greater or equal to 38C (100.4F), Moderate Pain (4 - 6)  dextrose 40% Gel 15 Gram(s) Oral once PRN Blood Glucose LESS THAN 70 milliGRAM(s)/deciliter  dextrose 40% Gel 15 Gram(s) Oral once PRN Blood Glucose LESS THAN 70 milliGRAM(s)/deciliter  glucagon  Injectable 1 milliGRAM(s) IntraMuscular once PRN Glucose LESS THAN 70 milligrams/deciliter  glucagon  Injectable 1 milliGRAM(s) IntraMuscular once PRN Glucose LESS THAN 70 milligrams/deciliter  heparin  Injectable 5500 Unit(s) IV Push every 6 hours PRN For aPTT less than 40  heparin  Injectable 2500 Unit(s) IV Push every 6 hours PRN For aPTT between 40 - 57          PHYSICAL EXAM:  GENERAL: NAD, well-groomed, well-developed  HEAD:  Atraumatic, Normocephalic  CHEST/LUNG: Clear to percussion bilaterally; No rales, rhonchi, wheezing, or rubs  HEART: Regular rate and rhythm; No murmurs, rubs, or gallops  ABDOMEN: Soft, Nontender, Nondistended; Bowel sounds present  EXTREMITIES:  2+ Peripheral Pulses, No clubbing, cyanosis, or edema  LYMPH: No lymphadenopathy noted  SKIN: No rashes or lesions    Care Discussed with Consultants/Other Providers [ ] YES  [ ] NO

## 2019-01-26 NOTE — PROVIDER CONTACT NOTE (OTHER) - SITUATION
Provider notified to obtain labs via lower extremities due to fistula in left arm and DVT in right upper arm. Provider states OK to use left hand for blood draws

## 2019-01-26 NOTE — PROGRESS NOTE ADULT - ASSESSMENT
Problem/Plan - 1:  ·  Problem: Acute deep vein thrombosis (DVT) of right upper extremity, unspecified vein.  Plan: check  MR venogram,.  cw heparin gtt  check INR and dose coumadin daily    Problem/Plan - 2:  ·  Problem: DM2 (diabetes mellitus, type 2).  Plan: hold oral hypoglycemics, check a1c, SS insulin for now.     Problem/Plan - 3:  ·  Problem: CKD (chronic kidney disease) stage 4, GFR 15-29 ml/min.  Plan: Renal consult in am, dose meds renally, avoid nephrotoxins  cont sodium bicarb for metabolic acidosis.     Problem/Plan - 4:  ·  Problem: HTN (Hypertension).  Plan: cont home meds.     Problem/Plan - 5:  ·  Problem: CAD (coronary artery disease).  Plan: cont home meds ( asa, plavix, clonidine, imdur, hydralazine, coreg, terazosin, lasix), cards follg.     Problem/Plan - 6:  Problem: HLD (hyperlipidemia). Plan: crestor to lipitor.    Problem/Plan - 7:  ·  Problem: Gout.  Plan: cont allopurinol.

## 2019-01-26 NOTE — PROGRESS NOTE ADULT - SUBJECTIVE AND OBJECTIVE BOX
Vascular Surgery    Feels well, states that arm swelling is decreasing.    Vital Signs Last 24 Hrs  T(C): 36.9 (01-26-19 @ 05:19), Max: 37.1 (01-25-19 @ 14:29)  T(F): 98.5 (01-26-19 @ 05:19), Max: 98.7 (01-25-19 @ 14:29)  HR: 60 (01-26-19 @ 05:19) (60 - 66)  BP: 155/85 (01-26-19 @ 05:19) (144/48 - 155/85)  BP(mean): --  RR: 18 (01-26-19 @ 05:19) (18 - 18)  SpO2: 100% (01-26-19 @ 05:19) (100% - 100%)  I&O's Detail                          7.7    3.95  )-----------( 150      ( 26 Jan 2019 05:17 )             26.7     01-26    139  |  106  |  43<H>  ----------------------------<  105<H>  4.8   |  21<L>  |  4.21<H>    Ca    9.5      26 Jan 2019 05:17  Phos  3.7     01-26  Mg     2.0     01-26      PT/INR - ( 26 Jan 2019 05:17 )   PT: 12.1 SEC;   INR: 1.09          PTT - ( 26 Jan 2019 05:17 )  PTT:82.3 SEC  CAPILLARY BLOOD GLUCOSE      POCT Blood Glucose.: 148 mg/dL (26 Jan 2019 08:16)  POCT Blood Glucose.: 146 mg/dL (25 Jan 2019 22:14)  POCT Blood Glucose.: 96 mg/dL (25 Jan 2019 17:04)  POCT Blood Glucose.: 93 mg/dL (25 Jan 2019 12:21)      MEDICATIONS  (STANDING):  allopurinol 100 milliGRAM(s) Oral daily  aspirin enteric coated 81 milliGRAM(s) Oral daily  atorvastatin 40 milliGRAM(s) Oral at bedtime  calcitriol   Capsule 0.25 MICROGram(s) Oral <User Schedule>  carvedilol 25 milliGRAM(s) Oral every 12 hours  cloNIDine 0.2 milliGRAM(s) Oral two times a day  clopidogrel Tablet 75 milliGRAM(s) Oral daily  dextrose 5%. 1000 milliLiter(s) (50 mL/Hr) IV Continuous <Continuous>  dextrose 5%. 1000 milliLiter(s) (50 mL/Hr) IV Continuous <Continuous>  dextrose 50% Injectable 12.5 Gram(s) IV Push once  dextrose 50% Injectable 25 Gram(s) IV Push once  dextrose 50% Injectable 25 Gram(s) IV Push once  dextrose 50% Injectable 12.5 Gram(s) IV Push once  dextrose 50% Injectable 25 Gram(s) IV Push once  dextrose 50% Injectable 25 Gram(s) IV Push once  furosemide    Tablet 40 milliGRAM(s) Oral daily  heparin  Infusion. 900 Unit(s)/Hr (9 mL/Hr) IV Continuous <Continuous>  hydrALAZINE 100 milliGRAM(s) Oral three times a day  insulin lispro (HumaLOG) corrective regimen sliding scale   SubCutaneous three times a day before meals  insulin lispro (HumaLOG) corrective regimen sliding scale   SubCutaneous at bedtime  isosorbide   mononitrate ER Tablet (IMDUR) 60 milliGRAM(s) Oral <User Schedule>  sodium bicarbonate 650 milliGRAM(s) Oral two times a day  terazosin 2 milliGRAM(s) Oral at bedtime  warfarin 5 milliGRAM(s) Oral once    MEDICATIONS  (PRN):  dextrose 40% Gel 15 Gram(s) Oral once PRN Blood Glucose LESS THAN 70 milliGRAM(s)/deciliter  dextrose 40% Gel 15 Gram(s) Oral once PRN Blood Glucose LESS THAN 70 milliGRAM(s)/deciliter  glucagon  Injectable 1 milliGRAM(s) IntraMuscular once PRN Glucose LESS THAN 70 milligrams/deciliter  glucagon  Injectable 1 milliGRAM(s) IntraMuscular once PRN Glucose LESS THAN 70 milligrams/deciliter  heparin  Injectable 5500 Unit(s) IV Push every 6 hours PRN For aPTT less than 40  heparin  Injectable 2500 Unit(s) IV Push every 6 hours PRN For aPTT between 40 - 57

## 2019-01-26 NOTE — CHART NOTE - NSCHARTNOTEFT_GEN_A_CORE
MEDICINE PA NOTE     Patient states she has a headache and requesting Tylenol. Documented allergies noted with Percocet allergy. Discussed with patient by bedside who states she takes Tylenol at home with no reaction. Will give Tylenol 650mg PO Q6H PRN for pain and fever and continue to monitor.

## 2019-01-26 NOTE — PROGRESS NOTE ADULT - ASSESSMENT
79F w/ aortic stenosis s/p aortic valve replacement, CAD s/p CABG x3, pacemaker, CKD w/ AVF LUE (unused, not on HD), OA, gout, presents with RUE swelling concerning for acute DVT. Duplex RUE demonstrates RUE DVT of subclavian, axillary, brachial, and radial veins. Vascular surgery is consulted for management of acute RUE DVT.     - Venous imaging to work-up possible thoracic outlet syndrome pending optimization  - Continue anticoagulation      [id number]

## 2019-01-26 NOTE — PROGRESS NOTE ADULT - ASSESSMENT
79F with PMH of HTN, CKD stage 5, with a LUE fistula which has never been used, DM2, CAD s/p CABG, PCI (3/2017), Aortic stenosis s/p TAVR/PPM (3/2017) presenting with RUE DVT    1. RUE DVT  a/c on hep gtt to coumadin per INR level   heme f/u   ct venogram cancelled, per renal pt at increased risk for radiocontrast nephropathy  ppm with medtronic lead not mri compat per mri manager/device company  vascular f/uv noted, no further imaging for now, continue A/C    2. CAD s/p CABG, PCI   no cp or sob   asa/plavix/bb/statin  will d/c plavix likely in 1-2 months to min bleed risk     3. AS s/p TAVR/ PPM  cv stable     4. HTN   continue with current anti-htn meds    5. anemia   guaiac negative   s/p prbc   hem f/u , monitor CBC     6. CKD   renal f/u

## 2019-01-26 NOTE — PROVIDER CONTACT NOTE (OTHER) - BACKGROUND
Pt admitted with acute embolism and thrombosis of deep vein of right upper extremity. Hx of osteoarthritis, CKD, DM, CAD, HTN, anemia, and gout

## 2019-01-26 NOTE — PROVIDER CONTACT NOTE (OTHER) - ACTION/TREATMENT ORDERED:
No problem thank you. Continue to monitor.
Ok no problem continue to monitor.
Obtain labs via left hand
Provider said continue transfusion and reschedule all pts morning medications.
as above

## 2019-01-26 NOTE — PROGRESS NOTE ADULT - SUBJECTIVE AND OBJECTIVE BOX
CARDIOLOGY FOLLOW UP - Dr. Damico    CC no chest pain or sob       PHYSICAL EXAM:  T(C): 36.9 (01-26-19 @ 05:19), Max: 37.1 (01-25-19 @ 14:29)  HR: 60 (01-26-19 @ 05:19) (60 - 66)  BP: 155/85 (01-26-19 @ 05:19) (144/48 - 155/85)  RR: 18 (01-26-19 @ 05:19) (18 - 18)  SpO2: 100% (01-26-19 @ 05:19) (100% - 100%)  Wt(kg): --  I&O's Summary      Appearance: Normal	  Cardiovascular: Normal S1 S2,RRR, +  murmurs  Respiratory: Lungs clear to auscultation	  Gastrointestinal:  Soft, Non-tender, + BS	  Extremities: Normal range of motion, RUE edema        MEDICATIONS  (STANDING):  allopurinol 100 milliGRAM(s) Oral daily  aspirin enteric coated 81 milliGRAM(s) Oral daily  atorvastatin 40 milliGRAM(s) Oral at bedtime  calcitriol   Capsule 0.25 MICROGram(s) Oral <User Schedule>  carvedilol 25 milliGRAM(s) Oral every 12 hours  cloNIDine 0.2 milliGRAM(s) Oral two times a day  clopidogrel Tablet 75 milliGRAM(s) Oral daily  dextrose 5%. 1000 milliLiter(s) (50 mL/Hr) IV Continuous <Continuous>  dextrose 5%. 1000 milliLiter(s) (50 mL/Hr) IV Continuous <Continuous>  dextrose 50% Injectable 12.5 Gram(s) IV Push once  dextrose 50% Injectable 25 Gram(s) IV Push once  dextrose 50% Injectable 25 Gram(s) IV Push once  dextrose 50% Injectable 12.5 Gram(s) IV Push once  dextrose 50% Injectable 25 Gram(s) IV Push once  dextrose 50% Injectable 25 Gram(s) IV Push once  furosemide    Tablet 40 milliGRAM(s) Oral daily  heparin  Infusion. 900 Unit(s)/Hr (9 mL/Hr) IV Continuous <Continuous>  hydrALAZINE 100 milliGRAM(s) Oral three times a day  insulin lispro (HumaLOG) corrective regimen sliding scale   SubCutaneous three times a day before meals  insulin lispro (HumaLOG) corrective regimen sliding scale   SubCutaneous at bedtime  isosorbide   mononitrate ER Tablet (IMDUR) 60 milliGRAM(s) Oral <User Schedule>  sodium bicarbonate 650 milliGRAM(s) Oral two times a day  terazosin 2 milliGRAM(s) Oral at bedtime      TELEMETRY: 	    ECG:  	  RADIOLOGY:   DIAGNOSTIC TESTING:  [ ] Echocardiogram:  [ ]  Catheterization:  [ ] Stress Test:    OTHER: 	    LABS:	 	                                7.7    3.95  )-----------( 150      ( 26 Jan 2019 05:17 )             26.7     01-26    139  |  106  |  43<H>  ----------------------------<  105<H>  4.8   |  21<L>  |  4.21<H>    Ca    9.5      26 Jan 2019 05:17  Phos  3.7     01-26  Mg     2.0     01-26      PT/INR - ( 26 Jan 2019 05:17 )   PT: 12.1 SEC;   INR: 1.09          PTT - ( 26 Jan 2019 05:17 )  PTT:82.3 SEC

## 2019-01-27 LAB
ANION GAP SERPL CALC-SCNC: 12 MMO/L — SIGNIFICANT CHANGE UP (ref 7–14)
APTT BLD: 86.3 SEC — HIGH (ref 27.5–36.3)
BUN SERPL-MCNC: 43 MG/DL — HIGH (ref 7–23)
CALCIUM SERPL-MCNC: 9.3 MG/DL — SIGNIFICANT CHANGE UP (ref 8.4–10.5)
CHLORIDE SERPL-SCNC: 108 MMOL/L — HIGH (ref 98–107)
CO2 SERPL-SCNC: 21 MMOL/L — LOW (ref 22–31)
CREAT SERPL-MCNC: 4.24 MG/DL — HIGH (ref 0.5–1.3)
GLUCOSE BLDC GLUCOMTR-MCNC: 122 MG/DL — HIGH (ref 70–99)
GLUCOSE BLDC GLUCOMTR-MCNC: 124 MG/DL — HIGH (ref 70–99)
GLUCOSE BLDC GLUCOMTR-MCNC: 143 MG/DL — HIGH (ref 70–99)
GLUCOSE BLDC GLUCOMTR-MCNC: 94 MG/DL — SIGNIFICANT CHANGE UP (ref 70–99)
GLUCOSE SERPL-MCNC: 107 MG/DL — HIGH (ref 70–99)
HCT VFR BLD CALC: 26.8 % — LOW (ref 34.5–45)
HGB BLD-MCNC: 7.7 G/DL — LOW (ref 11.5–15.5)
INR BLD: 1.23 — HIGH (ref 0.88–1.17)
MAGNESIUM SERPL-MCNC: 2 MG/DL — SIGNIFICANT CHANGE UP (ref 1.6–2.6)
MCHC RBC-ENTMCNC: 23.3 PG — LOW (ref 27–34)
MCHC RBC-ENTMCNC: 28.7 % — LOW (ref 32–36)
MCV RBC AUTO: 81.2 FL — SIGNIFICANT CHANGE UP (ref 80–100)
NRBC # FLD: 0 K/UL — LOW (ref 25–125)
PHOSPHATE SERPL-MCNC: 4.1 MG/DL — SIGNIFICANT CHANGE UP (ref 2.5–4.5)
PLATELET # BLD AUTO: 151 K/UL — SIGNIFICANT CHANGE UP (ref 150–400)
PMV BLD: 10 FL — SIGNIFICANT CHANGE UP (ref 7–13)
POTASSIUM SERPL-MCNC: 5 MMOL/L — SIGNIFICANT CHANGE UP (ref 3.5–5.3)
POTASSIUM SERPL-SCNC: 5 MMOL/L — SIGNIFICANT CHANGE UP (ref 3.5–5.3)
PROTHROM AB SERPL-ACNC: 14.1 SEC — HIGH (ref 9.8–13.1)
RBC # BLD: 3.3 M/UL — LOW (ref 3.8–5.2)
RBC # FLD: 16.6 % — HIGH (ref 10.3–14.5)
SODIUM SERPL-SCNC: 141 MMOL/L — SIGNIFICANT CHANGE UP (ref 135–145)
WBC # BLD: 3.57 K/UL — LOW (ref 3.8–10.5)
WBC # FLD AUTO: 3.57 K/UL — LOW (ref 3.8–10.5)

## 2019-01-27 RX ORDER — WARFARIN SODIUM 2.5 MG/1
5 TABLET ORAL ONCE
Qty: 0 | Refills: 0 | Status: COMPLETED | OUTPATIENT
Start: 2019-01-27 | End: 2019-01-27

## 2019-01-27 RX ORDER — FUROSEMIDE 40 MG
20 TABLET ORAL DAILY
Qty: 0 | Refills: 0 | Status: DISCONTINUED | OUTPATIENT
Start: 2019-01-29 | End: 2019-01-29

## 2019-01-27 RX ORDER — TERAZOSIN HYDROCHLORIDE 10 MG/1
3 CAPSULE ORAL AT BEDTIME
Qty: 0 | Refills: 0 | Status: DISCONTINUED | OUTPATIENT
Start: 2019-01-27 | End: 2019-01-29

## 2019-01-27 RX ADMIN — Medication 50 MILLIGRAM(S): at 12:21

## 2019-01-27 RX ADMIN — ISOSORBIDE MONONITRATE 60 MILLIGRAM(S): 60 TABLET, EXTENDED RELEASE ORAL at 08:46

## 2019-01-27 RX ADMIN — Medication 81 MILLIGRAM(S): at 12:22

## 2019-01-27 RX ADMIN — Medication 0.2 MILLIGRAM(S): at 05:19

## 2019-01-27 RX ADMIN — CARVEDILOL PHOSPHATE 25 MILLIGRAM(S): 80 CAPSULE, EXTENDED RELEASE ORAL at 05:19

## 2019-01-27 RX ADMIN — Medication 100 MILLIGRAM(S): at 05:19

## 2019-01-27 RX ADMIN — CLOPIDOGREL BISULFATE 75 MILLIGRAM(S): 75 TABLET, FILM COATED ORAL at 12:22

## 2019-01-27 RX ADMIN — Medication 40 MILLIGRAM(S): at 05:19

## 2019-01-27 RX ADMIN — Medication 650 MILLIGRAM(S): at 05:19

## 2019-01-27 RX ADMIN — Medication 650 MILLIGRAM(S): at 17:29

## 2019-01-27 RX ADMIN — Medication 100 MILLIGRAM(S): at 12:23

## 2019-01-27 RX ADMIN — HEPARIN SODIUM 900 UNIT(S)/HR: 5000 INJECTION INTRAVENOUS; SUBCUTANEOUS at 05:59

## 2019-01-27 RX ADMIN — CARVEDILOL PHOSPHATE 25 MILLIGRAM(S): 80 CAPSULE, EXTENDED RELEASE ORAL at 17:29

## 2019-01-27 RX ADMIN — Medication 0.2 MILLIGRAM(S): at 17:30

## 2019-01-27 RX ADMIN — WARFARIN SODIUM 5 MILLIGRAM(S): 2.5 TABLET ORAL at 17:28

## 2019-01-27 RX ADMIN — Medication 100 MILLIGRAM(S): at 21:37

## 2019-01-27 RX ADMIN — TERAZOSIN HYDROCHLORIDE 3 MILLIGRAM(S): 10 CAPSULE ORAL at 21:38

## 2019-01-27 RX ADMIN — ATORVASTATIN CALCIUM 40 MILLIGRAM(S): 80 TABLET, FILM COATED ORAL at 21:37

## 2019-01-27 RX ADMIN — ISOSORBIDE MONONITRATE 60 MILLIGRAM(S): 60 TABLET, EXTENDED RELEASE ORAL at 17:30

## 2019-01-27 NOTE — DIETITIAN INITIAL EVALUATION ADULT. - OTHER INFO
Patient seen for nutrition consult for Nutrition services- assessment and education + coumadin education. Patient reported having fair appetite in house- reported she does not like food in house- consuming 40 to 50%. Patient denies any nausea/vomiting/constipation- patient reported having diarrhea, RDN reviewed foods to help with diarrhea( rice, bread, applesauce). Denies any difficulty chewing and swallowing. Patient reports no food allergies or intolerances. Patient denies any recent weight change. Usual weight: 150 pounds. Current weight: 151 pounds. Patient noted with + 2 edema- right arm.

## 2019-01-27 NOTE — DIETITIAN INITIAL EVALUATION ADULT. - PERTINENT LABORATORY DATA
01-27 Na141 mmol/L Glu 107 mg/dL<H> K+ 5.0 mmol/L Cr  4.24 mg/dL<H> BUN 43 mg/dL<H> 01-27 Phos 4.1 mg/dL 01-22 Alb 3.8 g/dL 01-23 [id number] 5.1 %. POCT: 124, 165, 105, 136 mg/dL

## 2019-01-27 NOTE — DIETITIAN INITIAL EVALUATION ADULT. - PROBLEM SELECTOR PLAN 1
will start on ac with heparin, monitor pt/ptt, will need to discuss with Nephrology for other options given ckd, vascular consulted, f/u recs, heme consulted by ED, f/u recs, per vascular rec to get MR venogram, discussed with Vascular will need to coordinate with nephrology in am.

## 2019-01-27 NOTE — DIETITIAN INITIAL EVALUATION ADULT. - NS AS NUTRI INTERV ED CONTENT
Patient was provided with extensive review of Renal diet principles including; phosphorus, potassium, and sodium content of food and recommended intake as well as recommended protein intake and High Biological Value Protein sources (i.e. chicken, turkey, beef, fish, eggs, etc.). Coumadin education provided including: Coumadin/Vitamin K interaction, vitamin k points and serving sizes, and consistent vitamin K intake.

## 2019-01-27 NOTE — DIETITIAN INITIAL EVALUATION ADULT. - ENERGY NEEDS
Ht: 61 inches Wt: dosing weight: 151 pounds BMI: 28.5 kg/m2 IBW: 105 pounds (+/-10%) %IBW: 143%    Edema: + 2 right arm.  Skin: intact, no pressure injuries noted

## 2019-01-27 NOTE — PROGRESS NOTE ADULT - SUBJECTIVE AND OBJECTIVE BOX
Patient is a 79y old  Female who presents with a chief complaint of upper extremity swelling (25 Jan 2019 15:36)      INTERVAL HPI/OVERNIGHT EVENTS:  T(C): 37 (01-27-19 @ 12:20), Max: 37 (01-27-19 @ 12:20)  HR: 88 (01-27-19 @ 17:37) (60 - 88)  BP: 158/75 (01-27-19 @ 17:37) (157/57 - 161/59)  RR: 18 (01-27-19 @ 17:37) (17 - 18)  SpO2: 100% (01-27-19 @ 12:20) (100% - 100%)  Wt(kg): --  I&O's Summary      LABS:                        7.7    3.57  )-----------( 151      ( 27 Jan 2019 05:08 )             26.8     01-27    141  |  108<H>  |  43<H>  ----------------------------<  107<H>  5.0   |  21<L>  |  4.24<H>    Ca    9.3      27 Jan 2019 05:08  Phos  4.1     01-27  Mg     2.0     01-27      PT/INR - ( 27 Jan 2019 05:08 )   PT: 14.1 SEC;   INR: 1.23          PTT - ( 27 Jan 2019 05:08 )  PTT:86.3 SEC    CAPILLARY BLOOD GLUCOSE      POCT Blood Glucose.: 94 mg/dL (27 Jan 2019 17:12)  POCT Blood Glucose.: 143 mg/dL (27 Jan 2019 11:58)  POCT Blood Glucose.: 124 mg/dL (27 Jan 2019 08:23)  POCT Blood Glucose.: 165 mg/dL (26 Jan 2019 21:32)            MEDICATIONS  (STANDING):  allopurinol 100 milliGRAM(s) Oral daily  aspirin enteric coated 81 milliGRAM(s) Oral daily  atorvastatin 40 milliGRAM(s) Oral at bedtime  calcitriol   Capsule 0.25 MICROGram(s) Oral <User Schedule>  carvedilol 25 milliGRAM(s) Oral every 12 hours  cloNIDine 0.2 milliGRAM(s) Oral two times a day  clopidogrel Tablet 75 milliGRAM(s) Oral daily  dextrose 5%. 1000 milliLiter(s) (50 mL/Hr) IV Continuous <Continuous>  dextrose 5%. 1000 milliLiter(s) (50 mL/Hr) IV Continuous <Continuous>  dextrose 50% Injectable 12.5 Gram(s) IV Push once  dextrose 50% Injectable 25 Gram(s) IV Push once  dextrose 50% Injectable 25 Gram(s) IV Push once  dextrose 50% Injectable 12.5 Gram(s) IV Push once  dextrose 50% Injectable 25 Gram(s) IV Push once  dextrose 50% Injectable 25 Gram(s) IV Push once  heparin  Infusion. 900 Unit(s)/Hr (9 mL/Hr) IV Continuous <Continuous>  hydrALAZINE 100 milliGRAM(s) Oral three times a day  insulin lispro (HumaLOG) corrective regimen sliding scale   SubCutaneous three times a day before meals  insulin lispro (HumaLOG) corrective regimen sliding scale   SubCutaneous at bedtime  isosorbide   mononitrate ER Tablet (IMDUR) 60 milliGRAM(s) Oral <User Schedule>  sodium bicarbonate 650 milliGRAM(s) Oral two times a day  terazosin 3 milliGRAM(s) Oral at bedtime    MEDICATIONS  (PRN):  acetaminophen   Tablet .. 650 milliGRAM(s) Oral every 6 hours PRN Temp greater or equal to 38C (100.4F), Moderate Pain (4 - 6)  dextrose 40% Gel 15 Gram(s) Oral once PRN Blood Glucose LESS THAN 70 milliGRAM(s)/deciliter  dextrose 40% Gel 15 Gram(s) Oral once PRN Blood Glucose LESS THAN 70 milliGRAM(s)/deciliter  glucagon  Injectable 1 milliGRAM(s) IntraMuscular once PRN Glucose LESS THAN 70 milligrams/deciliter  glucagon  Injectable 1 milliGRAM(s) IntraMuscular once PRN Glucose LESS THAN 70 milligrams/deciliter  heparin  Injectable 5500 Unit(s) IV Push every 6 hours PRN For aPTT less than 40  heparin  Injectable 2500 Unit(s) IV Push every 6 hours PRN For aPTT between 40 - 57          PHYSICAL EXAM:  GENERAL: NAD, well-groomed, well-developed  HEAD:  Atraumatic, Normocephalic  CHEST/LUNG: Clear to percussion bilaterally; No rales, rhonchi, wheezing, or rubs  HEART: Regular rate and rhythm; No murmurs, rubs, or gallops  ABDOMEN: Soft, Nontender, Nondistended; Bowel sounds present  EXTREMITIES:  2+ Peripheral Pulses, No clubbing, cyanosis, or edema  LYMPH: No lymphadenopathy noted  SKIN: No rashes or lesions    Care Discussed with Consultants/Other Providers [ ] YES  [ ] NO

## 2019-01-27 NOTE — PROGRESS NOTE ADULT - ASSESSMENT
Problem/Plan - 1:  ·  Problem: Acute deep vein thrombosis (DVT) of right upper extremity, unspecified vein.  Plan:   cw heparin gtt  check INR and dose coumadin daily    Problem/Plan - 2:  ·  Problem: DM2 (diabetes mellitus, type 2).  Plan: hold oral hypoglycemics, check a1c, SS insulin for now.     Problem/Plan - 3:  ·  Problem: CKD (chronic kidney disease) stage 4, GFR 15-29 ml/min.  Plan: Renal consult in am, dose meds renally, avoid nephrotoxins  cont sodium bicarb for metabolic acidosis.     Problem/Plan - 4:  ·  Problem: HTN (Hypertension).  Plan: cont home meds.     Problem/Plan - 5:  ·  Problem: CAD (coronary artery disease).  Plan: cont home meds ( asa, plavix, clonidine, imdur, hydralazine, coreg, terazosin, lasix), cards follg.     Problem/Plan - 6:  Problem: HLD (hyperlipidemia). Plan: crestor to lipitor.

## 2019-01-27 NOTE — DIETITIAN INITIAL EVALUATION ADULT. - PERTINENT MEDS FT
MEDICATIONS  (STANDING):  allopurinol 100 milliGRAM(s) Oral daily  aspirin enteric coated 81 milliGRAM(s) Oral daily  atorvastatin 40 milliGRAM(s) Oral at bedtime  calcitriol   Capsule 0.25 MICROGram(s) Oral <User Schedule>  carvedilol 25 milliGRAM(s) Oral every 12 hours  cloNIDine 0.2 milliGRAM(s) Oral two times a day  clopidogrel Tablet 75 milliGRAM(s) Oral daily  dextrose 5%. 1000 milliLiter(s) (50 mL/Hr) IV Continuous <Continuous>  dextrose 5%. 1000 milliLiter(s) (50 mL/Hr) IV Continuous <Continuous>  dextrose 50% Injectable 12.5 Gram(s) IV Push once  dextrose 50% Injectable 25 Gram(s) IV Push once  dextrose 50% Injectable 25 Gram(s) IV Push once  dextrose 50% Injectable 12.5 Gram(s) IV Push once  dextrose 50% Injectable 25 Gram(s) IV Push once  dextrose 50% Injectable 25 Gram(s) IV Push once  furosemide    Tablet 40 milliGRAM(s) Oral daily  heparin  Infusion. 900 Unit(s)/Hr (9 mL/Hr) IV Continuous <Continuous>  hydrALAZINE 100 milliGRAM(s) Oral three times a day  insulin lispro (HumaLOG) corrective regimen sliding scale   SubCutaneous three times a day before meals  insulin lispro (HumaLOG) corrective regimen sliding scale   SubCutaneous at bedtime  isosorbide   mononitrate ER Tablet (IMDUR) 60 milliGRAM(s) Oral <User Schedule>  sodium bicarbonate 650 milliGRAM(s) Oral two times a day  terazosin 2 milliGRAM(s) Oral at bedtime  warfarin 5 milliGRAM(s) Oral once    MEDICATIONS  (PRN):  acetaminophen   Tablet .. 650 milliGRAM(s) Oral every 6 hours PRN Temp greater or equal to 38C (100.4F), Moderate Pain (4 - 6)  dextrose 40% Gel 15 Gram(s) Oral once PRN Blood Glucose LESS THAN 70 milliGRAM(s)/deciliter  dextrose 40% Gel 15 Gram(s) Oral once PRN Blood Glucose LESS THAN 70 milliGRAM(s)/deciliter  glucagon  Injectable 1 milliGRAM(s) IntraMuscular once PRN Glucose LESS THAN 70 milligrams/deciliter  glucagon  Injectable 1 milliGRAM(s) IntraMuscular once PRN Glucose LESS THAN 70 milligrams/deciliter  heparin  Injectable 5500 Unit(s) IV Push every 6 hours PRN For aPTT less than 40  heparin  Injectable 2500 Unit(s) IV Push every 6 hours PRN For aPTT between 40 - 57

## 2019-01-27 NOTE — PROGRESS NOTE ADULT - SUBJECTIVE AND OBJECTIVE BOX
CARDIOLOGY FOLLOW UP - Dr. Damico    CC no cp or sob        PHYSICAL EXAM:  T(C): 36.6 (01-27-19 @ 05:17), Max: 37.1 (01-26-19 @ 12:32)  HR: 60 (01-27-19 @ 05:17) (60 - 61)  BP: 157/57 (01-27-19 @ 05:17) (147/58 - 161/59)  RR: 17 (01-27-19 @ 05:17) (17 - 18)  SpO2: 100% (01-27-19 @ 05:17) (100% - 100%)  Wt(kg): --  I&O's Summary      Appearance: Normal	  Cardiovascular: Normal S1 S2,RRR, No JVD, No murmurs  Respiratory: Lungs clear to auscultation	  Gastrointestinal:  Soft, Non-tender, + BS	  Extremities: Normal range of motion, No clubbing, cyanosis or edema        MEDICATIONS  (STANDING):  allopurinol 100 milliGRAM(s) Oral daily  aspirin enteric coated 81 milliGRAM(s) Oral daily  atorvastatin 40 milliGRAM(s) Oral at bedtime  calcitriol   Capsule 0.25 MICROGram(s) Oral <User Schedule>  carvedilol 25 milliGRAM(s) Oral every 12 hours  cloNIDine 0.2 milliGRAM(s) Oral two times a day  clopidogrel Tablet 75 milliGRAM(s) Oral daily  dextrose 5%. 1000 milliLiter(s) (50 mL/Hr) IV Continuous <Continuous>  dextrose 5%. 1000 milliLiter(s) (50 mL/Hr) IV Continuous <Continuous>  dextrose 50% Injectable 12.5 Gram(s) IV Push once  dextrose 50% Injectable 25 Gram(s) IV Push once  dextrose 50% Injectable 25 Gram(s) IV Push once  dextrose 50% Injectable 12.5 Gram(s) IV Push once  dextrose 50% Injectable 25 Gram(s) IV Push once  dextrose 50% Injectable 25 Gram(s) IV Push once  furosemide    Tablet 40 milliGRAM(s) Oral daily  heparin  Infusion. 900 Unit(s)/Hr (9 mL/Hr) IV Continuous <Continuous>  hydrALAZINE 100 milliGRAM(s) Oral three times a day  insulin lispro (HumaLOG) corrective regimen sliding scale   SubCutaneous three times a day before meals  insulin lispro (HumaLOG) corrective regimen sliding scale   SubCutaneous at bedtime  isosorbide   mononitrate ER Tablet (IMDUR) 60 milliGRAM(s) Oral <User Schedule>  sodium bicarbonate 650 milliGRAM(s) Oral two times a day  terazosin 2 milliGRAM(s) Oral at bedtime  warfarin 5 milliGRAM(s) Oral once      TELEMETRY: 	    ECG:  	  RADIOLOGY:   DIAGNOSTIC TESTING:  [ ] Echocardiogram:  [ ]  Catheterization:  [ ] Stress Test:    OTHER: 	    LABS:	 	                                7.7    3.57  )-----------( 151      ( 27 Jan 2019 05:08 )             26.8     01-27    141  |  108<H>  |  43<H>  ----------------------------<  107<H>  5.0   |  21<L>  |  4.24<H>    Ca    9.3      27 Jan 2019 05:08  Phos  4.1     01-27  Mg     2.0     01-27      PT/INR - ( 27 Jan 2019 05:08 )   PT: 14.1 SEC;   INR: 1.23          PTT - ( 27 Jan 2019 05:08 )  PTT:86.3 SEC

## 2019-01-27 NOTE — PROGRESS NOTE ADULT - ASSESSMENT
79F with PMH of HTN, CKD stage 5, with a LUE fistula which has never been used, DM2, CAD s/p CABG, PCI (3/2017), Aortic stenosis s/p TAVR/PPM (3/2017) presenting with RUE DVT    1. RUE DVT  a/c on hep gtt to coumadin per INR level   heme f/u   ct venogram cancelled, per renal pt at increased risk for radiocontrast nephropathy  ppm with medtronic lead not mri compat per mri manager/device company  vascular f/uv noted, no further imaging for now, continue A/C    2. CAD s/p CABG, PCI   no cp or sob   asa/plavix/bb/statin  will d/c plavix likely in 1-2 months to min bleed risk     3. AS s/p TAVR/ PPM  cv stable     4. HTN   continue with current anti-htn meds    5. anemia   guaiac negative   s/p prbc   hem f/u , monitor CBC     6. CKD   renal f/u 79F with PMH of HTN, CKD stage 5, with a LUE fistula which has never been used, DM2, CAD s/p CABG, PCI (3/2017), Aortic stenosis s/p TAVR/PPM (3/2017) presenting with RUE DVT    1. RUE DVT  a/c on hep gtt to coumadin per INR level   heme f/u   ct venogram cancelled, per renal pt at increased risk for radiocontrast nephropathy  ppm with medtronic lead not mri compat per mri manager/device company  vascular f/uv noted, no further imaging for now, continue A/C    2. CAD s/p CABG, PCI   no cp or sob   asa/plavix/bb/statin  will d/c plavix likely in 1-2 months to min bleed risk     3. AS s/p TAVR/ PPM  cv stable     4. HTN   elevated bp readings noted   increase terazosin  to 5 mg daily  at bedtime    5. anemia   guaiac negative   s/p prbc   hem f/u , monitor CBC     6. CKD   creat rising, decrease lasix to 20 mg daily  renal f/u 79F with PMH of HTN, CKD stage 5, with a LUE fistula which has never been used, DM2, CAD s/p CABG, PCI (3/2017), Aortic stenosis s/p TAVR/PPM (3/2017) presenting with RUE DVT    1. RUE DVT  a/c on hep gtt to coumadin per INR level   heme f/u   ct venogram cancelled, per renal pt at increased risk for radiocontrast nephropathy  ppm with medtronic lead not mri compat per mri manager/device company  vascular f/uv noted, no further imaging for now, continue A/C    2. CAD s/p CABG, PCI   no cp or sob   asa/plavix/bb/statin  will d/c plavix likely in 1-2 months to min bleed risk     3. AS s/p TAVR/ PPM  cv stable     4. HTN   elevated bp readings noted   increase terazosin  to 3mg daily  at bedtime    5. anemia   guaiac negative   s/p prbc   hem f/u , monitor CBC     6. CKD   creat rising, decrease lasix to 20 mg daily  renal f/u

## 2019-01-27 NOTE — DIETITIAN INITIAL EVALUATION ADULT. - PROBLEM SELECTOR PLAN 5
cont home meds ( asa, plavix, clonidine, imdur, hydralazine, coreg, terazosin, lasix), cards filomena

## 2019-01-28 LAB
ANION GAP SERPL CALC-SCNC: 10 MMO/L — SIGNIFICANT CHANGE UP (ref 7–14)
APTT BLD: 98.2 SEC — HIGH (ref 27.5–36.3)
BUN SERPL-MCNC: 39 MG/DL — HIGH (ref 7–23)
CALCIUM SERPL-MCNC: 9.7 MG/DL — SIGNIFICANT CHANGE UP (ref 8.4–10.5)
CHLORIDE SERPL-SCNC: 108 MMOL/L — HIGH (ref 98–107)
CO2 SERPL-SCNC: 21 MMOL/L — LOW (ref 22–31)
CREAT SERPL-MCNC: 4.08 MG/DL — HIGH (ref 0.5–1.3)
GLUCOSE BLDC GLUCOMTR-MCNC: 106 MG/DL — HIGH (ref 70–99)
GLUCOSE BLDC GLUCOMTR-MCNC: 123 MG/DL — HIGH (ref 70–99)
GLUCOSE BLDC GLUCOMTR-MCNC: 168 MG/DL — HIGH (ref 70–99)
GLUCOSE BLDC GLUCOMTR-MCNC: 86 MG/DL — SIGNIFICANT CHANGE UP (ref 70–99)
GLUCOSE SERPL-MCNC: 101 MG/DL — HIGH (ref 70–99)
HCT VFR BLD CALC: 27.2 % — LOW (ref 34.5–45)
HGB BLD-MCNC: 7.8 G/DL — LOW (ref 11.5–15.5)
INR BLD: 1.74 — HIGH (ref 0.88–1.17)
MAGNESIUM SERPL-MCNC: 2 MG/DL — SIGNIFICANT CHANGE UP (ref 1.6–2.6)
MCHC RBC-ENTMCNC: 23.6 PG — LOW (ref 27–34)
MCHC RBC-ENTMCNC: 28.7 % — LOW (ref 32–36)
MCV RBC AUTO: 82.2 FL — SIGNIFICANT CHANGE UP (ref 80–100)
NRBC # FLD: 0 K/UL — LOW (ref 25–125)
PHOSPHATE SERPL-MCNC: 3.6 MG/DL — SIGNIFICANT CHANGE UP (ref 2.5–4.5)
PLATELET # BLD AUTO: 150 K/UL — SIGNIFICANT CHANGE UP (ref 150–400)
PMV BLD: 9.7 FL — SIGNIFICANT CHANGE UP (ref 7–13)
POTASSIUM SERPL-MCNC: 4.8 MMOL/L — SIGNIFICANT CHANGE UP (ref 3.5–5.3)
POTASSIUM SERPL-SCNC: 4.8 MMOL/L — SIGNIFICANT CHANGE UP (ref 3.5–5.3)
PROTHROM AB SERPL-ACNC: 19.6 SEC — HIGH (ref 9.8–13.1)
RBC # BLD: 3.31 M/UL — LOW (ref 3.8–5.2)
RBC # FLD: 16.9 % — HIGH (ref 10.3–14.5)
SODIUM SERPL-SCNC: 139 MMOL/L — SIGNIFICANT CHANGE UP (ref 135–145)
WBC # BLD: 3.71 K/UL — LOW (ref 3.8–10.5)
WBC # FLD AUTO: 3.71 K/UL — LOW (ref 3.8–10.5)

## 2019-01-28 PROCEDURE — 99232 SBSQ HOSP IP/OBS MODERATE 35: CPT

## 2019-01-28 PROCEDURE — 99232 SBSQ HOSP IP/OBS MODERATE 35: CPT | Mod: GC

## 2019-01-28 RX ORDER — WARFARIN SODIUM 2.5 MG/1
3 TABLET ORAL ONCE
Qty: 0 | Refills: 0 | Status: COMPLETED | OUTPATIENT
Start: 2019-01-28 | End: 2019-01-28

## 2019-01-28 RX ORDER — WARFARIN SODIUM 2.5 MG/1
5 TABLET ORAL ONCE
Qty: 0 | Refills: 0 | Status: DISCONTINUED | OUTPATIENT
Start: 2019-01-28 | End: 2019-01-28

## 2019-01-28 RX ADMIN — Medication 1: at 12:55

## 2019-01-28 RX ADMIN — Medication 100 MILLIGRAM(S): at 22:09

## 2019-01-28 RX ADMIN — Medication 100 MILLIGRAM(S): at 05:16

## 2019-01-28 RX ADMIN — ISOSORBIDE MONONITRATE 60 MILLIGRAM(S): 60 TABLET, EXTENDED RELEASE ORAL at 18:17

## 2019-01-28 RX ADMIN — Medication 650 MILLIGRAM(S): at 05:16

## 2019-01-28 RX ADMIN — TERAZOSIN HYDROCHLORIDE 3 MILLIGRAM(S): 10 CAPSULE ORAL at 22:10

## 2019-01-28 RX ADMIN — Medication 650 MILLIGRAM(S): at 18:18

## 2019-01-28 RX ADMIN — CALCITRIOL 0.25 MICROGRAM(S): 0.5 CAPSULE ORAL at 10:31

## 2019-01-28 RX ADMIN — CARVEDILOL PHOSPHATE 25 MILLIGRAM(S): 80 CAPSULE, EXTENDED RELEASE ORAL at 18:17

## 2019-01-28 RX ADMIN — ATORVASTATIN CALCIUM 40 MILLIGRAM(S): 80 TABLET, FILM COATED ORAL at 22:10

## 2019-01-28 RX ADMIN — ISOSORBIDE MONONITRATE 60 MILLIGRAM(S): 60 TABLET, EXTENDED RELEASE ORAL at 10:31

## 2019-01-28 RX ADMIN — Medication 0.2 MILLIGRAM(S): at 18:17

## 2019-01-28 RX ADMIN — Medication 650 MILLIGRAM(S): at 16:56

## 2019-01-28 RX ADMIN — Medication 650 MILLIGRAM(S): at 16:26

## 2019-01-28 RX ADMIN — Medication 81 MILLIGRAM(S): at 12:58

## 2019-01-28 RX ADMIN — Medication 100 MILLIGRAM(S): at 16:58

## 2019-01-28 RX ADMIN — Medication 0.2 MILLIGRAM(S): at 05:16

## 2019-01-28 RX ADMIN — Medication 100 MILLIGRAM(S): at 12:58

## 2019-01-28 RX ADMIN — HEPARIN SODIUM 900 UNIT(S)/HR: 5000 INJECTION INTRAVENOUS; SUBCUTANEOUS at 07:03

## 2019-01-28 RX ADMIN — WARFARIN SODIUM 3 MILLIGRAM(S): 2.5 TABLET ORAL at 18:23

## 2019-01-28 RX ADMIN — CARVEDILOL PHOSPHATE 25 MILLIGRAM(S): 80 CAPSULE, EXTENDED RELEASE ORAL at 05:16

## 2019-01-28 NOTE — PROGRESS NOTE ADULT - ASSESSMENT
79F with PMH of HTN, CKD stage 5, with a LUE fistula which has never been used, DM2, CAD s/p CABG, PCI (3/2017), Aortic stenosis s/p TAVR/PPM (3/2017) presenting with RUE DVT    1. RUE DVT  a/c on hep gtt to coumadin per INR level   heme f/u   ct venogram cancelled, per renal pt at increased risk for radiocontrast nephropathy  ppm with medtronic lead not mri compat per mri manager/device company  vascular f/u noted, no further imaging for now, continue A/C    2. CAD s/p CABG, PCI   no cp or sob   asa/plavix/bb/statin  will d/c plavix likely in 1-2 months to min bleed risk     3. AS s/p TAVR/ PPM  cv stable     4. HTN   increased terazosin  to 3mg daily   continue to trend bp and up-titrate meds as needed     5. anemia   guaiac negative   s/p prbc   hem f/u , monitor CBC     6. CKD   lasix decreased to 20mg daily  continue to trend creat   renal f/u

## 2019-01-28 NOTE — PROGRESS NOTE ADULT - SUBJECTIVE AND OBJECTIVE BOX
Patient is a 79y old  Female who presents with a chief complaint of upper extremity swelling (25 Jan 2019 15:36)      INTERVAL HPI/OVERNIGHT EVENTS:  T(C): 36.9 (01-28-19 @ 14:45), Max: 37.2 (01-28-19 @ 05:14)  HR: 60 (01-28-19 @ 16:57) (60 - 88)  BP: 163/62 (01-28-19 @ 16:57) (158/75 - 173/53)  RR: 18 (01-28-19 @ 14:45) (18 - 18)  SpO2: 100% (01-28-19 @ 14:45) (100% - 100%)  Wt(kg): --  I&O's Summary      LABS:                        7.8    3.71  )-----------( 150      ( 28 Jan 2019 05:25 )             27.2     01-28    139  |  108<H>  |  39<H>  ----------------------------<  101<H>  4.8   |  21<L>  |  4.08<H>    Ca    9.7      28 Jan 2019 05:25  Phos  3.6     01-28  Mg     2.0     01-28      PT/INR - ( 28 Jan 2019 05:25 )   PT: 19.6 SEC;   INR: 1.74          PTT - ( 28 Jan 2019 05:25 )  PTT:98.2 SEC    CAPILLARY BLOOD GLUCOSE      POCT Blood Glucose.: 168 mg/dL (28 Jan 2019 11:50)  POCT Blood Glucose.: 106 mg/dL (28 Jan 2019 08:22)  POCT Blood Glucose.: 122 mg/dL (27 Jan 2019 22:18)  POCT Blood Glucose.: 94 mg/dL (27 Jan 2019 17:12)            MEDICATIONS  (STANDING):  allopurinol 100 milliGRAM(s) Oral daily  aspirin enteric coated 81 milliGRAM(s) Oral daily  atorvastatin 40 milliGRAM(s) Oral at bedtime  calcitriol   Capsule 0.25 MICROGram(s) Oral <User Schedule>  carvedilol 25 milliGRAM(s) Oral every 12 hours  cloNIDine 0.2 milliGRAM(s) Oral two times a day  dextrose 5%. 1000 milliLiter(s) (50 mL/Hr) IV Continuous <Continuous>  dextrose 5%. 1000 milliLiter(s) (50 mL/Hr) IV Continuous <Continuous>  dextrose 50% Injectable 12.5 Gram(s) IV Push once  dextrose 50% Injectable 25 Gram(s) IV Push once  dextrose 50% Injectable 25 Gram(s) IV Push once  dextrose 50% Injectable 12.5 Gram(s) IV Push once  dextrose 50% Injectable 25 Gram(s) IV Push once  dextrose 50% Injectable 25 Gram(s) IV Push once  heparin  Infusion. 900 Unit(s)/Hr (9 mL/Hr) IV Continuous <Continuous>  hydrALAZINE 100 milliGRAM(s) Oral three times a day  insulin lispro (HumaLOG) corrective regimen sliding scale   SubCutaneous three times a day before meals  insulin lispro (HumaLOG) corrective regimen sliding scale   SubCutaneous at bedtime  isosorbide   mononitrate ER Tablet (IMDUR) 60 milliGRAM(s) Oral <User Schedule>  sodium bicarbonate 650 milliGRAM(s) Oral two times a day  terazosin 3 milliGRAM(s) Oral at bedtime  warfarin 3 milliGRAM(s) Oral once    MEDICATIONS  (PRN):  acetaminophen   Tablet .. 650 milliGRAM(s) Oral every 6 hours PRN Temp greater or equal to 38C (100.4F), Moderate Pain (4 - 6)  dextrose 40% Gel 15 Gram(s) Oral once PRN Blood Glucose LESS THAN 70 milliGRAM(s)/deciliter  dextrose 40% Gel 15 Gram(s) Oral once PRN Blood Glucose LESS THAN 70 milliGRAM(s)/deciliter  glucagon  Injectable 1 milliGRAM(s) IntraMuscular once PRN Glucose LESS THAN 70 milligrams/deciliter  glucagon  Injectable 1 milliGRAM(s) IntraMuscular once PRN Glucose LESS THAN 70 milligrams/deciliter  heparin  Injectable 5500 Unit(s) IV Push every 6 hours PRN For aPTT less than 40  heparin  Injectable 2500 Unit(s) IV Push every 6 hours PRN For aPTT between 40 - 57          PHYSICAL EXAM:  GENERAL: NAD, well-groomed, well-developed  HEAD:  Atraumatic, Normocephalic  CHEST/LUNG: Clear to percussion bilaterally; No rales, rhonchi, wheezing, or rubs  HEART: Regular rate and rhythm; No murmurs, rubs, or gallops  ABDOMEN: Soft, Nontender, Nondistended; Bowel sounds present  EXTREMITIES:  2+ Peripheral Pulses, No clubbing, cyanosis, or edema  LYMPH: No lymphadenopathy noted  SKIN: No rashes or lesions    Care Discussed with Consultants/Other Providers [ ] YES  [ ] NO

## 2019-01-28 NOTE — PROGRESS NOTE ADULT - SUBJECTIVE AND OBJECTIVE BOX
Patient is a 79y old  Female who presents with a chief complaint of upper extremity swelling (25 Jan 2019 15:36)      Vascular Surgery Attending Progress Note    Interval HPI: pt states the right arm still feels heavy but better than before     Medications:  acetaminophen   Tablet .. 650 milliGRAM(s) Oral every 6 hours PRN  allopurinol 100 milliGRAM(s) Oral daily  aspirin enteric coated 81 milliGRAM(s) Oral daily  atorvastatin 40 milliGRAM(s) Oral at bedtime  calcitriol   Capsule 0.25 MICROGram(s) Oral <User Schedule>  carvedilol 25 milliGRAM(s) Oral every 12 hours  cloNIDine 0.2 milliGRAM(s) Oral two times a day  dextrose 40% Gel 15 Gram(s) Oral once PRN  dextrose 40% Gel 15 Gram(s) Oral once PRN  dextrose 5%. 1000 milliLiter(s) IV Continuous <Continuous>  dextrose 5%. 1000 milliLiter(s) IV Continuous <Continuous>  dextrose 50% Injectable 12.5 Gram(s) IV Push once  dextrose 50% Injectable 25 Gram(s) IV Push once  dextrose 50% Injectable 25 Gram(s) IV Push once  dextrose 50% Injectable 12.5 Gram(s) IV Push once  dextrose 50% Injectable 25 Gram(s) IV Push once  dextrose 50% Injectable 25 Gram(s) IV Push once  glucagon  Injectable 1 milliGRAM(s) IntraMuscular once PRN  glucagon  Injectable 1 milliGRAM(s) IntraMuscular once PRN  heparin  Infusion. 900 Unit(s)/Hr IV Continuous <Continuous>  heparin  Injectable 5500 Unit(s) IV Push every 6 hours PRN  heparin  Injectable 2500 Unit(s) IV Push every 6 hours PRN  hydrALAZINE 100 milliGRAM(s) Oral three times a day  insulin lispro (HumaLOG) corrective regimen sliding scale   SubCutaneous three times a day before meals  insulin lispro (HumaLOG) corrective regimen sliding scale   SubCutaneous at bedtime  isosorbide   mononitrate ER Tablet (IMDUR) 60 milliGRAM(s) Oral <User Schedule>  sodium bicarbonate 650 milliGRAM(s) Oral two times a day  terazosin 3 milliGRAM(s) Oral at bedtime  warfarin 3 milliGRAM(s) Oral once      Vital Signs Last 24 Hrs  T(C): 37.2 (28 Jan 2019 05:14), Max: 37.2 (28 Jan 2019 05:14)  T(F): 99 (28 Jan 2019 05:14), Max: 99 (28 Jan 2019 05:14)  HR: 61 (28 Jan 2019 10:28) (60 - 88)  BP: 173/53 (28 Jan 2019 10:28) (158/75 - 173/53)  BP(mean): --  RR: 18 (28 Jan 2019 05:14) (18 - 18)  SpO2: 100% (28 Jan 2019 05:14) (100% - 100%)  I&O's Summary      Physical Exam:  Neuro  A&Ox3 VSS  Vascular:  rue mild to mod edema remains     LABS:                        7.8    3.71  )-----------( 150      ( 28 Jan 2019 05:25 )             27.2     01-28    139  |  108<H>  |  39<H>  ----------------------------<  101<H>  4.8   |  21<L>  |  4.08<H>    Ca    9.7      28 Jan 2019 05:25  Phos  3.6     01-28  Mg     2.0     01-28      PT/INR - ( 28 Jan 2019 05:25 )   PT: 19.6 SEC;   INR: 1.74          PTT - ( 28 Jan 2019 05:25 )  PTT:98.2 SEC    HARRIET FAY MD  [phone]

## 2019-01-28 NOTE — PROGRESS NOTE ADULT - PROBLEM SELECTOR PLAN 1
Pt. with known history of advanced CKD in the setting of long standing HTN and DM. On review of previous labs on [facility], Scr has ranged between 3.5 to 4.3 since 2015. Scr elevated at 3.74 on 12/19/18 and was 4.21 on this admission (1/22/19). Scr stable at 4.08 today (1/28/19). Monitor labs and urine output. Avoid any potential nephrotoxins. Dose medications as per eGFR

## 2019-01-28 NOTE — PROGRESS NOTE ADULT - ASSESSMENT
79F w/ aortic stenosis s/p aortic valve replacement, CAD s/p CABG x3, pacemaker, CKD w/ AVF LUE (unused, not on HD), OA, gout, presents with RUE swelling concerning for acute DVT. Duplex RUE demonstrates RUE DVT of subclavian, axillary, brachial, and radial veins. Vascular surgery is consulted for management of acute RUE DVT.     - d/w pt the renal insuff and renal failure risk /benefit of CTV or MRV  at this time since pt is clinically slowly improving will hold off on any imaging studies and continue anticoag rx  contiunue rue elevation   will follow

## 2019-01-28 NOTE — PROGRESS NOTE ADULT - SUBJECTIVE AND OBJECTIVE BOX
[facility] DIVISION OF KIDNEY DISEASES AND HYPERTENSION -- FOLLOW UP NOTE  --------------------------------------------------------------------------------  HPI: 79-year-old female with longstanding history of HTN, DM2, CKD stage 5, s/p LUE AVF (created ~6 years ago in [place], never used), CAD s/p CABG, PCI (3/2017), and aortic stenosis s/p TAVR/PPM (3/2017), admitted with RUE DVT. Nephrology consulted due to history of CKD. Found to have US study that showed DVT from right radial to subclavian vein. Pt. with longstanding history of CKD. Of note, pt. with worsening anemia during this hospitalization and has received blood transfusion (2 U PRBC). On review of previous labs on [facility], Scr has ranged between 3.5 to 4.3 since 2015. Scr elevated at 3.74 on 12/19/18 and was 4.21 on this admission (1/22/19). Scr stable at 4.08 today (1/28/19). Pt. seen and examined at bedside. Pt. denies any complains, tolerating oral diet. No CP, SOB, HA or dizziness.     PAST HISTORY  --------------------------------------------------------------------------------  No significant changes to PMH, PSH, FHx, SHx, unless otherwise noted    ALLERGIES & MEDICATIONS  --------------------------------------------------------------------------------  Allergies    codeine (Other)  Lortab (Other)  morphine (Other)  Percocet 10/325 (Other)  PPM not compatible with  MRI (Other (Fatal))  Reglan (Other; Flushing (Skin))  sulfa drugs (Flushing (Skin))    Intolerances    Standing Inpatient Medications  allopurinol 100 milliGRAM(s) Oral daily  aspirin enteric coated 81 milliGRAM(s) Oral daily  atorvastatin 40 milliGRAM(s) Oral at bedtime  calcitriol   Capsule 0.25 MICROGram(s) Oral <User Schedule>  carvedilol 25 milliGRAM(s) Oral every 12 hours  cloNIDine 0.2 milliGRAM(s) Oral two times a day  clopidogrel Tablet 75 milliGRAM(s) Oral daily  dextrose 5%. 1000 milliLiter(s) IV Continuous <Continuous>  dextrose 5%. 1000 milliLiter(s) IV Continuous <Continuous>  dextrose 50% Injectable 12.5 Gram(s) IV Push once  dextrose 50% Injectable 25 Gram(s) IV Push once  dextrose 50% Injectable 25 Gram(s) IV Push once  dextrose 50% Injectable 12.5 Gram(s) IV Push once  dextrose 50% Injectable 25 Gram(s) IV Push once  dextrose 50% Injectable 25 Gram(s) IV Push once  heparin  Infusion. 900 Unit(s)/Hr IV Continuous <Continuous>  hydrALAZINE 100 milliGRAM(s) Oral three times a day  insulin lispro (HumaLOG) corrective regimen sliding scale   SubCutaneous three times a day before meals  insulin lispro (HumaLOG) corrective regimen sliding scale   SubCutaneous at bedtime  isosorbide   mononitrate ER Tablet (IMDUR) 60 milliGRAM(s) Oral <User Schedule>  sodium bicarbonate 650 milliGRAM(s) Oral two times a day  terazosin 3 milliGRAM(s) Oral at bedtime  warfarin 5 milliGRAM(s) Oral once    REVIEW OF SYSTEMS  --------------------------------------------------------------------------------  Gen: No fever  Respiratory: No dyspnea  CV: No chest pain  GI: No abdominal pain  : No dysuria, hematuria  MSK: + RUE swelling (improving)  Heme: No easy bruising or bleeding    All other systems were reviewed and are negative, except as noted.    VITALS/PHYSICAL EXAM  --------------------------------------------------------------------------------  T(C): 37.2 (01-28-19 @ 05:14), Max: 37.2 (01-28-19 @ 05:14)  HR: 61 (01-28-19 @ 10:28) (60 - 88)  BP: 173/53 (01-28-19 @ 10:28) (157/81 - 173/53)  RR: 18 (01-28-19 @ 05:14) (18 - 18)  SpO2: 100% (01-28-19 @ 05:14) (100% - 100%)  Wt(kg): --    Physical Exam:  	Gen: resting, NAD  	HEENT: No JVD  	Pulm: CTA B/L  	CV: S1S2+  	Abd: Soft, +BS   	Ext: RUE swelling seen (improved), No LE edema B/L  	Neuro: Awake, alert  	Skin: Warm and dry  	Vascular access: LUE AVF site: +thrill +bruit    LABS/STUDIES  --------------------------------------------------------------------------------              7.8    3.71  >-----------<  150      [01-28-19 @ 05:25]              27.2     139  |  108  |  39  ----------------------------<  101      [01-28-19 @ 05:25]  4.8   |  21  |  4.08        Ca     9.7     [01-28-19 @ 05:25]      Mg     2.0     [01-28-19 @ 05:25]      Phos  3.6     [01-28-19 @ 05:25]      PT/INR: PT 19.6 , INR 1.74       [01-28-19 @ 05:25]  PTT: 98.2       [01-28-19 @ 05:25]      Creatinine Trend:  SCr 4.08 [01-28 @ 05:25]  SCr 4.24 [01-27 @ 05:08]  SCr 4.21 [01-26 @ 05:17]  SCr 4.13 [01-25 @ 06:21]  SCr 4.12 [01-24 @ 04:20] [facility] DIVISION OF KIDNEY DISEASES AND HYPERTENSION -- FOLLOW UP NOTE  --------------------------------------------------------------------------------  HPI: 79-year-old female with longstanding history of HTN, DM2, CKD stage 5, s/p LUE AVF (created ~6 years ago in [place], never used), CAD s/p CABG, PCI (3/2017), and aortic stenosis s/p TAVR/PPM (3/2017), admitted with RUE DVT. Nephrology consulted due to history of CKD. Found to have US study that showed DVT from right radial to subclavian vein. Pt. with longstanding history of CKD. Of note, pt. with worsening anemia during this hospitalization and has received blood transfusion (2 U PRBC). On review of previous labs on [facility], Scr has ranged between 3.5 to 4.3 since 2015. Scr elevated at 3.74 on 12/19/18 and was 4.21 on this admission (1/22/19). Scr stable at 4.08 today (1/28/19). Pt. seen and examined at bedside. Pt. denies any complains, tolerating oral diet. No CP, SOB, HA or dizziness.     PAST HISTORY  --------------------------------------------------------------------------------  No significant changes to PMH, PSH, FHx, SHx, unless otherwise noted    ALLERGIES & MEDICATIONS  --------------------------------------------------------------------------------  Allergies    codeine (Other)  Lortab (Other)  morphine (Other)  Percocet 10/325 (Other)  PPM not compatible with  MRI (Other (Fatal))  Reglan (Other; Flushing (Skin))  sulfa drugs (Flushing (Skin))    Intolerances    Standing Inpatient Medications  allopurinol 100 milliGRAM(s) Oral daily  aspirin enteric coated 81 milliGRAM(s) Oral daily  atorvastatin 40 milliGRAM(s) Oral at bedtime  calcitriol   Capsule 0.25 MICROGram(s) Oral <User Schedule>  carvedilol 25 milliGRAM(s) Oral every 12 hours  cloNIDine 0.2 milliGRAM(s) Oral two times a day  clopidogrel Tablet 75 milliGRAM(s) Oral daily  dextrose 5%. 1000 milliLiter(s) IV Continuous <Continuous>  dextrose 5%. 1000 milliLiter(s) IV Continuous <Continuous>  dextrose 50% Injectable 12.5 Gram(s) IV Push once  dextrose 50% Injectable 25 Gram(s) IV Push once  dextrose 50% Injectable 25 Gram(s) IV Push once  dextrose 50% Injectable 12.5 Gram(s) IV Push once  dextrose 50% Injectable 25 Gram(s) IV Push once  dextrose 50% Injectable 25 Gram(s) IV Push once  heparin  Infusion. 900 Unit(s)/Hr IV Continuous <Continuous>  hydrALAZINE 100 milliGRAM(s) Oral three times a day  insulin lispro (HumaLOG) corrective regimen sliding scale   SubCutaneous three times a day before meals  insulin lispro (HumaLOG) corrective regimen sliding scale   SubCutaneous at bedtime  isosorbide   mononitrate ER Tablet (IMDUR) 60 milliGRAM(s) Oral <User Schedule>  sodium bicarbonate 650 milliGRAM(s) Oral two times a day  terazosin 3 milliGRAM(s) Oral at bedtime  warfarin 5 milliGRAM(s) Oral once    REVIEW OF SYSTEMS  --------------------------------------------------------------------------------  Gen: No fever  Respiratory: No dyspnea  CV: No chest pain  GI: No abdominal pain  : No dysuria, hematuria  MSK: + RUE swelling (improving)  Heme: No easy bruising or bleeding    All other systems were reviewed and are negative, except as noted.    VITALS/PHYSICAL EXAM  --------------------------------------------------------------------------------  T(C): 37.2 (01-28-19 @ 05:14), Max: 37.2 (01-28-19 @ 05:14)  HR: 61 (01-28-19 @ 10:28) (60 - 88)  BP: 173/53 (01-28-19 @ 10:28) (157/81 - 173/53)  RR: 18 (01-28-19 @ 05:14) (18 - 18)  SpO2: 100% (01-28-19 @ 05:14) (100% - 100%)  Wt(kg): --    Physical Exam:  	Gen: resting, NAD  	HEENT: No JVD  	Pulm: CTA B/L  	CV: S1S2+  	Abd: Soft, +BS   	Ext: RUE swelling seen (improved), No LE edema B/L  	Neuro: Awake, alert  	Skin: Warm and dry  	Vascular access: LUE AVF site: +thrill +bruit    LABS/STUDIES  --------------------------------------------------------------------------------              7.8    3.71  >-----------<  150      [01-28-19 @ 05:25]              27.2     139  |  108  |  39  ----------------------------<  101      [01-28-19 @ 05:25]  4.8   |  21  |  4.08        Ca     9.7     [01-28-19 @ 05:25]      Mg     2.0     [01-28-19 @ 05:25]      Phos  3.6     [01-28-19 @ 05:25]    Creatinine Trend:  SCr 4.08 [01-28 @ 05:25]  SCr 4.24 [01-27 @ 05:08]  SCr 4.21 [01-26 @ 05:17]  SCr 4.13 [01-25 @ 06:21]  SCr 4.12 [01-24 @ 04:20]

## 2019-01-28 NOTE — CHART NOTE - NSCHARTNOTEFT_GEN_A_CORE
Patient unable to undergo contrast study.  Patient clinically improving.   Please page with questions.  [phone]

## 2019-01-29 VITALS — SYSTOLIC BLOOD PRESSURE: 166 MMHG | DIASTOLIC BLOOD PRESSURE: 62 MMHG | HEART RATE: 63 BPM

## 2019-01-29 LAB
ANION GAP SERPL CALC-SCNC: 12 MMO/L — SIGNIFICANT CHANGE UP (ref 7–14)
APTT BLD: 99.1 SEC — HIGH (ref 27.5–36.3)
BUN SERPL-MCNC: 39 MG/DL — HIGH (ref 7–23)
CALCIUM SERPL-MCNC: 9.6 MG/DL — SIGNIFICANT CHANGE UP (ref 8.4–10.5)
CHLORIDE SERPL-SCNC: 109 MMOL/L — HIGH (ref 98–107)
CO2 SERPL-SCNC: 21 MMOL/L — LOW (ref 22–31)
CREAT SERPL-MCNC: 4.11 MG/DL — HIGH (ref 0.5–1.3)
GLUCOSE BLDC GLUCOMTR-MCNC: 111 MG/DL — HIGH (ref 70–99)
GLUCOSE BLDC GLUCOMTR-MCNC: 144 MG/DL — HIGH (ref 70–99)
GLUCOSE BLDC GLUCOMTR-MCNC: 203 MG/DL — HIGH (ref 70–99)
GLUCOSE SERPL-MCNC: 116 MG/DL — HIGH (ref 70–99)
HCT VFR BLD CALC: 27.3 % — LOW (ref 34.5–45)
HGB BLD-MCNC: 7.7 G/DL — LOW (ref 11.5–15.5)
INR BLD: 2.17 — HIGH (ref 0.88–1.17)
MAGNESIUM SERPL-MCNC: 2 MG/DL — SIGNIFICANT CHANGE UP (ref 1.6–2.6)
MCHC RBC-ENTMCNC: 23.3 PG — LOW (ref 27–34)
MCHC RBC-ENTMCNC: 28.2 % — LOW (ref 32–36)
MCV RBC AUTO: 82.5 FL — SIGNIFICANT CHANGE UP (ref 80–100)
NRBC # FLD: 0 K/UL — LOW (ref 25–125)
PHOSPHATE SERPL-MCNC: 3.6 MG/DL — SIGNIFICANT CHANGE UP (ref 2.5–4.5)
PLATELET # BLD AUTO: 175 K/UL — SIGNIFICANT CHANGE UP (ref 150–400)
PMV BLD: 11.3 FL — SIGNIFICANT CHANGE UP (ref 7–13)
POTASSIUM SERPL-MCNC: 5.2 MMOL/L — SIGNIFICANT CHANGE UP (ref 3.5–5.3)
POTASSIUM SERPL-SCNC: 5.2 MMOL/L — SIGNIFICANT CHANGE UP (ref 3.5–5.3)
PROTHROM AB SERPL-ACNC: 25.3 SEC — HIGH (ref 9.8–13.1)
RBC # BLD: 3.31 M/UL — LOW (ref 3.8–5.2)
RBC # FLD: 17.2 % — HIGH (ref 10.3–14.5)
SODIUM SERPL-SCNC: 142 MMOL/L — SIGNIFICANT CHANGE UP (ref 135–145)
WBC # BLD: 3.42 K/UL — LOW (ref 3.8–10.5)
WBC # FLD AUTO: 3.42 K/UL — LOW (ref 3.8–10.5)

## 2019-01-29 PROCEDURE — 99232 SBSQ HOSP IP/OBS MODERATE 35: CPT

## 2019-01-29 RX ORDER — HYDRALAZINE HCL 50 MG
1 TABLET ORAL
Qty: 0 | Refills: 0 | COMMUNITY

## 2019-01-29 RX ORDER — TERAZOSIN HYDROCHLORIDE 10 MG/1
1 CAPSULE ORAL
Qty: 0 | Refills: 0 | COMMUNITY

## 2019-01-29 RX ORDER — WARFARIN SODIUM 2.5 MG/1
1 TABLET ORAL
Qty: 30 | Refills: 0
Start: 2019-01-29 | End: 2019-02-27

## 2019-01-29 RX ORDER — WARFARIN SODIUM 2.5 MG/1
3 TABLET ORAL ONCE
Qty: 0 | Refills: 0 | Status: COMPLETED | OUTPATIENT
Start: 2019-01-29 | End: 2019-01-29

## 2019-01-29 RX ORDER — TERAZOSIN HYDROCHLORIDE 10 MG/1
3 CAPSULE ORAL
Qty: 90 | Refills: 0
Start: 2019-01-29 | End: 2019-02-27

## 2019-01-29 RX ORDER — FUROSEMIDE 40 MG
1 TABLET ORAL
Qty: 30 | Refills: 0
Start: 2019-01-29 | End: 2019-02-27

## 2019-01-29 RX ORDER — FUROSEMIDE 40 MG
1 TABLET ORAL
Qty: 30 | Refills: 0 | COMMUNITY

## 2019-01-29 RX ADMIN — HEPARIN SODIUM 900 UNIT(S)/HR: 5000 INJECTION INTRAVENOUS; SUBCUTANEOUS at 07:19

## 2019-01-29 RX ADMIN — Medication 0.2 MILLIGRAM(S): at 17:50

## 2019-01-29 RX ADMIN — Medication 650 MILLIGRAM(S): at 17:50

## 2019-01-29 RX ADMIN — WARFARIN SODIUM 3 MILLIGRAM(S): 2.5 TABLET ORAL at 17:50

## 2019-01-29 RX ADMIN — Medication 100 MILLIGRAM(S): at 15:34

## 2019-01-29 RX ADMIN — Medication 2: at 12:24

## 2019-01-29 RX ADMIN — Medication 100 MILLIGRAM(S): at 12:13

## 2019-01-29 RX ADMIN — Medication 650 MILLIGRAM(S): at 06:47

## 2019-01-29 RX ADMIN — Medication 0.2 MILLIGRAM(S): at 05:21

## 2019-01-29 RX ADMIN — ISOSORBIDE MONONITRATE 60 MILLIGRAM(S): 60 TABLET, EXTENDED RELEASE ORAL at 09:43

## 2019-01-29 RX ADMIN — Medication 81 MILLIGRAM(S): at 12:13

## 2019-01-29 RX ADMIN — Medication 20 MILLIGRAM(S): at 05:21

## 2019-01-29 RX ADMIN — CARVEDILOL PHOSPHATE 25 MILLIGRAM(S): 80 CAPSULE, EXTENDED RELEASE ORAL at 05:21

## 2019-01-29 RX ADMIN — CARVEDILOL PHOSPHATE 25 MILLIGRAM(S): 80 CAPSULE, EXTENDED RELEASE ORAL at 17:50

## 2019-01-29 RX ADMIN — Medication 650 MILLIGRAM(S): at 05:21

## 2019-01-29 RX ADMIN — Medication 100 MILLIGRAM(S): at 05:21

## 2019-01-29 RX ADMIN — Medication 650 MILLIGRAM(S): at 05:14

## 2019-01-29 RX ADMIN — ISOSORBIDE MONONITRATE 60 MILLIGRAM(S): 60 TABLET, EXTENDED RELEASE ORAL at 17:51

## 2019-01-29 NOTE — PROGRESS NOTE ADULT - ASSESSMENT
79F with PMH of HTN, CKD stage 5, with a LUE fistula which has never been used, DM2, CAD s/p CABG, PCI (3/2017), Aortic stenosis s/p TAVR/PPM (3/2017) presenting with RUE DVT    1. RUE DVT  a/c on coumadin per INR level, INR therapeutic, can dc hep gtt    heme f/u   ct venogram cancelled, per renal pt at increased risk for radiocontrast nephropathy  ppm with [employer] lead not mri compat per mri manager/device company  vascular f/u noted, no further imaging for now, continue A/C    2. CAD s/p CABG, PCI   no cp or sob   asa/bb/statin  will remain off plavix to min bleed risk     3. AS s/p TAVR/ PPM  cv stable     4. HTN   elevated bp reading noted however taken on LE,  repeat on UE acceptable,  will continue with current anti-htn meds as ordered     5. anemia   guaiac negative   s/p prbc   hem f/u , monitor CBC     6. CKD   lasix  20mg daily  continue to trend creat   renal f/u     dc planning today, will be dc on 3 mg coumadin with INR check in 3 days   f/u appt for 2/14 at 3 pm

## 2019-01-29 NOTE — PROGRESS NOTE ADULT - ATTENDING COMMENTS
NP  CV STABLE  await therap inr  cont coumadin   cont bp meds  d/c plan per inr  will stop plavix for now in light of triple therapy and anemia to min bleed risk   cont asa
Patient seen and examined.  Agree with above NP note.  cv stable  renal/heme f/u  cont bp meds  cont a/c with hep  vasc f/u
agree w NP note above  CV stable  INR therap i  cont coumadin   cont bp meds  d/c plan
Patient seen and examined.  Agree with above NP note.  cv stable  s/p prbc  await mr venogram  renal/heme f/u  cont bp meds  cont a/c with hep  vasc f/u
agree with above  appreciate vascular eval  awaiting MR venogram to r/o thoracic outlet syndrome  patient has a MRI compatible PPM  heme eval
Agree with above NP note.  cv stable  renal/heme f/u\  titrate BP meds  decrease lasix  cont a/c with hep  vasc f/u
I have personally  seen and examined the patient today and have noted the findings and formulated the plan of care.
I have personally  seen and examined the patient today and have noted the findings and formulated the plan of care.
I have seen and examined the patient today and agree with  the  evaluation, assessment and plan of the surgical house officer

## 2019-01-29 NOTE — PROGRESS NOTE ADULT - SUBJECTIVE AND OBJECTIVE BOX
Patient is a 79y old  Female who presents with a chief complaint of sob (28 Jan 2019 17:03)      Vascular Surgery Attending Progress Note    Interval HPI: pt w/o c/o     Medications:  acetaminophen   Tablet .. 650 milliGRAM(s) Oral every 6 hours PRN  allopurinol 100 milliGRAM(s) Oral daily  aspirin enteric coated 81 milliGRAM(s) Oral daily  atorvastatin 40 milliGRAM(s) Oral at bedtime  calcitriol   Capsule 0.25 MICROGram(s) Oral <User Schedule>  carvedilol 25 milliGRAM(s) Oral every 12 hours  cloNIDine 0.2 milliGRAM(s) Oral two times a day  dextrose 40% Gel 15 Gram(s) Oral once PRN  dextrose 40% Gel 15 Gram(s) Oral once PRN  dextrose 5%. 1000 milliLiter(s) IV Continuous <Continuous>  dextrose 5%. 1000 milliLiter(s) IV Continuous <Continuous>  dextrose 50% Injectable 12.5 Gram(s) IV Push once  dextrose 50% Injectable 25 Gram(s) IV Push once  dextrose 50% Injectable 25 Gram(s) IV Push once  dextrose 50% Injectable 12.5 Gram(s) IV Push once  dextrose 50% Injectable 25 Gram(s) IV Push once  dextrose 50% Injectable 25 Gram(s) IV Push once  furosemide    Tablet 20 milliGRAM(s) Oral daily  glucagon  Injectable 1 milliGRAM(s) IntraMuscular once PRN  glucagon  Injectable 1 milliGRAM(s) IntraMuscular once PRN  hydrALAZINE 100 milliGRAM(s) Oral three times a day  insulin lispro (HumaLOG) corrective regimen sliding scale   SubCutaneous three times a day before meals  insulin lispro (HumaLOG) corrective regimen sliding scale   SubCutaneous at bedtime  isosorbide   mononitrate ER Tablet (IMDUR) 60 milliGRAM(s) Oral <User Schedule>  sodium bicarbonate 650 milliGRAM(s) Oral two times a day  terazosin 3 milliGRAM(s) Oral at bedtime      Vital Signs Last 24 Hrs  T(C): 37.3 (29 Jan 2019 15:31), Max: 37.3 (29 Jan 2019 15:31)  T(F): 99.1 (29 Jan 2019 15:31), Max: 99.1 (29 Jan 2019 15:31)  HR: 63 (29 Jan 2019 17:41) (60 - 79)  BP: 166/62 (29 Jan 2019 17:41) (157/52 - 175/60)  BP(mean): --  RR: 18 (29 Jan 2019 15:31) (18 - 18)  SpO2: 100% (29 Jan 2019 15:31) (100% - 100%)  I&O's Summary      Physical Exam:  Neuro  A&Ox3 VSS  Vascular:   rue edema stable     LABS:                        7.7    3.42  )-----------( 175      ( 29 Jan 2019 05:30 )             27.3     01-29    142  |  109<H>  |  39<H>  ----------------------------<  116<H>  5.2   |  21<L>  |  4.11<H>    Ca    9.6      29 Jan 2019 05:30  Phos  3.6     01-29  Mg     2.0     01-29      PT/INR - ( 29 Jan 2019 05:30 )   PT: 25.3 SEC;   INR: 2.17          PTT - ( 29 Jan 2019 05:30 )  PTT:99.1 SEC    HARRIET FAY MD  [phone]

## 2019-01-29 NOTE — PROGRESS NOTE ADULT - PROBLEM SELECTOR PROBLEM 2
Arm swelling
Arm swelling
HTN (Hypertension)
Arm swelling

## 2019-01-29 NOTE — PROGRESS NOTE ADULT - SUBJECTIVE AND OBJECTIVE BOX
CARDIOLOGY FOLLOW UP - Dr. Damico    CC no cp or sob       PHYSICAL EXAM:  T(C): 36.9 (01-29-19 @ 05:09), Max: 37.2 (01-28-19 @ 20:16)  HR: 60 (01-29-19 @ 09:42) (60 - 79)  BP: 157/52 (01-29-19 @ 09:42) (157/52 - 174/81)  RR: 18 (01-29-19 @ 05:09) (18 - 18)  SpO2: 100% (01-29-19 @ 05:09) (100% - 100%)  Wt(kg): --  I&O's Summary      Appearance: Normal	  Cardiovascular: Normal S1 S2,RRR, No JVD, No murmurs  Respiratory: Lungs clear to auscultation	  Gastrointestinal:  Soft, Non-tender, + BS	  Extremities: Normal range of motion, Right UE edema        MEDICATIONS  (STANDING):  allopurinol 100 milliGRAM(s) Oral daily  aspirin enteric coated 81 milliGRAM(s) Oral daily  atorvastatin 40 milliGRAM(s) Oral at bedtime  calcitriol   Capsule 0.25 MICROGram(s) Oral <User Schedule>  carvedilol 25 milliGRAM(s) Oral every 12 hours  cloNIDine 0.2 milliGRAM(s) Oral two times a day  dextrose 5%. 1000 milliLiter(s) (50 mL/Hr) IV Continuous <Continuous>  dextrose 5%. 1000 milliLiter(s) (50 mL/Hr) IV Continuous <Continuous>  dextrose 50% Injectable 12.5 Gram(s) IV Push once  dextrose 50% Injectable 25 Gram(s) IV Push once  dextrose 50% Injectable 25 Gram(s) IV Push once  dextrose 50% Injectable 12.5 Gram(s) IV Push once  dextrose 50% Injectable 25 Gram(s) IV Push once  dextrose 50% Injectable 25 Gram(s) IV Push once  furosemide    Tablet 20 milliGRAM(s) Oral daily  heparin  Infusion. 900 Unit(s)/Hr (9 mL/Hr) IV Continuous <Continuous>  hydrALAZINE 100 milliGRAM(s) Oral three times a day  insulin lispro (HumaLOG) corrective regimen sliding scale   SubCutaneous three times a day before meals  insulin lispro (HumaLOG) corrective regimen sliding scale   SubCutaneous at bedtime  isosorbide   mononitrate ER Tablet (IMDUR) 60 milliGRAM(s) Oral <User Schedule>  sodium bicarbonate 650 milliGRAM(s) Oral two times a day  terazosin 3 milliGRAM(s) Oral at bedtime      TELEMETRY: 	    ECG:  	  RADIOLOGY:   DIAGNOSTIC TESTING:  [ ] Echocardiogram:  [ ]  Catheterization:  [ ] Stress Test:    OTHER: 	    LABS:	 	                                7.7    3.42  )-----------( 175      ( 29 Jan 2019 05:30 )             27.3     01-29    142  |  109<H>  |  39<H>  ----------------------------<  116<H>  5.2   |  21<L>  |  4.11<H>    Ca    9.6      29 Jan 2019 05:30  Phos  3.6     01-29  Mg     2.0     01-29      PT/INR - ( 29 Jan 2019 05:30 )   PT: 25.3 SEC;   INR: 2.17          PTT - ( 29 Jan 2019 05:30 )  PTT:99.1 SEC

## 2019-01-29 NOTE — PROGRESS NOTE ADULT - ASSESSMENT
79F w/ aortic stenosis s/p aortic valve replacement, CAD s/p CABG x3, pacemaker, CKD w/ AVF LUE (unused, not on HD), OA, gout, presents with RUE swelling concerning for acute DVT. Duplex RUE demonstrates RUE DVT of subclavian, axillary, brachial, and radial veins. Vascular surgery is consulted for management of acute RUE DVT.     -continue anticoag rx and arm elevation   will follow

## 2019-02-05 ENCOUNTER — OUTPATIENT (OUTPATIENT)
Dept: OUTPATIENT SERVICES | Facility: HOSPITAL | Age: 79
LOS: 1 days | Discharge: HOME | End: 2019-02-05

## 2019-02-05 DIAGNOSIS — D46.1 REFRACTORY ANEMIA WITH RING SIDEROBLASTS: ICD-10-CM

## 2019-02-05 DIAGNOSIS — Z95.0 PRESENCE OF CARDIAC PACEMAKER: Chronic | ICD-10-CM

## 2019-02-05 DIAGNOSIS — Z90.49 ACQUIRED ABSENCE OF OTHER SPECIFIED PARTS OF DIGESTIVE TRACT: Chronic | ICD-10-CM

## 2019-02-05 DIAGNOSIS — D63.1 ANEMIA IN CHRONIC KIDNEY DISEASE: ICD-10-CM

## 2019-02-05 DIAGNOSIS — I77.0 ARTERIOVENOUS FISTULA, ACQUIRED: Chronic | ICD-10-CM

## 2019-02-05 DIAGNOSIS — Z95.2 PRESENCE OF PROSTHETIC HEART VALVE: Chronic | ICD-10-CM

## 2019-02-05 DIAGNOSIS — N18.4 CHRONIC KIDNEY DISEASE, STAGE 4 (SEVERE): ICD-10-CM

## 2019-02-15 ENCOUNTER — APPOINTMENT (OUTPATIENT)
Dept: VASCULAR SURGERY | Facility: CLINIC | Age: 79
End: 2019-02-15
Payer: MEDICARE

## 2019-02-15 ENCOUNTER — APPOINTMENT (OUTPATIENT)
Dept: HEMATOLOGY ONCOLOGY | Facility: CLINIC | Age: 79
End: 2019-02-15
Payer: MEDICARE

## 2019-02-15 ENCOUNTER — RESULT REVIEW (OUTPATIENT)
Age: 79
End: 2019-02-15

## 2019-02-15 ENCOUNTER — APPOINTMENT (OUTPATIENT)
Dept: INFUSION THERAPY | Facility: HOSPITAL | Age: 79
End: 2019-02-15

## 2019-02-15 VITALS
BODY MASS INDEX: 29.3 KG/M2 | WEIGHT: 150 LBS | RESPIRATION RATE: 16 BRPM | OXYGEN SATURATION: 96 % | HEART RATE: 90 BPM | TEMPERATURE: 98.4 F

## 2019-02-15 VITALS
DIASTOLIC BLOOD PRESSURE: 94 MMHG | BODY MASS INDEX: 28.32 KG/M2 | HEART RATE: 98 BPM | TEMPERATURE: 97.9 F | HEIGHT: 61 IN | SYSTOLIC BLOOD PRESSURE: 169 MMHG | WEIGHT: 150 LBS

## 2019-02-15 LAB
HCT VFR BLD CALC: 30 % — LOW (ref 34.5–45)
HGB BLD-MCNC: 9 G/DL — LOW (ref 11.5–15.5)
MCHC RBC-ENTMCNC: 23.4 PG — LOW (ref 27–34)
MCHC RBC-ENTMCNC: 30 G/DL — LOW (ref 32–36)
MCV RBC AUTO: 77.9 FL — LOW (ref 80–100)
PLATELET # BLD AUTO: 170 K/UL — SIGNIFICANT CHANGE UP (ref 150–400)
RBC # BLD: 3.85 M/UL — SIGNIFICANT CHANGE UP (ref 3.8–5.2)
RBC # FLD: 14.7 % — HIGH (ref 10.3–14.5)
WBC # BLD: 3.5 K/UL — LOW (ref 3.8–10.5)
WBC # FLD AUTO: 3.5 K/UL — LOW (ref 3.8–10.5)

## 2019-02-15 PROCEDURE — 93970 EXTREMITY STUDY: CPT | Mod: RT

## 2019-02-15 PROCEDURE — 99214 OFFICE O/P EST MOD 30 MIN: CPT

## 2019-02-15 PROCEDURE — 99213 OFFICE O/P EST LOW 20 MIN: CPT

## 2019-02-15 RX ORDER — GUAIFENESIN 1200 MG/1
TABLET, EXTENDED RELEASE ORAL
Refills: 0 | Status: COMPLETED | COMMUNITY

## 2019-02-15 NOTE — PHYSICAL EXAM
[Normal] : affect appropriate [Ambulatory and capable of all self care but unable to carry out any work activities] : Status 2- Ambulatory and capable of all self care but unable to carry out any work activities. Up and about more than 50% of waking hours [de-identified] : ambulating with cane [de-identified] : RUE swelling noted [de-identified] : limited R knee ROM

## 2019-02-15 NOTE — DATA REVIEWED
[FreeTextEntry1] : 2/15/2019 RUE venous Duplex sig for acute w progression to  subacute dvt in subclav  v and axillary v w  minimal flow

## 2019-02-15 NOTE — ASSESSMENT
[FreeTextEntry1] : Impression  rue s/o venous TOS and rue acute progressing to subacute dvt \par Given the chronicity of the rue dvt at the time of presentation pt was not a candidate for any vasc or endovasc intervention\par \par \par Plan Med Conservative management arm elevation, hand exercises prn\par continue anticoag rx for an additional 3mo\par ov w rue venous duplex s/o dvt  3mo may 2019 \par \par \par Letter faxed to Dr PHIL Damico MD \par  [Arterial/Venous Disease] : arterial/venous disease [Medication Management] : medication management

## 2019-02-15 NOTE — PHYSICAL EXAM
[JVD] : no jugular venous distention  [Normal Breath Sounds] : Normal breath sounds [Right Carotid Bruit] : no bruit heard over the right carotid [Left Carotid Bruit] : no bruit heard over the left carotid [2+] : left 2+ [Ankle Swelling (On Exam)] : not present [Varicose Veins Of Lower Extremities] : not present [] : not present [Abdomen Masses] : No abdominal masses [No HSM] : no hepatosplenomegaly [Tender] : was nontender [Stool Sample Taken] : No stool obtained  on rectal exam [No Rash or Lesion] : No rash or lesion [Alert] : alert [Oriented to Person] : oriented to person [Oriented to Place] : oriented to place [Oriented to Time] : oriented to time [Calm] : calm [de-identified] : nad [de-identified] : wnl [FreeTextEntry1] : rue mild edema from fingertips to shoulder girdle\par no pain no cellulitis\par good rom  [de-identified] : wnl [de-identified] : wnl [de-identified] : Wiliam Cranial nerves 2-12 wiliam grossly intact [de-identified] : cooperative

## 2019-02-15 NOTE — ASSESSMENT
[Supportive] : Goals of care discussed with patient: Supportive [Palliative Care Plan] : not applicable at this time [FreeTextEntry1] : Mrs. 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 Due to the recent finding of blood clots in her RUE, her Aranesp injections were discontinued. She was made aware that her treatments moving forward will be blood transfusions. However, her CBC today indicated she does not require any treatment at this time. She will continue to have her PT/INR checked every 2 weeks via home blood draws while on warfarin. Return to the office in 4 weeks. Seen in conjunction with Dr. Anjum Fox.

## 2019-02-15 NOTE — HISTORY OF PRESENT ILLNESS
[Disease:__________________________] : Disease: [unfilled] [Treatment Protocol] : Treatment Protocol [Cardiovascular] : Cardiovascular [Constitutional] : Constitutional [ENT] : ENT [Dermatologic] : Dermatologic [Endocrine] : Endocrine [Gastrointestinal] : Gastrointestinal [Genitourinary] : Genitourinary [Gynecologic] : Gynecologic [Infectious] : Infectious [Musculoskeletal] : Musculoskeletal [Neurologic] : Neurologic [Pain] : Pain [Pulmonary] : Pulmonary [Hematologic] : Hematologic [de-identified] : The patient has been followed by me for endstage renal disease for nearly 8 years. She does not require dialysis. She had a left sided AV arm fistula placed in 2013 by her physician in [place]. She is followed in the renal clinic by Dr Dixon.\par Past medical history includes DM2, CAD s/p CABG, s/p PCI (3/2017) and Aortic Stenosis s/p TAVR/ PPM (3/2017). She has significant hypertension and she has required over three medications for control. There is no prior history  of cerebrovascular accident or bleeding. She has had mildLy elevated ferritin and iron levels.\par The use of colony stimulating agents has been safe when given when hemoglobins are less than 10 g/dL which has led to a frequency of administration of one ejection every 4 to 6 weeks for approximately 6 years ([id number]) [FreeTextEntry1] : darbepoetin 200 mg q 6 weeks; hydralazine 100 mg in AM; 50 at noon  and 100 mg PO in evening [de-identified] : Patient was hospitalized from 1/22/2019 - 1/29/2019 for right upper extremity DVT. Prior to admission, she noted RUE swelling and pain/discomfort since receiving her last dose of Aranesp on 1/18/2019. An ultrasound confirmed the following: extensive DVT of the right subclavian vein. She was started Heparin GTT bridged to Coumadin. She also received 1 unit of blood during her admission.

## 2019-02-15 NOTE — REVIEW OF SYSTEMS
[Chills] : chills [Fatigue] : fatigue [SOB on Exertion] : shortness of breath during exertion [Joint Pain] : joint pain [Difficulty Walking] : difficulty walking [Negative] : Allergic/Immunologic [Fever] : no fever [Night Sweats] : no night sweats [Recent Change In Weight] : ~T no recent weight change [Eye Pain] : no eye pain [Red Eyes] : eyes not red [Dry Eyes] : no dryness of the eyes [Vision Problems] : no vision problems [Dysphagia] : no dysphagia [Loss of Hearing] : no loss of hearing [Nosebleeds] : no nosebleeds [Hoarseness] : no hoarseness [Odynophagia] : no odynophagia [Mucosal Pain] : no mucosal pain [Chest Pain] : no chest pain [Palpitations] : no palpitations [Leg Claudication] : no intermittent leg claudication [Lower Ext Edema] : no lower extremity edema [Shortness Of Breath] : no shortness of breath [Wheezing] : no wheezing [Cough] : no cough [Abdominal Pain] : no abdominal pain [Dysuria] : no dysuria [Incontinence] : no incontinence [Vaginal Discharge] : no vaginal discharge [Dysmenorrhea/Abn Vaginal Bleeding] : no dysmenorrhea/abnormal vaginal bleeding [Joint Stiffness] : no joint stiffness [Muscle Pain] : no muscle pain [Skin Rash] : no skin rash [Skin Wound] : no skin wound [Confused] : no confusion [Dizziness] : no dizziness [Fainting] : no fainting [Suicidal] : not suicidal [Insomnia] : no insomnia [Anxiety] : no anxiety [Depression] : no depression [Proptosis] : no proptosis [Hot Flashes] : no hot flashes [Muscle Weakness] : no muscle weakness [Deepening Of The Voice] : no deepening of the voice [Easy Bleeding] : no tendency for easy bleeding [Easy Bruising] : no tendency for easy bruising [Swollen Glands] : no swollen glands

## 2019-02-15 NOTE — HISTORY OF PRESENT ILLNESS
[FreeTextEntry1] : pt was seen in hop  consultation at [facility] w rue  edema and dvt  s/o venous TOS thrombosis\par pt was rx med/conservatively w anticoag rx  sx stabilized and improved\par pt was d/c on aticoag rx\par pt states that she still has mild to mod edema and intermittent discomfort \par since hosp d/c

## 2019-02-25 ENCOUNTER — APPOINTMENT (OUTPATIENT)
Dept: NEPHROLOGY | Facility: CLINIC | Age: 79
End: 2019-02-25
Payer: MEDICARE

## 2019-02-25 VITALS — WEIGHT: 150 LBS | BODY MASS INDEX: 28.32 KG/M2 | HEIGHT: 61 IN | OXYGEN SATURATION: 99 %

## 2019-02-25 LAB
BASOPHILS # BLD AUTO: 0.02 K/UL
BASOPHILS NFR BLD AUTO: 0.6 %
EOSINOPHIL # BLD AUTO: 0.09 K/UL
EOSINOPHIL NFR BLD AUTO: 2.7 %
HCT VFR BLD CALC: 28.8 %
HGB BLD-MCNC: 8.3 G/DL
IMM GRANULOCYTES NFR BLD AUTO: 0.3 %
LYMPHOCYTES # BLD AUTO: 0.95 K/UL
LYMPHOCYTES NFR BLD AUTO: 28.6 %
MAN DIFF?: NORMAL
MCHC RBC-ENTMCNC: 23.1 PG
MCHC RBC-ENTMCNC: 28.8 GM/DL
MCV RBC AUTO: 80.2 FL
MONOCYTES # BLD AUTO: 0.35 K/UL
MONOCYTES NFR BLD AUTO: 10.5 %
NEUTROPHILS # BLD AUTO: 1.9 K/UL
NEUTROPHILS NFR BLD AUTO: 57.3 %
PLATELET # BLD AUTO: 163 K/UL
RBC # BLD: 3.59 M/UL
RBC # FLD: 15.3 %
WBC # FLD AUTO: 3.32 K/UL

## 2019-02-25 PROCEDURE — 99214 OFFICE O/P EST MOD 30 MIN: CPT

## 2019-02-25 RX ORDER — OMEGA-3-ACID ETHYL ESTERS CAPSULES 1 G/1
1 CAPSULE, LIQUID FILLED ORAL
Qty: 360 | Refills: 0 | Status: DISCONTINUED | COMMUNITY
Start: 2018-06-26 | End: 2019-02-25

## 2019-02-25 RX ORDER — HYDRALAZINE HYDROCHLORIDE 100 MG/1
100 TABLET ORAL
Qty: 270 | Refills: 0 | Status: ACTIVE | COMMUNITY
Start: 2016-04-26

## 2019-02-25 NOTE — REVIEW OF SYSTEMS
[Chest Pain] : no chest pain [Lower Ext Edema] : no lower extremity edema [Shortness Of Breath] : no shortness of breath [SOB on Exertion] : no shortness of breath during exertion [Joint Pain] : joint pain [Itching] : no itching [As Noted in HPI] : as noted in HPI [Sleep Disturbances] : no sleep disturbances [Negative] : Heme/Lymph [FreeTextEntry9] : Left knee pain [de-identified] : b/l knee pain

## 2019-02-25 NOTE — HISTORY OF PRESENT ILLNESS
[FreeTextEntry1] : Ms. Seymour presents for follow-up for CKD 5 - healthy transitions patient.\par Hx of TAVR, previous 3V CABG, PPM, AVF\par \par Since last visit had hospitalization for R upper arm DVT. She was started on heparin and dc on coumadin. She is off aranesp now. SHe has had a LUE fistula that is not being used as ckd has been stable. Her dc crt was 4.1mg/dl and BUN in 30s. She gets a nurse to come home to do INR draws. No n/v. No decrease in appetite, no termors. No edema worsening. no decreased sleep. No foamy urine. no hematuria, no dysuria. no confusion. No SOB, ONOFRE. No wt loss.\par

## 2019-02-25 NOTE — ASSESSMENT
[FreeTextEntry1] : Ms. Seymour is a 79 y old F presenting for follow-up for CKD 5.\par \par 1)  CKD 5: \par Not uremic. Feels well overall. \par Will continue to monitor.\par Has fistula ready to use( good thrill and bruit)\par \par 2)  Anemia of CKD\par -patient follows-up with hematology for aranesp injections 200mcg q6 weeks but given recent blood clot, not sure if will be continuing on that. Might have to consider epogen low dose more frequently. Checking iron sats and pt to see heme in 2-3 weeks. \par \par 3)  Essential hypertension\par -cont meds for now, no changes\par \par 4)  Renal osteodystrophy\par -cont calcitriol for now\par \par 5)  Access: Left avf- good thrill and bruit\par -patient will continue to follow-up with vascular surgery as recommended\par \par f/u 2-3 months

## 2019-02-26 LAB
ALBUMIN SERPL ELPH-MCNC: 3.5 G/DL
ANION GAP SERPL CALC-SCNC: 13 MMOL/L
APPEARANCE: CLEAR
BACTERIA: NEGATIVE
BILIRUBIN URINE: NEGATIVE
BLOOD URINE: NEGATIVE
BUN SERPL-MCNC: 49 MG/DL
CALCIUM SERPL-MCNC: 9.1 MG/DL
CALCIUM SERPL-MCNC: 9.1 MG/DL
CHLORIDE SERPL-SCNC: 107 MMOL/L
CO2 SERPL-SCNC: 22 MMOL/L
COLOR: NORMAL
CREAT SERPL-MCNC: 4.33 MG/DL
CREAT SPEC-SCNC: 40 MG/DL
CREAT/PROT UR: 11.9 RATIO
FERRITIN SERPL-MCNC: 220 NG/ML
GLUCOSE QUALITATIVE U: NORMAL
GLUCOSE SERPL-MCNC: 99 MG/DL
HYALINE CASTS: 0 /LPF
IRON SATN MFR SERPL: 37 %
IRON SERPL-MCNC: 78 UG/DL
KETONES URINE: NEGATIVE
LEUKOCYTE ESTERASE URINE: NEGATIVE
MAGNESIUM SERPL-MCNC: 2.2 MG/DL
MICROSCOPIC-UA: NORMAL
NITRITE URINE: NEGATIVE
PARATHYROID HORMONE INTACT: 453 PG/ML
PH URINE: 7
PHOSPHATE SERPL-MCNC: 4.4 MG/DL
POTASSIUM SERPL-SCNC: 4.9 MMOL/L
PROT UR-MCNC: 478 MG/DL
PROTEIN URINE: ABNORMAL
RED BLOOD CELLS URINE: 3 /HPF
SODIUM SERPL-SCNC: 141 MMOL/L
SPECIFIC GRAVITY URINE: 1.01
SQUAMOUS EPITHELIAL CELLS: 3 /HPF
TIBC SERPL-MCNC: 209 UG/DL
UIBC SERPL-MCNC: 131 UG/DL
UROBILINOGEN URINE: NORMAL
WHITE BLOOD CELLS URINE: 3 /HPF

## 2019-03-13 ENCOUNTER — OUTPATIENT (OUTPATIENT)
Dept: OUTPATIENT SERVICES | Facility: HOSPITAL | Age: 79
LOS: 1 days | Discharge: HOME | End: 2019-03-13

## 2019-03-13 DIAGNOSIS — D63.1 ANEMIA IN CHRONIC KIDNEY DISEASE: ICD-10-CM

## 2019-03-13 DIAGNOSIS — I77.0 ARTERIOVENOUS FISTULA, ACQUIRED: Chronic | ICD-10-CM

## 2019-03-13 DIAGNOSIS — Z95.0 PRESENCE OF CARDIAC PACEMAKER: Chronic | ICD-10-CM

## 2019-03-13 DIAGNOSIS — Z95.2 PRESENCE OF PROSTHETIC HEART VALVE: Chronic | ICD-10-CM

## 2019-03-13 DIAGNOSIS — D46.1 REFRACTORY ANEMIA WITH RING SIDEROBLASTS: ICD-10-CM

## 2019-03-13 DIAGNOSIS — Z90.49 ACQUIRED ABSENCE OF OTHER SPECIFIED PARTS OF DIGESTIVE TRACT: Chronic | ICD-10-CM

## 2019-03-13 DIAGNOSIS — N18.4 CHRONIC KIDNEY DISEASE, STAGE 4 (SEVERE): ICD-10-CM

## 2019-03-20 ENCOUNTER — RESULT REVIEW (OUTPATIENT)
Age: 79
End: 2019-03-20

## 2019-03-20 ENCOUNTER — OUTPATIENT (OUTPATIENT)
Dept: OUTPATIENT SERVICES | Facility: HOSPITAL | Age: 79
LOS: 1 days | End: 2019-03-20
Payer: MEDICARE

## 2019-03-20 ENCOUNTER — APPOINTMENT (OUTPATIENT)
Dept: HEMATOLOGY ONCOLOGY | Facility: CLINIC | Age: 79
End: 2019-03-20
Payer: MEDICARE

## 2019-03-20 VITALS
OXYGEN SATURATION: 96 % | SYSTOLIC BLOOD PRESSURE: 184 MMHG | RESPIRATION RATE: 16 BRPM | TEMPERATURE: 98.1 F | WEIGHT: 149.47 LBS | DIASTOLIC BLOOD PRESSURE: 78 MMHG | BODY MASS INDEX: 28.24 KG/M2 | HEART RATE: 62 BPM

## 2019-03-20 DIAGNOSIS — Z95.2 PRESENCE OF PROSTHETIC HEART VALVE: Chronic | ICD-10-CM

## 2019-03-20 DIAGNOSIS — Z95.0 PRESENCE OF CARDIAC PACEMAKER: Chronic | ICD-10-CM

## 2019-03-20 DIAGNOSIS — Z90.49 ACQUIRED ABSENCE OF OTHER SPECIFIED PARTS OF DIGESTIVE TRACT: Chronic | ICD-10-CM

## 2019-03-20 DIAGNOSIS — I77.0 ARTERIOVENOUS FISTULA, ACQUIRED: Chronic | ICD-10-CM

## 2019-03-20 DIAGNOSIS — N18.9 CHRONIC KIDNEY DISEASE, UNSPECIFIED: ICD-10-CM

## 2019-03-20 LAB
BASOPHILS # BLD AUTO: 0 K/UL — SIGNIFICANT CHANGE UP (ref 0–0.2)
BASOPHILS NFR BLD AUTO: 1 % — SIGNIFICANT CHANGE UP (ref 0–2)
BLD GP AB SCN SERPL QL: NEGATIVE — SIGNIFICANT CHANGE UP
EOSINOPHIL # BLD AUTO: 0.1 K/UL — SIGNIFICANT CHANGE UP (ref 0–0.5)
EOSINOPHIL NFR BLD AUTO: 1 % — SIGNIFICANT CHANGE UP (ref 0–6)
HCT VFR BLD CALC: 20.8 % — CRITICAL LOW (ref 34.5–45)
HGB BLD-MCNC: 6.5 G/DL — CRITICAL LOW (ref 11.5–15.5)
LYMPHOCYTES # BLD AUTO: 0.8 K/UL — LOW (ref 1–3.3)
LYMPHOCYTES # BLD AUTO: 32 % — SIGNIFICANT CHANGE UP (ref 13–44)
MCHC RBC-ENTMCNC: 23.4 PG — LOW (ref 27–34)
MCHC RBC-ENTMCNC: 31.2 G/DL — LOW (ref 32–36)
MCV RBC AUTO: 75.1 FL — LOW (ref 80–100)
MONOCYTES # BLD AUTO: 0.2 K/UL — SIGNIFICANT CHANGE UP (ref 0–0.9)
MONOCYTES NFR BLD AUTO: 6 % — SIGNIFICANT CHANGE UP (ref 2–14)
NEUTROPHILS # BLD AUTO: 1.6 K/UL — LOW (ref 1.8–7.4)
NEUTROPHILS NFR BLD AUTO: 60 % — SIGNIFICANT CHANGE UP (ref 43–77)
PLAT MORPH BLD: NORMAL — SIGNIFICANT CHANGE UP
PLATELET # BLD AUTO: 135 K/UL — LOW (ref 150–400)
RBC # BLD: 2.77 M/UL — LOW (ref 3.8–5.2)
RBC # FLD: 14.5 % — SIGNIFICANT CHANGE UP (ref 10.3–14.5)
RBC BLD AUTO: SIGNIFICANT CHANGE UP
RH IG SCN BLD-IMP: POSITIVE — SIGNIFICANT CHANGE UP
WBC # BLD: 2.8 K/UL — LOW (ref 3.8–10.5)
WBC # FLD AUTO: 2.8 K/UL — LOW (ref 3.8–10.5)

## 2019-03-20 PROCEDURE — 99214 OFFICE O/P EST MOD 30 MIN: CPT

## 2019-03-20 NOTE — HISTORY OF PRESENT ILLNESS
[Disease:__________________________] : Disease: [unfilled] [Cardiovascular] : Cardiovascular [Constitutional] : Constitutional [ENT] : ENT [Dermatologic] : Dermatologic [Endocrine] : Endocrine [Gastrointestinal] : Gastrointestinal [Genitourinary] : Genitourinary [Gynecologic] : Gynecologic [Infectious] : Infectious [Musculoskeletal] : Musculoskeletal [Neurologic] : Neurologic [Pain] : Pain [Pulmonary] : Pulmonary [Hematologic] : Hematologic [Date: ____________] : Patient's last distress assessment performed on [unfilled]. [FreeTextEntry1] : darbepoetin 200 mg q 6 weeks;Now held.; transfusion is advised  hydralazine 100 mg in AM; 50 at noon  and 100 mg PO in evening [de-identified] : The patient has been followed by me for endstage renal disease for nearly 8 years. She does not require dialysis. She had a left sided fistula placed three years ago by her physician in [place]. She is followed in the renal clinic by Dr Dixon.\par She has significant hypertension and she has required over three medication for control. There is no prior history  of cerebrovascular accident or bleeding. \par She has type two diabetes mellitus. The patient has contribution of both illness in the causation of her Stage 4 chronic renal disease. \par She has had mildLy elevated ferritin and iron levels.\par The use of colony stimulating agents has been safe when given when hemoglobins are less than 10 g/dL which has led to a frequency of administration of one ejection every 4 to 6 weeks.\par \par She will continue on the antihypertensive medication.She is on treatment for diabetes [de-identified] : She has not had falls or dizziness. No head aches. Her terazosin has been changed to two tablets at night

## 2019-03-20 NOTE — ASSESSMENT
[Supportive] : Goals of care discussed with patient: Supportive [Palliative Care Plan] : not applicable at this time [FreeTextEntry1] : 79 year old female with hypertension and chronic renal disease.\par She has developed a thrombosis in the left arm and is now Warfarin 3 mg and the second day 1.5 mg PO .\par Advise transfusion of one unit of packed red cells. Her last transfusion was February 2019 in the hospital.\par She has signed consent for transfusion of packed cells; transfusion for 03/223/2019\par Patient advised to remain on her antihypertension

## 2019-03-23 ENCOUNTER — APPOINTMENT (OUTPATIENT)
Dept: INFUSION THERAPY | Facility: HOSPITAL | Age: 79
End: 2019-03-23

## 2019-03-25 DIAGNOSIS — Z51.89 ENCOUNTER FOR OTHER SPECIFIED AFTERCARE: ICD-10-CM

## 2019-04-09 ENCOUNTER — RESULT REVIEW (OUTPATIENT)
Age: 79
End: 2019-04-09

## 2019-04-09 ENCOUNTER — APPOINTMENT (OUTPATIENT)
Dept: HEMATOLOGY ONCOLOGY | Facility: CLINIC | Age: 79
End: 2019-04-09
Payer: MEDICARE

## 2019-04-09 VITALS
DIASTOLIC BLOOD PRESSURE: 67 MMHG | SYSTOLIC BLOOD PRESSURE: 197 MMHG | HEART RATE: 84 BPM | WEIGHT: 147.71 LBS | OXYGEN SATURATION: 100 % | TEMPERATURE: 98.9 F | RESPIRATION RATE: 17 BRPM | BODY MASS INDEX: 27.91 KG/M2

## 2019-04-09 LAB
BLD GP AB SCN SERPL QL: NEGATIVE — SIGNIFICANT CHANGE UP
HCT VFR BLD CALC: 24.4 % — LOW (ref 34.5–45)
HGB BLD-MCNC: 7.9 G/DL — LOW (ref 11.5–15.5)
MCHC RBC-ENTMCNC: 25.2 PG — LOW (ref 27–34)
MCHC RBC-ENTMCNC: 32.4 G/DL — SIGNIFICANT CHANGE UP (ref 32–36)
MCV RBC AUTO: 77.7 FL — LOW (ref 80–100)
PLATELET # BLD AUTO: 130 K/UL — LOW (ref 150–400)
RBC # BLD: 3.14 M/UL — LOW (ref 3.8–5.2)
RBC # FLD: 15.9 % — HIGH (ref 10.3–14.5)
RH IG SCN BLD-IMP: POSITIVE — SIGNIFICANT CHANGE UP
WBC # BLD: 4 K/UL — SIGNIFICANT CHANGE UP (ref 3.8–10.5)
WBC # FLD AUTO: 4 K/UL — SIGNIFICANT CHANGE UP (ref 3.8–10.5)

## 2019-04-09 PROCEDURE — 99214 OFFICE O/P EST MOD 30 MIN: CPT

## 2019-04-09 NOTE — ASSESSMENT
[Supportive] : Goals of care discussed with patient: Supportive [Palliative Care Plan] : not applicable at this time [FreeTextEntry1] : The patient is encouraged to continue her antihypertension medication,. She will be evaluated by renal service for consideration of dialysis. \par She has blood type and crossmatch and she is agreed to have 1 unit of packed red cell transfusion this week. She will be seen in follow up in 3 weeks. \par Patient seen with MARGARET JAVED

## 2019-04-09 NOTE — PHYSICAL EXAM
[Restricted in physically strenuous activity but ambulatory and able to carry out work of a light or sedentary nature] : Status 1- Restricted in physically strenuous activity but ambulatory and able to carry out work of a light or sedentary nature, e.g., light house work, office work [Normal] : affect appropriate [de-identified] : 1/6 AMY radiation to carotids [de-identified] : left arm av fistula site [de-identified] : l

## 2019-04-09 NOTE — HISTORY OF PRESENT ILLNESS
[Disease:__________________________] : Disease: [unfilled] [Cardiovascular] : Cardiovascular [Constitutional] : Constitutional [ENT] : ENT [Dermatologic] : Dermatologic [Endocrine] : Endocrine [Gastrointestinal] : Gastrointestinal [Genitourinary] : Genitourinary [Gynecologic] : Gynecologic [Infectious] : Infectious [Musculoskeletal] : Musculoskeletal [Pain] : Pain [Neurologic] : Neurologic [Pulmonary] : Pulmonary [Hematologic] : Hematologic [Date: ____________] : Patient's last distress assessment performed on [unfilled]. [0 - No Distress] : Distress Level: 0 [de-identified] : The patient has been followed by me for endstage renal disease for 8 years. She does not require dialysis. She had a left sided fistula placed three years ago by her physician in [place]. She is followed in the renal clinic by Dr Dixon.\par She has significant hypertension and she has required over three medication for control. There is no prior history  of cerebrovascular accident or bleeding. \par She has type two diabetes mellitus. The patient has contribution of both illness in the causation of her Stage 4 chronic renal disease. \par She has had mildLy elevated ferritin and iron levels.\par The use of colony stimulating agents has been safe when given when hemoglobins are less than 10 g/dL which has led to a frequency of administration of one ejection every 4 to 6 weeks.\par \par She will continue on the antihypertensive medication.She is on treatment for diabetes [FreeTextEntry1] : darbepoetin 200 mg q 6 weeks;Now held.; transfusion is advised since December 2018.  hydralazine 100 mg in AM; 50 at noon  and 100 mg PO in evening [de-identified] : The patient received two unit of packed red cells in March on Saturday March 23. NO bleeding noted. She is not on dialysis; She will see Dr Lomax on April 17. No weight gain;\par She remains hypertensive and she is on metolazone 5 mg PO BID one week ago because of swollen ankles.

## 2019-04-10 LAB
ALBUMIN SERPL ELPH-MCNC: 3.4 G/DL
ALP BLD-CCNC: 53 U/L
ALT SERPL-CCNC: 8 U/L
ANION GAP SERPL CALC-SCNC: 16 MMOL/L
AST SERPL-CCNC: 12 U/L
BILIRUB SERPL-MCNC: 0.2 MG/DL
BUN SERPL-MCNC: 72 MG/DL
CALCIUM SERPL-MCNC: 8.7 MG/DL
CHLORIDE SERPL-SCNC: 106 MMOL/L
CO2 SERPL-SCNC: 22 MMOL/L
CREAT SERPL-MCNC: 5.05 MG/DL
GLUCOSE SERPL-MCNC: 93 MG/DL
POTASSIUM SERPL-SCNC: 4.4 MMOL/L
PROT SERPL-MCNC: 5.7 G/DL
SODIUM SERPL-SCNC: 144 MMOL/L

## 2019-04-10 PROCEDURE — 86923 COMPATIBILITY TEST ELECTRIC: CPT

## 2019-04-10 PROCEDURE — 86901 BLOOD TYPING SEROLOGIC RH(D): CPT

## 2019-04-10 PROCEDURE — 86900 BLOOD TYPING SEROLOGIC ABO: CPT

## 2019-04-10 PROCEDURE — 86850 RBC ANTIBODY SCREEN: CPT

## 2019-04-11 ENCOUNTER — APPOINTMENT (OUTPATIENT)
Dept: INFUSION THERAPY | Facility: HOSPITAL | Age: 79
End: 2019-04-11

## 2019-04-17 ENCOUNTER — APPOINTMENT (OUTPATIENT)
Dept: NEPHROLOGY | Facility: CLINIC | Age: 79
End: 2019-04-17
Payer: MEDICARE

## 2019-04-17 VITALS
OXYGEN SATURATION: 98 % | SYSTOLIC BLOOD PRESSURE: 190 MMHG | HEIGHT: 61 IN | WEIGHT: 142.2 LBS | BODY MASS INDEX: 26.85 KG/M2 | HEART RATE: 63 BPM | DIASTOLIC BLOOD PRESSURE: 80 MMHG

## 2019-04-17 PROCEDURE — 99214 OFFICE O/P EST MOD 30 MIN: CPT

## 2019-04-17 NOTE — HISTORY OF PRESENT ILLNESS
[FreeTextEntry1] : Ms. Seymour presents for follow-up for CKD 5 - healthy transitions patient.\par Hx of TAVR, previous 3V CABG, PPM, AVF\par \par Patient reports feeling well, denies any change in taste or appetite.  She is sleeping well with good energy and positive mood.  She reports having fistula placed > 7 years ago.  Denies any change in cognition, tremor, change in urinary tendencies. She got her darbo weekly. Since last visit had 2 RBC tranfusions as hgb was in 6 and 7 range. She also developed edema of LE and was given metalozone 5mg twice a week. Crt now up to 5 range and bun in 70s. She feels tired, running nose and overall weak but eating well and sleeping ok. No fevers or chills.\par No n/v. No decrease in appetite, no termors. No foamy urine. no hematuria, no dysuria. no confusion. Her wt was 150lbs when she had edema and now 142lbs and edema gone.  \par \par

## 2019-04-17 NOTE — ASSESSMENT
[FreeTextEntry1] : Ms. Seymour is a 79 y old F presenting for follow-up for CKD 5.\par \par 1)  CKD 5: \par Not uremic. Feels well overall. \par Will continue to monitor.\par Has fistula ready to use( good thrill and bruit)\par For edema is gone now, wt is 142lbs, can hold metalozone as crt and BUN rising. \par \par 2)  Anemia of CKD\par -patient follows-up with hematology for aranesp injections 200mcg q6 weeks.\par But got 2 units of PRBCs last 2 weeks, will rpt CBC today\par \par \par 3)  Essential hypertension\par -cont meds for now and hold metalazone and use with wt control. So if wt >145lbs, can take metalazone for that week and keep wt in 142-145lbs range\par \par 4)  Renal osteodystrophy\par -cont calcitriol for now\par \par 5)  Access: Left avf- good thrill and bruit\par -patient will continue to follow-up with vascular surgery as recommended\par \par f/u 2 months

## 2019-04-17 NOTE — REVIEW OF SYSTEMS
[Chest Pain] : no chest pain [Lower Ext Edema] : no lower extremity edema [Shortness Of Breath] : no shortness of breath [SOB on Exertion] : no shortness of breath during exertion [Joint Pain] : joint pain [As Noted in HPI] : as noted in HPI [Itching] : no itching [Sleep Disturbances] : no sleep disturbances [Negative] : Heme/Lymph [FreeTextEntry9] : Left knee pain [de-identified] : b/l knee pain

## 2019-04-17 NOTE — PHYSICAL EXAM
[General Appearance - Alert] : alert [General Appearance - Well Nourished] : well nourished [General Appearance - In No Acute Distress] : in no acute distress [General Appearance - Well Developed] : well developed [Extraocular Movements] : extraocular movements were intact [Sclera] : the sclera and conjunctiva were normal [Oropharynx] : the oropharynx was normal [Neck Cervical Mass (___cm)] : no neck mass was observed [Jugular Venous Distention Increased] : there was no jugular-venous distention [Auscultation Breath Sounds / Voice Sounds] : lungs were clear to auscultation bilaterally [Heart Sounds Gallop] : no gallops [Heart Rate And Rhythm] : heart rate was normal and rhythm regular [FreeTextEntry1] : +2 systolic murmur [Edema] : there was no peripheral edema [Heart Sounds Pericardial Friction Rub] : no pericardial rub [Abdomen Soft] : soft [Abdomen Tenderness] : non-tender [No CVA Tenderness] : no ~M costovertebral angle tenderness [___ (cm) Fistula] : [unfilled] (cm) fistula [No Spinal Tenderness] : no spinal tenderness [Thrill] : a thrill was present [Bruit] : a bruit was present [] : no rash [Skin Lesions] : no skin lesions [Skin Turgor] : normal skin turgor [Affect] : the affect was normal [Impaired Insight] : insight and judgment were intact [Oriented To Time, Place, And Person] : oriented to person, place, and time [Mood] : the mood was normal

## 2019-04-18 LAB
ALBUMIN SERPL ELPH-MCNC: 3.5 G/DL
ANION GAP SERPL CALC-SCNC: 12 MMOL/L
BASOPHILS # BLD AUTO: 0.03 K/UL
BASOPHILS NFR BLD AUTO: 0.7 %
BUN SERPL-MCNC: 78 MG/DL
CALCIUM SERPL-MCNC: 9.2 MG/DL
CHLORIDE SERPL-SCNC: 107 MMOL/L
CO2 SERPL-SCNC: 22 MMOL/L
CREAT SERPL-MCNC: 4.68 MG/DL
EOSINOPHIL # BLD AUTO: 0.09 K/UL
EOSINOPHIL NFR BLD AUTO: 2.1 %
FERRITIN SERPL-MCNC: 630 NG/ML
GLUCOSE SERPL-MCNC: 122 MG/DL
HCT VFR BLD CALC: 28.7 %
HGB BLD-MCNC: 8.5 G/DL
IMM GRANULOCYTES NFR BLD AUTO: 0.2 %
IRON SATN MFR SERPL: 43 %
IRON SERPL-MCNC: 94 UG/DL
LYMPHOCYTES # BLD AUTO: 0.95 K/UL
LYMPHOCYTES NFR BLD AUTO: 22.4 %
MAN DIFF?: NORMAL
MCHC RBC-ENTMCNC: 24.6 PG
MCHC RBC-ENTMCNC: 29.6 GM/DL
MCV RBC AUTO: 82.9 FL
MONOCYTES # BLD AUTO: 0.31 K/UL
MONOCYTES NFR BLD AUTO: 7.3 %
NEUTROPHILS # BLD AUTO: 2.86 K/UL
NEUTROPHILS NFR BLD AUTO: 67.3 %
PHOSPHATE SERPL-MCNC: 4.8 MG/DL
PLATELET # BLD AUTO: 155 K/UL
POTASSIUM SERPL-SCNC: 4.7 MMOL/L
RBC # BLD: 3.46 M/UL
RBC # FLD: 17 %
SODIUM SERPL-SCNC: 141 MMOL/L
TIBC SERPL-MCNC: 219 UG/DL
UIBC SERPL-MCNC: 125 UG/DL
WBC # FLD AUTO: 4.25 K/UL

## 2019-04-29 ENCOUNTER — OUTPATIENT (OUTPATIENT)
Dept: OUTPATIENT SERVICES | Facility: HOSPITAL | Age: 79
LOS: 1 days | Discharge: HOME | End: 2019-04-29

## 2019-04-29 DIAGNOSIS — Z95.0 PRESENCE OF CARDIAC PACEMAKER: Chronic | ICD-10-CM

## 2019-04-29 DIAGNOSIS — I77.0 ARTERIOVENOUS FISTULA, ACQUIRED: Chronic | ICD-10-CM

## 2019-04-29 DIAGNOSIS — Z95.2 PRESENCE OF PROSTHETIC HEART VALVE: Chronic | ICD-10-CM

## 2019-04-29 DIAGNOSIS — D46.1 REFRACTORY ANEMIA WITH RING SIDEROBLASTS: ICD-10-CM

## 2019-04-29 DIAGNOSIS — Z90.49 ACQUIRED ABSENCE OF OTHER SPECIFIED PARTS OF DIGESTIVE TRACT: Chronic | ICD-10-CM

## 2019-04-29 DIAGNOSIS — D63.1 ANEMIA IN CHRONIC KIDNEY DISEASE: ICD-10-CM

## 2019-04-29 DIAGNOSIS — N18.4 CHRONIC KIDNEY DISEASE, STAGE 4 (SEVERE): ICD-10-CM

## 2019-05-01 ENCOUNTER — RESULT REVIEW (OUTPATIENT)
Age: 79
End: 2019-05-01

## 2019-05-01 ENCOUNTER — OUTPATIENT (OUTPATIENT)
Dept: OUTPATIENT SERVICES | Facility: HOSPITAL | Age: 79
LOS: 1 days | End: 2019-05-01
Payer: MEDICARE

## 2019-05-01 ENCOUNTER — APPOINTMENT (OUTPATIENT)
Dept: HEMATOLOGY ONCOLOGY | Facility: CLINIC | Age: 79
End: 2019-05-01
Payer: MEDICARE

## 2019-05-01 VITALS
BODY MASS INDEX: 27.02 KG/M2 | SYSTOLIC BLOOD PRESSURE: 167 MMHG | HEART RATE: 87 BPM | OXYGEN SATURATION: 98 % | DIASTOLIC BLOOD PRESSURE: 69 MMHG | WEIGHT: 142.99 LBS | RESPIRATION RATE: 16 BRPM | TEMPERATURE: 98.9 F

## 2019-05-01 DIAGNOSIS — I77.0 ARTERIOVENOUS FISTULA, ACQUIRED: Chronic | ICD-10-CM

## 2019-05-01 DIAGNOSIS — Z90.49 ACQUIRED ABSENCE OF OTHER SPECIFIED PARTS OF DIGESTIVE TRACT: Chronic | ICD-10-CM

## 2019-05-01 DIAGNOSIS — Z95.2 PRESENCE OF PROSTHETIC HEART VALVE: Chronic | ICD-10-CM

## 2019-05-01 DIAGNOSIS — N18.9 CHRONIC KIDNEY DISEASE, UNSPECIFIED: ICD-10-CM

## 2019-05-01 DIAGNOSIS — Z95.0 PRESENCE OF CARDIAC PACEMAKER: Chronic | ICD-10-CM

## 2019-05-01 LAB
BLD GP AB SCN SERPL QL: NEGATIVE — SIGNIFICANT CHANGE UP
FERRITIN SERPL-MCNC: 697 NG/ML
HCT VFR BLD CALC: 21.7 % — LOW (ref 34.5–45)
HGB BLD-MCNC: 7.4 G/DL — LOW (ref 11.5–15.5)
IRON SATN MFR SERPL: 36 %
IRON SERPL-MCNC: 71 UG/DL
MCHC RBC-ENTMCNC: 26.7 PG — LOW (ref 27–34)
MCHC RBC-ENTMCNC: 34.1 G/DL — SIGNIFICANT CHANGE UP (ref 32–36)
MCV RBC AUTO: 78.4 FL — LOW (ref 80–100)
PLATELET # BLD AUTO: 132 K/UL — LOW (ref 150–400)
RBC # BLD: 2.77 M/UL — LOW (ref 3.8–5.2)
RBC # FLD: 16.3 % — HIGH (ref 10.3–14.5)
RH IG SCN BLD-IMP: POSITIVE — SIGNIFICANT CHANGE UP
TIBC SERPL-MCNC: 195 UG/DL
UIBC SERPL-MCNC: 124 UG/DL
WBC # BLD: 4.5 K/UL — SIGNIFICANT CHANGE UP (ref 3.8–10.5)
WBC # FLD AUTO: 4.5 K/UL — SIGNIFICANT CHANGE UP (ref 3.8–10.5)

## 2019-05-01 PROCEDURE — 99214 OFFICE O/P EST MOD 30 MIN: CPT

## 2019-05-01 NOTE — HISTORY OF PRESENT ILLNESS
[Disease:__________________________] : Disease: [unfilled] [Cardiovascular] : Cardiovascular [Constitutional] : Constitutional [ENT] : ENT [Dermatologic] : Dermatologic [Endocrine] : Endocrine [Gastrointestinal] : Gastrointestinal [Genitourinary] : Genitourinary [Gynecologic] : Gynecologic [Infectious] : Infectious [Musculoskeletal] : Musculoskeletal [Neurologic] : Neurologic [Pain] : Pain [Pulmonary] : Pulmonary [Hematologic] : Hematologic [Date: ____________] : Patient's last distress assessment performed on [unfilled]. [0 - No Distress] : Distress Level: 0 [de-identified] : The patient has been followed by me for endstage renal disease for 8 years. She does not require dialysis. She had a left sided fistula placed three years ago by her physician in [place]. She is followed in the renal clinic by Dr Dixon.\par She has significant hypertension and she has required over three medication for control. There is no prior history  of cerebrovascular accident or bleeding. \par She has type two diabetes mellitus. The patient has contribution of both illness in the causation of her Stage 4 chronic renal disease. \par She has had mildLy elevated ferritin and iron levels.\par The use of colony stimulating agents has been safe when given when hemoglobins are less than 10 g/dL which has led to a frequency of administration of one ejection every 4 to 6 weeks.\par \par She will continue on the antihypertensive medication.She is on treatment for diabetes [FreeTextEntry1] : darbepoetin 200 mg q 6 weeks;Now held.; transfusion is advised since December 2018.  hydralazine 100 mg in AM; 50 at noon  and 100 mg PO in evening [de-identified] : The patient remains OK. She went [facility] ([facility]) and then a motor trip to [facility] for the Easter week with her family.\par No nausea, no vomiting. She had hare cardiology medication changed due to the rise in serum creatinine

## 2019-05-01 NOTE — REVIEW OF SYSTEMS
[Fatigue] : fatigue [SOB on Exertion] : shortness of breath during exertion [Lower Ext Edema] : lower extremity edema [Vision Problems] : vision problems [Difficulty Walking] : difficulty walking [Joint Pain] : joint pain [Joint Stiffness] : joint stiffness [Negative] : Allergic/Immunologic [Fever] : no fever [Chills] : no chills [Night Sweats] : no night sweats [Recent Change In Weight] : ~T no recent weight change [de-identified] : used a wheel chair in office today

## 2019-05-01 NOTE — ASSESSMENT
[Palliative Care Plan] : not applicable at this time [FreeTextEntry1] : The patient has had an adjustment of her blood pressure control medication.\par I have personally scheduled a transfusion for her on Saturday May 3 2019. Currently to avoid epoetin alpha and to have follow up with the vascular surgery.\par RTC 5 week.

## 2019-05-01 NOTE — PHYSICAL EXAM
[Ambulatory and capable of all self care but unable to carry out any work activities] : Status 2- Ambulatory and capable of all self care but unable to carry out any work activities. Up and about more than 50% of waking hours [Normal] : grossly intact [de-identified] : 1/6 AMY radiation to carotids [de-identified] : left arm av fistula site

## 2019-05-03 PROCEDURE — 86900 BLOOD TYPING SEROLOGIC ABO: CPT

## 2019-05-03 PROCEDURE — 86850 RBC ANTIBODY SCREEN: CPT

## 2019-05-03 PROCEDURE — 86901 BLOOD TYPING SEROLOGIC RH(D): CPT

## 2019-05-03 PROCEDURE — 86923 COMPATIBILITY TEST ELECTRIC: CPT

## 2019-05-04 ENCOUNTER — APPOINTMENT (OUTPATIENT)
Dept: INFUSION THERAPY | Facility: HOSPITAL | Age: 79
End: 2019-05-04

## 2019-05-06 DIAGNOSIS — Z51.89 ENCOUNTER FOR OTHER SPECIFIED AFTERCARE: ICD-10-CM

## 2019-05-07 DIAGNOSIS — E21.3 HYPERPARATHYROIDISM, UNSPECIFIED: ICD-10-CM

## 2019-05-17 ENCOUNTER — APPOINTMENT (OUTPATIENT)
Dept: VASCULAR SURGERY | Facility: CLINIC | Age: 79
End: 2019-05-17

## 2019-06-11 ENCOUNTER — OUTPATIENT (OUTPATIENT)
Dept: OUTPATIENT SERVICES | Facility: HOSPITAL | Age: 79
LOS: 1 days | Discharge: HOME | End: 2019-06-11

## 2019-06-11 DIAGNOSIS — Z90.49 ACQUIRED ABSENCE OF OTHER SPECIFIED PARTS OF DIGESTIVE TRACT: Chronic | ICD-10-CM

## 2019-06-11 DIAGNOSIS — D46.1 REFRACTORY ANEMIA WITH RING SIDEROBLASTS: ICD-10-CM

## 2019-06-11 DIAGNOSIS — Z95.2 PRESENCE OF PROSTHETIC HEART VALVE: Chronic | ICD-10-CM

## 2019-06-11 DIAGNOSIS — I77.0 ARTERIOVENOUS FISTULA, ACQUIRED: Chronic | ICD-10-CM

## 2019-06-11 DIAGNOSIS — N18.4 CHRONIC KIDNEY DISEASE, STAGE 4 (SEVERE): ICD-10-CM

## 2019-06-11 DIAGNOSIS — Z95.0 PRESENCE OF CARDIAC PACEMAKER: Chronic | ICD-10-CM

## 2019-06-11 DIAGNOSIS — D63.1 ANEMIA IN CHRONIC KIDNEY DISEASE: ICD-10-CM

## 2019-06-12 ENCOUNTER — APPOINTMENT (OUTPATIENT)
Dept: VASCULAR SURGERY | Facility: CLINIC | Age: 79
End: 2019-06-12
Payer: MEDICARE

## 2019-06-12 VITALS
HEART RATE: 78 BPM | SYSTOLIC BLOOD PRESSURE: 175 MMHG | DIASTOLIC BLOOD PRESSURE: 76 MMHG | HEIGHT: 61 IN | WEIGHT: 141 LBS | TEMPERATURE: 98.1 F | BODY MASS INDEX: 26.62 KG/M2

## 2019-06-12 DIAGNOSIS — I82.B11 ACUTE EMBOLISM AND THROMBOSIS OF RIGHT SUBCLAVIAN VEIN: ICD-10-CM

## 2019-06-12 PROCEDURE — 93971 EXTREMITY STUDY: CPT

## 2019-06-12 PROCEDURE — 99214 OFFICE O/P EST MOD 30 MIN: CPT

## 2019-06-12 NOTE — HISTORY OF PRESENT ILLNESS
[FreeTextEntry1] : pt was seen in hop  consultation at [facility] w rue  edema and dvt  s/o venous TOS thrombosis\par pt was rx med/conservatively w anticoag rx  sx stabilized and improved\par pt was d/c on aticoag rx\par pt states rue edema has resolved and only mild rt shoulder discomfort w activity \par intensity mild  since last ov

## 2019-06-12 NOTE — DATA REVIEWED
[FreeTextEntry1] : 2/15/2019 RUE venous Duplex sig for acute w progression to  subacute dvt in subclav  v and axillary v w  minimal flow \par \par 6/12/2019 RUE Venous Duplex sig for chronic non occ  dvt  in  dvt in subclav  v and  1of 2  brachial v , sig for wall thickening in basilic vein

## 2019-06-12 NOTE — PHYSICAL EXAM
[Normal Breath Sounds] : Normal breath sounds [2+] : left 2+ [No HSM] : no hepatosplenomegaly [Alert] : alert [No Rash or Lesion] : No rash or lesion [Oriented to Person] : oriented to person [Oriented to Place] : oriented to place [Oriented to Time] : oriented to time [Calm] : calm [JVD] : no jugular venous distention  [Left Carotid Bruit] : no bruit heard over the left carotid [Right Carotid Bruit] : no bruit heard over the right carotid [Ankle Swelling (On Exam)] : not present [Varicose Veins Of Lower Extremities] : not present [Tender] : was nontender [] : not present [Abdomen Masses] : No abdominal masses [Stool Sample Taken] : No stool obtained  on rectal exam [FreeTextEntry1] : rue no sig  edema\par no pain no cellulitis\par good rom  [de-identified] : wnl [de-identified] : nad [de-identified] : wnl [de-identified] : wnl [de-identified] : Wiliam Cranial nerves 2-12 wiliam grossly intact [de-identified] : cooperative

## 2019-06-12 NOTE — ASSESSMENT
[Arterial/Venous Disease] : arterial/venous disease [Medication Management] : medication management [FreeTextEntry1] : Impression  rue s/o venous TOS and rue  dvt progression\par Given the chronicity of the rue dvt at the time of presentation pt was not a candidate for any vasc or endovasc intervention\par \par \par Plan Med Conservative management arm elevation, hand exercises prn\par d/w pt that based on finding  at the next ov  will re eval anticoag rx \par continue anticoag rx for an additional 1mo\par ov w rue venous duplex s/o dvt  1mo july 2019 \par \par \par Letter faxed to Dr PHIL Damico MD \par

## 2019-06-14 ENCOUNTER — RESULT REVIEW (OUTPATIENT)
Age: 79
End: 2019-06-14

## 2019-06-14 ENCOUNTER — OUTPATIENT (OUTPATIENT)
Dept: OUTPATIENT SERVICES | Facility: HOSPITAL | Age: 79
LOS: 1 days | End: 2019-06-14
Payer: MEDICARE

## 2019-06-14 ENCOUNTER — APPOINTMENT (OUTPATIENT)
Dept: HEMATOLOGY ONCOLOGY | Facility: CLINIC | Age: 79
End: 2019-06-14
Payer: MEDICARE

## 2019-06-14 VITALS
SYSTOLIC BLOOD PRESSURE: 165 MMHG | OXYGEN SATURATION: 100 % | DIASTOLIC BLOOD PRESSURE: 65 MMHG | WEIGHT: 148.81 LBS | BODY MASS INDEX: 28.12 KG/M2 | RESPIRATION RATE: 16 BRPM | TEMPERATURE: 97.7 F | HEART RATE: 85 BPM

## 2019-06-14 DIAGNOSIS — Z95.2 PRESENCE OF PROSTHETIC HEART VALVE: Chronic | ICD-10-CM

## 2019-06-14 DIAGNOSIS — I82.621 ACUTE EMBOLISM AND THROMBOSIS OF DEEP VEINS OF RIGHT UPPER EXTREMITY: ICD-10-CM

## 2019-06-14 DIAGNOSIS — Z90.49 ACQUIRED ABSENCE OF OTHER SPECIFIED PARTS OF DIGESTIVE TRACT: Chronic | ICD-10-CM

## 2019-06-14 DIAGNOSIS — I77.0 ARTERIOVENOUS FISTULA, ACQUIRED: Chronic | ICD-10-CM

## 2019-06-14 DIAGNOSIS — Z95.0 PRESENCE OF CARDIAC PACEMAKER: Chronic | ICD-10-CM

## 2019-06-14 LAB
BASOPHILS # BLD AUTO: 0.1 K/UL — SIGNIFICANT CHANGE UP (ref 0–0.2)
BASOPHILS NFR BLD AUTO: 1.7 % — SIGNIFICANT CHANGE UP (ref 0–2)
BLD GP AB SCN SERPL QL: NEGATIVE — SIGNIFICANT CHANGE UP
EOSINOPHIL # BLD AUTO: 0.1 K/UL — SIGNIFICANT CHANGE UP (ref 0–0.5)
EOSINOPHIL NFR BLD AUTO: 3 % — SIGNIFICANT CHANGE UP (ref 0–6)
HCT VFR BLD CALC: 16.4 % — CRITICAL LOW (ref 34.5–45)
HGB BLD-MCNC: 5.4 G/DL — CRITICAL LOW (ref 11.5–15.5)
LYMPHOCYTES # BLD AUTO: 1 K/UL — SIGNIFICANT CHANGE UP (ref 1–3.3)
LYMPHOCYTES # BLD AUTO: 29.6 % — SIGNIFICANT CHANGE UP (ref 13–44)
MCHC RBC-ENTMCNC: 26.8 PG — LOW (ref 27–34)
MCHC RBC-ENTMCNC: 32.7 G/DL — SIGNIFICANT CHANGE UP (ref 32–36)
MCV RBC AUTO: 81.9 FL — SIGNIFICANT CHANGE UP (ref 80–100)
MONOCYTES # BLD AUTO: 0.3 K/UL — SIGNIFICANT CHANGE UP (ref 0–0.9)
MONOCYTES NFR BLD AUTO: 10.4 % — SIGNIFICANT CHANGE UP (ref 2–14)
NEUTROPHILS # BLD AUTO: 1.8 K/UL — SIGNIFICANT CHANGE UP (ref 1.8–7.4)
NEUTROPHILS NFR BLD AUTO: 55.4 % — SIGNIFICANT CHANGE UP (ref 43–77)
PLATELET # BLD AUTO: 129 K/UL — LOW (ref 150–400)
RBC # BLD: 2.01 M/UL — LOW (ref 3.8–5.2)
RBC # FLD: 15.2 % — HIGH (ref 10.3–14.5)
RH IG SCN BLD-IMP: POSITIVE — SIGNIFICANT CHANGE UP
WBC # BLD: 3.3 K/UL — LOW (ref 3.8–10.5)
WBC # FLD AUTO: 3.3 K/UL — LOW (ref 3.8–10.5)

## 2019-06-14 PROCEDURE — 99213 OFFICE O/P EST LOW 20 MIN: CPT

## 2019-06-14 NOTE — PHYSICAL EXAM
[Capable of only limited self care, confined to bed or chair more than 50% of waking hours] : Status 3- Capable of only limited self care, confined to bed or chair more than 50% of waking hours [Normal] : affect appropriate [de-identified] : 1/6 AMY radiation to carotids [de-identified] : left arm av fistula site

## 2019-06-14 NOTE — REASON FOR VISIT
[Follow-Up Visit] : a follow-up visit for [Blood Count Assessment] : blood count assessment [FreeTextEntry2] : anemia chronic renal disease; hypertension

## 2019-06-14 NOTE — ASSESSMENT
[Supportive] : Goals of care discussed with patient: Supportive [Palliative Care Plan] : not applicable at this time [FreeTextEntry1] : Jonathan Seymour is a 70 year old female with chronic anemia related to renal failure (Stage 4)\par She had received prior epoetin therapy for several year; she had issues with hypertension. The patietn developed an acute left upper extremity deep venous thrombosis in February 2019; epoetin is discontinued. She has been on anticoagulation for 4 months. \par  plan for transfusion 2 units of packed red cells.  to occur 9 AM on Saturday Jaclyn 15\par Patient seen with MARGARET JAVED

## 2019-06-14 NOTE — HISTORY OF PRESENT ILLNESS
[Disease:__________________________] : Disease: [unfilled] [Cardiovascular] : Cardiovascular [Constitutional] : Constitutional [ENT] : ENT [Dermatologic] : Dermatologic [Endocrine] : Endocrine [Gastrointestinal] : Gastrointestinal [Gynecologic] : Gynecologic [Genitourinary] : Genitourinary [Infectious] : Infectious [Musculoskeletal] : Musculoskeletal [Pulmonary] : Pulmonary [Pain] : Pain [Neurologic] : Neurologic [Date: ____________] : Patient's last distress assessment performed on [unfilled]. [Hematologic] : Hematologic [2 - Distress Level] : Distress Level: 2 [de-identified] : The patient has been followed by me for endstage renal disease for 8 years. She does not require dialysis. She had a left sided fistula placed three years ago by her physician in [place]. She is followed in the renal clinic by Dr Dixon.\par She has significant hypertension and she has required over three medication for control. There is no prior history  of cerebrovascular accident or bleeding. \par She has type two diabetes mellitus. The patient has contribution of both illness in the causation of her Stage 4 chronic renal disease. \par She has had mildLy elevated ferritin and iron levels.\par The use of colony stimulating agents has been safe when given when hemoglobins are less than 10 g/dL which has led to a frequency of administration of one ejection every 4 to 6 weeks.\par \par She will continue on the antihypertensive medication.She is on treatment for diabetes [FreeTextEntry1] : darbepoetin 200 mg q 6 weeks;Now held.; transfusion is advised since December 2018.  hydralazine 100 mg in AM; 50 at noon  and 100 mg PO in evening [de-identified] : She has increased fatigued within the past week. She has not had bleeding. She discontinued Warfarin on Wednesday June 11

## 2019-06-14 NOTE — REVIEW OF SYSTEMS
[Fatigue] : fatigue [Lower Ext Edema] : lower extremity edema [SOB on Exertion] : shortness of breath during exertion [Joint Pain] : joint pain [Negative] : Heme/Lymph [Fever] : no fever [Chills] : no chills [Red Eyes] : eyes not red [Dry Eyes] : no dryness of the eyes [Vision Problems] : no vision problems [FreeTextEntry3] : corrective lens

## 2019-06-15 ENCOUNTER — APPOINTMENT (OUTPATIENT)
Dept: INFUSION THERAPY | Facility: HOSPITAL | Age: 79
End: 2019-06-15

## 2019-06-18 DIAGNOSIS — Z51.89 ENCOUNTER FOR OTHER SPECIFIED AFTERCARE: ICD-10-CM

## 2019-06-26 ENCOUNTER — APPOINTMENT (OUTPATIENT)
Dept: NEPHROLOGY | Facility: CLINIC | Age: 79
End: 2019-06-26
Payer: MEDICARE

## 2019-06-26 VITALS
HEIGHT: 61 IN | WEIGHT: 145 LBS | HEART RATE: 69 BPM | SYSTOLIC BLOOD PRESSURE: 154 MMHG | OXYGEN SATURATION: 99 % | DIASTOLIC BLOOD PRESSURE: 81 MMHG | BODY MASS INDEX: 27.38 KG/M2

## 2019-06-26 PROCEDURE — 99214 OFFICE O/P EST MOD 30 MIN: CPT | Mod: GC

## 2019-06-26 RX ORDER — WARFARIN 3 MG/1
3 TABLET ORAL
Qty: 90 | Refills: 0 | Status: DISCONTINUED | COMMUNITY
Start: 2019-02-14 | End: 2019-06-26

## 2019-06-26 RX ORDER — TERAZOSIN 1 MG/1
1 CAPSULE ORAL
Qty: 90 | Refills: 0 | Status: DISCONTINUED | COMMUNITY
Start: 2019-01-29 | End: 2019-06-26

## 2019-06-26 RX ORDER — CALCITRIOL 0.25 UG/1
0.25 CAPSULE, LIQUID FILLED ORAL
Qty: 90 | Refills: 3 | Status: DISCONTINUED | COMMUNITY
Start: 2017-06-14 | End: 2019-06-26

## 2019-06-26 RX ORDER — WARFARIN 2 MG/1
2 TABLET ORAL
Refills: 0 | Status: DISCONTINUED | COMMUNITY
Start: 2019-04-17 | End: 2019-06-26

## 2019-06-26 RX ORDER — METOLAZONE 5 MG/1
5 TABLET ORAL
Refills: 0 | Status: DISCONTINUED | COMMUNITY
Start: 2019-04-09 | End: 2019-06-26

## 2019-06-26 NOTE — HISTORY OF PRESENT ILLNESS
[FreeTextEntry1] : Ms. Seymour presents for follow-up for CKD 5 - healthy transitions patient.\par Hx of TAVR, previous 3V CABG, PPM, AVF placed 7 years ago, has steal syndrome. \par \par Today claims is feeling well, no new medications added, no weight gain, edema or sob, denies insomnia, dysgeusia, tremors or anorexia. Had 2 units of PRBC 1 week ago due to Hb fo 5 range. Also evaluated by vascular surgery on 06/19, warfarin was discontinued and now only on asa. \par \par \par \par

## 2019-06-26 NOTE — PHYSICAL EXAM
[General Appearance - Alert] : alert [General Appearance - In No Acute Distress] : in no acute distress [General Appearance - Well Developed] : well developed [General Appearance - Well Nourished] : well nourished [Sclera] : the sclera and conjunctiva were normal [Extraocular Movements] : extraocular movements were intact [Oropharynx] : the oropharynx was normal [Jugular Venous Distention Increased] : there was no jugular-venous distention [Neck Cervical Mass (___cm)] : no neck mass was observed [Auscultation Breath Sounds / Voice Sounds] : lungs were clear to auscultation bilaterally [Heart Rate And Rhythm] : heart rate was normal and rhythm regular [Heart Sounds Gallop] : no gallops [Heart Sounds Pericardial Friction Rub] : no pericardial rub [Edema] : there was no peripheral edema [Abdomen Soft] : soft [No CVA Tenderness] : no ~M costovertebral angle tenderness [Abdomen Tenderness] : non-tender [No Spinal Tenderness] : no spinal tenderness [___ (cm) Fistula] : [unfilled] (cm) fistula [Bruit] : a bruit was present [Thrill] : a thrill was present [Skin Turgor] : normal skin turgor [] : no rash [Oriented To Time, Place, And Person] : oriented to person, place, and time [Skin Lesions] : no skin lesions [Affect] : the affect was normal [Impaired Insight] : insight and judgment were intact [Mood] : the mood was normal [FreeTextEntry1] : +2 systolic murmur

## 2019-06-26 NOTE — ASSESSMENT
[FreeTextEntry1] : Ms. Seymour is a 79 y old F presenting for follow-up for CKD 5.\par \par 1)  CKD 5: \par Not uremic. Feels well overall. \par Will continue to monitor.\par Has fistula ready to use( good thrill and bruit), needs follow up for steal syndrome. \par Continue with lasix 40 mg daily. \par \par 2)  Anemia of CKD\par Will check Hb today. Cannot get JEISON due to deep vein thrombosis. \par \par \par 3)  Essential hypertension\par -cont meds for now with lasix. \par \par 4)  Renal osteodystrophy\par -cont calcitriol for now, will heck pth and vit d. \par \par 5)  Access: Left avf- good thrill and bruit\par -patient will continue to follow-up with vascular surgery as recommended\par \par f/u 2 months

## 2019-06-26 NOTE — REVIEW OF SYSTEMS
[Negative] : Gastrointestinal [Chills] : no chills [Fever] : no fever [Feeling Poorly] : not feeling poorly [Feeling Tired] : not feeling tired [Arthralgias] : no arthralgias [Dysuria] : no dysuria [Joint Pain] : no joint pain [Joint Swelling] : no joint swelling

## 2019-06-27 LAB
25(OH)D3 SERPL-MCNC: 9.7 NG/ML
ALBUMIN SERPL ELPH-MCNC: 3.6 G/DL
ANION GAP SERPL CALC-SCNC: 13 MMOL/L
BASOPHILS # BLD AUTO: 0.03 K/UL
BASOPHILS NFR BLD AUTO: 0.7 %
BUN SERPL-MCNC: 65 MG/DL
CALCIUM SERPL-MCNC: 9.5 MG/DL
CALCIUM SERPL-MCNC: 9.5 MG/DL
CHLORIDE SERPL-SCNC: 105 MMOL/L
CO2 SERPL-SCNC: 21 MMOL/L
CREAT SERPL-MCNC: 5.09 MG/DL
EOSINOPHIL # BLD AUTO: 0.11 K/UL
EOSINOPHIL NFR BLD AUTO: 2.6 %
GLUCOSE SERPL-MCNC: 110 MG/DL
HCT VFR BLD CALC: 26.8 %
HGB BLD-MCNC: 7.9 G/DL
IMM GRANULOCYTES NFR BLD AUTO: 0.5 %
LYMPHOCYTES # BLD AUTO: 0.82 K/UL
LYMPHOCYTES NFR BLD AUTO: 19.2 %
MAN DIFF?: NORMAL
MCHC RBC-ENTMCNC: 25.6 PG
MCHC RBC-ENTMCNC: 29.5 GM/DL
MCV RBC AUTO: 87 FL
MONOCYTES # BLD AUTO: 0.29 K/UL
MONOCYTES NFR BLD AUTO: 6.8 %
NEUTROPHILS # BLD AUTO: 2.99 K/UL
NEUTROPHILS NFR BLD AUTO: 70.2 %
PARATHYROID HORMONE INTACT: 400 PG/ML
PHOSPHATE SERPL-MCNC: 5.2 MG/DL
PLATELET # BLD AUTO: 138 K/UL
POTASSIUM SERPL-SCNC: 4.9 MMOL/L
RBC # BLD: 3.08 M/UL
RBC # FLD: 14.7 %
SODIUM SERPL-SCNC: 139 MMOL/L
WBC # FLD AUTO: 4.26 K/UL

## 2019-06-27 NOTE — DISCHARGE NOTE ADULT - FUNCTIONAL SCREEN CURRENT LEVEL: BATHING, MLM
Called patient discussed very low level of Testosterone, will switch to Testosterone cream 2% one click nightly, Disp #30 grams.#3 refills given.   Will change to Estradiol 1.5 and Prometrium 300 qHS. #90 called in to [facility].     Intrarosa sent to [facility] pharmacy as she stated she had to pay $200 for one month.    (0) independent

## 2019-07-10 ENCOUNTER — APPOINTMENT (OUTPATIENT)
Dept: VASCULAR SURGERY | Facility: CLINIC | Age: 79
End: 2019-07-10
Payer: MEDICARE

## 2019-07-10 VITALS
HEART RATE: 76 BPM | TEMPERATURE: 99.1 F | SYSTOLIC BLOOD PRESSURE: 183 MMHG | BODY MASS INDEX: 27.38 KG/M2 | DIASTOLIC BLOOD PRESSURE: 74 MMHG | WEIGHT: 145 LBS | HEIGHT: 61 IN

## 2019-07-10 PROCEDURE — 93971 EXTREMITY STUDY: CPT | Mod: RT

## 2019-07-10 PROCEDURE — 99214 OFFICE O/P EST MOD 30 MIN: CPT

## 2019-07-10 NOTE — HISTORY OF PRESENT ILLNESS
[FreeTextEntry1] : pt was seen in hop  consultation at [facility] w rue  edema and dvt  s/o venous TOS thrombosis\par pt was rx med/conservatively w anticoag rx  sx stabilized and improved\par pt was d/c on aticoag rx\par pt states rue edema has resolved and only mild rt shoulder discomfort w activity \par intensity mild  since last ov  [de-identified] : pt is currently on asa rx and c.o some right shoulder area discomfort\par intensity mild in the past  week \par pt denies recent inj

## 2019-07-10 NOTE — PHYSICAL EXAM
[Normal Breath Sounds] : Normal breath sounds [2+] : left 2+ [No HSM] : no hepatosplenomegaly [No Rash or Lesion] : No rash or lesion [Oriented to Place] : oriented to place [Oriented to Person] : oriented to person [Alert] : alert [Oriented to Time] : oriented to time [Calm] : calm [JVD] : no jugular venous distention  [Right Carotid Bruit] : no bruit heard over the right carotid [Left Carotid Bruit] : no bruit heard over the left carotid [Ankle Swelling (On Exam)] : not present [Varicose Veins Of Lower Extremities] : not present [] : not present [Abdomen Masses] : No abdominal masses [Tender] : was nontender [Stool Sample Taken] : No stool obtained  on rectal exam [de-identified] : wnl [de-identified] : nad [FreeTextEntry1] : rue mild new   edema\par no pain no cellulitis\par good rom  [de-identified] : wnl [de-identified] : wnl [de-identified] : cooperative [de-identified] : Wiliam Cranial nerves 2-12 wiliam grossly intact

## 2019-07-10 NOTE — ASSESSMENT
[Arterial/Venous Disease] : arterial/venous disease [Medication Management] : medication management [FreeTextEntry1] : Impression  rue s/o venous TOS and rue dvt recurrence \par Given the chronicity of the rue dvt at the time of presentation pt was not a candidate for any vasc or endovasc intervention\par \par \par Plan Med Conservative management arm elevation, hand exercises prn\par restart coumadin for an additional 3 mo \par ov w rue venous duplex s/o dvt   3 mo \par f/u w Dr Margaux POSADA for inr surveillance goal 2-2.5 \par \par \par Letter faxed to Dr PHIL Damico MD \par

## 2019-07-10 NOTE — DATA REVIEWED
[FreeTextEntry1] : 2/15/2019 RUE venous Duplex sig for acute w progression to  subacute dvt in subclav  v and axillary v w  minimal flow \par \par 6/12/2019 RUE Venous Duplex sig for chronic non occ  dvt  in  dvt in subclav  v and  1of 2  brachial v , sig for wall thickening in basilic vein \par \par 7/10/2019  RUE Venous Duplex sig for chronic wall thickening of wall of  subclav  v and  1of 2  brachial v , sig for acute occlusive dvt , 2nd brach vein w chronic non occ dvt , basilic vein  w chronic  wall thickening

## 2019-07-11 ENCOUNTER — RESULT REVIEW (OUTPATIENT)
Age: 79
End: 2019-07-11

## 2019-07-11 ENCOUNTER — APPOINTMENT (OUTPATIENT)
Dept: HEMATOLOGY ONCOLOGY | Facility: CLINIC | Age: 79
End: 2019-07-11
Payer: MEDICARE

## 2019-07-11 VITALS
RESPIRATION RATE: 16 BRPM | OXYGEN SATURATION: 100 % | TEMPERATURE: 98.3 F | DIASTOLIC BLOOD PRESSURE: 62 MMHG | SYSTOLIC BLOOD PRESSURE: 168 MMHG | BODY MASS INDEX: 27.28 KG/M2 | WEIGHT: 144.4 LBS | HEART RATE: 72 BPM

## 2019-07-11 LAB
BLD GP AB SCN SERPL QL: NEGATIVE — SIGNIFICANT CHANGE UP
HCT VFR BLD CALC: 20.5 % — CRITICAL LOW (ref 34.5–45)
HGB BLD-MCNC: 6.5 G/DL — CRITICAL LOW (ref 11.5–15.5)
MCHC RBC-ENTMCNC: 26.5 PG — LOW (ref 27–34)
MCHC RBC-ENTMCNC: 31.7 G/DL — LOW (ref 32–36)
MCV RBC AUTO: 83.5 FL — SIGNIFICANT CHANGE UP (ref 80–100)
PLATELET # BLD AUTO: 112 K/UL — LOW (ref 150–400)
RBC # BLD: 2.45 M/UL — LOW (ref 3.8–5.2)
RBC # FLD: 13.7 % — SIGNIFICANT CHANGE UP (ref 10.3–14.5)
RH IG SCN BLD-IMP: POSITIVE — SIGNIFICANT CHANGE UP
WBC # BLD: 3.6 K/UL — LOW (ref 3.8–10.5)
WBC # FLD AUTO: 3.6 K/UL — LOW (ref 3.8–10.5)

## 2019-07-11 PROCEDURE — 99214 OFFICE O/P EST MOD 30 MIN: CPT

## 2019-07-11 NOTE — REASON FOR VISIT
[Follow-Up Visit] : a follow-up visit for [Blood Count Assessment] : blood count assessment [FreeTextEntry2] : anemia

## 2019-07-11 NOTE — ASSESSMENT
[Palliative Care Plan] : not applicable at this time [FreeTextEntry1] : She has been feeling OK; she is taking her blood pressure control medication.\par She is was off anticoagulation for 3 weeks and now restarted:Warfarin 3 mg. No bleeding noted: patietn was told that the clot was recurring (Vascular surgery) \par Plan for transfusion  2 units packed cells

## 2019-07-11 NOTE — PHYSICAL EXAM
[Ambulatory and capable of all self care but unable to carry out any work activities] : Status 2- Ambulatory and capable of all self care but unable to carry out any work activities. Up and about more than 50% of waking hours [Normal] : affect appropriate [de-identified] : 1/6 AMY radiation to carotids [de-identified] : left arm av fistula site

## 2019-07-11 NOTE — HISTORY OF PRESENT ILLNESS
[Disease:__________________________] : Disease: [unfilled] [Cardiovascular] : Cardiovascular [Constitutional] : Constitutional [ENT] : ENT [Dermatologic] : Dermatologic [Endocrine] : Endocrine [Gastrointestinal] : Gastrointestinal [Genitourinary] : Genitourinary [Gynecologic] : Gynecologic [Infectious] : Infectious [Musculoskeletal] : Musculoskeletal [Neurologic] : Neurologic [Pain] : Pain [Pulmonary] : Pulmonary [Hematologic] : Hematologic [Date: ____________] : Patient's last distress assessment performed on [unfilled]. [0 - No Distress] : Distress Level: 0 [de-identified] : The patient has been followed by me for endstage renal disease for 8 years. She does not require dialysis. She had a left sided fistula placed three years ago by her physician in [place]. She is followed in the renal clinic by Dr Dixon.\par She has significant hypertension and she has required over three medication for control. There is no prior history  of cerebrovascular accident or bleeding. \par She has type two diabetes mellitus. The patient has contribution of both illness in the causation of her Stage 4 chronic renal disease. \par She has had mildLy elevated ferritin and iron levels.\par The use of colony stimulating agents has been safe when given when hemoglobins are less than 10 g/dL which has led to a frequency of administration of one ejection every 4 to 6 weeks.\par \par She will continue on the antihypertensive medication.She is on treatment for diabetes [FreeTextEntry1] : darbepoetin 200 mg q 6 weeks;Now held.; transfusion is advised since December 2018.  hydralazine 100 mg in AM; 50 at noon  and 100 mg PO in evening [de-identified] : She has had intermittent fatigue

## 2019-07-12 ENCOUNTER — OUTPATIENT (OUTPATIENT)
Dept: OUTPATIENT SERVICES | Facility: HOSPITAL | Age: 79
LOS: 1 days | Discharge: HOME | End: 2019-07-12

## 2019-07-12 DIAGNOSIS — I77.0 ARTERIOVENOUS FISTULA, ACQUIRED: Chronic | ICD-10-CM

## 2019-07-12 DIAGNOSIS — Z95.2 PRESENCE OF PROSTHETIC HEART VALVE: Chronic | ICD-10-CM

## 2019-07-12 DIAGNOSIS — D46.1 REFRACTORY ANEMIA WITH RING SIDEROBLASTS: ICD-10-CM

## 2019-07-12 DIAGNOSIS — Z90.49 ACQUIRED ABSENCE OF OTHER SPECIFIED PARTS OF DIGESTIVE TRACT: Chronic | ICD-10-CM

## 2019-07-12 DIAGNOSIS — D63.1 ANEMIA IN CHRONIC KIDNEY DISEASE: ICD-10-CM

## 2019-07-12 DIAGNOSIS — Z95.0 PRESENCE OF CARDIAC PACEMAKER: Chronic | ICD-10-CM

## 2019-07-12 DIAGNOSIS — N18.4 CHRONIC KIDNEY DISEASE, STAGE 4 (SEVERE): ICD-10-CM

## 2019-07-12 PROCEDURE — 86900 BLOOD TYPING SEROLOGIC ABO: CPT

## 2019-07-12 PROCEDURE — 86850 RBC ANTIBODY SCREEN: CPT

## 2019-07-12 PROCEDURE — 86901 BLOOD TYPING SEROLOGIC RH(D): CPT

## 2019-07-12 PROCEDURE — 86923 COMPATIBILITY TEST ELECTRIC: CPT

## 2019-07-13 ENCOUNTER — APPOINTMENT (OUTPATIENT)
Dept: INFUSION THERAPY | Facility: HOSPITAL | Age: 79
End: 2019-07-13

## 2019-07-15 DIAGNOSIS — Z51.89 ENCOUNTER FOR OTHER SPECIFIED AFTERCARE: ICD-10-CM

## 2019-08-08 ENCOUNTER — EMERGENCY (EMERGENCY)
Facility: HOSPITAL | Age: 79
LOS: 1 days | Discharge: HOME | End: 2019-08-08
Attending: EMERGENCY MEDICINE
Payer: MEDICARE

## 2019-08-08 VITALS
OXYGEN SATURATION: 98 % | RESPIRATION RATE: 18 BRPM | HEART RATE: 74 BPM | SYSTOLIC BLOOD PRESSURE: 147 MMHG | TEMPERATURE: 99 F | DIASTOLIC BLOOD PRESSURE: 74 MMHG

## 2019-08-08 VITALS
SYSTOLIC BLOOD PRESSURE: 158 MMHG | TEMPERATURE: 98 F | HEART RATE: 85 BPM | HEIGHT: 61 IN | OXYGEN SATURATION: 98 % | DIASTOLIC BLOOD PRESSURE: 67 MMHG | RESPIRATION RATE: 18 BRPM | WEIGHT: 145.06 LBS

## 2019-08-08 DIAGNOSIS — Z95.2 PRESENCE OF PROSTHETIC HEART VALVE: Chronic | ICD-10-CM

## 2019-08-08 DIAGNOSIS — Z90.49 ACQUIRED ABSENCE OF OTHER SPECIFIED PARTS OF DIGESTIVE TRACT: Chronic | ICD-10-CM

## 2019-08-08 DIAGNOSIS — I77.0 ARTERIOVENOUS FISTULA, ACQUIRED: Chronic | ICD-10-CM

## 2019-08-08 DIAGNOSIS — Z95.0 PRESENCE OF CARDIAC PACEMAKER: Chronic | ICD-10-CM

## 2019-08-08 LAB
ALBUMIN SERPL ELPH-MCNC: 3.4 G/DL — SIGNIFICANT CHANGE UP (ref 3.3–5)
ALP SERPL-CCNC: 52 U/L — SIGNIFICANT CHANGE UP (ref 40–120)
ALT FLD-CCNC: 5 U/L — LOW (ref 10–45)
ANION GAP SERPL CALC-SCNC: 12 MMOL/L — SIGNIFICANT CHANGE UP (ref 5–17)
APTT BLD: 38 SEC — HIGH (ref 27.5–36.3)
AST SERPL-CCNC: 12 U/L — SIGNIFICANT CHANGE UP (ref 10–40)
BASOPHILS # BLD AUTO: 0 K/UL — SIGNIFICANT CHANGE UP (ref 0–0.2)
BASOPHILS NFR BLD AUTO: 0.2 % — SIGNIFICANT CHANGE UP (ref 0–2)
BILIRUB SERPL-MCNC: 0.2 MG/DL — SIGNIFICANT CHANGE UP (ref 0.2–1.2)
BUN SERPL-MCNC: 72 MG/DL — HIGH (ref 7–23)
CALCIUM SERPL-MCNC: 9.1 MG/DL — SIGNIFICANT CHANGE UP (ref 8.4–10.5)
CHLORIDE SERPL-SCNC: 106 MMOL/L — SIGNIFICANT CHANGE UP (ref 96–108)
CO2 SERPL-SCNC: 20 MMOL/L — LOW (ref 22–31)
CREAT SERPL-MCNC: 4.92 MG/DL — HIGH (ref 0.5–1.3)
EOSINOPHIL # BLD AUTO: 0.2 K/UL — SIGNIFICANT CHANGE UP (ref 0–0.5)
EOSINOPHIL NFR BLD AUTO: 3.2 % — SIGNIFICANT CHANGE UP (ref 0–6)
GLUCOSE SERPL-MCNC: 135 MG/DL — HIGH (ref 70–99)
HCT VFR BLD CALC: 22.5 % — LOW (ref 34.5–45)
HGB BLD-MCNC: 7.2 G/DL — LOW (ref 11.5–15.5)
INR BLD: 3.52 RATIO — HIGH (ref 0.88–1.16)
LYMPHOCYTES # BLD AUTO: 0.8 K/UL — LOW (ref 1–3.3)
LYMPHOCYTES # BLD AUTO: 14.7 % — SIGNIFICANT CHANGE UP (ref 13–44)
MCHC RBC-ENTMCNC: 26.7 PG — LOW (ref 27–34)
MCHC RBC-ENTMCNC: 31.9 GM/DL — LOW (ref 32–36)
MCV RBC AUTO: 83.8 FL — SIGNIFICANT CHANGE UP (ref 80–100)
MONOCYTES # BLD AUTO: 0.5 K/UL — SIGNIFICANT CHANGE UP (ref 0–0.9)
MONOCYTES NFR BLD AUTO: 8.4 % — SIGNIFICANT CHANGE UP (ref 2–14)
NEUTROPHILS # BLD AUTO: 4.1 K/UL — SIGNIFICANT CHANGE UP (ref 1.8–7.4)
NEUTROPHILS NFR BLD AUTO: 73.5 % — SIGNIFICANT CHANGE UP (ref 43–77)
PLATELET # BLD AUTO: 116 K/UL — LOW (ref 150–400)
POTASSIUM SERPL-MCNC: 5.1 MMOL/L — SIGNIFICANT CHANGE UP (ref 3.5–5.3)
POTASSIUM SERPL-SCNC: 5.1 MMOL/L — SIGNIFICANT CHANGE UP (ref 3.5–5.3)
PROT SERPL-MCNC: 6 G/DL — SIGNIFICANT CHANGE UP (ref 6–8.3)
PROTHROM AB SERPL-ACNC: 42.1 SEC — HIGH (ref 10–12.9)
RBC # BLD: 2.68 M/UL — LOW (ref 3.8–5.2)
RBC # FLD: 15.5 % — HIGH (ref 10.3–14.5)
SODIUM SERPL-SCNC: 138 MMOL/L — SIGNIFICANT CHANGE UP (ref 135–145)
WBC # BLD: 5.5 K/UL — SIGNIFICANT CHANGE UP (ref 3.8–10.5)
WBC # FLD AUTO: 5.5 K/UL — SIGNIFICANT CHANGE UP (ref 3.8–10.5)

## 2019-08-08 PROCEDURE — 85610 PROTHROMBIN TIME: CPT

## 2019-08-08 PROCEDURE — 29125 APPL SHORT ARM SPLINT STATIC: CPT | Mod: GC

## 2019-08-08 PROCEDURE — 73080 X-RAY EXAM OF ELBOW: CPT | Mod: 26,LT

## 2019-08-08 PROCEDURE — 72192 CT PELVIS W/O DYE: CPT

## 2019-08-08 PROCEDURE — 73590 X-RAY EXAM OF LOWER LEG: CPT

## 2019-08-08 PROCEDURE — 73552 X-RAY EXAM OF FEMUR 2/>: CPT | Mod: 26,LT

## 2019-08-08 PROCEDURE — 73080 X-RAY EXAM OF ELBOW: CPT

## 2019-08-08 PROCEDURE — 73590 X-RAY EXAM OF LOWER LEG: CPT | Mod: 26,LT

## 2019-08-08 PROCEDURE — 72170 X-RAY EXAM OF PELVIS: CPT

## 2019-08-08 PROCEDURE — 71045 X-RAY EXAM CHEST 1 VIEW: CPT

## 2019-08-08 PROCEDURE — 99283 EMERGENCY DEPT VISIT LOW MDM: CPT | Mod: GC

## 2019-08-08 PROCEDURE — 93010 ELECTROCARDIOGRAM REPORT: CPT | Mod: 59

## 2019-08-08 PROCEDURE — 76377 3D RENDER W/INTRP POSTPROCES: CPT | Mod: 26

## 2019-08-08 PROCEDURE — 82962 GLUCOSE BLOOD TEST: CPT

## 2019-08-08 PROCEDURE — 99284 EMERGENCY DEPT VISIT MOD MDM: CPT | Mod: 25

## 2019-08-08 PROCEDURE — 72192 CT PELVIS W/O DYE: CPT | Mod: 26

## 2019-08-08 PROCEDURE — 93005 ELECTROCARDIOGRAM TRACING: CPT | Mod: XU

## 2019-08-08 PROCEDURE — 73110 X-RAY EXAM OF WRIST: CPT

## 2019-08-08 PROCEDURE — 73562 X-RAY EXAM OF KNEE 3: CPT

## 2019-08-08 PROCEDURE — 29125 APPL SHORT ARM SPLINT STATIC: CPT | Mod: LT

## 2019-08-08 PROCEDURE — 73552 X-RAY EXAM OF FEMUR 2/>: CPT

## 2019-08-08 PROCEDURE — 72170 X-RAY EXAM OF PELVIS: CPT | Mod: 26

## 2019-08-08 PROCEDURE — 99284 EMERGENCY DEPT VISIT MOD MDM: CPT | Mod: 25,GC

## 2019-08-08 PROCEDURE — 85730 THROMBOPLASTIN TIME PARTIAL: CPT

## 2019-08-08 PROCEDURE — 76377 3D RENDER W/INTRP POSTPROCES: CPT

## 2019-08-08 PROCEDURE — 73562 X-RAY EXAM OF KNEE 3: CPT | Mod: 26,LT

## 2019-08-08 PROCEDURE — 85027 COMPLETE CBC AUTOMATED: CPT

## 2019-08-08 PROCEDURE — 73110 X-RAY EXAM OF WRIST: CPT | Mod: 26,LT

## 2019-08-08 PROCEDURE — 71045 X-RAY EXAM CHEST 1 VIEW: CPT | Mod: 26

## 2019-08-08 PROCEDURE — 80053 COMPREHEN METABOLIC PANEL: CPT

## 2019-08-08 RX ORDER — ACETAMINOPHEN 500 MG
975 TABLET ORAL ONCE
Refills: 0 | Status: COMPLETED | OUTPATIENT
Start: 2019-08-08 | End: 2019-08-08

## 2019-08-08 RX ADMIN — Medication 975 MILLIGRAM(S): at 15:23

## 2019-08-08 NOTE — ED ADULT NURSE NOTE - OBJECTIVE STATEMENT
1415 79 yr old BF sent to ER by PMD for further eval and tx. s/p tripped and fell at 1245, when "left knee buckled ". states PMH severe arthritis. Usually  ambulates with cane. Palpable lump at left buttock/l posterior thigh. TTP, States increased difficulty walking since injury. Did not hit head. A&Ox4, Denies HA, dizziness, palp, SOB. chest pain. Fall risk precautions maintained

## 2019-08-08 NOTE — ED PROVIDER NOTE - CLINICAL SUMMARY MEDICAL DECISION MAKING FREE TEXT BOX
79 y.o. female hx of CKD stage 4, AS, CAD, CABG,  HTN, DM2, arthritis presents to the ED from home s/p mechanical fall, contusion over left femoral head, concern for hematoma vs femoral head/neck fracture (pain with pelvic rock however patient was able to ambulate). Will evaluate with CT pelvis nonctrast (given CKD4) and xrays left tib/fib, knee, hip, pelvis, wrist, elbow.  Will get labs and give tylenol for pain. Reasses    Dispo: Pending CT pelvis L hip 79 y.o. female hx of CKD stage 4, AS, CAD, CABG,  HTN, DM2, arthritis presents to the ED from home s/p mechanical fall, contusion over left femoral head, concern for hematoma vs femoral head/neck fracture (pain with pelvic rock however patient was able to ambulate). Will evaluate with CT pelvis nonctrast (given CKD4) and xrays left tib/fib, knee, hip, pelvis, wrist, elbow.  Will get labs and give tylenol for pain. Reassess    Dispo: Pending CT pelvis L hip

## 2019-08-08 NOTE — CONSULT NOTE ADULT - ASSESSMENT
79 year old woman with hx  Hypertension, Hyperlipidemia, Thyroid Disease, Diabetes Mellitus, Arthritis, Anemia, Chronic Kidney Disease, Coronary Artery Disease, S/P CABG, S/P PCI, Severe Aortic Stenosis, S/P TAVR 3/30/2017, S/P PPM, Chronic Diastolic Congestive Heart Failure, RUE DVT presenting s/p Fall    1. Mechanical Fall   denies any prodromal cardiac symptoms  cv stable no cp or sob   CT pelvis with edema to left hip secondary to contusion, no fractures   xray of left wrist with small osseous fragments suspicious for triquetral avulsion fracture   supratherapuetic INR, hold a/c   pending ekg   work up per ED     2. Aortic Stenosis, S/P TAVR  Stable. eventually resume ASA. Recent echo revealed nl functioning TAVR.     3. Coronary Artery Disease, S/P CABG, S/P PCI.   Stable without chest pain. resume asa. Plavix stopped due to a/c indication.     4. Hypertension  resume outpt anti-htn meds     5. RUE DVT  supratherapuetic INR noted   hold coumadin    6. Chronic Diastolic Congestive Heart Failure  volume status stable  resume lasix 40 mg daily.

## 2019-08-08 NOTE — CONSULT NOTE ADULT - SUBJECTIVE AND OBJECTIVE BOX
CARDIOLOGY CONSULT - Dr. Damico         HPI:  79 year old female with hx as mentioned below presents to the ED from home s/p mechanical fall.  she reports her knee buckled, when fell backwards, landed on her left hip. She denied any cp, palps. dizziness, or sob prior to fall. Patient is known to the office. She is  on coumadin for RUE DVT. She denies any chest pain, dyspnea, palpitations, orthopnea, PND, syncope, cough, abdominal pain, exertional symptoms, or decrease in exercise tolerance.       PAST MEDICAL HISTORY:  Hypertension, Hyperlipidemia, Thyroid Disease, Diabetes Mellitus, Arthritis, Anemia, Chronic Kidney Disease, Coronary Artery Disease, S/P CABG, S/P PCI, Severe Aortic Stenosis, S/P TAVR 3/30/2017, S/P PPM, Chronic Diastolic Congestive Heart Failure, RUE DVT    PAST SURGICAL HISTORY:  CABG (, [facility]), S/P TAVR 3/30/2017, S/P PPM ([facility])    SOCIAL HISTORY:  Tobacco History: Non-smoker; Alcohol: Denies; Caffeine: 1 cup daily;  Number of children 5    FAMILY HISTORY:  Noncontributory    ALLERGIES: Percocet, Severity: Moderate  Reglan, Severity: Moderate  Lortab, Severity: Moderate  Morphine, Severity: Moderate  Sulfa, Severity: Moderate        PREVIOUS DIAGNOSTIC TESTING:    ECHO (GEMMA) 2017: mild-mod MR, sev AS, ROSHNI 0.7, 70/38 aortic gradients  ECHO 3/31/2017: no AI, normal LV fxn  CATH 2017: mid LAd 80%, prox LCx 50%, om1 80%, prox RCA 80%, mid %, LIMA to LAD patent, SVG to OM1 patent, SVG to RCA patent       MEDICATIONS:  Home Medications:  LASIX 40 MG...................Si tablet QD for 90 Days Qty: 90  METOLAZONE 5 MG...............Si tablet 2 times weekly for 4 Weeks Qty: 8, take 1 tablet two times a week in the morning with Furosemide (Lasix) dose.  TERAZOSIN 2 MG................Si tablet QD for 30 Days Qty: 60  WARFARIN 3 MG.................Si tablet QD for 90 Days Qty: 90  HYDRALAZINE 100 MG............Si tablet 3 times per day for 90 Days Qty: 270  IMDUR 60 MG...................Si tablet, extended release 2 times per day for 180 Days Qty: 180  ONGLYZA 2.5 MG................Si tablet QD for 90 Days Qty: 90  SODIUM BICARBONATE 650 MG.....Si tablet 2 times per day for 30 Days Qty: 60  PROAIR HFA 90 MCG/INH.........Si PUFFS 2 times per day for 30 Days Qty: 1  ALLOPURINOL 100 MG............Si tablet QD for 30 Days Qty: 30  CRESTOR 10 MG.................Si tablet QD for 30 Days Qty: 30  CARVEDILOL 25 MG..............Si tablet 2 times per day for 60 Days Qty: 60  ASPIRIN 81 MG.................Si tablet QD for 30 Days Qty: 30  CALCITRIOL 0.25 MCG...........Si capsule QD for 30 Days Qty: 30  CLONIDINE 0.2 MG..............Si tablet 2 times per day for 30 Days Qty: 60        MEDICATIONS  (STANDING):      FAMILY HISTORY:  No pertinent family history in first degree relatives      SOCIAL HISTORY:    [x ] Non-smoker  [ ] Smoker  [ ] Alcohol    Allergies    codeine (Other)  Lortab (Other)  morphine (Other)  Percocet 10/325 (Other)  PPM not compatible with  MRI (Other (Fatal))  Reglan (Other; [facility])  sulfa drugs ([facility])    Intolerances    	    REVIEW OF SYSTEMS:  CONSTITUTIONAL: No fever, weight loss, or fatigue  EYES: No eye pain, visual disturbances, or discharge  ENMT:  No difficulty hearing, tinnitus, vertigo; No sinus or throat pain  NECK: No pain or stiffness  RESPIRATORY: No cough, wheezing, chills or hemoptysis; No Shortness of Breath  CARDIOVASCULAR: see hpi   GASTROINTESTINAL: No abdominal or epigastric pain. No nausea, vomiting, or hematemesis; No diarrhea or constipation. No melena or hematochezia.  GENITOURINARY: No dysuria, frequency, hematuria, or incontinence  NEUROLOGICAL: No headaches, memory loss, loss of strength, numbness, or tremors  SKIN: No itching, burning, rashes, or lesions   	    [x ] All others negative	  [ ] Unable to obtain    PHYSICAL EXAM:  T(C): 36.7 (19 @ 13:59), Max: 36.7 (19 @ 13:59)  HR: 73 (19 @ 14:17) (73 - 85)  BP: 151/55 (19 @ 14:17) (151/55 - 158/67)  RR: 18 (19 @ 14:17) (18 - 18)  SpO2: 100% (19 @ 14:17) (98% - 100%)  Wt(kg): --  I&O's Summary      Appearance: Normal	  Psychiatry: A & O x 3, Mood & affect appropriate  HEENT:   Normal oral mucosa, PERRL, EOMI	  Lymphatic: No lymphadenopathy  Cardiovascular: Normal S1 S2,RRR, No JVD, No murmurs  Respiratory: Lungs clear to auscultation	  Gastrointestinal:  Soft, Non-tender, + BS	  Skin: No rashes, No ecchymoses, No cyanosis	  Neurologic: Non-focal  Extremities: left thigh/hip hematoma + tenderness     TELEMETRY: 	    ECG:  	Pending   RADIOLOGY:  OTHER:   < from: Xray Tibia + Fibula 2 Views, Left (19 @ 15:35) >  IMPRESSION:  1.  Noacute fracture or dislocation.  2.  Severe osteoarthritis of the left knee.    --------------------------------------------------------------------------------------------------------------------------------------  	  < from: Xray Wrist 3 Views, Left (19 @ 15:35) >  IMPRESSION:  Small osseous fragment along the dorsal aspect of the wrist suspicious   for triquetral avulsion fracture.    --------------------------------------------------------------------------------------------------------------------------------------  < from: Xray Elbow AP + Lateral + Oblique, Left (19 @ 15:36) >    IMPRESSION:  No acute fracture or dislocation.        --------------------------------------------------------------------------------------------------------------------------------------      < from: CT Pelvis Bony Only No Cont (19 @ 15:50) >    EXAM:  CT PELVIS BONY ONLY                          EXAM:  CT 3D RECONSTRUCT YARA CHINCHILLA                            PROCEDURE DATE:  2019          IMPRESSION:  1.  Edema in the subcutaneous fat along the lateral aspect of the left   hip, favored to be secondary to contusion from reported fall.  2.  No acute fracture or dislocation.        --------------------------------------------------------------------------------------------------------------------------------------        LABS:	 	    CARDIAC MARKERS:                                  7.2    5.5   )-----------( 116      ( 08 Aug 2019 15:12 )             22.5         138  |  106  |  72<H>  ----------------------------<  135<H>  5.1   |  20<L>  |  4.92<H>    Ca    9.1      08 Aug 2019 15:12    TPro  6.0  /  Alb  3.4  /  TBili  0.2  /  DBili  x   /  AST  12  /  ALT  5<L>  /  AlkPhos  52  08    PT/INR - ( 08 Aug 2019 15:12 )   PT: 42.1 sec;   INR: 3.52 ratio         PTT - ( 08 Aug 2019 15:12 )  PTT:38.0 sec  proBNP:   Lipid Profile:   HgA1c:   TSH:

## 2019-08-08 NOTE — ED PROVIDER NOTE - NSFOLLOWUPINSTRUCTIONS_ED_ALL_ED_FT
-You were seen in the Emergency Department (ED) for MECHANICAL FALL. You were found to have a LEFT WRIST TRIQUETRAL AVULSION FRACTURE that was immobilized with a wrist splint. Lab and imaging results, if performed, were discussed with you along with your discharge diagnosis.    MEDICATIONS:  -Continue all other prescribed medicine, IF ANY, as per your primary care doctor's (PMD) recommendations.  -Please stop your warfarin (coumadin) as directed to by Dr. Damico and see him on Tuesday in his office.    PAIN CONTROL:  -Please take over the counter Tylenol (also known as acetaminophen) 650mg every 6 hours for your pain, IF ANY, unless you are not supposed to for any reason.  -Rest, stay hydrated with plenty of fluids (drink at least 2 Liters or 64 Ounces of water each day UNLESS you are supposed to restrict fluids or ANY reason.    FOLLOW-UP:  -Please follow up with your private physician within the next 72 hours. Follow up with your cardiologist as directed to during your visit today. Tell them you were recently in the ED for an urgent issue and would like to be seen. Bring copies of your results if you were given.   - You will need to follow up with your PCP and an orthopedist doctor about your wrist fracture within 1 week.  -If you do not have a PMD, please call [phone] to find one convenient for you or call our clinic at [phone].    RETURN PRECAUTIONS:  -Please return to the Emergency Department if you experience ANY new or concerning symptoms, such as, but not limited to: worsening pain, large amount of bleeding, passing out, fever >100.F, shaking chills, inability to see or new double vision, chest pain, difficulty breathing, diffuse abdominal pain, unable to eat or drink, continuous vomiting or diarrhea, unable to move or feel part of your body      Thank you for choosing a [facility] ED.

## 2019-08-08 NOTE — ED PROVIDER NOTE - CARE PLAN
Principal Discharge DX:	Left wrist fracture, closed, initial encounter  Secondary Diagnosis:	Fall  Secondary Diagnosis:	Anemia

## 2019-08-08 NOTE — CONSULT NOTE ADULT - ASSESSMENT
79 year old woman with hx HTN, HLD, DM, arthritis, Anemia, Chronic Kidney Disease, Coronary Artery Disease, S/P CABG, S/P PCI, Severe Aortic Stenosis, S/P TAVR 3/30/2017, S/P PPM, Chronic Diastolic chronic CHF, on coumadin s/p RUE DVT in ED s/p fall, vascular surgery consulted for evaluation of possible hematoma:     - Continue splint to L UE per ED   - Appreciate CRDs recs, please continue to hold Coumadin until follow up with CRDs on Monday as OP   - Patient with chronic anemia on previous hospitalizations H/H today in ED stable  - Vitals stable in ED, no hemodynamic instability   - Please have patient follow up with vascular surgeon, Dr. Flores as outpatient next week or call with any questions/concerns [phone]     Patient plan discussed with Dr. Flores     Vascular Surgery

## 2019-08-08 NOTE — ED ADULT NURSE NOTE - NSSUSCREENINGQ2_ED_ALL_ED
Pt requesting lab results - only C. Diff completed and it is negative . Other stool testing are still in process.  Pt V/U.
No

## 2019-08-08 NOTE — ED PROVIDER NOTE - PROGRESS NOTE DETAILS
Karlos Caelro D.O., PGY1:   Treatment plan relayed to patient. Patient expressed verbal understanding and is in agreement. Zoë Ayon MD  pt has negative ct pelvis for fx/dislocation. pt was informed of results Karlos Calero D.O., PGY1 (Resident)  Wrist splint was placed on patient's left wrist to immobilize triquetral fracture. Patient was taught how to use splint. Patient expressed verbal understanding and was able to demonstrate how to use splint. Karlos Calero D.O., PGY1:   The patient was re-examined and is feeling better. Vitals signs are within normal limits of patient's baseline. Patient is at baseline mental status, ambulating, tolerating PO. Pain is adequately controlled.   Patient was given verbal and written discharge instructions and return precautions, and a hard copy of all results from tests completed in the ED. Patient verbalized understanding and agreed to outpatient follow up with PMD and/or specalists/clinics. Referral list / Clinic information for outpatient follow up was provided.

## 2019-08-08 NOTE — CONSULT NOTE ADULT - ATTENDING COMMENTS
Agree with above NP note.  cv stable for d/c home   mech fall, no syncope   hematoma without hip fx  heme stable  hod coumadin x 3 day s  resume monday 6mg daily   d/w er team   wrist splint per er
I reviewed the resident note and agree with the findings and plan

## 2019-08-08 NOTE — ED PROVIDER NOTE - PHYSICAL EXAMINATION
GENERAL: elderly female, lying in bed, NAD Vital signs are within normal limits  HEENT: NC/AT, conjunctiva, moist mucous membranes  NECK: Supple, trachea midline  LUNG: CTAB, no w/r/r appreciated, good respiratory effort  CV: RRR, no m/r/g appreciated, Pulses- Radial: 2+ b/l  ABDOMEN: Soft, NTND, no rebound or guarding,  MSK: full range of motion UE/LE b/l, 5/5 muscle strength UE/LE bilateral however contusion left hip over femoral head, left hip pain on pelvic rock, left tibial, knee, wrist and elbow tenderness to palpation  NEURO: AAOx4 (to person, place, time, event), sensation grossly intact  SKIN: Warm, dry, well perfused, no evidence of rash  PSYCH: Normal mood and affect

## 2019-08-08 NOTE — CONSULT NOTE ADULT - SUBJECTIVE AND OBJECTIVE BOX
Vascular Surgery Consult  Consulting surgical team: Vascular   Consulting attending: Mark     HPI:      78 yo F with Hx of HTN, HLD, DM, CKD, CAD s/p CABG arotic stenosis, CHF presented to the ED from home s/p mechanical fall after her knees buckling and she landed on her buttock/left hip. The patient was sent to ED by her cardiologist for evaluation because she is currently taking Coumadin for a RUE DVT. Vascular surgery consulted for evaluation of possible hematoma to L LE s/p fall. Patient c/o pain to the area on examination however she thinks it has "decreased in size" since she arrived to the ED. She denies any CP/COB.        PAST SURGICAL HISTORY:  CABG (2007, [facility]), S/P TAVR 3/30/2017, S/P PPM ([facility])    PAST MEDICAL HISTORY:  Thyroid nodule  Severe aortic stenosis  Osteoarthritis  CKD (chronic kidney disease) stage 4, GFR 15-29 ml/min  DM2 (diabetes mellitus, type 2)  Arthritis  CAD (coronary artery disease)  Gout  Anemia Associated with Chronic Renal Failure  HTN (Hypertension)  HTN (Hypertension)  Diabetes      PAST SURGICAL HISTORY:  S/P AVR  History of pacemaker  A-V fistula  S/P cholecystectomy  Stented coronary artery  S/P CABG (coronary artery bypass graft)      MEDICATIONS:      ALLERGIES:  codeine (Other)  Lortab (Other)  morphine (Other)  Percocet 10/325 (Other)  PPM not compatible with  MRI (Other (Fatal))  Reglan (Other; [place])  sulfa drugs ([place])      VITALS & I/Os:  Vital Signs Last 24 Hrs  T(C): 37.2 (08 Aug 2019 18:19), Max: 37.2 (08 Aug 2019 18:19)  T(F): 98.9 (08 Aug 2019 18:19), Max: 98.9 (08 Aug 2019 18:19)  HR: 74 (08 Aug 2019 18:19) (73 - 85)  BP: 147/74 (08 Aug 2019 18:19) (147/74 - 158/67)  BP(mean): --  RR: 18 (08 Aug 2019 18:19) (18 - 18)  SpO2: 98% (08 Aug 2019 18:19) (98% - 100%)    I&O's Summary      PHYSICAL EXAM:  General: No acute distress  Respiratory: Nonlabored  Cardiovascular: RRR  Abdominal: Soft, nondistended, nontender. No rebound or guarding. No organomegaly, no palpable mass.  Extremities: Area of swelling and tenderness to posterior L LE, inferior to buttock. No overlying ecchymosis. PT/DP signals intact bilaterally.     LABS:                        7.2    5.5   )-----------( 116      ( 08 Aug 2019 15:12 )             22.5     08-08    138  |  106  |  72<H>  ----------------------------<  135<H>  5.1   |  20<L>  |  4.92<H>    Ca    9.1      08 Aug 2019 15:12    TPro  6.0  /  Alb  3.4  /  TBili  0.2  /  DBili  x   /  AST  12  /  ALT  5<L>  /  AlkPhos  52  08-08    Lactate:    PT/INR - ( 08 Aug 2019 15:12 )   PT: 42.1 sec;   INR: 3.52 ratio         PTT - ( 08 Aug 2019 15:12 )  PTT:38.0 sec              IMAGING: Vascular Surgery Consult  Consulting surgical team: Vascular   Consulting attending: Mark     HPI:      80 yo F with Hx of HTN, HLD, DM, CKD, CAD s/p CABG arotic stenosis, CHF presented to the ED from home s/p mechanical fall after her knees buckling and she landed on her buttock/left hip. The patient was sent to ED by her cardiologist for evaluation because she is currently taking Coumadin for a RUE DVT. Vascular surgery consulted for evaluation of possible hematoma to L LE s/p fall. Patient c/o pain to the area on examination however she thinks it has "decreased in size" since she arrived to the ED. She denies any CP/COB.        PAST SURGICAL HISTORY:  CABG (2007, [facility]), S/P TAVR 3/30/2017, S/P PPM ([facility])    PAST MEDICAL HISTORY:  Thyroid nodule  Severe aortic stenosis  Osteoarthritis  CKD (chronic kidney disease) stage 4, GFR 15-29 ml/min  DM2 (diabetes mellitus, type 2)  Arthritis  CAD (coronary artery disease)  Gout  Anemia Associated with Chronic Renal Failure  HTN (Hypertension)  HTN (Hypertension)  Diabetes      PAST SURGICAL HISTORY:  S/P AVR  History of pacemaker  A-V fistula  S/P cholecystectomy  Stented coronary artery  S/P CABG (coronary artery bypass graft)      MEDICATIONS:      ALLERGIES:  codeine (Other)  Lortab (Other)  morphine (Other)  Percocet 10/325 (Other)  PPM not compatible with  MRI (Other (Fatal))  Reglan (Other; [place])  sulfa drugs ([place])      VITALS & I/Os:  Vital Signs Last 24 Hrs  T(C): 37.2 (08 Aug 2019 18:19), Max: 37.2 (08 Aug 2019 18:19)  T(F): 98.9 (08 Aug 2019 18:19), Max: 98.9 (08 Aug 2019 18:19)  HR: 74 (08 Aug 2019 18:19) (73 - 85)  BP: 147/74 (08 Aug 2019 18:19) (147/74 - 158/67)  BP(mean): --  RR: 18 (08 Aug 2019 18:19) (18 - 18)  SpO2: 98% (08 Aug 2019 18:19) (98% - 100%)    I&O's Summary      PHYSICAL EXAM:  General: No acute distress  Respiratory: Nonlabored  Cardiovascular: RRR  Abdominal: Soft, nondistended, nontender. No rebound or guarding. No organomegaly, no palpable mass.  Extremities: Area of swelling and tenderness to posterior L LE, inferior to buttock. No overlying ecchymosis. PT/DP signals intact bilaterally.     LABS:                        7.2    5.5   )-----------( 116      ( 08 Aug 2019 15:12 )             22.5     08-08    138  |  106  |  72<H>  ----------------------------<  135<H>  5.1   |  20<L>  |  4.92<H>    Ca    9.1      08 Aug 2019 15:12    TPro  6.0  /  Alb  3.4  /  TBili  0.2  /  DBili  x   /  AST  12  /  ALT  5<L>  /  AlkPhos  52  08-08    Lactate:    PT/INR - ( 08 Aug 2019 15:12 )   PT: 42.1 sec;   INR: 3.52 ratio         PTT - ( 08 Aug 2019 15:12 )  PTT:38.0 sec

## 2019-08-08 NOTE — ED PROVIDER NOTE - NSFOLLOWUPCLINICS_GEN_ALL_ED_FT
[facility] Orthopedic Surgery  Orthopedic Surgery  [address], [address]rd & [address]th [place] [address]  Phone: [phone]  Fax:   Follow Up Time: 1-3 Days

## 2019-08-08 NOTE — ED ADULT NURSE NOTE - NS ED NOTE ABUSE SUSPICION NEGLECT YN
Outcome: Left Message    Please transfer to Patient Outreach Team at [phone] when patient returns call.    Attempt # 2         No

## 2019-08-08 NOTE — ED PROVIDER NOTE - OBJECTIVE STATEMENT
79 y.o. female hx of CKD stage 4, AS, CAD, CABG,  HTN, DM2, arthritis presents to the ED from home s/p mechanical fall at 12:00. Patient was standing, talking on the phone when her left knee buckled on her and she fell to the fall. Patient fell backwards, landed on her left hip and left outstretched arm. 79 y.o. female hx of CKD stage 4, AS, CAD, CABG,  HTN, DM2, arthritis presents to the ED from home s/p mechanical fall at 12:00. Patient was standing, talking on the phone when her left knee buckled on her and she fell to the fall. Patient fell backwards, landed on her left hip and left outstretched arm. Denies hitting head, no LOC. Patient was able to get up with assistance from a stranger. Patient was driven home by daughter, was able to ambulate to her house with assistance from son but came to ED out of concern. No headache, visual changes, chest pain, shortness of breath, numbness, or tingling. Patient endorsed having sa

## 2019-08-08 NOTE — ED PROVIDER NOTE - ATTENDING CONTRIBUTION TO CARE
Pt with knee buckle with fall to floor with LEFT wrist FOOSH injury.  Pt with pain and contusion L gluteal area and L distal wrist, NV intact, FROM of all joint, no abdominal or pelvic pain, denies any head injury.

## 2019-08-08 NOTE — ED ADULT NURSE NOTE - NSIMPLEMENTINTERV_GEN_ALL_ED
Implemented All Fall with Harm Risk Interventions:  [place] to call system. Call bell, personal items and telephone within reach. Instruct patient to call for assistance. Room bathroom lighting operational. Non-slip footwear when patient is off stretcher. Physically safe environment: no spills, clutter or unnecessary equipment. Stretcher in lowest position, wheels locked, appropriate side rails in place. Provide visual cue, wrist band, yellow gown, etc. Monitor gait and stability. Monitor for mental status changes and reorient to person, place, and time. Review medications for side effects contributing to fall risk. Reinforce activity limits and safety measures with patient and family. Provide visual clues: red socks.

## 2019-08-08 NOTE — CONSULT NOTE ADULT - CONSULT REASON
fall  hx  Hypertension, Hyperlipidemia, Thyroid Disease, Diabetes Mellitus, Arthritis, Anemia, Chronic Kidney Disease, Coronary Artery Disease, S/P CABG, S/P PCI, Severe Aortic Stenosis, S/P TAVR 3/30/2017, S/P PPM, Chronic Diastolic Congestive Heart Failure, RUE DVT

## 2019-08-09 DIAGNOSIS — S80.12XA CONTUSION OF LEFT LOWER LEG, INITIAL ENCOUNTER: ICD-10-CM

## 2019-08-14 ENCOUNTER — OUTPATIENT (OUTPATIENT)
Dept: OUTPATIENT SERVICES | Facility: HOSPITAL | Age: 79
LOS: 1 days | Discharge: HOME | End: 2019-08-14

## 2019-08-14 DIAGNOSIS — Z95.0 PRESENCE OF CARDIAC PACEMAKER: Chronic | ICD-10-CM

## 2019-08-14 DIAGNOSIS — Z95.2 PRESENCE OF PROSTHETIC HEART VALVE: Chronic | ICD-10-CM

## 2019-08-14 DIAGNOSIS — I77.0 ARTERIOVENOUS FISTULA, ACQUIRED: Chronic | ICD-10-CM

## 2019-08-14 DIAGNOSIS — N18.4 CHRONIC KIDNEY DISEASE, STAGE 4 (SEVERE): ICD-10-CM

## 2019-08-14 DIAGNOSIS — D63.1 ANEMIA IN CHRONIC KIDNEY DISEASE: ICD-10-CM

## 2019-08-14 DIAGNOSIS — D46.1 REFRACTORY ANEMIA WITH RING SIDEROBLASTS: ICD-10-CM

## 2019-08-14 DIAGNOSIS — Z90.49 ACQUIRED ABSENCE OF OTHER SPECIFIED PARTS OF DIGESTIVE TRACT: Chronic | ICD-10-CM

## 2019-08-16 ENCOUNTER — OUTPATIENT (OUTPATIENT)
Dept: OUTPATIENT SERVICES | Facility: HOSPITAL | Age: 79
LOS: 1 days | End: 2019-08-16
Payer: MEDICARE

## 2019-08-16 ENCOUNTER — APPOINTMENT (OUTPATIENT)
Dept: HEMATOLOGY ONCOLOGY | Facility: CLINIC | Age: 79
End: 2019-08-16
Payer: MEDICARE

## 2019-08-16 ENCOUNTER — RESULT REVIEW (OUTPATIENT)
Age: 79
End: 2019-08-16

## 2019-08-16 VITALS
SYSTOLIC BLOOD PRESSURE: 124 MMHG | TEMPERATURE: 97.5 F | DIASTOLIC BLOOD PRESSURE: 56 MMHG | RESPIRATION RATE: 16 BRPM | HEART RATE: 60 BPM | OXYGEN SATURATION: 97 %

## 2019-08-16 DIAGNOSIS — Z95.2 PRESENCE OF PROSTHETIC HEART VALVE: Chronic | ICD-10-CM

## 2019-08-16 DIAGNOSIS — N18.9 CHRONIC KIDNEY DISEASE, UNSPECIFIED: ICD-10-CM

## 2019-08-16 DIAGNOSIS — Z90.49 ACQUIRED ABSENCE OF OTHER SPECIFIED PARTS OF DIGESTIVE TRACT: Chronic | ICD-10-CM

## 2019-08-16 DIAGNOSIS — I77.0 ARTERIOVENOUS FISTULA, ACQUIRED: Chronic | ICD-10-CM

## 2019-08-16 DIAGNOSIS — Z95.0 PRESENCE OF CARDIAC PACEMAKER: Chronic | ICD-10-CM

## 2019-08-16 LAB
BLD GP AB SCN SERPL QL: NEGATIVE — SIGNIFICANT CHANGE UP
HCT VFR BLD CALC: 17.8 % — CRITICAL LOW (ref 34.5–45)
HGB BLD-MCNC: 5.9 G/DL — CRITICAL LOW (ref 11.5–15.5)
MCHC RBC-ENTMCNC: 28 PG — SIGNIFICANT CHANGE UP (ref 27–34)
MCHC RBC-ENTMCNC: 33 G/DL — SIGNIFICANT CHANGE UP (ref 32–36)
MCV RBC AUTO: 84.7 FL — SIGNIFICANT CHANGE UP (ref 80–100)
PLATELET # BLD AUTO: 102 K/UL — LOW (ref 150–400)
RBC # BLD: 2.1 M/UL — LOW (ref 3.8–5.2)
RBC # FLD: 15.4 % — HIGH (ref 10.3–14.5)
RH IG SCN BLD-IMP: POSITIVE — SIGNIFICANT CHANGE UP
WBC # BLD: 3.9 K/UL — SIGNIFICANT CHANGE UP (ref 3.8–10.5)
WBC # FLD AUTO: 3.9 K/UL — SIGNIFICANT CHANGE UP (ref 3.8–10.5)

## 2019-08-16 PROCEDURE — 86850 RBC ANTIBODY SCREEN: CPT

## 2019-08-16 PROCEDURE — 99214 OFFICE O/P EST MOD 30 MIN: CPT

## 2019-08-16 PROCEDURE — 86923 COMPATIBILITY TEST ELECTRIC: CPT

## 2019-08-16 PROCEDURE — 86901 BLOOD TYPING SEROLOGIC RH(D): CPT

## 2019-08-16 PROCEDURE — 86900 BLOOD TYPING SEROLOGIC ABO: CPT

## 2019-08-16 NOTE — HISTORY OF PRESENT ILLNESS
[Disease:__________________________] : Disease: [unfilled] [Constitutional] : Constitutional [Cardiovascular] : Cardiovascular [ENT] : ENT [Dermatologic] : Dermatologic [Gastrointestinal] : Gastrointestinal [Endocrine] : Endocrine [Genitourinary] : Genitourinary [Gynecologic] : Gynecologic [Infectious] : Infectious [Musculoskeletal] : Musculoskeletal [Neurologic] : Neurologic [Pain] : Pain [Pulmonary] : Pulmonary [Hematologic] : Hematologic [de-identified] : The patient has been followed by me for endstage renal disease for 8 years. She does not require dialysis. She had a left sided fistula placed three years ago by her physician in [place]. She is followed in the renal clinic by Dr Dixon.\par She has significant hypertension and she has required over three medication for control. There is no prior history  of cerebrovascular accident or bleeding. \par She has type two diabetes mellitus. The patient has contribution of both illness in the causation of her Stage 4 chronic renal disease. \par She has had mildLy elevated ferritin and iron levels.\par The use of colony stimulating agents has been safe when given when hemoglobins are less than 10 g/dL which has led to a frequency of administration of one ejection every 4 to 6 weeks.\par \par She will continue on the antihypertensive medication.She is on treatment for diabetes [FreeTextEntry1] : darbepoetin 200 mg q 6 weeks;Now held.; transfusion is advised since December 2018.  hydralazine 100 mg in AM; 50 at noon  and 100 mg PO in evening [de-identified] : Patient was [facility] to [facility] ED s/p fall on 8/8/2019. She sustained left hip contusion and left wrist fracture and is currently wearing a LUE splint. Her Coumadin was temporarily held and resumed on Monday 8/12/2019. She is scheduled for orthopedic consultation at [facility] [place] later today.

## 2019-08-16 NOTE — ASSESSMENT
[Palliative Care Plan] : not applicable at this time [FreeTextEntry1] : Mrs. 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 Her CBC today indicated she requires a blood transfusion. Patient is scheduled for 2 units blood transfusion on 8/17/2019 at 8 am. She will follow up with orthopedics as directed. Additional blood studies done today (Type & Cross, CMP, Ferritin, TIBC). Return to the office in 5 weeks. Seen in conjunction with Gaurav SMALLS

## 2019-08-16 NOTE — PHYSICAL EXAM
[Ambulatory and capable of all self care but unable to carry out any work activities] : Status 2- Ambulatory and capable of all self care but unable to carry out any work activities. Up and about more than 50% of waking hours [Normal] : affect appropriate [de-identified] : wheelchair bound [de-identified] : left arm AV fistula site, left forearm splint in place [de-identified] : 1/6 AMY radiation to carotids

## 2019-08-16 NOTE — REVIEW OF SYSTEMS
[Negative] : Allergic/Immunologic [Fatigue] : fatigue [SOB on Exertion] : shortness of breath during exertion [Difficulty Walking] : difficulty walking [Easy Bleeding] : a tendency for easy bleeding [Easy Bruising] : a tendency for easy bruising [Fever] : no fever [Chills] : no chills [Night Sweats] : no night sweats [Recent Change In Weight] : ~T no recent weight change [Eye Pain] : no eye pain [Red Eyes] : eyes not red [Dry Eyes] : no dryness of the eyes [Vision Problems] : no vision problems [Dysphagia] : no dysphagia [Loss of Hearing] : no loss of hearing [Nosebleeds] : no nosebleeds [Hoarseness] : no hoarseness [Odynophagia] : no odynophagia [Mucosal Pain] : no mucosal pain [Chest Pain] : no chest pain [Palpitations] : no palpitations [Leg Claudication] : no intermittent leg claudication [Lower Ext Edema] : no lower extremity edema [Shortness Of Breath] : no shortness of breath [Wheezing] : no wheezing [Cough] : no cough [Joint Pain] : no joint pain [Joint Stiffness] : no joint stiffness [Muscle Pain] : no muscle pain [Skin Rash] : no skin rash [Skin Wound] : no skin wound [Confused] : no confusion [Dizziness] : no dizziness [Fainting] : no fainting [Suicidal] : not suicidal [Insomnia] : no insomnia [Anxiety] : no anxiety [Depression] : no depression [Proptosis] : no proptosis [Hot Flashes] : no hot flashes [Muscle Weakness] : no muscle weakness [Swollen Glands] : no swollen glands [Deepening Of The Voice] : no deepening of the voice

## 2019-08-17 ENCOUNTER — APPOINTMENT (OUTPATIENT)
Dept: INFUSION THERAPY | Facility: HOSPITAL | Age: 79
End: 2019-08-17

## 2019-08-19 DIAGNOSIS — Z51.89 ENCOUNTER FOR OTHER SPECIFIED AFTERCARE: ICD-10-CM

## 2019-08-19 LAB
ALBUMIN SERPL ELPH-MCNC: 3.3 G/DL
ALP BLD-CCNC: 46 U/L
ALT SERPL-CCNC: <5 U/L
ANION GAP SERPL CALC-SCNC: 13 MMOL/L
AST SERPL-CCNC: 11 U/L
BILIRUB SERPL-MCNC: 0.2 MG/DL
BUN SERPL-MCNC: 61 MG/DL
CALCIUM SERPL-MCNC: 9 MG/DL
CHLORIDE SERPL-SCNC: 111 MMOL/L
CO2 SERPL-SCNC: 16 MMOL/L
CREAT SERPL-MCNC: 4.83 MG/DL
FERRITIN SERPL-MCNC: 799 NG/ML
GLUCOSE SERPL-MCNC: 197 MG/DL
IRON SATN MFR SERPL: 35 %
IRON SERPL-MCNC: 65 UG/DL
POTASSIUM SERPL-SCNC: 5.1 MMOL/L
PROT SERPL-MCNC: 5.2 G/DL
SODIUM SERPL-SCNC: 140 MMOL/L
TIBC SERPL-MCNC: 186 UG/DL
UIBC SERPL-MCNC: 121 UG/DL

## 2019-08-23 ENCOUNTER — RX RENEWAL (OUTPATIENT)
Age: 79
End: 2019-08-23

## 2019-08-23 NOTE — ED ADULT NURSE NOTE - CAS EDN DISCHARGE INTERVENTIONS
08/23/19                            Chau Quinteros  [address] [address]    To Whom It May Concern:    This is to certify Chau Quinteros was evaluated with [facility] [address] on 08/23/19 and is excused from work on Friday 8/23/19. Cleared to return to work on Monday 8/26/19     RESTRICTIONS: none              [facility] [address]  [facility] [place] [id number]  [address] [place] [id number]  Phone: [phone]                    
IV discontinued, cath removed intact

## 2019-09-12 NOTE — H&P PST ADULT - NSSUBSTANCEUSE_GEN_ALL_CORE_SD
I have reviewed pertinent labs and imaging, discussed/agreed on the plan of care with Dr. Adam Hill. Hospitalist Progress Note    NAME: Whitney Alonso   :  1937   MRN:  [id number]       Interim Hospital Summary: 80 y.o. female whom presented on 2019 with  BRBPR with. She has a hx of GAVE. Assessment / Plan:  Acute Lower GI bleeding   Hx of PUD  Hx GERD  Hx Colon Polyps  Hx of GAVE  heme + stools in ER  -Last EGD 19 by Dr. Luis Miguel Pearson and showed erythema in the antrum but no GAVE and 2cm hiatal hernia. - Colonoscopy 19  14mm semipedunculated polyp in descending colon removed and 8mm sessile polyp in rectum removed. No diverticulosis and retroflexion in rectum was negative. Small bowel capsule 19 did not reach the cecum.   -19 Colonoscopy done  with findings of Single vascular ectasia ascending colon, clipped. -GI following  -Pill camera study in progress. Belt to be removed at 1500  -Denies abdominal cramping today    -Hgb stable up to 10.2  -Continue Carafate and PPI        Paroxysmal A-fib   Hx of ablation of atrial flutter  -Xarelto on hold due to bleeding   -HR 60-70's  -Continue Amiodarone  -Continue tele for now       DM type 2 with hyperglycemia   -Continue NPH and SSI  -AC/HS accu-checks  -today       HTN  CAD  Hypercholesteremia  S/P Mitral Valve Replacement with mechanical valve   PVD  Diastolic heart Failure  -Continue lasix  -Continue statin  -Continue Metoprolol  -Continue Potassium supplements K today 3.7   -ANTHONY hose ordered for chronic lower extremity edema       Hypokalemia  - K+ 3.7   -Repeat BMP in am       Hypothyroidism  -Continue Synthroid      Chronic Pain syndrome  -Tylenol as needed      Gout POA  -not having an acute attack at this time  -Continue Allopurinol       Chronic thrombocytopenia  -Improving 90      Body mass index is 31.04 kg/m².     Code status: DNR  Prophylaxis: H2B/PPI  Recommended Disposition: Home w/Family     Subjective: Chief Complaint / Reason for Physician Visit  F/U on pill cam study  \"I swallowed pill this morning, I feel fine \". Discussed with RN events overnight. Review of Systems:  Symptom Y/N Comments  Symptom Y/N Comments   Fever/Chills N   Chest Pain N    Poor Appetite N   Edema Y Right <left hx of PVD and swelling improved     Cough N   Abdominal Pain N    Sputum    Joint Pain     SOB/LEIJA N   Pruritis/Rash     Nausea/vomit N   Tolerating PT/OT     Diarrhea N   Tolerating Diet Y    Constipation N   Other       Could NOT obtain due to:      Objective:     VITALS:   Last 24hrs VS reviewed since prior progress note. Most recent are:  Patient Vitals for the past 24 hrs:   Temp Pulse Resp BP SpO2   09/12/19 1126 99.2 °F (37.3 °C) 70 16 139/62 98 %   09/12/19 0839 98.2 °F (36.8 °C) 67 16 129/43 96 %   09/12/19 0326 98 °F (36.7 °C) (!) 59 16 126/52 97 %   09/12/19 0000  61      09/11/19 2322 98 °F (36.7 °C) 64 16 134/43 97 %   09/11/19 2057 98.3 °F (36.8 °C) (!) 58 16 145/65 98 %   09/11/19 2000  60      09/11/19 1539 97.5 °F (36.4 °C) 99 16 134/46 100 %   09/11/19 1345  61 16 122/51 92 %   09/11/19 1343  61 17 132/59 95 %   09/11/19 1340   26 121/45 96 %   09/11/19 1337  60 21 120/48 96 %   09/11/19 1333  60 21 112/47 99 %   09/11/19 1329 97.4 °F (36.3 °C) 60 18 124/50 98 %   09/11/19 1319  (!) 111 19 109/41 100 %   09/11/19 1315  60 20 114/40 100 %       Intake/Output Summary (Last 24 hours) at 9/12/2019 1256  Last data filed at 9/11/2019 1341  Gross per 24 hour   Intake 550 ml   Output    Net 550 ml        PHYSICAL EXAM:  General: WD, WN. Alert, cooperative, no acute distress    EENT:  EOMI. Anicteric sclerae. MMM  Resp:  CTA bilaterally, no wheezing or rales. No accessory muscle use  CV:   NSR trace ->+1 bilateral lower extremity edema right more so than left ( wearing ANTHONY hose now)   GI:   no tenderness, Soft, Non distended, .  +Bowel sounds  Neurologic:  Alert and oriented X 3, normal speech, Psych:   Good insight. Not anxious nor agitated  Skin:  No rashes. No jaundice    Reviewed most current lab test results and cultures  YES  Reviewed most current radiology test results   YES  Review and summation of old records today    NO  Reviewed patient's current orders and MAR    YES  PMH/SH reviewed - no change compared to H&P  ________________________________________________________________________  Care Plan discussed with:    Comments   Patient Y    Family      RN Y    Care Manager     Consultant                        Multidiciplinary team rounds were held today with , nursing, pharmacist and clinical coordinator. Patient's plan of care was discussed; medications were reviewed and discharge planning was addressed. ________________________________________________________________________      Comments   >50% of visit spent in counseling and coordination of care     ________________________________________________________________________  McLeod Bran, NP     Procedures: see electronic medical records for all procedures/Xrays and details which were not copied into this note but were reviewed prior to creation of Plan. LABS:  I reviewed today's most current labs and imaging studies.   Pertinent labs include:  Recent Labs     09/12/19  0327 09/11/19  0312 09/10/19  1508 09/10/19  0304   WBC 4.9 4.9  --  4.7   HGB 10.2* 9.9* 9.9* 9.7*   HCT 31.3* 31.5* 31.0* 30.3*   PLT 90* 87*  --  84*     Recent Labs     09/12/19  0327 09/11/19  0312 09/10/19  0304 09/09/19  1826    141 145 143   K 3.7 3.3* 3.3* 3.9    107 111* 107   CO2 31 30 28 33*   GLU 85 90 102* 182*   BUN 14 16 23* 28*   CREA 1.65* 1.61* 1.82* 2.14*   CA 8.7 8.9 8.4* 8.8   MG  --  2.0  --   --    ALB  --   --   --  3.6   TBILI  --   --   --  0.5   SGOT  --   --   --  14*   ALT  --   --   --  23   INR  --   --   --  1.2*       Signed: )Andreina Bran, NP caffeine

## 2019-09-23 ENCOUNTER — OUTPATIENT (OUTPATIENT)
Dept: OUTPATIENT SERVICES | Facility: HOSPITAL | Age: 79
LOS: 1 days | Discharge: HOME | End: 2019-09-23

## 2019-09-23 DIAGNOSIS — Z95.2 PRESENCE OF PROSTHETIC HEART VALVE: Chronic | ICD-10-CM

## 2019-09-23 DIAGNOSIS — Z95.0 PRESENCE OF CARDIAC PACEMAKER: Chronic | ICD-10-CM

## 2019-09-23 DIAGNOSIS — N18.4 CHRONIC KIDNEY DISEASE, STAGE 4 (SEVERE): ICD-10-CM

## 2019-09-23 DIAGNOSIS — Z90.49 ACQUIRED ABSENCE OF OTHER SPECIFIED PARTS OF DIGESTIVE TRACT: Chronic | ICD-10-CM

## 2019-09-23 DIAGNOSIS — D46.1 REFRACTORY ANEMIA WITH RING SIDEROBLASTS: ICD-10-CM

## 2019-09-23 DIAGNOSIS — I77.0 ARTERIOVENOUS FISTULA, ACQUIRED: Chronic | ICD-10-CM

## 2019-09-23 DIAGNOSIS — D63.1 ANEMIA IN CHRONIC KIDNEY DISEASE: ICD-10-CM

## 2019-09-25 ENCOUNTER — OUTPATIENT (OUTPATIENT)
Dept: OUTPATIENT SERVICES | Facility: HOSPITAL | Age: 79
LOS: 1 days | End: 2019-09-25
Payer: MEDICARE

## 2019-09-25 ENCOUNTER — RESULT REVIEW (OUTPATIENT)
Age: 79
End: 2019-09-25

## 2019-09-25 ENCOUNTER — APPOINTMENT (OUTPATIENT)
Dept: HEMATOLOGY ONCOLOGY | Facility: CLINIC | Age: 79
End: 2019-09-25
Payer: MEDICARE

## 2019-09-25 VITALS
DIASTOLIC BLOOD PRESSURE: 70 MMHG | BODY MASS INDEX: 27.91 KG/M2 | WEIGHT: 147.71 LBS | HEART RATE: 60 BPM | SYSTOLIC BLOOD PRESSURE: 156 MMHG | OXYGEN SATURATION: 99 % | TEMPERATURE: 97.5 F | RESPIRATION RATE: 15 BRPM

## 2019-09-25 DIAGNOSIS — I82.621 ACUTE EMBOLISM AND THROMBOSIS OF DEEP VEINS OF RIGHT UPPER EXTREMITY: ICD-10-CM

## 2019-09-25 DIAGNOSIS — I77.0 ARTERIOVENOUS FISTULA, ACQUIRED: Chronic | ICD-10-CM

## 2019-09-25 DIAGNOSIS — Z90.49 ACQUIRED ABSENCE OF OTHER SPECIFIED PARTS OF DIGESTIVE TRACT: Chronic | ICD-10-CM

## 2019-09-25 DIAGNOSIS — Z95.2 PRESENCE OF PROSTHETIC HEART VALVE: Chronic | ICD-10-CM

## 2019-09-25 DIAGNOSIS — Z95.0 PRESENCE OF CARDIAC PACEMAKER: Chronic | ICD-10-CM

## 2019-09-25 LAB
BASOPHILS # BLD AUTO: 0.1 K/UL — SIGNIFICANT CHANGE UP (ref 0–0.2)
BASOPHILS NFR BLD AUTO: 1.7 % — SIGNIFICANT CHANGE UP (ref 0–2)
BLD GP AB SCN SERPL QL: NEGATIVE — SIGNIFICANT CHANGE UP
EOSINOPHIL # BLD AUTO: 0.1 K/UL — SIGNIFICANT CHANGE UP (ref 0–0.5)
EOSINOPHIL NFR BLD AUTO: 3.7 % — SIGNIFICANT CHANGE UP (ref 0–6)
HCT VFR BLD CALC: 17.9 % — CRITICAL LOW (ref 34.5–45)
HGB BLD-MCNC: 5.6 G/DL — CRITICAL LOW (ref 11.5–15.5)
LYMPHOCYTES # BLD AUTO: 1 K/UL — SIGNIFICANT CHANGE UP (ref 1–3.3)
LYMPHOCYTES # BLD AUTO: 29.5 % — SIGNIFICANT CHANGE UP (ref 13–44)
MCHC RBC-ENTMCNC: 26 PG — LOW (ref 27–34)
MCHC RBC-ENTMCNC: 31.1 G/DL — LOW (ref 32–36)
MCV RBC AUTO: 83.6 FL — SIGNIFICANT CHANGE UP (ref 80–100)
MONOCYTES # BLD AUTO: 0.3 K/UL — SIGNIFICANT CHANGE UP (ref 0–0.9)
MONOCYTES NFR BLD AUTO: 7.4 % — SIGNIFICANT CHANGE UP (ref 2–14)
NEUTROPHILS # BLD AUTO: 2 K/UL — SIGNIFICANT CHANGE UP (ref 1.8–7.4)
NEUTROPHILS NFR BLD AUTO: 57.8 % — SIGNIFICANT CHANGE UP (ref 43–77)
PLATELET # BLD AUTO: 132 K/UL — LOW (ref 150–400)
RBC # BLD: 2.13 M/UL — LOW (ref 3.8–5.2)
RBC # FLD: 14 % — SIGNIFICANT CHANGE UP (ref 10.3–14.5)
RH IG SCN BLD-IMP: POSITIVE — SIGNIFICANT CHANGE UP
WBC # BLD: 3.5 K/UL — LOW (ref 3.8–10.5)
WBC # FLD AUTO: 3.5 K/UL — LOW (ref 3.8–10.5)

## 2019-09-25 PROCEDURE — 99214 OFFICE O/P EST MOD 30 MIN: CPT

## 2019-09-25 NOTE — ASSESSMENT
[Palliative Care Plan] : not applicable at this time [FreeTextEntry1] : Mrs. 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 Her CBC today indicated she requires a blood transfusion. Patient is scheduled for 2 units blood transfusion on 9/28/2019 at 8 am. Additional blood studies done today (Type & Cross). Return to the office in 4 weeks. Seen in conjunction with Gaurav SMALLS

## 2019-09-25 NOTE — PHYSICAL EXAM
[Ambulatory and capable of all self care but unable to carry out any work activities] : Status 2- Ambulatory and capable of all self care but unable to carry out any work activities. Up and about more than 50% of waking hours [Normal] : grossly intact [de-identified] : wheelchair bound [de-identified] : 1/6 AMY radiation to carotids [de-identified] : left arm AV fistula site, left forearm splint in place

## 2019-09-25 NOTE — REVIEW OF SYSTEMS
[Chills] : chills [Fatigue] : fatigue [SOB on Exertion] : shortness of breath during exertion [Difficulty Walking] : difficulty walking [Easy Bleeding] : a tendency for easy bleeding [Easy Bruising] : a tendency for easy bruising [Negative] : Allergic/Immunologic [Fever] : no fever [Night Sweats] : no night sweats [Recent Change In Weight] : ~T no recent weight change [Eye Pain] : no eye pain [Dry Eyes] : no dryness of the eyes [Red Eyes] : eyes not red [Dysphagia] : no dysphagia [Vision Problems] : no vision problems [Nosebleeds] : no nosebleeds [Loss of Hearing] : no loss of hearing [Hoarseness] : no hoarseness [Odynophagia] : no odynophagia [Mucosal Pain] : no mucosal pain [Chest Pain] : no chest pain [Palpitations] : no palpitations [Leg Claudication] : no intermittent leg claudication [Shortness Of Breath] : no shortness of breath [Lower Ext Edema] : no lower extremity edema [Wheezing] : no wheezing [Cough] : no cough [Abdominal Pain] : no abdominal pain [Vomiting] : no vomiting [Constipation] : no constipation [Dysuria] : no dysuria [Diarrhea] : no diarrhea [Incontinence] : no incontinence [Vaginal Discharge] : no vaginal discharge [Joint Pain] : no joint pain [Dysmenorrhea/Abn Vaginal Bleeding] : no dysmenorrhea/abnormal vaginal bleeding [Joint Stiffness] : no joint stiffness [Muscle Pain] : no muscle pain [Skin Wound] : no skin wound [Skin Rash] : no skin rash [Confused] : no confusion [Dizziness] : no dizziness [Suicidal] : not suicidal [Fainting] : no fainting [Insomnia] : no insomnia [Anxiety] : no anxiety [Depression] : no depression [Hot Flashes] : no hot flashes [Proptosis] : no proptosis [Muscle Weakness] : no muscle weakness [Deepening Of The Voice] : no deepening of the voice [Swollen Glands] : no swollen glands

## 2019-09-25 NOTE — HISTORY OF PRESENT ILLNESS
[Disease:__________________________] : Disease: [unfilled] [Constitutional] : Constitutional [Cardiovascular] : Cardiovascular [ENT] : ENT [Dermatologic] : Dermatologic [Endocrine] : Endocrine [Gastrointestinal] : Gastrointestinal [Genitourinary] : Genitourinary [Gynecologic] : Gynecologic [Infectious] : Infectious [Musculoskeletal] : Musculoskeletal [Neurologic] : Neurologic [Pain] : Pain [Pulmonary] : Pulmonary [Hematologic] : Hematologic [de-identified] : The patient has been followed by me for endstage renal disease for 8 years. She does not require dialysis. She had a left sided fistula placed three years ago by her physician in [place]. She is followed in the renal clinic by Dr Dixon.\par She has significant hypertension and she has required over three medication for control. There is no prior history  of cerebrovascular accident or bleeding. \par She has type two diabetes mellitus. The patient has contribution of both illness in the causation of her Stage 4 chronic renal disease. \par She has had mildLy elevated ferritin and iron levels.\par The use of colony stimulating agents has been safe when given when hemoglobins are less than 10 g/dL which has led to a frequency of administration of one ejection every 4 to 6 weeks.\par \par She will continue on the antihypertensive medication.She is on treatment for diabetes [de-identified] : Patient continues recover from left wrist fracture, currently wearing a LUE splint as needed and following up with Orthopedics on 10/4/2019. Her Coumadin was resumed; she complains of right upper extremity/shoulder achiness at this time, exacerbated with physical activities.  [FreeTextEntry1] : darbepoetin 200 mg q 6 weeks;Now held.; transfusion is advised since December 2018.  hydralazine 100 mg in AM; 50 at noon  and 100 mg PO in evening [Date: ____________] : Patient's last distress assessment performed on [unfilled]. [0 - No Distress] : Distress Level: 0

## 2019-09-28 ENCOUNTER — APPOINTMENT (OUTPATIENT)
Dept: INFUSION THERAPY | Facility: HOSPITAL | Age: 79
End: 2019-09-28

## 2019-09-30 DIAGNOSIS — Z51.89 ENCOUNTER FOR OTHER SPECIFIED AFTERCARE: ICD-10-CM

## 2019-10-01 ENCOUNTER — APPOINTMENT (OUTPATIENT)
Dept: NEPHROLOGY | Facility: CLINIC | Age: 79
End: 2019-10-01
Payer: MEDICARE

## 2019-10-01 VITALS
SYSTOLIC BLOOD PRESSURE: 144 MMHG | WEIGHT: 141.09 LBS | BODY MASS INDEX: 26.64 KG/M2 | DIASTOLIC BLOOD PRESSURE: 73 MMHG | OXYGEN SATURATION: 96 % | HEART RATE: 78 BPM | HEIGHT: 61 IN

## 2019-10-01 PROCEDURE — 90688 IIV4 VACCINE SPLT 0.5 ML IM: CPT

## 2019-10-01 PROCEDURE — 99214 OFFICE O/P EST MOD 30 MIN: CPT | Mod: 25

## 2019-10-01 PROCEDURE — G0008: CPT

## 2019-10-01 NOTE — PHYSICAL EXAM
[General Appearance - Alert] : alert [General Appearance - In No Acute Distress] : in no acute distress [General Appearance - Well Nourished] : well nourished [General Appearance - Well Developed] : well developed [Sclera] : the sclera and conjunctiva were normal [Extraocular Movements] : extraocular movements were intact [Oropharynx] : the oropharynx was normal [Neck Cervical Mass (___cm)] : no neck mass was observed [Jugular Venous Distention Increased] : there was no jugular-venous distention [Auscultation Breath Sounds / Voice Sounds] : lungs were clear to auscultation bilaterally [Heart Rate And Rhythm] : heart rate was normal and rhythm regular [Heart Sounds Gallop] : no gallops [Heart Sounds Pericardial Friction Rub] : no pericardial rub [FreeTextEntry1] : +2 systolic murmur [Edema] : there was no peripheral edema [Abdomen Soft] : soft [Abdomen Tenderness] : non-tender [No CVA Tenderness] : no ~M costovertebral angle tenderness [No Spinal Tenderness] : no spinal tenderness [___ (cm) Fistula] : [unfilled] (cm) fistula [Bruit] : a bruit was present [Thrill] : a thrill was present [Skin Turgor] : normal skin turgor [] : no rash [Skin Lesions] : no skin lesions [Oriented To Time, Place, And Person] : oriented to person, place, and time [Impaired Insight] : insight and judgment were intact [Affect] : the affect was normal [Mood] : the mood was normal

## 2019-10-01 NOTE — ASSESSMENT
[FreeTextEntry1] : Ms. Seymour is a 79 y old F presenting for follow-up for CKD 5.\par \par 1)  CKD 5: \par Not uremic. Feels well overall. \par Will continue to monitor.\par Has fistula ready to use( good thrill and bruit), needs follow up for steal syndrome. \par Cont diuretics as needed\par \par 2)  Anemia of CKD\par Will check Hb today. Cannot get JEISON due to deep vein thrombosis. S/p transfusion recently\par \par \par 3)  Essential hypertension\par -cont meds for now with lasix. \par \par 4)  Renal osteodystrophy\par -cont calcitriol for now \par \par 5)  Access: Left avf- good thrill and bruit\par -patient will continue to follow-up with vascular surgery as recommended\par \par 6) Flu shot given today Left deltoid- tolerated well\par \par f/u 2 months

## 2019-10-01 NOTE — REVIEW OF SYSTEMS
[Fever] : no fever [Chills] : no chills [Feeling Poorly] : not feeling poorly [Feeling Tired] : not feeling tired [Dysuria] : no dysuria [Arthralgias] : no arthralgias [Joint Pain] : no joint pain [Joint Swelling] : no joint swelling [Negative] : Gastrointestinal

## 2019-10-01 NOTE — HISTORY OF PRESENT ILLNESS
[FreeTextEntry1] : Ms. Seymour presents for follow-up for CKD 5 - healthy transitions patient.\par Hx of TAVR, previous 3V CABG, PPM, AVF placed 9 years ago, has steal syndrome. \par \par Today claims is feeling well, no new medications added, no weight gain, edema or sob, denies insomnia, dysgeusia, tremors or anorexia. Had 2 units of PRBC few weeks ago for a Hgb of 5.6 and symptoms. She also had 2 falls where she feels her "knee" gave out and she is not sure what was happening. She is awaiting MRI of the brain from [facility]. Cards component was normal. \par \par \par \par

## 2019-10-02 LAB
ALBUMIN SERPL ELPH-MCNC: 3.7 G/DL
ANION GAP SERPL CALC-SCNC: 12 MMOL/L
APPEARANCE: ABNORMAL
BACTERIA: NEGATIVE
BASOPHILS # BLD AUTO: 0.01 K/UL
BASOPHILS NFR BLD AUTO: 0.3 %
BILIRUBIN URINE: NEGATIVE
BLOOD URINE: NEGATIVE
BUN SERPL-MCNC: 66 MG/DL
CALCIUM SERPL-MCNC: 9 MG/DL
CALCIUM SERPL-MCNC: 9 MG/DL
CHLORIDE SERPL-SCNC: 104 MMOL/L
CO2 SERPL-SCNC: 20 MMOL/L
COLOR: NORMAL
CREAT SERPL-MCNC: 5.1 MG/DL
CREAT SPEC-SCNC: 69 MG/DL
CREAT/PROT UR: 3.9 RATIO
EOSINOPHIL # BLD AUTO: 0.09 K/UL
EOSINOPHIL NFR BLD AUTO: 2.4 %
GLUCOSE QUALITATIVE U: NORMAL
GLUCOSE SERPL-MCNC: 138 MG/DL
HCT VFR BLD CALC: 26.9 %
HGB BLD-MCNC: 7.9 G/DL
HYALINE CASTS: 3 /LPF
IMM GRANULOCYTES NFR BLD AUTO: 0.3 %
KETONES URINE: NEGATIVE
LEUKOCYTE ESTERASE URINE: ABNORMAL
LYMPHOCYTES # BLD AUTO: 0.77 K/UL
LYMPHOCYTES NFR BLD AUTO: 20.6 %
MAN DIFF?: NORMAL
MCHC RBC-ENTMCNC: 26 PG
MCHC RBC-ENTMCNC: 29.4 GM/DL
MCV RBC AUTO: 88.5 FL
MICROSCOPIC-UA: NORMAL
MONOCYTES # BLD AUTO: 0.33 K/UL
MONOCYTES NFR BLD AUTO: 8.8 %
NEUTROPHILS # BLD AUTO: 2.52 K/UL
NEUTROPHILS NFR BLD AUTO: 67.6 %
NITRITE URINE: NEGATIVE
PARATHYROID HORMONE INTACT: 505 PG/ML
PH URINE: 6
PHOSPHATE SERPL-MCNC: 5.2 MG/DL
PLATELET # BLD AUTO: 123 K/UL
POTASSIUM SERPL-SCNC: 4.4 MMOL/L
PROT UR-MCNC: 268 MG/DL
PROTEIN URINE: ABNORMAL
RBC # BLD: 3.04 M/UL
RBC # FLD: 15.6 %
RED BLOOD CELLS URINE: 1 /HPF
SODIUM SERPL-SCNC: 136 MMOL/L
SPECIFIC GRAVITY URINE: 1.01
SQUAMOUS EPITHELIAL CELLS: 11 /HPF
UROBILINOGEN URINE: NORMAL
WBC # FLD AUTO: 3.73 K/UL
WHITE BLOOD CELLS URINE: 15 /HPF

## 2019-10-16 ENCOUNTER — APPOINTMENT (OUTPATIENT)
Dept: VASCULAR SURGERY | Facility: CLINIC | Age: 79
End: 2019-10-16
Payer: MEDICARE

## 2019-10-16 VITALS
TEMPERATURE: 97.8 F | WEIGHT: 141 LBS | SYSTOLIC BLOOD PRESSURE: 180 MMHG | DIASTOLIC BLOOD PRESSURE: 71 MMHG | HEIGHT: 61 IN | HEART RATE: 71 BPM | BODY MASS INDEX: 26.62 KG/M2

## 2019-10-16 DIAGNOSIS — R60.0 LOCALIZED EDEMA: ICD-10-CM

## 2019-10-16 DIAGNOSIS — I82.621 ACUTE EMBOLISM AND THROMBOSIS OF DEEP VEINS OF RIGHT UPPER EXTREMITY: ICD-10-CM

## 2019-10-16 DIAGNOSIS — I87.1 COMPRESSION OF VEIN: ICD-10-CM

## 2019-10-16 DIAGNOSIS — I82.A19 ACUTE EMBOLISM AND THROMBOSIS OF UNSPECIFIED AXILLARY VEIN: ICD-10-CM

## 2019-10-16 PROCEDURE — 93971 EXTREMITY STUDY: CPT

## 2019-10-16 PROCEDURE — 99214 OFFICE O/P EST MOD 30 MIN: CPT

## 2019-10-16 NOTE — PHYSICAL EXAM
[Normal Breath Sounds] : Normal breath sounds [2+] : left 2+ [No HSM] : no hepatosplenomegaly [No Rash or Lesion] : No rash or lesion [Alert] : alert [Oriented to Person] : oriented to person [Oriented to Place] : oriented to place [Oriented to Time] : oriented to time [Calm] : calm [JVD] : no jugular venous distention  [Right Carotid Bruit] : no bruit heard over the right carotid [Left Carotid Bruit] : no bruit heard over the left carotid [Ankle Swelling (On Exam)] : not present [Varicose Veins Of Lower Extremities] : not present [] : not present [Abdomen Masses] : No abdominal masses [Tender] : was nontender [Stool Sample Taken] : No stool obtained  on rectal exam [de-identified] : nad [de-identified] : wnl [FreeTextEntry1] : rue mild new   edema\par no pain no cellulitis\par good rom  [de-identified] : wnl [de-identified] : wnl [de-identified] : Wiliam Cranial nerves 2-12 wiliam grossly intact [de-identified] : cooperative

## 2019-10-16 NOTE — HISTORY OF PRESENT ILLNESS
[FreeTextEntry1] : pt was seen in hop  consultation at [facility] w rue  edema and dvt  s/o venous TOS thrombosis\par pt was rx med/conservatively w anticoag rx  sx stabilized and improved\par pt was d/c on aticoag rx\par pt states rue edema has resolved and only mild rt shoulder discomfort w activity \par intensity mild  since last ov  [de-identified] : pt is currently on counadin rx  which is being monitored by Dr Margaux POSADA\par pt is continuing rue  self pt/ot exercises \par pt reports mild swelling \par intensity mild since last ov \par

## 2019-10-16 NOTE — DATA REVIEWED
[FreeTextEntry1] : 2/15/2019 RUE venous Duplex sig for acute w progression to  subacute dvt in subclav  v and axillary v w  minimal flow \par \par 6/12/2019 RUE Venous Duplex sig for chronic non occ  dvt  in  dvt in subclav  v and  1of 2  brachial v , sig for wall thickening in basilic vein \par \par 7/10/2019  RUE Venous Duplex sig for chronic wall thickening of wall of  subclav  v and  1of 2  brachial v , sig for acute occlusive dvt , 2nd brach vein w chronic non occ dvt , basilic vein  w chronic  wall thickening \par \par 10/16/2019 RUE Venous Duplex sig for  sub acute ax v non occ dvt  \par                                                                 and  1of 2  brachial v , sig for sub acute non  occlusive dvt , \par                                                                  2nd brach vein w chronic non occ dvt , basilic vein\par \par \par

## 2019-10-16 NOTE — ASSESSMENT
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

## 2019-10-18 ENCOUNTER — OUTPATIENT (OUTPATIENT)
Dept: OUTPATIENT SERVICES | Facility: HOSPITAL | Age: 79
LOS: 1 days | Discharge: HOME | End: 2019-10-18

## 2019-10-18 DIAGNOSIS — Z90.49 ACQUIRED ABSENCE OF OTHER SPECIFIED PARTS OF DIGESTIVE TRACT: Chronic | ICD-10-CM

## 2019-10-18 DIAGNOSIS — Z95.2 PRESENCE OF PROSTHETIC HEART VALVE: Chronic | ICD-10-CM

## 2019-10-18 DIAGNOSIS — Z95.0 PRESENCE OF CARDIAC PACEMAKER: Chronic | ICD-10-CM

## 2019-10-18 DIAGNOSIS — D46.1 REFRACTORY ANEMIA WITH RING SIDEROBLASTS: ICD-10-CM

## 2019-10-18 DIAGNOSIS — D63.1 ANEMIA IN CHRONIC KIDNEY DISEASE: ICD-10-CM

## 2019-10-18 DIAGNOSIS — I77.0 ARTERIOVENOUS FISTULA, ACQUIRED: Chronic | ICD-10-CM

## 2019-10-18 DIAGNOSIS — N18.4 CHRONIC KIDNEY DISEASE, STAGE 4 (SEVERE): ICD-10-CM

## 2019-10-25 ENCOUNTER — RESULT REVIEW (OUTPATIENT)
Age: 79
End: 2019-10-25

## 2019-10-25 ENCOUNTER — APPOINTMENT (OUTPATIENT)
Dept: HEMATOLOGY ONCOLOGY | Facility: CLINIC | Age: 79
End: 2019-10-25
Payer: MEDICARE

## 2019-10-25 VITALS
OXYGEN SATURATION: 100 % | TEMPERATURE: 98 F | BODY MASS INDEX: 27.91 KG/M2 | RESPIRATION RATE: 18 BRPM | HEART RATE: 76 BPM | SYSTOLIC BLOOD PRESSURE: 190 MMHG | DIASTOLIC BLOOD PRESSURE: 70 MMHG | WEIGHT: 147.71 LBS

## 2019-10-25 DIAGNOSIS — E11.9 TYPE 2 DIABETES MELLITUS W/OUT COMPLICATIONS: ICD-10-CM

## 2019-10-25 LAB
BLD GP AB SCN SERPL QL: NEGATIVE — SIGNIFICANT CHANGE UP
HCT VFR BLD CALC: 21.6 % — LOW (ref 34.5–45)
HGB BLD-MCNC: 6.8 G/DL — CRITICAL LOW (ref 11.5–15.5)
MCHC RBC-ENTMCNC: 26.8 PG — LOW (ref 27–34)
MCHC RBC-ENTMCNC: 31.4 G/DL — LOW (ref 32–36)
MCV RBC AUTO: 85.3 FL — SIGNIFICANT CHANGE UP (ref 80–100)
PLATELET # BLD AUTO: 121 K/UL — LOW (ref 150–400)
RBC # BLD: 2.54 M/UL — LOW (ref 3.8–5.2)
RBC # FLD: 14.4 % — SIGNIFICANT CHANGE UP (ref 10.3–14.5)
RH IG SCN BLD-IMP: POSITIVE — SIGNIFICANT CHANGE UP
WBC # BLD: 4.3 K/UL — SIGNIFICANT CHANGE UP (ref 3.8–10.5)
WBC # FLD AUTO: 4.3 K/UL — SIGNIFICANT CHANGE UP (ref 3.8–10.5)

## 2019-10-25 PROCEDURE — 86901 BLOOD TYPING SEROLOGIC RH(D): CPT

## 2019-10-25 PROCEDURE — 99214 OFFICE O/P EST MOD 30 MIN: CPT

## 2019-10-25 PROCEDURE — 86850 RBC ANTIBODY SCREEN: CPT

## 2019-10-25 PROCEDURE — 86923 COMPATIBILITY TEST ELECTRIC: CPT

## 2019-10-25 PROCEDURE — 86900 BLOOD TYPING SEROLOGIC ABO: CPT

## 2019-10-25 NOTE — HISTORY OF PRESENT ILLNESS
[Disease:__________________________] : Disease: [unfilled] [Date: ____________] : Patient's last distress assessment performed on [unfilled]. [0 - No Distress] : Distress Level: 0 [Cardiovascular] : Cardiovascular [Constitutional] : Constitutional [Dermatologic] : Dermatologic [ENT] : ENT [Genitourinary] : Genitourinary [Gastrointestinal] : Gastrointestinal [Endocrine] : Endocrine [Infectious] : Infectious [Gynecologic] : Gynecologic [Musculoskeletal] : Musculoskeletal [Neurologic] : Neurologic [Pain] : Pain [Pulmonary] : Pulmonary [Hematologic] : Hematologic [de-identified] : The patient has been followed by me for endstage renal disease for 9 years. She does not require dialysis. She had a left sided fistula placed three years ago by her physician in [place]. She is followed in the renal clinic by Dr Dixon.\par She has significant hypertension and she has required over three medication for control. There is no prior history  of cerebrovascular accident or bleeding. \par She has type two diabetes mellitus. The patient has contribution of both illness in the causation of her Stage 4 chronic renal disease. \par She has had mildLy elevated ferritin and iron levels.\par The use of colony stimulating agents has been safe when given when hemoglobins are less than 10 g/dL which has led to a frequency of administration of one ejection every 4 to 6 weeks.\par \par She will continue on the antihypertensive medication.She is on treatment for diabetes [FreeTextEntry1] : darbepoetin 200 mg q 6 weeks;Now held.; transfusion is advised since December 2018.  hydralazine 100 mg in AM; 50 at noon  and 100 mg PO in evening [de-identified] : She has flels similar to other visits. Thrombus persists in the right arm now 8 months ago. She is no longer on anticoagulation Warfarin  3 mg altercate with 6.5 mg for the past 2 months. No bleeding.Note systolic BP elevation today to 190 systolic; She is advised by me to return home and to take her clonidine in addition to the other medication

## 2019-10-25 NOTE — ASSESSMENT
[Palliative Care Plan] : not applicable at this time [Supportive] : Goals of care discussed with patient: Supportive [FreeTextEntry1] : Ms ELDA Seymour is a 79 year old female with anemia renally related. She also has a history of neutropenia (benign) and now mild thrombocytopenia. Bone marrow was performed over 5 years ago and showed trilineage hematopoiesis.\par She has hypertension and she is advised to take her clonidine when she returns home. Transfusion of 2 units of packed red cells to be scheduled for 8 AM October 26 2019.\par R T C 1 month.\par Discussion with the patient regarding the use of Desferal SC home infusion as the potential of iron hemochromatosis exists.

## 2019-10-26 ENCOUNTER — APPOINTMENT (OUTPATIENT)
Dept: INFUSION THERAPY | Facility: HOSPITAL | Age: 79
End: 2019-10-26

## 2019-10-28 LAB
ALBUMIN SERPL ELPH-MCNC: 3.7 G/DL
ALP BLD-CCNC: 56 U/L
ALT SERPL-CCNC: 5 U/L
ANION GAP SERPL CALC-SCNC: 14 MMOL/L
AST SERPL-CCNC: 12 U/L
BILIRUB SERPL-MCNC: 0.2 MG/DL
BUN SERPL-MCNC: 74 MG/DL
CALCIUM SERPL-MCNC: 8.8 MG/DL
CHLORIDE SERPL-SCNC: 108 MMOL/L
CO2 SERPL-SCNC: 18 MMOL/L
CREAT SERPL-MCNC: 5.01 MG/DL
FERRITIN SERPL-MCNC: 985 NG/ML
GLUCOSE SERPL-MCNC: 122 MG/DL
IRON SATN MFR SERPL: 37 %
IRON SERPL-MCNC: 74 UG/DL
POTASSIUM SERPL-SCNC: 5.1 MMOL/L
PROT SERPL-MCNC: 6 G/DL
SODIUM SERPL-SCNC: 140 MMOL/L
TIBC SERPL-MCNC: 198 UG/DL
UIBC SERPL-MCNC: 124 UG/DL

## 2019-10-29 DIAGNOSIS — Z51.89 ENCOUNTER FOR OTHER SPECIFIED AFTERCARE: ICD-10-CM

## 2019-11-22 ENCOUNTER — OUTPATIENT (OUTPATIENT)
Dept: OUTPATIENT SERVICES | Facility: HOSPITAL | Age: 79
LOS: 1 days | End: 2019-11-22
Payer: MEDICARE

## 2019-11-22 ENCOUNTER — RESULT REVIEW (OUTPATIENT)
Age: 79
End: 2019-11-22

## 2019-11-22 ENCOUNTER — OUTPATIENT (OUTPATIENT)
Dept: OUTPATIENT SERVICES | Facility: HOSPITAL | Age: 79
LOS: 1 days | Discharge: HOME | End: 2019-11-22

## 2019-11-22 ENCOUNTER — APPOINTMENT (OUTPATIENT)
Dept: HEMATOLOGY ONCOLOGY | Facility: CLINIC | Age: 79
End: 2019-11-22
Payer: MEDICARE

## 2019-11-22 VITALS
HEART RATE: 65 BPM | RESPIRATION RATE: 16 BRPM | BODY MASS INDEX: 28.2 KG/M2 | TEMPERATURE: 97.8 F | SYSTOLIC BLOOD PRESSURE: 143 MMHG | OXYGEN SATURATION: 99 % | DIASTOLIC BLOOD PRESSURE: 67 MMHG | WEIGHT: 149.25 LBS

## 2019-11-22 DIAGNOSIS — D63.1 ANEMIA IN CHRONIC KIDNEY DISEASE: ICD-10-CM

## 2019-11-22 DIAGNOSIS — Z95.0 PRESENCE OF CARDIAC PACEMAKER: Chronic | ICD-10-CM

## 2019-11-22 DIAGNOSIS — I77.0 ARTERIOVENOUS FISTULA, ACQUIRED: Chronic | ICD-10-CM

## 2019-11-22 DIAGNOSIS — N18.4 CHRONIC KIDNEY DISEASE, STAGE 4 (SEVERE): ICD-10-CM

## 2019-11-22 DIAGNOSIS — Z90.49 ACQUIRED ABSENCE OF OTHER SPECIFIED PARTS OF DIGESTIVE TRACT: Chronic | ICD-10-CM

## 2019-11-22 DIAGNOSIS — Z95.2 PRESENCE OF PROSTHETIC HEART VALVE: Chronic | ICD-10-CM

## 2019-11-22 DIAGNOSIS — D46.1 REFRACTORY ANEMIA WITH RING SIDEROBLASTS: ICD-10-CM

## 2019-11-22 LAB
BLD GP AB SCN SERPL QL: NEGATIVE — SIGNIFICANT CHANGE UP
HCT VFR BLD CALC: 20.3 % — CRITICAL LOW (ref 34.5–45)
HGB BLD-MCNC: 6.5 G/DL — CRITICAL LOW (ref 11.5–15.5)
MCHC RBC-ENTMCNC: 26.8 PG — LOW (ref 27–34)
MCHC RBC-ENTMCNC: 32.2 G/DL — SIGNIFICANT CHANGE UP (ref 32–36)
MCV RBC AUTO: 83.1 FL — SIGNIFICANT CHANGE UP (ref 80–100)
PLATELET # BLD AUTO: 108 K/UL — LOW (ref 150–400)
RBC # BLD: 2.44 M/UL — LOW (ref 3.8–5.2)
RBC # FLD: 14.2 % — SIGNIFICANT CHANGE UP (ref 10.3–14.5)
RH IG SCN BLD-IMP: POSITIVE — SIGNIFICANT CHANGE UP
WBC # BLD: 3.2 K/UL — LOW (ref 3.8–10.5)
WBC # FLD AUTO: 3.2 K/UL — LOW (ref 3.8–10.5)

## 2019-11-22 PROCEDURE — 99214 OFFICE O/P EST MOD 30 MIN: CPT

## 2019-11-22 NOTE — ASSESSMENT
[Supportive] : Goals of care discussed with patient: Supportive [Palliative Care Plan] : not applicable at this time [FreeTextEntry1] : Jonathan Seymour is a 79 year old female diagnosed with anemia secondary to endstage renal disease, s/p 57 Aranesp 200 mg injections from May 2011 - January 2019 (discontinued due to RUE DVT), currently on blood transfusion for symptomatic management. Patient She also has a history of leukopenia (benign) and mild thrombocytopenia. Bone marrow biopsy last performed on 7/15/2011 demonstrated maturing and mature myeloid elements with no significant lymphocytosis or immaturity. Her physical examination findings remain stable when compared to the last visit. \par Discussion with the patient regarding the use of Desferal SC home infusion as the potential of iron hemochromatosis exists; plan to check iron storage levels at the next visit. \par Transfusion of 2 units of packed red cells to be scheduled for 11/23/2019 at 11 am. Return to the office in 4 weeks.

## 2019-11-22 NOTE — REVIEW OF SYSTEMS
[Negative] : Allergic/Immunologic [Chills] : chills [Fatigue] : fatigue [Shortness Of Breath] : shortness of breath [SOB on Exertion] : shortness of breath during exertion [Joint Pain] : joint pain [Joint Stiffness] : joint stiffness [Difficulty Walking] : difficulty walking [Fever] : no fever [Recent Change In Weight] : ~T no recent weight change [Night Sweats] : no night sweats [Eye Pain] : no eye pain [Dry Eyes] : no dryness of the eyes [Vision Problems] : no vision problems [Red Eyes] : eyes not red [Loss of Hearing] : no loss of hearing [Dysphagia] : no dysphagia [Hoarseness] : no hoarseness [Nosebleeds] : no nosebleeds [Mucosal Pain] : no mucosal pain [Leg Claudication] : no intermittent leg claudication [Odynophagia] : no odynophagia [Wheezing] : no wheezing [Cough] : no cough [Constipation] : no constipation [Vomiting] : no vomiting [Abdominal Pain] : no abdominal pain [Dysuria] : no dysuria [Diarrhea] : no diarrhea [Dysmenorrhea/Abn Vaginal Bleeding] : no dysmenorrhea/abnormal vaginal bleeding [Vaginal Discharge] : no vaginal discharge [Muscle Pain] : no muscle pain [Skin Wound] : no skin wound [Confused] : no confusion [Skin Rash] : no skin rash [Fainting] : no fainting [Dizziness] : no dizziness [Suicidal] : not suicidal [Anxiety] : no anxiety [Insomnia] : no insomnia [Depression] : no depression [Proptosis] : no proptosis [Hot Flashes] : no hot flashes [Muscle Weakness] : no muscle weakness [Deepening Of The Voice] : no deepening of the voice [Easy Bleeding] : no tendency for easy bleeding [Swollen Glands] : no swollen glands [Easy Bruising] : no tendency for easy bruising

## 2019-11-22 NOTE — HISTORY OF PRESENT ILLNESS
[Disease:__________________________] : Disease: [unfilled] [Cardiovascular] : Cardiovascular [Constitutional] : Constitutional [ENT] : ENT [Dermatologic] : Dermatologic [Endocrine] : Endocrine [Gastrointestinal] : Gastrointestinal [Genitourinary] : Genitourinary [Gynecologic] : Gynecologic [Infectious] : Infectious [Musculoskeletal] : Musculoskeletal [Neurologic] : Neurologic [Pain] : Pain [Pulmonary] : Pulmonary [Hematologic] : Hematologic [Treatment Protocol] : Treatment Protocol [de-identified] : 79 year old female presenting to the office for hematologic care. The patient has been followed by [facility] for anemia secondary to endstage renal disease since April 2011. She had a left sided fistula placed approximately in 2010 by her physician in [place] but she does not require dialysis; she is followed in the renal clinic by Dr. Markell Dixon. The use of colony stimulating agents had been safe for the patient when given when hemoglobins are less than 10 g/dL which has led to a frequency of administration of one ejection every 4 to 6 weeks; she received Aranesp injections, completing 57 injections from May 2011 until January 2019. \par Patient was hospitalized from 1/22/2019 - 1/29/2019 for right upper extremity DVT. Prior to admission, she noted RUE swelling and pain/discomfort since receiving her last dose of Aranesp on 1/18/2019. 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 Since then, her Aranesp treatments were discontinued and she was transitioned to blood transfusions, which she requires 1 or 2 units every 4-6 weeks. \par Past medical history includes hypertension (required multiple medications for control), type two diabetes mellitus. There is no prior history of cerebrovascular accident or bleeding. She has had mildly elevated ferritin and iron levels. [FreeTextEntry1] : Darbepoetin 200 mg q 6 weeks (s/p 57 treatments - held.due to RUE DVT); transfusion since January 2019. [de-identified] : Patient presented to the office for routine follow-up visit. Her fatigue and shortness of breath remains unchanged. She noted an increase in joint pain/stiffness, exacerbated by cold weather and temporarily relieved with Tylenol; she currently using cane to ambulate small distances and wheelchair for assistance with transportation.

## 2019-11-23 ENCOUNTER — APPOINTMENT (OUTPATIENT)
Dept: INFUSION THERAPY | Facility: HOSPITAL | Age: 79
End: 2019-11-23

## 2019-11-25 DIAGNOSIS — Z51.89 ENCOUNTER FOR OTHER SPECIFIED AFTERCARE: ICD-10-CM

## 2019-11-26 ENCOUNTER — APPOINTMENT (OUTPATIENT)
Dept: INFUSION THERAPY | Facility: HOSPITAL | Age: 79
End: 2019-11-26

## 2019-12-03 DIAGNOSIS — R35.0 FREQUENCY OF MICTURITION: ICD-10-CM

## 2019-12-18 NOTE — ED PROVIDER NOTE - NS ED MD DISPO SPECIAL CONSIDERATION1
Body Location Override (Optional - Billing Will Still Be Based On Selected Body Map Location If Applicable): left upper cutaneous lip
Detail Level: Detailed
Add 07896 Cpt? (Important Note: In 2017 The Use Of 39119 Is Being Tracked By Cms To Determine Future Global Period Reimbursement For Global Periods): yes
None

## 2019-12-19 ENCOUNTER — RESULT REVIEW (OUTPATIENT)
Age: 79
End: 2019-12-19

## 2019-12-19 ENCOUNTER — APPOINTMENT (OUTPATIENT)
Dept: HEMATOLOGY ONCOLOGY | Facility: CLINIC | Age: 79
End: 2019-12-19
Payer: MEDICARE

## 2019-12-19 VITALS
HEART RATE: 67 BPM | WEIGHT: 136.69 LBS | RESPIRATION RATE: 14 BRPM | DIASTOLIC BLOOD PRESSURE: 66 MMHG | BODY MASS INDEX: 25.83 KG/M2 | OXYGEN SATURATION: 99 % | TEMPERATURE: 98.2 F | SYSTOLIC BLOOD PRESSURE: 122 MMHG

## 2019-12-19 LAB
ALBUMIN SERPL ELPH-MCNC: 3.5 G/DL
ALP BLD-CCNC: 53 U/L
ALT SERPL-CCNC: 9 U/L
ANION GAP SERPL CALC-SCNC: 11 MMOL/L
AST SERPL-CCNC: 15 U/L
BILIRUB SERPL-MCNC: 0.2 MG/DL
BLD GP AB SCN SERPL QL: NEGATIVE — SIGNIFICANT CHANGE UP
BUN SERPL-MCNC: 84 MG/DL
CALCIUM SERPL-MCNC: 9.3 MG/DL
CHLORIDE SERPL-SCNC: 107 MMOL/L
CO2 SERPL-SCNC: 22 MMOL/L
CREAT SERPL-MCNC: 5.73 MG/DL
FERRITIN SERPL-MCNC: 1487 NG/ML
GLUCOSE SERPL-MCNC: 118 MG/DL
HCT VFR BLD CALC: 23.8 % — LOW (ref 34.5–45)
HGB BLD-MCNC: 7.6 G/DL — LOW (ref 11.5–15.5)
IRON SATN MFR SERPL: 49 %
IRON SERPL-MCNC: 93 UG/DL
MCHC RBC-ENTMCNC: 26.5 PG — LOW (ref 27–34)
MCHC RBC-ENTMCNC: 32 G/DL — SIGNIFICANT CHANGE UP (ref 32–36)
MCV RBC AUTO: 82.8 FL — SIGNIFICANT CHANGE UP (ref 80–100)
PLATELET # BLD AUTO: 124 K/UL — LOW (ref 150–400)
POTASSIUM SERPL-SCNC: 5 MMOL/L
PROT SERPL-MCNC: 6.2 G/DL
RBC # BLD: 2.87 M/UL — LOW (ref 3.8–5.2)
RBC # FLD: 14.3 % — SIGNIFICANT CHANGE UP (ref 10.3–14.5)
RH IG SCN BLD-IMP: POSITIVE — SIGNIFICANT CHANGE UP
SODIUM SERPL-SCNC: 140 MMOL/L
TIBC SERPL-MCNC: 188 UG/DL
UIBC SERPL-MCNC: 95 UG/DL
WBC # BLD: 4 K/UL — SIGNIFICANT CHANGE UP (ref 3.8–10.5)
WBC # FLD AUTO: 4 K/UL — SIGNIFICANT CHANGE UP (ref 3.8–10.5)

## 2019-12-19 PROCEDURE — 99214 OFFICE O/P EST MOD 30 MIN: CPT

## 2019-12-19 NOTE — ASSESSMENT
[Supportive] : Goals of care discussed with patient: Supportive [Palliative Care Plan] : not applicable at this time [FreeTextEntry1] : Jonathan Seymour is a 79 year old female diagnosed with anemia secondary to endstage renal disease, s/p 57 Aranesp 200 mg injections from May 2011 - January 2019 (discontinued due to RUE DVT), currently on blood transfusion for symptomatic management. Patient She also has a history of leukopenia (benign) and mild thrombocytopenia. Bone marrow biopsy last performed on 7/15/2011 demonstrated maturing and mature myeloid elements with no significant lymphocytosis or immaturity. \par Patient appeared well today and her physical examination findings remain unchanged when compared to the last visit. Patient is aware of her rise in ferritin and agreed to consider starting Desferal SC home infusion sometime in the next 1-2 months. Blood studies done today. Transfusion of 1 unit of packed red cells to be scheduled for 12/21/2019 at 1 pm. Return to the office in 1 month.

## 2019-12-19 NOTE — REVIEW OF SYSTEMS
[Chills] : chills [Fatigue] : fatigue [Shortness Of Breath] : shortness of breath [SOB on Exertion] : shortness of breath during exertion [Joint Pain] : joint pain [Joint Stiffness] : joint stiffness [Difficulty Walking] : difficulty walking [Negative] : Endocrine [Fever] : no fever [Night Sweats] : no night sweats [Recent Change In Weight] : ~T no recent weight change [Eye Pain] : no eye pain [Red Eyes] : eyes not red [Vision Problems] : no vision problems [Dry Eyes] : no dryness of the eyes [Dysphagia] : no dysphagia [Loss of Hearing] : no loss of hearing [Nosebleeds] : no nosebleeds [Hoarseness] : no hoarseness [Odynophagia] : no odynophagia [Mucosal Pain] : no mucosal pain [Leg Claudication] : no intermittent leg claudication [Wheezing] : no wheezing [Cough] : no cough [Abdominal Pain] : no abdominal pain [Vomiting] : no vomiting [Constipation] : no constipation [Diarrhea] : no diarrhea [Dysuria] : no dysuria [Vaginal Discharge] : no vaginal discharge [Dysmenorrhea/Abn Vaginal Bleeding] : no dysmenorrhea/abnormal vaginal bleeding [Muscle Pain] : no muscle pain [Skin Rash] : no skin rash [Skin Wound] : no skin wound [Dizziness] : no dizziness [Confused] : no confusion [Fainting] : no fainting [Suicidal] : not suicidal [Insomnia] : no insomnia [Anxiety] : no anxiety [Depression] : no depression [Proptosis] : no proptosis [Hot Flashes] : no hot flashes [Muscle Weakness] : no muscle weakness [Deepening Of The Voice] : no deepening of the voice [Easy Bleeding] : no tendency for easy bleeding [Easy Bruising] : no tendency for easy bruising [Swollen Glands] : no swollen glands

## 2019-12-19 NOTE — HISTORY OF PRESENT ILLNESS
[Disease:__________________________] : Disease: [unfilled] [Treatment Protocol] : Treatment Protocol [Constitutional] : Constitutional [Cardiovascular] : Cardiovascular [ENT] : ENT [Dermatologic] : Dermatologic [Endocrine] : Endocrine [Gynecologic] : Gynecologic [Gastrointestinal] : Gastrointestinal [Genitourinary] : Genitourinary [Infectious] : Infectious [Musculoskeletal] : Musculoskeletal [Pain] : Pain [Pulmonary] : Pulmonary [Neurologic] : Neurologic [Hematologic] : Hematologic [de-identified] : 79 year old female presenting to the office for hematologic care. 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 She had a left sided fistula placed approximately in 2010 by her physician in [place] but she does not require dialysis; she is followed in the renal clinic by Dr. Markell Dixon. The use of colony stimulating agents had been safe for the patient when given when hemoglobins are less than 10 g/dL which has led to a frequency of administration of one ejection every 4 to 6 weeks; she received Aranesp injections, completing 57 injections from May 2011 until January 2019. \par Patient was hospitalized from 1/22/2019 - 1/29/2019 for right upper extremity DVT. Prior to admission, she noted RUE swelling and pain/discomfort since receiving her last dose of Aranesp on 1/18/2019. 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 Since then, her Aranesp treatments were discontinued and she was transitioned to blood transfusions, which she requires 1 or 2 units every 4-6 weeks. \par Past medical history includes hypertension (required multiple medications for control), type two diabetes mellitus. There is no prior history of cerebrovascular accident or bleeding. She has had mildly elevated ferritin and iron levels. [FreeTextEntry1] : Darbepoetin 200 mg q 6 weeks (s/p 57 treatments - held.due to RUE DVT); transfusion since January 2019. [de-identified] : Patient presented to the office for her scheduled follow-up visit and blood check. Her symptoms remain unchanged (fatigue, chills, shortness of breath at exertion).

## 2019-12-19 NOTE — PHYSICAL EXAM
[Restricted in physically strenuous activity but ambulatory and able to carry out work of a light or sedentary nature] : Status 1- Restricted in physically strenuous activity but ambulatory and able to carry out work of a light or sedentary nature, e.g., light house work, office work [Normal] : affect appropriate [de-identified] : ambulating with cane

## 2019-12-20 PROCEDURE — 86901 BLOOD TYPING SEROLOGIC RH(D): CPT

## 2019-12-20 PROCEDURE — 86923 COMPATIBILITY TEST ELECTRIC: CPT

## 2019-12-20 PROCEDURE — 86850 RBC ANTIBODY SCREEN: CPT

## 2019-12-20 PROCEDURE — 86900 BLOOD TYPING SEROLOGIC ABO: CPT

## 2019-12-21 ENCOUNTER — APPOINTMENT (OUTPATIENT)
Dept: INFUSION THERAPY | Facility: HOSPITAL | Age: 79
End: 2019-12-21

## 2020-01-20 ENCOUNTER — OUTPATIENT (OUTPATIENT)
Dept: OUTPATIENT SERVICES | Facility: HOSPITAL | Age: 80
LOS: 1 days | Discharge: HOME | End: 2020-01-20

## 2020-01-20 DIAGNOSIS — Z95.2 PRESENCE OF PROSTHETIC HEART VALVE: Chronic | ICD-10-CM

## 2020-01-20 DIAGNOSIS — Z95.0 PRESENCE OF CARDIAC PACEMAKER: Chronic | ICD-10-CM

## 2020-01-20 DIAGNOSIS — Z90.49 ACQUIRED ABSENCE OF OTHER SPECIFIED PARTS OF DIGESTIVE TRACT: Chronic | ICD-10-CM

## 2020-01-20 DIAGNOSIS — D63.1 ANEMIA IN CHRONIC KIDNEY DISEASE: ICD-10-CM

## 2020-01-20 DIAGNOSIS — I77.0 ARTERIOVENOUS FISTULA, ACQUIRED: Chronic | ICD-10-CM

## 2020-01-24 ENCOUNTER — APPOINTMENT (OUTPATIENT)
Dept: HEMATOLOGY ONCOLOGY | Facility: CLINIC | Age: 80
End: 2020-01-24
Payer: MEDICARE

## 2020-01-24 ENCOUNTER — OUTPATIENT (OUTPATIENT)
Dept: OUTPATIENT SERVICES | Facility: HOSPITAL | Age: 80
LOS: 1 days | End: 2020-01-24
Payer: COMMERCIAL

## 2020-01-24 ENCOUNTER — RESULT REVIEW (OUTPATIENT)
Age: 80
End: 2020-01-24

## 2020-01-24 VITALS
WEIGHT: 136.66 LBS | BODY MASS INDEX: 25.83 KG/M2 | HEART RATE: 73 BPM | DIASTOLIC BLOOD PRESSURE: 65 MMHG | TEMPERATURE: 97.5 F | SYSTOLIC BLOOD PRESSURE: 125 MMHG | RESPIRATION RATE: 16 BRPM | OXYGEN SATURATION: 97 %

## 2020-01-24 DIAGNOSIS — Z95.2 PRESENCE OF PROSTHETIC HEART VALVE: Chronic | ICD-10-CM

## 2020-01-24 DIAGNOSIS — D46.1 REFRACTORY ANEMIA WITH RING SIDEROBLASTS: ICD-10-CM

## 2020-01-24 DIAGNOSIS — N18.4 CHRONIC KIDNEY DISEASE, STAGE 4 (SEVERE): ICD-10-CM

## 2020-01-24 DIAGNOSIS — Z95.0 PRESENCE OF CARDIAC PACEMAKER: Chronic | ICD-10-CM

## 2020-01-24 DIAGNOSIS — D63.1 ANEMIA IN CHRONIC KIDNEY DISEASE: ICD-10-CM

## 2020-01-24 DIAGNOSIS — I77.0 ARTERIOVENOUS FISTULA, ACQUIRED: Chronic | ICD-10-CM

## 2020-01-24 DIAGNOSIS — I82.621 ACUTE EMBOLISM AND THROMBOSIS OF DEEP VEINS OF RIGHT UPPER EXTREMITY: ICD-10-CM

## 2020-01-24 DIAGNOSIS — Z90.49 ACQUIRED ABSENCE OF OTHER SPECIFIED PARTS OF DIGESTIVE TRACT: Chronic | ICD-10-CM

## 2020-01-24 LAB
BLD GP AB SCN SERPL QL: NEGATIVE — SIGNIFICANT CHANGE UP
HCT VFR BLD CALC: 18.9 % — CRITICAL LOW (ref 34.5–45)
HGB BLD-MCNC: 6 G/DL — CRITICAL LOW (ref 11.5–15.5)
MCHC RBC-ENTMCNC: 26 PG — LOW (ref 27–34)
MCHC RBC-ENTMCNC: 32 G/DL — SIGNIFICANT CHANGE UP (ref 32–36)
MCV RBC AUTO: 81.1 FL — SIGNIFICANT CHANGE UP (ref 80–100)
PLATELET # BLD AUTO: 100 K/UL — LOW (ref 150–400)
RBC # BLD: 2.32 M/UL — LOW (ref 3.8–5.2)
RBC # FLD: 13.2 % — SIGNIFICANT CHANGE UP (ref 10.3–14.5)
RH IG SCN BLD-IMP: POSITIVE — SIGNIFICANT CHANGE UP
WBC # BLD: 3.4 K/UL — LOW (ref 3.8–10.5)
WBC # FLD AUTO: 3.4 K/UL — LOW (ref 3.8–10.5)

## 2020-01-24 PROCEDURE — 86850 RBC ANTIBODY SCREEN: CPT

## 2020-01-24 PROCEDURE — 86901 BLOOD TYPING SEROLOGIC RH(D): CPT

## 2020-01-24 PROCEDURE — 86900 BLOOD TYPING SEROLOGIC ABO: CPT

## 2020-01-24 PROCEDURE — 86923 COMPATIBILITY TEST ELECTRIC: CPT

## 2020-01-24 PROCEDURE — 99214 OFFICE O/P EST MOD 30 MIN: CPT

## 2020-01-25 ENCOUNTER — APPOINTMENT (OUTPATIENT)
Dept: INFUSION THERAPY | Facility: HOSPITAL | Age: 80
End: 2020-01-25

## 2020-01-26 NOTE — REVIEW OF SYSTEMS
[Chills] : chills [Fatigue] : fatigue [Shortness Of Breath] : shortness of breath [SOB on Exertion] : shortness of breath during exertion [Joint Stiffness] : joint stiffness [Joint Pain] : joint pain [Difficulty Walking] : difficulty walking [Negative] : Allergic/Immunologic [Fever] : no fever [Night Sweats] : no night sweats [Recent Change In Weight] : ~T no recent weight change [Eye Pain] : no eye pain [Red Eyes] : eyes not red [Dry Eyes] : no dryness of the eyes [Vision Problems] : no vision problems [Dysphagia] : no dysphagia [Loss of Hearing] : no loss of hearing [Nosebleeds] : no nosebleeds [Hoarseness] : no hoarseness [Odynophagia] : no odynophagia [Leg Claudication] : no intermittent leg claudication [Mucosal Pain] : no mucosal pain [Cough] : no cough [Wheezing] : no wheezing [Abdominal Pain] : no abdominal pain [Vomiting] : no vomiting [Constipation] : no constipation [Diarrhea] : no diarrhea [Dysuria] : no dysuria [Vaginal Discharge] : no vaginal discharge [Dysmenorrhea/Abn Vaginal Bleeding] : no dysmenorrhea/abnormal vaginal bleeding [Skin Rash] : no skin rash [Muscle Pain] : no muscle pain [Confused] : no confusion [Skin Wound] : no skin wound [Fainting] : no fainting [Dizziness] : no dizziness [Suicidal] : not suicidal [Insomnia] : no insomnia [Anxiety] : no anxiety [Depression] : no depression [Proptosis] : no proptosis [Hot Flashes] : no hot flashes [Muscle Weakness] : no muscle weakness [Deepening Of The Voice] : no deepening of the voice [Easy Bleeding] : no tendency for easy bleeding [Easy Bruising] : no tendency for easy bruising [Swollen Glands] : no swollen glands

## 2020-01-26 NOTE — HISTORY OF PRESENT ILLNESS
[Disease:__________________________] : Disease: [unfilled] [Treatment Protocol] : Treatment Protocol [Constitutional] : Constitutional [Cardiovascular] : Cardiovascular [ENT] : ENT [Dermatologic] : Dermatologic [Endocrine] : Endocrine [Gastrointestinal] : Gastrointestinal [Genitourinary] : Genitourinary [Gynecologic] : Gynecologic [Infectious] : Infectious [Musculoskeletal] : Musculoskeletal [Neurologic] : Neurologic [Pain] : Pain [Pulmonary] : Pulmonary [Hematologic] : Hematologic [de-identified] : 79 year old female presenting to the office for hematologic care. 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 She had a left sided fistula placed approximately in 2010 by her physician in [place] but she does not require dialysis; she is followed in the renal clinic by Dr. Markell Dixon. The use of colony stimulating agents had been safe for the patient when given when hemoglobins are less than 10 g/dL which has led to a frequency of administration of one ejection every 4 to 6 weeks; she received Aranesp injections, completing 57 injections from May 2011 until January 2019. \par \par Patient was hospitalized from 1/22/2019 - 1/29/2019 for right upper extremity DVT. Prior to admission, she noted RUE swelling and pain/discomfort since receiving her last dose of Aranesp on 1/18/2019. 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 Since then, her Aranesp treatments were discontinued and she was transitioned to blood transfusions, which she requires 1 or 2 units every 4-6 weeks. \par \par Past medical history includes hypertension (required multiple medications for control), type two diabetes mellitus. There is no prior history of cerebrovascular accident or bleeding. She has had mildly elevated ferritin and iron levels. [FreeTextEntry1] : Darbepoetin 200 mg q 6 weeks (s/p 57 treatments - held.due to RUE DVT); blood transfusions since January 2019 (1-2 units packed RBC every 4-6 weeks) [de-identified] : Patient presented to the office for her scheduled follow-up visit and blood check. Her symptoms remain unchanged (fatigue, chills, shortness of breath at exertion).

## 2020-01-26 NOTE — PHYSICAL EXAM
[Restricted in physically strenuous activity but ambulatory and able to carry out work of a light or sedentary nature] : Status 1- Restricted in physically strenuous activity but ambulatory and able to carry out work of a light or sedentary nature, e.g., light house work, office work [Normal] : grossly intact [de-identified] : ambulating with cane

## 2020-01-26 NOTE — ASSESSMENT
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 Patient She also has a history of leukopenia (benign) and mild thrombocytopenia. Bone marrow biopsy last performed on 7/15/2011 demonstrated maturing and mature myeloid elements with no significant lymphocytosis or immaturity. \par Patient appeared well today and her physical examination findings remain unchanged when compared to the last visit. Patient is aware of her rise in ferritin and agreed to start Desferal SC home infusion. Blood studies done today. Transfusion of 2 unit of packed red cells scheduled for 1/25/2020 at 9 am. Return to the office in 1 month.

## 2020-01-27 DIAGNOSIS — N18.3 CHRONIC KIDNEY DISEASE, STAGE 3 (MODERATE): ICD-10-CM

## 2020-01-27 DIAGNOSIS — N18.4 CHRONIC KIDNEY DISEASE, STAGE 4 (SEVERE): ICD-10-CM

## 2020-01-27 DIAGNOSIS — Z51.89 ENCOUNTER FOR OTHER SPECIFIED AFTERCARE: ICD-10-CM

## 2020-01-27 LAB
ALBUMIN SERPL ELPH-MCNC: 3.7 G/DL
ALP BLD-CCNC: 48 U/L
ALT SERPL-CCNC: <5 U/L
ANION GAP SERPL CALC-SCNC: 15 MMOL/L
AST SERPL-CCNC: 14 U/L
BILIRUB SERPL-MCNC: 0.2 MG/DL
BUN SERPL-MCNC: 58 MG/DL
CALCIUM SERPL-MCNC: 9.3 MG/DL
CHLORIDE SERPL-SCNC: 108 MMOL/L
CO2 SERPL-SCNC: 17 MMOL/L
CREAT SERPL-MCNC: 5.77 MG/DL
FERRITIN SERPL-MCNC: 1187 NG/ML
GLUCOSE SERPL-MCNC: 112 MG/DL
IRON SATN MFR SERPL: 37 %
IRON SERPL-MCNC: 73 UG/DL
POTASSIUM SERPL-SCNC: 5.8 MMOL/L
PROT SERPL-MCNC: 5.8 G/DL
SODIUM SERPL-SCNC: 140 MMOL/L
TIBC SERPL-MCNC: 200 UG/DL
UIBC SERPL-MCNC: 127 UG/DL

## 2020-01-28 ENCOUNTER — APPOINTMENT (OUTPATIENT)
Dept: NEPHROLOGY | Facility: CLINIC | Age: 80
End: 2020-01-28
Payer: MEDICARE

## 2020-01-28 VITALS
WEIGHT: 138.89 LBS | TEMPERATURE: 98 F | BODY MASS INDEX: 26.22 KG/M2 | HEART RATE: 77 BPM | DIASTOLIC BLOOD PRESSURE: 72 MMHG | SYSTOLIC BLOOD PRESSURE: 138 MMHG | HEIGHT: 61 IN | OXYGEN SATURATION: 100 %

## 2020-01-28 PROCEDURE — 99214 OFFICE O/P EST MOD 30 MIN: CPT

## 2020-01-28 NOTE — HISTORY OF PRESENT ILLNESS
[FreeTextEntry1] : Ms. Seymour presents for follow-up for CKD 5 - healthy transitions patient.\par Hx of TAVR, previous 3V CABG, PPM, AVF placed 10 years ago, has steal syndrome. \par \par Today claims is feeling well, no new medications added, no weight gain, edema or sob, denies insomnia, dysgeusia, tremors or anorexia. Had 1 units of PRBC few weeks ago for a low Hgb symptoms. Her pain on the R arm due to old clot is unbearable now. She cannot take NSAIDS. Her fistula is working. \par She has not gained wt, her meds unchanged. She feels otherwise good except decrease appetite.\par \par \par

## 2020-01-28 NOTE — ASSESSMENT
[FreeTextEntry1] : Ms. Seymour is a 80 y old F presenting for follow-up for CKD 5.\par \par 1)  CKD 5: \par mildly uremic. Feels well overall. \par Will continue to monitor.\par Has fistula ready to use( good thrill and bruit), needs follow up for steal syndrome. \par Cont diuretics as needed- change to MWF dosing\par \par 2)  Anemia of CKD\par Iron low, planned for IV iron and contuining JEISON by heme\par \par 3)  Essential hypertension\par - stable\par \par 4)  Renal osteodystrophy\par -cont calcitriol for now \par \par 5)  Access: Left avf- good thrill and bruit\par -patient will continue to follow-up with vascular surgery as recommended\par -pain on the R side for the clot that has been there- will d/w with Surgery on alternative options for her pain as she cannot get NSAIDS\par \par 6) Hyperkalemia - diet changes again discussed, Lokelma for 1 week and recheck labs next week \par \par f/u 2-3 months

## 2020-01-28 NOTE — PHYSICAL EXAM
[General Appearance - Alert] : alert [General Appearance - Well Nourished] : well nourished [General Appearance - In No Acute Distress] : in no acute distress [General Appearance - Well Developed] : well developed [Sclera] : the sclera and conjunctiva were normal [Extraocular Movements] : extraocular movements were intact [Oropharynx] : the oropharynx was normal [Neck Cervical Mass (___cm)] : no neck mass was observed [Jugular Venous Distention Increased] : there was no jugular-venous distention [Heart Rate And Rhythm] : heart rate was normal and rhythm regular [Auscultation Breath Sounds / Voice Sounds] : lungs were clear to auscultation bilaterally [Heart Sounds Gallop] : no gallops [Heart Sounds Pericardial Friction Rub] : no pericardial rub [FreeTextEntry1] : +2 systolic murmur [Edema] : there was no peripheral edema [Abdomen Soft] : soft [] : no hepato-splenomegaly [Abdomen Tenderness] : non-tender [No CVA Tenderness] : no ~M costovertebral angle tenderness [No Spinal Tenderness] : no spinal tenderness [___ (cm) Fistula] : [unfilled] (cm) fistula [Bruit] : a bruit was present [Thrill] : a thrill was present

## 2020-01-28 NOTE — REVIEW OF SYSTEMS
[Chills] : no chills [Fever] : no fever [Feeling Poorly] : not feeling poorly [Feeling Tired] : not feeling tired [Dysuria] : no dysuria [Arthralgias] : no arthralgias [Joint Pain] : no joint pain [Joint Swelling] : no joint swelling [Negative] : Gastrointestinal

## 2020-02-01 ENCOUNTER — INPATIENT (INPATIENT)
Facility: HOSPITAL | Age: 80
LOS: 10 days | Discharge: HOME | End: 2020-02-12
Attending: HOSPITALIST | Admitting: HOSPITALIST
Payer: MEDICARE

## 2020-02-01 VITALS
SYSTOLIC BLOOD PRESSURE: 176 MMHG | TEMPERATURE: 98 F | OXYGEN SATURATION: 99 % | RESPIRATION RATE: 16 BRPM | DIASTOLIC BLOOD PRESSURE: 82 MMHG | HEART RATE: 72 BPM

## 2020-02-01 DIAGNOSIS — Z90.49 ACQUIRED ABSENCE OF OTHER SPECIFIED PARTS OF DIGESTIVE TRACT: Chronic | ICD-10-CM

## 2020-02-01 DIAGNOSIS — Z95.0 PRESENCE OF CARDIAC PACEMAKER: Chronic | ICD-10-CM

## 2020-02-01 DIAGNOSIS — Z95.2 PRESENCE OF PROSTHETIC HEART VALVE: Chronic | ICD-10-CM

## 2020-02-01 DIAGNOSIS — E11.9 TYPE 2 DIABETES MELLITUS WITHOUT COMPLICATIONS: ICD-10-CM

## 2020-02-01 DIAGNOSIS — R07.9 CHEST PAIN, UNSPECIFIED: ICD-10-CM

## 2020-02-01 DIAGNOSIS — N18.9 CHRONIC KIDNEY DISEASE, UNSPECIFIED: ICD-10-CM

## 2020-02-01 DIAGNOSIS — M10.9 GOUT, UNSPECIFIED: ICD-10-CM

## 2020-02-01 DIAGNOSIS — I25.10 ATHEROSCLEROTIC HEART DISEASE OF NATIVE CORONARY ARTERY WITHOUT ANGINA PECTORIS: ICD-10-CM

## 2020-02-01 DIAGNOSIS — I77.0 ARTERIOVENOUS FISTULA, ACQUIRED: Chronic | ICD-10-CM

## 2020-02-01 DIAGNOSIS — I10 ESSENTIAL (PRIMARY) HYPERTENSION: ICD-10-CM

## 2020-02-01 LAB
ALBUMIN SERPL ELPH-MCNC: 3.5 G/DL — SIGNIFICANT CHANGE UP (ref 3.3–5)
ALP SERPL-CCNC: 41 U/L — SIGNIFICANT CHANGE UP (ref 40–120)
ALT FLD-CCNC: 5 U/L — SIGNIFICANT CHANGE UP (ref 4–33)
ANION GAP SERPL CALC-SCNC: 12 MMO/L — SIGNIFICANT CHANGE UP (ref 7–14)
APTT BLD: 30.1 SEC — SIGNIFICANT CHANGE UP (ref 27.5–36.3)
AST SERPL-CCNC: 10 U/L — SIGNIFICANT CHANGE UP (ref 4–32)
BASOPHILS # BLD AUTO: 0.01 K/UL — SIGNIFICANT CHANGE UP (ref 0–0.2)
BASOPHILS NFR BLD AUTO: 0.3 % — SIGNIFICANT CHANGE UP (ref 0–2)
BILIRUB SERPL-MCNC: 0.3 MG/DL — SIGNIFICANT CHANGE UP (ref 0.2–1.2)
BLD GP AB SCN SERPL QL: NEGATIVE — SIGNIFICANT CHANGE UP
BUN SERPL-MCNC: 58 MG/DL — HIGH (ref 7–23)
CALCIUM SERPL-MCNC: 8.9 MG/DL — SIGNIFICANT CHANGE UP (ref 8.4–10.5)
CHLORIDE SERPL-SCNC: 107 MMOL/L — SIGNIFICANT CHANGE UP (ref 98–107)
CO2 SERPL-SCNC: 20 MMOL/L — LOW (ref 22–31)
CREAT SERPL-MCNC: 4.97 MG/DL — HIGH (ref 0.5–1.3)
EOSINOPHIL # BLD AUTO: 0.1 K/UL — SIGNIFICANT CHANGE UP (ref 0–0.5)
EOSINOPHIL NFR BLD AUTO: 2.9 % — SIGNIFICANT CHANGE UP (ref 0–6)
GLUCOSE SERPL-MCNC: 67 MG/DL — LOW (ref 70–99)
HCT VFR BLD CALC: 23.5 % — LOW (ref 34.5–45)
HGB BLD-MCNC: 7.2 G/DL — LOW (ref 11.5–15.5)
IMM GRANULOCYTES NFR BLD AUTO: 0.3 % — SIGNIFICANT CHANGE UP (ref 0–1.5)
INR BLD: 1.55 — HIGH (ref 0.88–1.17)
LYMPHOCYTES # BLD AUTO: 0.91 K/UL — LOW (ref 1–3.3)
LYMPHOCYTES # BLD AUTO: 26.2 % — SIGNIFICANT CHANGE UP (ref 13–44)
MCHC RBC-ENTMCNC: 26.1 PG — LOW (ref 27–34)
MCHC RBC-ENTMCNC: 30.6 % — LOW (ref 32–36)
MCV RBC AUTO: 85.1 FL — SIGNIFICANT CHANGE UP (ref 80–100)
MONOCYTES # BLD AUTO: 0.4 K/UL — SIGNIFICANT CHANGE UP (ref 0–0.9)
MONOCYTES NFR BLD AUTO: 11.5 % — SIGNIFICANT CHANGE UP (ref 2–14)
NEUTROPHILS # BLD AUTO: 2.04 K/UL — SIGNIFICANT CHANGE UP (ref 1.8–7.4)
NEUTROPHILS NFR BLD AUTO: 58.8 % — SIGNIFICANT CHANGE UP (ref 43–77)
NRBC # FLD: 0 K/UL — SIGNIFICANT CHANGE UP (ref 0–0)
NT-PROBNP SERPL-SCNC: 5514 PG/ML — SIGNIFICANT CHANGE UP
PLATELET # BLD AUTO: 101 K/UL — LOW (ref 150–400)
PMV BLD: 9.7 FL — SIGNIFICANT CHANGE UP (ref 7–13)
POTASSIUM SERPL-MCNC: 4.1 MMOL/L — SIGNIFICANT CHANGE UP (ref 3.5–5.3)
POTASSIUM SERPL-SCNC: 4.1 MMOL/L — SIGNIFICANT CHANGE UP (ref 3.5–5.3)
PROT SERPL-MCNC: 5.8 G/DL — LOW (ref 6–8.3)
PROTHROM AB SERPL-ACNC: 17.9 SEC — HIGH (ref 9.8–13.1)
RBC # BLD: 2.76 M/UL — LOW (ref 3.8–5.2)
RBC # FLD: 15.3 % — HIGH (ref 10.3–14.5)
RH IG SCN BLD-IMP: POSITIVE — SIGNIFICANT CHANGE UP
SODIUM SERPL-SCNC: 139 MMOL/L — SIGNIFICANT CHANGE UP (ref 135–145)
TROPONIN T, HIGH SENSITIVITY: 93 NG/L — CRITICAL HIGH (ref ?–14)
TROPONIN T, HIGH SENSITIVITY: 93 NG/L — CRITICAL HIGH (ref ?–14)
WBC # BLD: 3.47 K/UL — LOW (ref 3.8–10.5)
WBC # FLD AUTO: 3.47 K/UL — LOW (ref 3.8–10.5)

## 2020-02-01 PROCEDURE — 71045 X-RAY EXAM CHEST 1 VIEW: CPT | Mod: 26

## 2020-02-01 RX ORDER — DEXTROSE 50 % IN WATER 50 %
12.5 SYRINGE (ML) INTRAVENOUS ONCE
Refills: 0 | Status: DISCONTINUED | OUTPATIENT
Start: 2020-02-01 | End: 2020-02-12

## 2020-02-01 RX ORDER — ALLOPURINOL 300 MG
100 TABLET ORAL DAILY
Refills: 0 | Status: DISCONTINUED | OUTPATIENT
Start: 2020-02-01 | End: 2020-02-12

## 2020-02-01 RX ORDER — WARFARIN SODIUM 2.5 MG/1
2 TABLET ORAL
Qty: 0 | Refills: 0 | DISCHARGE

## 2020-02-01 RX ORDER — INSULIN LISPRO 100/ML
VIAL (ML) SUBCUTANEOUS AT BEDTIME
Refills: 0 | Status: DISCONTINUED | OUTPATIENT
Start: 2020-02-01 | End: 2020-02-12

## 2020-02-01 RX ORDER — GLUCAGON INJECTION, SOLUTION 0.5 MG/.1ML
1 INJECTION, SOLUTION SUBCUTANEOUS ONCE
Refills: 0 | Status: DISCONTINUED | OUTPATIENT
Start: 2020-02-01 | End: 2020-02-12

## 2020-02-01 RX ORDER — INSULIN LISPRO 100/ML
VIAL (ML) SUBCUTANEOUS
Refills: 0 | Status: DISCONTINUED | OUTPATIENT
Start: 2020-02-01 | End: 2020-02-12

## 2020-02-01 RX ORDER — ASPIRIN/CALCIUM CARB/MAGNESIUM 324 MG
81 TABLET ORAL ONCE
Refills: 0 | Status: COMPLETED | OUTPATIENT
Start: 2020-02-01 | End: 2020-02-01

## 2020-02-01 RX ORDER — DEXTROSE 50 % IN WATER 50 %
25 SYRINGE (ML) INTRAVENOUS ONCE
Refills: 0 | Status: DISCONTINUED | OUTPATIENT
Start: 2020-02-01 | End: 2020-02-12

## 2020-02-01 RX ORDER — WARFARIN SODIUM 2.5 MG/1
6 TABLET ORAL ONCE
Refills: 0 | Status: COMPLETED | OUTPATIENT
Start: 2020-02-01 | End: 2020-02-01

## 2020-02-01 RX ORDER — TERAZOSIN HYDROCHLORIDE 10 MG/1
2 CAPSULE ORAL
Qty: 0 | Refills: 0 | DISCHARGE

## 2020-02-01 RX ORDER — FAMOTIDINE 10 MG/ML
0 INJECTION INTRAVENOUS
Qty: 0 | Refills: 0 | DISCHARGE

## 2020-02-01 RX ORDER — ISOSORBIDE MONONITRATE 60 MG/1
60 TABLET, EXTENDED RELEASE ORAL
Refills: 0 | Status: DISCONTINUED | OUTPATIENT
Start: 2020-02-01 | End: 2020-02-12

## 2020-02-01 RX ORDER — SAXAGLIPTIN 5 MG/1
1 TABLET, FILM COATED ORAL
Qty: 0 | Refills: 0 | DISCHARGE

## 2020-02-01 RX ORDER — FUROSEMIDE 40 MG
40 TABLET ORAL
Refills: 0 | Status: DISCONTINUED | OUTPATIENT
Start: 2020-02-03 | End: 2020-02-08

## 2020-02-01 RX ORDER — DEXTROSE 50 % IN WATER 50 %
15 SYRINGE (ML) INTRAVENOUS ONCE
Refills: 0 | Status: DISCONTINUED | OUTPATIENT
Start: 2020-02-01 | End: 2020-02-12

## 2020-02-01 RX ORDER — SODIUM CHLORIDE 9 MG/ML
1000 INJECTION, SOLUTION INTRAVENOUS
Refills: 0 | Status: DISCONTINUED | OUTPATIENT
Start: 2020-02-01 | End: 2020-02-12

## 2020-02-01 RX ORDER — CALCITRIOL 0.5 UG/1
0.25 CAPSULE ORAL
Refills: 0 | Status: DISCONTINUED | OUTPATIENT
Start: 2020-02-01 | End: 2020-02-12

## 2020-02-01 RX ORDER — HYDRALAZINE HCL 50 MG
1 TABLET ORAL
Qty: 90 | Refills: 0

## 2020-02-01 RX ORDER — CARVEDILOL PHOSPHATE 80 MG/1
25 CAPSULE, EXTENDED RELEASE ORAL EVERY 12 HOURS
Refills: 0 | Status: DISCONTINUED | OUTPATIENT
Start: 2020-02-01 | End: 2020-02-12

## 2020-02-01 RX ORDER — HYDRALAZINE HCL 50 MG
100 TABLET ORAL
Refills: 0 | Status: DISCONTINUED | OUTPATIENT
Start: 2020-02-01 | End: 2020-02-08

## 2020-02-01 RX ORDER — ACETAMINOPHEN 500 MG
2 TABLET ORAL
Qty: 0 | Refills: 0 | DISCHARGE

## 2020-02-01 RX ORDER — SODIUM BICARBONATE 1 MEQ/ML
650 SYRINGE (ML) INTRAVENOUS
Refills: 0 | Status: DISCONTINUED | OUTPATIENT
Start: 2020-02-01 | End: 2020-02-12

## 2020-02-01 RX ORDER — ASPIRIN/CALCIUM CARB/MAGNESIUM 324 MG
81 TABLET ORAL DAILY
Refills: 0 | Status: DISCONTINUED | OUTPATIENT
Start: 2020-02-01 | End: 2020-02-12

## 2020-02-01 RX ORDER — ATORVASTATIN CALCIUM 80 MG/1
40 TABLET, FILM COATED ORAL AT BEDTIME
Refills: 0 | Status: DISCONTINUED | OUTPATIENT
Start: 2020-02-01 | End: 2020-02-12

## 2020-02-01 RX ADMIN — Medication 650 MILLIGRAM(S): at 22:56

## 2020-02-01 RX ADMIN — Medication 100 MILLIGRAM(S): at 22:56

## 2020-02-01 RX ADMIN — WARFARIN SODIUM 6 MILLIGRAM(S): 2.5 TABLET ORAL at 21:34

## 2020-02-01 RX ADMIN — Medication 81 MILLIGRAM(S): at 17:55

## 2020-02-01 RX ADMIN — ATORVASTATIN CALCIUM 40 MILLIGRAM(S): 80 TABLET, FILM COATED ORAL at 21:59

## 2020-02-01 RX ADMIN — CARVEDILOL PHOSPHATE 25 MILLIGRAM(S): 80 CAPSULE, EXTENDED RELEASE ORAL at 22:56

## 2020-02-01 NOTE — ED PROVIDER NOTE - PROGRESS NOTE DETAILS
Shashi Bianchi PGY3: remains asymptomatic, d/w Dr. Damico - requested admission to dr. baldwin, no interventions at this point

## 2020-02-01 NOTE — H&P ADULT - ATTENDING COMMENTS
Chart reviewed. Vitals and Labs noted.   Pt seen and examined at bedside. Plan formulated with the resident/PA/NP. Detail H&P as above.

## 2020-02-01 NOTE — ED PROVIDER NOTE - CLINICAL SUMMARY MEDICAL DECISION MAKING FREE TEXT BOX
Impression:  substernal chest pressure in elderly F with known CAD and valve replacement.  Mhx and description of pain necessitates cardiac workup with serial enzymes.  Plan:  2nd dose ASA 81mg, serial trops, cxr, reassess. D/w Margaux.

## 2020-02-01 NOTE — H&P ADULT - NSICDXPASTMEDICALHX_GEN_ALL_CORE_FT
PAST MEDICAL HISTORY:  Anemia Associated with Chronic Renal Failure     Arthritis     CAD (coronary artery disease)     CKD (chronic kidney disease) stage 4, GFR 15-29 ml/min     DM2 (diabetes mellitus, type 2)     Gout     HTN (Hypertension)     Osteoarthritis bilateral knees    Severe aortic stenosis     Thyroid nodule awaiting FNB in the near future

## 2020-02-01 NOTE — H&P ADULT - PROBLEM SELECTOR PLAN 1
Will TELE CE ECG in am, Will talk to cardiology Dr Damico in regard to further ischemic W/U likely STRESS TEST, will resume home meds with ASA and BB as well as statins Will TELE CE ECG in am, Will talk to cardiology Dr Damico in regard to further ischemic W/U likely STRESS TEST, will resume home meds with ASA and BB as well as statins  PA addendum case D/W Dr Damico will order STRESS/pharmacological for am

## 2020-02-01 NOTE — H&P ADULT - PROBLEM SELECTOR PLAN 5
Likely anemia of renal disease, will F/U occult blood H&H of 7.2/23 in December hgb was 7.6 will talk to PMD in regard to blood transfusion to keep Hgb above 8 Likely anemia of renal disease, will F/U occult blood H&H of 7.2/23 in December According to Dr Damico her Hgb around 7.  Pt had Blood transfusion last week for Hgb 6. will trend H&H for now Likely anemia of renal disease, will F/U occult blood H&H of 7.2/23 in December According to Dr Damico her Hgb around 7.  Pt had Blood transfusion last week for Hgb 6. will trend H&H for now, will talk to PMD in am if PRBC is needed to keep Hgb above 8 given Hx of CAD Likely anemia of renal disease, will F/U occult blood H&H of 7.2/23 in December According to Dr Damico her Hgb around 7.  Pt had Blood transfusion last week for Hgb 6. will trend H&H for now, will talk to PMD in am if PRBC is needed to keep Hgb above 8 given Hx of CAD  for now will sent Fe studies Folic and B12, will talk to PMD in regard to Epogen

## 2020-02-01 NOTE — H&P ADULT - NEGATIVE GENERAL SYMPTOMS
no fatigue/no malaise/no fever/no chills/no weight gain/no anorexia/no weight loss/no polyphagia/no polyuria/no polydipsia/no sweating

## 2020-02-01 NOTE — H&P ADULT - HISTORY OF PRESENT ILLNESS
Pt 81yo female with Hx of HTN CAD CABG AS S/P  TAVR 2017, PPM CHF RUE DVT DM Anemia presenting to [facility] ER for evaluation of C/P. Pt reports sudden onset of midsternal chest tightness that started around 0600 this morning when walking to bathrom. Pain was mild to moderate tightness like sensation with no radiation  and no pleuritic component. Pt reports associated mild dyspnea, but no dizziness, plapitations, nausea or diaphoresis. Pt reports took "baby aspirin" and 10 minutes after pain resolved. Rest of the morning  was unremarkable; however, in afternoon around 1300 Pt had similar quality chest tightness while at rest, she contacted her cardiologist and was advised to come to hospital. Pt reports no nausea or vomiting, She reports no cough, no leg pain or swelling, no orthopnea, and reportes no changes in her exertional tolerance.   Pt reports that she was seen By nephrologist a week ago for routine F/U. She reports that she was told to take her lasix QOD, (instead of usual 40QD) and also she was noted to have elevated K (Pt confident it was elevated K) for which she received powder lie medicine.

## 2020-02-01 NOTE — ED PROVIDER NOTE - ATTENDING CONTRIBUTION TO CARE
MD Morton:  patient seen and evaluated with the resident.  I was present for key portions of the History & Physical, and I agree with the Impression & Plan.  MD Morton:  81 yo F, c/o SSCP.  Onset: 6am x 30 min, again at 12pm x 30 min, and again at 3pm for 30 min.  Context:  known CAD/CABG.  Primary cardiologist = Dr. Casey rodriguez.  Associated Sx: no n/v/d, no SOB, no back pain.  Intensity: 2/10.  Patient took 81mg ASA earlier today.    VS: wnl.  Physical Exam: elderly F, NAD, NCAT, PERRL, EOMI, neck supple, RRR, CTA B, Abd: s/nd/nt, Ext: no bipedal edema, Neuro: AAOx3, ambulates w/o diff, strength 5/5 & symmetric throughout.  Impression:  substernal chest pressure in elderly F with known CAD and valve replacement.  Mhx and description of pain necessitates cardiac workup with serial enzymes.  Plan:  2nd dose ASA 81mg, serial trops, cxr, reassess. D/w Margaux.

## 2020-02-01 NOTE — ED ADULT TRIAGE NOTE - CHIEF COMPLAINT QUOTE
c/o intermittent mid sternal chest pain since 6am today. Hx left arm fistula never on dialysis, PPM, valve replacement 2018, DM2, blood clot right arm 2019. Pt takes Warfarin. FS=75

## 2020-02-01 NOTE — ED PROVIDER NOTE - OBJECTIVE STATEMENT
80F w/ pmh HTN, HLD, Thyroid Disease, DM, Arthritis, Anemia, CKD, CAD S/P CABG, S/P PCI, Severe AS (s/p TAVR 3/30/2017, S/P PPM), CHF, RUE DVT -- sent by cardiologist for eval. Pt reports at 6am was walking to bathroom when had acute midsternal chest tightness assc w/ sob, lasted 30 min then resolved, had the same thing 2 hrs later and then this afternoon when called cardiologist and was told to go to ED. No fever, n/v/d/c, abd pain, cough, dizziness, dysuria/hematuria. No recent travel, medication change, illness, or hospitalization.     Cards: Estrada Damico

## 2020-02-01 NOTE — PHARMACOTHERAPY INTERVENTION NOTE - COMMENTS
Medication history is incomplete. Unable to verify patient's medication list with two sources. Discrepancies noted in provider handoff. Please call spectra [phone] if you have any questions.

## 2020-02-01 NOTE — ED ADULT NURSE NOTE - OBJECTIVE STATEMENT
Pt received into spot Tr A. AAOx4, c/o mid sternal chest pain earlier this morning while walking to the bathroom. Denies SOB. Denies n/v. NSR on cardiac monitor, heart rate in the 60's. Float RN placed #20g IVSL to right ac. Pt has AV fistula to left arm, has not been used. MD diamond performed. NAD. Awaiting further plan of care.

## 2020-02-01 NOTE — H&P ADULT - ASSESSMENT
Pt 79yo female with Hx of HTN CAD CABG AS S/P  TAVR 2017, PPM CHF RUE DVT DM Anemia presenting to [facility] ER for evaluation of C/P. At present Pt HD stable and in no dsitress. /53 HR 68 RR 18 100% on RA. Pt was noted with TROP of 93 x 2 (in a setting of CKD Cr of 4.97) BUN 59 BNP of 5000 but lung exam is clear, with no JVD leg edema and no orthopnea. CXR is pending. Pt was noted with H&H of 7.2/23 with H&H in December of 2019 7.6

## 2020-02-01 NOTE — PATIENT PROFILE ADULT - NSPROEDALEARNPREFOTH_GEN_A_NUR
computer/internet/individual instruction/written material/audio/verbal instruction/skill demonstration

## 2020-02-02 LAB
ANISOCYTOSIS BLD QL: SLIGHT — SIGNIFICANT CHANGE UP
BASOPHILS # BLD AUTO: 0.02 K/UL — SIGNIFICANT CHANGE UP (ref 0–0.2)
BASOPHILS NFR BLD AUTO: 0.6 % — SIGNIFICANT CHANGE UP (ref 0–2)
BASOPHILS NFR SPEC: 1.8 % — SIGNIFICANT CHANGE UP (ref 0–2)
BLASTS # FLD: 0 % — SIGNIFICANT CHANGE UP (ref 0–0)
BLD GP AB SCN SERPL QL: NEGATIVE — SIGNIFICANT CHANGE UP
CHOLEST SERPL-MCNC: 122 MG/DL — SIGNIFICANT CHANGE UP (ref 120–199)
CK SERPL-CCNC: 44 U/L — SIGNIFICANT CHANGE UP (ref 25–170)
ELLIPTOCYTES BLD QL SMEAR: SLIGHT — SIGNIFICANT CHANGE UP
EOSINOPHIL # BLD AUTO: 0.11 K/UL — SIGNIFICANT CHANGE UP (ref 0–0.5)
EOSINOPHIL NFR BLD AUTO: 3.3 % — SIGNIFICANT CHANGE UP (ref 0–6)
EOSINOPHIL NFR FLD: 0.9 % — SIGNIFICANT CHANGE UP (ref 0–6)
FERRITIN SERPL-MCNC: 1155 NG/ML — HIGH (ref 15–150)
GIANT PLATELETS BLD QL SMEAR: PRESENT — SIGNIFICANT CHANGE UP
HBA1C BLD-MCNC: 4.9 % — SIGNIFICANT CHANGE UP (ref 4–5.6)
HCT VFR BLD CALC: 23.7 % — LOW (ref 34.5–45)
HDLC SERPL-MCNC: 44 MG/DL — LOW (ref 45–65)
HGB BLD-MCNC: 7.1 G/DL — LOW (ref 11.5–15.5)
HYPOCHROMIA BLD QL: SLIGHT — SIGNIFICANT CHANGE UP
IMM GRANULOCYTES NFR BLD AUTO: 0.3 % — SIGNIFICANT CHANGE UP (ref 0–1.5)
INR BLD: 1.65 — HIGH (ref 0.88–1.17)
IRON SATN MFR SERPL: 100 UG/DL — SIGNIFICANT CHANGE UP (ref 30–160)
IRON SATN MFR SERPL: 163 UG/DL — SIGNIFICANT CHANGE UP (ref 140–530)
LIPID PNL WITH DIRECT LDL SERPL: 71 MG/DL — SIGNIFICANT CHANGE UP
LYMPHOCYTES # BLD AUTO: 0.94 K/UL — LOW (ref 1–3.3)
LYMPHOCYTES # BLD AUTO: 27.9 % — SIGNIFICANT CHANGE UP (ref 13–44)
LYMPHOCYTES NFR SPEC AUTO: 19.8 % — SIGNIFICANT CHANGE UP (ref 13–44)
MCHC RBC-ENTMCNC: 26 PG — LOW (ref 27–34)
MCHC RBC-ENTMCNC: 30 % — LOW (ref 32–36)
MCV RBC AUTO: 86.8 FL — SIGNIFICANT CHANGE UP (ref 80–100)
METAMYELOCYTES # FLD: 0 % — SIGNIFICANT CHANGE UP (ref 0–1)
MICROCYTES BLD QL: SLIGHT — SIGNIFICANT CHANGE UP
MONOCYTES # BLD AUTO: 0.32 K/UL — SIGNIFICANT CHANGE UP (ref 0–0.9)
MONOCYTES NFR BLD AUTO: 9.5 % — SIGNIFICANT CHANGE UP (ref 2–14)
MONOCYTES NFR BLD: 1.8 % — LOW (ref 2–9)
MYELOCYTES NFR BLD: 0 % — SIGNIFICANT CHANGE UP (ref 0–0)
NEUTROPHIL AB SER-ACNC: 71.2 % — SIGNIFICANT CHANGE UP (ref 43–77)
NEUTROPHILS # BLD AUTO: 1.97 K/UL — SIGNIFICANT CHANGE UP (ref 1.8–7.4)
NEUTROPHILS NFR BLD AUTO: 58.4 % — SIGNIFICANT CHANGE UP (ref 43–77)
NEUTS BAND # BLD: 0 % — SIGNIFICANT CHANGE UP (ref 0–6)
NRBC # FLD: 0 K/UL — SIGNIFICANT CHANGE UP (ref 0–0)
OTHER - HEMATOLOGY %: 0 — SIGNIFICANT CHANGE UP
OVALOCYTES BLD QL SMEAR: SIGNIFICANT CHANGE UP
PLATELET # BLD AUTO: 107 K/UL — LOW (ref 150–400)
PLATELET COUNT - ESTIMATE: SIGNIFICANT CHANGE UP
PMV BLD: 10.9 FL — SIGNIFICANT CHANGE UP (ref 7–13)
POIKILOCYTOSIS BLD QL AUTO: SIGNIFICANT CHANGE UP
POLYCHROMASIA BLD QL SMEAR: SLIGHT — SIGNIFICANT CHANGE UP
PROMYELOCYTES # FLD: 0 % — SIGNIFICANT CHANGE UP (ref 0–0)
PROTHROM AB SERPL-ACNC: 18.6 SEC — HIGH (ref 9.8–13.1)
PTH-INTACT SERPL-MCNC: 468.7 PG/ML — HIGH (ref 15–65)
RBC # BLD: 2.73 M/UL — LOW (ref 3.8–5.2)
RBC # FLD: 15.3 % — HIGH (ref 10.3–14.5)
RH IG SCN BLD-IMP: POSITIVE — SIGNIFICANT CHANGE UP
SCHISTOCYTES BLD QL AUTO: SLIGHT — SIGNIFICANT CHANGE UP
TRIGL SERPL-MCNC: 63 MG/DL — SIGNIFICANT CHANGE UP (ref 10–149)
TROPONIN T, HIGH SENSITIVITY: 93 NG/L — CRITICAL HIGH (ref ?–14)
TSH SERPL-MCNC: 1.34 UIU/ML — SIGNIFICANT CHANGE UP (ref 0.27–4.2)
UIBC SERPL-MCNC: 62.5 UG/DL — LOW (ref 110–370)
VARIANT LYMPHS # BLD: 4.5 % — SIGNIFICANT CHANGE UP
VIT B12 SERPL-MCNC: 439 PG/ML — SIGNIFICANT CHANGE UP (ref 200–900)
WBC # BLD: 3.37 K/UL — LOW (ref 3.8–10.5)
WBC # FLD AUTO: 3.37 K/UL — LOW (ref 3.8–10.5)

## 2020-02-02 RX ORDER — WARFARIN SODIUM 2.5 MG/1
1 TABLET ORAL
Qty: 0 | Refills: 0 | DISCHARGE

## 2020-02-02 RX ORDER — HEPARIN SODIUM 5000 [USP'U]/ML
INJECTION INTRAVENOUS; SUBCUTANEOUS
Qty: 25000 | Refills: 0 | Status: DISCONTINUED | OUTPATIENT
Start: 2020-02-02 | End: 2020-02-08

## 2020-02-02 RX ORDER — ACETAMINOPHEN 500 MG
650 TABLET ORAL EVERY 6 HOURS
Refills: 0 | Status: DISCONTINUED | OUTPATIENT
Start: 2020-02-02 | End: 2020-02-12

## 2020-02-02 RX ORDER — HEPARIN SODIUM 5000 [USP'U]/ML
5000 INJECTION INTRAVENOUS; SUBCUTANEOUS EVERY 6 HOURS
Refills: 0 | Status: DISCONTINUED | OUTPATIENT
Start: 2020-02-02 | End: 2020-02-08

## 2020-02-02 RX ORDER — HEPARIN SODIUM 5000 [USP'U]/ML
2500 INJECTION INTRAVENOUS; SUBCUTANEOUS EVERY 6 HOURS
Refills: 0 | Status: DISCONTINUED | OUTPATIENT
Start: 2020-02-02 | End: 2020-02-08

## 2020-02-02 RX ORDER — FUROSEMIDE 40 MG
1 TABLET ORAL
Qty: 0 | Refills: 0 | DISCHARGE

## 2020-02-02 RX ORDER — SODIUM BICARBONATE 1 MEQ/ML
1 SYRINGE (ML) INTRAVENOUS
Qty: 0 | Refills: 0 | DISCHARGE

## 2020-02-02 RX ORDER — WARFARIN SODIUM 2.5 MG/1
0 TABLET ORAL
Qty: 0 | Refills: 0 | DISCHARGE

## 2020-02-02 RX ADMIN — Medication 81 MILLIGRAM(S): at 14:30

## 2020-02-02 RX ADMIN — Medication 100 MILLIGRAM(S): at 14:30

## 2020-02-02 RX ADMIN — Medication 100 MILLIGRAM(S): at 08:22

## 2020-02-02 RX ADMIN — Medication 650 MILLIGRAM(S): at 06:30

## 2020-02-02 RX ADMIN — CARVEDILOL PHOSPHATE 25 MILLIGRAM(S): 80 CAPSULE, EXTENDED RELEASE ORAL at 21:56

## 2020-02-02 RX ADMIN — Medication 650 MILLIGRAM(S): at 08:22

## 2020-02-02 RX ADMIN — ISOSORBIDE MONONITRATE 60 MILLIGRAM(S): 60 TABLET, EXTENDED RELEASE ORAL at 21:56

## 2020-02-02 RX ADMIN — Medication 100 MILLIGRAM(S): at 18:19

## 2020-02-02 RX ADMIN — Medication 100 MILLIGRAM(S): at 21:56

## 2020-02-02 RX ADMIN — ATORVASTATIN CALCIUM 40 MILLIGRAM(S): 80 TABLET, FILM COATED ORAL at 21:56

## 2020-02-02 RX ADMIN — HEPARIN SODIUM 1100 UNIT(S)/HR: 5000 INJECTION INTRAVENOUS; SUBCUTANEOUS at 16:33

## 2020-02-02 RX ADMIN — Medication 650 MILLIGRAM(S): at 18:20

## 2020-02-02 RX ADMIN — Medication 0.2 MILLIGRAM(S): at 21:56

## 2020-02-02 RX ADMIN — CARVEDILOL PHOSPHATE 25 MILLIGRAM(S): 80 CAPSULE, EXTENDED RELEASE ORAL at 10:41

## 2020-02-02 RX ADMIN — ISOSORBIDE MONONITRATE 60 MILLIGRAM(S): 60 TABLET, EXTENDED RELEASE ORAL at 06:30

## 2020-02-02 RX ADMIN — Medication 650 MILLIGRAM(S): at 09:15

## 2020-02-02 NOTE — CONSULT NOTE ADULT - SUBJECTIVE AND OBJECTIVE BOX
CHIEF COMPLAINT:    HPI:  Pt 79yo female with Hx of HTN CAD CABG AS S/P  TAVR 2017, PPM CHF RUE DVT DM Anemia presenting to [facility] ER for evaluation of C/P. Pt reports sudden onset of midsternal chest tightness that started around 0600 this morning when walking to bathrom. Pain was mild to moderate tightness like sensation with no radiation  and no pleuritic component. Pt reports associated mild dyspnea, but no dizziness, plapitations, nausea or diaphoresis. Pt reports took "baby aspirin" and 10 minutes after pain resolved. Rest of the morning  was unremarkable; however, in afternoon around 1300 Pt had similar quality chest tightness while at rest, she contacted her cardiologist and was advised to come to hospital. Pt reports no nausea or vomiting, She reports no cough, no leg pain or swelling, no orthopnea, and reportes no changes in her exertional tolerance.         PAST MEDICAL & SURGICAL HISTORY:  Thyroid nodule: awaiting FNB in the near future  Severe aortic stenosis  Osteoarthritis: bilateral knees  CKD (chronic kidney disease) stage 4, GFR 15-29 ml/min  DM2 (diabetes mellitus, type 2)  Arthritis  CAD (coronary artery disease)  Gout  Anemia Associated with Chronic Renal Failure  HTN (Hypertension)  S/P AVR: TAVR  History of pacemaker  A-V fistula: left arm  S/P cholecystectomy  Stented coronary artery: s/p 3 stents, then CABG 2007  S/P CABG (coronary artery bypass graft): triple in 2007          PREVIOUS DIAGNOSTIC TESTING:    [ ] Echocardiogram:  [ ]  Catheterization:  [ ] Stress Test:  	    MEDICATIONS:  MEDICATIONS  (STANDING):  allopurinol 100 milliGRAM(s) Oral daily  aspirin enteric coated 81 milliGRAM(s) Oral daily  atorvastatin 40 milliGRAM(s) Oral at bedtime  calcitriol   Capsule 0.25 MICROGram(s) Oral <User Schedule>  carvedilol 25 milliGRAM(s) Oral every 12 hours  cloNIDine 0.2 milliGRAM(s) Oral daily  dextrose 5%. 1000 milliLiter(s) (50 mL/Hr) IV Continuous <Continuous>  dextrose 50% Injectable 12.5 Gram(s) IV Push once  dextrose 50% Injectable 25 Gram(s) IV Push once  dextrose 50% Injectable 25 Gram(s) IV Push once  hydrALAZINE 100 milliGRAM(s) Oral four times a day with meals  insulin lispro (HumaLOG) corrective regimen sliding scale   SubCutaneous three times a day before meals  insulin lispro (HumaLOG) corrective regimen sliding scale   SubCutaneous at bedtime  isosorbide   mononitrate ER Tablet (IMDUR) 60 milliGRAM(s) Oral <User Schedule>  sodium bicarbonate 650 milliGRAM(s) Oral two times a day      FAMILY HISTORY:  No pertinent family history in first degree relatives      SOCIAL HISTORY:    [ ] Non-smoker  [ ] Smoker  [ ] Alcohol    Allergies    codeine (Other)  Lortab (Other)  morphine (Other)  Percocet 10/325 (Other)  PPM not compatible with  MRI (Other (Fatal))  Reglan (Other; [place])  sulfa drugs ([place])    Intolerances    	    REVIEW OF SYSTEMS:  CONSTITUTIONAL: No fever, weight loss, or fatigue  EYES: No eye pain, visual disturbances, or discharge  ENMT:  No difficulty hearing, tinnitus, vertigo; No sinus or throat pain  NECK: No pain or stiffness  RESPIRATORY: No cough, wheezing, chills or hemoptysis; No Shortness of Breath  CARDIOVASCULAR: No chest pain, palpitations, passing out, dizziness, or leg swelling  GASTROINTESTINAL: No abdominal or epigastric pain. No nausea, vomiting, or hematemesis; No diarrhea or constipation. No melena or hematochezia.  GENITOURINARY: No dysuria, frequency, hematuria, or incontinence  NEUROLOGICAL: No headaches, memory loss, loss of strength, numbness, or tremors  SKIN: No itching, burning, rashes, or lesions   	    [ ] All others negative	  [ ] Unable to obtain    PHYSICAL EXAM:  T(C): 37.2 (02-02-20 @ 08:20), Max: 37.2 (02-02-20 @ 08:20)  HR: 68 (02-02-20 @ 08:20) (65 - 80)  BP: 149/65 (02-02-20 @ 08:20) (138/57 - 182/79)  RR: 18 (02-02-20 @ 08:20) (15 - 18)  SpO2: 99% (02-02-20 @ 08:20) (98% - 100%)  Wt(kg): --  I&O's Summary    01 Feb 2020 07:01  -  02 Feb 2020 07:00  --------------------------------------------------------  IN: 120 mL / OUT: 401 mL / NET: -281 mL        Appearance: Normal	  Psychiatry: A & O x 3, Mood & affect appropriate  HEENT:   Normal oral mucosa, PERRL, EOMI	  Lymphatic: No lymphadenopathy  Cardiovascular: Normal S1 S2,RRR, No JVD, No murmurs  Respiratory: Lungs clear to auscultation	  Gastrointestinal:  Soft, Non-tender, + BS	  Skin: No rashes, No ecchymoses, No cyanosis	  Neurologic: Non-focal  Extremities: Normal range of motion, No clubbing, cyanosis or edema  Vascular: Peripheral pulses palpable 2+ bilaterally    TELEMETRY: 	    ECG:  	  RADIOLOGY:  OTHER: 	  	  LABS:	 	    CARDIAC MARKERS:                                  7.1    3.37  )-----------( 107      ( 02 Feb 2020 05:15 )             23.7     02-01    139  |  107  |  58<H>  ----------------------------<  67<L>  4.1   |  20<L>  |  4.97<H>    Ca    8.9      01 Feb 2020 17:00    TPro  5.8<L>  /  Alb  3.5  /  TBili  0.3  /  DBili  x   /  AST  10  /  ALT  5   /  AlkPhos  41  02-01    PT/INR - ( 01 Feb 2020 17:00 )   PT: 17.9 SEC;   INR: 1.55          PTT - ( 01 Feb 2020 17:00 )  PTT:30.1 SEC  proBNP: Serum Pro-Brain Natriuretic Peptide: 5514 pg/mL (02-01 @ 17:00)    Lipid Profile:   HgA1c: Hemoglobin A1C, Whole Blood: 4.9 % (02-02 @ 05:15)    TSH: Thyroid Stimulating Hormone, Serum: 1.34 uIU/mL (02-02 @ 05:15)      ASSESSMENT/PLAN: 	    MEDICATIONS  (STANDING):  allopurinol 100 milliGRAM(s) Oral daily  aspirin enteric coated 81 milliGRAM(s) Oral daily  atorvastatin 40 milliGRAM(s) Oral at bedtime  calcitriol   Capsule 0.25 MICROGram(s) Oral <User Schedule>  carvedilol 25 milliGRAM(s) Oral every 12 hours  cloNIDine 0.2 milliGRAM(s) Oral daily  dextrose 5%. 1000 milliLiter(s) (50 mL/Hr) IV Continuous <Continuous>  dextrose 50% Injectable 12.5 Gram(s) IV Push once  dextrose 50% Injectable 25 Gram(s) IV Push once  dextrose 50% Injectable 25 Gram(s) IV Push once  hydrALAZINE 100 milliGRAM(s) Oral four times a day with meals  insulin lispro (HumaLOG) corrective regimen sliding scale   SubCutaneous three times a day before meals  insulin lispro (HumaLOG) corrective regimen sliding scale   SubCutaneous at bedtime  isosorbide   mononitrate ER Tablet (IMDUR) 60 milliGRAM(s) Oral <User Schedule>  sodium bicarbonate 650 milliGRAM(s) Oral two times a day

## 2020-02-02 NOTE — CONSULT NOTE ADULT - ASSESSMENT
79F with PMH of HTN, CKD stage 5, with a LUE fistula which has never been used, DM2, CAD s/p CABG, PCI (3/2017), Aortic stenosis s/p TAVR/PPM (3/2017), RUE DVT presenting with chest pain    1. Chest pain-h/o CAD/CABG  Nuclear stress  cont current mgmt  cont asa    2. 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 AS s/p TAVR/ PPM  cv stable     4. HTN   bp acceptable  cont current meds     5. anemia   transfuse prn med med   s/p prbc   hem f/u , monitor CBC     6. CKD   continue to trend creat   renal f/u

## 2020-02-02 NOTE — PHARMACOTHERAPY INTERVENTION NOTE - COMMENTS
Medication history is complete. Medication list updated in Outpatient Medication Record (OMR). Please call spectra [phone] if you have any questions.

## 2020-02-03 LAB
ANION GAP SERPL CALC-SCNC: 9 MMO/L — SIGNIFICANT CHANGE UP (ref 7–14)
APTT BLD: 128.3 SEC — CRITICAL HIGH (ref 27.5–36.3)
APTT BLD: 137.2 SEC — CRITICAL HIGH (ref 27.5–36.3)
APTT BLD: 69.9 SEC — HIGH (ref 27.5–36.3)
BASOPHILS # BLD AUTO: 0.02 K/UL — SIGNIFICANT CHANGE UP (ref 0–0.2)
BASOPHILS NFR BLD AUTO: 0.5 % — SIGNIFICANT CHANGE UP (ref 0–2)
BLD GP AB SCN SERPL QL: NEGATIVE — SIGNIFICANT CHANGE UP
BUN SERPL-MCNC: 59 MG/DL — HIGH (ref 7–23)
CALCIUM SERPL-MCNC: 8.7 MG/DL — SIGNIFICANT CHANGE UP (ref 8.4–10.5)
CHLORIDE SERPL-SCNC: 109 MMOL/L — HIGH (ref 98–107)
CO2 SERPL-SCNC: 21 MMOL/L — LOW (ref 22–31)
CREAT SERPL-MCNC: 4.84 MG/DL — HIGH (ref 0.5–1.3)
EOSINOPHIL # BLD AUTO: 0.1 K/UL — SIGNIFICANT CHANGE UP (ref 0–0.5)
EOSINOPHIL NFR BLD AUTO: 2.4 % — SIGNIFICANT CHANGE UP (ref 0–6)
GLUCOSE SERPL-MCNC: 99 MG/DL — SIGNIFICANT CHANGE UP (ref 70–99)
HCT VFR BLD CALC: 21.3 % — LOW (ref 34.5–45)
HCT VFR BLD CALC: 22.3 % — LOW (ref 34.5–45)
HCT VFR BLD CALC: 27.7 % — LOW (ref 34.5–45)
HGB BLD-MCNC: 6.6 G/DL — CRITICAL LOW (ref 11.5–15.5)
HGB BLD-MCNC: 6.8 G/DL — CRITICAL LOW (ref 11.5–15.5)
HGB BLD-MCNC: 8.7 G/DL — LOW (ref 11.5–15.5)
IMM GRANULOCYTES NFR BLD AUTO: 0.2 % — SIGNIFICANT CHANGE UP (ref 0–1.5)
INR BLD: 1.9 — HIGH (ref 0.88–1.17)
LYMPHOCYTES # BLD AUTO: 1.04 K/UL — SIGNIFICANT CHANGE UP (ref 1–3.3)
LYMPHOCYTES # BLD AUTO: 24.7 % — SIGNIFICANT CHANGE UP (ref 13–44)
MAGNESIUM SERPL-MCNC: 1.9 MG/DL — SIGNIFICANT CHANGE UP (ref 1.6–2.6)
MCHC RBC-ENTMCNC: 26.4 PG — LOW (ref 27–34)
MCHC RBC-ENTMCNC: 26.7 PG — LOW (ref 27–34)
MCHC RBC-ENTMCNC: 26.8 PG — LOW (ref 27–34)
MCHC RBC-ENTMCNC: 30.5 % — LOW (ref 32–36)
MCHC RBC-ENTMCNC: 31 % — LOW (ref 32–36)
MCHC RBC-ENTMCNC: 31.4 % — LOW (ref 32–36)
MCV RBC AUTO: 85.2 FL — SIGNIFICANT CHANGE UP (ref 80–100)
MCV RBC AUTO: 86.2 FL — SIGNIFICANT CHANGE UP (ref 80–100)
MCV RBC AUTO: 86.4 FL — SIGNIFICANT CHANGE UP (ref 80–100)
MONOCYTES # BLD AUTO: 0.4 K/UL — SIGNIFICANT CHANGE UP (ref 0–0.9)
MONOCYTES NFR BLD AUTO: 9.5 % — SIGNIFICANT CHANGE UP (ref 2–14)
NEUTROPHILS # BLD AUTO: 2.64 K/UL — SIGNIFICANT CHANGE UP (ref 1.8–7.4)
NEUTROPHILS NFR BLD AUTO: 62.7 % — SIGNIFICANT CHANGE UP (ref 43–77)
NRBC # FLD: 0 K/UL — SIGNIFICANT CHANGE UP (ref 0–0)
NRBC # FLD: 0 K/UL — SIGNIFICANT CHANGE UP (ref 0–0)
NRBC # FLD: 0.02 K/UL — SIGNIFICANT CHANGE UP (ref 0–0)
OB PNL STL: NEGATIVE — SIGNIFICANT CHANGE UP
PLATELET # BLD AUTO: 91 K/UL — LOW (ref 150–400)
PLATELET # BLD AUTO: 97 K/UL — LOW (ref 150–400)
PLATELET # BLD AUTO: 98 K/UL — LOW (ref 150–400)
PMV BLD: 10.3 FL — SIGNIFICANT CHANGE UP (ref 7–13)
PMV BLD: 10.5 FL — SIGNIFICANT CHANGE UP (ref 7–13)
PMV BLD: 9.9 FL — SIGNIFICANT CHANGE UP (ref 7–13)
POTASSIUM SERPL-MCNC: 4.5 MMOL/L — SIGNIFICANT CHANGE UP (ref 3.5–5.3)
POTASSIUM SERPL-SCNC: 4.5 MMOL/L — SIGNIFICANT CHANGE UP (ref 3.5–5.3)
PROTHROM AB SERPL-ACNC: 21.5 SEC — HIGH (ref 9.8–13.1)
RBC # BLD: 2.47 M/UL — LOW (ref 3.8–5.2)
RBC # BLD: 2.58 M/UL — LOW (ref 3.8–5.2)
RBC # BLD: 3.25 M/UL — LOW (ref 3.8–5.2)
RBC # FLD: 14.6 % — HIGH (ref 10.3–14.5)
RBC # FLD: 15.1 % — HIGH (ref 10.3–14.5)
RBC # FLD: 15.1 % — HIGH (ref 10.3–14.5)
RH IG SCN BLD-IMP: POSITIVE — SIGNIFICANT CHANGE UP
SODIUM SERPL-SCNC: 139 MMOL/L — SIGNIFICANT CHANGE UP (ref 135–145)
WBC # BLD: 3.28 K/UL — LOW (ref 3.8–10.5)
WBC # BLD: 3.55 K/UL — LOW (ref 3.8–10.5)
WBC # BLD: 4.21 K/UL — SIGNIFICANT CHANGE UP (ref 3.8–10.5)
WBC # FLD AUTO: 3.28 K/UL — LOW (ref 3.8–10.5)
WBC # FLD AUTO: 3.55 K/UL — LOW (ref 3.8–10.5)
WBC # FLD AUTO: 4.21 K/UL — SIGNIFICANT CHANGE UP (ref 3.8–10.5)

## 2020-02-03 RX ADMIN — ATORVASTATIN CALCIUM 40 MILLIGRAM(S): 80 TABLET, FILM COATED ORAL at 22:03

## 2020-02-03 RX ADMIN — Medication 40 MILLIGRAM(S): at 09:56

## 2020-02-03 RX ADMIN — Medication 100 MILLIGRAM(S): at 13:46

## 2020-02-03 RX ADMIN — Medication 100 MILLIGRAM(S): at 22:04

## 2020-02-03 RX ADMIN — Medication 650 MILLIGRAM(S): at 18:16

## 2020-02-03 RX ADMIN — ISOSORBIDE MONONITRATE 60 MILLIGRAM(S): 60 TABLET, EXTENDED RELEASE ORAL at 20:56

## 2020-02-03 RX ADMIN — Medication 100 MILLIGRAM(S): at 09:56

## 2020-02-03 RX ADMIN — Medication 650 MILLIGRAM(S): at 05:50

## 2020-02-03 RX ADMIN — ISOSORBIDE MONONITRATE 60 MILLIGRAM(S): 60 TABLET, EXTENDED RELEASE ORAL at 06:44

## 2020-02-03 RX ADMIN — Medication 81 MILLIGRAM(S): at 13:47

## 2020-02-03 RX ADMIN — HEPARIN SODIUM 800 UNIT(S)/HR: 5000 INJECTION INTRAVENOUS; SUBCUTANEOUS at 11:24

## 2020-02-03 RX ADMIN — CARVEDILOL PHOSPHATE 25 MILLIGRAM(S): 80 CAPSULE, EXTENDED RELEASE ORAL at 22:03

## 2020-02-03 RX ADMIN — HEPARIN SODIUM 800 UNIT(S)/HR: 5000 INJECTION INTRAVENOUS; SUBCUTANEOUS at 20:54

## 2020-02-03 RX ADMIN — Medication 100 MILLIGRAM(S): at 13:47

## 2020-02-03 RX ADMIN — HEPARIN SODIUM 900 UNIT(S)/HR: 5000 INJECTION INTRAVENOUS; SUBCUTANEOUS at 01:39

## 2020-02-03 RX ADMIN — HEPARIN SODIUM 0 UNIT(S)/HR: 5000 INJECTION INTRAVENOUS; SUBCUTANEOUS at 00:36

## 2020-02-03 RX ADMIN — Medication 100 MILLIGRAM(S): at 18:15

## 2020-02-03 RX ADMIN — CALCITRIOL 0.25 MICROGRAM(S): 0.5 CAPSULE ORAL at 05:50

## 2020-02-03 RX ADMIN — Medication 0.2 MILLIGRAM(S): at 22:03

## 2020-02-03 RX ADMIN — CARVEDILOL PHOSPHATE 25 MILLIGRAM(S): 80 CAPSULE, EXTENDED RELEASE ORAL at 09:56

## 2020-02-03 NOTE — PROGRESS NOTE ADULT - PROBLEM SELECTOR PLAN 5
Likely anemia of renal disease, will F/U occult blood H&H of 7.2/23 in December According to Dr Damico her Hgb around 7.  Pt had Blood transfusion last week for Hgb 6.   Transfused 1 unit of prBC 2/3/20.

## 2020-02-03 NOTE — PROGRESS NOTE ADULT - ATTENDING COMMENTS
Patient seen and examined.  Agree with above NP note.  cv stable  stress canceled today due to hem 6.6  s/p PRBC  no further chest pain  reattempt stress if heme improved martín

## 2020-02-03 NOTE — CHART NOTE - NSCHARTNOTEFT_GEN_A_CORE
Late note entry    Notified of Hgb of 6.8 and elevated .2. Pt on heparin gtt for DVT. Patient asymptomatic and VSS. Of note baseline hgb is around 7.2, last hgb 7.1. No obvious source of bleeding found. Occult stool ordered. Will f/u CBC with next PTT, if H/H continues to drop will transfuse and consider holding heparin gtt at that time. Attempted to reach medical attending multiple times and left message with call back number. Plan d/w hospitalist overnight, in agreement. Will continue to monitor patient closely.     Vital Signs Last 24 Hrs  T(C): 36.7 (03 Feb 2020 01:58), Max: 37.2 (02 Feb 2020 08:20)  T(F): 98.1 (03 Feb 2020 01:58), Max: 98.9 (02 Feb 2020 08:20)  HR: 71 (03 Feb 2020 01:58) (65 - 80)  BP: 121/49 (03 Feb 2020 01:58) (121/49 - 172/59)  BP(mean): --  RR: 18 (03 Feb 2020 01:58) (18 - 18)  SpO2: 100% (03 Feb 2020 01:58) (99% - 100%) Late note entry    Notified of Hgb of 6.8 and elevated .2. Pt on heparin gtt for DVT. Patient asymptomatic and VSS. Of note baseline hgb is around 7.2, last hgb 7.1. No overt source of bleeding found. Occult stool ordered. Will f/u CBC with next PTT, if H/H continues to drop will transfuse and consider holding heparin gtt at that time. Attempted to reach medical attending multiple times and left message with call back number. Plan d/w hospitalist overnight, in agreement. Will continue to monitor patient closely.     Vital Signs Last 24 Hrs  T(C): 36.7 (03 Feb 2020 01:58), Max: 37.2 (02 Feb 2020 08:20)  T(F): 98.1 (03 Feb 2020 01:58), Max: 98.9 (02 Feb 2020 08:20)  HR: 71 (03 Feb 2020 01:58) (65 - 80)  BP: 121/49 (03 Feb 2020 01:58) (121/49 - 172/59)  BP(mean): --  RR: 18 (03 Feb 2020 01:58) (18 - 18)  SpO2: 100% (03 Feb 2020 01:58) (99% - 100%)

## 2020-02-03 NOTE — PROGRESS NOTE ADULT - SUBJECTIVE AND OBJECTIVE BOX
Patient is a 80y old  Female who presents with a chief complaint of Chest pain (03 Feb 2020 10:59)      SUBJECTIVE / OVERNIGHT EVENTS:    Ordered for 2 units of pRBC as Hgb is below 7.0  Stress test deferred    Vital Signs Last 24 Hrs  T(C): 36.7 (03 Feb 2020 09:54), Max: 37 (02 Feb 2020 14:28)  T(F): 98 (03 Feb 2020 09:54), Max: 98.6 (02 Feb 2020 14:28)  HR: 70 (03 Feb 2020 09:54) (69 - 88)  BP: 139/60 (03 Feb 2020 09:54) (121/49 - 172/59)  BP(mean): --  RR: 16 (03 Feb 2020 09:54) (15 - 18)  SpO2: 100% (03 Feb 2020 09:54) (100% - 100%)  I&O's Summary    02 Feb 2020 07:01  -  03 Feb 2020 07:00  --------------------------------------------------------  IN: 300 mL / OUT: 700 mL / NET: -400 mL        GENERAL: NAD, well-developed  HEAD:  Atraumatic, Normocephalic  EYES: EOMI, PERRLA, conjunctiva and sclera clear  NECK: Supple, No JVD, No LAD, No carotid bruits  CHEST/LUNG: Clear to auscultation bilaterally; No wheezes  HEART: Regular rate and rhythm; 3/6 systolic murmur  ABDOMEN: Soft, Nontender, Nondistended; Bowel sounds present  EXTREMITIES:  2+ Peripheral Pulses, No clubbing, cyanosis, or edema  SKIN: No rashes or lesions  NEURO: A+O x 3; nonfocal CN/motor/sensory/reflexes  PSYCH: Nl affect; no agitation or delirium; no suicidal or homicidal ideation    LABS:                        6.6    3.28  )-----------( 91       ( 03 Feb 2020 07:30 )             21.3     02-03    139  |  109<H>  |  59<H>  ----------------------------<  99  4.5   |  21<L>  |  4.84<H>    Ca    8.7      03 Feb 2020 07:30  Mg     1.9     02-03    TPro  5.8<L>  /  Alb  3.5  /  TBili  0.3  /  DBili  x   /  AST  10  /  ALT  5   /  AlkPhos  41  02-01    PT/INR - ( 03 Feb 2020 07:30 )   PT: 21.5 SEC;   INR: 1.90          PTT - ( 03 Feb 2020 07:30 )  PTT:128.3 SEC  CAPILLARY BLOOD GLUCOSE      POCT Blood Glucose.: 116 mg/dL (03 Feb 2020 13:00)  POCT Blood Glucose.: 98 mg/dL (03 Feb 2020 08:37)  POCT Blood Glucose.: 92 mg/dL (02 Feb 2020 22:22)  POCT Blood Glucose.: 123 mg/dL (02 Feb 2020 17:44)  POCT Blood Glucose.: 115 mg/dL (02 Feb 2020 13:45)    CARDIAC MARKERS ( 02 Feb 2020 05:15 )  x     / x     / 44 u/L / x     / x              RADIOLOGY & ADDITIONAL TESTS:    Imaging Personally Reviewed:  [x] YES  [ ] NO    Consultant(s) Notes Reviewed:  [x] YES  [ ] NO      MEDICATIONS  (STANDING):  allopurinol 100 milliGRAM(s) Oral daily  aspirin enteric coated 81 milliGRAM(s) Oral daily  atorvastatin 40 milliGRAM(s) Oral at bedtime  calcitriol   Capsule 0.25 MICROGram(s) Oral <User Schedule>  carvedilol 25 milliGRAM(s) Oral every 12 hours  cloNIDine 0.2 milliGRAM(s) Oral daily  dextrose 5%. 1000 milliLiter(s) (50 mL/Hr) IV Continuous <Continuous>  dextrose 50% Injectable 12.5 Gram(s) IV Push once  dextrose 50% Injectable 25 Gram(s) IV Push once  dextrose 50% Injectable 25 Gram(s) IV Push once  furosemide    Tablet 40 milliGRAM(s) Oral <User Schedule>  heparin  Infusion.  Unit(s)/Hr (11 mL/Hr) IV Continuous <Continuous>  hydrALAZINE 100 milliGRAM(s) Oral four times a day with meals  insulin lispro (HumaLOG) corrective regimen sliding scale   SubCutaneous three times a day before meals  insulin lispro (HumaLOG) corrective regimen sliding scale   SubCutaneous at bedtime  isosorbide   mononitrate ER Tablet (IMDUR) 60 milliGRAM(s) Oral <User Schedule>  sodium bicarbonate 650 milliGRAM(s) Oral two times a day    MEDICATIONS  (PRN):  acetaminophen   Tablet .. 650 milliGRAM(s) Oral every 6 hours PRN Mild Pain (1 - 3), Moderate Pain (4 - 6), Severe Pain (7 - 10)  dextrose 40% Gel 15 Gram(s) Oral once PRN Blood Glucose LESS THAN 70 milliGRAM(s)/deciliter  glucagon  Injectable 1 milliGRAM(s) IntraMuscular once PRN Glucose LESS THAN 70 milligrams/deciliter  heparin  Injectable 5000 Unit(s) IV Push every 6 hours PRN For aPTT less than 40  heparin  Injectable 2500 Unit(s) IV Push every 6 hours PRN For aPTT between 40 - 57      Care Discussed with Consultants/Other Providers [x] YES  [ ] NO    HEALTH ISSUES - PROBLEM Dx:  Gout: Gout  Anemia Associated with Chronic Renal Failure: Anemia Associated with Chronic Renal Failure  HTN (Hypertension): HTN (Hypertension)  DM2 (diabetes mellitus, type 2): DM2 (diabetes mellitus, type 2)  CAD (coronary artery disease): CAD (coronary artery disease)  Chest pain: Chest pain

## 2020-02-03 NOTE — PROGRESS NOTE ADULT - ASSESSMENT
79F with PMH of HTN, CKD stage 5, with a LUE fistula which has never been used, DM2, CAD s/p CABG, PCI (3/2017), Aortic stenosis s/p TAVR/PPM (3/2017), RUE DVT presenting with chest pain.     1. Chest pain-h/o CAD/CABG   no further cheat pain noted   plan for nuclear stress however cancelled today 2/2 to low hemoglobin   PRBC per med, re-attempt tomorrow.   cont asa, statin, bb    2. RUE DVT  hold coumadin for ischemic eval  heparin gtt for now     3. AS s/p TAVR/ PPM  cv stable     4. HTN   bp acceptable  cont current meds     5. anemia, hx  associated with chronic renal failure   no overt s/s of bleeding, f/u occult   baseline hemoglobin around 7   hemoglobin 6.6 today, PRBC per med     6. CKD   continue to trend creat   appears at baseline no

## 2020-02-03 NOTE — PROGRESS NOTE ADULT - ASSESSMENT
Pt 79yo female with Hx of HTN CAD CABG AS S/P  TAVR 2017, PPM CHF RUE DVT DM Anemia presenting to [facility] ER for evaluation of C/P. At present Pt HD stable and in no dsitress. /53 HR 68 RR 18 100% on RA. Pt was noted with TROP of 93 x 2 (in a setting of CKD Cr of 4.97) BUN 59 BNP of 5000 but lung exam is clear, with no JVD leg edema and no orthopnea. CXR is pending. Pt was noted with H&H of 7.2/23 with H&H in December of 2019 7.6

## 2020-02-03 NOTE — PROGRESS NOTE ADULT - SUBJECTIVE AND OBJECTIVE BOX
CARDIOLOGY FOLLOW UP - Dr. Damico    CC no cp/sob   events noted stress test cancelled yesterday because she had decaf coffee  cancelled today 2/2 to low hemoglobin     PHYSICAL EXAM:  T(C): 36.7 (02-03-20 @ 09:54), Max: 37 (02-02-20 @ 14:28)  HR: 70 (02-03-20 @ 09:54) (69 - 88)  BP: 139/60 (02-03-20 @ 09:54) (121/49 - 172/59)  RR: 16 (02-03-20 @ 09:54) (15 - 18)  SpO2: 100% (02-03-20 @ 09:54) (100% - 100%)  Wt(kg): --  I&O's Summary    02 Feb 2020 07:01  -  03 Feb 2020 07:00  --------------------------------------------------------  IN: 300 mL / OUT: 700 mL / NET: -400 mL        Appearance: Normal	  Cardiovascular: Normal S1 S2,RRR   Respiratory: Lungs clear to auscultation	  Gastrointestinal:  Soft, Non-tender, + BS	  Extremities: Normal range of motion, No clubbing, cyanosis or edema        MEDICATIONS  (STANDING):  allopurinol 100 milliGRAM(s) Oral daily  aspirin enteric coated 81 milliGRAM(s) Oral daily  atorvastatin 40 milliGRAM(s) Oral at bedtime  calcitriol   Capsule 0.25 MICROGram(s) Oral <User Schedule>  carvedilol 25 milliGRAM(s) Oral every 12 hours  cloNIDine 0.2 milliGRAM(s) Oral daily  dextrose 5%. 1000 milliLiter(s) (50 mL/Hr) IV Continuous <Continuous>  dextrose 50% Injectable 12.5 Gram(s) IV Push once  dextrose 50% Injectable 25 Gram(s) IV Push once  dextrose 50% Injectable 25 Gram(s) IV Push once  furosemide    Tablet 40 milliGRAM(s) Oral <User Schedule>  heparin  Infusion.  Unit(s)/Hr (11 mL/Hr) IV Continuous <Continuous>  hydrALAZINE 100 milliGRAM(s) Oral four times a day with meals  insulin lispro (HumaLOG) corrective regimen sliding scale   SubCutaneous three times a day before meals  insulin lispro (HumaLOG) corrective regimen sliding scale   SubCutaneous at bedtime  isosorbide   mononitrate ER Tablet (IMDUR) 60 milliGRAM(s) Oral <User Schedule>  sodium bicarbonate 650 milliGRAM(s) Oral two times a day      TELEMETRY: vpaced 70s	    ECG:  	  RADIOLOGY:   DIAGNOSTIC TESTING:  [ ] Echocardiogram:  [ ]  Catheterization:  [ ] Stress Test:    OTHER: 	    LABS:	 	                                6.6    3.28  )-----------( 91       ( 03 Feb 2020 07:30 )             21.3     02-03    139  |  109<H>  |  59<H>  ----------------------------<  99  4.5   |  21<L>  |  4.84<H>    Ca    8.7      03 Feb 2020 07:30  Mg     1.9     02-03    TPro  5.8<L>  /  Alb  3.5  /  TBili  0.3  /  DBili  x   /  AST  10  /  ALT  5   /  AlkPhos  41  02-01    PT/INR - ( 03 Feb 2020 07:30 )   PT: 21.5 SEC;   INR: 1.90          PTT - ( 02 Feb 2020 23:35 )  PTT:137.2 SEC CARDIOLOGY FOLLOW UP - Dr. Damico    CC no cp/sob   events noted stress test cancelled yesterday because she had decaf coffee  cancelled today 2/2 to low hemoglobin     PHYSICAL EXAM:  T(C): 36.7 (02-03-20 @ 09:54), Max: 37 (02-02-20 @ 14:28)  HR: 70 (02-03-20 @ 09:54) (69 - 88)  BP: 139/60 (02-03-20 @ 09:54) (121/49 - 172/59)  RR: 16 (02-03-20 @ 09:54) (15 - 18)  SpO2: 100% (02-03-20 @ 09:54) (100% - 100%)  Wt(kg): --  I&O's Summary    02 Feb 2020 07:01  -  03 Feb 2020 07:00  --------------------------------------------------------  IN: 300 mL / OUT: 700 mL / NET: -400 mL        Appearance: Normal	  Cardiovascular: Normal S1 S2,RRR   Respiratory: Lungs clear to auscultation	  Gastrointestinal:  Soft, Non-tender, + BS	  Extremities: Normal range of motion, No clubbing, cyanosis or edema        MEDICATIONS  (STANDING):  allopurinol 100 milliGRAM(s) Oral daily  aspirin enteric coated 81 milliGRAM(s) Oral daily  atorvastatin 40 milliGRAM(s) Oral at bedtime  calcitriol   Capsule 0.25 MICROGram(s) Oral <User Schedule>  carvedilol 25 milliGRAM(s) Oral every 12 hours  cloNIDine 0.2 milliGRAM(s) Oral daily  dextrose 5%. 1000 milliLiter(s) (50 mL/Hr) IV Continuous <Continuous>  dextrose 50% Injectable 12.5 Gram(s) IV Push once  dextrose 50% Injectable 25 Gram(s) IV Push once  dextrose 50% Injectable 25 Gram(s) IV Push once  furosemide    Tablet 40 milliGRAM(s) Oral <User Schedule>  heparin  Infusion.  Unit(s)/Hr (11 mL/Hr) IV Continuous <Continuous>  hydrALAZINE 100 milliGRAM(s) Oral four times a day with meals  insulin lispro (HumaLOG) corrective regimen sliding scale   SubCutaneous three times a day before meals  insulin lispro (HumaLOG) corrective regimen sliding scale   SubCutaneous at bedtime  isosorbide   mononitrate ER Tablet (IMDUR) 60 milliGRAM(s) Oral <User Schedule>  sodium bicarbonate 650 milliGRAM(s) Oral two times a day      TELEMETRY: vpaced 70s	    ECG:  	  RADIOLOGY:   DIAGNOSTIC TESTING:  [ ] Echocardiogram:  [ ]  Catheterization:  [ ] Stress Test:    OTHER: 	    LABS:	 	                                6.6    3.28  )-----------( 91       ( 03 Feb 2020 07:30 )             21.3     02-03    139  |  109<H>  |  59<H>  ----------------------------<  99  4.5   |  21<L>  |  4.84<H>    Ca    8.7      03 Feb 2020 07:30  Mg     1.9     02-03    TPro  5.8<L>  /  Alb  3.5  /  TBili  0.3  /  DBili  x   /  AST  10  /  ALT  5   /  AlkPhos  41  02-01    PT/INR - ( 03 Feb 2020 07:30 )   PT: 21.5 SEC;   INR: 1.90          PTT - ( 02 Feb 2020 23:35 )  PTT:137.2 SEC    Advanced care planning discussed with patient. Advanced care planning forms discussed with patient and/or family.  Risks, benefits, and alternatives of medical/cardiac procedures were discussed in detail with all questions answered.  30 minutes were spent addressing advance care planning.

## 2020-02-04 DIAGNOSIS — I50.32 CHRONIC DIASTOLIC (CONGESTIVE) HEART FAILURE: ICD-10-CM

## 2020-02-04 DIAGNOSIS — I82.629 ACUTE EMBOLISM AND THROMBOSIS OF DEEP VEINS OF UNSPECIFIED UPPER EXTREMITY: ICD-10-CM

## 2020-02-04 DIAGNOSIS — M25.512 PAIN IN LEFT SHOULDER: ICD-10-CM

## 2020-02-04 LAB
ANION GAP SERPL CALC-SCNC: 12 MMO/L — SIGNIFICANT CHANGE UP (ref 7–14)
APTT BLD: 147 SEC — CRITICAL HIGH (ref 27.5–36.3)
APTT BLD: 147.9 SEC — CRITICAL HIGH (ref 27.5–36.3)
APTT BLD: 65.5 SEC — HIGH (ref 27.5–36.3)
APTT BLD: 95.4 SEC — HIGH (ref 27.5–36.3)
BASOPHILS # BLD AUTO: 0.04 K/UL — SIGNIFICANT CHANGE UP (ref 0–0.2)
BASOPHILS NFR BLD AUTO: 1 % — SIGNIFICANT CHANGE UP (ref 0–2)
BUN SERPL-MCNC: 57 MG/DL — HIGH (ref 7–23)
CALCIUM SERPL-MCNC: 9.4 MG/DL — SIGNIFICANT CHANGE UP (ref 8.4–10.5)
CHLORIDE SERPL-SCNC: 104 MMOL/L — SIGNIFICANT CHANGE UP (ref 98–107)
CO2 SERPL-SCNC: 20 MMOL/L — LOW (ref 22–31)
CREAT SERPL-MCNC: 4.58 MG/DL — HIGH (ref 0.5–1.3)
EOSINOPHIL # BLD AUTO: 0.11 K/UL — SIGNIFICANT CHANGE UP (ref 0–0.5)
EOSINOPHIL NFR BLD AUTO: 2.8 % — SIGNIFICANT CHANGE UP (ref 0–6)
GLUCOSE SERPL-MCNC: 102 MG/DL — HIGH (ref 70–99)
HCT VFR BLD CALC: 26.8 % — LOW (ref 34.5–45)
HCT VFR BLD CALC: 26.8 % — LOW (ref 34.5–45)
HGB BLD-MCNC: 8.6 G/DL — LOW (ref 11.5–15.5)
HGB BLD-MCNC: 8.6 G/DL — LOW (ref 11.5–15.5)
IMM GRANULOCYTES NFR BLD AUTO: 0.3 % — SIGNIFICANT CHANGE UP (ref 0–1.5)
INR BLD: 1.69 — HIGH (ref 0.88–1.17)
LYMPHOCYTES # BLD AUTO: 0.85 K/UL — LOW (ref 1–3.3)
LYMPHOCYTES # BLD AUTO: 22 % — SIGNIFICANT CHANGE UP (ref 13–44)
MAGNESIUM SERPL-MCNC: 2.1 MG/DL — SIGNIFICANT CHANGE UP (ref 1.6–2.6)
MCHC RBC-ENTMCNC: 27.2 PG — SIGNIFICANT CHANGE UP (ref 27–34)
MCHC RBC-ENTMCNC: 27.2 PG — SIGNIFICANT CHANGE UP (ref 27–34)
MCHC RBC-ENTMCNC: 32.1 % — SIGNIFICANT CHANGE UP (ref 32–36)
MCHC RBC-ENTMCNC: 32.1 % — SIGNIFICANT CHANGE UP (ref 32–36)
MCV RBC AUTO: 84.8 FL — SIGNIFICANT CHANGE UP (ref 80–100)
MCV RBC AUTO: 84.8 FL — SIGNIFICANT CHANGE UP (ref 80–100)
MONOCYTES # BLD AUTO: 0.34 K/UL — SIGNIFICANT CHANGE UP (ref 0–0.9)
MONOCYTES NFR BLD AUTO: 8.8 % — SIGNIFICANT CHANGE UP (ref 2–14)
NEUTROPHILS # BLD AUTO: 2.52 K/UL — SIGNIFICANT CHANGE UP (ref 1.8–7.4)
NEUTROPHILS NFR BLD AUTO: 65.1 % — SIGNIFICANT CHANGE UP (ref 43–77)
NRBC # FLD: 0.05 K/UL — SIGNIFICANT CHANGE UP (ref 0–0)
NRBC # FLD: 0.05 K/UL — SIGNIFICANT CHANGE UP (ref 0–0)
NRBC FLD-RTO: 1.3 — SIGNIFICANT CHANGE UP
NRBC FLD-RTO: 1.3 — SIGNIFICANT CHANGE UP
PLATELET # BLD AUTO: 96 K/UL — LOW (ref 150–400)
PLATELET # BLD AUTO: 96 K/UL — LOW (ref 150–400)
PMV BLD: 9.8 FL — SIGNIFICANT CHANGE UP (ref 7–13)
PMV BLD: 9.8 FL — SIGNIFICANT CHANGE UP (ref 7–13)
POTASSIUM SERPL-MCNC: 4 MMOL/L — SIGNIFICANT CHANGE UP (ref 3.5–5.3)
POTASSIUM SERPL-SCNC: 4 MMOL/L — SIGNIFICANT CHANGE UP (ref 3.5–5.3)
PROTHROM AB SERPL-ACNC: 19.1 SEC — HIGH (ref 9.8–13.1)
RBC # BLD: 3.16 M/UL — LOW (ref 3.8–5.2)
RBC # BLD: 3.16 M/UL — LOW (ref 3.8–5.2)
RBC # FLD: 14.6 % — HIGH (ref 10.3–14.5)
RBC # FLD: 14.6 % — HIGH (ref 10.3–14.5)
SODIUM SERPL-SCNC: 136 MMOL/L — SIGNIFICANT CHANGE UP (ref 135–145)
WBC # BLD: 3.87 K/UL — SIGNIFICANT CHANGE UP (ref 3.8–10.5)
WBC # BLD: 3.87 K/UL — SIGNIFICANT CHANGE UP (ref 3.8–10.5)
WBC # FLD AUTO: 3.87 K/UL — SIGNIFICANT CHANGE UP (ref 3.8–10.5)
WBC # FLD AUTO: 3.87 K/UL — SIGNIFICANT CHANGE UP (ref 3.8–10.5)

## 2020-02-04 PROCEDURE — 78452 HT MUSCLE IMAGE SPECT MULT: CPT | Mod: 26

## 2020-02-04 PROCEDURE — 93018 CV STRESS TEST I&R ONLY: CPT | Mod: GC

## 2020-02-04 PROCEDURE — 73030 X-RAY EXAM OF SHOULDER: CPT | Mod: 26,LT

## 2020-02-04 PROCEDURE — 93016 CV STRESS TEST SUPVJ ONLY: CPT | Mod: GC

## 2020-02-04 RX ADMIN — HEPARIN SODIUM 0 UNIT(S)/HR: 5000 INJECTION INTRAVENOUS; SUBCUTANEOUS at 06:19

## 2020-02-04 RX ADMIN — Medication 81 MILLIGRAM(S): at 15:50

## 2020-02-04 RX ADMIN — Medication 650 MILLIGRAM(S): at 18:03

## 2020-02-04 RX ADMIN — Medication 100 MILLIGRAM(S): at 21:43

## 2020-02-04 RX ADMIN — HEPARIN SODIUM 600 UNIT(S)/HR: 5000 INJECTION INTRAVENOUS; SUBCUTANEOUS at 07:20

## 2020-02-04 RX ADMIN — Medication 650 MILLIGRAM(S): at 05:17

## 2020-02-04 RX ADMIN — HEPARIN SODIUM 600 UNIT(S)/HR: 5000 INJECTION INTRAVENOUS; SUBCUTANEOUS at 14:08

## 2020-02-04 RX ADMIN — CARVEDILOL PHOSPHATE 25 MILLIGRAM(S): 80 CAPSULE, EXTENDED RELEASE ORAL at 21:43

## 2020-02-04 RX ADMIN — Medication 650 MILLIGRAM(S): at 18:50

## 2020-02-04 RX ADMIN — Medication 100 MILLIGRAM(S): at 09:07

## 2020-02-04 RX ADMIN — Medication 650 MILLIGRAM(S): at 06:22

## 2020-02-04 RX ADMIN — ISOSORBIDE MONONITRATE 60 MILLIGRAM(S): 60 TABLET, EXTENDED RELEASE ORAL at 21:37

## 2020-02-04 RX ADMIN — ISOSORBIDE MONONITRATE 60 MILLIGRAM(S): 60 TABLET, EXTENDED RELEASE ORAL at 06:20

## 2020-02-04 RX ADMIN — Medication 0.2 MILLIGRAM(S): at 21:44

## 2020-02-04 RX ADMIN — Medication 100 MILLIGRAM(S): at 18:04

## 2020-02-04 RX ADMIN — CARVEDILOL PHOSPHATE 25 MILLIGRAM(S): 80 CAPSULE, EXTENDED RELEASE ORAL at 09:07

## 2020-02-04 RX ADMIN — Medication 100 MILLIGRAM(S): at 15:50

## 2020-02-04 RX ADMIN — ATORVASTATIN CALCIUM 40 MILLIGRAM(S): 80 TABLET, FILM COATED ORAL at 21:37

## 2020-02-04 RX ADMIN — Medication 650 MILLIGRAM(S): at 18:05

## 2020-02-04 NOTE — PROGRESS NOTE ADULT - SUBJECTIVE AND OBJECTIVE BOX
CARDIOLOGY FOLLOW UP - Dr. Damico    CC no cp/sob   c/o left shoulder pain  H/H improved, pending stress today       PHYSICAL EXAM:  T(C): 36.8 (02-04-20 @ 09:06), Max: 37 (02-03-20 @ 13:45)  HR: 79 (02-04-20 @ 09:06) (70 - 79)  BP: 170/69 (02-04-20 @ 09:06) (146/62 - 171/52)  RR: 18 (02-04-20 @ 09:06) (15 - 18)  SpO2: 100% (02-04-20 @ 09:06) (98% - 100%)  Wt(kg): --  I&O's Summary    03 Feb 2020 07:01  -  04 Feb 2020 07:00  --------------------------------------------------------  IN: 660 mL / OUT: 800 mL / NET: -140 mL        Appearance: Normal	  Cardiovascular: Normal S1 S2,RRR, No JVD, No murmurs  Respiratory: Lungs clear to auscultation	  Gastrointestinal:  Soft, Non-tender, + BS	  Extremities: Normal range of motion, No clubbing, cyanosis or edema        MEDICATIONS  (STANDING):  allopurinol 100 milliGRAM(s) Oral daily  aspirin enteric coated 81 milliGRAM(s) Oral daily  atorvastatin 40 milliGRAM(s) Oral at bedtime  calcitriol   Capsule 0.25 MICROGram(s) Oral <User Schedule>  carvedilol 25 milliGRAM(s) Oral every 12 hours  cloNIDine 0.2 milliGRAM(s) Oral daily  dextrose 5%. 1000 milliLiter(s) (50 mL/Hr) IV Continuous <Continuous>  dextrose 50% Injectable 12.5 Gram(s) IV Push once  dextrose 50% Injectable 25 Gram(s) IV Push once  dextrose 50% Injectable 25 Gram(s) IV Push once  furosemide    Tablet 40 milliGRAM(s) Oral <User Schedule>  heparin  Infusion.  Unit(s)/Hr (11 mL/Hr) IV Continuous <Continuous>  hydrALAZINE 100 milliGRAM(s) Oral four times a day with meals  insulin lispro (HumaLOG) corrective regimen sliding scale   SubCutaneous three times a day before meals  insulin lispro (HumaLOG) corrective regimen sliding scale   SubCutaneous at bedtime  isosorbide   mononitrate ER Tablet (IMDUR) 60 milliGRAM(s) Oral <User Schedule>  sodium bicarbonate 650 milliGRAM(s) Oral two times a day      TELEMETRY: paced 	    ECG:  	  RADIOLOGY:   DIAGNOSTIC TESTING:  [ ] Echocardiogram:  [ ]  Catheterization:  [ ] Stress Test:    OTHER: 	    LABS:	 	                                8.6    3.87  )-----------( 96       ( 04 Feb 2020 05:01 )             26.8     02-04    136  |  104  |  57<H>  ----------------------------<  102<H>  4.0   |  20<L>  |  4.58<H>    Ca    9.4      04 Feb 2020 05:01  Mg     2.1     02-04      PT/INR - ( 03 Feb 2020 07:30 )   PT: 21.5 SEC;   INR: 1.90          PTT - ( 04 Feb 2020 05:01 )  PTT:147.9 SEC

## 2020-02-04 NOTE — PROGRESS NOTE ADULT - SUBJECTIVE AND OBJECTIVE BOX
Hgb went from 6.6 to 8.6 after one unit of pRBC  (+)lt shoulder pain with movement, started last night  No acute SOB or CP    Vital Signs Last 24 Hrs  T(C): 36.8 (04 Feb 2020 09:06), Max: 37 (03 Feb 2020 13:45)  T(F): 98.2 (04 Feb 2020 09:06), Max: 98.6 (03 Feb 2020 13:45)  HR: 79 (04 Feb 2020 09:06) (70 - 79)  BP: 170/69 (04 Feb 2020 09:06) (146/62 - 171/52)  BP(mean): --  RR: 18 (04 Feb 2020 09:06) (15 - 18)  SpO2: 100% (04 Feb 2020 09:06) (98% - 100%)        GENERAL: NAD, well-developed  HEAD:  Atraumatic, Normocephalic  EYES: EOMI, PERRLA, conjunctiva and sclera clear  NECK: Supple, No JVD, No LAD, No carotid bruits  CHEST/LUNG: Clear to auscultation bilaterally; No wheezes  HEART: Regular rate and rhythm; 3/6 systolic murmur  ABDOMEN: Soft, Nontender, Nondistended; Bowel sounds present  EXTREMITIES:  2+ Peripheral Pulses, No clubbing, cyanosis, or edema; lt shoulder rotation limited 2' pain but doesn't appear swollen or red  SKIN: No rashes or lesions  NEURO: A+O x 3; nonfocal CN/motor/sensory/reflexes  PSYCH: Nl affect; no agitation or delirium; no suicidal or homicidal ideation      LABS:                        8.6    3.87  )-----------( 96       ( 04 Feb 2020 05:01 )             26.8     02-04    136  |  104  |  57<H>  ----------------------------<  102<H>  4.0   |  20<L>  |  4.58<H>    Ca    9.4      04 Feb 2020 05:01  Mg     2.1     02-04      PT/INR - ( 03 Feb 2020 07:30 )   PT: 21.5 SEC;   INR: 1.90          PTT - ( 04 Feb 2020 05:01 )  PTT:147.9 SEC  CAPILLARY BLOOD GLUCOSE      POCT Blood Glucose.: 106 mg/dL (04 Feb 2020 08:59)  POCT Blood Glucose.: 111 mg/dL (03 Feb 2020 21:34)  POCT Blood Glucose.: 95 mg/dL (03 Feb 2020 17:25)  POCT Blood Glucose.: 116 mg/dL (03 Feb 2020 13:00)

## 2020-02-04 NOTE — PROGRESS NOTE ADULT - ASSESSMENT
79F with PMH of HTN, CKD stage 5, with a LUE fistula which has never been used, DM2, CAD s/p CABG, PCI (3/2017), Aortic stenosis s/p TAVR/PPM (3/2017), RUE DVT presenting with chest pain.     1. Chest pain-h/o CAD/CABG   no further cheat pain noted   h/h improved , plan for stress test today   cont asa, statin, bb    2. RUE DVT  hold coumadin for ischemic eval  heparin gtt for now     3. AS s/p TAVR/ PPM  cv stable     4. HTN   elevated bp noted this am prior to am meds   cont to trend, cont current meds     5. anemia, hx  associated with chronic renal failure   no overt s/s of bleeding, occult negative    baseline hemoglobin around 7   s/p 1uPRBC, h/h improved     6. CKD   continue to trend creat   appears at baseline

## 2020-02-04 NOTE — PROGRESS NOTE ADULT - ASSESSMENT
Pt 81yo female with Hx of HTN CAD CABG AS S/P  TAVR 2017, PPM CHF RUE DVT DM Anemia presenting to [facility] ER for evaluation of C/P. At present Pt HD stable and in no dsitress. /53 HR 68 RR 18 100% on RA. Pt was noted with TROP of 93 x 2 (in a setting of CKD Cr of 4.97) BUN 59 BNP of 5000 but lung exam is clear, with no JVD leg edema and no orthopnea. CXR is pending. Pt was noted with H&H of 7.2/23 with H&H in December of 2019 7.6 Pt 81yo female with Hx of HTN, CAD, CABG, AS S/P TAVR 2017, s/p PPM, HFpEF (60% in May 2017), RUE DVT dxed 1/22/2019 on coumadin, DM2, Anemia presenting to [facility] ER for evaluation of C/P.

## 2020-02-04 NOTE — PROGRESS NOTE ADULT - ATTENDING COMMENTS
Patient seen and examined.  Agree with above NP note.  cv stable  hgb improved  stress today  no further chest pain

## 2020-02-05 LAB
ANION GAP SERPL CALC-SCNC: 13 MMO/L — SIGNIFICANT CHANGE UP (ref 7–14)
APTT BLD: 66.6 SEC — HIGH (ref 27.5–36.3)
BASOPHILS # BLD AUTO: 0.01 K/UL — SIGNIFICANT CHANGE UP (ref 0–0.2)
BASOPHILS NFR BLD AUTO: 0.3 % — SIGNIFICANT CHANGE UP (ref 0–2)
BUN SERPL-MCNC: 55 MG/DL — HIGH (ref 7–23)
CALCIUM SERPL-MCNC: 9.4 MG/DL — SIGNIFICANT CHANGE UP (ref 8.4–10.5)
CHLORIDE SERPL-SCNC: 104 MMOL/L — SIGNIFICANT CHANGE UP (ref 98–107)
CO2 SERPL-SCNC: 17 MMOL/L — LOW (ref 22–31)
CREAT SERPL-MCNC: 4.27 MG/DL — HIGH (ref 0.5–1.3)
EOSINOPHIL # BLD AUTO: 0.02 K/UL — SIGNIFICANT CHANGE UP (ref 0–0.5)
EOSINOPHIL NFR BLD AUTO: 0.6 % — SIGNIFICANT CHANGE UP (ref 0–6)
GLUCOSE SERPL-MCNC: 110 MG/DL — HIGH (ref 70–99)
HCT VFR BLD CALC: 25.6 % — LOW (ref 34.5–45)
HCT VFR BLD CALC: 25.6 % — LOW (ref 34.5–45)
HGB BLD-MCNC: 7.9 G/DL — LOW (ref 11.5–15.5)
HGB BLD-MCNC: 7.9 G/DL — LOW (ref 11.5–15.5)
IMM GRANULOCYTES NFR BLD AUTO: 0.3 % — SIGNIFICANT CHANGE UP (ref 0–1.5)
LYMPHOCYTES # BLD AUTO: 0.63 K/UL — LOW (ref 1–3.3)
LYMPHOCYTES # BLD AUTO: 19.1 % — SIGNIFICANT CHANGE UP (ref 13–44)
MAGNESIUM SERPL-MCNC: 2 MG/DL — SIGNIFICANT CHANGE UP (ref 1.6–2.6)
MCHC RBC-ENTMCNC: 27 PG — SIGNIFICANT CHANGE UP (ref 27–34)
MCHC RBC-ENTMCNC: 27 PG — SIGNIFICANT CHANGE UP (ref 27–34)
MCHC RBC-ENTMCNC: 30.9 % — LOW (ref 32–36)
MCHC RBC-ENTMCNC: 30.9 % — LOW (ref 32–36)
MCV RBC AUTO: 87.4 FL — SIGNIFICANT CHANGE UP (ref 80–100)
MCV RBC AUTO: 87.4 FL — SIGNIFICANT CHANGE UP (ref 80–100)
MONOCYTES # BLD AUTO: 0.22 K/UL — SIGNIFICANT CHANGE UP (ref 0–0.9)
MONOCYTES NFR BLD AUTO: 6.7 % — SIGNIFICANT CHANGE UP (ref 2–14)
NEUTROPHILS # BLD AUTO: 2.41 K/UL — SIGNIFICANT CHANGE UP (ref 1.8–7.4)
NEUTROPHILS NFR BLD AUTO: 73 % — SIGNIFICANT CHANGE UP (ref 43–77)
NRBC # FLD: 0 K/UL — SIGNIFICANT CHANGE UP (ref 0–0)
NRBC # FLD: 0 K/UL — SIGNIFICANT CHANGE UP (ref 0–0)
PLATELET # BLD AUTO: 109 K/UL — LOW (ref 150–400)
PLATELET # BLD AUTO: 109 K/UL — LOW (ref 150–400)
PMV BLD: 12.3 FL — SIGNIFICANT CHANGE UP (ref 7–13)
PMV BLD: 12.3 FL — SIGNIFICANT CHANGE UP (ref 7–13)
POTASSIUM SERPL-MCNC: 4.1 MMOL/L — SIGNIFICANT CHANGE UP (ref 3.5–5.3)
POTASSIUM SERPL-SCNC: 4.1 MMOL/L — SIGNIFICANT CHANGE UP (ref 3.5–5.3)
RBC # BLD: 2.93 M/UL — LOW (ref 3.8–5.2)
RBC # BLD: 2.93 M/UL — LOW (ref 3.8–5.2)
RBC # FLD: 14.8 % — HIGH (ref 10.3–14.5)
RBC # FLD: 14.8 % — HIGH (ref 10.3–14.5)
SODIUM SERPL-SCNC: 134 MMOL/L — LOW (ref 135–145)
WBC # BLD: 3.3 K/UL — LOW (ref 3.8–10.5)
WBC # BLD: 3.3 K/UL — LOW (ref 3.8–10.5)
WBC # FLD AUTO: 3.3 K/UL — LOW (ref 3.8–10.5)
WBC # FLD AUTO: 3.3 K/UL — LOW (ref 3.8–10.5)

## 2020-02-05 RX ORDER — ONDANSETRON 8 MG/1
4 TABLET, FILM COATED ORAL EVERY 6 HOURS
Refills: 0 | Status: DISCONTINUED | OUTPATIENT
Start: 2020-02-05 | End: 2020-02-12

## 2020-02-05 RX ADMIN — Medication 650 MILLIGRAM(S): at 14:00

## 2020-02-05 RX ADMIN — Medication 100 MILLIGRAM(S): at 21:39

## 2020-02-05 RX ADMIN — Medication 100 MILLIGRAM(S): at 13:07

## 2020-02-05 RX ADMIN — Medication 650 MILLIGRAM(S): at 13:10

## 2020-02-05 RX ADMIN — Medication 650 MILLIGRAM(S): at 20:06

## 2020-02-05 RX ADMIN — ATORVASTATIN CALCIUM 40 MILLIGRAM(S): 80 TABLET, FILM COATED ORAL at 21:37

## 2020-02-05 RX ADMIN — Medication 100 MILLIGRAM(S): at 08:53

## 2020-02-05 RX ADMIN — HEPARIN SODIUM 600 UNIT(S)/HR: 5000 INJECTION INTRAVENOUS; SUBCUTANEOUS at 00:22

## 2020-02-05 RX ADMIN — Medication 650 MILLIGRAM(S): at 06:06

## 2020-02-05 RX ADMIN — ISOSORBIDE MONONITRATE 60 MILLIGRAM(S): 60 TABLET, EXTENDED RELEASE ORAL at 06:07

## 2020-02-05 RX ADMIN — Medication 0.2 MILLIGRAM(S): at 21:37

## 2020-02-05 RX ADMIN — Medication 100 MILLIGRAM(S): at 20:06

## 2020-02-05 RX ADMIN — Medication 81 MILLIGRAM(S): at 13:07

## 2020-02-05 RX ADMIN — CARVEDILOL PHOSPHATE 25 MILLIGRAM(S): 80 CAPSULE, EXTENDED RELEASE ORAL at 09:04

## 2020-02-05 RX ADMIN — HEPARIN SODIUM 600 UNIT(S)/HR: 5000 INJECTION INTRAVENOUS; SUBCUTANEOUS at 22:00

## 2020-02-05 RX ADMIN — Medication 650 MILLIGRAM(S): at 21:37

## 2020-02-05 RX ADMIN — CARVEDILOL PHOSPHATE 25 MILLIGRAM(S): 80 CAPSULE, EXTENDED RELEASE ORAL at 21:37

## 2020-02-05 RX ADMIN — CALCITRIOL 0.25 MICROGRAM(S): 0.5 CAPSULE ORAL at 06:07

## 2020-02-05 RX ADMIN — Medication 40 MILLIGRAM(S): at 08:53

## 2020-02-05 RX ADMIN — Medication 650 MILLIGRAM(S): at 21:58

## 2020-02-05 RX ADMIN — ISOSORBIDE MONONITRATE 60 MILLIGRAM(S): 60 TABLET, EXTENDED RELEASE ORAL at 20:05

## 2020-02-05 NOTE — PROGRESS NOTE ADULT - SUBJECTIVE AND OBJECTIVE BOX
Patient is a 80y old  Female who presents with a chief complaint of Chest pain (05 Feb 2020 11:22)      SUBJECTIVE / OVERNIGHT EVENTS:  s/p stress test  card planning angio tomorrow  no cp, no sob. mild nausea, no abdominal pain.  no headache, no dizziness.  BP slightly high but already on a lot of BP meds      Vital Signs Last 24 Hrs  T(C): 36.7 (05 Feb 2020 13:04), Max: 37 (05 Feb 2020 05:45)  T(F): 98.1 (05 Feb 2020 13:04), Max: 98.6 (05 Feb 2020 05:45)  HR: 73 (05 Feb 2020 13:04) (73 - 75)  BP: 173/77 (05 Feb 2020 13:04) (137/58 - 178/86)  BP(mean): --  RR: 18 (05 Feb 2020 13:04) (18 - 18)  SpO2: 97% (05 Feb 2020 13:04) (97% - 99%)  I&O's Summary      PHYSICAL EXAM:  GENERAL: NAD, Comfortable  HEAD:  Atraumatic, Normocephalic  EYES: EOMI, PERRLA, conjunctiva and sclera clear  NECK: Supple, No JVD  CHEST/LUNG: Clear to auscultation bilaterally; No wheeze  HEART: Regular rate and rhythm; No murmurs, rubs, or gallops  ABDOMEN: Soft, Nontender, Nondistended; Bowel sounds present  Neuro: AAO x 3, no focal deficit, 5/5 b/l extremities  EXTREMITIES:  2+ Peripheral Pulses, No clubbing, cyanosis, or edema  SKIN: No rashes or lesions    LABS:                        7.9    3.30  )-----------( 109      ( 05 Feb 2020 06:50 )             25.6     02-05    134<L>  |  104  |  55<H>  ----------------------------<  110<H>  4.1   |  17<L>  |  4.27<H>    Ca    9.4      05 Feb 2020 06:50  Mg     2.0     02-05      PT/INR - ( 04 Feb 2020 13:24 )   PT: 19.1 SEC;   INR: 1.69          PTT - ( 04 Feb 2020 21:41 )  PTT:95.4 SEC  CAPILLARY BLOOD GLUCOSE      POCT Blood Glucose.: 92 mg/dL (05 Feb 2020 17:38)  POCT Blood Glucose.: 129 mg/dL (05 Feb 2020 12:55)  POCT Blood Glucose.: 108 mg/dL (05 Feb 2020 08:30)  POCT Blood Glucose.: 104 mg/dL (04 Feb 2020 21:39)            RADIOLOGY & ADDITIONAL TESTS:    Imaging Personally Reviewed:  [x] YES  [ ] NO    Consultant(s) Notes Reviewed:  [x] YES  [ ] NO      MEDICATIONS  (STANDING):  allopurinol 100 milliGRAM(s) Oral daily  aspirin enteric coated 81 milliGRAM(s) Oral daily  atorvastatin 40 milliGRAM(s) Oral at bedtime  calcitriol   Capsule 0.25 MICROGram(s) Oral <User Schedule>  carvedilol 25 milliGRAM(s) Oral every 12 hours  cloNIDine 0.2 milliGRAM(s) Oral daily  dextrose 5%. 1000 milliLiter(s) (50 mL/Hr) IV Continuous <Continuous>  dextrose 50% Injectable 12.5 Gram(s) IV Push once  dextrose 50% Injectable 25 Gram(s) IV Push once  dextrose 50% Injectable 25 Gram(s) IV Push once  furosemide    Tablet 40 milliGRAM(s) Oral <User Schedule>  heparin  Infusion.  Unit(s)/Hr (11 mL/Hr) IV Continuous <Continuous>  hydrALAZINE 100 milliGRAM(s) Oral four times a day with meals  insulin lispro (HumaLOG) corrective regimen sliding scale   SubCutaneous three times a day before meals  insulin lispro (HumaLOG) corrective regimen sliding scale   SubCutaneous at bedtime  isosorbide   mononitrate ER Tablet (IMDUR) 60 milliGRAM(s) Oral <User Schedule>  sodium bicarbonate 650 milliGRAM(s) Oral two times a day    MEDICATIONS  (PRN):  acetaminophen   Tablet .. 650 milliGRAM(s) Oral every 6 hours PRN Mild Pain (1 - 3), Moderate Pain (4 - 6), Severe Pain (7 - 10)  dextrose 40% Gel 15 Gram(s) Oral once PRN Blood Glucose LESS THAN 70 milliGRAM(s)/deciliter  glucagon  Injectable 1 milliGRAM(s) IntraMuscular once PRN Glucose LESS THAN 70 milligrams/deciliter  heparin  Injectable 5000 Unit(s) IV Push every 6 hours PRN For aPTT less than 40  heparin  Injectable 2500 Unit(s) IV Push every 6 hours PRN For aPTT between 40 - 57      Care Discussed with Consultants/Other Providers [x] YES  [ ] NO    HEALTH ISSUES - PROBLEM Dx:  Chronic diastolic CHF (congestive heart failure): Chronic diastolic CHF (congestive heart failure)  DVT of upper extremity (deep vein thrombosis): DVT of upper extremity (deep vein thrombosis)  Shoulder pain, left: Shoulder pain, left  Gout: Gout  Anemia Associated with Chronic Renal Failure: Anemia Associated with Chronic Renal Failure  HTN (Hypertension): HTN (Hypertension)  DM2 (diabetes mellitus, type 2): DM2 (diabetes mellitus, type 2)  CAD (coronary artery disease): CAD (coronary artery disease)  Chest pain: Chest pain

## 2020-02-05 NOTE — PROGRESS NOTE ADULT - SUBJECTIVE AND OBJECTIVE BOX
CARDIOLOGY FOLLOW UP - Dr. Damico    CC no cp/sob  completed stress this am - pending results       PHYSICAL EXAM:  T(C): 37 (02-05-20 @ 08:52), Max: 37.1 (02-04-20 @ 18:02)  HR: 75 (02-05-20 @ 08:52) (74 - 75)  BP: 162/71 (02-05-20 @ 08:52) (137/58 - 188/70)  RR: 18 (02-05-20 @ 08:52) (18 - 18)  SpO2: 98% (02-05-20 @ 08:52) (97% - 100%)  Wt(kg): --  I&O's Summary      Appearance: Normal	  Cardiovascular: Normal S1 S2,RRR,  Respiratory: Lungs clear to auscultation	  Gastrointestinal:  Soft, Non-tender, + BS	  Extremities: Normal range of motion, No clubbing, cyanosis or edema        MEDICATIONS  (STANDING):  allopurinol 100 milliGRAM(s) Oral daily  aspirin enteric coated 81 milliGRAM(s) Oral daily  atorvastatin 40 milliGRAM(s) Oral at bedtime  calcitriol   Capsule 0.25 MICROGram(s) Oral <User Schedule>  carvedilol 25 milliGRAM(s) Oral every 12 hours  cloNIDine 0.2 milliGRAM(s) Oral daily  dextrose 5%. 1000 milliLiter(s) (50 mL/Hr) IV Continuous <Continuous>  dextrose 50% Injectable 12.5 Gram(s) IV Push once  dextrose 50% Injectable 25 Gram(s) IV Push once  dextrose 50% Injectable 25 Gram(s) IV Push once  furosemide    Tablet 40 milliGRAM(s) Oral <User Schedule>  heparin  Infusion.  Unit(s)/Hr (11 mL/Hr) IV Continuous <Continuous>  hydrALAZINE 100 milliGRAM(s) Oral four times a day with meals  insulin lispro (HumaLOG) corrective regimen sliding scale   SubCutaneous three times a day before meals  insulin lispro (HumaLOG) corrective regimen sliding scale   SubCutaneous at bedtime  isosorbide   mononitrate ER Tablet (IMDUR) 60 milliGRAM(s) Oral <User Schedule>  sodium bicarbonate 650 milliGRAM(s) Oral two times a day      TELEMETRY: paced     ECG:  	  RADIOLOGY:   DIAGNOSTIC TESTING:  [ ] Echocardiogram:  [ ]  Catheterization:  [ ] Stress Test:    OTHER: 	    LABS:	 	                                7.9    3.30  )-----------( 109      ( 05 Feb 2020 06:50 )             25.6     02-05    134<L>  |  104  |  55<H>  ----------------------------<  110<H>  4.1   |  17<L>  |  4.27<H>    Ca    9.4      05 Feb 2020 06:50  Mg     2.0     02-05      PT/INR - ( 04 Feb 2020 13:24 )   PT: 19.1 SEC;   INR: 1.69          PTT - ( 04 Feb 2020 21:41 )  PTT:95.4 SEC

## 2020-02-05 NOTE — PROGRESS NOTE ADULT - ATTENDING COMMENTS
Patient seen and examined, agree with the above assessment and plan by TRISHA Diaz.  CV stable  awaiting stress results  hgb stable  cont AC  if stress reveals no significant ischemia can resume coumadin

## 2020-02-05 NOTE — PROGRESS NOTE ADULT - ASSESSMENT
79F with PMH of HTN, CKD stage 5, with a LUE fistula which has never been used, DM2, CAD s/p CABG, PCI (3/2017), Aortic stenosis s/p TAVR/PPM (3/2017), RUE DVT presenting with chest pain.     1. Chest pain-h/o CAD/CABG   no further cheat pain noted   s/p stress test, pending results   cont asa, statin, bb    2. RUE DVT  hold coumadin for ischemic eval  heparin gtt for now     3. AS s/p TAVR/ PPM  cv stable     4. HTN   elevated bp noted this am prior to am meds   cont to trend, cont current meds     5. anemia, hx  associated with chronic renal failure   no overt s/s of bleeding, occult negative    baseline hemoglobin around 7   s/p 1uPRBC, h/h improved     6. CKD   continue to trend creat   appears at baseline 79F with PMH of HTN, CKD stage 5, with a LUE fistula which has never been used, DM2, CAD s/p CABG, PCI (3/2017), Aortic stenosis s/p TAVR/PPM (3/2017), RUE DVT presenting with chest pain.     1. Chest pain-h/o CAD/CABG   no further chest pain noted   s/p stress test, pending results   cont asa, statin, bb    2. RUE DVT  hold coumadin for ischemic eval  heparin gtt for now     3. AS s/p TAVR/ PPM  cv stable     4. HTN   elevated bp noted this am prior to am meds   cont to trend, cont current meds     5. anemia, hx  associated with chronic renal failure   no overt s/s of bleeding, occult negative    baseline hemoglobin around 7   s/p 1uPRBC, h/h improved     6. CKD   continue to trend creat   appears at baseline

## 2020-02-05 NOTE — PROGRESS NOTE ADULT - ASSESSMENT
Pt 79yo female with Hx of HTN, CAD, CABG, AS S/P TAVR 2017, s/p PPM, HFpEF (60% in May 2017), RUE DVT dxed 1/22/2019 on coumadin, DM2, Anemia presenting to [facility] ER for evaluation of C/P.

## 2020-02-06 DIAGNOSIS — N18.5 CHRONIC KIDNEY DISEASE, STAGE 5: ICD-10-CM

## 2020-02-06 DIAGNOSIS — I10 ESSENTIAL (PRIMARY) HYPERTENSION: ICD-10-CM

## 2020-02-06 DIAGNOSIS — R51 HEADACHE: ICD-10-CM

## 2020-02-06 LAB
ANION GAP SERPL CALC-SCNC: 13 MMO/L — SIGNIFICANT CHANGE UP (ref 7–14)
APTT BLD: 166.9 SEC — CRITICAL HIGH (ref 27.5–36.3)
APTT BLD: 26.5 SEC — LOW (ref 27.5–36.3)
APTT BLD: 26.9 SEC — LOW (ref 27.5–36.3)
BASOPHILS # BLD AUTO: 0.01 K/UL — SIGNIFICANT CHANGE UP (ref 0–0.2)
BASOPHILS NFR BLD AUTO: 0.3 % — SIGNIFICANT CHANGE UP (ref 0–2)
BUN SERPL-MCNC: 57 MG/DL — HIGH (ref 7–23)
CALCIUM SERPL-MCNC: 9.7 MG/DL — SIGNIFICANT CHANGE UP (ref 8.4–10.5)
CHLORIDE SERPL-SCNC: 105 MMOL/L — SIGNIFICANT CHANGE UP (ref 98–107)
CO2 SERPL-SCNC: 19 MMOL/L — LOW (ref 22–31)
CREAT SERPL-MCNC: 4.38 MG/DL — HIGH (ref 0.5–1.3)
EOSINOPHIL # BLD AUTO: 0.01 K/UL — SIGNIFICANT CHANGE UP (ref 0–0.5)
EOSINOPHIL NFR BLD AUTO: 0.3 % — SIGNIFICANT CHANGE UP (ref 0–6)
GLUCOSE SERPL-MCNC: 116 MG/DL — HIGH (ref 70–99)
HCT VFR BLD CALC: 23.8 % — LOW (ref 34.5–45)
HCT VFR BLD CALC: 25.5 % — LOW (ref 34.5–45)
HCT VFR BLD CALC: 25.5 % — LOW (ref 34.5–45)
HGB BLD-MCNC: 7.1 G/DL — LOW (ref 11.5–15.5)
HGB BLD-MCNC: 7.8 G/DL — LOW (ref 11.5–15.5)
HGB BLD-MCNC: 7.8 G/DL — LOW (ref 11.5–15.5)
IMM GRANULOCYTES NFR BLD AUTO: 0.3 % — SIGNIFICANT CHANGE UP (ref 0–1.5)
INR BLD: 1.45 — HIGH (ref 0.88–1.17)
LYMPHOCYTES # BLD AUTO: 0.57 K/UL — LOW (ref 1–3.3)
LYMPHOCYTES # BLD AUTO: 14.4 % — SIGNIFICANT CHANGE UP (ref 13–44)
MAGNESIUM SERPL-MCNC: 2 MG/DL — SIGNIFICANT CHANGE UP (ref 1.6–2.6)
MCHC RBC-ENTMCNC: 26.3 PG — LOW (ref 27–34)
MCHC RBC-ENTMCNC: 26.3 PG — LOW (ref 27–34)
MCHC RBC-ENTMCNC: 26.5 PG — LOW (ref 27–34)
MCHC RBC-ENTMCNC: 29.8 % — LOW (ref 32–36)
MCHC RBC-ENTMCNC: 30.6 % — LOW (ref 32–36)
MCHC RBC-ENTMCNC: 30.6 % — LOW (ref 32–36)
MCV RBC AUTO: 85.9 FL — SIGNIFICANT CHANGE UP (ref 80–100)
MCV RBC AUTO: 85.9 FL — SIGNIFICANT CHANGE UP (ref 80–100)
MCV RBC AUTO: 88.8 FL — SIGNIFICANT CHANGE UP (ref 80–100)
MONOCYTES # BLD AUTO: 0.14 K/UL — SIGNIFICANT CHANGE UP (ref 0–0.9)
MONOCYTES NFR BLD AUTO: 3.5 % — SIGNIFICANT CHANGE UP (ref 2–14)
NEUTROPHILS # BLD AUTO: 3.21 K/UL — SIGNIFICANT CHANGE UP (ref 1.8–7.4)
NEUTROPHILS NFR BLD AUTO: 81.2 % — HIGH (ref 43–77)
NRBC # FLD: 0 K/UL — SIGNIFICANT CHANGE UP (ref 0–0)
PLATELET # BLD AUTO: 106 K/UL — LOW (ref 150–400)
PLATELET # BLD AUTO: 111 K/UL — LOW (ref 150–400)
PLATELET # BLD AUTO: 111 K/UL — LOW (ref 150–400)
PMV BLD: 10.6 FL — SIGNIFICANT CHANGE UP (ref 7–13)
PMV BLD: 10.8 FL — SIGNIFICANT CHANGE UP (ref 7–13)
PMV BLD: 10.8 FL — SIGNIFICANT CHANGE UP (ref 7–13)
POTASSIUM SERPL-MCNC: 4.5 MMOL/L — SIGNIFICANT CHANGE UP (ref 3.5–5.3)
POTASSIUM SERPL-SCNC: 4.5 MMOL/L — SIGNIFICANT CHANGE UP (ref 3.5–5.3)
PROTHROM AB SERPL-ACNC: 16.7 SEC — HIGH (ref 9.8–13.1)
RBC # BLD: 2.68 M/UL — LOW (ref 3.8–5.2)
RBC # BLD: 2.97 M/UL — LOW (ref 3.8–5.2)
RBC # BLD: 2.97 M/UL — LOW (ref 3.8–5.2)
RBC # FLD: 14.9 % — HIGH (ref 10.3–14.5)
RBC # FLD: 14.9 % — HIGH (ref 10.3–14.5)
RBC # FLD: 15 % — HIGH (ref 10.3–14.5)
SODIUM SERPL-SCNC: 137 MMOL/L — SIGNIFICANT CHANGE UP (ref 135–145)
WBC # BLD: 3.95 K/UL — SIGNIFICANT CHANGE UP (ref 3.8–10.5)
WBC # BLD: 3.95 K/UL — SIGNIFICANT CHANGE UP (ref 3.8–10.5)
WBC # BLD: 4.77 K/UL — SIGNIFICANT CHANGE UP (ref 3.8–10.5)
WBC # FLD AUTO: 3.95 K/UL — SIGNIFICANT CHANGE UP (ref 3.8–10.5)
WBC # FLD AUTO: 3.95 K/UL — SIGNIFICANT CHANGE UP (ref 3.8–10.5)
WBC # FLD AUTO: 4.77 K/UL — SIGNIFICANT CHANGE UP (ref 3.8–10.5)

## 2020-02-06 PROCEDURE — 99223 1ST HOSP IP/OBS HIGH 75: CPT | Mod: GC

## 2020-02-06 PROCEDURE — 70450 CT HEAD/BRAIN W/O DYE: CPT | Mod: 26

## 2020-02-06 RX ORDER — ACETAMINOPHEN 500 MG
500 TABLET ORAL ONCE
Refills: 0 | Status: COMPLETED | OUTPATIENT
Start: 2020-02-06 | End: 2020-02-06

## 2020-02-06 RX ORDER — WARFARIN SODIUM 2.5 MG/1
8 TABLET ORAL ONCE
Refills: 0 | Status: COMPLETED | OUTPATIENT
Start: 2020-02-06 | End: 2020-02-06

## 2020-02-06 RX ORDER — ACETAMINOPHEN 500 MG
650 TABLET ORAL ONCE
Refills: 0 | Status: COMPLETED | OUTPATIENT
Start: 2020-02-06 | End: 2020-02-06

## 2020-02-06 RX ORDER — SODIUM CHLORIDE 9 MG/ML
200 INJECTION INTRAMUSCULAR; INTRAVENOUS; SUBCUTANEOUS ONCE
Refills: 0 | Status: DISCONTINUED | OUTPATIENT
Start: 2020-02-06 | End: 2020-02-06

## 2020-02-06 RX ADMIN — Medication 0.2 MILLIGRAM(S): at 21:26

## 2020-02-06 RX ADMIN — ISOSORBIDE MONONITRATE 60 MILLIGRAM(S): 60 TABLET, EXTENDED RELEASE ORAL at 20:07

## 2020-02-06 RX ADMIN — Medication 650 MILLIGRAM(S): at 18:20

## 2020-02-06 RX ADMIN — ONDANSETRON 4 MILLIGRAM(S): 8 TABLET, FILM COATED ORAL at 22:10

## 2020-02-06 RX ADMIN — Medication 650 MILLIGRAM(S): at 11:54

## 2020-02-06 RX ADMIN — ATORVASTATIN CALCIUM 40 MILLIGRAM(S): 80 TABLET, FILM COATED ORAL at 21:26

## 2020-02-06 RX ADMIN — ISOSORBIDE MONONITRATE 60 MILLIGRAM(S): 60 TABLET, EXTENDED RELEASE ORAL at 06:43

## 2020-02-06 RX ADMIN — Medication 100 MILLIGRAM(S): at 18:20

## 2020-02-06 RX ADMIN — Medication 81 MILLIGRAM(S): at 11:54

## 2020-02-06 RX ADMIN — HEPARIN SODIUM 5000 UNIT(S): 5000 INJECTION INTRAVENOUS; SUBCUTANEOUS at 11:55

## 2020-02-06 RX ADMIN — Medication 200 MILLIGRAM(S): at 04:27

## 2020-02-06 RX ADMIN — Medication 200 MILLIGRAM(S): at 20:02

## 2020-02-06 RX ADMIN — HEPARIN SODIUM 700 UNIT(S)/HR: 5000 INJECTION INTRAVENOUS; SUBCUTANEOUS at 20:39

## 2020-02-06 RX ADMIN — Medication 100 MILLIGRAM(S): at 23:56

## 2020-02-06 RX ADMIN — Medication 650 MILLIGRAM(S): at 12:45

## 2020-02-06 RX ADMIN — HEPARIN SODIUM 0 UNIT(S)/HR: 5000 INJECTION INTRAVENOUS; SUBCUTANEOUS at 19:28

## 2020-02-06 RX ADMIN — CARVEDILOL PHOSPHATE 25 MILLIGRAM(S): 80 CAPSULE, EXTENDED RELEASE ORAL at 21:26

## 2020-02-06 RX ADMIN — HEPARIN SODIUM 900 UNIT(S)/HR: 5000 INJECTION INTRAVENOUS; SUBCUTANEOUS at 11:55

## 2020-02-06 RX ADMIN — WARFARIN SODIUM 8 MILLIGRAM(S): 2.5 TABLET ORAL at 18:23

## 2020-02-06 RX ADMIN — Medication 650 MILLIGRAM(S): at 23:50

## 2020-02-06 RX ADMIN — Medication 100 MILLIGRAM(S): at 09:12

## 2020-02-06 RX ADMIN — CARVEDILOL PHOSPHATE 25 MILLIGRAM(S): 80 CAPSULE, EXTENDED RELEASE ORAL at 09:12

## 2020-02-06 RX ADMIN — Medication 500 MILLIGRAM(S): at 21:00

## 2020-02-06 RX ADMIN — Medication 650 MILLIGRAM(S): at 01:05

## 2020-02-06 RX ADMIN — Medication 260 MILLIGRAM(S): at 22:57

## 2020-02-06 RX ADMIN — Medication 260 MILLIGRAM(S): at 00:35

## 2020-02-06 RX ADMIN — Medication 500 MILLIGRAM(S): at 04:57

## 2020-02-06 RX ADMIN — Medication 100 MILLIGRAM(S): at 11:54

## 2020-02-06 NOTE — CHART NOTE - NSCHARTNOTEFT_GEN_A_CORE
Notified By Rn, Pt had a episode of brown/red emesis. Pt was seen at the bedside. prior to emesis pt ate and drank cranberry juice and chocolate pudding. Concerned due to patient being on heparin gtt. CBC was ordered x2. Hbg seems stable. will follow up with morning cbc

## 2020-02-06 NOTE — PROGRESS NOTE ADULT - ASSESSMENT
Pt 79yo female with Hx of HTN, CAD, CABG, AS S/P TAVR 2017, s/p PPM, HFpEF (60% in May 2017), RUE DVT dxed 1/22/2019 on coumadin, DM2, Anemia presenting to [facility] ER for evaluation of C/P.

## 2020-02-06 NOTE — CONSULT NOTE ADULT - ASSESSMENT
80F PMHx CKD5 with LUE AVF (outpt nephrologist Dr. Dixon),CAD s/p CABG 2007, aortic stenosis s/p TAVR, PPM, CHF, PPM, RUE DVT, DM2, HTN who admitted for chest pain found to have elevated troponin with stress test on 2/4/2020 show moderate ischemia ("moderate defect in mid inferior wall that is reversible").  Cardiology plan cardiac cath possibly 2/6/2020.    Nephrology consulted for CKD5 management.  On admission sCr 4.97 (baseline 4.2)

## 2020-02-06 NOTE — PROGRESS NOTE ADULT - PROBLEM SELECTOR PLAN 7
Likely anemia of renal disease  H&H of 7.2/23 in December According to Dr Damico her Hgb around 7.  Pt had Blood transfusion last week for Hgb 6.   Transfused 1 unit of pRBC 2/3/20.  never had colonoscopy all her life. encouraged procedure but refused. understand risk of missed colon lesions

## 2020-02-06 NOTE — PROGRESS NOTE ADULT - ASSESSMENT
79F with PMH of HTN, CKD stage 5, with a LUE fistula which has never been used, DM2, CAD s/p CABG, PCI (3/2017), Aortic stenosis s/p TAVR/PPM (3/2017), RUE DVT presenting with chest pain.     1. Chest pain-h/o CAD/CABG   no further chest pain noted   stress test revealed moderate ischemia to inferior wall. nl LV fx   creat noted,  will need to discuss with attending in regards to proceeding with cath vs medical management   cont asa, statin, bb    2. RUE DVT  hold coumadin for ischemic eval  heparin gtt for now     3. AS s/p TAVR/ PPM  cv stable     4. HTN   cont to trend, cont current meds     5. anemia, hx  associated with chronic renal failure   no overt s/s of bleeding, occult negative    baseline hemoglobin around 7   s/p 1uPRBC, h/h improved     6. CKD   continue to trend creat , renal f/u     7. Headache   events noted. Head ct with no acute intracranial pathology. + pituitary gland mildly enlarged suspicious for a pituitary macroadenoma  med f/u

## 2020-02-06 NOTE — PROGRESS NOTE ADULT - PROBLEM SELECTOR PLAN 1
left sided headaches  have hx of chronic migraines  no vision changes  no focal deficit  CT head neg, but incidentally noted ? pituitary macroadenoma   headaches better s/p pain med.   neurology Dr. Sauceda consulted

## 2020-02-06 NOTE — CONSULT NOTE ADULT - SUBJECTIVE AND OBJECTIVE BOX
[facility] DIVISION OF KIDNEY DISEASES AND HYPERTENSION -- INITIAL CONSULT NOTE  --------------------------------------------------------------------------------  HPI:  80F PMHx CKD5 with LUE AVF (outpt nephrologist Dr. Dixon),CAD s/p CABG 2007, aortic stenosis s/p TAVR, PPM, CHF, PPM, RUE DVT, DM2, HTN who admitted for chest pain.  Patient report she was walking to bathroom (2/1/2020) when she felt sudden onset chest pain, midsternal chest tightness, non radiating, associated with shortness of breath.  In ED found to have elevated troponin with stress test on 2/4/2020 show moderate ischemia ("moderate defect in mid inferior wall that is reversible").  Cardiology plan cardiac cath possibly 2/6/2020.    Nephrology consulted for CKD5 management.  On admission sCr 4.97 (baseline 4.2)      PAST HISTORY  --------------------------------------------------------------------------------  PAST MEDICAL & SURGICAL HISTORY:  Thyroid nodule: awaiting FNB in the near future  Severe aortic stenosis  Osteoarthritis: bilateral knees  CKD (chronic kidney disease) stage 4, GFR 15-29 ml/min  DM2 (diabetes mellitus, type 2)  Arthritis  CAD (coronary artery disease)  Gout  Anemia Associated with Chronic Renal Failure  HTN (Hypertension)  S/P AVR: TAVR  History of pacemaker  A-V fistula: left arm  S/P cholecystectomy  Stented coronary artery: s/p 3 stents, then CABG 2007  S/P CABG (coronary artery bypass graft): triple in 2007    FAMILY HISTORY:  No pertinent family history in first degree relatives    PAST SOCIAL HISTORY:    ALLERGIES & MEDICATIONS  --------------------------------------------------------------------------------  Allergies    codeine (Other)  Lortab (Other)  morphine (Other)  Percocet 10/325 (Other)  PPM not compatible with  MRI (Other (Fatal))  Reglan (Other; Flushing)  sulfa drugs (Flushing)    Intolerances      Standing Inpatient Medications  allopurinol 100 milliGRAM(s) Oral daily  aspirin enteric coated 81 milliGRAM(s) Oral daily  atorvastatin 40 milliGRAM(s) Oral at bedtime  calcitriol   Capsule 0.25 MICROGram(s) Oral <User Schedule>  carvedilol 25 milliGRAM(s) Oral every 12 hours  cloNIDine 0.2 milliGRAM(s) Oral daily  dextrose 5%. 1000 milliLiter(s) IV Continuous <Continuous>  dextrose 50% Injectable 12.5 Gram(s) IV Push once  dextrose 50% Injectable 25 Gram(s) IV Push once  dextrose 50% Injectable 25 Gram(s) IV Push once  furosemide    Tablet 40 milliGRAM(s) Oral <User Schedule>  heparin  Infusion.  Unit(s)/Hr IV Continuous <Continuous>  hydrALAZINE 100 milliGRAM(s) Oral four times a day with meals  insulin lispro (HumaLOG) corrective regimen sliding scale   SubCutaneous three times a day before meals  insulin lispro (HumaLOG) corrective regimen sliding scale   SubCutaneous at bedtime  isosorbide   mononitrate ER Tablet (IMDUR) 60 milliGRAM(s) Oral <User Schedule>  sodium bicarbonate 650 milliGRAM(s) Oral two times a day  sodium chloride 0.9% Bolus 200 milliLiter(s) IV Bolus once    PRN Inpatient Medications  acetaminophen   Tablet .. 650 milliGRAM(s) Oral every 6 hours PRN  dextrose 40% Gel 15 Gram(s) Oral once PRN  glucagon  Injectable 1 milliGRAM(s) IntraMuscular once PRN  heparin  Injectable 5000 Unit(s) IV Push every 6 hours PRN  heparin  Injectable 2500 Unit(s) IV Push every 6 hours PRN  ondansetron Injectable 4 milliGRAM(s) IV Push every 6 hours PRN      REVIEW OF SYSTEMS  --------------------------------------------------------------------------------  Gen: No fatigue, fevers/chills, weakness  Respiratory: No dyspnea, cough  CV: No chest pain  GI: No abdominal pain, diarrhea, constipation, nausea, vomiting  : No increased frequency, dysuria, hematuria  MSK: no edema  Neuro: No dizziness/lightheadedness, weakness      All other systems were reviewed and are negative, except as noted.    VITALS/PHYSICAL EXAM  --------------------------------------------------------------------------------  T(C): 36.9 (02-06-20 @ 11:50), Max: 37.2 (02-06-20 @ 09:10)  HR: 88 (02-06-20 @ 11:50) (72 - 97)  BP: 134/74 (02-06-20 @ 11:50) (134/74 - 185/70)  RR: 18 (02-06-20 @ 11:50) (18 - 18)  SpO2: 98% (02-06-20 @ 11:50) (98% - 98%)  Wt(kg): --        Physical Exam:  	Gen: NAD, well-appearing on room air   	HEENT: no JVD, clear oropharynx  	Pulm: CTA B/L  	CV: RRR, S1S2; no rub  	Abd: +BS, soft, nontender/nondistended  	: No suprapubic tenderness  	LE: Warm, no edema  	Skin: Warm, without rashes  	Vascular access: UBALDOE AVF +thrcalos    LABS/STUDIES  --------------------------------------------------------------------------------              7.8    3.95  >-----------<  111      [02-06-20 @ 07:40]              25.5     137  |  105  |  57  ----------------------------<  116      [02-06-20 @ 07:40]  4.5   |  19  |  4.38        Ca     9.7     [02-06-20 @ 07:40]      Mg     2.0     [02-06-20 @ 07:40]        PTT: 26.5       [02-06-20 @ 10:24]      Creatinine Trend:  SCr 4.38 [02-06 @ 07:40]  SCr 4.27 [02-05 @ 06:50]  SCr 4.58 [02-04 @ 05:01]  SCr 4.84 [02-03 @ 07:30]  SCr 4.97 [02-01 @ 17:00]    Urinalysis - [08-06-18 @ 23:39]      Color Yellow / Appearance Clear / SG 1.012 / pH 7.0      Gluc 50 / Ketone Negative  / Bili Negative / Urobili Negative       Blood Negative / Protein 500 / Leuk Est Moderate / Nitrite Negative      RBC 0-2 / WBC 3-5 / Hyaline 0-2 / Gran  / Sq Epi  / Non Sq Epi OCC / Bacteria Few      Iron 100, TIBC 163, %sat --      [02-02-20 @ 05:15]  Ferritin 1155      [02-02-20 @ 05:15]  .7 (Ca --)      [02-02-20 @ 05:15]   --  HbA1c 4.9      [02-02-20 @ 05:15]  TSH 1.34      [02-02-20 @ 05:15]  Lipid: chol 122, TG 63, HDL 44, LDL 71      [02-02-20 @ 05:15] [facility] DIVISION OF KIDNEY DISEASES AND HYPERTENSION -- INITIAL CONSULT NOTE  --------------------------------------------------------------------------------  HPI:  80F PMHx CKD5 with LUE AVF (outpt nephrologist Dr. Dixon),CAD s/p CABG 2007, aortic stenosis s/p TAVR, PPM, CHF, PPM, RUE DVT, DM2, HTN who admitted for chest pain.  Patient report she was walking to bathroom (2/1/2020) when she felt sudden onset chest pain, midsternal chest tightness, non radiating, associated with shortness of breath.  In ED found to have elevated troponin with stress test on 2/4/2020 show moderate ischemia ("moderate defect in mid inferior wall that is reversible").  Cardiology plan cardiac cath possibly 2/6/2020.    Nephrology consulted for CKD5 management.  On admission sCr 4.97 (baseline 4.2)      PAST HISTORY  --------------------------------------------------------------------------------  PAST MEDICAL & SURGICAL HISTORY:  Thyroid nodule: awaiting FNB in the near future  Severe aortic stenosis  Osteoarthritis: bilateral knees  CKD (chronic kidney disease) stage 4, GFR 15-29 ml/min  DM2 (diabetes mellitus, type 2)  Arthritis  CAD (coronary artery disease)  Gout  Anemia Associated with Chronic Renal Failure  HTN (Hypertension)  S/P AVR: TAVR  History of pacemaker  A-V fistula: left arm  S/P cholecystectomy  Stented coronary artery: s/p 3 stents, then CABG 2007  S/P CABG (coronary artery bypass graft): triple in 2007    FAMILY HISTORY:  No pertinent family history in first degree relatives    PAST SOCIAL HISTORY: recently move    ALLERGIES & MEDICATIONS  --------------------------------------------------------------------------------  Allergies    codeine (Other)  Lortab (Other)  morphine (Other)  Percocet 10/325 (Other)  PPM not compatible with  MRI (Other (Fatal))  Reglan (Other; Flushing)  sulfa drugs (Flushing)    Intolerances      Standing Inpatient Medications  allopurinol 100 milliGRAM(s) Oral daily  aspirin enteric coated 81 milliGRAM(s) Oral daily  atorvastatin 40 milliGRAM(s) Oral at bedtime  calcitriol   Capsule 0.25 MICROGram(s) Oral <User Schedule>  carvedilol 25 milliGRAM(s) Oral every 12 hours  cloNIDine 0.2 milliGRAM(s) Oral daily  dextrose 5%. 1000 milliLiter(s) IV Continuous <Continuous>  dextrose 50% Injectable 12.5 Gram(s) IV Push once  dextrose 50% Injectable 25 Gram(s) IV Push once  dextrose 50% Injectable 25 Gram(s) IV Push once  furosemide    Tablet 40 milliGRAM(s) Oral <User Schedule>  heparin  Infusion.  Unit(s)/Hr IV Continuous <Continuous>  hydrALAZINE 100 milliGRAM(s) Oral four times a day with meals  insulin lispro (HumaLOG) corrective regimen sliding scale   SubCutaneous three times a day before meals  insulin lispro (HumaLOG) corrective regimen sliding scale   SubCutaneous at bedtime  isosorbide   mononitrate ER Tablet (IMDUR) 60 milliGRAM(s) Oral <User Schedule>  sodium bicarbonate 650 milliGRAM(s) Oral two times a day  sodium chloride 0.9% Bolus 200 milliLiter(s) IV Bolus once    PRN Inpatient Medications  acetaminophen   Tablet .. 650 milliGRAM(s) Oral every 6 hours PRN  dextrose 40% Gel 15 Gram(s) Oral once PRN  glucagon  Injectable 1 milliGRAM(s) IntraMuscular once PRN  heparin  Injectable 5000 Unit(s) IV Push every 6 hours PRN  heparin  Injectable 2500 Unit(s) IV Push every 6 hours PRN  ondansetron Injectable 4 milliGRAM(s) IV Push every 6 hours PRN      REVIEW OF SYSTEMS  --------------------------------------------------------------------------------  Gen: No fatigue, fevers/chills, weakness  Respiratory: No dyspnea, cough  CV: + chest pain  GI: No abdominal pain, diarrhea, constipation, nausea, vomiting  : No increased frequency, dysuria, hematuria  MSK: no edema  Neuro: headache      All other systems were reviewed and are negative, except as noted.    VITALS/PHYSICAL EXAM  --------------------------------------------------------------------------------  T(C): 36.9 (02-06-20 @ 11:50), Max: 37.2 (02-06-20 @ 09:10)  HR: 88 (02-06-20 @ 11:50) (72 - 97)  BP: 134/74 (02-06-20 @ 11:50) (134/74 - 185/70)  RR: 18 (02-06-20 @ 11:50) (18 - 18)  SpO2: 98% (02-06-20 @ 11:50) (98% - 98%)  Wt(kg): --        Physical Exam:  	Gen: NAD, well-appearing on room air   	HEENT: no JVD, clear oropharynx  	Pulm: CTA B/L  	CV: RRR, S1S2; no rub  	Abd: +BS, soft, nontender/nondistended  	: No suprapubic tenderness  	LE: Warm, no edema  	Skin: Warm, without rashes  	Vascular access: MASHA WALSH +thrcalos    LABS/STUDIES  --------------------------------------------------------------------------------              7.8    3.95  >-----------<  111      [02-06-20 @ 07:40]              25.5     137  |  105  |  57  ----------------------------<  116      [02-06-20 @ 07:40]  4.5   |  19  |  4.38        Ca     9.7     [02-06-20 @ 07:40]      Mg     2.0     [02-06-20 @ 07:40]        PTT: 26.5       [02-06-20 @ 10:24]      Creatinine Trend:  SCr 4.38 [02-06 @ 07:40]  SCr 4.27 [02-05 @ 06:50]  SCr 4.58 [02-04 @ 05:01]  SCr 4.84 [02-03 @ 07:30]  SCr 4.97 [02-01 @ 17:00]    Urinalysis - [08-06-18 @ 23:39]      Color Yellow / Appearance Clear / SG 1.012 / pH 7.0      Gluc 50 / Ketone Negative  / Bili Negative / Urobili Negative       Blood Negative / Protein 500 / Leuk Est Moderate / Nitrite Negative      RBC 0-2 / WBC 3-5 / Hyaline 0-2 / Gran  / Sq Epi  / Non Sq Epi OCC / Bacteria Few      Iron 100, TIBC 163, %sat --      [02-02-20 @ 05:15]  Ferritin 1155      [02-02-20 @ 05:15]  .7 (Ca --)      [02-02-20 @ 05:15]   --  HbA1c 4.9      [02-02-20 @ 05:15]  TSH 1.34      [02-02-20 @ 05:15]  Lipid: chol 122, TG 63, HDL 44, LDL 71      [02-02-20 @ 05:15]

## 2020-02-06 NOTE — PROGRESS NOTE ADULT - SUBJECTIVE AND OBJECTIVE BOX
Patient is a 80y old  Female who presents with a chief complaint of Chest pain (06 Feb 2020 16:30)      SUBJECTIVE / OVERNIGHT EVENTS:  seen and examined this am.  left sided headaches  have hx of chronic migraines  no vision changes  no focal deficit  CT head neg, but incidentally noted ? pituitary macroadenoma   headaches better s/p pain med.   denied chest pain  await cath   nausea resolved.      Vital Signs Last 24 Hrs  T(C): 36.9 (06 Feb 2020 11:50), Max: 37.2 (06 Feb 2020 09:10)  T(F): 98.5 (06 Feb 2020 11:50), Max: 98.9 (06 Feb 2020 09:10)  HR: 88 (06 Feb 2020 11:50) (72 - 97)  BP: 134/74 (06 Feb 2020 11:50) (134/74 - 185/70)  BP(mean): --  RR: 18 (06 Feb 2020 11:50) (18 - 18)  SpO2: 98% (06 Feb 2020 11:50) (98% - 98%)  I&O's Summary      PHYSICAL EXAM:  GENERAL: NAD, Comfortable  HEAD:  Atraumatic, Normocephalic  EYES: EOMI, PERRLA, conjunctiva and sclera clear  NECK: Supple, No JVD  CHEST/LUNG: Clear to auscultation bilaterally; No wheeze  HEART: Regular rate and rhythm; No murmurs, rubs, or gallops  ABDOMEN: Soft, Nontender, Nondistended; Bowel sounds present  Neuro: AAO x 3, no focal deficit, 5/5 b/l extremities  EXTREMITIES:  2+ Peripheral Pulses, No clubbing, cyanosis, or edema  SKIN: No rashes or lesions    LABS:                        7.8    3.95  )-----------( 111      ( 06 Feb 2020 07:40 )             25.5     02-06    137  |  105  |  57<H>  ----------------------------<  116<H>  4.5   |  19<L>  |  4.38<H>    Ca    9.7      06 Feb 2020 07:40  Mg     2.0     02-06      PTT - ( 06 Feb 2020 10:24 )  PTT:26.5 SEC  CAPILLARY BLOOD GLUCOSE      POCT Blood Glucose.: 100 mg/dL (06 Feb 2020 12:31)  POCT Blood Glucose.: 121 mg/dL (06 Feb 2020 08:43)  POCT Blood Glucose.: 113 mg/dL (05 Feb 2020 21:51)            RADIOLOGY & ADDITIONAL TESTS:    Imaging Personally Reviewed:  [x] YES  [ ] NO    Consultant(s) Notes Reviewed:  [x] YES  [ ] NO      MEDICATIONS  (STANDING):  allopurinol 100 milliGRAM(s) Oral daily  aspirin enteric coated 81 milliGRAM(s) Oral daily  atorvastatin 40 milliGRAM(s) Oral at bedtime  calcitriol   Capsule 0.25 MICROGram(s) Oral <User Schedule>  carvedilol 25 milliGRAM(s) Oral every 12 hours  cloNIDine 0.2 milliGRAM(s) Oral daily  dextrose 5%. 1000 milliLiter(s) (50 mL/Hr) IV Continuous <Continuous>  dextrose 50% Injectable 12.5 Gram(s) IV Push once  dextrose 50% Injectable 25 Gram(s) IV Push once  dextrose 50% Injectable 25 Gram(s) IV Push once  furosemide    Tablet 40 milliGRAM(s) Oral <User Schedule>  heparin  Infusion.  Unit(s)/Hr (11 mL/Hr) IV Continuous <Continuous>  hydrALAZINE 100 milliGRAM(s) Oral four times a day with meals  insulin lispro (HumaLOG) corrective regimen sliding scale   SubCutaneous three times a day before meals  insulin lispro (HumaLOG) corrective regimen sliding scale   SubCutaneous at bedtime  isosorbide   mononitrate ER Tablet (IMDUR) 60 milliGRAM(s) Oral <User Schedule>  sodium bicarbonate 650 milliGRAM(s) Oral two times a day  sodium chloride 0.9% Bolus 200 milliLiter(s) IV Bolus once    MEDICATIONS  (PRN):  acetaminophen   Tablet .. 650 milliGRAM(s) Oral every 6 hours PRN Mild Pain (1 - 3), Moderate Pain (4 - 6), Severe Pain (7 - 10)  dextrose 40% Gel 15 Gram(s) Oral once PRN Blood Glucose LESS THAN 70 milliGRAM(s)/deciliter  glucagon  Injectable 1 milliGRAM(s) IntraMuscular once PRN Glucose LESS THAN 70 milligrams/deciliter  heparin  Injectable 5000 Unit(s) IV Push every 6 hours PRN For aPTT less than 40  heparin  Injectable 2500 Unit(s) IV Push every 6 hours PRN For aPTT between 40 - 57  ondansetron Injectable 4 milliGRAM(s) IV Push every 6 hours PRN Nausea and/or Vomiting      Care Discussed with Consultants/Other Providers [x] YES  [ ] NO    HEALTH ISSUES - PROBLEM Dx:  Essential hypertension: Essential hypertension  CKD (chronic kidney disease), stage V: CKD (chronic kidney disease), stage V  Chronic diastolic CHF (congestive heart failure): Chronic diastolic CHF (congestive heart failure)  DVT of upper extremity (deep vein thrombosis): DVT of upper extremity (deep vein thrombosis)  Shoulder pain, left: Shoulder pain, left  Gout: Gout  Anemia Associated with Chronic Renal Failure: Anemia Associated with Chronic Renal Failure  HTN (Hypertension): HTN (Hypertension)  DM2 (diabetes mellitus, type 2): DM2 (diabetes mellitus, type 2)  CAD (coronary artery disease): CAD (coronary artery disease)  Chest pain: Chest pain

## 2020-02-06 NOTE — PROGRESS NOTE ADULT - ATTENDING COMMENTS
Patient seen and examined.  Agree with above NP note.  cv stable without further chest pain  stress with mild inferior defect  + known inferior defect, mi with occluded native RCA  no other significant ischemia   has had no further chest pain  in light of age, CKD/EsRD, anemia and resolution of sx, and to dec risk of ALMA and HD need, we will defer cath   r/b/a of cath, dye load, poss alma d/w her at length  will cont med therapy as ordered  restart coumadin tonight 8mg dose  cont tx of headache  neuro eval called  monitor nausea ? migraine    ? sec to pharm stress agent   benign abd, no active nausea

## 2020-02-06 NOTE — CONSULT NOTE ADULT - PROBLEM SELECTOR RECOMMENDATION 9
CKD5 likely 2/2 DM/HTN with LUE AVF in place however not uremia and making good urine.  On admission sCr 4.97 (baseline sCr 4.2 in Jan 2020).  Jamaal Score for post cardiac cath: 57.3% risk of post PCI PATRICIA and 12.6% risk post PCI PATRICIA requiring dialysis  Recommendations:  - Please give normal saline 200 cc bolus prior today prior cardiac catheterization  - avoid nephrotoxic agents (NSAIDS), minimal possible contrast  - renally dose medications per eGFR<10  - trend Cr/electrolytes daily, daily weight, strict I/O, renal diet. CKD5 likely 2/2 DM/HTN with LUE AVF in place however not uremic and making good urine.  On admission sCr 4.97 (baseline sCr 4.2 in Jan 2020).  Jamaal Score for post cardiac cath: 57.3% risk of post PCI PATRICIA and 12.6% risk post PCI PATRICIA requiring dialysis  Recommendations:  - Please give normal saline 200 cc bolus prior today prior cardiac catheterization to reduce risk of PATRICIA  - avoid nephrotoxic agents (NSAIDS), minimal possible contrast  - renally dose medications per eGFR<10  - trend Cr/electrolytes daily, daily weight, strict I/O, renal diet.

## 2020-02-06 NOTE — PROGRESS NOTE ADULT - SUBJECTIVE AND OBJECTIVE BOX
CARDIOLOGY FOLLOW UP - Dr. Damico    CC no cp or sob   c/o headache ; ct head with no acute intracanial pathology         PHYSICAL EXAM:  T(C): 36.9 (02-06-20 @ 11:50), Max: 37.2 (02-06-20 @ 09:10)  HR: 88 (02-06-20 @ 11:50) (72 - 97)  BP: 134/74 (02-06-20 @ 11:50) (134/74 - 185/70)  RR: 18 (02-06-20 @ 11:50) (18 - 18)  SpO2: 98% (02-06-20 @ 11:50) (98% - 98%)  Wt(kg): --  I&O's Summary      Appearance: Normal	  Cardiovascular: Normal S1 S2,RRR, No JVD, No murmurs  Respiratory: Lungs clear to auscultation	  Gastrointestinal:  Soft, Non-tender, + BS	  Extremities: Normal range of motion, No clubbing, cyanosis or edema        MEDICATIONS  (STANDING):  allopurinol 100 milliGRAM(s) Oral daily  aspirin enteric coated 81 milliGRAM(s) Oral daily  atorvastatin 40 milliGRAM(s) Oral at bedtime  calcitriol   Capsule 0.25 MICROGram(s) Oral <User Schedule>  carvedilol 25 milliGRAM(s) Oral every 12 hours  cloNIDine 0.2 milliGRAM(s) Oral daily  dextrose 5%. 1000 milliLiter(s) (50 mL/Hr) IV Continuous <Continuous>  dextrose 50% Injectable 12.5 Gram(s) IV Push once  dextrose 50% Injectable 25 Gram(s) IV Push once  dextrose 50% Injectable 25 Gram(s) IV Push once  furosemide    Tablet 40 milliGRAM(s) Oral <User Schedule>  heparin  Infusion.  Unit(s)/Hr (11 mL/Hr) IV Continuous <Continuous>  hydrALAZINE 100 milliGRAM(s) Oral four times a day with meals  insulin lispro (HumaLOG) corrective regimen sliding scale   SubCutaneous three times a day before meals  insulin lispro (HumaLOG) corrective regimen sliding scale   SubCutaneous at bedtime  isosorbide   mononitrate ER Tablet (IMDUR) 60 milliGRAM(s) Oral <User Schedule>  sodium bicarbonate 650 milliGRAM(s) Oral two times a day  sodium chloride 0.9% Bolus 200 milliLiter(s) IV Bolus once      TELEMETRY: 	    ECG:  	  RADIOLOGY:   < from: CT Head No Cont (02.06.20 @ 05:52) >  IMPRESSION:    No acute intracranial pathology is noted. If the patient has new and persistent symptoms, consider short interval follow-up exam.    The pituitary gland is mildly enlarged for the patient's age suspicious for a pituitary macroadenoma. There appears to be slight suprasellar extension.          < end of copied text >    DIAGNOSTIC TESTING:  [ ] Echocardiogram:  [ ]  Catheterization:  [ ] Stress Test:    < from: Nuclear Stress Test-Pharmacologic (02.04.20 @ 15:26) >  GATED ANALYSIS:  Rest gated wall motion analysis was performed (LVEF = 53  %;LVEDV = 132 ml.), revealing normal  LV function.  Post-stress gated wall motion analysis was performed (LVEF  = 39 %;LVEDV = 117 ml.), revealing moderate hypokinesis.  RV function appeared normal.  ------------------------------------------------------------------------  IMPRESSIONS:Abnormal Study  * Myocardial Perfusion SPECT results are abnormal.  * Review of raw data shows: The study is of good technical  quality.  * The left ventricle was hypertrophied. There is a small,  moderate defect in mid inferior wall that is reversible  consistent withmoderate ischemia.  * Rest gated wall motion analysis was performed (LVEF = 53  %;LVEDV = 132 ml.), revealing normal  LV function.  Post-stress gated wall motion analysis was performed (LVEF  = 39 %;LVEDV = 117 ml.), revealing moderate hypokinesis.  RV function appeared normal.  *** No previous Nuclear/Stress exam.  ------------------------------------------------------------------------  Confirmed on  2/5/2020 - 13:04:11 by Gloria Huff MD  ------------------------------------------------------------------------    < end of copied text >    OTHER: 	    LABS:	 	    Creatine Kinase, Serum: 44 u/L [25 - 170] (02-02 @ 05:15)  Troponin T, High Sensitivity: 93 ng/L [<6 - 14] (02-02 @ 05:15)  Troponin T, High Sensitivity: 93 ng/L [<6 - 14] (02-01 @ 18:35)  Troponin T, High Sensitivity: 93 ng/L [<6 - 14] (02-01 @ 17:00)                          7.8    3.95  )-----------( 111      ( 06 Feb 2020 07:40 )             25.5     02-06    137  |  105  |  57<H>  ----------------------------<  116<H>  4.5   |  19<L>  |  4.38<H>    Ca    9.7      06 Feb 2020 07:40  Mg     2.0     02-06      PTT - ( 06 Feb 2020 10:24 )  PTT:26.5 SEC

## 2020-02-07 LAB
ANION GAP SERPL CALC-SCNC: 15 MMO/L — HIGH (ref 7–14)
APTT BLD: 59.8 SEC — HIGH (ref 27.5–36.3)
APTT BLD: 86 SEC — HIGH (ref 27.5–36.3)
APTT BLD: > 200 SEC — CRITICAL HIGH (ref 27.5–36.3)
BUN SERPL-MCNC: 60 MG/DL — HIGH (ref 7–23)
CALCIUM SERPL-MCNC: 9.7 MG/DL — SIGNIFICANT CHANGE UP (ref 8.4–10.5)
CHLORIDE SERPL-SCNC: 100 MMOL/L — SIGNIFICANT CHANGE UP (ref 98–107)
CO2 SERPL-SCNC: 17 MMOL/L — LOW (ref 22–31)
CREAT SERPL-MCNC: 4.33 MG/DL — HIGH (ref 0.5–1.3)
ERYTHROCYTE [SEDIMENTATION RATE] IN BLOOD: 23 MM/HR — SIGNIFICANT CHANGE UP (ref 4–25)
GLUCOSE SERPL-MCNC: 104 MG/DL — HIGH (ref 70–99)
HCT VFR BLD CALC: 23.9 % — LOW (ref 34.5–45)
HGB BLD-MCNC: 7.4 G/DL — LOW (ref 11.5–15.5)
INR BLD: 1.41 — HIGH (ref 0.88–1.17)
MAGNESIUM SERPL-MCNC: 2 MG/DL — SIGNIFICANT CHANGE UP (ref 1.6–2.6)
MCHC RBC-ENTMCNC: 26.7 PG — LOW (ref 27–34)
MCHC RBC-ENTMCNC: 31 % — LOW (ref 32–36)
MCV RBC AUTO: 86.3 FL — SIGNIFICANT CHANGE UP (ref 80–100)
NRBC # FLD: 0.03 K/UL — SIGNIFICANT CHANGE UP (ref 0–0)
PLATELET # BLD AUTO: 138 K/UL — LOW (ref 150–400)
PMV BLD: 12.8 FL — SIGNIFICANT CHANGE UP (ref 7–13)
POTASSIUM SERPL-MCNC: 4.3 MMOL/L — SIGNIFICANT CHANGE UP (ref 3.5–5.3)
POTASSIUM SERPL-SCNC: 4.3 MMOL/L — SIGNIFICANT CHANGE UP (ref 3.5–5.3)
PROTHROM AB SERPL-ACNC: 15.8 SEC — HIGH (ref 9.8–13.1)
RBC # BLD: 2.77 M/UL — LOW (ref 3.8–5.2)
RBC # FLD: 15.1 % — HIGH (ref 10.3–14.5)
SODIUM SERPL-SCNC: 132 MMOL/L — LOW (ref 135–145)
WBC # BLD: 5.99 K/UL — SIGNIFICANT CHANGE UP (ref 3.8–10.5)
WBC # FLD AUTO: 5.99 K/UL — SIGNIFICANT CHANGE UP (ref 3.8–10.5)

## 2020-02-07 PROCEDURE — 99232 SBSQ HOSP IP/OBS MODERATE 35: CPT | Mod: GC

## 2020-02-07 RX ORDER — WARFARIN SODIUM 2.5 MG/1
8 TABLET ORAL ONCE
Refills: 0 | Status: COMPLETED | OUTPATIENT
Start: 2020-02-07 | End: 2020-02-07

## 2020-02-07 RX ORDER — ACETAMINOPHEN 500 MG
1000 TABLET ORAL ONCE
Refills: 0 | Status: COMPLETED | OUTPATIENT
Start: 2020-02-07 | End: 2020-02-07

## 2020-02-07 RX ORDER — VALPROIC ACID (AS SODIUM SALT) 250 MG/5ML
500 SOLUTION, ORAL ORAL
Refills: 0 | Status: DISCONTINUED | OUTPATIENT
Start: 2020-02-07 | End: 2020-02-08

## 2020-02-07 RX ADMIN — Medication 100 MILLIGRAM(S): at 23:47

## 2020-02-07 RX ADMIN — HEPARIN SODIUM 700 UNIT(S)/HR: 5000 INJECTION INTRAVENOUS; SUBCUTANEOUS at 04:19

## 2020-02-07 RX ADMIN — Medication 100 MILLIGRAM(S): at 17:56

## 2020-02-07 RX ADMIN — ONDANSETRON 4 MILLIGRAM(S): 8 TABLET, FILM COATED ORAL at 17:09

## 2020-02-07 RX ADMIN — Medication 40 MILLIGRAM(S): at 07:54

## 2020-02-07 RX ADMIN — ISOSORBIDE MONONITRATE 60 MILLIGRAM(S): 60 TABLET, EXTENDED RELEASE ORAL at 06:40

## 2020-02-07 RX ADMIN — WARFARIN SODIUM 8 MILLIGRAM(S): 2.5 TABLET ORAL at 17:55

## 2020-02-07 RX ADMIN — HEPARIN SODIUM 0 UNIT(S)/HR: 5000 INJECTION INTRAVENOUS; SUBCUTANEOUS at 11:38

## 2020-02-07 RX ADMIN — HEPARIN SODIUM 500 UNIT(S)/HR: 5000 INJECTION INTRAVENOUS; SUBCUTANEOUS at 22:03

## 2020-02-07 RX ADMIN — CARVEDILOL PHOSPHATE 25 MILLIGRAM(S): 80 CAPSULE, EXTENDED RELEASE ORAL at 22:03

## 2020-02-07 RX ADMIN — Medication 0.2 MILLIGRAM(S): at 22:03

## 2020-02-07 RX ADMIN — Medication 1 TABLET(S): at 03:30

## 2020-02-07 RX ADMIN — Medication 100 MILLIGRAM(S): at 12:53

## 2020-02-07 RX ADMIN — ATORVASTATIN CALCIUM 40 MILLIGRAM(S): 80 TABLET, FILM COATED ORAL at 22:03

## 2020-02-07 RX ADMIN — Medication 1: at 13:04

## 2020-02-07 RX ADMIN — Medication 1 TABLET(S): at 02:31

## 2020-02-07 RX ADMIN — HEPARIN SODIUM 500 UNIT(S)/HR: 5000 INJECTION INTRAVENOUS; SUBCUTANEOUS at 12:41

## 2020-02-07 RX ADMIN — CALCITRIOL 0.25 MICROGRAM(S): 0.5 CAPSULE ORAL at 06:40

## 2020-02-07 RX ADMIN — Medication 27.5 MILLIGRAM(S): at 17:10

## 2020-02-07 RX ADMIN — Medication 1000 MILLIGRAM(S): at 11:05

## 2020-02-07 RX ADMIN — Medication 100 MILLIGRAM(S): at 07:54

## 2020-02-07 RX ADMIN — ISOSORBIDE MONONITRATE 60 MILLIGRAM(S): 60 TABLET, EXTENDED RELEASE ORAL at 20:01

## 2020-02-07 RX ADMIN — CARVEDILOL PHOSPHATE 25 MILLIGRAM(S): 80 CAPSULE, EXTENDED RELEASE ORAL at 09:54

## 2020-02-07 RX ADMIN — Medication 650 MILLIGRAM(S): at 06:40

## 2020-02-07 RX ADMIN — Medication 81 MILLIGRAM(S): at 12:53

## 2020-02-07 RX ADMIN — Medication 1000 MILLIGRAM(S): at 12:00

## 2020-02-07 RX ADMIN — Medication 650 MILLIGRAM(S): at 17:56

## 2020-02-07 NOTE — CONSULT NOTE ADULT - SUBJECTIVE AND OBJECTIVE BOX
[facility]), [facility]        Patient is a 80y old  Female who presents with a chief complaint of Chest pain (2020 14:49)    Excerpt from H&P,Pt 79yo female with Hx of HTN CAD CABG AS S/P  TAVR , PPM CHF RUE DVT DM Anemia presenting to [facility] ER for evaluation of C/P. Pt reports sudden onset of midsternal chest tightness that started around 0600 this morning when walking to bathrom. Pain was mild to moderate tightness like sensation with no radiation  and no pleuritic component. Pt reports associated mild dyspnea, but no dizziness, plapitations, nausea or diaphoresis. Pt reports took "baby aspirin" and 10 minutes after pain resolved. Rest of the morning  was unremarkable; however, in afternoon around 1300 Pt had similar quality chest tightness while at rest, she contacted her cardiologist and was advised to come to hospital. Pt reports no nausea or vomiting,    Headache started after admission and has been continuous. left sided associated with tearing on the left   Pt reports that she often gets headaches at home for which she takes tylenol.   She denies any visual obscurations/photosensitivity/phonosensitivity/double vision/blurry vision              *****PAST MEDICAL / Surgical  HISTORY:  PAST MEDICAL & SURGICAL HISTORY:  Thyroid nodule: awaiting FNB in the near future  Severe aortic stenosis  Osteoarthritis: bilateral knees  CKD (chronic kidney disease) stage 4, GFR 15-29 ml/min  DM2 (diabetes mellitus, type 2)  Arthritis  CAD (coronary artery disease)  Gout  Anemia Associated with Chronic Renal Failure  HTN (Hypertension)  S/P AVR: TAVR  History of pacemaker  A-V fistula: left arm  S/P cholecystectomy  Stented coronary artery: s/p 3 stents, then CABG   S/P CABG (coronary artery bypass graft): triple in            *****FAMILY HISTORY:  FAMILY HISTORY:  No pertinent family history in first degree relatives           *****SOCIAL HISTORY:  Alcohol: None  Smoking: None         *****ALLERGIES:   Allergies    codeine (Other)  Lortab (Other)  morphine (Other)  Percocet 10/325 (Other)  PPM not compatible with  MRI (Other (Fatal))  Reglan (Other; [place])  sulfa drugs ([place])    Intolerances             *****MEDICATIONS: current medication reviewed and documented.   MEDICATIONS  (STANDING):  allopurinol 100 milliGRAM(s) Oral daily  aspirin enteric coated 81 milliGRAM(s) Oral daily  atorvastatin 40 milliGRAM(s) Oral at bedtime  calcitriol   Capsule 0.25 MICROGram(s) Oral <User Schedule>  carvedilol 25 milliGRAM(s) Oral every 12 hours  cloNIDine 0.2 milliGRAM(s) Oral daily  dextrose 5%. 1000 milliLiter(s) (50 mL/Hr) IV Continuous <Continuous>  dextrose 50% Injectable 12.5 Gram(s) IV Push once  dextrose 50% Injectable 25 Gram(s) IV Push once  dextrose 50% Injectable 25 Gram(s) IV Push once  furosemide    Tablet 40 milliGRAM(s) Oral <User Schedule>  heparin  Infusion.  Unit(s)/Hr (11 mL/Hr) IV Continuous <Continuous>  hydrALAZINE 100 milliGRAM(s) Oral four times a day with meals  insulin lispro (HumaLOG) corrective regimen sliding scale   SubCutaneous three times a day before meals  insulin lispro (HumaLOG) corrective regimen sliding scale   SubCutaneous at bedtime  isosorbide   mononitrate ER Tablet (IMDUR) 60 milliGRAM(s) Oral <User Schedule>  sodium bicarbonate 650 milliGRAM(s) Oral two times a day  valproate sodium IVPB 500 milliGRAM(s) IV Intermittent two times a day  warfarin 8 milliGRAM(s) Oral once    MEDICATIONS  (PRN):  acetaminophen   Tablet .. 650 milliGRAM(s) Oral every 6 hours PRN Mild Pain (1 - 3), Moderate Pain (4 - 6), Severe Pain (7 - 10)  dextrose 40% Gel 15 Gram(s) Oral once PRN Blood Glucose LESS THAN 70 milliGRAM(s)/deciliter  glucagon  Injectable 1 milliGRAM(s) IntraMuscular once PRN Glucose LESS THAN 70 milligrams/deciliter  heparin  Injectable 5000 Unit(s) IV Push every 6 hours PRN For aPTT less than 40  heparin  Injectable 2500 Unit(s) IV Push every 6 hours PRN For aPTT between 40 - 57  ondansetron Injectable 4 milliGRAM(s) IV Push every 6 hours PRN Nausea and/or Vomiting           *****REVIEW OF SYSTEM:  GEN: no fever, no chills, no pain  RESP: no SOB, no cough, no sputum  CVS: no chest pain, no palpitations, no edema  GI: no abdominal pain, no nausea, no vomiting, no constipation, no diarrhea  : no dysurea, no frequency, no hematurea  Neuro: no headache, no dizziness  PSYCH: no anxiety, no depression  Derm : no itching, no rash         *****VITAL SIGNS:  T(C): 37.1 (20 @ 12:50), Max: 37.1 (20 @ 12:50)  HR: 81 (20 @ 12:50) (80 - 94)  BP: 164/61 (20 @ 12:50) (133/66 - 164/61)  RR: 18 (20 @ 12:50) (16 - 18)  SpO2: 100% (20 @ 12:50) (98% - 100%)  Wt(kg): --     @ 07:01  -   @ 07:00  --------------------------------------------------------  IN: 240 mL / OUT: 0 mL / NET: 240 mL             *****PHYSICAL EXAM:   Alert oriented x2  Attention comprehension are fair. Able to name, repeat, read without any difficulty.   Able to follow 3 step commands.     EOMI fundi not visualized,  VFF to confrontration  No facial asymmetry   Tongue is midline   Palate elevates symmetrically   Moving all 4 ext symmetrically no pronator drift   limited eval as pt was not fully cooperative   left shoulder pain   Reflexes are symmetric throughout   sensation is grossly symmetric  Gait : not assessed.  B/L down going toes               *****LAB AND IMAGIN.4    5.99  )-----------( 138      ( 2020 02:46 )             23.9               02-07    132<L>  |  100  |  60<H>  ----------------------------<  104<H>  4.3   |  17<L>  |  4.33<H>    Ca    9.7      2020 02:46  Mg     2.0     02-07      PT/INR - ( 2020 02:46 )   PT: 15.8 SEC;   INR: 1.41          PTT - ( 2020 10:31 )  PTT:> 200.0 SEC                            [All pertinent recent Imaging reports reviewed]         *****A S S E S S M E N T   A N D   P L A N :        Excerpt from H&P,Pt 79yo female with Hx of HTN CAD CABG AS S/P  TAVR 2017, PPM CHF RUE DVT DM Anemia presenting to [facility] ER for evaluation of C/P. Pt reports sudden onset of midsternal chest tightness that started around 0600 this morning when walking to bathrom. Pain was mild to moderate tightness like sensation with no radiation  and no pleuritic component. Pt reports associated mild dyspnea, but no dizziness, plapitations, nausea or diaphoresis. Pt reports took "baby aspirin" and 10 minutes after pain resolved. Rest of the morning  was unremarkable; however, in afternoon around 1300 Pt had similar quality chest tightness while at rest, she contacted her cardiologist and was advised to come to hospital. Pt reports no nausea or vomiting,    Headache started after admission and has been continuous. left sided associated with tearing on the left   Pt reports that she often gets headaches at home for which she takes tylenol.   She denies any visual obscurations/photosensitivity/phonosensitivity/double vision/blurry vision     Problem/Recommendations 1:  unilateral headache, transformed migraine vs. hemicrania continua vs. gca vs. tension headache   no constitutional symptoms able to feel a temporal pulse   denies any jaw pain or visual obscurations.   no meningismus   depakote 500 ivpb bid x 3 days   esr   oxygen supplementation   sleep augmentation   uanble to give reglan due to allergy   mri/mra/mrv to r/o other etiologies such as venous sinus thrombosis      ___________________________  Will follow with you.  Thank you,  Chantal Sauceda MD  Diplomate of the American Board of Neurology and Psychiatry.  Diplomate of the American Board of Vascular Neurology.   [facility] ([facility]), [facility]   Ph: [phone]    Differential diagnosis and plan of care discussed with patient after the evaluation.   Advanced care planning options discussed.   Pain assessed and judicious use of narcotics when appropriate was discussed.  Importance of Fall prevention discussed.  Counseling on Smoking and Alcohol cessation was offered when appropriate.  Counseling on Diet, exercise, and medication compliance was done.   83 minutes spent on the total encounter;  more than 50 % of the visit was spent on counseling  and or coordinating care by the attending physician.    Thank you for allowing me to participate in the care of this rafat patient. Please do not hesitate to call me if you have any questions.     This and subsequent notes were partially created using voice recognition software and will  inherently be subject to errors including those of syntax and sound alike substitutions which may escape proofreading. In such instances original meaning may be extrapolated by contextual derivation.

## 2020-02-07 NOTE — PROGRESS NOTE ADULT - ASSESSMENT
79F with PMH of HTN, CKD stage 5, with a LUE fistula which has never been used, DM2, CAD s/p CABG, PCI (3/2017), Aortic stenosis s/p TAVR/PPM (3/2017), RUE DVT presenting with chest pain.     1. Chest pain-h/o CAD/CABG   no further chest pain noted   stress test revealed moderate ischemia to inferior wall. nl LV fx   creat noted,  in light of age, CKD/EsRD, anemia and resolution of sx, and to dec risk of PATRICIA and HD need, we will defer cath   medical management, cont asa, statin, bb    2. RUE DVT  heparin gtt - coumadin    3. AS s/p TAVR/ PPM  cv stable     4. HTN   cont to trend, cont current meds     5. anemia, hx  associated with chronic renal failure   no overt s/s of bleeding, occult negative    baseline hemoglobin around 7   s/p 1uPRBC, h/h improved     6. CKD   continue to trend creat, renal f/u     7. Headache   Head ct with no acute intracranial pathology. + pituitary gland mildly enlarged suspicious for a pituitary macroadenoma  neuro eval pending   med f/u

## 2020-02-07 NOTE — PROGRESS NOTE ADULT - PROBLEM SELECTOR PLAN 1
left sided headaches  have hx of chronic migraines  no vision changes  no focal deficit  CT head neg, but incidentally noted ? pituitary macroadenoma   neurology Dr. Sauceda consulted  limited pain regimen. (avoiding NSAID given CKD, no opioids given allergic reaction, "makes me crazy", limited tylenol use)

## 2020-02-07 NOTE — PROGRESS NOTE ADULT - ASSESSMENT
Pt 81yo female with Hx of HTN, CAD, CABG, AS S/P TAVR 2017, s/p PPM, HFpEF (60% in May 2017), RUE DVT dxed 1/22/2019 on coumadin, DM2, Anemia presenting to [facility] ER for evaluation of C/P.

## 2020-02-07 NOTE — PROGRESS NOTE ADULT - PROBLEM SELECTOR PLAN 1
CKD5 likely 2/2 DM/HTN with LUE AVF in place however not uremic and making good urine.  On admission sCr 4.97 (baseline sCr 4.2 in Jan 2020).  Jamaal Score for post cardiac cath: 57.3% risk of post PCI PATRICIA and 12.6% risk post PCI PATRICIA requiring dialysis.  Cardiac cath deferred at this time 2/7/2020  Recommendations:  - avoid nephrotoxic agents (NSAIDS)  - renally dose medications per eGFR<10  - trend Cr/electrolytes daily, daily weight, strict I/O, renal diet.

## 2020-02-07 NOTE — PROGRESS NOTE ADULT - PROBLEM SELECTOR PLAN 2
Stress test abnormal with reversible ischemia  Appreciate cardiology  Initial plan for cath/angio but conservative medical management for now

## 2020-02-07 NOTE — PROGRESS NOTE ADULT - SUBJECTIVE AND OBJECTIVE BOX
Patient is a 80y old  Female who presents with a chief complaint of Chest pain (07 Feb 2020 10:57)      SUBJECTIVE / OVERNIGHT EVENTS:  headache came back this am and last night  received Tylenol  multiple allergies to pain med. limited choice  neuro eval pending.       Vital Signs Last 24 Hrs  T(C): 36.7 (07 Feb 2020 09:52), Max: 36.9 (06 Feb 2020 11:50)  T(F): 98 (07 Feb 2020 09:52), Max: 98.5 (06 Feb 2020 11:50)  HR: 81 (07 Feb 2020 09:52) (80 - 94)  BP: 152/73 (07 Feb 2020 09:52) (133/66 - 152/73)  BP(mean): --  RR: 17 (07 Feb 2020 09:52) (16 - 18)  SpO2: 100% (07 Feb 2020 09:52) (98% - 100%)  I&O's Summary    06 Feb 2020 07:01  -  07 Feb 2020 07:00  --------------------------------------------------------  IN: 240 mL / OUT: 0 mL / NET: 240 mL        PHYSICAL EXAM:  GENERAL: NAD, Comfortable, in mild distress due to headaches  HEAD:  Atraumatic, Normocephalic  EYES: EOMI, PERRLA, conjunctiva and sclera clear  NECK: Supple, No JVD  CHEST/LUNG: Clear to auscultation bilaterally; No wheeze  HEART: Regular rate and rhythm; No murmurs, rubs, or gallops  ABDOMEN: Soft, Nontender, Nondistended; Bowel sounds present  Neuro: AAO x 3, no focal deficit, 5/5 b/l extremities  EXTREMITIES:  2+ Peripheral Pulses, No clubbing, cyanosis, or edema  SKIN: No rashes or lesions      LABS:                        7.4    5.99  )-----------( 138      ( 07 Feb 2020 02:46 )             23.9     02-07    132<L>  |  100  |  60<H>  ----------------------------<  104<H>  4.3   |  17<L>  |  4.33<H>    Ca    9.7      07 Feb 2020 02:46  Mg     2.0     02-07      PT/INR - ( 07 Feb 2020 02:46 )   PT: 15.8 SEC;   INR: 1.41          PTT - ( 07 Feb 2020 10:31 )  PTT:> 200.0 SEC  CAPILLARY BLOOD GLUCOSE      POCT Blood Glucose.: 124 mg/dL (07 Feb 2020 08:47)  POCT Blood Glucose.: 76 mg/dL (06 Feb 2020 21:40)  POCT Blood Glucose.: 78 mg/dL (06 Feb 2020 17:55)  POCT Blood Glucose.: 100 mg/dL (06 Feb 2020 12:31)            RADIOLOGY & ADDITIONAL TESTS:    Imaging Personally Reviewed:  [x] YES  [ ] NO    Consultant(s) Notes Reviewed:  [x] YES  [ ] NO      MEDICATIONS  (STANDING):  allopurinol 100 milliGRAM(s) Oral daily  aspirin enteric coated 81 milliGRAM(s) Oral daily  atorvastatin 40 milliGRAM(s) Oral at bedtime  calcitriol   Capsule 0.25 MICROGram(s) Oral <User Schedule>  carvedilol 25 milliGRAM(s) Oral every 12 hours  cloNIDine 0.2 milliGRAM(s) Oral daily  dextrose 5%. 1000 milliLiter(s) (50 mL/Hr) IV Continuous <Continuous>  dextrose 50% Injectable 12.5 Gram(s) IV Push once  dextrose 50% Injectable 25 Gram(s) IV Push once  dextrose 50% Injectable 25 Gram(s) IV Push once  furosemide    Tablet 40 milliGRAM(s) Oral <User Schedule>  heparin  Infusion.  Unit(s)/Hr (11 mL/Hr) IV Continuous <Continuous>  hydrALAZINE 100 milliGRAM(s) Oral four times a day with meals  insulin lispro (HumaLOG) corrective regimen sliding scale   SubCutaneous three times a day before meals  insulin lispro (HumaLOG) corrective regimen sliding scale   SubCutaneous at bedtime  isosorbide   mononitrate ER Tablet (IMDUR) 60 milliGRAM(s) Oral <User Schedule>  sodium bicarbonate 650 milliGRAM(s) Oral two times a day  warfarin 8 milliGRAM(s) Oral once    MEDICATIONS  (PRN):  acetaminophen   Tablet .. 650 milliGRAM(s) Oral every 6 hours PRN Mild Pain (1 - 3), Moderate Pain (4 - 6), Severe Pain (7 - 10)  dextrose 40% Gel 15 Gram(s) Oral once PRN Blood Glucose LESS THAN 70 milliGRAM(s)/deciliter  glucagon  Injectable 1 milliGRAM(s) IntraMuscular once PRN Glucose LESS THAN 70 milligrams/deciliter  heparin  Injectable 5000 Unit(s) IV Push every 6 hours PRN For aPTT less than 40  heparin  Injectable 2500 Unit(s) IV Push every 6 hours PRN For aPTT between 40 - 57  ondansetron Injectable 4 milliGRAM(s) IV Push every 6 hours PRN Nausea and/or Vomiting      Care Discussed with Consultants/Other Providers [x] YES  [ ] NO    HEALTH ISSUES - PROBLEM Dx:  Headache: Headache  Essential hypertension: Essential hypertension  CKD (chronic kidney disease), stage V: CKD (chronic kidney disease), stage V  Chronic diastolic CHF (congestive heart failure): Chronic diastolic CHF (congestive heart failure)  DVT of upper extremity (deep vein thrombosis): DVT of upper extremity (deep vein thrombosis)  Shoulder pain, left: Shoulder pain, left  Gout: Gout  Anemia Associated with Chronic Renal Failure: Anemia Associated with Chronic Renal Failure  HTN (Hypertension): HTN (Hypertension)  DM2 (diabetes mellitus, type 2): DM2 (diabetes mellitus, type 2)  CAD (coronary artery disease): CAD (coronary artery disease)  Chest pain: Chest pain

## 2020-02-07 NOTE — PROGRESS NOTE ADULT - ATTENDING COMMENTS
Patient examined and ROS reviewed. A case of advanced CKD stage V admitted with chest pain. Patient does not have pain now. Cardiac cath was not done because of risk of contrast induced worsening of CKD. Advised fluid balance.

## 2020-02-07 NOTE — PROGRESS NOTE ADULT - SUBJECTIVE AND OBJECTIVE BOX
CARDIOLOGY FOLLOW UP - Dr. Damico    CC no cp/sob  c/o headache - neuro eval pending  TELLO badillo aware, pain management orders pending       PHYSICAL EXAM:  T(C): 36.7 (02-07-20 @ 09:52), Max: 36.9 (02-06-20 @ 11:50)  HR: 81 (02-07-20 @ 09:52) (80 - 94)  BP: 152/73 (02-07-20 @ 09:52) (133/66 - 152/73)  RR: 17 (02-07-20 @ 09:52) (16 - 18)  SpO2: 100% (02-07-20 @ 09:52) (98% - 100%)  Wt(kg): --  I&O's Summary    06 Feb 2020 07:01  -  07 Feb 2020 07:00  --------------------------------------------------------  IN: 240 mL / OUT: 0 mL / NET: 240 mL        Appearance: Normal	  Cardiovascular: Normal S1 S2,RRR,   Respiratory: Lungs clear to auscultation	  Gastrointestinal:  Soft, Non-tender, + BS	  Extremities: Normal range of motion, No clubbing, cyanosis or edema        MEDICATIONS  (STANDING):  acetaminophen   Tablet .. 1000 milliGRAM(s) Oral once  allopurinol 100 milliGRAM(s) Oral daily  aspirin enteric coated 81 milliGRAM(s) Oral daily  atorvastatin 40 milliGRAM(s) Oral at bedtime  calcitriol   Capsule 0.25 MICROGram(s) Oral <User Schedule>  carvedilol 25 milliGRAM(s) Oral every 12 hours  cloNIDine 0.2 milliGRAM(s) Oral daily  dextrose 5%. 1000 milliLiter(s) (50 mL/Hr) IV Continuous <Continuous>  dextrose 50% Injectable 12.5 Gram(s) IV Push once  dextrose 50% Injectable 25 Gram(s) IV Push once  dextrose 50% Injectable 25 Gram(s) IV Push once  furosemide    Tablet 40 milliGRAM(s) Oral <User Schedule>  heparin  Infusion.  Unit(s)/Hr (11 mL/Hr) IV Continuous <Continuous>  hydrALAZINE 100 milliGRAM(s) Oral four times a day with meals  insulin lispro (HumaLOG) corrective regimen sliding scale   SubCutaneous three times a day before meals  insulin lispro (HumaLOG) corrective regimen sliding scale   SubCutaneous at bedtime  isosorbide   mononitrate ER Tablet (IMDUR) 60 milliGRAM(s) Oral <User Schedule>  sodium bicarbonate 650 milliGRAM(s) Oral two times a day  warfarin 8 milliGRAM(s) Oral once      TELEMETRY: paced	    ECG:  	  RADIOLOGY:   DIAGNOSTIC TESTING:  [ ] Echocardiogram:  [ ]  Catheterization:  [ ] Stress Test:    OTHER: 	    LABS:	 	                                7.4    5.99  )-----------( 138      ( 07 Feb 2020 02:46 )             23.9     02-07    132<L>  |  100  |  60<H>  ----------------------------<  104<H>  4.3   |  17<L>  |  4.33<H>    Ca    9.7      07 Feb 2020 02:46  Mg     2.0     02-07      PT/INR - ( 07 Feb 2020 02:46 )   PT: 15.8 SEC;   INR: 1.41          PTT - ( 07 Feb 2020 02:46 )  PTT:86.0 SEC

## 2020-02-07 NOTE — PROGRESS NOTE ADULT - SUBJECTIVE AND OBJECTIVE BOX
[facility] DIVISION OF KIDNEY DISEASES AND HYPERTENSION -- FOLLOW UP NOTE  --------------------------------------------------------------------------------  HPI: 80F PMHx CKD5 with LUE AVF (outpt nephrologist Dr. Dixon),CAD s/p CABG 2007, aortic stenosis s/p TAVR, PPM, CHF, PPM, RUE DVT, DM2, HTN who admitted for chest pain found to have elevated troponin with stress test on 2/4/2020 show moderate ischemia ("moderate defect in mid inferior wall that is reversible").  Cardiology plan cardiac cath possibly 2/6/2020.  Nephrology consulted for CKD5 management.  On admission sCr 4.97 (baseline 4.2)    Interval Hx:  - yesterday cardiology plan medical management (defer cardiac cath)  - no overnight events, afebrile.  Pt seen and examined at bedside without complain denies any chest pain, shortness of breath, nausea, vomiting, diarrhea, metallic/sour taste    PAST HISTORY  --------------------------------------------------------------------------------  No significant changes to PMH, PSH, FHx, SHx, unless otherwise noted    ALLERGIES & MEDICATIONS  --------------------------------------------------------------------------------  Allergies    codeine (Other)  Lortab (Other)  morphine (Other)  Percocet 10/325 (Other)  PPM not compatible with  MRI (Other (Fatal))  Reglan (Other; [place]ushing)  sulfa drugs (Flushing)    Intolerances      Standing Inpatient Medications  allopurinol 100 milliGRAM(s) Oral daily  aspirin enteric coated 81 milliGRAM(s) Oral daily  atorvastatin 40 milliGRAM(s) Oral at bedtime  calcitriol   Capsule 0.25 MICROGram(s) Oral <User Schedule>  carvedilol 25 milliGRAM(s) Oral every 12 hours  cloNIDine 0.2 milliGRAM(s) Oral daily  dextrose 5%. 1000 milliLiter(s) IV Continuous <Continuous>  dextrose 50% Injectable 12.5 Gram(s) IV Push once  dextrose 50% Injectable 25 Gram(s) IV Push once  dextrose 50% Injectable 25 Gram(s) IV Push once  furosemide    Tablet 40 milliGRAM(s) Oral <User Schedule>  heparin  Infusion.  Unit(s)/Hr IV Continuous <Continuous>  hydrALAZINE 100 milliGRAM(s) Oral four times a day with meals  insulin lispro (HumaLOG) corrective regimen sliding scale   SubCutaneous three times a day before meals  insulin lispro (HumaLOG) corrective regimen sliding scale   SubCutaneous at bedtime  isosorbide   mononitrate ER Tablet (IMDUR) 60 milliGRAM(s) Oral <User Schedule>  sodium bicarbonate 650 milliGRAM(s) Oral two times a day  warfarin 8 milliGRAM(s) Oral once    PRN Inpatient Medications  acetaminophen   Tablet .. 650 milliGRAM(s) Oral every 6 hours PRN  dextrose 40% Gel 15 Gram(s) Oral once PRN  glucagon  Injectable 1 milliGRAM(s) IntraMuscular once PRN  heparin  Injectable 5000 Unit(s) IV Push every 6 hours PRN  heparin  Injectable 2500 Unit(s) IV Push every 6 hours PRN  ondansetron Injectable 4 milliGRAM(s) IV Push every 6 hours PRN      REVIEW OF SYSTEMS  --------------------------------------------------------------------------------  Gen: No fatigue, fevers/chills, weakness  Respiratory: No dyspnea, cough  CV: no chest pain  GI: No abdominal pain, diarrhea, constipation, nausea, vomiting  : No increased frequency, dysuria, hematuria  MSK: no edema  Neuro: no headache  All other systems were reviewed and are negative, except as noted.    VITALS/PHYSICAL EXAM  --------------------------------------------------------------------------------  T(C): 37.1 (02-07-20 @ 12:50), Max: 37.1 (02-07-20 @ 12:50)  HR: 81 (02-07-20 @ 12:50) (80 - 94)  BP: 164/61 (02-07-20 @ 12:50) (133/66 - 164/61)  RR: 18 (02-07-20 @ 12:50) (16 - 18)  SpO2: 100% (02-07-20 @ 12:50) (98% - 100%)  Wt(kg): --        02-06-20 @ 07:01  -  02-07-20 @ 07:00  --------------------------------------------------------  IN: 240 mL / OUT: 0 mL / NET: 240 mL      Physical Exam:  	Gen: NAD, well-appearing on room air   	HEENT: no JVD, clear oropharynx  	Pulm: CTA B/L  	CV: RRR, S1S2; no rub  	Abd: +BS, soft, nontender/nondistended  	: No suprapubic tenderness  	LE: Warm, no edema  	Skin: Warm, without rashes  	Vascular access: MASHA WALSH +thrcalos      LABS/STUDIES  --------------------------------------------------------------------------------              7.4    5.99  >-----------<  138      [02-07-20 @ 02:46]              23.9     132  |  100  |  60  ----------------------------<  104      [02-07-20 @ 02:46]  4.3   |  17  |  4.33        Ca     9.7     [02-07-20 @ 02:46]      Mg     2.0     [02-07-20 @ 02:46]      PT/INR: PT 15.8 , INR 1.41       [02-07-20 @ 02:46]  PTT: > 200.0      [02-07-20 @ 10:31]      Creatinine Trend:  SCr 4.33 [02-07 @ 02:46]  SCr 4.38 [02-06 @ 07:40]  SCr 4.27 [02-05 @ 06:50]  SCr 4.58 [02-04 @ 05:01]  SCr 4.84 [02-03 @ 07:30]        Iron 100, TIBC 163, %sat --      [02-02-20 @ 05:15]  Ferritin 1155      [02-02-20 @ 05:15]  .7 (Ca --)      [02-02-20 @ 05:15]   --  HbA1c 4.9      [02-02-20 @ 05:15]  TSH 1.34      [02-02-20 @ 05:15]  Lipid: chol 122, TG 63, HDL 44, LDL 71      [02-02-20 @ 05:15]

## 2020-02-07 NOTE — PROGRESS NOTE ADULT - ATTENDING COMMENTS
Patient seen and examined.  Agree with above NP note.  cv stable  no further chest pain  deferring cardiac cath  cont med tx of cad  hep to coumadin for rue dvt  headache tx per neuro   d/c planning  heme stable

## 2020-02-08 LAB
ANION GAP SERPL CALC-SCNC: 16 MMO/L — HIGH (ref 7–14)
APTT BLD: 61.7 SEC — HIGH (ref 27.5–36.3)
BLD GP AB SCN SERPL QL: NEGATIVE — SIGNIFICANT CHANGE UP
BUN SERPL-MCNC: 64 MG/DL — HIGH (ref 7–23)
CALCIUM SERPL-MCNC: 9.2 MG/DL — SIGNIFICANT CHANGE UP (ref 8.4–10.5)
CHLORIDE SERPL-SCNC: 98 MMOL/L — SIGNIFICANT CHANGE UP (ref 98–107)
CO2 SERPL-SCNC: 16 MMOL/L — LOW (ref 22–31)
CREAT SERPL-MCNC: 4.6 MG/DL — HIGH (ref 0.5–1.3)
GLUCOSE SERPL-MCNC: 120 MG/DL — HIGH (ref 70–99)
HCT VFR BLD CALC: 21.1 % — LOW (ref 34.5–45)
HCT VFR BLD CALC: 22.7 % — LOW (ref 34.5–45)
HCT VFR BLD CALC: 26.8 % — LOW (ref 34.5–45)
HGB BLD-MCNC: 6.7 G/DL — CRITICAL LOW (ref 11.5–15.5)
HGB BLD-MCNC: 7 G/DL — CRITICAL LOW (ref 11.5–15.5)
HGB BLD-MCNC: 8.5 G/DL — LOW (ref 11.5–15.5)
INR BLD: 2.55 — HIGH (ref 0.88–1.17)
MAGNESIUM SERPL-MCNC: 1.8 MG/DL — SIGNIFICANT CHANGE UP (ref 1.6–2.6)
MCHC RBC-ENTMCNC: 26.7 PG — LOW (ref 27–34)
MCHC RBC-ENTMCNC: 27 PG — SIGNIFICANT CHANGE UP (ref 27–34)
MCHC RBC-ENTMCNC: 27.5 PG — SIGNIFICANT CHANGE UP (ref 27–34)
MCHC RBC-ENTMCNC: 30.8 % — LOW (ref 32–36)
MCHC RBC-ENTMCNC: 31.7 % — LOW (ref 32–36)
MCHC RBC-ENTMCNC: 31.8 % — LOW (ref 32–36)
MCV RBC AUTO: 85.1 FL — SIGNIFICANT CHANGE UP (ref 80–100)
MCV RBC AUTO: 86.5 FL — SIGNIFICANT CHANGE UP (ref 80–100)
MCV RBC AUTO: 86.6 FL — SIGNIFICANT CHANGE UP (ref 80–100)
NRBC # FLD: 0 K/UL — SIGNIFICANT CHANGE UP (ref 0–0)
PHOSPHATE SERPL-MCNC: 3.5 MG/DL — SIGNIFICANT CHANGE UP (ref 2.5–4.5)
PLATELET # BLD AUTO: 105 K/UL — LOW (ref 150–400)
PLATELET # BLD AUTO: 107 K/UL — LOW (ref 150–400)
PLATELET # BLD AUTO: 94 K/UL — LOW (ref 150–400)
PMV BLD: 10.5 FL — SIGNIFICANT CHANGE UP (ref 7–13)
PMV BLD: 11.1 FL — SIGNIFICANT CHANGE UP (ref 7–13)
PMV BLD: 11.4 FL — SIGNIFICANT CHANGE UP (ref 7–13)
POTASSIUM SERPL-MCNC: 4.1 MMOL/L — SIGNIFICANT CHANGE UP (ref 3.5–5.3)
POTASSIUM SERPL-SCNC: 4.1 MMOL/L — SIGNIFICANT CHANGE UP (ref 3.5–5.3)
PROTHROM AB SERPL-ACNC: 29.9 SEC — HIGH (ref 9.8–13.1)
RBC # BLD: 2.44 M/UL — LOW (ref 3.8–5.2)
RBC # BLD: 2.62 M/UL — LOW (ref 3.8–5.2)
RBC # BLD: 3.15 M/UL — LOW (ref 3.8–5.2)
RBC # FLD: 15 % — HIGH (ref 10.3–14.5)
RBC # FLD: 15 % — HIGH (ref 10.3–14.5)
RBC # FLD: 15.1 % — HIGH (ref 10.3–14.5)
RH IG SCN BLD-IMP: POSITIVE — SIGNIFICANT CHANGE UP
SODIUM SERPL-SCNC: 130 MMOL/L — LOW (ref 135–145)
WBC # BLD: 4.71 K/UL — SIGNIFICANT CHANGE UP (ref 3.8–10.5)
WBC # BLD: 4.83 K/UL — SIGNIFICANT CHANGE UP (ref 3.8–10.5)
WBC # BLD: 5.1 K/UL — SIGNIFICANT CHANGE UP (ref 3.8–10.5)
WBC # FLD AUTO: 4.71 K/UL — SIGNIFICANT CHANGE UP (ref 3.8–10.5)
WBC # FLD AUTO: 4.83 K/UL — SIGNIFICANT CHANGE UP (ref 3.8–10.5)
WBC # FLD AUTO: 5.1 K/UL — SIGNIFICANT CHANGE UP (ref 3.8–10.5)

## 2020-02-08 RX ADMIN — Medication 650 MILLIGRAM(S): at 13:45

## 2020-02-08 RX ADMIN — HEPARIN SODIUM 500 UNIT(S)/HR: 5000 INJECTION INTRAVENOUS; SUBCUTANEOUS at 05:10

## 2020-02-08 RX ADMIN — Medication 650 MILLIGRAM(S): at 17:05

## 2020-02-08 RX ADMIN — ATORVASTATIN CALCIUM 40 MILLIGRAM(S): 80 TABLET, FILM COATED ORAL at 21:57

## 2020-02-08 RX ADMIN — ISOSORBIDE MONONITRATE 60 MILLIGRAM(S): 60 TABLET, EXTENDED RELEASE ORAL at 20:44

## 2020-02-08 RX ADMIN — Medication 650 MILLIGRAM(S): at 14:15

## 2020-02-08 RX ADMIN — Medication 0.2 MILLIGRAM(S): at 21:57

## 2020-02-08 RX ADMIN — Medication 100 MILLIGRAM(S): at 11:52

## 2020-02-08 RX ADMIN — CARVEDILOL PHOSPHATE 25 MILLIGRAM(S): 80 CAPSULE, EXTENDED RELEASE ORAL at 21:57

## 2020-02-08 RX ADMIN — Medication 650 MILLIGRAM(S): at 05:12

## 2020-02-08 RX ADMIN — Medication 27.5 MILLIGRAM(S): at 05:11

## 2020-02-08 RX ADMIN — Medication 81 MILLIGRAM(S): at 11:52

## 2020-02-08 NOTE — PROGRESS NOTE ADULT - PROBLEM SELECTOR PLAN 1
Likely anemia of renal disease  H&H of 7.2/23 in December According to Dr Damico her Hgb around 7.  Pt had Blood transfusion last week for Hgb 6.   Transfused 1 unit of pRBC 2/3/20.  never had colonoscopy all her life. encouraged procedure but refused. understand risk of missed colon lesions  hgb dropped today 2/8/20, confirmed repeat.  report brown stool, occult stool negative a few days ago  likely CKD related, plan to transfuse 1 unit  d/c hep gtt, INR therapeutic, hold coumadin today

## 2020-02-08 NOTE — PROVIDER CONTACT NOTE (CRITICAL VALUE NOTIFICATION) - SITUATION
patient with low h/h
Pt with anemia likely of renal disease. H/H downtrending
pt H&H is low.
pt came in for chest pain and had an abnormal stress test
pt on iv heparin

## 2020-02-08 NOTE — PROGRESS NOTE ADULT - ASSESSMENT
79F with PMH of HTN, CKD stage 5, with a LUE fistula which has never been used, DM2, CAD s/p CABG, PCI (3/2017), Aortic stenosis s/p TAVR/PPM (3/2017), RUE DVT presenting with chest pain.     1. Chest pain-h/o CAD/CABG   -no further chest pain noted   -stress test revealed moderate ischemia to inferior wall. nl LV fx   -in light of age, CKD/EsRD, anemia and resolution of sx, and to dec risk of PATRICIA and HD need, we will defer cath   -medical management, cont asa, statin, bb    2. RUE DVT  -coumadin per inr   -hold coumadin tonight, restart martín pending inr    3. AS s/p TAVR/ PPM  -cv stable     4. HTN   -low bp today   -hold bp meds for now     5. anemia, hx  -associated with chronic renal failure   -no overt s/s of bleeding, occult negative    -prbc today , gets prbc every 3-4 weeks   s/p 1uPRBC, h/h improved     6. CKD   continue to trend creat, renal f/u     7. Headache   -resolved  -Head ct with no acute intracranial pathology.   + pituitary gland mildly enlarged suspicious for a pituitary macroadenoma  neuro f/u  med f/u I, Wilfredo Voss, performed the initial face to face bedside interview with this patient regarding history of present illness, review of symptoms and relevant past medical, social and family history.  I completed an independent physical examination.  I was the initial provider who evaluated this patient. I have signed out the follow up of any pending tests (i.e. labs, radiological studies) to the ACP.  I have communicated the patient’s plan of care and disposition with the ACP.  The history, relevant review of systems, past medical and surgical history, medical decision making, and physical examination was documented by the scribe in my presence and I attest to the accuracy of the documentation.

## 2020-02-08 NOTE — PROGRESS NOTE ADULT - PROBLEM SELECTOR PLAN 2
left sided headaches  have hx of chronic migraines  no vision changes  no focal deficit  CT head neg, but incidentally noted ? pituitary macroadenoma   neurology Dr. Sauceda consulted  limited pain regimen. (avoiding NSAID given CKD, no opioids given allergic reaction, "makes me crazy", limited tylenol use)  s/p Depakote with resolution

## 2020-02-08 NOTE — PROVIDER CONTACT NOTE (CRITICAL VALUE NOTIFICATION) - ASSESSMENT
pt asymptomatic.
BP soft, PA Denisha made aware as documented
No active signs of bleeding. Asymptomatic.
pt on hep gtt running at 9ml/hr. no s/s of bleeding
no bleeding noted.

## 2020-02-08 NOTE — PROVIDER CONTACT NOTE (CRITICAL VALUE NOTIFICATION) - ACTION/TREATMENT ORDERED:
follow heparin nomogram protocol. Hold heparin for 60 mins and adjust as per order.
awaiting orders. Will continue to monitor.
Continue to monitor
TELLO Denny to assess at bedside
hep gtt being paused for 60 min then resuming at 7 ml/hr.
repeat 147.9 ptt monogram followed hold for 1 hour then decrease at 820am

## 2020-02-08 NOTE — PROGRESS NOTE ADULT - SUBJECTIVE AND OBJECTIVE BOX
CARDIOLOGY FOLLOW UP NOTE - DR. NAVARRO    Subjective:    no chest pain, sob, palpitations  resolved ha     PHYSICAL EXAM:  T(C): 36.9 (20 @ 09:05), Max: 36.9 (20 @ 19:59)  HR: 69 (20 @ 09:05) (67 - 92)  BP: 95/41 (20 @ 09:05) (95/41 - 174/75)  RR: 17 (20 @ 09:05) (17 - 18)  SpO2: 100% (20 @ 09:05) (100% - 100%)  Wt(kg): --  I&O's Summary    2020 07:01  -  2020 07:00  --------------------------------------------------------  IN: 200 mL / OUT: 0 mL / NET: 200 mL      Daily     Daily Weight in k.4 (2020 06:50)    Appearance: Normal	  Cardiovascular: Normal S1 S2,RRR, No JVD, No murmurs  Respiratory: Lungs clear to auscultation	  Gastrointestinal:  Soft, Non-tender, + BS	  Extremities: Normal range of motion, No clubbing, cyanosis or edema      Home Medications:  allopurinol 100 mg oral tablet: 1 tab(s) orally once a day (2020 12:14)  aspirin 81 mg oral delayed release tablet: 1 tab(s) orally once a day (2020 12:14)  calcitriol 0.25 mcg oral capsule: 1 cap(s) orally 3 times a week on Monday, Wednesday, and Friday  (2020 12:14)  carvedilol 25 mg oral tablet: 1 tab(s) orally 2 times a day (2020 12:14)  cloNIDine 0.2 mg oral tablet: 1 tab(s) orally once a day (2020 12:14)  Coumadin 3 mg oral tablet: 1 tab(s) orally every other day alternating with 6mg tablet  (2020 12:14)  Coumadin 6 mg oral tablet: 1 tab(s) orally every other day alternating with 3mg tablet (2020 12:14)  Crestor 10 mg oral tablet: 1 tab(s) orally once a day (at bedtime) (2020 12:14)  furosemide 40 mg oral tablet: 1 tab(s) orally every other day (2020 12:14)  hydrALAZINE 100 mg oral tablet: 1 tab(s) orally 4 times a day (2020 12:14)  Imdur 60 mg oral tablet, extended release: 1 tab(s) orally 2 times a day (2020 12:14)  Januvia 50 mg oral tablet: 1 tab(s) orally once a day (2020 12:14)  ProAir HFA 90 mcg/inh inhalation aerosol: 2 puff(s) inhaled every 6 hours, As Needed (2020 12:)  sodium bicarbonate 650 mg oral tablet: 1 tab(s) orally 2 times a day (2020 12:14)  terazosin 2 mg oral capsule: 1 cap(s) orally once a day (at bedtime) (2020 12:14)      MEDICATIONS  (STANDING):  allopurinol 100 milliGRAM(s) Oral daily  aspirin enteric coated 81 milliGRAM(s) Oral daily  atorvastatin 40 milliGRAM(s) Oral at bedtime  calcitriol   Capsule 0.25 MICROGram(s) Oral <User Schedule>  carvedilol 25 milliGRAM(s) Oral every 12 hours  cloNIDine 0.2 milliGRAM(s) Oral daily  dextrose 5%. 1000 milliLiter(s) (50 mL/Hr) IV Continuous <Continuous>  dextrose 50% Injectable 12.5 Gram(s) IV Push once  dextrose 50% Injectable 25 Gram(s) IV Push once  dextrose 50% Injectable 25 Gram(s) IV Push once  insulin lispro (HumaLOG) corrective regimen sliding scale   SubCutaneous three times a day before meals  insulin lispro (HumaLOG) corrective regimen sliding scale   SubCutaneous at bedtime  isosorbide   mononitrate ER Tablet (IMDUR) 60 milliGRAM(s) Oral <User Schedule>  sodium bicarbonate 650 milliGRAM(s) Oral two times a day      TELEMETRY: 	    ECG:  	  RADIOLOGY:   DIAGNOSTIC TESTING:  [ ] Echocardiogram:  [ ] Catheterization:  [ ] Stress Test:    OTHER: 	    LABS:	 	    CARDIAC MARKERS:                                6.7    4.83  )-----------( 94       ( 2020 07:30 )             21.1     02-08    130<L>  |  98  |  64<H>  ----------------------------<  120<H>  4.1   |  16<L>  |  4.60<H>    Ca    9.2      2020 03:51  Phos  3.5       Mg     1.8           proBNP:   PT/INR - ( 2020 03:35 )   PT: 29.9 SEC;   INR: 2.55          PTT - ( 2020 03:35 )  PTT:61.7 SEC  Lipid Profile:   HgA1c:     Creatinine, Serum: 4.60 mg/dL (20 @ 03:51)  Creatinine, Serum: 4.33 mg/dL (20 @ 02:46)  Creatinine, Serum: 4.38 mg/dL (20 @ 07:40)

## 2020-02-08 NOTE — PROGRESS NOTE ADULT - SUBJECTIVE AND OBJECTIVE BOX
[facility]      Seen earlier today, and examined.  - Today, patient is without complaints.           *****MEDICATIONS: Current medication reviewed and documented.    MEDICATIONS  (STANDING):  allopurinol 100 milliGRAM(s) Oral daily  aspirin enteric coated 81 milliGRAM(s) Oral daily  atorvastatin 40 milliGRAM(s) Oral at bedtime  calcitriol   Capsule 0.25 MICROGram(s) Oral <User Schedule>  carvedilol 25 milliGRAM(s) Oral every 12 hours  cloNIDine 0.2 milliGRAM(s) Oral daily  dextrose 5%. 1000 milliLiter(s) (50 mL/Hr) IV Continuous <Continuous>  dextrose 50% Injectable 12.5 Gram(s) IV Push once  dextrose 50% Injectable 25 Gram(s) IV Push once  dextrose 50% Injectable 25 Gram(s) IV Push once  heparin  Infusion.  Unit(s)/Hr (11 mL/Hr) IV Continuous <Continuous>  insulin lispro (HumaLOG) corrective regimen sliding scale   SubCutaneous three times a day before meals  insulin lispro (HumaLOG) corrective regimen sliding scale   SubCutaneous at bedtime  isosorbide   mononitrate ER Tablet (IMDUR) 60 milliGRAM(s) Oral <User Schedule>  sodium bicarbonate 650 milliGRAM(s) Oral two times a day    MEDICATIONS  (PRN):  acetaminophen   Tablet .. 650 milliGRAM(s) Oral every 6 hours PRN Mild Pain (1 - 3), Moderate Pain (4 - 6), Severe Pain (7 - 10)  dextrose 40% Gel 15 Gram(s) Oral once PRN Blood Glucose LESS THAN 70 milliGRAM(s)/deciliter  glucagon  Injectable 1 milliGRAM(s) IntraMuscular once PRN Glucose LESS THAN 70 milligrams/deciliter  heparin  Injectable 5000 Unit(s) IV Push every 6 hours PRN For aPTT less than 40  heparin  Injectable 2500 Unit(s) IV Push every 6 hours PRN For aPTT between 40 - 57  ondansetron Injectable 4 milliGRAM(s) IV Push every 6 hours PRN Nausea and/or Vomiting          ***** VITAL SIGNS:  T(F): 98.4 (20 @ 09:05), Max: 98.8 (20 @ 12:50)  HR: 69 (20 @ 09:05) (67 - 92)  BP: 95/41 (20 @ 09:05) (95/41 - 174/75)  RR: 17 (20 @ 09:05) (17 - 18)  SpO2: 100% (20 @ 09:05) (100% - 100%)  Wt(kg): --  ,   I&O's Summary    2020 07:01  -  2020 07:00  --------------------------------------------------------  IN: 200 mL / OUT: 0 mL / NET: 200 mL             *****PHYSICAL EXAM:    Alert oriented x2  Attention comprehension are fair. Able to name, repeat, read without any difficulty.   Able to follow 3 step commands.     EOMI fundi not visualized,  VFF to confrontration  No facial asymmetry   Tongue is midline   Palate elevates symmetrically   Moving all 4 ext symmetrically no pronator drift   limited eval as pt was not fully cooperative   left shoulder pain   Reflexes are symmetric throughout   sensation is grossly symmetric  Gait : not assessed.  B/L down going toes        *****LAB AND IMAGIN.7    4.83  )-----------( 94       ( 2020 07:30 )             21.1               02-08    130<L>  |  98  |  64<H>  ----------------------------<  120<H>  4.1   |  16<L>  |  4.60<H>    Ca    9.2      2020 03:51  Phos  3.5     02-08  Mg     1.8     02-08      PT/INR - ( 2020 03:35 )   PT: 29.9 SEC;   INR: 2.55          PTT - ( 2020 03:35 )  PTT:61.7 SEC                     [All pertinent recent Imaging/Reports reviewed]           *****A S S E S S M E N T   A N D   P L A N :         Excerpt from H&P,Pt 81yo female with Hx of HTN CAD CABG AS S/P  TAVR 2017, PPM CHF RUE DVT DM Anemia presenting to [facility] ER for evaluation of C/P. Pt reports sudden onset of midsternal chest tightness that started around 0600 this morning when walking to bathrom. Pain was mild to moderate tightness like sensation with no radiation  and no pleuritic component. Pt reports associated mild dyspnea, but no dizziness, plapitations, nausea or diaphoresis. Pt reports took "baby aspirin" and 10 minutes after pain resolved. Rest of the morning  was unremarkable; however, in afternoon around 1300 Pt had similar quality chest tightness while at rest, she contacted her cardiologist and was advised to come to hospital. Pt reports no nausea or vomiting,    Headache started after admission and has been continuous. left sided associated with tearing on the left   Pt reports that she often gets headaches at home for which she takes tylenol.   She denies any visual obscurations/photosensitivity/phonosensitivity/double vision/blurry vision     Problem/Recommendations 1:  unilateral headache resolved after depakote    will d/c depakote   esr normal.   no meningismus    esr   oxygen supplementation   sleep augmentation    mri/mra/mrv to r/o other etiologies such as venous sinus thrombosis        Thank you for allowing me to participate in the care of this patient. Please do not hesitate to call me if you have any  questions.        ________________  Chantal Sauceda MD  [facility] ([facility]  [phone]      33 minutes spent on total encounter; more than 50 % of the visit was  spent counseling about plan of care, compliance to diet/exercise and medication regimen and or  coordinating care by the attending physician.      It is advised that stroke patients follow up with TRISHA Reeves @ [phone] in 1- 2 weeks.   Others please follow up with Dr. Michael Nissenbaum [phone]

## 2020-02-08 NOTE — PROGRESS NOTE ADULT - SUBJECTIVE AND OBJECTIVE BOX
Patient is a 80y old  Female who presents with a chief complaint of Chest pain (08 Feb 2020 13:13)      SUBJECTIVE / OVERNIGHT EVENTS:  headaches resolved.  hgb dropped today, confirmed repeat.  report brown stool  occult stool negative a few days ago  likely CKD related  transfuse 1 unit  d/c hep gtt, INR therapeutic  no cp, no sob, no n/v/d. no abdominal pain.  no headache, no dizziness.   no events on tele.      Vital Signs Last 24 Hrs  T(C): 36.9 (08 Feb 2020 09:05), Max: 36.9 (07 Feb 2020 19:59)  T(F): 98.4 (08 Feb 2020 09:05), Max: 98.5 (07 Feb 2020 19:59)  HR: 69 (08 Feb 2020 09:05) (67 - 92)  BP: 95/41 (08 Feb 2020 09:05) (95/41 - 174/75)  BP(mean): --  RR: 17 (08 Feb 2020 09:05) (17 - 18)  SpO2: 100% (08 Feb 2020 09:05) (100% - 100%)  I&O's Summary    07 Feb 2020 07:01  -  08 Feb 2020 07:00  --------------------------------------------------------  IN: 200 mL / OUT: 0 mL / NET: 200 mL      PHYSICAL EXAM:  GENERAL: NAD, Comfortable  HEAD:  Atraumatic, Normocephalic  EYES: EOMI, PERRLA, conjunctiva and sclera clear  NECK: Supple, No JVD  CHEST/LUNG: Clear to auscultation bilaterally; No wheeze  HEART: Regular rate and rhythm; No murmurs, rubs, or gallops  ABDOMEN: Soft, Nontender, Nondistended; Bowel sounds present  Neuro: AAO x 3, no focal deficit, 5/5 b/l extremities  EXTREMITIES:  2+ Peripheral Pulses, No clubbing, cyanosis, or edema  SKIN: No rashes or lesions    LABS:                        6.7    4.83  )-----------( 94       ( 08 Feb 2020 07:30 )             21.1     02-08    130<L>  |  98  |  64<H>  ----------------------------<  120<H>  4.1   |  16<L>  |  4.60<H>    Ca    9.2      08 Feb 2020 03:51  Phos  3.5     02-08  Mg     1.8     02-08      PT/INR - ( 08 Feb 2020 03:35 )   PT: 29.9 SEC;   INR: 2.55          PTT - ( 08 Feb 2020 03:35 )  PTT:61.7 SEC  CAPILLARY BLOOD GLUCOSE      POCT Blood Glucose.: 126 mg/dL (08 Feb 2020 12:39)  POCT Blood Glucose.: 110 mg/dL (08 Feb 2020 08:43)  POCT Blood Glucose.: 122 mg/dL (07 Feb 2020 22:56)  POCT Blood Glucose.: 131 mg/dL (07 Feb 2020 17:30)            RADIOLOGY & ADDITIONAL TESTS:    Imaging Personally Reviewed:  [x] YES  [ ] NO    Consultant(s) Notes Reviewed:  [x] YES  [ ] NO      MEDICATIONS  (STANDING):  allopurinol 100 milliGRAM(s) Oral daily  aspirin enteric coated 81 milliGRAM(s) Oral daily  atorvastatin 40 milliGRAM(s) Oral at bedtime  calcitriol   Capsule 0.25 MICROGram(s) Oral <User Schedule>  carvedilol 25 milliGRAM(s) Oral every 12 hours  cloNIDine 0.2 milliGRAM(s) Oral daily  dextrose 5%. 1000 milliLiter(s) (50 mL/Hr) IV Continuous <Continuous>  dextrose 50% Injectable 12.5 Gram(s) IV Push once  dextrose 50% Injectable 25 Gram(s) IV Push once  dextrose 50% Injectable 25 Gram(s) IV Push once  insulin lispro (HumaLOG) corrective regimen sliding scale   SubCutaneous three times a day before meals  insulin lispro (HumaLOG) corrective regimen sliding scale   SubCutaneous at bedtime  isosorbide   mononitrate ER Tablet (IMDUR) 60 milliGRAM(s) Oral <User Schedule>  sodium bicarbonate 650 milliGRAM(s) Oral two times a day    MEDICATIONS  (PRN):  acetaminophen   Tablet .. 650 milliGRAM(s) Oral every 6 hours PRN Mild Pain (1 - 3), Moderate Pain (4 - 6), Severe Pain (7 - 10)  dextrose 40% Gel 15 Gram(s) Oral once PRN Blood Glucose LESS THAN 70 milliGRAM(s)/deciliter  glucagon  Injectable 1 milliGRAM(s) IntraMuscular once PRN Glucose LESS THAN 70 milligrams/deciliter  ondansetron Injectable 4 milliGRAM(s) IV Push every 6 hours PRN Nausea and/or Vomiting      Care Discussed with Consultants/Other Providers [x] YES  [ ] NO    HEALTH ISSUES - PROBLEM Dx:  Headache: Headache  Essential hypertension: Essential hypertension  CKD (chronic kidney disease), stage V: CKD (chronic kidney disease), stage V  Chronic diastolic CHF (congestive heart failure): Chronic diastolic CHF (congestive heart failure)  DVT of upper extremity (deep vein thrombosis): DVT of upper extremity (deep vein thrombosis)  Shoulder pain, left: Shoulder pain, left  Gout: Gout  Anemia Associated with Chronic Renal Failure: Anemia Associated with Chronic Renal Failure  HTN (Hypertension): HTN (Hypertension)  DM2 (diabetes mellitus, type 2): DM2 (diabetes mellitus, type 2)  CAD (coronary artery disease): CAD (coronary artery disease)  Chest pain: Chest pain

## 2020-02-08 NOTE — PROGRESS NOTE ADULT - ASSESSMENT
81 yo female with Hx of HTN, CAD, CABG, AS S/P TAVR 2017, s/p PPM, HFpEF (60% in May 2017), RUE DVT dxed 1/22/2019 on coumadin, DM2, Anemia presenting to [facility] ER for evaluation of C/P.

## 2020-02-08 NOTE — PROVIDER CONTACT NOTE (CRITICAL VALUE NOTIFICATION) - RECOMMENDATIONS
will follow orders as per tele pa.
Notify team, continue to monitor
to give 1 unit of PRBCs. awaiting TELLO Denny's orders.
will follow nomogram
repeat  ptt

## 2020-02-08 NOTE — PROVIDER CONTACT NOTE (CRITICAL VALUE NOTIFICATION) - BACKGROUND
Pt admitted for chest pain. On heparin gtt.
80F admitted with CP and CKD. On heparin gtt.
hx of CKD, pt will maintain the proper diet and will attempt to maintain their FS between 100-150., CAD, gout, CHF
pt on a hep gtt running as per orders. pt came in with chest pain and DVT in RUE.
blood drawn on rt arm with iv infusing

## 2020-02-09 LAB
ANION GAP SERPL CALC-SCNC: 17 MMO/L — HIGH (ref 7–14)
APTT BLD: 32.3 SEC — SIGNIFICANT CHANGE UP (ref 27.5–36.3)
BUN SERPL-MCNC: 73 MG/DL — HIGH (ref 7–23)
CALCIUM SERPL-MCNC: 9.3 MG/DL — SIGNIFICANT CHANGE UP (ref 8.4–10.5)
CHLORIDE SERPL-SCNC: 98 MMOL/L — SIGNIFICANT CHANGE UP (ref 98–107)
CO2 SERPL-SCNC: 15 MMOL/L — LOW (ref 22–31)
CREAT SERPL-MCNC: 5.2 MG/DL — HIGH (ref 0.5–1.3)
GLUCOSE SERPL-MCNC: 159 MG/DL — HIGH (ref 70–99)
HCT VFR BLD CALC: 26.5 % — LOW (ref 34.5–45)
HGB BLD-MCNC: 8.1 G/DL — LOW (ref 11.5–15.5)
INR BLD: 3.8 — HIGH (ref 0.88–1.17)
MAGNESIUM SERPL-MCNC: 1.8 MG/DL — SIGNIFICANT CHANGE UP (ref 1.6–2.6)
MCHC RBC-ENTMCNC: 26.8 PG — LOW (ref 27–34)
MCHC RBC-ENTMCNC: 30.6 % — LOW (ref 32–36)
MCV RBC AUTO: 87.7 FL — SIGNIFICANT CHANGE UP (ref 80–100)
NRBC # FLD: 0 K/UL — SIGNIFICANT CHANGE UP (ref 0–0)
PHOSPHATE SERPL-MCNC: 4.4 MG/DL — SIGNIFICANT CHANGE UP (ref 2.5–4.5)
PLATELET # BLD AUTO: 108 K/UL — LOW (ref 150–400)
PMV BLD: 12 FL — SIGNIFICANT CHANGE UP (ref 7–13)
POTASSIUM SERPL-MCNC: 4.1 MMOL/L — SIGNIFICANT CHANGE UP (ref 3.5–5.3)
POTASSIUM SERPL-SCNC: 4.1 MMOL/L — SIGNIFICANT CHANGE UP (ref 3.5–5.3)
PROTHROM AB SERPL-ACNC: 45.1 SEC — HIGH (ref 9.8–13.1)
RBC # BLD: 3.02 M/UL — LOW (ref 3.8–5.2)
RBC # FLD: 15.2 % — HIGH (ref 10.3–14.5)
SODIUM SERPL-SCNC: 130 MMOL/L — LOW (ref 135–145)
WBC # BLD: 5.06 K/UL — SIGNIFICANT CHANGE UP (ref 3.8–10.5)
WBC # FLD AUTO: 5.06 K/UL — SIGNIFICANT CHANGE UP (ref 3.8–10.5)

## 2020-02-09 RX ADMIN — ISOSORBIDE MONONITRATE 60 MILLIGRAM(S): 60 TABLET, EXTENDED RELEASE ORAL at 07:00

## 2020-02-09 RX ADMIN — ATORVASTATIN CALCIUM 40 MILLIGRAM(S): 80 TABLET, FILM COATED ORAL at 22:49

## 2020-02-09 RX ADMIN — Medication 650 MILLIGRAM(S): at 10:24

## 2020-02-09 RX ADMIN — Medication 650 MILLIGRAM(S): at 11:24

## 2020-02-09 RX ADMIN — Medication 100 MILLIGRAM(S): at 12:34

## 2020-02-09 RX ADMIN — CARVEDILOL PHOSPHATE 25 MILLIGRAM(S): 80 CAPSULE, EXTENDED RELEASE ORAL at 22:48

## 2020-02-09 RX ADMIN — ISOSORBIDE MONONITRATE 60 MILLIGRAM(S): 60 TABLET, EXTENDED RELEASE ORAL at 20:02

## 2020-02-09 RX ADMIN — Medication 650 MILLIGRAM(S): at 17:18

## 2020-02-09 RX ADMIN — Medication 650 MILLIGRAM(S): at 07:00

## 2020-02-09 RX ADMIN — Medication 81 MILLIGRAM(S): at 12:34

## 2020-02-09 RX ADMIN — Medication 0.2 MILLIGRAM(S): at 22:49

## 2020-02-09 NOTE — PROGRESS NOTE ADULT - SUBJECTIVE AND OBJECTIVE BOX
Patient is a 80y old  Female who presents with a chief complaint of Chest pain (09 Feb 2020 09:47)      SUBJECTIVE / OVERNIGHT EVENTS:  Pt sitting up out of bed.  feels well. no more headaches.  +BM brown stool, stable hgb.   no chest pain, no shortness of breath.   comfortable. no n/v/d. no abd pain.  no dizziness.       Vital Signs Last 24 Hrs  T(C): 36.9 (09 Feb 2020 17:42), Max: 37 (09 Feb 2020 06:42)  T(F): 98.5 (09 Feb 2020 17:42), Max: 98.6 (09 Feb 2020 06:42)  HR: 90 (09 Feb 2020 17:42) (74 - 98)  BP: 138/55 (09 Feb 2020 17:42) (97/46 - 138/55)  BP(mean): --  RR: 17 (09 Feb 2020 17:42) (17 - 18)  SpO2: 100% (09 Feb 2020 17:42) (100% - 100%)  I&O's Summary      PHYSICAL EXAM:  GENERAL: NAD, Comfortable  HEAD:  Atraumatic, Normocephalic  EYES: EOMI, PERRLA, conjunctiva and sclera clear  NECK: Supple, No JVD  CHEST/LUNG: Clear to auscultation bilaterally; No wheeze  HEART: Regular rate and rhythm; No murmurs, rubs, or gallops  ABDOMEN: Soft, Nontender, Nondistended; Bowel sounds present  Neuro: AAO x 3, no focal deficit, 5/5 b/l extremities  EXTREMITIES:  2+ Peripheral Pulses, No clubbing, cyanosis, or edema  SKIN: No rashes or lesions      LABS:                        8.1    5.06  )-----------( 108      ( 09 Feb 2020 10:50 )             26.5     02-09    130<L>  |  98  |  73<H>  ----------------------------<  159<H>  4.1   |  15<L>  |  5.20<H>    Ca    9.3      09 Feb 2020 10:50  Phos  4.4     02-09  Mg     1.8     02-09      PT/INR - ( 09 Feb 2020 10:50 )   PT: 45.1 SEC;   INR: 3.80          PTT - ( 09 Feb 2020 10:50 )  PTT:32.3 SEC  CAPILLARY BLOOD GLUCOSE      POCT Blood Glucose.: 133 mg/dL (09 Feb 2020 17:52)  POCT Blood Glucose.: 148 mg/dL (09 Feb 2020 12:49)  POCT Blood Glucose.: 131 mg/dL (09 Feb 2020 08:52)  POCT Blood Glucose.: 122 mg/dL (08 Feb 2020 22:44)            RADIOLOGY & ADDITIONAL TESTS:    Imaging Personally Reviewed:  [x] YES  [ ] NO    Consultant(s) Notes Reviewed:  [x] YES  [ ] NO      MEDICATIONS  (STANDING):  allopurinol 100 milliGRAM(s) Oral daily  aspirin enteric coated 81 milliGRAM(s) Oral daily  atorvastatin 40 milliGRAM(s) Oral at bedtime  calcitriol   Capsule 0.25 MICROGram(s) Oral <User Schedule>  carvedilol 25 milliGRAM(s) Oral every 12 hours  cloNIDine 0.2 milliGRAM(s) Oral daily  dextrose 5%. 1000 milliLiter(s) (50 mL/Hr) IV Continuous <Continuous>  dextrose 50% Injectable 12.5 Gram(s) IV Push once  dextrose 50% Injectable 25 Gram(s) IV Push once  dextrose 50% Injectable 25 Gram(s) IV Push once  insulin lispro (HumaLOG) corrective regimen sliding scale   SubCutaneous three times a day before meals  insulin lispro (HumaLOG) corrective regimen sliding scale   SubCutaneous at bedtime  isosorbide   mononitrate ER Tablet (IMDUR) 60 milliGRAM(s) Oral <User Schedule>  sodium bicarbonate 650 milliGRAM(s) Oral two times a day    MEDICATIONS  (PRN):  acetaminophen   Tablet .. 650 milliGRAM(s) Oral every 6 hours PRN Mild Pain (1 - 3), Moderate Pain (4 - 6), Severe Pain (7 - 10)  dextrose 40% Gel 15 Gram(s) Oral once PRN Blood Glucose LESS THAN 70 milliGRAM(s)/deciliter  glucagon  Injectable 1 milliGRAM(s) IntraMuscular once PRN Glucose LESS THAN 70 milligrams/deciliter  ondansetron Injectable 4 milliGRAM(s) IV Push every 6 hours PRN Nausea and/or Vomiting      Care Discussed with Consultants/Other Providers [x] YES  [ ] NO    HEALTH ISSUES - PROBLEM Dx:  Headache: Headache  Essential hypertension: Essential hypertension  CKD (chronic kidney disease), stage V: CKD (chronic kidney disease), stage V  Chronic diastolic CHF (congestive heart failure): Chronic diastolic CHF (congestive heart failure)  DVT of upper extremity (deep vein thrombosis): DVT of upper extremity (deep vein thrombosis)  Shoulder pain, left: Shoulder pain, left  Gout: Gout  Anemia Associated with Chronic Renal Failure: Anemia Associated with Chronic Renal Failure  HTN (Hypertension): HTN (Hypertension)  DM2 (diabetes mellitus, type 2): DM2 (diabetes mellitus, type 2)  CAD (coronary artery disease): CAD (coronary artery disease)  Chest pain: Chest pain

## 2020-02-09 NOTE — DIETITIAN INITIAL EVALUATION ADULT. - PROBLEM SELECTOR PLAN 1
Will TELE CE ECG in am, Will talk to cardiology Dr Damico in regard to further ischemic W/U likely STRESS TEST, will resume home meds with ASA and BB as well as statins  PA addendum case D/W Dr Damico will order STRESS/pharmacological for am

## 2020-02-09 NOTE — PROGRESS NOTE ADULT - SUBJECTIVE AND OBJECTIVE BOX
CARDIOLOGY FOLLOW UP NOTE - DR. NAVARRO    Subjective:    no chest pain, sob, palpitations  mild ha    PHYSICAL EXAM:  T(C): 37 (02-09-20 @ 06:42), Max: 37.2 (02-08-20 @ 12:57)  HR: 88 (02-09-20 @ 06:42) (69 - 98)  BP: 117/45 (02-09-20 @ 06:42) (97/45 - 133/71)  RR: 18 (02-09-20 @ 06:42) (17 - 18)  SpO2: 100% (02-09-20 @ 06:42) (100% - 100%)  Wt(kg): --  I&O's Summary    Daily     Daily     Appearance: Normal	  Cardiovascular: Normal S1 S2,RRR, sm  Respiratory: Lungs clear to auscultation	  Gastrointestinal:  Soft, Non-tender, + BS	  Extremities: Normal range of motion, No clubbing, cyanosis or edema      Home Medications:  allopurinol 100 mg oral tablet: 1 tab(s) orally once a day (02 Feb 2020 12:14)  aspirin 81 mg oral delayed release tablet: 1 tab(s) orally once a day (02 Feb 2020 12:14)  calcitriol 0.25 mcg oral capsule: 1 cap(s) orally 3 times a week on Monday, Wednesday, and Friday  (02 Feb 2020 12:14)  carvedilol 25 mg oral tablet: 1 tab(s) orally 2 times a day (02 Feb 2020 12:14)  cloNIDine 0.2 mg oral tablet: 1 tab(s) orally once a day (02 Feb 2020 12:14)  Coumadin 3 mg oral tablet: 1 tab(s) orally every other day alternating with 6mg tablet  (02 Feb 2020 12:14)  Coumadin 6 mg oral tablet: 1 tab(s) orally every other day alternating with 3mg tablet (02 Feb 2020 12:14)  Crestor 10 mg oral tablet: 1 tab(s) orally once a day (at bedtime) (02 Feb 2020 12:14)  furosemide 40 mg oral tablet: 1 tab(s) orally every other day (02 Feb 2020 12:14)  hydrALAZINE 100 mg oral tablet: 1 tab(s) orally 4 times a day (02 Feb 2020 12:14)  Imdur 60 mg oral tablet, extended release: 1 tab(s) orally 2 times a day (02 Feb 2020 12:14)  Januvia 50 mg oral tablet: 1 tab(s) orally once a day (02 Feb 2020 12:14)  ProAir HFA 90 mcg/inh inhalation aerosol: 2 puff(s) inhaled every 6 hours, As Needed (02 Feb 2020 12:14)  sodium bicarbonate 650 mg oral tablet: 1 tab(s) orally 2 times a day (02 Feb 2020 12:14)  terazosin 2 mg oral capsule: 1 cap(s) orally once a day (at bedtime) (02 Feb 2020 12:14)      MEDICATIONS  (STANDING):  allopurinol 100 milliGRAM(s) Oral daily  aspirin enteric coated 81 milliGRAM(s) Oral daily  atorvastatin 40 milliGRAM(s) Oral at bedtime  calcitriol   Capsule 0.25 MICROGram(s) Oral <User Schedule>  carvedilol 25 milliGRAM(s) Oral every 12 hours  cloNIDine 0.2 milliGRAM(s) Oral daily  dextrose 5%. 1000 milliLiter(s) (50 mL/Hr) IV Continuous <Continuous>  dextrose 50% Injectable 12.5 Gram(s) IV Push once  dextrose 50% Injectable 25 Gram(s) IV Push once  dextrose 50% Injectable 25 Gram(s) IV Push once  insulin lispro (HumaLOG) corrective regimen sliding scale   SubCutaneous three times a day before meals  insulin lispro (HumaLOG) corrective regimen sliding scale   SubCutaneous at bedtime  isosorbide   mononitrate ER Tablet (IMDUR) 60 milliGRAM(s) Oral <User Schedule>  sodium bicarbonate 650 milliGRAM(s) Oral two times a day      TELEMETRY: 	    ECG:  	  RADIOLOGY:   DIAGNOSTIC TESTING:  [ ] Echocardiogram:  [ ] Catheterization:  [ ] Stress Test:    OTHER: 	    LABS:	 	    CARDIAC MARKERS:                                8.5    4.71  )-----------( 105      ( 08 Feb 2020 17:56 )             26.8     02-08    130<L>  |  98  |  64<H>  ----------------------------<  120<H>  4.1   |  16<L>  |  4.60<H>    Ca    9.2      08 Feb 2020 03:51  Phos  3.5     02-08  Mg     1.8     02-08      proBNP:   PT/INR - ( 08 Feb 2020 03:35 )   PT: 29.9 SEC;   INR: 2.55          PTT - ( 08 Feb 2020 03:35 )  PTT:61.7 SEC  Lipid Profile:   HgA1c:     Creatinine, Serum: 4.60 mg/dL (02-08-20 @ 03:51)  Creatinine, Serum: 4.33 mg/dL (02-07-20 @ 02:46)

## 2020-02-09 NOTE — DIETITIAN INITIAL EVALUATION ADULT. - PROBLEM SELECTOR PLAN 5
Likely anemia of renal disease, will F/U occult blood H&H of 7.2/23 in December According to Dr Damico her Hgb around 7.  Pt had Blood transfusion last week for Hgb 6. will trend H&H for now, will talk to PMD in am if PRBC is needed to keep Hgb above 8 given Hx of CAD  for now will sent Fe studies Folic and B12, will talk to PMD in regard to Epogen

## 2020-02-09 NOTE — DIETITIAN INITIAL EVALUATION ADULT. - PERTINENT LABORATORY DATA
02-08 Na 130 mmol/L<L> Glu 120 mg/dL<H> K+ 4.1 mmol/L Cr 4.60 mg/dL<H> BUN 64 mg/dL<H> Phos 3.5 mg/dL  02-02-20 HbA1c 4.9 %  02-09 @ 08:52 POCT 131 mg/dL  02-08 @ 22:44 POCT 122 mg/dL  02-08 @ 17:55 POCT 120 mg/dL  02-08 @ 12:39 POCT 126 mg/dL

## 2020-02-09 NOTE — PROGRESS NOTE ADULT - ASSESSMENT
79 yo female with Hx of HTN, CAD, CABG, AS S/P TAVR 2017, s/p PPM, HFpEF (60% in May 2017), RUE DVT dxed 1/22/2019 on coumadin, DM2, Anemia presenting to [facility] ER for evaluation of C/P.

## 2020-02-09 NOTE — PROGRESS NOTE ADULT - PROBLEM SELECTOR PLAN 3
Stress test abnormal with reversible ischemia  Appreciate cardiology  Initial plan for cath/angio but conservative medical management for now given CKD stage 5

## 2020-02-09 NOTE — DIETITIAN INITIAL EVALUATION ADULT. - DIET TYPE
Diet, Regular: DASH/TLC {Sodium & Cholesterol Restricted} (DASH) For patients receiving Renal Replacement - No Protein Restr, No Conc K, No Conc Phos, Low Sodium (RENAL) Low Sodium (02-01-20 @ 21:25)

## 2020-02-09 NOTE — DIETITIAN INITIAL EVALUATION ADULT. - PERTINENT MEDS FT
MEDICATIONS  (STANDING):  allopurinol 100 milliGRAM(s) Oral daily  aspirin enteric coated 81 milliGRAM(s) Oral daily  atorvastatin 40 milliGRAM(s) Oral at bedtime  calcitriol   Capsule 0.25 MICROGram(s) Oral <User Schedule>  carvedilol 25 milliGRAM(s) Oral every 12 hours  cloNIDine 0.2 milliGRAM(s) Oral daily  dextrose 5%. 1000 milliLiter(s) (50 mL/Hr) IV Continuous <Continuous>  dextrose 50% Injectable 12.5 Gram(s) IV Push once  dextrose 50% Injectable 25 Gram(s) IV Push once  dextrose 50% Injectable 25 Gram(s) IV Push once  insulin lispro (HumaLOG) corrective regimen sliding scale   SubCutaneous three times a day before meals  insulin lispro (HumaLOG) corrective regimen sliding scale   SubCutaneous at bedtime  isosorbide   mononitrate ER Tablet (IMDUR) 60 milliGRAM(s) Oral <User Schedule>  sodium bicarbonate 650 milliGRAM(s) Oral two times a day    MEDICATIONS  (PRN):  acetaminophen   Tablet .. 650 milliGRAM(s) Oral every 6 hours PRN Mild Pain (1 - 3), Moderate Pain (4 - 6), Severe Pain (7 - 10)  dextrose 40% Gel 15 Gram(s) Oral once PRN Blood Glucose LESS THAN 70 milliGRAM(s)/deciliter  glucagon  Injectable 1 milliGRAM(s) IntraMuscular once PRN Glucose LESS THAN 70 milligrams/deciliter  ondansetron Injectable 4 milliGRAM(s) IV Push every 6 hours PRN Nausea and/or Vomiting

## 2020-02-09 NOTE — DIETITIAN INITIAL EVALUATION ADULT. - OTHER INFO
80-year-old female patient with PMH of HTN, CAD, type 2 diabetes mellitus, CHF, CKD stage 5 with anemia. Presented to hospital with chest pain.     RD met with patient during time of encounter.      RD services to remain available. 80-year-old female patient with PMH of HTN, CAD, type 2 diabetes mellitus, CHF, CKD stage 5 with anemia. Presented to hospital with chest pain.     RD met with patient during time of encounter.  Patient denies food allergies, nausea, constipation, or issues with chewing/swallowing but reports vomiting and diarrhea during hospital stay. Patient reports last bout of vomiting x ~2 days and diarrhea x ~4 days. Since last episode of diarrhea, patient now with formed BM. Last BM yesterday. Patient reports "okay" PO intake, as she does not like the hospital food. However, patient had oatmeal and coffee in AM. Patient confirms current weight of 64.4 kg and denies recent weight fluctuations. Educated patient on the DASH/TLC (cholesterol & Na restricted) and renal restriction diet. Also explained the coumadin and vitamin K interaction. Patient receptive. RD services to remain available.

## 2020-02-09 NOTE — PROGRESS NOTE ADULT - ASSESSMENT
79F with PMH of HTN, CKD stage 5, with a LUE fistula which has never been used, DM2, CAD s/p CABG, PCI (3/2017), Aortic stenosis s/p TAVR/PPM (3/2017), RUE DVT presenting with chest pain.     1. Chest pain-h/o CAD/CABG   -no further chest pain noted   -stress test revealed moderate ischemia to inferior wall. nl LV fx   -in light of age, CKD/EsRD, anemia and resolution of sx, and to dec risk of PATRICIA and HD need, we will defer cath   -medical management, cont asa, statin, bb    2. RUE DVT  -coumadin per inr     3. AS s/p TAVR/ PPM  -cv stable     4. HTN   -bp stable    5. anemia, hx  -associated with chronic renal failure   -no overt s/s of bleeding, occult negative    -prbc 2/8, gets prbc every 3-4 weeks   -h/h improved     6. CKD   continue to trend creat, renal f/u     7. Headache   -mostly resolved  -Head ct with no acute intracranial pathology.   + pituitary gland mildly enlarged suspicious for a pituitary macroadenoma  neuro f/u  med f/u       d/c planning

## 2020-02-09 NOTE — PROGRESS NOTE ADULT - ATTENDING COMMENTS
- Dr. ANH Gustafson ([facility])  - [phone] CKD stage V, Cr trending up. will need renal follow up.     - [facility]  - [phone]

## 2020-02-09 NOTE — PROGRESS NOTE ADULT - PROBLEM SELECTOR PLAN 1
Likely anemia of renal disease  H&H of 7.2/23 in December According to Dr Damico her Hgb around 7.  Pt had Blood transfusion last week for Hgb 6. Transfused 1 unit of pRBC 2/3/20.   never had colonoscopy all her life. encouraged procedure but refused. understand risk of missed colon lesions  hgb dropped today 2/8/20, confirmed repeat.  report brown stool, occult stool negative a few days ago  likely CKD related, plan to transfuse 1 unit  d/c hep gtt, INR supratherapeutic, continue to hold coumadin today

## 2020-02-10 DIAGNOSIS — E11.69 TYPE 2 DIABETES MELLITUS WITH OTHER SPECIFIED COMPLICATION: ICD-10-CM

## 2020-02-10 DIAGNOSIS — D35.2 BENIGN NEOPLASM OF PITUITARY GLAND: ICD-10-CM

## 2020-02-10 DIAGNOSIS — E78.5 HYPERLIPIDEMIA, UNSPECIFIED: ICD-10-CM

## 2020-02-10 LAB
ANION GAP SERPL CALC-SCNC: 15 MMO/L — HIGH (ref 7–14)
APTT BLD: 29.8 SEC — SIGNIFICANT CHANGE UP (ref 27.5–36.3)
BUN SERPL-MCNC: 78 MG/DL — HIGH (ref 7–23)
CALCIUM SERPL-MCNC: 9.9 MG/DL — SIGNIFICANT CHANGE UP (ref 8.4–10.5)
CHLORIDE SERPL-SCNC: 99 MMOL/L — SIGNIFICANT CHANGE UP (ref 98–107)
CO2 SERPL-SCNC: 18 MMOL/L — LOW (ref 22–31)
CREAT SERPL-MCNC: 5.6 MG/DL — HIGH (ref 0.5–1.3)
FOLATE RBC-MCNC: 1125 — SIGNIFICANT CHANGE UP
GLUCOSE SERPL-MCNC: 115 MG/DL — HIGH (ref 70–99)
HCT VFR BLD CALC: 24.9 % — LOW (ref 34.5–45)
HGB BLD-MCNC: 8 G/DL — LOW (ref 11.5–15.5)
INR BLD: 2.32 — HIGH (ref 0.88–1.17)
MAGNESIUM SERPL-MCNC: 1.8 MG/DL — SIGNIFICANT CHANGE UP (ref 1.6–2.6)
MCHC RBC-ENTMCNC: 27.2 PG — SIGNIFICANT CHANGE UP (ref 27–34)
MCHC RBC-ENTMCNC: 32.1 % — SIGNIFICANT CHANGE UP (ref 32–36)
MCV RBC AUTO: 84.7 FL — SIGNIFICANT CHANGE UP (ref 80–100)
NRBC # FLD: 0 K/UL — SIGNIFICANT CHANGE UP (ref 0–0)
PHOSPHATE SERPL-MCNC: 4.2 MG/DL — SIGNIFICANT CHANGE UP (ref 2.5–4.5)
PLATELET # BLD AUTO: 122 K/UL — LOW (ref 150–400)
PMV BLD: 11.3 FL — SIGNIFICANT CHANGE UP (ref 7–13)
POTASSIUM SERPL-MCNC: 4.3 MMOL/L — SIGNIFICANT CHANGE UP (ref 3.5–5.3)
POTASSIUM SERPL-SCNC: 4.3 MMOL/L — SIGNIFICANT CHANGE UP (ref 3.5–5.3)
PROTHROM AB SERPL-ACNC: 27.2 SEC — HIGH (ref 9.8–13.1)
RBC # BLD: 2.94 M/UL — LOW (ref 3.8–5.2)
RBC # FLD: 15.1 % — HIGH (ref 10.3–14.5)
SODIUM SERPL-SCNC: 132 MMOL/L — LOW (ref 135–145)
WBC # BLD: 4.89 K/UL — SIGNIFICANT CHANGE UP (ref 3.8–10.5)
WBC # FLD AUTO: 4.89 K/UL — SIGNIFICANT CHANGE UP (ref 3.8–10.5)

## 2020-02-10 PROCEDURE — 99232 SBSQ HOSP IP/OBS MODERATE 35: CPT | Mod: GC

## 2020-02-10 PROCEDURE — 99223 1ST HOSP IP/OBS HIGH 75: CPT | Mod: GC

## 2020-02-10 RX ORDER — WARFARIN SODIUM 2.5 MG/1
5 TABLET ORAL ONCE
Refills: 0 | Status: COMPLETED | OUTPATIENT
Start: 2020-02-10 | End: 2020-02-10

## 2020-02-10 RX ADMIN — CALCITRIOL 0.25 MICROGRAM(S): 0.5 CAPSULE ORAL at 07:24

## 2020-02-10 RX ADMIN — CARVEDILOL PHOSPHATE 25 MILLIGRAM(S): 80 CAPSULE, EXTENDED RELEASE ORAL at 10:19

## 2020-02-10 RX ADMIN — Medication 100 MILLIGRAM(S): at 12:13

## 2020-02-10 RX ADMIN — ATORVASTATIN CALCIUM 40 MILLIGRAM(S): 80 TABLET, FILM COATED ORAL at 21:46

## 2020-02-10 RX ADMIN — ISOSORBIDE MONONITRATE 60 MILLIGRAM(S): 60 TABLET, EXTENDED RELEASE ORAL at 07:24

## 2020-02-10 RX ADMIN — Medication 1: at 12:44

## 2020-02-10 RX ADMIN — Medication 650 MILLIGRAM(S): at 07:24

## 2020-02-10 RX ADMIN — CARVEDILOL PHOSPHATE 25 MILLIGRAM(S): 80 CAPSULE, EXTENDED RELEASE ORAL at 21:46

## 2020-02-10 RX ADMIN — Medication 650 MILLIGRAM(S): at 18:43

## 2020-02-10 RX ADMIN — Medication 650 MILLIGRAM(S): at 13:13

## 2020-02-10 RX ADMIN — Medication 650 MILLIGRAM(S): at 12:13

## 2020-02-10 RX ADMIN — ISOSORBIDE MONONITRATE 60 MILLIGRAM(S): 60 TABLET, EXTENDED RELEASE ORAL at 19:41

## 2020-02-10 RX ADMIN — WARFARIN SODIUM 5 MILLIGRAM(S): 2.5 TABLET ORAL at 19:41

## 2020-02-10 RX ADMIN — Medication 0.2 MILLIGRAM(S): at 21:47

## 2020-02-10 RX ADMIN — Medication 81 MILLIGRAM(S): at 12:13

## 2020-02-10 NOTE — PROGRESS NOTE ADULT - ATTENDING COMMENTS
Agree with above NP note.  cv stable  no further chest pain  deferring cardiac cath  cont med tx of cad  hep to coumadin for rue dvt  headache tx per neuro   d/c planning  heme stable

## 2020-02-10 NOTE — PROGRESS NOTE ADULT - ASSESSMENT
79 yo female with Hx of HTN, CAD, CABG, AS S/P TAVR 2017, s/p PPM, HFpEF (60% in May 2017), RUE DVT dxed 1/22/2019 on coumadin, DM2, Anemia presenting to [facility] ER for evaluation of C/P.

## 2020-02-10 NOTE — CHART NOTE - NSCHARTNOTEFT_GEN_A_CORE
Called EP to evaluate PPM to see if MRI compatible - PPM is not MRI compatible, MRI discontinued. Dr. Gustafson made aware.

## 2020-02-10 NOTE — PROGRESS NOTE ADULT - PROBLEM SELECTOR PLAN 2
left sided headaches  have hx of chronic migraines  no vision changes  no focal deficit  CT head neg, but incidentally noted ? pituitary macroadenoma   (will consult Endo house if she remains in the hospital)  neurology Dr. Sauceda consulted for headaches, may or may not be related to macroadenoma.   limited pain regimen. (avoiding NSAID given CKD, no opioids given allergic reaction, "makes me crazy", limited tylenol use)  s/p Depakote with resolution, will hold any further Depakote  her ppm is not MRI compatible. (confirmed with radiology)

## 2020-02-10 NOTE — CONSULT NOTE ADULT - SUBJECTIVE AND OBJECTIVE BOX
HPI:  81yo female with Hx of HTN CAD CABG AS S/P  TAVR 2017, PPM CHF RUE DVT DM Anemia presenting to [facility] ER for evaluation of chest pain. (01 Feb 2020 21:25)      Endocrine history:  Patient is a 80 year old female with no prior history of any pituitary disorders. 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 Patient could not underwent MRI as her PPM is not MRI compatible. Currently reports occasional headache but no blurry vision, galactorrhea, cold intolerance, fatigue. Reports appetite is good at home. No history of head or neck radiations in past.     History of T2DM since 1990s. Follows with PCP. Hba1c 4.9%. Currently on Januvia 50 mg qd only if FS >100. Says she checks blood sugars once a day, most of the time ranges less than 100s. Has CKD stage 5, says she is aware and follows with nephrologist, not on HD. History of CAD, follows with cardiologist. No history of CVA. Does not report neuropathy, retinopathy or nephropathy.       PAST MEDICAL & SURGICAL HISTORY:  Thyroid nodule: awaiting FNB in the near future  Severe aortic stenosis  Osteoarthritis: bilateral knees  CKD (chronic kidney disease) stage 4, GFR 15-29 ml/min  DM2 (diabetes mellitus, type 2)  Arthritis  CAD (coronary artery disease)  Gout  Anemia Associated with Chronic Renal Failure  HTN (Hypertension)  S/P AVR: TAVR  History of pacemaker  A-V fistula: left arm  S/P cholecystectomy  Stented coronary artery: s/p 3 stents, then CABG 2007  S/P CABG (coronary artery bypass graft): triple in 2007      FAMILY HISTORY:  No pertinent family history of DM or pituitary problems in first degree relatives      Social History:  Former smoking , no current alcohol or illicit drug use      Outpatient Medications:  · 	Crestor 10 mg oral tablet: Last Dose Taken:  , 1 tab(s) orally once a day (at bedtime)  · 	Imdur 60 mg oral tablet, extended release: Last Dose Taken:  , 1 tab(s) orally 2 times a day  · 	sodium bicarbonate 650 mg oral tablet: Last Dose Taken:  , 1 tab(s) orally 2 times a day  · 	carvedilol 25 mg oral tablet: Last Dose Taken:  , 1 tab(s) orally 2 times a day  · 	allopurinol 100 mg oral tablet: Last Dose Taken:  , 1 tab(s) orally once a day  · 	calcitriol 0.25 mcg oral capsule: Last Dose Taken:  , 1 cap(s) orally 3 times a week on Monday, Wednesday, and Friday   · 	aspirin 81 mg oral delayed release tablet: Last Dose Taken:  , 1 tab(s) orally once a day  · 	furosemide 40 mg oral tablet: Last Dose Taken:  , 1 tab(s) orally once a day  · 	cloNIDine 0.2 mg oral tablet: Last Dose Taken:  , 1 tab(s) orally once a day  · 	hydrALAZINE 100 mg oral tablet: Last Dose Taken:  , 1 tab(s) orally 4 times a day  · 	terazosin 2 mg oral capsule: Last Dose Taken:  , 1 cap(s) orally once a day (at bedtime)  · 	warfarin 3 mg oral tablet: Last Dose Taken:  , 1 tab(s) orally once a day (at bedtime)  · 	Januvia 50 mg oral tablet: Last Dose Taken:  , 1 tab(s) orally once a day  · 	sodium bicarbonate 650 mg oral tablet: 1 tab(s) orally 2 times a day  · 	Coumadin 6 mg oral tablet: Last Dose Taken:  , orally every other day (at bedtime)      MEDICATIONS  (STANDING):  allopurinol 100 milliGRAM(s) Oral daily  aspirin enteric coated 81 milliGRAM(s) Oral daily  atorvastatin 40 milliGRAM(s) Oral at bedtime  calcitriol   Capsule 0.25 MICROGram(s) Oral <User Schedule>  carvedilol 25 milliGRAM(s) Oral every 12 hours  cloNIDine 0.2 milliGRAM(s) Oral daily  dextrose 5%. 1000 milliLiter(s) (50 mL/Hr) IV Continuous <Continuous>  dextrose 50% Injectable 12.5 Gram(s) IV Push once  dextrose 50% Injectable 25 Gram(s) IV Push once  dextrose 50% Injectable 25 Gram(s) IV Push once  insulin lispro (HumaLOG) corrective regimen sliding scale   SubCutaneous three times a day before meals  insulin lispro (HumaLOG) corrective regimen sliding scale   SubCutaneous at bedtime  isosorbide   mononitrate ER Tablet (IMDUR) 60 milliGRAM(s) Oral <User Schedule>  sodium bicarbonate 650 milliGRAM(s) Oral two times a day    MEDICATIONS  (PRN):  acetaminophen   Tablet .. 650 milliGRAM(s) Oral every 6 hours PRN Mild Pain (1 - 3), Moderate Pain (4 - 6), Severe Pain (7 - 10)  dextrose 40% Gel 15 Gram(s) Oral once PRN Blood Glucose LESS THAN 70 milliGRAM(s)/deciliter  glucagon  Injectable 1 milliGRAM(s) IntraMuscular once PRN Glucose LESS THAN 70 milligrams/deciliter  ondansetron Injectable 4 milliGRAM(s) IV Push every 6 hours PRN Nausea and/or Vomiting      Allergies  codeine (Other)  Lortab (Other)  morphine (Other)  Percocet 10/325 (Other)  PPM not compatible with  MRI (Other (Fatal))  Reglan (Other; [place])  sulfa drugs ([place])      Review of Systems:  Constitutional: No fever, good appetite/po intake  Eyes: No blurry vision, diplopia  Neuro: No tremors  HEENT: No pain  Cardiovascular: + chest pain, palpitations  Respiratory: No SOB, no cough  GI: No nausea, vomiting,   : No dysuria, hematuria  Skin: no rash  Endocrine: no polyuria, polydipsia  ALL OTHER SYSTEMS REVIEWED AND NEGATIVE      PHYSICAL EXAM:  VITALS: T(C): 36.8 (02-10-20 @ 14:35)  T(F): 98.2 (02-10-20 @ 14:35), Max: 98.6 (02-09-20 @ 19:50)  HR: 63 (02-10-20 @ 14:35) (63 - 90)  BP: 122/48 (02-10-20 @ 14:35) (122/48 - 151/56)  RR:  (16 - 18)  SpO2:  (100% - 100%)  Wt(kg): --  GENERAL: NAD, well-groomed, well-developed  EYES: No proptosis, anicteric  HEENT:  Atraumatic, Normocephalic, moist mucous membranes  THYROID: Normal size, no palpable nodules  RESPIRATORY: Clear to auscultation bilaterally; No rales, rhonchi, wheezing, or rubs  CARDIOVASCULAR: Regular rate and rhythm; No murmurs; no peripheral edema  GI: Soft, nontender, non distended, normal bowel sounds  SKIN: Dry, intact, No rashes or lesions  NEURO: AAO x 3, no gross or focal deficit   PSYCH: reactive affect, euthymic mood    POCT Blood Glucose.: 166 mg/dL (02-10-20 @ 12:37)  POCT Blood Glucose.: 126 mg/dL (02-10-20 @ 09:10)  POCT Blood Glucose.: 149 mg/dL (02-09-20 @ 21:29)  POCT Blood Glucose.: 133 mg/dL (02-09-20 @ 17:52)  POCT Blood Glucose.: 148 mg/dL (02-09-20 @ 12:49)  POCT Blood Glucose.: 131 mg/dL (02-09-20 @ 08:52)  POCT Blood Glucose.: 122 mg/dL (02-08-20 @ 22:44)  POCT Blood Glucose.: 120 mg/dL (02-08-20 @ 17:55)  POCT Blood Glucose.: 126 mg/dL (02-08-20 @ 12:39)  POCT Blood Glucose.: 110 mg/dL (02-08-20 @ 08:43)  POCT Blood Glucose.: 122 mg/dL (02-07-20 @ 22:56)  POCT Blood Glucose.: 131 mg/dL (02-07-20 @ 17:30)                            8.0    4.89  )-----------( 122      ( 10 Feb 2020 07:35 )             24.9       02-10    132<L>  |  99  |  78<H>  ----------------------------<  115<H>  4.3   |  18<L>  |  5.60<H>    EGFR if : 8   EGFR if non : 7     Ca    9.9      02-10  Mg     1.8     02-10  Phos  4.2     02-10        Thyroid Function Tests:  02-02 @ 05:15 TSH 1.34      Hemoglobin A1C, Whole Blood: 4.9 % [4.0 - 5.6] (02-02-20 @ 05:15)      02-02 Chol 122 LDL 71 HDL 44<L> Trig 63      < from: CT Head No Cont (02.06.20 @ 05:52) >  The pituitary gland is mildly enlarged for the patient's age suspicious for a pituitary macroadenoma. There appears to be slight suprasellar extension.    < end of copied text >

## 2020-02-10 NOTE — PROGRESS NOTE ADULT - ATTENDING COMMENTS
CKD stage V, Cr trending up. renal follow up.     dc planning if no objection from renal perspective.     - Dr. KAMARA Htet ([facility])  - [phone]

## 2020-02-10 NOTE — CONSULT NOTE ADULT - PROBLEM SELECTOR RECOMMENDATION 9
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  - MRI could not be done as her PPM is not MRI compatible  - TSH wnl, 1.34.   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  - MRI could not be done as her PPM is not MRI compatible and contrast studies also contraindicated given her CKD stage 5.   - TSH wnl, 1.34.   - Recommend to check send AM labs for prolactin, free T4, 8 am cortisol, ACTH, LH, FSH, estradiol  - Will continue to follow.   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  - MRI could not be done as her PPM is not MRI compatible and contrast studies also contraindicated given her CKD stage 5.   - TSH wnl, 1.34.   - Recommend to check send AM labs for prolactin, free T4, 8 am cortisol, ACTH, IGF-1, LH, FSH, estradiol  - Will continue to follow.   - Visual field testing as outpatient

## 2020-02-10 NOTE — PROGRESS NOTE ADULT - PROBLEM SELECTOR PLAN 1
Likely anemia of renal disease  H&H of 7.2/23 in December According to Dr Damico her Hgb around 7.  Pt had Blood transfusion last week for Hgb 6. Transfused 1 unit of pRBC 2/3/20.   never had colonoscopy all her life. encouraged procedure but refused. understand risk of missed colon lesions. can follow up outpt.   hgb dropped on 2/8/20, confirmed repeat.  report brown stool, occult stool negative a few days ago  likely CKD related, plan to transfuse 1 unit  hgb remained stable.  +brown stool per pt.

## 2020-02-10 NOTE — PROGRESS NOTE ADULT - PROBLEM SELECTOR PLAN 1
CKD5 likely 2/2 DM/HTN with LUE AVF in place however not uremic and making good urine.    Recommendations:  - avoid nephrotoxic agents (NSAIDS)  - renally dose medications per eGFR<10  - trend Cr/electrolytes renal diet.  - no indications for dialysis  - follow up with Dr. Dixon as an outpatient.

## 2020-02-10 NOTE — CONSULT NOTE ADULT - PROBLEM/RECOMMENDATION-2
Fariba Johnson  : 1952  [place]  Secondary: [facility]  [address] [place] [address]  [phone]   [phone]          OUTPATIENT PHYSICAL THERAPY:Recertification    ICD-10: Treatment Diagnosis: Cervicalgia, M54.2  Pain in joint, right shoulder, M25.511  Precautions/Allergies:   Celebrex [celecoxib] and Sulfa (sulfonamide antibiotics)   TREATMENT PLAN:  Effective Dates: 2019 TO 2019 (90 days). Frequency/Duration: 1 time every other week for 8 weeks, then one time the last 4 weeks MEDICAL/REFERRING DIAGNOSIS:  neck Facet arthropathy, DDD cervical region  DATE OF ONSET: chronic  REFERRING PHYSICIAN: Kimmie LAW MD Orders: evaluate and treat  Return MD Appointment: 7/3/19   Fariba Johnson has been seen for 14 visits from 19 to 19 for cervicalgia. Patient has performed therapeutic exercises, activities, and had manual therapy to increased strength, ROM and function. Patient has also used modalities for pain control in order to increase function. Patient has show an increase in function per the NDI with scores of 7/50. Patient has progressed well toward their goals and will benefit from continuing skilled PT in order to address their impairments. Nano Akins, PT, DPT, OCS          INITIAL ASSESSMENT:  Mr. Frankie Garcia presents with cervical spine pain due to cervical joint limitation and restricted mobility. He shows significant loss of left rotation with increased pain down the right side of his upper back. He is a good candidate for skilled PT in order to address listed impairments affecting his function. Nano Akins, PT, DPT, OCS      PROBLEM LIST (Impacting functional limitations):  1. Decreased Strength  2. Increased Pain  3. Decreased Activity Tolerance  4. Increased Fatigue  5.  Decreased Flexibility/Joint Mobility INTERVENTIONS PLANNED: (Treatment may consist of any combination of the following)  1. Cold  2. Cryotherapy  3. Electrical Stimulation  4. Heat  5. Manual Therapy  6. Neuromuscular Re-education/Strengthening  7. Range of Motion (ROM)  8. Therapeutic Activites  9. Therapeutic Exercise/Strengthening  10. Traction     GOALS: (Goals have been discussed and agreed upon with patient.)  Short-Term Functional Goals: Time Frame: 4 weeks  1. Patient will show a greater than 5% improvement in left cervical rotation without pain increase (MET)  2. Patient will show a greater than 1 finger increase in cervical flexion (MET)  3. Patient will be independent in [facility] for cervical mobility exercises (MET)  Discharge Goals: Time Frame: 12 weeks  1. Patient will report driving without pain increase in order to return to function (ongoing)  2. Patient will show a greater than 5 point decrease on the NDI in order to show an increase in function (ongoing)  3. Patient will return to golf without pain increase (MET)  4. Patient will be independent in cervical spine posture and stability training (MET)    OUTCOME MEASURE:   Tool Used: Neck Disability Index (NDI)  Score:  Initial: 7/50  Most Recent: 7/50 (Date: 8/29/19 )   Interpretation of Score: The Neck Disability Index is a revised form of the Oswestry Low Back Pain Index and is designed to measure the activities of daily living in person's with neck pain. Each section is scored on a 0-5 scale, 5 representing the greatest disability. The scores of each section are added together for a total score of 50. MEDICAL NECESSITY:   · Patient is expected to demonstrate progress in strength, range of motion and functional technique to decrease symptoms. .  · Patient demonstrates good rehab potential due to higher previous functional level. · Skilled intervention continues to be required due to increased pain.   REASON FOR SERVICES/OTHER COMMENTS:  · Patient continues to require skilled intervention due to increased pain and dysfunction. Total Duration:  PT Patient Time In/Time Out  Time In: 1000  Time Out: 1055    Rehabilitation Potential For Stated Goals: Good  Regarding Nolberto Pritchard's therapy, I certify that the treatment plan above will be carried out by a therapist or under their direction. Thank you for this referral,  Jackie Case, PT     Referring Physician Signature: Giulia FARLEY* _______________________________ Date _____________     PAIN/SUBJECTIVE:   Initial: Pain Intensity 1: 1 not much pain when not moving Post Session:  0/10   HISTORY:   History of Injury/Illness (Reason for Referral):  Patient reports chronic neck tightness with some pain, but started to have increased pain in early spring of this year with pain from the left shoulder blade into the neck. He feels it most with left rotation. He had previously had a fracture in his right shoulder of either the [facility] joint or shoulder itself that has limited some of his mobility, but not too much. X-rays shows increased disc degeneration in lower cervical spine. He does not report increased neurological signs or symptoms, but just a burning pain in the right side of his back and neck. The pain is inconsistent at this time. He has not had an MRI and does not want one due to phobia of small spaces. Looking up also can cause his pain  Past Medical History/Comorbidities:   Mr. Andreas Paulson  has a past medical history of Detached retina (6/4/14), Detached retina (6/4/2014), Hyperlipidemia, and Tubular adenoma of colon (5/22/2017). Mr. Andreas Paulson  has a past surgical history that includes hx vein stripping (1982); hx colonoscopy (2008); hx knee arthroscopy (1999); hx cataract removal (12/09/2013); hx tonsillectomy; hx heent (1999); hx retinal detachment repair (Right, 5/6/14); and hx retinal detachment repair (02/05/2015).   Social History/Living Environment:       Social History     Socioeconomic History    Marital status:      Spouse name: Not on file    Number of children: Not on file    Years of education: Not on file    Highest education level: Not on file   Occupational History    Not on file   Social Needs    Financial resource strain: Not on file    Food insecurity:     Worry: Not on file     Inability: Not on file    Transportation needs:     Medical: Not on file     Non-medical: Not on file   Tobacco Use    Smoking status: Never Smoker    Smokeless tobacco: Never Used   Substance and Sexual Activity    Alcohol use: Yes     Alcohol/week: 13.3 standard drinks     Types: 6 Cans of beer, 10 Shots of liquor per week    Drug use: No    Sexual activity: Not on file   Lifestyle    Physical activity:     Days per week: Not on file     Minutes per session: Not on file    Stress: Not on file   Relationships    Social connections:     Talks on phone: Not on file     Gets together: Not on file     Attends Faith service: Not on file     Active member of club or organization: Not on file     Attends meetings of clubs or organizations: Not on file     Relationship status: Not on file    Intimate partner violence:     Fear of current or ex partner: Not on file     Emotionally abused: Not on file     Physically abused: Not on file     Forced sexual activity: Not on file   Other Topics Concern    Not on file   Social History Narrative    Not on file         Prior Level of Function/Work/Activity:  Independent, retired  Dominant Side:         RIGHT   Ambulatory/Rehab Services H2 Model Falls Risk Assessment   Risk Factors:       (1)  Gender [Male] Ability to Rise from Chair:       (0)  Ability to rise in a single movement   Falls Prevention Plan:       No modifications necessary   Total: (5 or greater = High Risk): 1   ©2010 [facility] [place] [address] [place] [address] States Patent #[id number].  Federal Law prohibits the replication, distribution or use without written permission from [facility] of [address] [place] [address]   Current Medications: Current Outpatient Medications:     diclofenac (VOLTAREN) 1 % gel, Apply 4 g to affected area four (4) times daily. , Disp: 3 Each, Rfl: 3    montelukast (SINGULAIR) 10 mg tablet, Take 1 Tab by mouth daily. , Disp: 90 Tab, Rfl: 3    polyethylene glycol (MIRALAX) 17 gram packet, Take 1 Packet by mouth daily. Indications: constipation, Disp: 1 Each, Rfl: 0    aspirin 81 mg chewable tablet, Take 81 mg by mouth daily. , Disp: , Rfl:    Date Last Reviewed:  8/29/2019     Number of Personal Factors/Comorbidities that affect the Plan of Care: 0: LOW COMPLEXITY   EXAMINATION:   Observation/Orthostatic Postural Assessment:          Patient shows increased forward head and rounded shoulders posture. Cervical spine appears flattened as well as thoracic spine. Increased tone noted along cervical spine. Patient shows elevated clavicle on the right side with right AC joint a little more prominent. Patient has increased thoracic cage left rotation and pelvic and lumbar right rotation.   Palpation:          Upper cervical- shows increased soft tissue and fascia restrictions with decreased mobility throughout sib-occipitals as well as paraspinals  -SCM and anterior cervical spine also restricted due to posture and position.  -Decreased lateral side glide through lower cervical spine from left to right more  -most of cervical rotation comes from upper c-spine with little to no motion occurring going left in lower cervical spine and CT junction  -Limited first rib mobility on right and left side.  -Increased facet joint stiffness throughout lower cervical spine for extension testing  -Flexion along thoracic spine limited as well as extension with decreased curvature  -Limitation in palm down ab[facility] on right side  ROM:          Cervical ROM: Rotation R-60%, L-50%  Side bending: R 5 fingers with pain on left side Left at 5 fingers without not as much pain  Flexion to 3 fingers from sternum and extension mostly at upper cervical spine  Strength:          5/5 for B UE except for right shoulder ER at 4/5  Special Tests:          Cervical stability/vertebral artery tests:   1. Sharper Pursor: (-)  2. Alar ligament: (-)  3. Shear test: (-)  4. Tectorial membrane distraction:(-)  5. Transverse ligament lift up test: (-)      Neurological Screen:        Myotomes:  Limited in C5-6        Neural Tension Tests:  (+) for cervical tension   Body Structures Involved:  1. Nerves  2. Thoracic Cage  3. Bones  4. Joints  5. Muscles  6. Ligaments Body Functions Affected:  1. Neuromusculoskeletal  2. Movement Related Activities and Participation Affected:  1. General Tasks and Demands  2. Mobility  3. Domestic Life  4.  Interpersonal Interactions and Relationships   Number of elements (examined above) that affect the Plan of Care: 4+: HIGH COMPLEXITY   CLINICAL PRESENTATION:   Presentation: Stable and uncomplicated: LOW COMPLEXITY   CLINICAL DECISION MAKING:   Use of outcome tool(s) and clinical judgement create a POC that gives a: Clear prediction of patient's progress: LOW COMPLEXITY DISPLAY PLAN FREE TEXT

## 2020-02-10 NOTE — PROGRESS NOTE ADULT - SUBJECTIVE AND OBJECTIVE BOX
CARDIOLOGY FOLLOW UP - Dr. Damico    CC no cp or sob   no headache today     PHYSICAL EXAM:  T(C): 36.7 (02-10-20 @ 06:45), Max: 37 (02-09-20 @ 12:39)  HR: 86 (02-10-20 @ 06:45) (74 - 90)  BP: 132/55 (02-10-20 @ 06:45) (121/40 - 138/55)  RR: 16 (02-10-20 @ 06:45) (16 - 18)  SpO2: 100% (02-10-20 @ 06:45) (100% - 100%)  Wt(kg): --  I&O's Summary      Appearance: Normal	  Cardiovascular: Normal S1 S2,RRR,+ murmurs  Respiratory: Lungs clear to auscultation	  Gastrointestinal:  Soft, Non-tender, + BS	  Extremities: Normal range of motion, No clubbing, cyanosis or edema        MEDICATIONS  (STANDING):  allopurinol 100 milliGRAM(s) Oral daily  aspirin enteric coated 81 milliGRAM(s) Oral daily  atorvastatin 40 milliGRAM(s) Oral at bedtime  calcitriol   Capsule 0.25 MICROGram(s) Oral <User Schedule>  carvedilol 25 milliGRAM(s) Oral every 12 hours  cloNIDine 0.2 milliGRAM(s) Oral daily  dextrose 5%. 1000 milliLiter(s) (50 mL/Hr) IV Continuous <Continuous>  dextrose 50% Injectable 12.5 Gram(s) IV Push once  dextrose 50% Injectable 25 Gram(s) IV Push once  dextrose 50% Injectable 25 Gram(s) IV Push once  insulin lispro (HumaLOG) corrective regimen sliding scale   SubCutaneous three times a day before meals  insulin lispro (HumaLOG) corrective regimen sliding scale   SubCutaneous at bedtime  isosorbide   mononitrate ER Tablet (IMDUR) 60 milliGRAM(s) Oral <User Schedule>  sodium bicarbonate 650 milliGRAM(s) Oral two times a day      TELEMETRY: nsr 	    ECG:  	  RADIOLOGY:   DIAGNOSTIC TESTING:  [ ] Echocardiogram:  [ ]  Catheterization:  [ ] Stress Test:    OTHER: 	    LABS:	 	                            8.0    4.89  )-----------( 122      ( 10 Feb 2020 07:35 )             24.9     02-10    132<L>  |  99  |  78<H>  ----------------------------<  115<H>  4.3   |  18<L>  |  5.60<H>    Ca    9.9      10 Feb 2020 07:35  Phos  4.2     02-10  Mg     1.8     02-10      PT/INR - ( 10 Feb 2020 06:00 )   PT: 27.2 SEC;   INR: 2.32          PTT - ( 10 Feb 2020 06:00 )  PTT:29.8 SEC

## 2020-02-10 NOTE — PROGRESS NOTE ADULT - SUBJECTIVE AND OBJECTIVE BOX
Patient is a 80y old  Female who presents with a chief complaint of Chest pain (10 Feb 2020 12:21)      SUBJECTIVE / OVERNIGHT EVENTS:  feels well  no cp, no sob, no n/v/d. no abdominal pain.  no headache, no dizziness.  wants to go home  Cr went up slightly  renal f/u requested      Vital Signs Last 24 Hrs  T(C): 36.8 (10 Feb 2020 14:35), Max: 37 (09 Feb 2020 19:50)  T(F): 98.2 (10 Feb 2020 14:35), Max: 98.6 (09 Feb 2020 19:50)  HR: 63 (10 Feb 2020 14:35) (63 - 90)  BP: 122/48 (10 Feb 2020 14:35) (122/48 - 151/56)  BP(mean): --  RR: 18 (10 Feb 2020 14:35) (16 - 18)  SpO2: 100% (10 Feb 2020 14:35) (100% - 100%)  I&O's Summary      PHYSICAL EXAM:  GENERAL: NAD, Comfortable  HEAD:  Atraumatic, Normocephalic  EYES: EOMI, PERRLA, conjunctiva and sclera clear  NECK: Supple, No JVD  CHEST/LUNG: Clear to auscultation bilaterally; No wheeze  HEART: Regular rate and rhythm; No murmurs, rubs, or gallops  ABDOMEN: Soft, Nontender, Nondistended; Bowel sounds present  Neuro: AAO x 3, no focal deficit, 5/5 b/l extremities  EXTREMITIES:  2+ Peripheral Pulses, No clubbing, cyanosis, or edema  SKIN: No rashes or lesions      LABS:                        8.0    4.89  )-----------( 122      ( 10 Feb 2020 07:35 )             24.9     02-10    132<L>  |  99  |  78<H>  ----------------------------<  115<H>  4.3   |  18<L>  |  5.60<H>    Ca    9.9      10 Feb 2020 07:35  Phos  4.2     02-10  Mg     1.8     02-10      PT/INR - ( 10 Feb 2020 06:00 )   PT: 27.2 SEC;   INR: 2.32          PTT - ( 10 Feb 2020 06:00 )  PTT:29.8 SEC  CAPILLARY BLOOD GLUCOSE      POCT Blood Glucose.: 166 mg/dL (10 Feb 2020 12:37)  POCT Blood Glucose.: 126 mg/dL (10 Feb 2020 09:10)  POCT Blood Glucose.: 149 mg/dL (09 Feb 2020 21:29)  POCT Blood Glucose.: 133 mg/dL (09 Feb 2020 17:52)            RADIOLOGY & ADDITIONAL TESTS:    Imaging Personally Reviewed:  [x] YES  [ ] NO    Consultant(s) Notes Reviewed:  [x] YES  [ ] NO      MEDICATIONS  (STANDING):  allopurinol 100 milliGRAM(s) Oral daily  aspirin enteric coated 81 milliGRAM(s) Oral daily  atorvastatin 40 milliGRAM(s) Oral at bedtime  calcitriol   Capsule 0.25 MICROGram(s) Oral <User Schedule>  carvedilol 25 milliGRAM(s) Oral every 12 hours  cloNIDine 0.2 milliGRAM(s) Oral daily  dextrose 5%. 1000 milliLiter(s) (50 mL/Hr) IV Continuous <Continuous>  dextrose 50% Injectable 12.5 Gram(s) IV Push once  dextrose 50% Injectable 25 Gram(s) IV Push once  dextrose 50% Injectable 25 Gram(s) IV Push once  insulin lispro (HumaLOG) corrective regimen sliding scale   SubCutaneous three times a day before meals  insulin lispro (HumaLOG) corrective regimen sliding scale   SubCutaneous at bedtime  isosorbide   mononitrate ER Tablet (IMDUR) 60 milliGRAM(s) Oral <User Schedule>  sodium bicarbonate 650 milliGRAM(s) Oral two times a day    MEDICATIONS  (PRN):  acetaminophen   Tablet .. 650 milliGRAM(s) Oral every 6 hours PRN Mild Pain (1 - 3), Moderate Pain (4 - 6), Severe Pain (7 - 10)  dextrose 40% Gel 15 Gram(s) Oral once PRN Blood Glucose LESS THAN 70 milliGRAM(s)/deciliter  glucagon  Injectable 1 milliGRAM(s) IntraMuscular once PRN Glucose LESS THAN 70 milligrams/deciliter  ondansetron Injectable 4 milliGRAM(s) IV Push every 6 hours PRN Nausea and/or Vomiting      Care Discussed with Consultants/Other Providers [x] YES  [ ] NO    HEALTH ISSUES - PROBLEM Dx:  Headache: Headache  Essential hypertension: Essential hypertension  CKD (chronic kidney disease), stage V: CKD (chronic kidney disease), stage V  Chronic diastolic CHF (congestive heart failure): Chronic diastolic CHF (congestive heart failure)  DVT of upper extremity (deep vein thrombosis): DVT of upper extremity (deep vein thrombosis)  Shoulder pain, left: Shoulder pain, left  Gout: Gout  Anemia Associated with Chronic Renal Failure: Anemia Associated with Chronic Renal Failure  HTN (Hypertension): HTN (Hypertension)  DM2 (diabetes mellitus, type 2): DM2 (diabetes mellitus, type 2)  CAD (coronary artery disease): CAD (coronary artery disease)  Chest pain: Chest pain

## 2020-02-10 NOTE — PROGRESS NOTE ADULT - SUBJECTIVE AND OBJECTIVE BOX
[facility]      Seen earlier today, and examined.  - Today, patient is without complaints.           *****MEDICATIONS: Current medication reviewed and documented.    MEDICATIONS  (STANDING):  allopurinol 100 milliGRAM(s) Oral daily  aspirin enteric coated 81 milliGRAM(s) Oral daily  atorvastatin 40 milliGRAM(s) Oral at bedtime  calcitriol   Capsule 0.25 MICROGram(s) Oral <User Schedule>  carvedilol 25 milliGRAM(s) Oral every 12 hours  cloNIDine 0.2 milliGRAM(s) Oral daily  dextrose 5%. 1000 milliLiter(s) (50 mL/Hr) IV Continuous <Continuous>  dextrose 50% Injectable 12.5 Gram(s) IV Push once  dextrose 50% Injectable 25 Gram(s) IV Push once  dextrose 50% Injectable 25 Gram(s) IV Push once  insulin lispro (HumaLOG) corrective regimen sliding scale   SubCutaneous three times a day before meals  insulin lispro (HumaLOG) corrective regimen sliding scale   SubCutaneous at bedtime  isosorbide   mononitrate ER Tablet (IMDUR) 60 milliGRAM(s) Oral <User Schedule>  sodium bicarbonate 650 milliGRAM(s) Oral two times a day    MEDICATIONS  (PRN):  acetaminophen   Tablet .. 650 milliGRAM(s) Oral every 6 hours PRN Mild Pain (1 - 3), Moderate Pain (4 - 6), Severe Pain (7 - 10)  dextrose 40% Gel 15 Gram(s) Oral once PRN Blood Glucose LESS THAN 70 milliGRAM(s)/deciliter  glucagon  Injectable 1 milliGRAM(s) IntraMuscular once PRN Glucose LESS THAN 70 milligrams/deciliter  ondansetron Injectable 4 milliGRAM(s) IV Push every 6 hours PRN Nausea and/or Vomiting          ***** VITAL SIGNS:  T(F): 98 (02-10-20 @ 06:45), Max: 98.6 (20 @ 12:39)  HR: 86 (02-10-20 @ 06:45) (74 - 90)  BP: 132/55 (02-10-20 @ 06:45) (121/40 - 138/55)  RR: 16 (02-10-20 @ 06:45) (16 - 18)  SpO2: 100% (02-10-20 @ 06:45) (100% - 100%)  Wt(kg): --  ,   I&O's Summary           *****PHYSICAL EXAM:     Alert oriented x2  Attention comprehension are fair. Able to name, repeat, read without any difficulty.   Able to follow 3 step commands.     EOMI fundi not visualized,  VFF to confrontration  No facial asymmetry   Tongue is midline   Palate elevates symmetrically   Moving all 4 ext symmetrically no pronator drift   limited eval as pt was not fully cooperative   left shoulder pain   Reflexes are symmetric throughout   sensation is grossly symmetric  Gait : not assessed.  B/L down going toes        *****LAB AND IMAGIN.0    4.89  )-----------( 122      ( 10 Feb 2020 07:35 )             24.9               02-10    132<L>  |  99  |  78<H>  ----------------------------<  115<H>  4.3   |  18<L>  |  5.60<H>    Ca    9.9      10 Feb 2020 07:35  Phos  4.2     02-10  Mg     1.8     02-10      PT/INR - ( 10 Feb 2020 06:00 )   PT: 27.2 SEC;   INR: 2.32          PTT - ( 10 Feb 2020 06:00 )  PTT:29.8 SEC                     [All pertinent recent Imaging/Reports reviewed]           *****A S S E S S M E N T   A N D   P L A N :       Excerpt from H&P,Pt 81yo female with Hx of HTN CAD CABG AS S/P  TAVR 2017, PPM CHF RUE DVT DM Anemia presenting to [facility] ER for evaluation of C/P. Pt reports sudden onset of midsternal chest tightness that started around 0600 this morning when walking to [place]. Pain was mild to moderate tightness like sensation with no radiation  and no pleuritic component. Pt reports associated mild dyspnea, but no dizziness, plapitations, nausea or diaphoresis. Pt reports took "baby aspirin" and 10 minutes after pain resolved. Rest of the morning  was unremarkable; however, in afternoon around 1300 Pt had similar quality chest tightness while at rest, she contacted her cardiologist and was advised to come to hospital. Pt reports no nausea or vomiting,    Headache started after admission and has been continuous. left sided associated with tearing on the left   Pt reports that she often gets headaches at home for which she takes tylenol.   She denies any visual obscurations/photosensitivity/phonosensitivity/double vision/blurry vision     Problem/Recommendations 1:  unilateral headache resolved after depakote    will d/c depakote   esr normal.   no meningismus    esr   oxygen supplementation   sleep augmentation            Thank you for allowing me to participate in the care of this patient. Please do not hesitate to call me if you have any  questions.        ________________  Chantal Sauceda MD  [facility] (PN)[facility]  [phone]      33 minutes spent on total encounter; more than 50 % of the visit was  spent counseling about plan of care, compliance to diet/exercise and medication regimen and or  coordinating care by the attending physician.      It is advised that stroke patients follow up with TRISHA Reeves @ [phone] in 1- 2 weeks.   Others please follow up with Dr. Michael Nissenbaum [phone]

## 2020-02-10 NOTE — PROGRESS NOTE ADULT - ASSESSMENT
79F with PMH of HTN, CKD stage 5, with a LUE fistula which has never been used, DM2, CAD s/p CABG, PCI (3/2017), Aortic stenosis s/p TAVR/PPM (3/2017), RUE DVT presenting with chest pain.     1. Chest pain-h/o CAD/CABG   -no further chest pain noted   -stress test revealed moderate ischemia to inferior wall. nl LV fx   -in light of age, CKD/EsRD, anemia and resolution of sx, and to dec risk of PATRICIA and HD need, we will defer cath   -medical management, cont asa, statin, bb    2. RUE DVT  -coumadin per inr     3. AS s/p TAVR/ PPM  -cv stable     4. HTN   -bp stable    5. anemia, hx  -associated with chronic renal failure   -no overt s/s of bleeding, occult negative    -prbc 2/8, gets prbc every 3-4 weeks   -h/h improved     6. CKD   continue to trend creat, renal f/u     7. Headache   -resolved  -Head ct with no acute intracranial pathology.   + pituitary gland mildly enlarged suspicious for a pituitary macroadenoma  neuro f/u  med f/u       d/c planning

## 2020-02-10 NOTE — PROGRESS NOTE ADULT - SUBJECTIVE AND OBJECTIVE BOX
[facility] Division of Kidney Diseases & Hypertension  FOLLOW UP NOTE  --------------------------------------------------------------------------------  Chief Complaint: chest pain    24 hour events/subjective: the patient was seen and evaluated at bedside this morning she reports no chest pain and no shortness of breath.  no urinary difficulty.        PAST HISTORY  --------------------------------------------------------------------------------  No significant changes to PMH, PSH, FHx, SHx, unless otherwise noted    ALLERGIES & MEDICATIONS  --------------------------------------------------------------------------------  Allergies    codeine (Other)  Lortab (Other)  morphine (Other)  Percocet 10/325 (Other)  PPM not compatible with  MRI (Other (Fatal))  Reglan (Other; [place])  sulfa drugs ([place])    Intolerances      Standing Inpatient Medications  allopurinol 100 milliGRAM(s) Oral daily  aspirin enteric coated 81 milliGRAM(s) Oral daily  atorvastatin 40 milliGRAM(s) Oral at bedtime  calcitriol   Capsule 0.25 MICROGram(s) Oral <User Schedule>  carvedilol 25 milliGRAM(s) Oral every 12 hours  cloNIDine 0.2 milliGRAM(s) Oral daily  dextrose 5%. 1000 milliLiter(s) IV Continuous <Continuous>  dextrose 50% Injectable 12.5 Gram(s) IV Push once  dextrose 50% Injectable 25 Gram(s) IV Push once  dextrose 50% Injectable 25 Gram(s) IV Push once  insulin lispro (HumaLOG) corrective regimen sliding scale   SubCutaneous three times a day before meals  insulin lispro (HumaLOG) corrective regimen sliding scale   SubCutaneous at bedtime  isosorbide   mononitrate ER Tablet (IMDUR) 60 milliGRAM(s) Oral <User Schedule>  sodium bicarbonate 650 milliGRAM(s) Oral two times a day    PRN Inpatient Medications  acetaminophen   Tablet .. 650 milliGRAM(s) Oral every 6 hours PRN  dextrose 40% Gel 15 Gram(s) Oral once PRN  glucagon  Injectable 1 milliGRAM(s) IntraMuscular once PRN  ondansetron Injectable 4 milliGRAM(s) IV Push every 6 hours PRN      REVIEW OF SYSTEMS  --------------------------------------------------------------------------------  Gen: no fever  Respiratory: no sob  CV:  no cp  GI: no ab pain   : no change in urinary pattern  MSK: no pain    VITALS/PHYSICAL EXAM  --------------------------------------------------------------------------------  T(C): 36.6 (02-10-20 @ 12:15), Max: 37 (02-09-20 @ 12:39)  HR: 80 (02-10-20 @ 12:15) (74 - 90)  BP: 151/56 (02-10-20 @ 12:15) (121/40 - 151/56)  ABP: --  ABP(mean): --  RR: 16 (02-10-20 @ 12:15) (16 - 18)  SpO2: 100% (02-10-20 @ 12:15) (100% - 100%)  CVP(mm Hg): --        Physical Exam:  	Gen: NAD, well-appearing on room air   	HEENT: no JVD, clear oropharynx  	Pulm: CTA B/L  	CV: RRR, S1S2; no rub  	Abd: +BS, soft, nontender/nondistended  	: No suprapubic tenderness  	LE: Warm, no edema  	Skin: Warm, without rashes  	Vascular access: MASHA WALSH    LABS/STUDIES  --------------------------------------------------------------------------------              8.0    4.89  >-----------<  122      [02-10-20 @ 07:35]              24.9     132  |  99  |  78  ----------------------------<  115      [02-10-20 @ 07:35]  4.3   |  18  |  5.60        Ca     9.9     [02-10-20 @ 07:35]      Mg     1.8     [02-10-20 @ 07:35]      Phos  4.2     [02-10-20 @ 07:35]      PT/INR: PT 27.2 , INR 2.32       [02-10-20 @ 06:00]  PTT: 29.8       [02-10-20 @ 06:00]      Creatinine Trend:  SCr 5.60 [02-10 @ 07:35]  SCr 5.20 [02-09 @ 10:50]  SCr 4.60 [02-08 @ 03:51]  SCr 4.33 [02-07 @ 02:46]  SCr 4.38 [02-06 @ 07:40]

## 2020-02-10 NOTE — CONSULT NOTE ADULT - ATTENDING COMMENTS
spoke to the patient regarding contrast risk.  she agrees that cardiac cath is important.  saline to reduce risk of PATRICIA pre cath.  monitor BMP post cath.
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 Unable to perform MRI or CT with contrast to better characterize. Check hormonal function and would benefit from visual field testing as outpatient as well as outpatient endocrine follow up. H/o DM2 appears to be resolved. Stop Januvia at UT.

## 2020-02-10 NOTE — PROGRESS NOTE ADULT - ASSESSMENT
80F PMHx CKD5 with LUE AVF (outpt nephrologist Dr. Dixon),CAD s/p CABG 2007, aortic stenosis s/p TAVR, PPM, CHF, PPM, RUE DVT, DM2, HTN who admitted for chest pain found to have elevated troponin with stress test on 2/4/2020 show moderate ischemia ("moderate defect in mid inferior wall that is reversible").    Nephrology consulted for CKD5 management.  On admission sCr 4.97 (baseline 4.2)

## 2020-02-10 NOTE — CONSULT NOTE ADULT - PROBLEM SELECTOR RECOMMENDATION 2
- A1c 4.9% indicating no DM or too tight controlled..   - FS here well controlled and only on low dose Humalog correctional scale   - Consistent carb diet   - Monitor FS before meals and bedtime  - FS goal 100-200, no need of tight control at her age.   Discharge plan: Discontinue Januvia on discharge. Patient can be discharged without DM meds on discharge and follow up with PCP on discharge.

## 2020-02-11 ENCOUNTER — TRANSCRIPTION ENCOUNTER (OUTPATIENT)
Age: 80
End: 2020-02-11

## 2020-02-11 LAB
ANION GAP SERPL CALC-SCNC: 12 MMO/L — SIGNIFICANT CHANGE UP (ref 7–14)
APTT BLD: 25.7 SEC — LOW (ref 27.5–36.3)
BLD GP AB SCN SERPL QL: NEGATIVE — SIGNIFICANT CHANGE UP
BUN SERPL-MCNC: 82 MG/DL — HIGH (ref 7–23)
CALCIUM SERPL-MCNC: 9.2 MG/DL — SIGNIFICANT CHANGE UP (ref 8.4–10.5)
CHLORIDE SERPL-SCNC: 101 MMOL/L — SIGNIFICANT CHANGE UP (ref 98–107)
CO2 SERPL-SCNC: 20 MMOL/L — LOW (ref 22–31)
CREAT SERPL-MCNC: 5.46 MG/DL — HIGH (ref 0.5–1.3)
ESTRADIOL FREE SERPL-MCNC: 8 PG/ML — SIGNIFICANT CHANGE UP
FSH SERPL-MCNC: 0.9 IU/L — SIGNIFICANT CHANGE UP
GLUCOSE SERPL-MCNC: 116 MG/DL — HIGH (ref 70–99)
HCT VFR BLD CALC: 21.4 % — LOW (ref 34.5–45)
HCT VFR BLD CALC: 22.7 % — LOW (ref 34.5–45)
HGB BLD-MCNC: 6.9 G/DL — CRITICAL LOW (ref 11.5–15.5)
HGB BLD-MCNC: 7.3 G/DL — LOW (ref 11.5–15.5)
INR BLD: 1.8 — HIGH (ref 0.88–1.17)
LH SERPL-ACNC: < 0.3 IU/L — SIGNIFICANT CHANGE UP
MAGNESIUM SERPL-MCNC: 1.9 MG/DL — SIGNIFICANT CHANGE UP (ref 1.6–2.6)
MCHC RBC-ENTMCNC: 27.2 PG — SIGNIFICANT CHANGE UP (ref 27–34)
MCHC RBC-ENTMCNC: 27.2 PG — SIGNIFICANT CHANGE UP (ref 27–34)
MCHC RBC-ENTMCNC: 32.2 % — SIGNIFICANT CHANGE UP (ref 32–36)
MCHC RBC-ENTMCNC: 32.2 % — SIGNIFICANT CHANGE UP (ref 32–36)
MCV RBC AUTO: 84.3 FL — SIGNIFICANT CHANGE UP (ref 80–100)
MCV RBC AUTO: 84.7 FL — SIGNIFICANT CHANGE UP (ref 80–100)
NRBC # FLD: 0 K/UL — SIGNIFICANT CHANGE UP (ref 0–0)
NRBC # FLD: 0.05 K/UL — SIGNIFICANT CHANGE UP (ref 0–0)
NRBC FLD-RTO: 1.3 — SIGNIFICANT CHANGE UP
PLATELET # BLD AUTO: 104 K/UL — LOW (ref 150–400)
PLATELET # BLD AUTO: 117 K/UL — LOW (ref 150–400)
PMV BLD: 10.7 FL — SIGNIFICANT CHANGE UP (ref 7–13)
PMV BLD: 9.6 FL — SIGNIFICANT CHANGE UP (ref 7–13)
POTASSIUM SERPL-MCNC: 4.2 MMOL/L — SIGNIFICANT CHANGE UP (ref 3.5–5.3)
POTASSIUM SERPL-SCNC: 4.2 MMOL/L — SIGNIFICANT CHANGE UP (ref 3.5–5.3)
PROLACTIN SERPL-MCNC: 94.1 NG/ML — HIGH (ref 3.4–24.1)
PROTHROM AB SERPL-ACNC: 20.3 SEC — HIGH (ref 9.8–13.1)
RBC # BLD: 2.54 M/UL — LOW (ref 3.8–5.2)
RBC # BLD: 2.68 M/UL — LOW (ref 3.8–5.2)
RBC # FLD: 14.8 % — HIGH (ref 10.3–14.5)
RBC # FLD: 15.1 % — HIGH (ref 10.3–14.5)
RH IG SCN BLD-IMP: POSITIVE — SIGNIFICANT CHANGE UP
SODIUM SERPL-SCNC: 133 MMOL/L — LOW (ref 135–145)
T4 FREE SERPL-MCNC: 0.98 NG/DL — SIGNIFICANT CHANGE UP (ref 0.9–1.8)
WBC # BLD: 3.87 K/UL — SIGNIFICANT CHANGE UP (ref 3.8–10.5)
WBC # BLD: 3.89 K/UL — SIGNIFICANT CHANGE UP (ref 3.8–10.5)
WBC # FLD AUTO: 3.87 K/UL — SIGNIFICANT CHANGE UP (ref 3.8–10.5)
WBC # FLD AUTO: 3.89 K/UL — SIGNIFICANT CHANGE UP (ref 3.8–10.5)

## 2020-02-11 PROCEDURE — 99232 SBSQ HOSP IP/OBS MODERATE 35: CPT | Mod: GC

## 2020-02-11 RX ORDER — ERYTHROPOIETIN 10000 [IU]/ML
10000 INJECTION, SOLUTION INTRAVENOUS; SUBCUTANEOUS ONCE
Refills: 0 | Status: DISCONTINUED | OUTPATIENT
Start: 2020-02-11 | End: 2020-02-11

## 2020-02-11 RX ORDER — FUROSEMIDE 40 MG
1 TABLET ORAL
Qty: 0 | Refills: 0 | DISCHARGE

## 2020-02-11 RX ORDER — HYDRALAZINE HCL 50 MG
1 TABLET ORAL
Qty: 90 | Refills: 0
Start: 2020-02-11 | End: 2020-03-11

## 2020-02-11 RX ORDER — HYDRALAZINE HCL 50 MG
1 TABLET ORAL
Qty: 0 | Refills: 0 | DISCHARGE
Start: 2020-02-11 | End: 2020-03-11

## 2020-02-11 RX ORDER — HYDRALAZINE HCL 50 MG
1 TABLET ORAL
Qty: 0 | Refills: 0 | DISCHARGE

## 2020-02-11 RX ORDER — SITAGLIPTIN 50 MG/1
1 TABLET, FILM COATED ORAL
Qty: 0 | Refills: 0 | DISCHARGE

## 2020-02-11 RX ORDER — WARFARIN SODIUM 2.5 MG/1
6 TABLET ORAL ONCE
Refills: 0 | Status: COMPLETED | OUTPATIENT
Start: 2020-02-11 | End: 2020-02-11

## 2020-02-11 RX ORDER — TERAZOSIN HYDROCHLORIDE 10 MG/1
1 CAPSULE ORAL
Qty: 0 | Refills: 0 | DISCHARGE

## 2020-02-11 RX ADMIN — Medication 650 MILLIGRAM(S): at 17:07

## 2020-02-11 RX ADMIN — CARVEDILOL PHOSPHATE 25 MILLIGRAM(S): 80 CAPSULE, EXTENDED RELEASE ORAL at 21:54

## 2020-02-11 RX ADMIN — Medication 81 MILLIGRAM(S): at 11:16

## 2020-02-11 RX ADMIN — ATORVASTATIN CALCIUM 40 MILLIGRAM(S): 80 TABLET, FILM COATED ORAL at 21:53

## 2020-02-11 RX ADMIN — Medication 650 MILLIGRAM(S): at 06:21

## 2020-02-11 RX ADMIN — WARFARIN SODIUM 6 MILLIGRAM(S): 2.5 TABLET ORAL at 17:07

## 2020-02-11 RX ADMIN — Medication 0.2 MILLIGRAM(S): at 21:55

## 2020-02-11 RX ADMIN — ISOSORBIDE MONONITRATE 60 MILLIGRAM(S): 60 TABLET, EXTENDED RELEASE ORAL at 20:05

## 2020-02-11 RX ADMIN — CARVEDILOL PHOSPHATE 25 MILLIGRAM(S): 80 CAPSULE, EXTENDED RELEASE ORAL at 09:33

## 2020-02-11 RX ADMIN — Medication 100 MILLIGRAM(S): at 11:16

## 2020-02-11 RX ADMIN — ISOSORBIDE MONONITRATE 60 MILLIGRAM(S): 60 TABLET, EXTENDED RELEASE ORAL at 07:30

## 2020-02-11 NOTE — DISCHARGE NOTE PROVIDER - NSDCFUSCHEDAPPT_GEN_ALL_CORE_FT
IRA ACEVEDO ; 02/19/2020 ; [facility] [address] ; 02/19/2020 ; NPP Surg [facility] [address]  KRISTINA, IRA ; 02/27/2020 ; NPELÍAS Sotelo

## 2020-02-11 NOTE — DISCHARGE NOTE PROVIDER - CARE PROVIDER_API CALL
Markell Dixon)  Nephrology  [address] [facility] [facility], [address]nd [place] [address]  Phone: [phone]  Fax: [phone]  Follow Up Time:     Casey Damico (MD)  Cardiovascular Disease; Interventional Cardiology; Nuclear Cardiology  [address] [facility], [address]  [place] [address]  Phone: [phone]  Fax: [phone]  Follow Up Time:     Dr. Sharpe (hematology),   Phone: (   )    -  Fax: (   )    -  Established Patient  Follow Up Time: Markell Dixon)  Nephrology  [address] [facility], [address]nd [place] [address]  Phone: [phone]  Fax: [phone]  Follow Up Time:     Casey Damico)  Cardiovascular Disease; Interventional Cardiology; Nuclear Cardiology  [address] [facility] [address] [address], [address]  [place] [address]  Phone: [phone]  Fax: [phone]  Follow Up Time:     Dr. Sharpe (hematology),   Phone: (   )    -  Fax: (   )    -  Established Patient  Follow Up Time:     Chantal Sauceda)  Neurology; Vascular Neurology  [address] [place] [address]  Phone: [phone]  Fax: [phone]  Follow Up Time:

## 2020-02-11 NOTE — PROGRESS NOTE ADULT - ASSESSMENT
79F with PMH of HTN, CKD stage 5, with a LUE fistula which has never been used, DM2, CAD s/p CABG, PCI (3/2017), Aortic stenosis s/p TAVR/PPM (3/2017), RUE DVT presenting with chest pain.     1. Chest pain-h/o CAD/CABG   -no further chest pain noted   -stress test revealed moderate ischemia to inferior wall. nl LV fx   -in light of age, CKD/ESRD, anemia and resolution of sx, and to dec risk of PATRICIA and HD need, we will defer cath   -medical management, cont asa, statin, bb    2. RUE DVT  -coumadin per inr     3. AS s/p TAVR/ PPM  -cv stable     4. HTN   -bp stable    5. anemia, hx  -associated with chronic renal failure   -no overt s/s of bleeding, occult negative    -prbc 2/8, gets prbc every 3-4 weeks     6. CKD   continue to trend creat, renal f/u     7. Headache   -resolved  -Head ct with no acute intracranial pathology.   + pituitary gland mildly enlarged suspicious for a pituitary macroadenoma  neuro f/u  med f/u       d/c planning

## 2020-02-11 NOTE — DISCHARGE NOTE PROVIDER - PROVIDER TOKENS
PROVIDER:[TOKEN:[[id number]]],PROVIDER:[TOKEN:[[id number]]],FREE:[LAST:[Dr. Sharpe (hematology)],PHONE:[(   )    -],FAX:[(   )    -],ESTABLISHEDPATIENT:[T]] PROVIDER:[TOKEN:[[id number]]],PROVIDER:[TOKEN:[[id number]]],FREE:[LAST:[Dr. Sharpe (hematology)],PHONE:[(   )    -],FAX:[(   )    -],ESTABLISHEDPATIENT:[T]],PROVIDER:[TOKEN:[[id number]]]

## 2020-02-11 NOTE — DISCHARGE NOTE PROVIDER - HOSPITAL COURSE
79 yo female with Hx of HTN, CAD, CABG, AS S/P TAVR 2017, s/p PPM, HFpEF (60% in May 2017), RUE DVT dxed 1/22/2019 on coumadin, DM2, Anemia presenting to [facility] ER for evaluation of C/P.          Problem/Plan - 1:    ·  Problem: Anemia Associated with Chronic Renal Failure.  Plan: Likely anemia of renal disease    H&H of 7.2/23 in December According to Dr Damico her Hgb around 7.  Pt had Blood transfusion last week for Hgb 6. Transfused 1 unit of pRBC 2/3/20.     never had colonoscopy all her life. encouraged procedure but refused. understand risk of missed colon lesions. can follow up outpt.     hgb dropped on 2/8/20, confirmed repeat.    report brown stool, occult stool negative a few days ago    likely CKD related, plan to transfuse 1 unit    hgb remained stable.  +brown stool per pt.          Problem/Plan - 2:    ·  Problem: Headache.  Plan: left sided headaches    have hx of chronic migraines    no vision changes    no focal deficit    CT head neg, but incidentally noted ? pituitary macroadenoma     (will consult Endo house if she remains in the hospital)    neurology Dr. Sauceda consulted for headaches, may or may not be related to macroadenoma.     limited pain regimen. (avoiding NSAID given CKD, no opioids given allergic reaction, "makes me crazy", limited tylenol use)    s/p Depakote with resolution, will hold any further Depakote    her ppm is not MRI compatible. (confirmed with radiology).          Problem/Plan - 3:    ·  Problem: Chest pain.  Plan: Stress test abnormal with reversible ischemia    Appreciate cardiology    Initial plan for cath/angio but conservative medical management for now given CKD stage 5.          Problem/Plan - 4:    ·  Problem: CAD (coronary artery disease).  Plan: Cont atorvastatin, carvedilol, baby aspirin    Appreciate cardiology.          Problem/Plan - 5:    ·  Problem: Shoulder pain, left.  Plan: shoulder Xray: no fractures, dislocations, or AC separation.          Problem/Plan - 6:    Problem: DM2 (diabetes mellitus, type 2). Plan: Will hold PO meds and continue with ISS    Fingersticks qachs    1800 ADA.         Problem/Plan - 7:    ·  Problem: HTN (Hypertension).  Plan: Continue carvedilol, hydralazine, clonidine, imdur.          Problem/Plan - 8:    ·  Problem: DVT of upper extremity (deep vein thrombosis).  Plan: coumadin    d/c hep gtt, INR therapeutic.          Problem/Plan - 9:    ·  Problem: Chronic diastolic CHF (congestive heart failure).  Plan: Appears euvolemic    Cont home-dose lasix.          Problem/Plan - 10:    Problem: Gout. Plan; Continue allopurinol.        Attending Attestation:     CKD stage V, Cr trending up. renal follow up.         dc planning if no objection from renal perspective. 79 yo female with Hx of HTN, CAD, CABG, AS S/P TAVR 2017, s/p PPM, HFpEF (60% in May 2017), RUE DVT dxed 1/22/2019 on coumadin, DM2, Anemia presenting to [facility] ER for evaluation of chest pain. Stress test revealed reversible ischemia. Initial plan for cardiac catheterization however conservative management recommended per cardiology at this time as patient with CKD 5. Patient complained of left sided headaches this admission without focal deficits (has history of chronic migraines). CTH negative however incidentally noted to have a pituitary macroadenoma. Neurology was consulted. Patient's headache resolved with IV Depakote. MRI unable to be performed as PPM is not MRI compatible. Patient with anemia this admission- likely anemia of chronic disease. Was given 2U of blood this admission. Denies ever having a colonoscopy and did not wish to have one currently. Occult stool negative and stools brown per patient. She follows up with Dr. Fox (hematology) where she receives regular blood transfusions and has an upcoming appointment scheduled.         INCOMPLETE         Case discussed with Dr. uGstafson on 2/11/2020. The patient is medically stable for discharge and has appropriate follow up. 79 yo female with Hx of HTN, CAD, CABG, AS S/P TAVR 2017, s/p PPM, HFpEF (60% in May 2017), RUE DVT dxed 1/22/2019 on coumadin, DM2, Anemia presenting to [facility] ER for evaluation of chest pain. Stress test revealed reversible ischemia. Initial plan for cardiac catheterization however conservative management recommended per cardiology at this time as patient with CKD 5. Patient complained of left sided headaches this admission without focal deficits (has history of chronic migraines). CTH negative however incidentally noted to have a pituitary macroadenoma. Neurology was consulted. Patient's headache resolved with IV Depakote. MRI unable to be performed as PPM is not MRI compatible. Patient will need visual field testing as an outpatient. Patient with anemia this admission- likely anemia of chronic disease. Was given 2U of blood this admission. Denies ever having a colonoscopy and did not wish to have one currently. Occult stool negative and stools brown per patient. She follows up with Dr. Fox (hematology) where she receives regular blood transfusions and has an upcoming appointment scheduled.                         INCOMPLETE         Case discussed with Dr. Gustafson on 2/11/2020. The patient is medically stable for discharge and has appropriate follow up. 79 yo female with Hx of HTN, CAD, CABG, AS S/P TAVR 2017, s/p PPM, HFpEF (60% in May 2017), RUE DVT dxed 1/22/2019 on coumadin, DM2, Anemia presenting to [facility] ER for evaluation of chest pain. Stress test revealed reversible ischemia. Initial plan for cardiac catheterization however conservative management recommended per cardiology at this time as patient with CKD 5. Patient complained of left sided headaches this admission without focal deficits (has history of chronic migraines). CTH negative however incidentally noted to have a pituitary macroadenoma. Neurology was consulted. Patient's headache resolved with IV Depakote. MRI unable to be performed as PPM is not MRI compatible. Patient will need visual field testing as an outpatient. Patient with anemia this admission- likely anemia of chronic disease. Was given 2U of blood this admission. Denies ever having a colonoscopy and did not wish to have one currently. Occult stool negative and stools brown per patient. She follows up with Dr. Fox (hematology) where she receives regular blood transfusions and has an upcoming appointment scheduled.        Case discussed with Dr. Gustafson on 2/11/2020. The patient is medically stable for discharge and has appropriate follow up. 79 yo female with Hx of HTN, CAD, CABG, AS S/P TAVR 2017, s/p PPM, HFpEF (60% in May 2017), RUE DVT dxed 1/22/2019 on coumadin, DM2, Anemia who presented to [facility] ER for evaluation of chest pain. Stress test revealed reversible ischemia. Initial plan for cardiac catheterization however conservative management recommended per cardiology at this time as patient with CKD 5. Patient complained of left sided headaches this admission without focal deficits (has history of chronic migraines). CTH negative however incidentally noted to have a pituitary macroadenoma. Neurology was consulted. Patient's headache resolved with IV Depakote. MRI unable to be performed as PPM is not MRI compatible. Patient was also seen by endocrinology for macroadenoma. Patient will need visual field testing as an outpatient. Patient with anemia this admission- likely anemia of chronic disease. Was given 2U of blood this admission. Denies ever having a colonoscopy and did not wish to have one currently. Occult stool negative and stools brown per patient. She follows up with Dr. Fox (hematology) where she receives regular blood transfusions and has an upcoming appointment scheduled.        Case discussed with Dr. Gustafson on 2/11/2020. The patient is medically stable for discharge and has appropriate follow up. 81 yo female with Hx of HTN, CAD, CABG, AS S/P TAVR 2017, s/p PPM, HFpEF (60% in May 2017), RUE DVT dxed 1/22/2019 on coumadin, DM2, Anemia who presented to [facility] ER for evaluation of chest pain. Stress test revealed reversible ischemia. Initial plan for cardiac catheterization however conservative management recommended per cardiology at this time as patient with CKD 5. Patient complained of left sided headaches this admission without focal deficits (has history of chronic migraines). CTH negative however incidentally noted to have a pituitary macroadenoma. Neurology was consulted. Patient's headache resolved with IV Depakote. MRI unable to be performed as PPM is not MRI compatible. Patient was also seen by endocrinology for macroadenoma. Recommended cortisol and ACTH, which were drawn and stable. Cleared for discharge by endocrinology. Patient will need visual field testing as an outpatient. Patient with anemia this admission- likely anemia of chronic disease. Was given 2U of blood this admission. Denies ever having a colonoscopy and did not wish to have one currently. Occult stool negative and stools brown per patient. She follows up with Dr. Fox (hematology) where she receives regular blood transfusions and has an upcoming appointment scheduled.        Patient seen and evaluated, no acute somatic complaints. Reviewed discharge medications with patient; All new medications requiring new prescriptions were sent to the pharmacy of patient's choice. Reviewed need for prescription for previous home medications and new prescriptions sent if requested. Medically cleared/stable for discharge as per Dr. Mayer with close outpatient follow up within 1-2 weeks of discharge. Patient understands and agrees with plan of care.

## 2020-02-11 NOTE — PROGRESS NOTE ADULT - PROBLEM SELECTOR PLAN 9
Appears euvolemic  Cont home-dose lasix
Continue allopurinol
Continue allopurinol

## 2020-02-11 NOTE — PROGRESS NOTE ADULT - PROBLEM SELECTOR PLAN 1
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  - MRI could not be done as her PPM is not MRI compatible and contrast studies also contraindicated given her CKD stage 5.   - TFTs wnl, TSH: 1.34, FT4 0.9.   - Prolactin noted to be elevated to 94. LH, FSH and estradiol low   - IGF-1 testing   - 8 am cortisol and ACTH were not sent  - Recommend visual field testing as outpatient.  - Patient needs endocrine and opthalmology follow up as outpatient   She can follow up with [address] [place] [address], [address], [place], [place] [id number] [phone] - CT head showed mildly enlarged pituitary gland concerning for pituitary macroadenoma with slight suprasellar extension.  - MRI could not be done as her PPM is not MRI compatible and contrast studies also contraindicated given her CKD stage 5.   - TFTs wnl, TSH: 1.34, FT4 0.9.   - Prolactin noted to be elevated to 94. LH, FSH and estradiol low   - IGF-1 testing   - 8 am cortisol and ACTH were not sent, reordered for tomorrow am  - Recommend visual field testing as outpatient.  - Patient needs endocrine and opthalmology follow up as outpatient   She can follow up with [address] [place] [address], [address], [place] [address]  [phone]

## 2020-02-11 NOTE — DISCHARGE NOTE PROVIDER - NSDCMRMEDTOKEN_GEN_ALL_CORE_FT
allopurinol 100 mg oral tablet: 1 tab(s) orally once a day  aspirin 81 mg oral delayed release tablet: 1 tab(s) orally once a day  calcitriol 0.25 mcg oral capsule: 1 cap(s) orally 3 times a week on Monday, Wednesday, and Friday   carvedilol 25 mg oral tablet: 1 tab(s) orally 2 times a day  cloNIDine 0.2 mg oral tablet: 1 tab(s) orally once a day  Coumadin 3 mg oral tablet: 1 tab(s) orally every other day alternating with 6mg tablet   Coumadin 6 mg oral tablet: 1 tab(s) orally every other day alternating with 3mg tablet  Crestor 10 mg oral tablet: 1 tab(s) orally once a day (at bedtime)  furosemide 40 mg oral tablet: 1 tab(s) orally every other day  hydrALAZINE 100 mg oral tablet: 1 tab(s) orally 4 times a day  Imdur 60 mg oral tablet, extended release: 1 tab(s) orally 2 times a day  Januvia 50 mg oral tablet: 1 tab(s) orally once a day  ProAir HFA 90 mcg/inh inhalation aerosol: 2 puff(s) inhaled every 6 hours, As Needed  sodium bicarbonate 650 mg oral tablet: 1 tab(s) orally 2 times a day  terazosin 2 mg oral capsule: 1 cap(s) orally once a day (at bedtime) allopurinol 100 mg oral tablet: 1 tab(s) orally once a day  aspirin 81 mg oral delayed release tablet: 1 tab(s) orally once a day  calcitriol 0.25 mcg oral capsule: 1 cap(s) orally 3 times a week on Monday, Wednesday, and Friday   carvedilol 25 mg oral tablet: 1 tab(s) orally 2 times a day  cloNIDine 0.2 mg oral tablet: 1 tab(s) orally once a day  Coumadin 3 mg oral tablet: 1 tab(s) orally every other day alternating with 6mg tablet   Coumadin 6 mg oral tablet: 1 tab(s) orally every other day alternating with 3mg tablet  Crestor 10 mg oral tablet: 1 tab(s) orally once a day (at bedtime)  Imdur 60 mg oral tablet, extended release: 1 tab(s) orally 2 times a day  ProAir HFA 90 mcg/inh inhalation aerosol: 2 puff(s) inhaled every 6 hours, As Needed  sodium bicarbonate 650 mg oral tablet: 1 tab(s) orally 2 times a day  terazosin 2 mg oral capsule: 1 cap(s) orally once a day (at bedtime) allopurinol 100 mg oral tablet: 1 tab(s) orally once a day  aspirin 81 mg oral delayed release tablet: 1 tab(s) orally once a day  calcitriol 0.25 mcg oral capsule: 1 cap(s) orally 3 times a week on Monday, Wednesday, and Friday   carvedilol 25 mg oral tablet: 1 tab(s) orally 2 times a day  cloNIDine 0.2 mg oral tablet: 1 tab(s) orally once a day  Coumadin 3 mg oral tablet: 1 tab(s) orally every other day alternating with 6mg tablet   Coumadin 6 mg oral tablet: 1 tab(s) orally every other day alternating with 3mg tablet  Crestor 10 mg oral tablet: 1 tab(s) orally once a day (at bedtime)  hydrALAZINE 100 mg oral tablet: 1 tab(s) orally 3 times a day   Imdur 60 mg oral tablet, extended release: 1 tab(s) orally 2 times a day  ProAir HFA 90 mcg/inh inhalation aerosol: 2 puff(s) inhaled every 6 hours, As Needed  sodium bicarbonate 650 mg oral tablet: 1 tab(s) orally 2 times a day

## 2020-02-11 NOTE — PROGRESS NOTE ADULT - ATTENDING COMMENTS
Agree with above NP note.  cv stable  no further chest pain  deferring cardiac cath  cont med tx of cad  hep to coumadin for rue dvt  headache tx per neuro   d/c planning

## 2020-02-11 NOTE — PROGRESS NOTE ADULT - ASSESSMENT
79 yo female with Hx of HTN, CAD, CABG, AS S/P TAVR 2017, s/p PPM, HFpEF (60% in May 2017), RUE DVT dxed 1/22/2019 on coumadin, DM2, Anemia presenting to [facility] ER for evaluation of C/P.

## 2020-02-11 NOTE — PROGRESS NOTE ADULT - PROBLEM SELECTOR PLAN 8
Appears euvolemic  Cont home-dose lasix
coumadin
coumadin
coumadin  d/c hep gtt, INR close enough to therapeutic (1.8 today)  takes 3 mg and 6 mg at home based on INR  f/u Dr. Damico for INR checks
coumadin  d/c hep gtt, INR therapeutic
heparin gtt bridge while coumadin is being held
heparin gtt bridge with coumadin
Appears euvolemic  Cont home-dose lasix

## 2020-02-11 NOTE — PROGRESS NOTE ADULT - PROBLEM SELECTOR PLAN 2
- A1c 4.9% indicating that her DM has resolved.   - FS here well controlled and only on low dose Humalog correctional scale   - Consistent carb diet   - Monitor FS before meals and bedtime  - FS goal 100-200, no need of tight control at her age.   Discharge plan: Discontinue Januvia on discharge. Patient can be discharged without DM meds on discharge and follow up with PCP on discharge.

## 2020-02-11 NOTE — PROGRESS NOTE ADULT - SUBJECTIVE AND OBJECTIVE BOX
Patient is a 80y old  Female who presents with a chief complaint of Chest pain (11 Feb 2020 13:22)      SUBJECTIVE / OVERNIGHT EVENTS:  feels well  mildly anemic  no melena, no hematochezia. no BRBPR.   no n/v/d. no abdominal pain.   brown stool yesterday  per pt, require transfusion q4-5 weeks with Dr. Sharpe (heme/onc).  no Aranesp shots since she believes it gave her DVT (hence on coumadin)      Vital Signs Last 24 Hrs  T(C): 36.7 (11 Feb 2020 09:33), Max: 37 (10 Feb 2020 20:59)  T(F): 98 (11 Feb 2020 09:33), Max: 98.6 (10 Feb 2020 20:59)  HR: 62 (11 Feb 2020 09:33) (62 - 83)  BP: 136/52 (11 Feb 2020 09:33) (122/48 - 150/57)  BP(mean): --  RR: 16 (11 Feb 2020 09:33) (16 - 19)  SpO2: 98% (11 Feb 2020 09:33) (97% - 100%)  I&O's Summary      PHYSICAL EXAM:  GENERAL: NAD, Comfortable, on room air, out of bed in chair  HEAD:  Atraumatic, Normocephalic  EYES: EOMI, PERRLA, conjunctiva and sclera clear  NECK: Supple, No JVD  CHEST/LUNG: Clear to auscultation bilaterally; No wheeze  HEART: Regular rate and rhythm; No murmurs, rubs, or gallops  ABDOMEN: Soft, Nontender, Nondistended; Bowel sounds present  Neuro: AAO x 3, no focal deficit, 5/5 b/l extremities  EXTREMITIES:  2+ Peripheral Pulses, No clubbing, cyanosis, or edema  SKIN: No rashes or lesions      LABS:                        7.3    3.87  )-----------( 117      ( 11 Feb 2020 06:54 )             22.7     02-11    133<L>  |  101  |  82<H>  ----------------------------<  116<H>  4.2   |  20<L>  |  5.46<H>    Ca    9.2      11 Feb 2020 05:00  Phos  4.2     02-10  Mg     1.9     02-11      PT/INR - ( 11 Feb 2020 06:54 )   PT: 20.3 SEC;   INR: 1.80          PTT - ( 11 Feb 2020 06:54 )  PTT:25.7 SEC  CAPILLARY BLOOD GLUCOSE      POCT Blood Glucose.: 114 mg/dL (11 Feb 2020 13:02)  POCT Blood Glucose.: 114 mg/dL (11 Feb 2020 09:13)  POCT Blood Glucose.: 119 mg/dL (10 Feb 2020 22:14)  POCT Blood Glucose.: 121 mg/dL (10 Feb 2020 17:52)            RADIOLOGY & ADDITIONAL TESTS:    Imaging Personally Reviewed:  [x] YES  [ ] NO    Consultant(s) Notes Reviewed:  [x] YES  [ ] NO      MEDICATIONS  (STANDING):  allopurinol 100 milliGRAM(s) Oral daily  aspirin enteric coated 81 milliGRAM(s) Oral daily  atorvastatin 40 milliGRAM(s) Oral at bedtime  calcitriol   Capsule 0.25 MICROGram(s) Oral <User Schedule>  carvedilol 25 milliGRAM(s) Oral every 12 hours  cloNIDine 0.2 milliGRAM(s) Oral daily  dextrose 5%. 1000 milliLiter(s) (50 mL/Hr) IV Continuous <Continuous>  dextrose 50% Injectable 12.5 Gram(s) IV Push once  dextrose 50% Injectable 25 Gram(s) IV Push once  dextrose 50% Injectable 25 Gram(s) IV Push once  insulin lispro (HumaLOG) corrective regimen sliding scale   SubCutaneous three times a day before meals  insulin lispro (HumaLOG) corrective regimen sliding scale   SubCutaneous at bedtime  isosorbide   mononitrate ER Tablet (IMDUR) 60 milliGRAM(s) Oral <User Schedule>  sodium bicarbonate 650 milliGRAM(s) Oral two times a day  warfarin 6 milliGRAM(s) Oral once    MEDICATIONS  (PRN):  acetaminophen   Tablet .. 650 milliGRAM(s) Oral every 6 hours PRN Mild Pain (1 - 3), Moderate Pain (4 - 6), Severe Pain (7 - 10)  dextrose 40% Gel 15 Gram(s) Oral once PRN Blood Glucose LESS THAN 70 milliGRAM(s)/deciliter  glucagon  Injectable 1 milliGRAM(s) IntraMuscular once PRN Glucose LESS THAN 70 milligrams/deciliter  ondansetron Injectable 4 milliGRAM(s) IV Push every 6 hours PRN Nausea and/or Vomiting      Care Discussed with Consultants/Other Providers [x] YES  [ ] NO    HEALTH ISSUES - PROBLEM Dx:  Hyperlipidemia, unspecified hyperlipidemia type: Hyperlipidemia, unspecified hyperlipidemia type  Type 2 diabetes mellitus with other specified complication, without long-term current use of insulin: Type 2 diabetes mellitus with other specified complication, without long-term current use of insulin  Pituitary macroadenoma: Pituitary macroadenoma  Headache: Headache  Essential hypertension: Essential hypertension  CKD (chronic kidney disease), stage V: CKD (chronic kidney disease), stage V  Chronic diastolic CHF (congestive heart failure): Chronic diastolic CHF (congestive heart failure)  DVT of upper extremity (deep vein thrombosis): DVT of upper extremity (deep vein thrombosis)  Shoulder pain, left: Shoulder pain, left  Gout: Gout  Anemia Associated with Chronic Renal Failure: Anemia Associated with Chronic Renal Failure  HTN (Hypertension): HTN (Hypertension)  DM2 (diabetes mellitus, type 2): DM2 (diabetes mellitus, type 2)  CAD (coronary artery disease): CAD (coronary artery disease)  Chest pain: Chest pain

## 2020-02-11 NOTE — PROGRESS NOTE ADULT - PROBLEM SELECTOR PLAN 2
left sided unilateral headaches  have hx of chronic migraines  no vision changes  no focal deficit  CT head neg, but incidentally noted ? pituitary macroadenoma   Endo house, f/u outpt  neurology Dr. Sauceda consulted for headaches, may or may not be related to macroadenoma.   limited pain regimen. (avoiding NSAID given CKD, no opioids given allergic reaction, "makes me crazy", limited tylenol use)  s/p Depakote with resolution, will hold any further Depakote  her ppm is not MRI compatible. (confirmed with radiology)  c/w PRN Tylenol

## 2020-02-11 NOTE — DISCHARGE NOTE PROVIDER - CARE PROVIDERS DIRECT ADDRESSES
,[email or web address],DirectAddress_Unknown,DirectAddress_Unknown ,[email or web address],DirectAddress_Unknown,DirectAddress_Unknown,DirectAddress_Unknown

## 2020-02-11 NOTE — PROGRESS NOTE ADULT - PROBLEM SELECTOR PLAN 6
1/hour(s)
Continue allopurinol
Continue carvedilol, hydralazine, clonidine, imdur
Continue carvedilol, hydralazine, clonidine, imdur
Likely anemia of renal disease  H&H of 7.2/23 in December According to Dr Damico her Hgb around 7.  Pt had Blood transfusion last week for Hgb 6.   Transfused 1 unit of pRBC 2/3/20.
Likely anemia of renal disease  H&H of 7.2/23 in December According to Dr Damico her Hgb around 7.  Pt had Blood transfusion last week for Hgb 6.   Transfused 1 unit of pRBC 2/3/20.  never had colonoscopy all her life. encouraged procedure but refused. understand risk of missed colon lesions
Will hold PO meds and continue with ISS  Fingersticks qachs  1800 ADA

## 2020-02-11 NOTE — DISCHARGE NOTE PROVIDER - NSDCCPCAREPLAN_GEN_ALL_CORE_FT
PRINCIPAL DISCHARGE DIAGNOSIS  Diagnosis: Chest pain  Assessment and Plan of Treatment: You came to the hospital for chest pain. Your stress test was abnormal. A cardiac catheterization (angiogram) is not recommended at this time due to your poor kidney function.      SECONDARY DISCHARGE DIAGNOSES  Diagnosis: Pituitary macroadenoma  Assessment and Plan of Treatment: You were found to have a pituitary macroadenoma. You should follow up with an eye doctor for visual field testing and endocrinology. Report any visual changes to your physician immediately.    Diagnosis: Headache  Assessment and Plan of Treatment: You suffered from headaches while in the hospital. You were given Depakote by neurology which resolved your headache. Follow up with neurology outpatient.    Diagnosis: HTN (Hypertension)  Assessment and Plan of Treatment: Continue your blood pressure medications as prescribed. Routine follow up with your PCP/cardiologist.    Diagnosis: DVT of upper extremity (deep vein thrombosis)  Assessment and Plan of Treatment: You are on warfarin for a DVT. Continue with warfarin 6mg for 2 more days and then continue with your alternate dosing of 3mg/6mg. Follow up with Dr. Damico in 3 days for INR check.    Diagnosis: Anemia Associated with Chronic Renal Failure  Assessment and Plan of Treatment: Your recieved blood transfusions when in the hospital. Follow up with your hematologist for routine blood transfusions as scheduled.    Diagnosis: Type 2 diabetes mellitus with other specified complication, without long-term current use of insulin  Assessment and Plan of Treatment: Endocrinology saw you in the hospital. You do not require any diabetes medications at this time. Follow up with your primary care physician. PRINCIPAL DISCHARGE DIAGNOSIS  Diagnosis: Chest pain  Assessment and Plan of Treatment: You came to the hospital for chest pain. Your stress test was abnormal. A cardiac catheterization (angiogram) is not recommended at this time due to your poor kidney function.      SECONDARY DISCHARGE DIAGNOSES  Diagnosis: Anemia Associated with Chronic Renal Failure  Assessment and Plan of Treatment: Your recieved blood transfusions when in the hospital. Follow up with your hematologist for routine blood transfusions as scheduled.    Diagnosis: DVT of upper extremity (deep vein thrombosis)  Assessment and Plan of Treatment: You are on warfarin for a DVT. Continue with warfarin 6mg for 2 more days and then continue with your alternate dosing of 3mg/6mg. Follow up with Dr. Damico in 3 days for INR check.    Diagnosis: HTN (Hypertension)  Assessment and Plan of Treatment: Continue your blood pressure medications as prescribed. Routine follow up with your PCP/cardiologist.    Diagnosis: Headache  Assessment and Plan of Treatment: You suffered from headaches while in the hospital. You were given Depakote by neurology which resolved your headache. Follow up with neurology outpatient.    Diagnosis: Pituitary macroadenoma  Assessment and Plan of Treatment: You were found to have a pituitary macroadenoma. You should follow up with an eye doctor for visual field testing and endocrinology. Report any visual changes to your physician immediately.    Diagnosis: Type 2 diabetes mellitus with other specified complication, without long-term current use of insulin  Assessment and Plan of Treatment: Endocrinology saw you in the hospital. You do not require any diabetes medications at this time. Do not continue Januvia. Follow up with your primary care physician.

## 2020-02-11 NOTE — PROGRESS NOTE ADULT - PROBLEM SELECTOR PROBLEM 9
Chronic diastolic CHF (congestive heart failure)
Gout
Gout

## 2020-02-11 NOTE — PROGRESS NOTE ADULT - SUBJECTIVE AND OBJECTIVE BOX
CARDIOLOGY FOLLOW UP - Dr. Damico    CC no cp or sob        PHYSICAL EXAM:  T(C): 36.7 (02-11-20 @ 09:33), Max: 37 (02-10-20 @ 20:59)  HR: 62 (02-11-20 @ 09:33) (62 - 83)  BP: 136/52 (02-11-20 @ 09:33) (122/48 - 150/57)  RR: 16 (02-11-20 @ 09:33) (16 - 19)  SpO2: 98% (02-11-20 @ 09:33) (97% - 100%)  Wt(kg): --  I&O's Summary      Appearance: Normal	  Cardiovascular: Normal S1 S2,RRR, + murmurs  Respiratory: Lungs clear to auscultation	  Gastrointestinal:  Soft, Non-tender, + BS	  Extremities: Normal range of motion, No clubbing, cyanosis or edema        MEDICATIONS  (STANDING):  allopurinol 100 milliGRAM(s) Oral daily  aspirin enteric coated 81 milliGRAM(s) Oral daily  atorvastatin 40 milliGRAM(s) Oral at bedtime  calcitriol   Capsule 0.25 MICROGram(s) Oral <User Schedule>  carvedilol 25 milliGRAM(s) Oral every 12 hours  cloNIDine 0.2 milliGRAM(s) Oral daily  dextrose 5%. 1000 milliLiter(s) (50 mL/Hr) IV Continuous <Continuous>  dextrose 50% Injectable 12.5 Gram(s) IV Push once  dextrose 50% Injectable 25 Gram(s) IV Push once  dextrose 50% Injectable 25 Gram(s) IV Push once  insulin lispro (HumaLOG) corrective regimen sliding scale   SubCutaneous three times a day before meals  insulin lispro (HumaLOG) corrective regimen sliding scale   SubCutaneous at bedtime  isosorbide   mononitrate ER Tablet (IMDUR) 60 milliGRAM(s) Oral <User Schedule>  sodium bicarbonate 650 milliGRAM(s) Oral two times a day  warfarin 6 milliGRAM(s) Oral once      TELEMETRY: paced 	    ECG:  	  RADIOLOGY:   DIAGNOSTIC TESTING:  [ ] Echocardiogram:  [ ]  Catheterization:  [ ] Stress Test:    OTHER: 	    LABS:	 	                            7.3    3.87  )-----------( 117      ( 11 Feb 2020 06:54 )             22.7     02-11    133<L>  |  101  |  82<H>  ----------------------------<  116<H>  4.2   |  20<L>  |  5.46<H>    Ca    9.2      11 Feb 2020 05:00  Phos  4.2     02-10  Mg     1.9     02-11      PT/INR - ( 11 Feb 2020 06:54 )   PT: 20.3 SEC;   INR: 1.80          PTT - ( 11 Feb 2020 06:54 )  PTT:25.7 SEC

## 2020-02-11 NOTE — PROGRESS NOTE ADULT - PROBLEM SELECTOR PROBLEM 8
Chronic diastolic CHF (congestive heart failure)
DVT of upper extremity (deep vein thrombosis)
Chronic diastolic CHF (congestive heart failure)

## 2020-02-11 NOTE — PROGRESS NOTE ADULT - SUBJECTIVE AND OBJECTIVE BOX
Follow-up on: Pituitary macroadenoma     Subjective: Patient seen at bedside, says doing well. Receiving blood transfusion for low Hb. Currently does not report any headache, blurry vision or any other complaints.     MEDICATIONS  (STANDING):  allopurinol 100 milliGRAM(s) Oral daily  aspirin enteric coated 81 milliGRAM(s) Oral daily  atorvastatin 40 milliGRAM(s) Oral at bedtime  calcitriol   Capsule 0.25 MICROGram(s) Oral <User Schedule>  carvedilol 25 milliGRAM(s) Oral every 12 hours  cloNIDine 0.2 milliGRAM(s) Oral daily  dextrose 5%. 1000 milliLiter(s) (50 mL/Hr) IV Continuous <Continuous>  dextrose 50% Injectable 12.5 Gram(s) IV Push once  dextrose 50% Injectable 25 Gram(s) IV Push once  dextrose 50% Injectable 25 Gram(s) IV Push once  insulin lispro (HumaLOG) corrective regimen sliding scale   SubCutaneous three times a day before meals  insulin lispro (HumaLOG) corrective regimen sliding scale   SubCutaneous at bedtime  isosorbide   mononitrate ER Tablet (IMDUR) 60 milliGRAM(s) Oral <User Schedule>  sodium bicarbonate 650 milliGRAM(s) Oral two times a day  warfarin 6 milliGRAM(s) Oral once    MEDICATIONS  (PRN):  acetaminophen   Tablet .. 650 milliGRAM(s) Oral every 6 hours PRN Mild Pain (1 - 3), Moderate Pain (4 - 6), Severe Pain (7 - 10)  dextrose 40% Gel 15 Gram(s) Oral once PRN Blood Glucose LESS THAN 70 milliGRAM(s)/deciliter  glucagon  Injectable 1 milliGRAM(s) IntraMuscular once PRN Glucose LESS THAN 70 milligrams/deciliter  ondansetron Injectable 4 milliGRAM(s) IV Push every 6 hours PRN Nausea and/or Vomiting      PHYSICAL EXAM:  VITALS: T(C): 36.8 (02-11-20 @ 14:35)  T(F): 98.3 (02-11-20 @ 14:35), Max: 98.6 (02-10-20 @ 20:59)  HR: 63 (02-11-20 @ 14:35) (62 - 83)  BP: 160/59 (02-11-20 @ 14:35) (136/52 - 160/59)  RR:  (16 - 19)  SpO2:  (97% - 100%)  Wt(kg): --  GENERAL: NAD, well-groomed, well-developed  EYES: No proptosis, no injection  HEENT:  Atraumatic, Normocephalic, moist mucous membranes  Neuro: AAO x 3, no gross or focal deficit  Psych: reactive affect, euthymic mood      POCT Blood Glucose.: 114 mg/dL (02-11-20 @ 13:02)  POCT Blood Glucose.: 114 mg/dL (02-11-20 @ 09:13)  POCT Blood Glucose.: 119 mg/dL (02-10-20 @ 22:14)  POCT Blood Glucose.: 121 mg/dL (02-10-20 @ 17:52)  POCT Blood Glucose.: 166 mg/dL (02-10-20 @ 12:37)  POCT Blood Glucose.: 126 mg/dL (02-10-20 @ 09:10)  POCT Blood Glucose.: 149 mg/dL (02-09-20 @ 21:29)  POCT Blood Glucose.: 133 mg/dL (02-09-20 @ 17:52)  POCT Blood Glucose.: 148 mg/dL (02-09-20 @ 12:49)  POCT Blood Glucose.: 131 mg/dL (02-09-20 @ 08:52)  POCT Blood Glucose.: 122 mg/dL (02-08-20 @ 22:44)  POCT Blood Glucose.: 120 mg/dL (02-08-20 @ 17:55)    02-11    133<L>  |  101  |  82<H>  ----------------------------<  116<H>  4.2   |  20<L>  |  5.46<H>    EGFR if : 8   EGFR if non : 7     Ca    9.2      02-11  Mg     1.9     02-11  Phos  4.2     02-10      Thyroid Function Tests:  02-11 @ 05:00 Free Thyroxine, Serum: 0.98 ng/dL  02-02 @ 05:15 TSH 1.34     Prolactin, Serum (02.11.20 @ 05:00): 94.1 ng/mL    Luteinizing Hormone, Serum (02.11.20 @ 05:00): < 0.3    Follicle Stimulating Hormone, Serum (02.11.20 @ 05:00): 0.9    Hemoglobin A1C, Whole Blood: 4.9 % [4.0 - 5.6] (02-02-20 @ 05:15)

## 2020-02-11 NOTE — PROGRESS NOTE ADULT - ASSESSMENT
79yo female with Hx of HTN CAD CABG AS S/P  TAVR 2017, PPM CHF RUE DVT DM Anemia presenting to [facility] ER for evaluation of chest pain. Noted to have concern for pituitary macroadenoma on CT head done for headache. Endocrine team consulted for further evaluation.

## 2020-02-11 NOTE — PROGRESS NOTE ADULT - ATTENDING COMMENTS
Would obtain 8AM cortisol prior to dc to rule out adrenal insufficiency. Free T4 low normal ok for now but may become low in the future. LH/FSH are low. 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 Will need outpatient visual field testing and endocrine follow up.

## 2020-02-11 NOTE — DISCHARGE NOTE PROVIDER - NSDCFUADDAPPT_GEN_ALL_CORE_FT
Appointment with Dr. Sharpe on 2/24/2020 Appointment with Dr. Fox on 2/24/2020. Follow up with PCP within 1-2 weeks of discharge.   Follow up with hematology within 1-2 weeks of discharge -- You have appointment with Dr. Fox on 2/24/2020.  Follow up with cardiology within 1-2 weeks of discharge.   Follow up with neurology within 1-2 weeks of discharge.   Follow up with endocrinology within 1-2 weeks of discharge: She can follow up with [address] [place] [address] [address], [place] [address], [phone]. You should not take anymore DM2 medications (Januvia) until you follow up with endocrinology.   Follow up with ophthalmology within 1-2 weeks of discharge.

## 2020-02-11 NOTE — PROGRESS NOTE ADULT - ATTENDING COMMENTS
dc planning home post transfusion.     - Dr. ANH Gustafson ([facility])  - [phone]

## 2020-02-11 NOTE — PROGRESS NOTE ADULT - PROBLEM SELECTOR PLAN 1
- Likely anemia of renal disease  - per pt, require pRBC transfusion q4-5 weeks with Dr. Sharpe (heme/onc [facility]).  - no Aranesp shots since she believes it gave her DVT (hence on coumadin)  - never had colonoscopy all her life. encouraged procedure but refused. understand risk of missed colon lesions. can follow up outpt.   - report brown stool, occult stool negative a few days ago  - hgb ~ 7. will transfused 1 unit PRBC before dc planning.  - She has an appointment with Dr. Sharpe on 2/24/20 for follow up.   - hemodynamically stable.  +brown stools

## 2020-02-11 NOTE — PROGRESS NOTE ADULT - NSHPATTENDINGPLANDISCUSS_GEN_ALL_CORE
Team
pt
pt and PA
pt and PA Faisal
pt and RN
pt and TELLO Coulter
pt and TELLO Malone d/w renal Dr. Murphy d/w card Dr. Damico
pt and deanna An/bijal Damico

## 2020-02-11 NOTE — DISCHARGE NOTE PROVIDER - NSFOLLOWUPCLINICS_GEN_ALL_ED_FT
[facility] Endocrinology  Endocrinology  [address] [place] [address]  Phone: [phone]  Fax:   Follow Up Time:

## 2020-02-12 ENCOUNTER — TRANSCRIPTION ENCOUNTER (OUTPATIENT)
Age: 80
End: 2020-02-12

## 2020-02-12 VITALS
SYSTOLIC BLOOD PRESSURE: 132 MMHG | HEART RATE: 70 BPM | OXYGEN SATURATION: 99 % | DIASTOLIC BLOOD PRESSURE: 67 MMHG | TEMPERATURE: 98 F | RESPIRATION RATE: 18 BRPM

## 2020-02-12 DIAGNOSIS — D64.9 ANEMIA, UNSPECIFIED: ICD-10-CM

## 2020-02-12 LAB
ACTH SER-ACNC: 88.4 PG/ML — HIGH (ref 7.2–63.3)
ANION GAP SERPL CALC-SCNC: 12 MMO/L — SIGNIFICANT CHANGE UP (ref 7–14)
APTT BLD: 27.1 SEC — LOW (ref 27.5–36.3)
BLD GP AB SCN SERPL QL: NEGATIVE — SIGNIFICANT CHANGE UP
BUN SERPL-MCNC: 80 MG/DL — HIGH (ref 7–23)
CALCIUM SERPL-MCNC: 9 MG/DL — SIGNIFICANT CHANGE UP (ref 8.4–10.5)
CHLORIDE SERPL-SCNC: 103 MMOL/L — SIGNIFICANT CHANGE UP (ref 98–107)
CO2 SERPL-SCNC: 19 MMOL/L — LOW (ref 22–31)
CORTIS SERPL-MCNC: 12.9 UG/DL — SIGNIFICANT CHANGE UP (ref 2.7–18.4)
CORTIS SERPL-MCNC: 13.7 UG/DL — SIGNIFICANT CHANGE UP (ref 2.7–18.4)
CREAT SERPL-MCNC: 4.73 MG/DL — HIGH (ref 0.5–1.3)
GLUCOSE SERPL-MCNC: 103 MG/DL — HIGH (ref 70–99)
HCT VFR BLD CALC: 26.9 % — LOW (ref 34.5–45)
HGB BLD-MCNC: 8.8 G/DL — LOW (ref 11.5–15.5)
INR BLD: 1.78 — HIGH (ref 0.88–1.17)
MAGNESIUM SERPL-MCNC: 1.9 MG/DL — SIGNIFICANT CHANGE UP (ref 1.6–2.6)
MCHC RBC-ENTMCNC: 27.4 PG — SIGNIFICANT CHANGE UP (ref 27–34)
MCHC RBC-ENTMCNC: 32.7 % — SIGNIFICANT CHANGE UP (ref 32–36)
MCV RBC AUTO: 83.8 FL — SIGNIFICANT CHANGE UP (ref 80–100)
NRBC # FLD: 0 K/UL — SIGNIFICANT CHANGE UP (ref 0–0)
PHOSPHATE SERPL-MCNC: 3.3 MG/DL — SIGNIFICANT CHANGE UP (ref 2.5–4.5)
PLATELET # BLD AUTO: 132 K/UL — LOW (ref 150–400)
PMV BLD: 11.2 FL — SIGNIFICANT CHANGE UP (ref 7–13)
POTASSIUM SERPL-MCNC: 4.3 MMOL/L — SIGNIFICANT CHANGE UP (ref 3.5–5.3)
POTASSIUM SERPL-SCNC: 4.3 MMOL/L — SIGNIFICANT CHANGE UP (ref 3.5–5.3)
PROTHROM AB SERPL-ACNC: 20.6 SEC — HIGH (ref 9.8–13.1)
RBC # BLD: 3.21 M/UL — LOW (ref 3.8–5.2)
RBC # FLD: 14.6 % — HIGH (ref 10.3–14.5)
RH IG SCN BLD-IMP: POSITIVE — SIGNIFICANT CHANGE UP
SODIUM SERPL-SCNC: 134 MMOL/L — LOW (ref 135–145)
WBC # BLD: 3.82 K/UL — SIGNIFICANT CHANGE UP (ref 3.8–10.5)
WBC # FLD AUTO: 3.82 K/UL — SIGNIFICANT CHANGE UP (ref 3.8–10.5)

## 2020-02-12 PROCEDURE — 99232 SBSQ HOSP IP/OBS MODERATE 35: CPT | Mod: GC

## 2020-02-12 RX ADMIN — Medication 650 MILLIGRAM(S): at 06:29

## 2020-02-12 RX ADMIN — CALCITRIOL 0.25 MICROGRAM(S): 0.5 CAPSULE ORAL at 06:29

## 2020-02-12 RX ADMIN — CARVEDILOL PHOSPHATE 25 MILLIGRAM(S): 80 CAPSULE, EXTENDED RELEASE ORAL at 12:23

## 2020-02-12 RX ADMIN — ISOSORBIDE MONONITRATE 60 MILLIGRAM(S): 60 TABLET, EXTENDED RELEASE ORAL at 06:50

## 2020-02-12 RX ADMIN — Medication 100 MILLIGRAM(S): at 12:23

## 2020-02-12 RX ADMIN — Medication 81 MILLIGRAM(S): at 12:23

## 2020-02-12 NOTE — PROGRESS NOTE ADULT - ATTENDING COMMENTS
8AM cortisol normal. Free T4 low normal ok for now but may become low in the future. LH/FSH are low. 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 Will need outpatient visual field testing and endocrine follow up.

## 2020-02-12 NOTE — PROGRESS NOTE ADULT - SUBJECTIVE AND OBJECTIVE BOX
CC: no cp/sob    TELEMETRY:     PHYSICAL EXAM:    T(C): 36.7 (02-12-20 @ 06:26), Max: 36.9 (02-11-20 @ 21:51)  HR: 60 (02-12-20 @ 06:26) (60 - 75)  BP: 143/53 (02-12-20 @ 06:26) (143/53 - 160/79)  RR: 18 (02-12-20 @ 06:26) (16 - 19)  SpO2: 100% (02-12-20 @ 06:26) (98% - 100%)  Wt(kg): --  I&O's Summary      Appearance: Normal	  Cardiovascular: Normal S1 S2,RRR, No JVD, No murmurs  Respiratory: Lungs clear to auscultation	  Gastrointestinal:  Soft, Non-tender, + BS	  Extremities: Normal range of motion, No clubbing, cyanosis or edema  Vascular: Peripheral pulses palpable 2+ bilaterally     LABS:	 	                          8.8    3.82  )-----------( 132      ( 12 Feb 2020 07:48 )             26.9     02-12    134<L>  |  103  |  80<H>  ----------------------------<  103<H>  4.3   |  19<L>  |  4.73<H>    Ca    9.0      12 Feb 2020 07:48  Phos  3.3     02-12  Mg     1.9     02-12        PT/INR - ( 12 Feb 2020 07:48 )   PT: 20.6 SEC;   INR: 1.78          PTT - ( 12 Feb 2020 07:48 )  PTT:27.1 SEC    CARDIAC MARKERS:

## 2020-02-12 NOTE — PROGRESS NOTE ADULT - REASON FOR ADMISSION
Chest pain

## 2020-02-12 NOTE — PROGRESS NOTE ADULT - SUBJECTIVE AND OBJECTIVE BOX
Chief Complaint/Follow-up on:     Subjective:    MEDICATIONS  (STANDING):  allopurinol 100 milliGRAM(s) Oral daily  aspirin enteric coated 81 milliGRAM(s) Oral daily  atorvastatin 40 milliGRAM(s) Oral at bedtime  calcitriol   Capsule 0.25 MICROGram(s) Oral <User Schedule>  carvedilol 25 milliGRAM(s) Oral every 12 hours  cloNIDine 0.2 milliGRAM(s) Oral daily  dextrose 5%. 1000 milliLiter(s) (50 mL/Hr) IV Continuous <Continuous>  dextrose 50% Injectable 12.5 Gram(s) IV Push once  dextrose 50% Injectable 25 Gram(s) IV Push once  dextrose 50% Injectable 25 Gram(s) IV Push once  insulin lispro (HumaLOG) corrective regimen sliding scale   SubCutaneous three times a day before meals  insulin lispro (HumaLOG) corrective regimen sliding scale   SubCutaneous at bedtime  isosorbide   mononitrate ER Tablet (IMDUR) 60 milliGRAM(s) Oral <User Schedule>  sodium bicarbonate 650 milliGRAM(s) Oral two times a day    MEDICATIONS  (PRN):  acetaminophen   Tablet .. 650 milliGRAM(s) Oral every 6 hours PRN Mild Pain (1 - 3), Moderate Pain (4 - 6), Severe Pain (7 - 10)  dextrose 40% Gel 15 Gram(s) Oral once PRN Blood Glucose LESS THAN 70 milliGRAM(s)/deciliter  glucagon  Injectable 1 milliGRAM(s) IntraMuscular once PRN Glucose LESS THAN 70 milligrams/deciliter  ondansetron Injectable 4 milliGRAM(s) IV Push every 6 hours PRN Nausea and/or Vomiting      PHYSICAL EXAM:  VITALS: T(C): 36.8 (02-12-20 @ 12:22)  T(F): 98.3 (02-12-20 @ 12:22), Max: 98.4 (02-11-20 @ 21:51)  HR: 70 (02-12-20 @ 12:22) (60 - 75)  BP: 132/67 (02-12-20 @ 12:22) (132/67 - 160/79)  RR:  (18 - 19)  SpO2:  (99% - 100%)  Wt(kg): --  GENERAL: NAD, well-groomed, well-developed  EYES: No proptosis, no injection  HEENT:  Atraumatic, Normocephalic, moist mucous membranes  THYROID: Normal size, no palpable nodules  RESPIRATORY: Clear to auscultation bilaterally; No rales, rhonchi, wheezing, or rubs  CARDIOVASCULAR: Regular rate and rhythm; No murmurs; no peripheral edema  GI: Soft, nontender, non distended, normal bowel sounds  CUSHING'S SIGNS: no striae    POCT Blood Glucose.: 143 mg/dL (02-12-20 @ 12:45)  POCT Blood Glucose.: 102 mg/dL (02-12-20 @ 08:51)  POCT Blood Glucose.: 137 mg/dL (02-11-20 @ 21:27)  POCT Blood Glucose.: 110 mg/dL (02-11-20 @ 17:21)  POCT Blood Glucose.: 114 mg/dL (02-11-20 @ 13:02)  POCT Blood Glucose.: 114 mg/dL (02-11-20 @ 09:13)  POCT Blood Glucose.: 119 mg/dL (02-10-20 @ 22:14)  POCT Blood Glucose.: 121 mg/dL (02-10-20 @ 17:52)  POCT Blood Glucose.: 166 mg/dL (02-10-20 @ 12:37)  POCT Blood Glucose.: 126 mg/dL (02-10-20 @ 09:10)  POCT Blood Glucose.: 149 mg/dL (02-09-20 @ 21:29)  POCT Blood Glucose.: 133 mg/dL (02-09-20 @ 17:52)    02-12    134<L>  |  103  |  80<H>  ----------------------------<  103<H>  4.3   |  19<L>  |  4.73<H>    EGFR if : 9   EGFR if non : 8     Ca    9.0      02-12  Mg     1.9     02-12  Phos  3.3     02-12    Thyroid Function Tests:  02-11 @ 05:00 FreeT4 0.98  02-02 @ 05:15 TSH 1.34     Cortisol, Serum (ESO) (02.12.20 @ 09:20): 12.9  Cortisol, Serum (ESO) (02.12.20 @ 07:48): 13.7    Adrenocorticotropic Hormone, Serum (02.12.20 @ 09:20): 88.4 pg/mL    Prolactin, Serum (02.11.20 @ 05:00): 94.1 ng/mL    Luteinizing Hormone, Serum (02.11.20 @ 05:00): < 0.3    Follicle Stimulating Hormone, Serum (02.11.20 @ 05:00): 0.9    Hemoglobin A1C, Whole Blood: 4.9 % [4.0 - 5.6] (02-02-20 @ 05:15) 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 For discharge home. No new complaints.    MEDICATIONS  (STANDING):  allopurinol 100 milliGRAM(s) Oral daily  aspirin enteric coated 81 milliGRAM(s) Oral daily  atorvastatin 40 milliGRAM(s) Oral at bedtime  calcitriol   Capsule 0.25 MICROGram(s) Oral <User Schedule>  carvedilol 25 milliGRAM(s) Oral every 12 hours  cloNIDine 0.2 milliGRAM(s) Oral daily  dextrose 5%. 1000 milliLiter(s) (50 mL/Hr) IV Continuous <Continuous>  dextrose 50% Injectable 12.5 Gram(s) IV Push once  dextrose 50% Injectable 25 Gram(s) IV Push once  dextrose 50% Injectable 25 Gram(s) IV Push once  insulin lispro (HumaLOG) corrective regimen sliding scale   SubCutaneous three times a day before meals  insulin lispro (HumaLOG) corrective regimen sliding scale   SubCutaneous at bedtime  isosorbide   mononitrate ER Tablet (IMDUR) 60 milliGRAM(s) Oral <User Schedule>  sodium bicarbonate 650 milliGRAM(s) Oral two times a day    MEDICATIONS  (PRN):  acetaminophen   Tablet .. 650 milliGRAM(s) Oral every 6 hours PRN Mild Pain (1 - 3), Moderate Pain (4 - 6), Severe Pain (7 - 10)  dextrose 40% Gel 15 Gram(s) Oral once PRN Blood Glucose LESS THAN 70 milliGRAM(s)/deciliter  glucagon  Injectable 1 milliGRAM(s) IntraMuscular once PRN Glucose LESS THAN 70 milligrams/deciliter  ondansetron Injectable 4 milliGRAM(s) IV Push every 6 hours PRN Nausea and/or Vomiting      PHYSICAL EXAM:  VITALS: T(C): 36.8 (02-12-20 @ 12:22)  T(F): 98.3 (02-12-20 @ 12:22), Max: 98.4 (02-11-20 @ 21:51)  HR: 70 (02-12-20 @ 12:22) (60 - 75)  BP: 132/67 (02-12-20 @ 12:22) (132/67 - 160/79)  RR:  (18 - 19)  SpO2:  (99% - 100%)  Wt(kg): --  GENERAL: NAD, well-groomed, well-developed  EYES: No proptosis, no injection  HEENT:  Atraumatic, Normocephalic, moist mucous membranes  THYROID: Normal size, no palpable nodules  RESPIRATORY: Clear to auscultation bilaterally; No rales, rhonchi, wheezing, or rubs  CARDIOVASCULAR: Regular rate and rhythm; No murmurs; no peripheral edema  GI: Soft, nontender, non distended, normal bowel sounds  CUSHING'S SIGNS: no striae    POCT Blood Glucose.: 143 mg/dL (02-12-20 @ 12:45)  POCT Blood Glucose.: 102 mg/dL (02-12-20 @ 08:51)  POCT Blood Glucose.: 137 mg/dL (02-11-20 @ 21:27)  POCT Blood Glucose.: 110 mg/dL (02-11-20 @ 17:21)  POCT Blood Glucose.: 114 mg/dL (02-11-20 @ 13:02)  POCT Blood Glucose.: 114 mg/dL (02-11-20 @ 09:13)  POCT Blood Glucose.: 119 mg/dL (02-10-20 @ 22:14)  POCT Blood Glucose.: 121 mg/dL (02-10-20 @ 17:52)  POCT Blood Glucose.: 166 mg/dL (02-10-20 @ 12:37)  POCT Blood Glucose.: 126 mg/dL (02-10-20 @ 09:10)  POCT Blood Glucose.: 149 mg/dL (02-09-20 @ 21:29)  POCT Blood Glucose.: 133 mg/dL (02-09-20 @ 17:52)    02-12    134<L>  |  103  |  80<H>  ----------------------------<  103<H>  4.3   |  19<L>  |  4.73<H>    EGFR if : 9   EGFR if non : 8     Ca    9.0      02-12  Mg     1.9     02-12  Phos  3.3     02-12    Thyroid Function Tests:  02-11 @ 05:00 FreeT4 0.98  02-02 @ 05:15 TSH 1.34     Cortisol, Serum (ESO) (02.12.20 @ 09:20): 12.9  Cortisol, Serum (ESO) (02.12.20 @ 07:48): 13.7    Adrenocorticotropic Hormone, Serum (02.12.20 @ 09:20): 88.4 pg/mL    Prolactin, Serum (02.11.20 @ 05:00): 94.1 ng/mL    Luteinizing Hormone, Serum (02.11.20 @ 05:00): < 0.3    Follicle Stimulating Hormone, Serum (02.11.20 @ 05:00): 0.9    Hemoglobin A1C, Whole Blood: 4.9 % [4.0 - 5.6] (02-02-20 @ 05:15)

## 2020-02-12 NOTE — DISCHARGE NOTE NURSING/CASE MANAGEMENT/SOCIAL WORK - PATIENT PORTAL LINK FT
You can access the FollowMyHealth Patient Portal offered by [facility] by registering at the following website: [email or web address]. By joining [facility]’s FollowMyHealth portal, you will also be able to view your health information using other applications (apps) compatible with our system.

## 2020-02-12 NOTE — PROGRESS NOTE ADULT - PROBLEM SELECTOR PLAN 1
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  - MRI could not be done as her PPM is not MRI compatible and contrast studies also contraindicated given her CKD stage 5.   - TFTs wnl, TSH: 1.34, FT4 0.9.   - Prolactin noted to be elevated to 94, likely secondary to stalk effect.   - LH, FSH and estradiol low   - ACTH elevated to 88.4 but AM cortisol wnl, 12.9, 13.7  - IGF-1 testing   - Recommend visual field testing as outpatient.  - Patient needs endocrine and opthalmology follow up as outpatient   She can follow up with [address] [place] [address], [address], [place], [place] [id number] [phone]

## 2020-02-12 NOTE — PROGRESS NOTE ADULT - SUBJECTIVE AND OBJECTIVE BOX
[facility]      Seen earlier today, and examined.  - Today, patient is without complaints.           *****MEDICATIONS: Current medication reviewed and documented.    MEDICATIONS  (STANDING):    MEDICATIONS  (PRN):          ***** VITAL SIGNS:  T(F): 98.3 (20 @ 12:22), Max: 98.3 (20 @ 12:22)  HR: 70 (20 @ 12:22) (70 - 70)  BP: 132/67 (20 @ 12:22) (132/67 - 132/67)  RR: 18 (20 @ 12:22) (18 - 18)  SpO2: 99% (20 @ 12:22) (99% - 99%)  Wt(kg): --  ,   I&O's Summary           *****PHYSICAL EXAM:    Alert oriented x2  Attention comprehension are fair. Able to name, repeat, read without any difficulty.   Able to follow 3 step commands.     EOMI fundi not visualized,  VFF to confrontration  No facial asymmetry   Tongue is midline   Palate elevates symmetrically   Moving all 4 ext symmetrically no pronator drift   limited eval as pt was not fully cooperative   left shoulder pain   Reflexes are symmetric throughout   sensation is grossly symmetric  Gait : not assessed.  B/L down going toes        *****LAB AND IMAGIN.8    3.82  )-----------( 132      ( 2020 07:48 )             26.9               02-12    134<L>  |  103  |  80<H>  ----------------------------<  103<H>  4.3   |  19<L>  |  4.73<H>    Ca    9.0      2020 07:48  Phos  3.3     02-12  Mg     1.9     02-12      PT/INR - ( 2020 07:48 )   PT: 20.6 SEC;   INR: 1.78          PTT - ( 2020 07:48 )  PTT:27.1 SEC                     [All pertinent recent Imaging/Reports reviewed]           *****A S S E S S M E N T   A N D   P L A N :       Excerpt from H&P,Pt 81yo female with Hx of HTN CAD CABG AS S/P  TAVR 2017, PPM CHF RUE DVT DM Anemia presenting to [facility] ER for evaluation of C/P. Pt reports sudden onset of midsternal chest tightness that started around 0600 this morning when walking to bathrom. Pain was mild to moderate tightness like sensation with no radiation  and no pleuritic component. Pt reports associated mild dyspnea, but no dizziness, plapitations, nausea or diaphoresis. Pt reports took "baby aspirin" and 10 minutes after pain resolved. Rest of the morning  was unremarkable; however, in afternoon around 1300 Pt had similar quality chest tightness while at rest, she contacted her cardiologist and was advised to come to hospital. Pt reports no nausea or vomiting,    Headache started after admission and has been continuous. left sided associated with tearing on the left   Pt reports that she often gets headaches at home for which she takes tylenol.   She denies any visual obscurations/photosensitivity/phonosensitivity/double vision/blurry vision     Problem/Recommendations 1:  unilateral headache resolved     will d/c depakote   esr normal.   no meningismus    esr   oxygen supplementation   sleep augmentation         Thank you for allowing me to participate in the care of this patient. Please do not hesitate to call me if you have any  questions.        ________________  Chantal Sauceda MD  [facility] ([facility]  [phone]      33 minutes spent on total encounter; more than 50 % of the visit was  spent counseling about plan of care, compliance to diet/exercise and medication regimen and or  coordinating care by the attending physician.      It is advised that stroke patients follow up with TRISHA Reeves @ [phone] in 1- 2 weeks.   Others please follow up with Dr. Michael Nissenbaum [phone]

## 2020-02-12 NOTE — DISCHARGE NOTE NURSING/CASE MANAGEMENT/SOCIAL WORK - NSDCFUADDAPPT_GEN_ALL_CORE_FT
Follow up with PCP within 1-2 weeks of discharge.   Follow up with hematology within 1-2 weeks of discharge -- You have appointment with Dr. Fox on 2/24/2020.  Follow up with cardiology within 1-2 weeks of discharge.   Follow up with neurology within 1-2 weeks of discharge.   Follow up with endocrinology within 1-2 weeks of discharge: She can follow up with [address] [place] [address] [address] [place] [address], [phone]. You should not take anymore DM2 medications (Januvia) until you follow up with endocrinology.   Follow up with ophthalmology within 1-2 weeks of discharge.

## 2020-02-12 NOTE — PROGRESS NOTE ADULT - ASSESSMENT
81yo female with Hx of HTN CAD CABG AS S/P  TAVR 2017, PPM CHF RUE DVT DM Anemia presenting to [facility] ER for evaluation of chest pain. Noted to have concern for pituitary macroadenoma on CT head done for headache. Endocrine team was consulted for further evaluation.

## 2020-02-12 NOTE — PROGRESS NOTE ADULT - SUBJECTIVE AND OBJECTIVE BOX
[facility] Division of Kidney Diseases & Hypertension  FOLLOW UP NOTE  --------------------------------------------------------------------------------  Chief Complaint: headache    24 hour events/subjective: The patient was seen and evaluated at bedside this morning.  She reports that she had a headache yesterday but that it went away.  This morning she feels much better.  She has no chest pain no shortness of breath no nausea and no vomiting.  She tolerated PRBCs yesterday without an issue.        PAST HISTORY  --------------------------------------------------------------------------------  No significant changes to PMH, PSH, FHx, SHx, unless otherwise noted    ALLERGIES & MEDICATIONS  --------------------------------------------------------------------------------  Allergies    codeine (Other)  Lortab (Other)  morphine (Other)  Percocet 10/325 (Other)  PPM not compatible with  MRI (Other (Fatal))  Reglan (Other; [place])  sulfa drugs ([place])    Intolerances      Standing Inpatient Medications  allopurinol 100 milliGRAM(s) Oral daily  aspirin enteric coated 81 milliGRAM(s) Oral daily  atorvastatin 40 milliGRAM(s) Oral at bedtime  calcitriol   Capsule 0.25 MICROGram(s) Oral <User Schedule>  carvedilol 25 milliGRAM(s) Oral every 12 hours  cloNIDine 0.2 milliGRAM(s) Oral daily  dextrose 5%. 1000 milliLiter(s) IV Continuous <Continuous>  dextrose 50% Injectable 12.5 Gram(s) IV Push once  dextrose 50% Injectable 25 Gram(s) IV Push once  dextrose 50% Injectable 25 Gram(s) IV Push once  insulin lispro (HumaLOG) corrective regimen sliding scale   SubCutaneous three times a day before meals  insulin lispro (HumaLOG) corrective regimen sliding scale   SubCutaneous at bedtime  isosorbide   mononitrate ER Tablet (IMDUR) 60 milliGRAM(s) Oral <User Schedule>  sodium bicarbonate 650 milliGRAM(s) Oral two times a day    PRN Inpatient Medications  acetaminophen   Tablet .. 650 milliGRAM(s) Oral every 6 hours PRN  dextrose 40% Gel 15 Gram(s) Oral once PRN  glucagon  Injectable 1 milliGRAM(s) IntraMuscular once PRN  ondansetron Injectable 4 milliGRAM(s) IV Push every 6 hours PRN      REVIEW OF SYSTEMS  --------------------------------------------------------------------------------  Gen: no fever  Respiratory: no sob  CV: no cp  GI: no ab pain  : urinating normally  MSK: no pain    VITALS/PHYSICAL EXAM  --------------------------------------------------------------------------------  T(C): 36.7 (02-12-20 @ 06:26), Max: 36.9 (02-11-20 @ 21:51)  HR: 60 (02-12-20 @ 06:26) (60 - 75)  BP: 143/53 (02-12-20 @ 06:26) (143/53 - 160/79)  ABP: --  ABP(mean): --  RR: 18 (02-12-20 @ 06:26) (16 - 19)  SpO2: 100% (02-12-20 @ 06:26) (98% - 100%)  CVP(mm Hg): --        Physical Exam:  	Gen: NAD, well-appearing on room air   	HEENT: no JVD, clear oropharynx  	Pulm: CTA B/L  	CV: RRR, S1S2; no rub  	Abd: +BS, soft, nontender/nondistended  	: No suprapubic tenderness  	LE: Warm, no edema  	Skin: Warm, without rashes  	Vascular access: MASHA WALSH    LABS/STUDIES  --------------------------------------------------------------------------------              8.8    3.82  >-----------<  132      [02-12-20 @ 07:48]              26.9     134  |  103  |  80  ----------------------------<  103      [02-12-20 @ 07:48]  4.3   |  19  |  4.73        Ca     9.0     [02-12-20 @ 07:48]      Mg     1.9     [02-12-20 @ 07:48]      Phos  3.3     [02-12-20 @ 07:48]      PT/INR: PT 20.6 , INR 1.78       [02-12-20 @ 07:48]  PTT: 27.1       [02-12-20 @ 07:48]      Creatinine Trend:  SCr 4.73 [02-12 @ 07:48]  SCr 5.46 [02-11 @ 05:00]  SCr 5.60 [02-10 @ 07:35]  SCr 5.20 [02-09 @ 10:50]  SCr 4.60 [02-08 @ 03:51]

## 2020-02-13 LAB — IGF BP1 SERPL-MCNC: 113 NG/ML — SIGNIFICANT CHANGE UP (ref 35–165)

## 2020-02-19 ENCOUNTER — APPOINTMENT (OUTPATIENT)
Dept: VASCULAR SURGERY | Facility: CLINIC | Age: 80
End: 2020-02-19
Payer: MEDICARE

## 2020-02-19 VITALS
TEMPERATURE: 99 F | WEIGHT: 140 LBS | HEIGHT: 61 IN | SYSTOLIC BLOOD PRESSURE: 183 MMHG | BODY MASS INDEX: 26.43 KG/M2 | DIASTOLIC BLOOD PRESSURE: 72 MMHG | HEART RATE: 70 BPM

## 2020-02-19 DIAGNOSIS — I87.009 POSTTHROMBOTIC SYNDROME W/OUT COMPLICATIONS OF UNSPECIFIED EXTREMITY: ICD-10-CM

## 2020-02-19 DIAGNOSIS — M25.511 PAIN IN RIGHT SHOULDER: ICD-10-CM

## 2020-02-19 PROCEDURE — 99214 OFFICE O/P EST MOD 30 MIN: CPT

## 2020-02-19 PROCEDURE — 93971 EXTREMITY STUDY: CPT | Mod: RT

## 2020-02-19 NOTE — HISTORY OF PRESENT ILLNESS
[FreeTextEntry1] : pt was seen in hop  consultation at [facility] w rue  edema and dvt  s/o venous TOS thrombosis\par pt was rx med/conservatively w anticoag rx  sx stabilized and improved\par pt was d/c on aticoag rx\par pt states rue edema has resolved and only mild rt shoulder discomfort w activity \par intensity mild  since last ov  [de-identified] : pt is currently on counadin rx  which is being monitored by Dr Margaux POSADA\par pt is on baby asa \par pt is continuing rue  self pt/ot exercises \par pt reports only v mild swelling\par intensity mild since last ov \par pt reports dec in rom  and mod pain of rt shoulder  since last ov\par pt denies inj \par

## 2020-02-19 NOTE — DATA REVIEWED
[FreeTextEntry1] : 2/15/2019 RUE venous Duplex sig for acute w progression to  subacute dvt in subclav  v and axillary v w  minimal flow \par \par 6/12/2019 RUE Venous Duplex sig for chronic non occ  dvt  in  dvt in subclav  v and  1of 2  brachial v , sig for wall thickening in basilic vein \par \par 7/10/2019  RUE Venous Duplex sig for chronic wall thickening of wall of  subclav  v and  1of 2  brachial v , sig for acute occlusive dvt , 2nd brach vein w chronic non occ dvt , basilic vein  w chronic  wall thickening \par \par 10/16/2019 RUE Venous Duplex sig for  sub acute ax v non occ dvt  \par                                                                 and  1of 2  brachial v , sig for sub acute non  occlusive dvt , \par                                                                  2nd brach vein w chronic non occ dvt , basilic vein\par \par \par 2/19/2020 RUE Venous Duplex sig for  chronic non occ  1of 2  brachial v , basilic v, \par                                                          chronic wall thickening in RT SV\par \par \par

## 2020-02-19 NOTE — ASSESSMENT
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

## 2020-02-24 ENCOUNTER — OUTPATIENT (OUTPATIENT)
Dept: OUTPATIENT SERVICES | Facility: HOSPITAL | Age: 80
LOS: 1 days | Discharge: HOME | End: 2020-02-24

## 2020-02-24 DIAGNOSIS — D46.1 REFRACTORY ANEMIA WITH RING SIDEROBLASTS: ICD-10-CM

## 2020-02-24 DIAGNOSIS — D63.1 ANEMIA IN CHRONIC KIDNEY DISEASE: ICD-10-CM

## 2020-02-24 DIAGNOSIS — Z95.0 PRESENCE OF CARDIAC PACEMAKER: Chronic | ICD-10-CM

## 2020-02-24 DIAGNOSIS — I77.0 ARTERIOVENOUS FISTULA, ACQUIRED: Chronic | ICD-10-CM

## 2020-02-24 DIAGNOSIS — Z95.2 PRESENCE OF PROSTHETIC HEART VALVE: Chronic | ICD-10-CM

## 2020-02-24 DIAGNOSIS — Z90.49 ACQUIRED ABSENCE OF OTHER SPECIFIED PARTS OF DIGESTIVE TRACT: Chronic | ICD-10-CM

## 2020-02-24 DIAGNOSIS — N18.4 CHRONIC KIDNEY DISEASE, STAGE 4 (SEVERE): ICD-10-CM

## 2020-02-27 ENCOUNTER — RESULT REVIEW (OUTPATIENT)
Age: 80
End: 2020-02-27

## 2020-02-27 ENCOUNTER — OUTPATIENT (OUTPATIENT)
Dept: OUTPATIENT SERVICES | Facility: HOSPITAL | Age: 80
LOS: 1 days | End: 2020-02-27
Payer: MEDICARE

## 2020-02-27 ENCOUNTER — APPOINTMENT (OUTPATIENT)
Dept: HEMATOLOGY ONCOLOGY | Facility: CLINIC | Age: 80
End: 2020-02-27
Payer: MEDICARE

## 2020-02-27 VITALS
TEMPERATURE: 97.9 F | HEART RATE: 61 BPM | WEIGHT: 143.74 LBS | OXYGEN SATURATION: 100 % | SYSTOLIC BLOOD PRESSURE: 159 MMHG | BODY MASS INDEX: 27.16 KG/M2 | RESPIRATION RATE: 16 BRPM | DIASTOLIC BLOOD PRESSURE: 68 MMHG

## 2020-02-27 DIAGNOSIS — Z95.2 PRESENCE OF PROSTHETIC HEART VALVE: Chronic | ICD-10-CM

## 2020-02-27 DIAGNOSIS — I77.0 ARTERIOVENOUS FISTULA, ACQUIRED: Chronic | ICD-10-CM

## 2020-02-27 DIAGNOSIS — Z90.49 ACQUIRED ABSENCE OF OTHER SPECIFIED PARTS OF DIGESTIVE TRACT: Chronic | ICD-10-CM

## 2020-02-27 DIAGNOSIS — Z95.0 PRESENCE OF CARDIAC PACEMAKER: Chronic | ICD-10-CM

## 2020-02-27 DIAGNOSIS — D63.1 ANEMIA IN CHRONIC KIDNEY DISEASE: ICD-10-CM

## 2020-02-27 LAB
ALBUMIN SERPL ELPH-MCNC: 3.7 G/DL
ALP BLD-CCNC: 45 U/L
ALT SERPL-CCNC: 13 U/L
ANION GAP SERPL CALC-SCNC: 13 MMOL/L
AST SERPL-CCNC: 17 U/L
BILIRUB SERPL-MCNC: 0.2 MG/DL
BLD GP AB SCN SERPL QL: NEGATIVE — SIGNIFICANT CHANGE UP
BUN SERPL-MCNC: 78 MG/DL
CALCIUM SERPL-MCNC: 9.3 MG/DL
CHLORIDE SERPL-SCNC: 107 MMOL/L
CO2 SERPL-SCNC: 19 MMOL/L
CREAT SERPL-MCNC: 4.27 MG/DL
FERRITIN SERPL-MCNC: 1491 NG/ML
GLUCOSE SERPL-MCNC: 114 MG/DL
HCT VFR BLD CALC: 20.3 % — CRITICAL LOW (ref 34.5–45)
HGB BLD-MCNC: 6.5 G/DL — CRITICAL LOW (ref 11.5–15.5)
MCHC RBC-ENTMCNC: 28 PG — SIGNIFICANT CHANGE UP (ref 27–34)
MCHC RBC-ENTMCNC: 32.1 G/DL — SIGNIFICANT CHANGE UP (ref 32–36)
MCV RBC AUTO: 87.3 FL — SIGNIFICANT CHANGE UP (ref 80–100)
PLATELET # BLD AUTO: 96 K/UL — LOW (ref 150–400)
POTASSIUM SERPL-SCNC: 5.4 MMOL/L
PROT SERPL-MCNC: 5.8 G/DL
RBC # BLD: 2.33 M/UL — LOW (ref 3.8–5.2)
RBC # FLD: 14.1 % — SIGNIFICANT CHANGE UP (ref 10.3–14.5)
RH IG SCN BLD-IMP: POSITIVE — SIGNIFICANT CHANGE UP
SODIUM SERPL-SCNC: 139 MMOL/L
WBC # BLD: 4.1 K/UL — SIGNIFICANT CHANGE UP (ref 3.8–10.5)
WBC # FLD AUTO: 4.1 K/UL — SIGNIFICANT CHANGE UP (ref 3.8–10.5)

## 2020-02-27 PROCEDURE — 99214 OFFICE O/P EST MOD 30 MIN: CPT

## 2020-02-27 NOTE — PHYSICAL EXAM
[Restricted in physically strenuous activity but ambulatory and able to carry out work of a light or sedentary nature] : Status 1- Restricted in physically strenuous activity but ambulatory and able to carry out work of a light or sedentary nature, e.g., light house work, office work [Normal] : grossly intact [de-identified] : ambulating with cane

## 2020-02-27 NOTE — REVIEW OF SYSTEMS
[Chills] : chills [Fatigue] : fatigue [Shortness Of Breath] : shortness of breath [SOB on Exertion] : shortness of breath during exertion [Joint Pain] : joint pain [Joint Stiffness] : joint stiffness [Difficulty Walking] : difficulty walking [Negative] : Allergic/Immunologic [Fever] : no fever [Night Sweats] : no night sweats [Recent Change In Weight] : ~T no recent weight change [Eye Pain] : no eye pain [Red Eyes] : eyes not red [Vision Problems] : no vision problems [Dysphagia] : no dysphagia [Dry Eyes] : no dryness of the eyes [Nosebleeds] : no nosebleeds [Odynophagia] : no odynophagia [Hoarseness] : no hoarseness [Loss of Hearing] : no loss of hearing [Mucosal Pain] : no mucosal pain [Wheezing] : no wheezing [Leg Claudication] : no intermittent leg claudication [Vomiting] : no vomiting [Abdominal Pain] : no abdominal pain [Cough] : no cough [Dysuria] : no dysuria [Vaginal Discharge] : no vaginal discharge [Constipation] : no constipation [Diarrhea] : no diarrhea [Skin Rash] : no skin rash [Dysmenorrhea/Abn Vaginal Bleeding] : no dysmenorrhea/abnormal vaginal bleeding [Muscle Pain] : no muscle pain [Skin Wound] : no skin wound [Confused] : no confusion [Suicidal] : not suicidal [Dizziness] : no dizziness [Fainting] : no fainting [Insomnia] : no insomnia [Anxiety] : no anxiety [Depression] : no depression [Muscle Weakness] : no muscle weakness [Hot Flashes] : no hot flashes [Proptosis] : no proptosis [Deepening Of The Voice] : no deepening of the voice [Easy Bruising] : no tendency for easy bruising [Easy Bleeding] : no tendency for easy bleeding [Swollen Glands] : no swollen glands

## 2020-02-27 NOTE — HISTORY OF PRESENT ILLNESS
[Disease:__________________________] : Disease: [unfilled] [Treatment Protocol] : Treatment Protocol [Cardiovascular] : Cardiovascular [Constitutional] : Constitutional [ENT] : ENT [Endocrine] : Endocrine [Dermatologic] : Dermatologic [Gastrointestinal] : Gastrointestinal [Genitourinary] : Genitourinary [Infectious] : Infectious [Gynecologic] : Gynecologic [Musculoskeletal] : Musculoskeletal [Neurologic] : Neurologic [Pain] : Pain [Pulmonary] : Pulmonary [Hematologic] : Hematologic [FreeTextEntry1] : Darbepoetin 200 mg q 6 weeks (s/p 57 treatments - held.due to RUE DVT); blood transfusions since January 2019 (1-2 units packed RBC every 4-6 weeks) [de-identified] : 79 year old female presenting to the office for hematologic care. 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 She had a left sided fistula placed approximately in 2010 by her physician in [place] but she does not require dialysis; she is followed in the renal clinic by Dr. Markell Dixon. The use of colony stimulating agents had been safe for the patient when given when hemoglobins are less than 10 g/dL which has led to a frequency of administration of one ejection every 4 to 6 weeks; she received Aranesp injections, completing 57 injections from May 2011 until January 2019. \par \par Patient was hospitalized from 1/22/2019 - 1/29/2019 for right upper extremity DVT. Prior to admission, she noted RUE swelling and pain/discomfort since receiving her last dose of Aranesp on 1/18/2019. 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 Since then, her Aranesp treatments were discontinued and she was transitioned to blood transfusions, which she requires 1 or 2 units every 4-6 weeks. \par \par Past medical history includes hypertension (required multiple medications for control), type two diabetes mellitus. There is no prior history of cerebrovascular accident or bleeding. She has had mildly elevated ferritin and iron levels. [de-identified] : Patient was advised to [facility] due to complaint of chest pain on 2/1/2020. Stress test revealed reversible ischemia and she was subsequently admitted. Cardiac catheterization was considered but conservative management was recommended due to her chronic kidney disease. Patient also complained of left sided headaches without focal deficits (has history of chronic migraines). Her CT head was negative and Neurology was consulted. Patient's headache resolved with IV Depakote. MRI was unable to be performed as PPM is not MRI compatible. She was given 2 units of blood this admission. She was discharged on 2/12/2020 and home physical therapy was requested for her right upper extremity. \par

## 2020-02-27 NOTE — ASSESSMENT
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 Patient She also has a history of leukopenia (benign) and mild thrombocytopenia. Bone marrow biopsy last performed on 7/15/2011 demonstrated maturing and mature myeloid elements with no significant lymphocytosis or immaturity. \par \par Patient's physical examination findings remain unchanged when compared to the last visit. Patient is aware of her rise in ferritin and Desferal SC home infusion was considered. However, this regimen is contraindicated in patients with severe renal disease since the drug and the iron chelate are excreted primarily by the kidney. Plan to reach out to Dr. Dixon (nephrologist) if there are alternative treatments to address her elevated ferritin level or if dialysis should be considered. Blood studies done today. Transfusion of 2 unit of packed red cells scheduled for 2/29/2020 at 8 am. Return to the office on 4/2/2020 at 11 am. Seen and discussed with Dr. Anjum Fox.

## 2020-02-28 PROCEDURE — 86923 COMPATIBILITY TEST ELECTRIC: CPT

## 2020-02-28 PROCEDURE — 86850 RBC ANTIBODY SCREEN: CPT

## 2020-02-28 PROCEDURE — 86901 BLOOD TYPING SEROLOGIC RH(D): CPT

## 2020-02-28 PROCEDURE — 86900 BLOOD TYPING SEROLOGIC ABO: CPT

## 2020-02-29 ENCOUNTER — APPOINTMENT (OUTPATIENT)
Dept: INFUSION THERAPY | Facility: HOSPITAL | Age: 80
End: 2020-02-29

## 2020-03-02 DIAGNOSIS — Z51.89 ENCOUNTER FOR OTHER SPECIFIED AFTERCARE: ICD-10-CM

## 2020-03-04 ENCOUNTER — APPOINTMENT (OUTPATIENT)
Dept: NEPHROLOGY | Facility: CLINIC | Age: 80
End: 2020-03-04
Payer: MEDICARE

## 2020-03-04 VITALS
DIASTOLIC BLOOD PRESSURE: 69 MMHG | HEART RATE: 83 BPM | HEIGHT: 61 IN | WEIGHT: 139 LBS | BODY MASS INDEX: 26.24 KG/M2 | OXYGEN SATURATION: 100 % | SYSTOLIC BLOOD PRESSURE: 128 MMHG

## 2020-03-04 DIAGNOSIS — I10 ESSENTIAL (PRIMARY) HYPERTENSION: ICD-10-CM

## 2020-03-04 PROCEDURE — 99214 OFFICE O/P EST MOD 30 MIN: CPT

## 2020-03-04 NOTE — ASSESSMENT
[FreeTextEntry1] : Ms. Seymour is a 80 y old F presenting for follow-up for CKD 5.\par \par 1)  CKD 5: \par mildly uremic. Feels well overall. \par Will continue to monitor.\par Has fistula ready to use( good thrill and bruit), needs follow up for steal syndrome. \par Cont diuretics as needed- change to MWF dosing\par No urgent dialysis needed\par \par 2)  Anemia of CKD\par On JEISON but now concern for iron overload\par Rising ferritin and perhaps need for Desferal SC home infusion was considered. Ok to give with 25% of the prescribed dose for normal GFR.\par \par 3)  Essential hypertension\par - stable\par \par 4)  Renal osteodystrophy\par -cont calcitriol for now \par \par 5)  Access: Left avf- good thrill and bruit\par -patient will continue to follow-up with vascular surgery as recommended\par -pain on the R side for the clot that has been there- will d/w with Surgery on alternative options for her pain as she cannot get NSAIDS\par \par \par f/u 2-3 months

## 2020-03-04 NOTE — HISTORY OF PRESENT ILLNESS
[FreeTextEntry1] : Ms. Seymour presents for follow-up for CKD 5 - healthy transitions patient.\par Hx of TAVR, previous 3V CABG, PPM, AVF placed 10 years ago, has steal syndrome. \par \par Today claims is feeling well, no new medications added, no weight gain, edema or sob, denies insomnia, dysgeusia, tremors or anorexia. Had 1 units of PRBC few weeks ago for a low Hgb symptoms. Her pain on the R arm due to old clot is unbearable now. She cannot take NSAIDS. Her fistula is working. \par She has not gained wt, her meds unchanged. She feels otherwise good except decrease appetite.\par Heme considering given IV chelator for iron overload. \par \par \par

## 2020-03-04 NOTE — PHYSICAL EXAM
[General Appearance - Alert] : alert [General Appearance - In No Acute Distress] : in no acute distress [General Appearance - Well Nourished] : well nourished [General Appearance - Well Developed] : well developed [Sclera] : the sclera and conjunctiva were normal [Extraocular Movements] : extraocular movements were intact [Oropharynx] : the oropharynx was normal [Neck Cervical Mass (___cm)] : no neck mass was observed [Jugular Venous Distention Increased] : there was no jugular-venous distention [Auscultation Breath Sounds / Voice Sounds] : lungs were clear to auscultation bilaterally [Heart Rate And Rhythm] : heart rate was normal and rhythm regular [Heart Sounds Gallop] : no gallops [Heart Sounds Pericardial Friction Rub] : no pericardial rub [FreeTextEntry1] : +2 systolic murmur [Edema] : there was no peripheral edema [Abdomen Tenderness] : non-tender [Abdomen Soft] : soft [] : no hepato-splenomegaly [No CVA Tenderness] : no ~M costovertebral angle tenderness [No Spinal Tenderness] : no spinal tenderness [___ (cm) Fistula] : [unfilled] (cm) fistula [Bruit] : a bruit was present [Thrill] : a thrill was present

## 2020-03-27 ENCOUNTER — OUTPATIENT (OUTPATIENT)
Dept: OUTPATIENT SERVICES | Facility: HOSPITAL | Age: 80
LOS: 1 days | Discharge: HOME | End: 2020-03-27

## 2020-03-27 DIAGNOSIS — Z95.2 PRESENCE OF PROSTHETIC HEART VALVE: Chronic | ICD-10-CM

## 2020-03-27 DIAGNOSIS — N18.4 CHRONIC KIDNEY DISEASE, STAGE 4 (SEVERE): ICD-10-CM

## 2020-03-27 DIAGNOSIS — Z95.0 PRESENCE OF CARDIAC PACEMAKER: Chronic | ICD-10-CM

## 2020-03-27 DIAGNOSIS — I77.0 ARTERIOVENOUS FISTULA, ACQUIRED: Chronic | ICD-10-CM

## 2020-03-27 DIAGNOSIS — D63.1 ANEMIA IN CHRONIC KIDNEY DISEASE: ICD-10-CM

## 2020-03-27 DIAGNOSIS — Z90.49 ACQUIRED ABSENCE OF OTHER SPECIFIED PARTS OF DIGESTIVE TRACT: Chronic | ICD-10-CM

## 2020-03-27 DIAGNOSIS — D46.1 REFRACTORY ANEMIA WITH RING SIDEROBLASTS: ICD-10-CM

## 2020-04-02 ENCOUNTER — RESULT REVIEW (OUTPATIENT)
Age: 80
End: 2020-04-02

## 2020-04-02 ENCOUNTER — OUTPATIENT (OUTPATIENT)
Dept: OUTPATIENT SERVICES | Facility: HOSPITAL | Age: 80
LOS: 1 days | End: 2020-04-02
Payer: MEDICARE

## 2020-04-02 ENCOUNTER — APPOINTMENT (OUTPATIENT)
Dept: HEMATOLOGY ONCOLOGY | Facility: CLINIC | Age: 80
End: 2020-04-02
Payer: MEDICARE

## 2020-04-02 VITALS
HEART RATE: 71 BPM | RESPIRATION RATE: 16 BRPM | TEMPERATURE: 98.8 F | DIASTOLIC BLOOD PRESSURE: 71 MMHG | OXYGEN SATURATION: 100 % | SYSTOLIC BLOOD PRESSURE: 148 MMHG

## 2020-04-02 DIAGNOSIS — D63.1 ANEMIA IN CHRONIC KIDNEY DISEASE: ICD-10-CM

## 2020-04-02 DIAGNOSIS — Z95.0 PRESENCE OF CARDIAC PACEMAKER: Chronic | ICD-10-CM

## 2020-04-02 DIAGNOSIS — Z90.49 ACQUIRED ABSENCE OF OTHER SPECIFIED PARTS OF DIGESTIVE TRACT: Chronic | ICD-10-CM

## 2020-04-02 DIAGNOSIS — I77.0 ARTERIOVENOUS FISTULA, ACQUIRED: Chronic | ICD-10-CM

## 2020-04-02 DIAGNOSIS — Z95.2 PRESENCE OF PROSTHETIC HEART VALVE: Chronic | ICD-10-CM

## 2020-04-02 LAB
BASOPHILS # BLD AUTO: 0.02 K/UL — SIGNIFICANT CHANGE UP (ref 0–0.2)
BASOPHILS NFR BLD AUTO: 0.7 % — SIGNIFICANT CHANGE UP (ref 0–2)
BLD GP AB SCN SERPL QL: NEGATIVE — SIGNIFICANT CHANGE UP
EOSINOPHIL # BLD AUTO: 0.12 K/UL — SIGNIFICANT CHANGE UP (ref 0–0.5)
EOSINOPHIL NFR BLD AUTO: 4.1 % — SIGNIFICANT CHANGE UP (ref 0–6)
HCT VFR BLD CALC: 20.9 % — CRITICAL LOW (ref 34.5–45)
HGB BLD-MCNC: 6.3 G/DL — CRITICAL LOW (ref 11.5–15.5)
IMM GRANULOCYTES NFR BLD AUTO: 0.3 % — SIGNIFICANT CHANGE UP (ref 0–1.5)
LYMPHOCYTES # BLD AUTO: 0.9 K/UL — LOW (ref 1–3.3)
LYMPHOCYTES # BLD AUTO: 30.9 % — SIGNIFICANT CHANGE UP (ref 13–44)
MCHC RBC-ENTMCNC: 26.5 PG — LOW (ref 27–34)
MCHC RBC-ENTMCNC: 30.1 GM/DL — LOW (ref 32–36)
MCV RBC AUTO: 87.8 FL — SIGNIFICANT CHANGE UP (ref 80–100)
MONOCYTES # BLD AUTO: 0.23 K/UL — SIGNIFICANT CHANGE UP (ref 0–0.9)
MONOCYTES NFR BLD AUTO: 7.9 % — SIGNIFICANT CHANGE UP (ref 2–14)
NEUTROPHILS # BLD AUTO: 1.63 K/UL — LOW (ref 1.8–7.4)
NEUTROPHILS NFR BLD AUTO: 56.1 % — SIGNIFICANT CHANGE UP (ref 43–77)
NRBC # BLD: 0 /100 WBCS — SIGNIFICANT CHANGE UP (ref 0–0)
PLATELET # BLD AUTO: 110 K/UL — LOW (ref 150–400)
RBC # BLD: 2.38 M/UL — LOW (ref 3.8–5.2)
RBC # FLD: 14.6 % — HIGH (ref 10.3–14.5)
RH IG SCN BLD-IMP: POSITIVE — SIGNIFICANT CHANGE UP
WBC # BLD: 2.91 K/UL — LOW (ref 3.8–10.5)
WBC # FLD AUTO: 2.91 K/UL — LOW (ref 3.8–10.5)

## 2020-04-02 PROCEDURE — 99214 OFFICE O/P EST MOD 30 MIN: CPT

## 2020-04-02 NOTE — ASSESSMENT
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 She also has a history of leukopenia (benign) and mild thrombocytopenia. Bone marrow biopsy last performed on 7/15/2011 demonstrated maturing and mature myeloid elements with no significant lymphocytosis or immaturity. \par \par Patient's physical examination findings remain unchanged when compared to the last visit. Her ferritin continues to remain elevated and Desferal home infusion therapy was considered. She was evaluated by Dr. Dixon (nephrologist) who recommended that 25% of the prescribed dose can be administered; plan to discuss with Dr. Fox when to begin this home infusion given this current COVID crisis. \par \par Written consent obtained for blood transfusion. Additional blood studies done today. 2 unit of packed red cells transfusion scheduled for 4/04/2020 at 9 am. Return to the office on 4/30/2020 at 11 am.

## 2020-04-02 NOTE — PHYSICAL EXAM
[Restricted in physically strenuous activity but ambulatory and able to carry out work of a light or sedentary nature] : Status 1- Restricted in physically strenuous activity but ambulatory and able to carry out work of a light or sedentary nature, e.g., light house work, office work [Normal] : affect appropriate [de-identified] : ambulating with cane

## 2020-04-02 NOTE — HISTORY OF PRESENT ILLNESS
[Disease:__________________________] : Disease: [unfilled] [Treatment Protocol] : Treatment Protocol [Cardiovascular] : Cardiovascular [Constitutional] : Constitutional [ENT] : ENT [Dermatologic] : Dermatologic [Endocrine] : Endocrine [Gastrointestinal] : Gastrointestinal [Genitourinary] : Genitourinary [Gynecologic] : Gynecologic [Infectious] : Infectious [Musculoskeletal] : Musculoskeletal [Neurologic] : Neurologic [Pain] : Pain [Pulmonary] : Pulmonary [Hematologic] : Hematologic [de-identified] : 79 year old female presenting to the office for hematologic care. 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 She had a left sided fistula placed approximately in 2010 by her physician in [place] but she does not require dialysis; she is followed in the renal clinic by Dr. Markell Dixon. The use of colony stimulating agents had been safe for the patient when given when hemoglobins are less than 10 g/dL which has led to a frequency of administration of one ejection every 4 to 6 weeks; she received Aranesp injections, completing 57 injections from May 2011 until January 2019. \par \par Patient was hospitalized from 1/22/2019 - 1/29/2019 for right upper extremity DVT. Prior to admission, she noted RUE swelling and pain/discomfort since receiving her last dose of Aranesp on 1/18/2019. 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 Since then, her Aranesp treatments were discontinued and she was transitioned to blood transfusions, which she requires 1 or 2 units every 4-6 weeks. \par \par Past medical history includes hypertension (required multiple medications for control), type two diabetes mellitus. There is no prior history of cerebrovascular accident or bleeding. She has had mildly elevated ferritin and iron levels. [FreeTextEntry1] : Darbepoetin 200 mg q 6 weeks (s/p 57 treatments - held.due to RUE DVT); blood transfusions since January 2019 (1-2 units packed RBC every 4-6 weeks) [de-identified] : Patient presented to the office for routine follow-up visit. She has remained indoors during this current COVID crisis, denied experiencing any fever, cough, worsening shortness of breath at this time. Patient saw Dr. Dixon (nephrologist) last month.

## 2020-04-02 NOTE — REVIEW OF SYSTEMS
[Chills] : chills [Fatigue] : fatigue [SOB on Exertion] : shortness of breath during exertion [Joint Pain] : joint pain [Joint Stiffness] : joint stiffness [Difficulty Walking] : difficulty walking [Negative] : Allergic/Immunologic [Fever] : no fever [Night Sweats] : no night sweats [Recent Change In Weight] : ~T no recent weight change [Eye Pain] : no eye pain [Red Eyes] : eyes not red [Dry Eyes] : no dryness of the eyes [Vision Problems] : no vision problems [Dysphagia] : no dysphagia [Loss of Hearing] : no loss of hearing [Nosebleeds] : no nosebleeds [Hoarseness] : no hoarseness [Odynophagia] : no odynophagia [Mucosal Pain] : no mucosal pain [Leg Claudication] : no intermittent leg claudication [Shortness Of Breath] : no shortness of breath [Wheezing] : no wheezing [Cough] : no cough [Abdominal Pain] : no abdominal pain [Vomiting] : no vomiting [Constipation] : no constipation [Diarrhea] : no diarrhea [Dysuria] : no dysuria [Vaginal Discharge] : no vaginal discharge [Dysmenorrhea/Abn Vaginal Bleeding] : no dysmenorrhea/abnormal vaginal bleeding [Muscle Pain] : no muscle pain [Skin Rash] : no skin rash [Skin Wound] : no skin wound [Confused] : no confusion [Dizziness] : no dizziness [Fainting] : no fainting [Suicidal] : not suicidal [Insomnia] : no insomnia [Anxiety] : no anxiety [Depression] : no depression [Proptosis] : no proptosis [Hot Flashes] : no hot flashes [Muscle Weakness] : no muscle weakness [Deepening Of The Voice] : no deepening of the voice [Easy Bleeding] : no tendency for easy bleeding [Easy Bruising] : no tendency for easy bruising [Swollen Glands] : no swollen glands

## 2020-04-03 LAB
ALBUMIN SERPL ELPH-MCNC: 3.4 G/DL
ALP BLD-CCNC: 55 U/L
ALT SERPL-CCNC: <5 U/L
ANION GAP SERPL CALC-SCNC: 12 MMOL/L
AST SERPL-CCNC: 13 U/L
BILIRUB SERPL-MCNC: 0.2 MG/DL
BUN SERPL-MCNC: 51 MG/DL
CALCIUM SERPL-MCNC: 9.3 MG/DL
CHLORIDE SERPL-SCNC: 108 MMOL/L
CO2 SERPL-SCNC: 18 MMOL/L
CREAT SERPL-MCNC: 4.53 MG/DL
FERRITIN SERPL-MCNC: 1554 NG/ML
GLUCOSE SERPL-MCNC: 112 MG/DL
IRON SATN MFR SERPL: 41 %
IRON SERPL-MCNC: 75 UG/DL
POTASSIUM SERPL-SCNC: 5.4 MMOL/L
PROT SERPL-MCNC: 5.4 G/DL
SODIUM SERPL-SCNC: 139 MMOL/L
TIBC SERPL-MCNC: 180 UG/DL
UIBC SERPL-MCNC: 106 UG/DL

## 2020-04-04 ENCOUNTER — APPOINTMENT (OUTPATIENT)
Dept: INFUSION THERAPY | Facility: HOSPITAL | Age: 80
End: 2020-04-04

## 2020-04-06 DIAGNOSIS — Z51.89 ENCOUNTER FOR OTHER SPECIFIED AFTERCARE: ICD-10-CM

## 2020-04-07 ENCOUNTER — APPOINTMENT (OUTPATIENT)
Dept: VASCULAR SURGERY | Facility: CLINIC | Age: 80
End: 2020-04-07

## 2020-04-13 LAB — HCT VFR BLD CALC: 25.5 % — LOW (ref 34.5–45)

## 2020-04-23 ENCOUNTER — OUTPATIENT (OUTPATIENT)
Dept: OUTPATIENT SERVICES | Facility: HOSPITAL | Age: 80
LOS: 1 days | Discharge: HOME | End: 2020-04-23

## 2020-04-23 DIAGNOSIS — Z95.2 PRESENCE OF PROSTHETIC HEART VALVE: Chronic | ICD-10-CM

## 2020-04-23 DIAGNOSIS — Z90.49 ACQUIRED ABSENCE OF OTHER SPECIFIED PARTS OF DIGESTIVE TRACT: Chronic | ICD-10-CM

## 2020-04-23 DIAGNOSIS — D63.1 ANEMIA IN CHRONIC KIDNEY DISEASE: ICD-10-CM

## 2020-04-23 DIAGNOSIS — I77.0 ARTERIOVENOUS FISTULA, ACQUIRED: Chronic | ICD-10-CM

## 2020-04-23 DIAGNOSIS — Z95.0 PRESENCE OF CARDIAC PACEMAKER: Chronic | ICD-10-CM

## 2020-04-30 ENCOUNTER — APPOINTMENT (OUTPATIENT)
Dept: HEMATOLOGY ONCOLOGY | Facility: CLINIC | Age: 80
End: 2020-04-30
Payer: MEDICARE

## 2020-04-30 ENCOUNTER — RESULT REVIEW (OUTPATIENT)
Age: 80
End: 2020-04-30

## 2020-04-30 VITALS
DIASTOLIC BLOOD PRESSURE: 63 MMHG | TEMPERATURE: 98.7 F | HEART RATE: 71 BPM | BODY MASS INDEX: 25.2 KG/M2 | RESPIRATION RATE: 16 BRPM | OXYGEN SATURATION: 98 % | SYSTOLIC BLOOD PRESSURE: 150 MMHG | WEIGHT: 133.38 LBS

## 2020-04-30 LAB
BASOPHILS # BLD AUTO: 0.01 K/UL — SIGNIFICANT CHANGE UP (ref 0–0.2)
BASOPHILS NFR BLD AUTO: 0.3 % — SIGNIFICANT CHANGE UP (ref 0–2)
BLD GP AB SCN SERPL QL: NEGATIVE — SIGNIFICANT CHANGE UP
EOSINOPHIL # BLD AUTO: 0.1 K/UL — SIGNIFICANT CHANGE UP (ref 0–0.5)
EOSINOPHIL NFR BLD AUTO: 3.1 % — SIGNIFICANT CHANGE UP (ref 0–6)
FERRITIN SERPL-MCNC: 1428 NG/ML
HCT VFR BLD CALC: 20.6 % — CRITICAL LOW (ref 34.5–45)
HGB BLD-MCNC: 6.2 G/DL — CRITICAL LOW (ref 11.5–15.5)
IMM GRANULOCYTES NFR BLD AUTO: 0 % — SIGNIFICANT CHANGE UP (ref 0–1.5)
LYMPHOCYTES # BLD AUTO: 0.86 K/UL — LOW (ref 1–3.3)
LYMPHOCYTES # BLD AUTO: 27 % — SIGNIFICANT CHANGE UP (ref 13–44)
MCHC RBC-ENTMCNC: 25.9 PG — LOW (ref 27–34)
MCHC RBC-ENTMCNC: 30.1 GM/DL — LOW (ref 32–36)
MCV RBC AUTO: 86.2 FL — SIGNIFICANT CHANGE UP (ref 80–100)
MONOCYTES # BLD AUTO: 0.2 K/UL — SIGNIFICANT CHANGE UP (ref 0–0.9)
MONOCYTES NFR BLD AUTO: 6.3 % — SIGNIFICANT CHANGE UP (ref 2–14)
NEUTROPHILS # BLD AUTO: 2.01 K/UL — SIGNIFICANT CHANGE UP (ref 1.8–7.4)
NEUTROPHILS NFR BLD AUTO: 63.3 % — SIGNIFICANT CHANGE UP (ref 43–77)
NRBC # BLD: 0 /100 WBCS — SIGNIFICANT CHANGE UP (ref 0–0)
PLATELET # BLD AUTO: 90 K/UL — LOW (ref 150–400)
RBC # BLD: 2.39 M/UL — LOW (ref 3.8–5.2)
RBC # FLD: 14.5 % — SIGNIFICANT CHANGE UP (ref 10.3–14.5)
RH IG SCN BLD-IMP: POSITIVE — SIGNIFICANT CHANGE UP
WBC # BLD: 3.18 K/UL — LOW (ref 3.8–10.5)
WBC # FLD AUTO: 3.18 K/UL — LOW (ref 3.8–10.5)

## 2020-04-30 PROCEDURE — 99214 OFFICE O/P EST MOD 30 MIN: CPT

## 2020-04-30 PROCEDURE — 86923 COMPATIBILITY TEST ELECTRIC: CPT

## 2020-04-30 PROCEDURE — 86900 BLOOD TYPING SEROLOGIC ABO: CPT

## 2020-04-30 PROCEDURE — 86901 BLOOD TYPING SEROLOGIC RH(D): CPT

## 2020-04-30 PROCEDURE — 86850 RBC ANTIBODY SCREEN: CPT

## 2020-04-30 NOTE — ASSESSMENT
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 She also has a history of leukopenia (benign) and mild thrombocytopenia. Bone marrow biopsy last performed on 7/15/2011 demonstrated maturing and mature myeloid elements with no significant lymphocytosis or immaturity. \par \par Patient's physical examination findings remain unchanged when compared to the last visit. Her ferritin continues to remain elevated and patient is agreeable to Desferal home infusion therapy. She was evaluated by Dr. Dixon (nephrologist) who recommended that 25% of the prescribed dose can be administered. Request sent to [facility]. \par \par Additional blood studies done today. 2 unit of packed red cells transfusion scheduled for 5/2/2020 at 8 am. Return to the office on 6/4/2020 at 11 am. Discussed with Dr. Anjum Fox.  [Supportive] : Goals of care discussed with patient: Supportive [Palliative Care Plan] : not applicable at this time

## 2020-04-30 NOTE — PHYSICAL EXAM
[Ambulatory and capable of all self care but unable to carry out any work activities] : Status 2- Ambulatory and capable of all self care but unable to carry out any work activities. Up and about more than 50% of waking hours [Normal] : affect appropriate [de-identified] : ambulating with cane, currently in wheelchair

## 2020-04-30 NOTE — REVIEW OF SYSTEMS
[Fever] : no fever [Chills] : chills [Night Sweats] : no night sweats [Fatigue] : fatigue [Recent Change In Weight] : ~T recent weight change [Eye Pain] : no eye pain [Red Eyes] : eyes not red [Dry Eyes] : no dryness of the eyes [Vision Problems] : no vision problems [Dysphagia] : no dysphagia [Loss of Hearing] : no loss of hearing [Nosebleeds] : no nosebleeds [Hoarseness] : no hoarseness [Odynophagia] : no odynophagia [Mucosal Pain] : no mucosal pain [Chest Pain] : no chest pain [Palpitations] : no palpitations [Leg Claudication] : no intermittent leg claudication [Shortness Of Breath] : no shortness of breath [Lower Ext Edema] : no lower extremity edema [Wheezing] : no wheezing [SOB on Exertion] : shortness of breath during exertion [Cough] : no cough [Abdominal Pain] : no abdominal pain [Vomiting] : no vomiting [Constipation] : no constipation [Dysuria] : no dysuria [Diarrhea] : no diarrhea [Vaginal Discharge] : no vaginal discharge [Dysmenorrhea/Abn Vaginal Bleeding] : no dysmenorrhea/abnormal vaginal bleeding [Joint Pain] : joint pain [Joint Stiffness] : joint stiffness [Muscle Pain] : no muscle pain [Skin Wound] : no skin wound [Skin Rash] : no skin rash [Confused] : no confusion [Fainting] : no fainting [Dizziness] : no dizziness [Difficulty Walking] : difficulty walking [Suicidal] : not suicidal [Insomnia] : no insomnia [Depression] : no depression [Anxiety] : no anxiety [Proptosis] : no proptosis [Hot Flashes] : no hot flashes [Deepening Of The Voice] : no deepening of the voice [Easy Bleeding] : no tendency for easy bleeding [Muscle Weakness] : no muscle weakness [Swollen Glands] : no swollen glands [Easy Bruising] : no tendency for easy bruising [Negative] : Allergic/Immunologic [FreeTextEntry2] : 3 lb weight loss

## 2020-04-30 NOTE — HISTORY OF PRESENT ILLNESS
[Disease:__________________________] : Disease: [unfilled] [de-identified] : 80 year old female presenting to the office for hematologic care. 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 She had a left sided fistula placed approximately in 2010 by her physician in [place] but she does not require dialysis; she is followed in the renal clinic by Dr. Markell Dixon. The use of colony stimulating agents had been safe for the patient when given when hemoglobins are less than 10 g/dL which has led to a frequency of administration of one ejection every 4 to 6 weeks; she received Aranesp injections, completing 57 injections from May 2011 until January 2019. \par \par Patient was hospitalized from 1/22/2019 - 1/29/2019 for right upper extremity DVT. Prior to admission, she noted RUE swelling and pain/discomfort since receiving her last dose of Aranesp on 1/18/2019. 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 Since then, her Aranesp treatments were discontinued and she was transitioned to blood transfusions, which she requires 1 or 2 units every 4-6 weeks. \par \par Past medical history includes hypertension (required multiple medications for control), type two diabetes mellitus. There is no prior history of cerebrovascular accident or bleeding. She has had mildly elevated ferritin and iron levels. [Treatment Protocol] : Treatment Protocol [FreeTextEntry1] : Darbepoetin 200 mg q 6 weeks (s/p 57 treatments - held.due to RUE DVT); blood transfusions since January 2019 (1-2 units packed RBC every 4-6 weeks) [de-identified] : Patient presented to the office for routine follow-up visit. She complained of bilateral knee pain (related to her arthritis), has remained indoors during this current COVID crisis; she denied experiencing fever, cough, worsening shortness of breath at this time. She is agreeable to Desferal home infusion.  [Cardiovascular] : Cardiovascular [Constitutional] : Constitutional [ENT] : ENT [Dermatologic] : Dermatologic [Endocrine] : Endocrine [Gastrointestinal] : Gastrointestinal [Genitourinary] : Genitourinary [Infectious] : Infectious [Gynecologic] : Gynecologic [Musculoskeletal] : Musculoskeletal [Neurologic] : Neurologic [Pulmonary] : Pulmonary [Pain] : Pain [Hematologic] : Hematologic

## 2020-05-01 ENCOUNTER — OUTPATIENT (OUTPATIENT)
Dept: OUTPATIENT SERVICES | Facility: HOSPITAL | Age: 80
LOS: 1 days | End: 2020-05-01
Payer: MEDICARE

## 2020-05-01 DIAGNOSIS — D63.1 ANEMIA IN CHRONIC KIDNEY DISEASE: ICD-10-CM

## 2020-05-01 DIAGNOSIS — Z95.2 PRESENCE OF PROSTHETIC HEART VALVE: Chronic | ICD-10-CM

## 2020-05-01 DIAGNOSIS — Z90.49 ACQUIRED ABSENCE OF OTHER SPECIFIED PARTS OF DIGESTIVE TRACT: Chronic | ICD-10-CM

## 2020-05-01 DIAGNOSIS — I77.0 ARTERIOVENOUS FISTULA, ACQUIRED: Chronic | ICD-10-CM

## 2020-05-01 DIAGNOSIS — Z95.0 PRESENCE OF CARDIAC PACEMAKER: Chronic | ICD-10-CM

## 2020-05-01 PROCEDURE — 86923 COMPATIBILITY TEST ELECTRIC: CPT

## 2020-05-02 ENCOUNTER — APPOINTMENT (OUTPATIENT)
Dept: INFUSION THERAPY | Facility: HOSPITAL | Age: 80
End: 2020-05-02

## 2020-05-04 DIAGNOSIS — Z51.89 ENCOUNTER FOR OTHER SPECIFIED AFTERCARE: ICD-10-CM

## 2020-05-04 DIAGNOSIS — D46.9 MYELODYSPLASTIC SYNDROME, UNSPECIFIED: ICD-10-CM

## 2020-05-04 DIAGNOSIS — N18.4 CHRONIC KIDNEY DISEASE, STAGE 4 (SEVERE): ICD-10-CM

## 2020-05-05 LAB
ALBUMIN SERPL ELPH-MCNC: 3.5 G/DL
ALP BLD-CCNC: 52 U/L
ALT SERPL-CCNC: <5 U/L
ANION GAP SERPL CALC-SCNC: 12 MMOL/L
AST SERPL-CCNC: 11 U/L
BILIRUB SERPL-MCNC: 0.3 MG/DL
BUN SERPL-MCNC: 51 MG/DL
CALCIUM SERPL-MCNC: 9.4 MG/DL
CHLORIDE SERPL-SCNC: 108 MMOL/L
CO2 SERPL-SCNC: 19 MMOL/L
CREAT SERPL-MCNC: 4.74 MG/DL
GLUCOSE SERPL-MCNC: 114 MG/DL
IRON SATN MFR SERPL: 42 %
IRON SERPL-MCNC: 74 UG/DL
POTASSIUM SERPL-SCNC: 5.4 MMOL/L
PROT SERPL-MCNC: 5.6 G/DL
SODIUM SERPL-SCNC: 139 MMOL/L
TIBC SERPL-MCNC: 174 UG/DL
UIBC SERPL-MCNC: 100 UG/DL

## 2020-05-20 ENCOUNTER — APPOINTMENT (OUTPATIENT)
Dept: NEPHROLOGY | Facility: CLINIC | Age: 80
End: 2020-05-20
Payer: MEDICARE

## 2020-05-20 PROCEDURE — 99441: CPT | Mod: 95

## 2020-06-02 ENCOUNTER — APPOINTMENT (OUTPATIENT)
Dept: HEMATOLOGY ONCOLOGY | Facility: CLINIC | Age: 80
End: 2020-06-02

## 2020-06-03 ENCOUNTER — OUTPATIENT (OUTPATIENT)
Dept: OUTPATIENT SERVICES | Facility: HOSPITAL | Age: 80
LOS: 1 days | Discharge: HOME | End: 2020-06-03

## 2020-06-03 DIAGNOSIS — I77.0 ARTERIOVENOUS FISTULA, ACQUIRED: Chronic | ICD-10-CM

## 2020-06-03 DIAGNOSIS — D63.1 ANEMIA IN CHRONIC KIDNEY DISEASE: ICD-10-CM

## 2020-06-03 DIAGNOSIS — Z90.49 ACQUIRED ABSENCE OF OTHER SPECIFIED PARTS OF DIGESTIVE TRACT: Chronic | ICD-10-CM

## 2020-06-03 DIAGNOSIS — Z95.2 PRESENCE OF PROSTHETIC HEART VALVE: Chronic | ICD-10-CM

## 2020-06-03 DIAGNOSIS — Z95.0 PRESENCE OF CARDIAC PACEMAKER: Chronic | ICD-10-CM

## 2020-06-04 ENCOUNTER — RESULT REVIEW (OUTPATIENT)
Age: 80
End: 2020-06-04

## 2020-06-04 ENCOUNTER — OUTPATIENT (OUTPATIENT)
Dept: OUTPATIENT SERVICES | Facility: HOSPITAL | Age: 80
LOS: 1 days | End: 2020-06-04
Payer: MEDICARE

## 2020-06-04 ENCOUNTER — APPOINTMENT (OUTPATIENT)
Dept: HEMATOLOGY ONCOLOGY | Facility: CLINIC | Age: 80
End: 2020-06-04
Payer: MEDICARE

## 2020-06-04 VITALS
SYSTOLIC BLOOD PRESSURE: 158 MMHG | WEIGHT: 131.84 LBS | BODY MASS INDEX: 24.91 KG/M2 | TEMPERATURE: 97.5 F | HEART RATE: 67 BPM | OXYGEN SATURATION: 100 % | RESPIRATION RATE: 14 BRPM | DIASTOLIC BLOOD PRESSURE: 70 MMHG

## 2020-06-04 DIAGNOSIS — Z95.2 PRESENCE OF PROSTHETIC HEART VALVE: Chronic | ICD-10-CM

## 2020-06-04 DIAGNOSIS — D64.9 ANEMIA, UNSPECIFIED: ICD-10-CM

## 2020-06-04 DIAGNOSIS — Z95.0 PRESENCE OF CARDIAC PACEMAKER: Chronic | ICD-10-CM

## 2020-06-04 DIAGNOSIS — I77.0 ARTERIOVENOUS FISTULA, ACQUIRED: Chronic | ICD-10-CM

## 2020-06-04 DIAGNOSIS — Z90.49 ACQUIRED ABSENCE OF OTHER SPECIFIED PARTS OF DIGESTIVE TRACT: Chronic | ICD-10-CM

## 2020-06-04 LAB
ALBUMIN SERPL ELPH-MCNC: 3.7 G/DL
ALP BLD-CCNC: 51 U/L
ALT SERPL-CCNC: 5 U/L
ANION GAP SERPL CALC-SCNC: 10 MMOL/L
AST SERPL-CCNC: 10 U/L
BASOPHILS # BLD AUTO: 0.02 K/UL — SIGNIFICANT CHANGE UP (ref 0–0.2)
BASOPHILS NFR BLD AUTO: 0.6 % — SIGNIFICANT CHANGE UP (ref 0–2)
BILIRUB SERPL-MCNC: 0.3 MG/DL
BLD GP AB SCN SERPL QL: NEGATIVE — SIGNIFICANT CHANGE UP
BUN SERPL-MCNC: 66 MG/DL
CALCIUM SERPL-MCNC: 9.5 MG/DL
CHLORIDE SERPL-SCNC: 112 MMOL/L
CO2 SERPL-SCNC: 17 MMOL/L
CREAT SERPL-MCNC: 5.15 MG/DL
EOSINOPHIL # BLD AUTO: 0.16 K/UL — SIGNIFICANT CHANGE UP (ref 0–0.5)
EOSINOPHIL NFR BLD AUTO: 4.4 % — SIGNIFICANT CHANGE UP (ref 0–6)
FERRITIN SERPL-MCNC: 1537 NG/ML
GLUCOSE SERPL-MCNC: 108 MG/DL
HCT VFR BLD CALC: 17.8 % — CRITICAL LOW (ref 34.5–45)
HGB BLD-MCNC: 5.3 G/DL — CRITICAL LOW (ref 11.5–15.5)
IMM GRANULOCYTES NFR BLD AUTO: 0.3 % — SIGNIFICANT CHANGE UP (ref 0–1.5)
IRON SATN MFR SERPL: 39 %
IRON SERPL-MCNC: 64 UG/DL
LYMPHOCYTES # BLD AUTO: 0.8 K/UL — LOW (ref 1–3.3)
LYMPHOCYTES # BLD AUTO: 22 % — SIGNIFICANT CHANGE UP (ref 13–44)
MCHC RBC-ENTMCNC: 25.4 PG — LOW (ref 27–34)
MCHC RBC-ENTMCNC: 29.8 GM/DL — LOW (ref 32–36)
MCV RBC AUTO: 85.2 FL — SIGNIFICANT CHANGE UP (ref 80–100)
MONOCYTES # BLD AUTO: 0.32 K/UL — SIGNIFICANT CHANGE UP (ref 0–0.9)
MONOCYTES NFR BLD AUTO: 8.8 % — SIGNIFICANT CHANGE UP (ref 2–14)
NEUTROPHILS # BLD AUTO: 2.32 K/UL — SIGNIFICANT CHANGE UP (ref 1.8–7.4)
NEUTROPHILS NFR BLD AUTO: 63.9 % — SIGNIFICANT CHANGE UP (ref 43–77)
NRBC # BLD: 0 /100 WBCS — SIGNIFICANT CHANGE UP (ref 0–0)
PLATELET # BLD AUTO: 89 K/UL — LOW (ref 150–400)
POTASSIUM SERPL-SCNC: 6.1 MMOL/L
PROT SERPL-MCNC: 5.7 G/DL
RBC # BLD: 2.09 M/UL — LOW (ref 3.8–5.2)
RBC # FLD: 15.7 % — HIGH (ref 10.3–14.5)
RH IG SCN BLD-IMP: POSITIVE — SIGNIFICANT CHANGE UP
SODIUM SERPL-SCNC: 139 MMOL/L
TIBC SERPL-MCNC: 163 UG/DL
UIBC SERPL-MCNC: 99 UG/DL
WBC # BLD: 3.63 K/UL — LOW (ref 3.8–10.5)
WBC # FLD AUTO: 3.63 K/UL — LOW (ref 3.8–10.5)

## 2020-06-04 PROCEDURE — 99214 OFFICE O/P EST MOD 30 MIN: CPT

## 2020-06-04 RX ORDER — COLISTIN SULFATE, NEOMYCIN SULFATE, THONZONIUM BROMIDE AND HYDROCORTISONE ACETATE 3; 3.3; .5; 1 MG/ML; MG/ML; MG/ML; MG/ML
3.3-3-10-0.5 SUSPENSION AURICULAR (OTIC)
Qty: 10 | Refills: 0 | Status: COMPLETED | COMMUNITY
Start: 2019-12-03

## 2020-06-04 RX ORDER — SITAGLIPTIN 50 MG/1
50 TABLET, FILM COATED ORAL
Qty: 90 | Refills: 0 | Status: ACTIVE | COMMUNITY
Start: 2019-12-26

## 2020-06-04 NOTE — PHYSICAL EXAM
[Ambulatory and capable of all self care but unable to carry out any work activities] : Status 2- Ambulatory and capable of all self care but unable to carry out any work activities. Up and about more than 50% of waking hours [Normal] : affect appropriate [de-identified] : ambulating with cane, currently in wheelchair

## 2020-06-04 NOTE — ASSESSMENT
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 She also has a history of leukopenia (benign) and mild thrombocytopenia. Bone marrow biopsy last performed on 7/15/2011 demonstrated maturing and mature myeloid elements with no significant lymphocytosis or immaturity. \par \par Patient's physical examination findings remain unchanged when compared to the last visit. Her ferritin continues to remain elevated and patient is agreeable to Desferal home infusion therapy. She was evaluated by Dr. Dixon (nephrologist) who recommended that 25% of the prescribed dose can be administered. Request sent to [facility]. \par \par Additional blood studies done today. 2 unit of packed red cells transfusion scheduled for 6/7/2020 at 8 am. Return to the office on 7/2/2020 at 11 am. Seen in conjunction with Dr. Anjum Fox.

## 2020-06-04 NOTE — REVIEW OF SYSTEMS
[Chills] : chills [Fatigue] : fatigue [Recent Change In Weight] : ~T recent weight change [SOB on Exertion] : shortness of breath during exertion [Joint Pain] : joint pain [Joint Stiffness] : joint stiffness [Difficulty Walking] : difficulty walking [Negative] : Allergic/Immunologic [Fever] : no fever [Red Eyes] : eyes not red [Eye Pain] : no eye pain [Night Sweats] : no night sweats [Dry Eyes] : no dryness of the eyes [Vision Problems] : no vision problems [Loss of Hearing] : no loss of hearing [Dysphagia] : no dysphagia [Nosebleeds] : no nosebleeds [Odynophagia] : no odynophagia [Hoarseness] : no hoarseness [Chest Pain] : no chest pain [Mucosal Pain] : no mucosal pain [Leg Claudication] : no intermittent leg claudication [Palpitations] : no palpitations [Shortness Of Breath] : no shortness of breath [Lower Ext Edema] : no lower extremity edema [Wheezing] : no wheezing [Cough] : no cough [Abdominal Pain] : no abdominal pain [Vomiting] : no vomiting [Constipation] : no constipation [Diarrhea] : no diarrhea [Dysuria] : no dysuria [Vaginal Discharge] : no vaginal discharge [Dysmenorrhea/Abn Vaginal Bleeding] : no dysmenorrhea/abnormal vaginal bleeding [Muscle Pain] : no muscle pain [Skin Rash] : no skin rash [Skin Wound] : no skin wound [Confused] : no confusion [Dizziness] : no dizziness [Fainting] : no fainting [Suicidal] : not suicidal [Insomnia] : no insomnia [Anxiety] : no anxiety [Depression] : no depression [Proptosis] : no proptosis [Hot Flashes] : no hot flashes [Muscle Weakness] : no muscle weakness [Deepening Of The Voice] : no deepening of the voice [Easy Bleeding] : no tendency for easy bleeding [Easy Bruising] : no tendency for easy bruising [Swollen Glands] : no swollen glands [FreeTextEntry2] : 3 lb weight loss

## 2020-06-04 NOTE — HISTORY OF PRESENT ILLNESS
[Disease:__________________________] : Disease: [unfilled] [Treatment Protocol] : Treatment Protocol [Constitutional] : Constitutional [Cardiovascular] : Cardiovascular [ENT] : ENT [Dermatologic] : Dermatologic [Endocrine] : Endocrine [Gastrointestinal] : Gastrointestinal [Genitourinary] : Genitourinary [Gynecologic] : Gynecologic [Infectious] : Infectious [Neurologic] : Neurologic [Musculoskeletal] : Musculoskeletal [Pulmonary] : Pulmonary [Pain] : Pain [Hematologic] : Hematologic [de-identified] : 80 year old female presenting to the office for hematologic care. 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 She had a left sided fistula placed approximately in 2010 by her physician in [place] but she does not require dialysis; she is followed in the renal clinic by Dr. Markell Dixon. The use of colony stimulating agents had been safe for the patient when given when hemoglobins are less than 10 g/dL which has led to a frequency of administration of one ejection every 4 to 6 weeks; she received Aranesp injections, completing 57 injections from May 2011 until January 2019. \par \par Patient was hospitalized from 1/22/2019 - 1/29/2019 for right upper extremity DVT. Prior to admission, she noted RUE swelling and pain/discomfort since receiving her last dose of Aranesp on 1/18/2019. 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 Since then, her Aranesp treatments were discontinued and she was transitioned to blood transfusions, which she requires 1 or 2 units every 4-6 weeks. \par \par Past medical history includes hypertension (required multiple medications for control), type two diabetes mellitus. There is no prior history of cerebrovascular accident or bleeding. She has had mildly elevated ferritin and iron levels. [FreeTextEntry1] : Darbepoetin 200 mg q 6 weeks (s/p 57 treatments - held.due to RUE DVT); blood transfusions since January 2019 (1-2 units packed RBC every 4-6 weeks) [de-identified] : Patient presented to the office for routine follow-up visit. She admitted to chills but denied fever, worsening shortness of breath, cough, headache, taste changes. Her complained of bilateral knee pain remains unchanged (related to her arthritis). She has remained indoors during this current COVID crisis. Request was put in for Desferal home infusion approximately 1 month ago but no treatment has been started.

## 2020-06-05 PROCEDURE — 86850 RBC ANTIBODY SCREEN: CPT

## 2020-06-05 PROCEDURE — 86923 COMPATIBILITY TEST ELECTRIC: CPT

## 2020-06-05 PROCEDURE — 86901 BLOOD TYPING SEROLOGIC RH(D): CPT

## 2020-06-05 PROCEDURE — 86900 BLOOD TYPING SEROLOGIC ABO: CPT

## 2020-06-07 ENCOUNTER — APPOINTMENT (OUTPATIENT)
Dept: INFUSION THERAPY | Facility: HOSPITAL | Age: 80
End: 2020-06-07

## 2020-06-08 DIAGNOSIS — N18.3 CHRONIC KIDNEY DISEASE, STAGE 3 (MODERATE): ICD-10-CM

## 2020-06-08 DIAGNOSIS — D46.9 MYELODYSPLASTIC SYNDROME, UNSPECIFIED: ICD-10-CM

## 2020-06-08 DIAGNOSIS — N18.4 CHRONIC KIDNEY DISEASE, STAGE 4 (SEVERE): ICD-10-CM

## 2020-06-08 DIAGNOSIS — Z51.89 ENCOUNTER FOR OTHER SPECIFIED AFTERCARE: ICD-10-CM

## 2020-07-02 ENCOUNTER — RESULT REVIEW (OUTPATIENT)
Age: 80
End: 2020-07-02

## 2020-07-02 ENCOUNTER — APPOINTMENT (OUTPATIENT)
Dept: HEMATOLOGY ONCOLOGY | Facility: CLINIC | Age: 80
End: 2020-07-02
Payer: MEDICARE

## 2020-07-02 ENCOUNTER — OUTPATIENT (OUTPATIENT)
Dept: OUTPATIENT SERVICES | Facility: HOSPITAL | Age: 80
LOS: 1 days | End: 2020-07-02
Payer: MEDICARE

## 2020-07-02 VITALS
SYSTOLIC BLOOD PRESSURE: 149 MMHG | DIASTOLIC BLOOD PRESSURE: 71 MMHG | TEMPERATURE: 99.2 F | OXYGEN SATURATION: 100 % | BODY MASS INDEX: 23.49 KG/M2 | HEART RATE: 70 BPM | RESPIRATION RATE: 15 BRPM | WEIGHT: 124.34 LBS

## 2020-07-02 DIAGNOSIS — Z90.49 ACQUIRED ABSENCE OF OTHER SPECIFIED PARTS OF DIGESTIVE TRACT: Chronic | ICD-10-CM

## 2020-07-02 DIAGNOSIS — Z95.2 PRESENCE OF PROSTHETIC HEART VALVE: Chronic | ICD-10-CM

## 2020-07-02 DIAGNOSIS — Z95.0 PRESENCE OF CARDIAC PACEMAKER: Chronic | ICD-10-CM

## 2020-07-02 DIAGNOSIS — I77.0 ARTERIOVENOUS FISTULA, ACQUIRED: Chronic | ICD-10-CM

## 2020-07-02 DIAGNOSIS — D63.1 ANEMIA IN CHRONIC KIDNEY DISEASE: ICD-10-CM

## 2020-07-02 LAB
BASOPHILS # BLD AUTO: 0.02 K/UL — SIGNIFICANT CHANGE UP (ref 0–0.2)
BASOPHILS NFR BLD AUTO: 0.6 % — SIGNIFICANT CHANGE UP (ref 0–2)
BLD GP AB SCN SERPL QL: NEGATIVE — SIGNIFICANT CHANGE UP
EOSINOPHIL # BLD AUTO: 0.11 K/UL — SIGNIFICANT CHANGE UP (ref 0–0.5)
EOSINOPHIL NFR BLD AUTO: 3.5 % — SIGNIFICANT CHANGE UP (ref 0–6)
HCT VFR BLD CALC: 18.8 % — CRITICAL LOW (ref 34.5–45)
HGB BLD-MCNC: 5.6 G/DL — CRITICAL LOW (ref 11.5–15.5)
IMM GRANULOCYTES NFR BLD AUTO: 0.3 % — SIGNIFICANT CHANGE UP (ref 0–1.5)
LYMPHOCYTES # BLD AUTO: 0.8 K/UL — LOW (ref 1–3.3)
LYMPHOCYTES # BLD AUTO: 25.7 % — SIGNIFICANT CHANGE UP (ref 13–44)
MCHC RBC-ENTMCNC: 25.9 PG — LOW (ref 27–34)
MCHC RBC-ENTMCNC: 29.8 GM/DL — LOW (ref 32–36)
MCV RBC AUTO: 87 FL — SIGNIFICANT CHANGE UP (ref 80–100)
MONOCYTES # BLD AUTO: 0.25 K/UL — SIGNIFICANT CHANGE UP (ref 0–0.9)
MONOCYTES NFR BLD AUTO: 8 % — SIGNIFICANT CHANGE UP (ref 2–14)
NEUTROPHILS # BLD AUTO: 1.92 K/UL — SIGNIFICANT CHANGE UP (ref 1.8–7.4)
NEUTROPHILS NFR BLD AUTO: 61.9 % — SIGNIFICANT CHANGE UP (ref 43–77)
NRBC # BLD: 0 /100 WBCS — SIGNIFICANT CHANGE UP (ref 0–0)
PLATELET # BLD AUTO: 105 K/UL — LOW (ref 150–400)
RBC # BLD: 2.16 M/UL — LOW (ref 3.8–5.2)
RBC # FLD: 15.4 % — HIGH (ref 10.3–14.5)
RH IG SCN BLD-IMP: POSITIVE — SIGNIFICANT CHANGE UP
WBC # BLD: 3.11 K/UL — LOW (ref 3.8–10.5)
WBC # FLD AUTO: 3.11 K/UL — LOW (ref 3.8–10.5)

## 2020-07-02 PROCEDURE — 99215 OFFICE O/P EST HI 40 MIN: CPT

## 2020-07-02 NOTE — REVIEW OF SYSTEMS
[Fever] : no fever [Fatigue] : fatigue [Chills] : chills [Night Sweats] : no night sweats [Red Eyes] : eyes not red [Recent Change In Weight] : ~T recent weight change [Eye Pain] : no eye pain [Loss of Hearing] : no loss of hearing [Vision Problems] : no vision problems [Dysphagia] : no dysphagia [Dry Eyes] : no dryness of the eyes [Nosebleeds] : no nosebleeds [Hoarseness] : no hoarseness [Chest Pain] : no chest pain [Mucosal Pain] : no mucosal pain [Palpitations] : no palpitations [Odynophagia] : no odynophagia [Leg Claudication] : no intermittent leg claudication [Lower Ext Edema] : no lower extremity edema [Shortness Of Breath] : no shortness of breath [Wheezing] : no wheezing [SOB on Exertion] : shortness of breath during exertion [Cough] : no cough [Abdominal Pain] : no abdominal pain [Diarrhea] : no diarrhea [Vomiting] : no vomiting [Constipation] : no constipation [Joint Pain] : joint pain [Dysuria] : no dysuria [Dysmenorrhea/Abn Vaginal Bleeding] : no dysmenorrhea/abnormal vaginal bleeding [Vaginal Discharge] : no vaginal discharge [Joint Stiffness] : joint stiffness [Muscle Pain] : no muscle pain [Skin Rash] : no skin rash [Skin Wound] : no skin wound [Confused] : no confusion [Difficulty Walking] : difficulty walking [Fainting] : no fainting [Dizziness] : no dizziness [Insomnia] : no insomnia [Anxiety] : no anxiety [Suicidal] : not suicidal [Proptosis] : no proptosis [Hot Flashes] : no hot flashes [Depression] : no depression [Deepening Of The Voice] : no deepening of the voice [Easy Bruising] : no tendency for easy bruising [Muscle Weakness] : no muscle weakness [Easy Bleeding] : no tendency for easy bleeding [Swollen Glands] : no swollen glands [Negative] : Allergic/Immunologic [FreeTextEntry2] : 3 lb weight loss

## 2020-07-02 NOTE — REASON FOR VISIT
[Follow-Up Visit] : a follow-up visit for [Coagulopathy] : coagulopathy [Blood Count Assessment] : blood count assessment [FreeTextEntry2] : anemia

## 2020-07-02 NOTE — PHYSICAL EXAM
[Ambulatory and capable of all self care but unable to carry out any work activities] : Status 2- Ambulatory and capable of all self care but unable to carry out any work activities. Up and about more than 50% of waking hours [Normal] : grossly intact [de-identified] : ambulating with cane, currently in wheelchair

## 2020-07-02 NOTE — HISTORY OF PRESENT ILLNESS
[Disease:__________________________] : Disease: [unfilled] [Treatment Protocol] : Treatment Protocol [de-identified] : 80 year old female presenting to the office for hematologic care. 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 She had a left sided fistula placed approximately in 2010 by her physician in [place] but she does not require dialysis; she is followed in the renal clinic by Dr. Markell Dixon. The use of colony stimulating agents had been safe for the patient when given when hemoglobins are less than 10 g/dL which has led to a frequency of administration of one ejection every 4 to 6 weeks; she received Aranesp injections, completing 57 injections from May 2011 until January 2019. \par \par Patient was hospitalized from 1/22/2019 - 1/29/2019 for right upper extremity DVT. Prior to admission, she noted RUE swelling and pain/discomfort since receiving her last dose of Aranesp on 1/18/2019. 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 Since then, her Aranesp treatments were discontinued and she was transitioned to blood transfusions, which she requires 1 or 2 units every 4-6 weeks. \par \par Past medical history includes hypertension (required multiple medications for control), type two diabetes mellitus. There is no prior history of cerebrovascular accident or bleeding. She has had mildly elevated ferritin and iron levels. [FreeTextEntry1] : Darbepoetin 200 mg q 6 weeks (s/p 57 treatments - held.due to RUE DVT); blood transfusions since January 2019 (1-2 units packed RBC every 4-6 weeks) [de-identified] : Patient presented to the office for routine blood check and follow-up visit. She noted chills due to the current room temperature in the facility but denied fever, worsening shortness of breath, cough, headache, taste changes. She has remained indoors during this current COVID crisis.

## 2020-07-02 NOTE — ASSESSMENT
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 She also has a history of leukopenia (benign) and mild thrombocytopenia. Bone marrow biopsy last performed on 7/15/2011 demonstrated maturing and mature myeloid elements with no significant lymphocytosis or immaturity. \par \par Patient's physical examination findings remain unchanged when compared to the last visit. Her ferritin continues to remain elevated and patient is agreeable to Desferal home infusion therapy. She was evaluated by Dr. Dixon (nephrologist) who recommended that 25% of the prescribed dose can be administered. A 2nd request was sent to [facility]. \par \par COVID-19 nasal swab testing was completed. Additional blood studies done today. 2 unit of packed red cells transfusion scheduled for 7/5/2020 at 11 am. Return to the office on 7/30/2020 at 11 am for re-evaluation. Discussed with Dr. Anjum Fox.  [Palliative Care Plan] : not applicable at this time [Supportive] : Goals of care discussed with patient: Supportive

## 2020-07-03 LAB — SARS-COV-2 RNA SPEC QL NAA+PROBE: SIGNIFICANT CHANGE UP

## 2020-07-05 ENCOUNTER — APPOINTMENT (OUTPATIENT)
Dept: INFUSION THERAPY | Facility: HOSPITAL | Age: 80
End: 2020-07-05

## 2020-07-05 ENCOUNTER — OUTPATIENT (OUTPATIENT)
Dept: OUTPATIENT SERVICES | Facility: HOSPITAL | Age: 80
LOS: 1 days | Discharge: HOME | End: 2020-07-05

## 2020-07-05 DIAGNOSIS — I77.0 ARTERIOVENOUS FISTULA, ACQUIRED: Chronic | ICD-10-CM

## 2020-07-05 DIAGNOSIS — Z90.49 ACQUIRED ABSENCE OF OTHER SPECIFIED PARTS OF DIGESTIVE TRACT: Chronic | ICD-10-CM

## 2020-07-05 DIAGNOSIS — D63.1 ANEMIA IN CHRONIC KIDNEY DISEASE: ICD-10-CM

## 2020-07-05 DIAGNOSIS — Z95.2 PRESENCE OF PROSTHETIC HEART VALVE: Chronic | ICD-10-CM

## 2020-07-05 DIAGNOSIS — Z95.0 PRESENCE OF CARDIAC PACEMAKER: Chronic | ICD-10-CM

## 2020-07-06 DIAGNOSIS — D46.1 REFRACTORY ANEMIA WITH RING SIDEROBLASTS: ICD-10-CM

## 2020-07-06 DIAGNOSIS — Z51.89 ENCOUNTER FOR OTHER SPECIFIED AFTERCARE: ICD-10-CM

## 2020-07-06 DIAGNOSIS — N18.3 CHRONIC KIDNEY DISEASE, STAGE 3 (MODERATE): ICD-10-CM

## 2020-07-06 DIAGNOSIS — N18.4 CHRONIC KIDNEY DISEASE, STAGE 4 (SEVERE): ICD-10-CM

## 2020-07-06 DIAGNOSIS — D46.9 MYELODYSPLASTIC SYNDROME, UNSPECIFIED: ICD-10-CM

## 2020-07-06 LAB
ALBUMIN SERPL ELPH-MCNC: 3.5 G/DL
ALP BLD-CCNC: 52 U/L
ALT SERPL-CCNC: <5 U/L
ANION GAP SERPL CALC-SCNC: 12 MMOL/L
AST SERPL-CCNC: 14 U/L
BILIRUB SERPL-MCNC: 0.3 MG/DL
BUN SERPL-MCNC: 61 MG/DL
CALCIUM SERPL-MCNC: 9.3 MG/DL
CHLORIDE SERPL-SCNC: 109 MMOL/L
CO2 SERPL-SCNC: 18 MMOL/L
CREAT SERPL-MCNC: 4.91 MG/DL
FERRITIN SERPL-MCNC: 1672 NG/ML
GLUCOSE SERPL-MCNC: 116 MG/DL
IRON SATN MFR SERPL: 42 %
IRON SERPL-MCNC: 69 UG/DL
POTASSIUM SERPL-SCNC: 5.6 MMOL/L
PROT SERPL-MCNC: 5.7 G/DL
SODIUM SERPL-SCNC: 139 MMOL/L
TIBC SERPL-MCNC: 165 UG/DL
UIBC SERPL-MCNC: 96 UG/DL

## 2020-07-14 NOTE — H&P ADULT - HEIGHT IN CM
154.94 Helical Rim Advancement Flap Text: The defect edges were debeveled with a #15 blade scalpel.  Given the location of the defect and the proximity to free margins (helical rim) a double helical rim advancement flap was deemed most appropriate.  Using a sterile surgical marker, the appropriate advancement flaps were drawn incorporating the defect and placing the expected incisions between the helical rim and antihelix where possible.  The area thus outlined was incised through and through with a #15 scalpel blade.  With a skin hook and iris scissors, the flaps were gently and sharply undermined and freed up.

## 2020-07-22 ENCOUNTER — RESULT REVIEW (OUTPATIENT)
Age: 80
End: 2020-07-22

## 2020-07-22 ENCOUNTER — APPOINTMENT (OUTPATIENT)
Dept: HEMATOLOGY ONCOLOGY | Facility: CLINIC | Age: 80
End: 2020-07-22
Payer: MEDICARE

## 2020-07-22 VITALS
SYSTOLIC BLOOD PRESSURE: 130 MMHG | WEIGHT: 124.34 LBS | TEMPERATURE: 98 F | OXYGEN SATURATION: 99 % | HEART RATE: 68 BPM | BODY MASS INDEX: 23.49 KG/M2 | RESPIRATION RATE: 14 BRPM | DIASTOLIC BLOOD PRESSURE: 70 MMHG

## 2020-07-22 LAB
ALBUMIN SERPL ELPH-MCNC: 3.7 G/DL
ALP BLD-CCNC: 47 U/L
ALT SERPL-CCNC: 7 U/L
AMMONIA BLD-MCNC: 22 UMOL/L — SIGNIFICANT CHANGE UP (ref 11–55)
ANION GAP SERPL CALC-SCNC: 13 MMOL/L
AST SERPL-CCNC: 16 U/L
BASOPHILS # BLD AUTO: 0.02 K/UL — SIGNIFICANT CHANGE UP (ref 0–0.2)
BASOPHILS NFR BLD AUTO: 0.6 % — SIGNIFICANT CHANGE UP (ref 0–2)
BILIRUB SERPL-MCNC: 0.3 MG/DL
BLD GP AB SCN SERPL QL: NEGATIVE — SIGNIFICANT CHANGE UP
BUN SERPL-MCNC: 66 MG/DL
CALCIUM SERPL-MCNC: 9.8 MG/DL
CHLORIDE SERPL-SCNC: 105 MMOL/L
CO2 SERPL-SCNC: 20 MMOL/L
CREAT SERPL-MCNC: 4.73 MG/DL
EOSINOPHIL # BLD AUTO: 0.13 K/UL — SIGNIFICANT CHANGE UP (ref 0–0.5)
EOSINOPHIL NFR BLD AUTO: 3.7 % — SIGNIFICANT CHANGE UP (ref 0–6)
FERRITIN SERPL-MCNC: 1894 NG/ML
GLUCOSE SERPL-MCNC: 159 MG/DL
HCT VFR BLD CALC: 23.2 % — LOW (ref 34.5–45)
HGB BLD-MCNC: 6.9 G/DL — CRITICAL LOW (ref 11.5–15.5)
IMM GRANULOCYTES NFR BLD AUTO: 0.3 % — SIGNIFICANT CHANGE UP (ref 0–1.5)
LYMPHOCYTES # BLD AUTO: 0.89 K/UL — LOW (ref 1–3.3)
LYMPHOCYTES # BLD AUTO: 25.3 % — SIGNIFICANT CHANGE UP (ref 13–44)
MCHC RBC-ENTMCNC: 26.4 PG — LOW (ref 27–34)
MCHC RBC-ENTMCNC: 29.7 GM/DL — LOW (ref 32–36)
MCV RBC AUTO: 88.9 FL — SIGNIFICANT CHANGE UP (ref 80–100)
MONOCYTES # BLD AUTO: 0.33 K/UL — SIGNIFICANT CHANGE UP (ref 0–0.9)
MONOCYTES NFR BLD AUTO: 9.4 % — SIGNIFICANT CHANGE UP (ref 2–14)
NEUTROPHILS # BLD AUTO: 2.14 K/UL — SIGNIFICANT CHANGE UP (ref 1.8–7.4)
NEUTROPHILS NFR BLD AUTO: 60.7 % — SIGNIFICANT CHANGE UP (ref 43–77)
NRBC # BLD: 0 /100 WBCS — SIGNIFICANT CHANGE UP (ref 0–0)
PLATELET # BLD AUTO: 87 K/UL — LOW (ref 150–400)
POTASSIUM SERPL-SCNC: 5.4 MMOL/L
PROT SERPL-MCNC: 5.7 G/DL
RBC # BLD: 2.61 M/UL — LOW (ref 3.8–5.2)
RBC # FLD: 15.9 % — HIGH (ref 10.3–14.5)
RH IG SCN BLD-IMP: POSITIVE — SIGNIFICANT CHANGE UP
SARS-COV-2 RNA SPEC QL NAA+PROBE: SIGNIFICANT CHANGE UP
SODIUM SERPL-SCNC: 138 MMOL/L
WBC # BLD: 3.52 K/UL — LOW (ref 3.8–10.5)
WBC # FLD AUTO: 3.52 K/UL — LOW (ref 3.8–10.5)

## 2020-07-22 PROCEDURE — 86900 BLOOD TYPING SEROLOGIC ABO: CPT

## 2020-07-22 PROCEDURE — 86850 RBC ANTIBODY SCREEN: CPT

## 2020-07-22 PROCEDURE — 86901 BLOOD TYPING SEROLOGIC RH(D): CPT

## 2020-07-22 PROCEDURE — 82140 ASSAY OF AMMONIA: CPT

## 2020-07-22 PROCEDURE — 99215 OFFICE O/P EST HI 40 MIN: CPT

## 2020-07-22 PROCEDURE — 86923 COMPATIBILITY TEST ELECTRIC: CPT

## 2020-07-22 NOTE — REASON FOR VISIT
[Follow-Up Visit] : a follow-up visit for [Blood Count Assessment] : blood count assessment [Coagulopathy] : coagulopathy [FreeTextEntry2] : anemia

## 2020-07-22 NOTE — ASSESSMENT
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 She also has a history of leukopenia (benign) and mild thrombocytopenia. Bone marrow biopsy last performed on 7/15/2011 demonstrated maturing and mature myeloid elements with no significant lymphocytosis or immaturity. \par \par Patient's physical examination findings remain unchanged when compared to the last visit. Her ferritin continues to remain elevated and patient is agreeable to Desferal home infusion therapy. She is also under the care of Dr. Dixon (nephrologist) who recommended that 25% of the prescribed dose can be administered. A 2nd request was sent to [facility]. \par \par COVID-19 nasal swab testing was completed. Additional blood studies done today. 1 unit of packed red cells transfusion scheduled for 7/25/2020 at 10 am. Return to the office on 8/20/2020 at 11 am for re-evaluation. Discussed with Dr. Anjum Fox.

## 2020-07-22 NOTE — REVIEW OF SYSTEMS
[Chills] : chills [Fatigue] : fatigue [SOB on Exertion] : shortness of breath during exertion [Joint Pain] : joint pain [Joint Stiffness] : joint stiffness [Difficulty Walking] : difficulty walking [Negative] : Allergic/Immunologic [Eye Pain] : no eye pain [Fever] : no fever [Recent Change In Weight] : ~T no recent weight change [Night Sweats] : no night sweats [Dry Eyes] : no dryness of the eyes [Red Eyes] : eyes not red [Dysphagia] : no dysphagia [Vision Problems] : no vision problems [Loss of Hearing] : no loss of hearing [Hoarseness] : no hoarseness [Nosebleeds] : no nosebleeds [Odynophagia] : no odynophagia [Mucosal Pain] : no mucosal pain [Palpitations] : no palpitations [Chest Pain] : no chest pain [Leg Claudication] : no intermittent leg claudication [Lower Ext Edema] : no lower extremity edema [Shortness Of Breath] : no shortness of breath [Abdominal Pain] : no abdominal pain [Wheezing] : no wheezing [Cough] : no cough [Diarrhea] : no diarrhea [Constipation] : no constipation [Vomiting] : no vomiting [Dysmenorrhea/Abn Vaginal Bleeding] : no dysmenorrhea/abnormal vaginal bleeding [Dysuria] : no dysuria [Vaginal Discharge] : no vaginal discharge [Muscle Pain] : no muscle pain [Skin Rash] : no skin rash [Confused] : no confusion [Skin Wound] : no skin wound [Dizziness] : no dizziness [Fainting] : no fainting [Suicidal] : not suicidal [Anxiety] : no anxiety [Insomnia] : no insomnia [Proptosis] : no proptosis [Depression] : no depression [Muscle Weakness] : no muscle weakness [Hot Flashes] : no hot flashes [Deepening Of The Voice] : no deepening of the voice [Easy Bruising] : no tendency for easy bruising [Easy Bleeding] : no tendency for easy bleeding [Swollen Glands] : no swollen glands

## 2020-07-22 NOTE — PHYSICAL EXAM
[Capable of only limited self care, confined to bed or chair more than 50% of waking hours] : Status 3- Capable of only limited self care, confined to bed or chair more than 50% of waking hours [Normal] : affect appropriate [de-identified] : ambulating with cane, currently in wheelchair

## 2020-07-22 NOTE — DISCHARGE NOTE ADULT - NS MD DC PLAN IMMU FLU PROVIDE INFO
I am not able to offer patient an in person visit at this time. I discussed his case with Dr. Lisa (who he saw before for a separate problem). Dr. Lisa is able to see in person. Ok to schedule in next available in person visit. This is not urgent.    Lizzeth Curtis MD    Department of Dermatology  [facility]: Phone: [phone], Fax:[phone]  [facility]: Phone: [phone], Fax: [phone]         Risks/benefits discussed with patient or patient surrogate

## 2020-07-22 NOTE — HISTORY OF PRESENT ILLNESS
[Disease:__________________________] : Disease: [unfilled] [Treatment Protocol] : Treatment Protocol [de-identified] : 80 year old female presenting to the office for hematologic care. 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 She had a left sided fistula placed approximately in 2010 by her physician in [place] but she does not require dialysis; she is followed in the renal clinic by Dr. Markell Dixon. The use of colony stimulating agents had been safe for the patient when given when hemoglobins are less than 10 g/dL which has led to a frequency of administration of one ejection every 4 to 6 weeks; she received Aranesp injections, completing 57 injections from May 2011 until January 2019. \par \par Patient was hospitalized from 1/22/2019 - 1/29/2019 for right upper extremity DVT. Prior to admission, she noted RUE swelling and pain/discomfort since receiving her last dose of Aranesp on 1/18/2019. 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 Since then, her Aranesp treatments were discontinued and she was transitioned to blood transfusions, which she requires 1 or 2 units every 4-6 weeks. \par \par Past medical history includes hypertension (required multiple medications for control), type two diabetes mellitus. There is no prior history of cerebrovascular accident or bleeding. She has had mildly elevated ferritin and iron levels. [FreeTextEntry1] : Darbepoetin 200 mg q 6 weeks (s/p 57 treatments - held.due to RUE DVT); blood transfusions since January 2019 (1-2 units packed RBC every 4-6 weeks) [de-identified] : Patient was brought in today by her son (Tracie) for blood check today. He noted the patient is becoming forgetful and confused. She denied recent traumas/injury to the body, headaches, lightheadedness/dizziness, worsening shortness of breath. She has remained indoors due to the current pandemic.

## 2020-07-24 ENCOUNTER — LABORATORY RESULT (OUTPATIENT)
Age: 80
End: 2020-07-24

## 2020-07-25 ENCOUNTER — APPOINTMENT (OUTPATIENT)
Dept: INFUSION THERAPY | Facility: HOSPITAL | Age: 80
End: 2020-07-25

## 2020-07-26 LAB
APPEARANCE: CLEAR
BILIRUBIN URINE: NEGATIVE
BLOOD URINE: NEGATIVE
COLOR: YELLOW
GLUCOSE QUALITATIVE U: NEGATIVE
KETONES URINE: NEGATIVE
LEUKOCYTE ESTERASE URINE: NEGATIVE
NITRITE URINE: NEGATIVE
PH URINE: 6
PROTEIN URINE: ABNORMAL
SPECIFIC GRAVITY URINE: 1.01
UROBILINOGEN URINE: NORMAL

## 2020-07-29 ENCOUNTER — APPOINTMENT (OUTPATIENT)
Dept: INFUSION THERAPY | Facility: HOSPITAL | Age: 80
End: 2020-07-29

## 2020-07-31 ENCOUNTER — OUTPATIENT (OUTPATIENT)
Dept: OUTPATIENT SERVICES | Facility: HOSPITAL | Age: 80
LOS: 1 days | Discharge: HOME | End: 2020-07-31

## 2020-07-31 ENCOUNTER — APPOINTMENT (OUTPATIENT)
Dept: INFUSION THERAPY | Facility: HOSPITAL | Age: 80
End: 2020-07-31

## 2020-07-31 DIAGNOSIS — Z95.2 PRESENCE OF PROSTHETIC HEART VALVE: Chronic | ICD-10-CM

## 2020-07-31 DIAGNOSIS — Z90.49 ACQUIRED ABSENCE OF OTHER SPECIFIED PARTS OF DIGESTIVE TRACT: Chronic | ICD-10-CM

## 2020-07-31 DIAGNOSIS — Z95.0 PRESENCE OF CARDIAC PACEMAKER: Chronic | ICD-10-CM

## 2020-07-31 DIAGNOSIS — I77.0 ARTERIOVENOUS FISTULA, ACQUIRED: Chronic | ICD-10-CM

## 2020-07-31 DIAGNOSIS — D63.1 ANEMIA IN CHRONIC KIDNEY DISEASE: ICD-10-CM

## 2020-08-01 DIAGNOSIS — D46.1 REFRACTORY ANEMIA WITH RING SIDEROBLASTS: ICD-10-CM

## 2020-08-01 DIAGNOSIS — N18.4 CHRONIC KIDNEY DISEASE, STAGE 4 (SEVERE): ICD-10-CM

## 2020-08-04 ENCOUNTER — APPOINTMENT (OUTPATIENT)
Dept: INFUSION THERAPY | Facility: HOSPITAL | Age: 80
End: 2020-08-04

## 2020-08-07 ENCOUNTER — RESULT REVIEW (OUTPATIENT)
Age: 80
End: 2020-08-07

## 2020-08-07 ENCOUNTER — LABORATORY RESULT (OUTPATIENT)
Age: 80
End: 2020-08-07

## 2020-08-07 ENCOUNTER — APPOINTMENT (OUTPATIENT)
Dept: INFUSION THERAPY | Facility: HOSPITAL | Age: 80
End: 2020-08-07

## 2020-08-07 LAB
BASOPHILS # BLD AUTO: 0.02 K/UL — SIGNIFICANT CHANGE UP (ref 0–0.2)
BASOPHILS NFR BLD AUTO: 0.6 % — SIGNIFICANT CHANGE UP (ref 0–2)
EOSINOPHIL # BLD AUTO: 0.14 K/UL — SIGNIFICANT CHANGE UP (ref 0–0.5)
EOSINOPHIL NFR BLD AUTO: 4.3 % — SIGNIFICANT CHANGE UP (ref 0–6)
HCT VFR BLD CALC: 24.6 % — LOW (ref 34.5–45)
HGB BLD-MCNC: 7.5 G/DL — LOW (ref 11.5–15.5)
IMM GRANULOCYTES NFR BLD AUTO: 0.3 % — SIGNIFICANT CHANGE UP (ref 0–1.5)
LYMPHOCYTES # BLD AUTO: 0.89 K/UL — LOW (ref 1–3.3)
LYMPHOCYTES # BLD AUTO: 27.1 % — SIGNIFICANT CHANGE UP (ref 13–44)
MCHC RBC-ENTMCNC: 26 PG — LOW (ref 27–34)
MCHC RBC-ENTMCNC: 30.5 GM/DL — LOW (ref 32–36)
MCV RBC AUTO: 85.4 FL — SIGNIFICANT CHANGE UP (ref 80–100)
MONOCYTES # BLD AUTO: 0.29 K/UL — SIGNIFICANT CHANGE UP (ref 0–0.9)
MONOCYTES NFR BLD AUTO: 8.8 % — SIGNIFICANT CHANGE UP (ref 2–14)
NEUTROPHILS # BLD AUTO: 1.93 K/UL — SIGNIFICANT CHANGE UP (ref 1.8–7.4)
NEUTROPHILS NFR BLD AUTO: 58.9 % — SIGNIFICANT CHANGE UP (ref 43–77)
NRBC # BLD: 0 /100 WBCS — SIGNIFICANT CHANGE UP (ref 0–0)
PLATELET # BLD AUTO: 135 K/UL — LOW (ref 150–400)
RBC # BLD: 2.88 M/UL — LOW (ref 3.8–5.2)
RBC # FLD: 15 % — HIGH (ref 10.3–14.5)
WBC # BLD: 3.28 K/UL — LOW (ref 3.8–10.5)
WBC # FLD AUTO: 3.28 K/UL — LOW (ref 3.8–10.5)

## 2020-08-20 ENCOUNTER — RESULT REVIEW (OUTPATIENT)
Age: 80
End: 2020-08-20

## 2020-08-20 ENCOUNTER — OUTPATIENT (OUTPATIENT)
Dept: OUTPATIENT SERVICES | Facility: HOSPITAL | Age: 80
LOS: 1 days | End: 2020-08-20
Payer: MEDICARE

## 2020-08-20 ENCOUNTER — APPOINTMENT (OUTPATIENT)
Dept: HEMATOLOGY ONCOLOGY | Facility: CLINIC | Age: 80
End: 2020-08-20
Payer: MEDICARE

## 2020-08-20 VITALS
RESPIRATION RATE: 17 BRPM | OXYGEN SATURATION: 98 % | TEMPERATURE: 97.8 F | SYSTOLIC BLOOD PRESSURE: 108 MMHG | HEART RATE: 60 BPM | DIASTOLIC BLOOD PRESSURE: 66 MMHG

## 2020-08-20 DIAGNOSIS — I77.0 ARTERIOVENOUS FISTULA, ACQUIRED: Chronic | ICD-10-CM

## 2020-08-20 DIAGNOSIS — N18.9 CHRONIC KIDNEY DISEASE, UNSPECIFIED: ICD-10-CM

## 2020-08-20 DIAGNOSIS — I82.621 ACUTE EMBOLISM AND THROMBOSIS OF DEEP VEINS OF RIGHT UPPER EXTREMITY: ICD-10-CM

## 2020-08-20 DIAGNOSIS — Z95.2 PRESENCE OF PROSTHETIC HEART VALVE: Chronic | ICD-10-CM

## 2020-08-20 DIAGNOSIS — Z90.49 ACQUIRED ABSENCE OF OTHER SPECIFIED PARTS OF DIGESTIVE TRACT: Chronic | ICD-10-CM

## 2020-08-20 DIAGNOSIS — Z95.0 PRESENCE OF CARDIAC PACEMAKER: Chronic | ICD-10-CM

## 2020-08-20 LAB
ALBUMIN SERPL ELPH-MCNC: 3.3 G/DL
ALP BLD-CCNC: 43 U/L
ALT SERPL-CCNC: 7 U/L
ANION GAP SERPL CALC-SCNC: 12 MMOL/L
AST SERPL-CCNC: 14 U/L
BASOPHILS # BLD AUTO: 0.02 K/UL — SIGNIFICANT CHANGE UP (ref 0–0.2)
BASOPHILS NFR BLD AUTO: 0.7 % — SIGNIFICANT CHANGE UP (ref 0–2)
BILIRUB SERPL-MCNC: 0.2 MG/DL
BLD GP AB SCN SERPL QL: NEGATIVE — SIGNIFICANT CHANGE UP
BUN SERPL-MCNC: 99 MG/DL
CALCIUM SERPL-MCNC: 8.8 MG/DL
CHLORIDE SERPL-SCNC: 106 MMOL/L
CO2 SERPL-SCNC: 20 MMOL/L
CREAT SERPL-MCNC: 6.36 MG/DL
EOSINOPHIL # BLD AUTO: 0.16 K/UL — SIGNIFICANT CHANGE UP (ref 0–0.5)
EOSINOPHIL NFR BLD AUTO: 5.9 % — SIGNIFICANT CHANGE UP (ref 0–6)
FERRITIN SERPL-MCNC: 1758 NG/ML
GLUCOSE SERPL-MCNC: 117 MG/DL
HCT VFR BLD CALC: 19.1 % — CRITICAL LOW (ref 34.5–45)
HGB BLD-MCNC: 5.6 G/DL — CRITICAL LOW (ref 11.5–15.5)
IMM GRANULOCYTES NFR BLD AUTO: 0.4 % — SIGNIFICANT CHANGE UP (ref 0–1.5)
LYMPHOCYTES # BLD AUTO: 0.57 K/UL — LOW (ref 1–3.3)
LYMPHOCYTES # BLD AUTO: 21 % — SIGNIFICANT CHANGE UP (ref 13–44)
MCHC RBC-ENTMCNC: 25.8 PG — LOW (ref 27–34)
MCHC RBC-ENTMCNC: 29.3 GM/DL — LOW (ref 32–36)
MCV RBC AUTO: 88 FL — SIGNIFICANT CHANGE UP (ref 80–100)
MONOCYTES # BLD AUTO: 0.22 K/UL — SIGNIFICANT CHANGE UP (ref 0–0.9)
MONOCYTES NFR BLD AUTO: 8.1 % — SIGNIFICANT CHANGE UP (ref 2–14)
NEUTROPHILS # BLD AUTO: 1.73 K/UL — LOW (ref 1.8–7.4)
NEUTROPHILS NFR BLD AUTO: 63.9 % — SIGNIFICANT CHANGE UP (ref 43–77)
NRBC # BLD: 0 /100 WBCS — SIGNIFICANT CHANGE UP (ref 0–0)
PLATELET # BLD AUTO: 88 K/UL — LOW (ref 150–400)
POTASSIUM SERPL-SCNC: 6 MMOL/L
PROT SERPL-MCNC: 5.4 G/DL
RBC # BLD: 2.17 M/UL — LOW (ref 3.8–5.2)
RBC # FLD: 14.7 % — HIGH (ref 10.3–14.5)
RH IG SCN BLD-IMP: POSITIVE — SIGNIFICANT CHANGE UP
SODIUM SERPL-SCNC: 138 MMOL/L
WBC # BLD: 2.71 K/UL — LOW (ref 3.8–10.5)
WBC # FLD AUTO: 2.71 K/UL — LOW (ref 3.8–10.5)

## 2020-08-20 PROCEDURE — 99215 OFFICE O/P EST HI 40 MIN: CPT

## 2020-08-21 PROBLEM — I82.621 DEEP VEIN THROMBOSIS (DVT) OF RIGHT UPPER EXTREMITY: Status: ACTIVE | Noted: 2019-02-15

## 2020-08-21 PROCEDURE — 86923 COMPATIBILITY TEST ELECTRIC: CPT

## 2020-08-21 NOTE — HISTORY OF PRESENT ILLNESS
[Disease:__________________________] : Disease: [unfilled] [Treatment Protocol] : Treatment Protocol [de-identified] : 80 year old female presenting to the office for hematologic care. 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 She had a left sided fistula placed approximately in 2010 by her physician in [place] but she does not require dialysis; she is followed in the renal clinic by Dr. Markell Dixon. The use of colony stimulating agents had been safe for the patient when given when hemoglobins are less than 10 g/dL which has led to a frequency of administration of one ejection every 4 to 6 weeks; she received Aranesp injections, completing 57 injections from May 2011 until January 2019. \par \par Patient was hospitalized from 1/22/2019 - 1/29/2019 for right upper extremity DVT. Prior to admission, she noted RUE swelling and pain/discomfort since receiving her last dose of Aranesp on 1/18/2019. 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 Since then, her Aranesp treatments were discontinued and she was transitioned to blood transfusions, which she requires 1 or 2 units every 4-6 weeks. \par \par Past medical history includes hypertension (required multiple medications for control), type two diabetes mellitus. There is no prior history of cerebrovascular accident or bleeding. She has had mildly elevated ferritin and iron levels. [FreeTextEntry1] : Darbepoetin 200 mg q 6 weeks (s/p 57 treatments - held.due to RUE DVT); blood transfusions since January 2019 (1-2 units packed RBC every 4-6 weeks) [de-identified] : Patient was brought in today by her son (Tracie) for blood check today. He provided a copy of a recent MRI head scan completed by patient's neurologist. He believes there has been some improvement in her energy and short-term improvement since starting Desferal last month. Patient reported right upper extremity pain for the past 1 week, admitting to tenderness to the touch and unable to raise her arm to shoulder level. She denied recent traumas/injury to the body, headaches, lightheadedness/dizziness, worsening shortness of breath. She has remained indoors due to the current pandemic.

## 2020-08-21 NOTE — REVIEW OF SYSTEMS
[Chills] : chills [Fatigue] : fatigue [SOB on Exertion] : shortness of breath during exertion [Joint Pain] : joint pain [Joint Stiffness] : joint stiffness [Difficulty Walking] : difficulty walking [Negative] : Heme/Lymph [Fever] : no fever [Night Sweats] : no night sweats [Recent Change In Weight] : ~T no recent weight change [Eye Pain] : no eye pain [Red Eyes] : eyes not red [Dry Eyes] : no dryness of the eyes [Vision Problems] : no vision problems [Dysphagia] : no dysphagia [Nosebleeds] : no nosebleeds [Loss of Hearing] : no loss of hearing [Hoarseness] : no hoarseness [Odynophagia] : no odynophagia [Mucosal Pain] : no mucosal pain [Chest Pain] : no chest pain [Palpitations] : no palpitations [Leg Claudication] : no intermittent leg claudication [Lower Ext Edema] : no lower extremity edema [Shortness Of Breath] : no shortness of breath [Wheezing] : no wheezing [Cough] : no cough [Abdominal Pain] : no abdominal pain [Vomiting] : no vomiting [Constipation] : no constipation [Diarrhea] : no diarrhea [Dysuria] : no dysuria [Vaginal Discharge] : no vaginal discharge [Muscle Pain] : no muscle pain [Dysmenorrhea/Abn Vaginal Bleeding] : no dysmenorrhea/abnormal vaginal bleeding [Skin Rash] : no skin rash [Skin Wound] : no skin wound [Confused] : no confusion [Dizziness] : no dizziness [Fainting] : no fainting [Suicidal] : not suicidal [Insomnia] : no insomnia [Anxiety] : no anxiety [Depression] : no depression [Proptosis] : no proptosis [Hot Flashes] : no hot flashes [Muscle Weakness] : no muscle weakness [Deepening Of The Voice] : no deepening of the voice [Easy Bleeding] : no tendency for easy bleeding [Easy Bruising] : no tendency for easy bruising [Swollen Glands] : no swollen glands

## 2020-08-21 NOTE — ASSESSMENT
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 She also has a history of leukopenia (benign) and mild thrombocytopenia. Bone marrow biopsy last performed on 7/15/2011 demonstrated maturing and mature myeloid elements with no significant lymphocytosis or immaturity. \par \par Despite patient's reported symptoms, her physical examination findings remain unchanged when compared to the last visit. Her ferritin continues to remain elevated, mild improvement is noted with Desferal therapy. She and her son agreed on the following: \par \par - Additional blood studies done today (CBC, CMP, Ferritin). \par - 2 unit of packed red cells transfusion scheduled for 8/22/2020 at 8 am. \par - Continue Desferal on weekly basis.\par - Plan to request RUE ultrasound to rule out DVT. \par - Follow up with nephrology; scheduled on 9/10/2020 with Dr. Dixon. \par - Return to the office on 9/17/2020 at 11 am for re-evaluation. Discussed with Dr. Anjum Fox.

## 2020-08-21 NOTE — REASON FOR VISIT
[Follow-Up Visit] : a follow-up visit for [Blood Count Assessment] : blood count assessment [Coagulopathy] : coagulopathy [FreeTextEntry2] : anemia in CKD

## 2020-08-21 NOTE — PHYSICAL EXAM
[Capable of only limited self care, confined to bed or chair more than 50% of waking hours] : Status 3- Capable of only limited self care, confined to bed or chair more than 50% of waking hours [Normal] : grossly intact [de-identified] : ambulating with cane, currently in wheelchair

## 2020-08-22 ENCOUNTER — APPOINTMENT (OUTPATIENT)
Dept: INFUSION THERAPY | Facility: HOSPITAL | Age: 80
End: 2020-08-22

## 2020-08-22 ENCOUNTER — INPATIENT (INPATIENT)
Facility: HOSPITAL | Age: 80
LOS: 6 days | Discharge: HOME | End: 2020-08-29
Attending: HOSPITALIST | Admitting: HOSPITALIST
Payer: MEDICARE

## 2020-08-22 VITALS
OXYGEN SATURATION: 100 % | SYSTOLIC BLOOD PRESSURE: 142 MMHG | HEART RATE: 60 BPM | RESPIRATION RATE: 16 BRPM | DIASTOLIC BLOOD PRESSURE: 56 MMHG | TEMPERATURE: 98 F

## 2020-08-22 DIAGNOSIS — I77.0 ARTERIOVENOUS FISTULA, ACQUIRED: Chronic | ICD-10-CM

## 2020-08-22 DIAGNOSIS — N18.4 CHRONIC KIDNEY DISEASE, STAGE 4 (SEVERE): ICD-10-CM

## 2020-08-22 DIAGNOSIS — N17.9 ACUTE KIDNEY FAILURE, UNSPECIFIED: ICD-10-CM

## 2020-08-22 DIAGNOSIS — Z95.0 PRESENCE OF CARDIAC PACEMAKER: Chronic | ICD-10-CM

## 2020-08-22 DIAGNOSIS — Z95.2 PRESENCE OF PROSTHETIC HEART VALVE: Chronic | ICD-10-CM

## 2020-08-22 DIAGNOSIS — Z90.49 ACQUIRED ABSENCE OF OTHER SPECIFIED PARTS OF DIGESTIVE TRACT: Chronic | ICD-10-CM

## 2020-08-22 DIAGNOSIS — D64.9 ANEMIA, UNSPECIFIED: ICD-10-CM

## 2020-08-22 DIAGNOSIS — F03.90 UNSPECIFIED DEMENTIA WITHOUT BEHAVIORAL DISTURBANCE: ICD-10-CM

## 2020-08-22 DIAGNOSIS — I10 ESSENTIAL (PRIMARY) HYPERTENSION: ICD-10-CM

## 2020-08-22 DIAGNOSIS — Z29.9 ENCOUNTER FOR PROPHYLACTIC MEASURES, UNSPECIFIED: ICD-10-CM

## 2020-08-22 DIAGNOSIS — E11.9 TYPE 2 DIABETES MELLITUS WITHOUT COMPLICATIONS: ICD-10-CM

## 2020-08-22 LAB
ALBUMIN SERPL ELPH-MCNC: 3.5 G/DL — SIGNIFICANT CHANGE UP (ref 3.3–5)
ALP SERPL-CCNC: 44 U/L — SIGNIFICANT CHANGE UP (ref 40–120)
ALT FLD-CCNC: 9 U/L — SIGNIFICANT CHANGE UP (ref 4–33)
ANION GAP SERPL CALC-SCNC: 13 MMO/L — SIGNIFICANT CHANGE UP (ref 7–14)
ANISOCYTOSIS BLD QL: SLIGHT — SIGNIFICANT CHANGE UP
APTT BLD: 29.6 SEC — SIGNIFICANT CHANGE UP (ref 27–36.3)
AST SERPL-CCNC: 16 U/L — SIGNIFICANT CHANGE UP (ref 4–32)
BASE EXCESS BLDV CALC-SCNC: -3.1 MMOL/L — SIGNIFICANT CHANGE UP
BASOPHILS # BLD AUTO: 0.02 K/UL — SIGNIFICANT CHANGE UP (ref 0–0.2)
BASOPHILS NFR BLD AUTO: 0.5 % — SIGNIFICANT CHANGE UP (ref 0–2)
BASOPHILS NFR SPEC: 0.9 % — SIGNIFICANT CHANGE UP (ref 0–2)
BILIRUB SERPL-MCNC: < 0.2 MG/DL — LOW (ref 0.2–1.2)
BLASTS # FLD: 0 % — SIGNIFICANT CHANGE UP (ref 0–0)
BLD GP AB SCN SERPL QL: NEGATIVE — SIGNIFICANT CHANGE UP
BLOOD GAS VENOUS - CREATININE: 6.84 MG/DL — HIGH (ref 0.5–1.3)
BLOOD GAS VENOUS - FIO2: 21 — SIGNIFICANT CHANGE UP
BUN SERPL-MCNC: 105 MG/DL — HIGH (ref 7–23)
CALCIUM SERPL-MCNC: 9.1 MG/DL — SIGNIFICANT CHANGE UP (ref 8.4–10.5)
CHLORIDE BLDV-SCNC: 108 MMOL/L — SIGNIFICANT CHANGE UP (ref 96–108)
CHLORIDE SERPL-SCNC: 105 MMOL/L — SIGNIFICANT CHANGE UP (ref 98–107)
CO2 SERPL-SCNC: 20 MMOL/L — LOW (ref 22–31)
CREAT SERPL-MCNC: 6.2 MG/DL — HIGH (ref 0.5–1.3)
DACRYOCYTES BLD QL SMEAR: SLIGHT — SIGNIFICANT CHANGE UP
EOSINOPHIL # BLD AUTO: 0.18 K/UL — SIGNIFICANT CHANGE UP (ref 0–0.5)
EOSINOPHIL NFR BLD AUTO: 4.9 % — SIGNIFICANT CHANGE UP (ref 0–6)
EOSINOPHIL NFR FLD: 4.4 % — SIGNIFICANT CHANGE UP (ref 0–6)
FERRITIN SERPL-MCNC: 2330 NG/ML — HIGH (ref 15–150)
GAS PNL BLDV: 134 MMOL/L — LOW (ref 136–146)
GIANT PLATELETS BLD QL SMEAR: PRESENT — SIGNIFICANT CHANGE UP
GLUCOSE BLDV-MCNC: 94 MG/DL — SIGNIFICANT CHANGE UP (ref 70–99)
GLUCOSE SERPL-MCNC: 99 MG/DL — SIGNIFICANT CHANGE UP (ref 70–99)
HCO3 BLDV-SCNC: 21 MMOL/L — SIGNIFICANT CHANGE UP (ref 20–27)
HCT VFR BLD CALC: 18.9 % — CRITICAL LOW (ref 34.5–45)
HCT VFR BLDV CALC: 18.6 % — CRITICAL LOW (ref 34.5–45)
HGB BLD-MCNC: 5.6 G/DL — CRITICAL LOW (ref 11.5–15.5)
HGB BLDV-MCNC: 5.9 G/DL — CRITICAL LOW (ref 11.5–15.5)
IMM GRANULOCYTES NFR BLD AUTO: 0.3 % — SIGNIFICANT CHANGE UP (ref 0–1.5)
INR BLD: 0.97 — SIGNIFICANT CHANGE UP (ref 0.88–1.16)
IRON SATN MFR SERPL: 167 UG/DL — SIGNIFICANT CHANGE UP (ref 140–530)
IRON SATN MFR SERPL: 83 UG/DL — SIGNIFICANT CHANGE UP (ref 30–160)
LACTATE BLDV-MCNC: 0.8 MMOL/L — SIGNIFICANT CHANGE UP (ref 0.5–2)
LYMPHOCYTES # BLD AUTO: 0.82 K/UL — LOW (ref 1–3.3)
LYMPHOCYTES # BLD AUTO: 22.3 % — SIGNIFICANT CHANGE UP (ref 13–44)
LYMPHOCYTES NFR SPEC AUTO: 10.6 % — LOW (ref 13–44)
MCHC RBC-ENTMCNC: 25.9 PG — LOW (ref 27–34)
MCHC RBC-ENTMCNC: 29.6 % — LOW (ref 32–36)
MCV RBC AUTO: 87.5 FL — SIGNIFICANT CHANGE UP (ref 80–100)
METAMYELOCYTES # FLD: 0 % — SIGNIFICANT CHANGE UP (ref 0–1)
MICROCYTES BLD QL: SLIGHT — SIGNIFICANT CHANGE UP
MONOCYTES # BLD AUTO: 0.25 K/UL — SIGNIFICANT CHANGE UP (ref 0–0.9)
MONOCYTES NFR BLD AUTO: 6.8 % — SIGNIFICANT CHANGE UP (ref 2–14)
MONOCYTES NFR BLD: 1.8 % — LOW (ref 2–9)
MYELOCYTES NFR BLD: 0 % — SIGNIFICANT CHANGE UP (ref 0–0)
NEUTROPHIL AB SER-ACNC: 81.4 % — HIGH (ref 43–77)
NEUTROPHILS # BLD AUTO: 2.4 K/UL — SIGNIFICANT CHANGE UP (ref 1.8–7.4)
NEUTROPHILS NFR BLD AUTO: 65.2 % — SIGNIFICANT CHANGE UP (ref 43–77)
NEUTS BAND # BLD: 0 % — SIGNIFICANT CHANGE UP (ref 0–6)
NRBC # BLD: 3 /100WBC — SIGNIFICANT CHANGE UP
NRBC # FLD: 0 K/UL — SIGNIFICANT CHANGE UP (ref 0–0)
OTHER - HEMATOLOGY %: 0 — SIGNIFICANT CHANGE UP
PCO2 BLDV: 50 MMHG — SIGNIFICANT CHANGE UP (ref 41–51)
PH BLDV: 7.28 PH — LOW (ref 7.32–7.43)
PLATELET # BLD AUTO: 100 K/UL — LOW (ref 150–400)
PLATELET COUNT - ESTIMATE: SIGNIFICANT CHANGE UP
PMV BLD: 12.3 FL — SIGNIFICANT CHANGE UP (ref 7–13)
PO2 BLDV: < 24 MMHG — LOW (ref 35–40)
POIKILOCYTOSIS BLD QL AUTO: SLIGHT — SIGNIFICANT CHANGE UP
POTASSIUM BLDV-SCNC: 5.4 MMOL/L — HIGH (ref 3.4–4.5)
POTASSIUM SERPL-MCNC: 5.8 MMOL/L — HIGH (ref 3.5–5.3)
POTASSIUM SERPL-SCNC: 5.8 MMOL/L — HIGH (ref 3.5–5.3)
PROMYELOCYTES # FLD: 0 % — SIGNIFICANT CHANGE UP (ref 0–0)
PROT SERPL-MCNC: 5.6 G/DL — LOW (ref 6–8.3)
PROTHROM AB SERPL-ACNC: 11.2 SEC — SIGNIFICANT CHANGE UP (ref 10.6–13.6)
RBC # BLD: 2.16 M/UL — LOW (ref 3.8–5.2)
RBC # FLD: 14.9 % — HIGH (ref 10.3–14.5)
RH IG SCN BLD-IMP: POSITIVE — SIGNIFICANT CHANGE UP
SAO2 % BLDV: 37 % — LOW (ref 60–85)
SARS-COV-2 RNA SPEC QL NAA+PROBE: SIGNIFICANT CHANGE UP
SMUDGE CELLS # BLD: PRESENT — SIGNIFICANT CHANGE UP
SODIUM SERPL-SCNC: 138 MMOL/L — SIGNIFICANT CHANGE UP (ref 135–145)
UIBC SERPL-MCNC: 83.6 UG/DL — LOW (ref 110–370)
VARIANT LYMPHS # BLD: 0.9 % — SIGNIFICANT CHANGE UP
WBC # BLD: 3.68 K/UL — LOW (ref 3.8–10.5)
WBC # FLD AUTO: 3.68 K/UL — LOW (ref 3.8–10.5)

## 2020-08-22 PROCEDURE — 99222 1ST HOSP IP/OBS MODERATE 55: CPT | Mod: GC

## 2020-08-22 PROCEDURE — 99285 EMERGENCY DEPT VISIT HI MDM: CPT

## 2020-08-22 RX ORDER — SODIUM ZIRCONIUM CYCLOSILICATE 10 G/10G
10 POWDER, FOR SUSPENSION ORAL ONCE
Refills: 0 | Status: COMPLETED | OUTPATIENT
Start: 2020-08-22 | End: 2020-08-22

## 2020-08-22 RX ORDER — HYDRALAZINE HCL 50 MG
100 TABLET ORAL EVERY 8 HOURS
Refills: 0 | Status: DISCONTINUED | OUTPATIENT
Start: 2020-08-22 | End: 2020-08-29

## 2020-08-22 RX ORDER — DEFEROXAMINE MESYLATE 500 MG/1
2.5 INJECTION, POWDER, LYOPHILIZED, FOR SOLUTION INTRAMUSCULAR; INTRAVENOUS; SUBCUTANEOUS ONCE
Refills: 0 | Status: DISCONTINUED | OUTPATIENT
Start: 2020-08-22 | End: 2020-08-22

## 2020-08-22 RX ORDER — DEXTROSE 50 % IN WATER 50 %
25 SYRINGE (ML) INTRAVENOUS ONCE
Refills: 0 | Status: DISCONTINUED | OUTPATIENT
Start: 2020-08-22 | End: 2020-08-29

## 2020-08-22 RX ORDER — TERAZOSIN HYDROCHLORIDE 10 MG/1
1 CAPSULE ORAL
Qty: 0 | Refills: 0 | DISCHARGE

## 2020-08-22 RX ORDER — ALBUTEROL 90 UG/1
2 AEROSOL, METERED ORAL EVERY 6 HOURS
Refills: 0 | Status: DISCONTINUED | OUTPATIENT
Start: 2020-08-22 | End: 2020-08-29

## 2020-08-22 RX ORDER — INSULIN LISPRO 100/ML
VIAL (ML) SUBCUTANEOUS
Refills: 0 | Status: DISCONTINUED | OUTPATIENT
Start: 2020-08-22 | End: 2020-08-29

## 2020-08-22 RX ORDER — DEFEROXAMINE MESYLATE 500 MG/1
0.25 INJECTION, POWDER, LYOPHILIZED, FOR SOLUTION INTRAMUSCULAR; INTRAVENOUS; SUBCUTANEOUS ONCE
Refills: 0 | Status: DISCONTINUED | OUTPATIENT
Start: 2020-08-22 | End: 2020-08-22

## 2020-08-22 RX ORDER — SODIUM CHLORIDE 9 MG/ML
1000 INJECTION, SOLUTION INTRAVENOUS
Refills: 0 | Status: DISCONTINUED | OUTPATIENT
Start: 2020-08-22 | End: 2020-08-29

## 2020-08-22 RX ORDER — ATORVASTATIN CALCIUM 80 MG/1
20 TABLET, FILM COATED ORAL AT BEDTIME
Refills: 0 | Status: DISCONTINUED | OUTPATIENT
Start: 2020-08-22 | End: 2020-08-29

## 2020-08-22 RX ORDER — WARFARIN SODIUM 2.5 MG/1
1 TABLET ORAL
Qty: 0 | Refills: 0 | DISCHARGE

## 2020-08-22 RX ORDER — ISOSORBIDE MONONITRATE 60 MG/1
1 TABLET, EXTENDED RELEASE ORAL
Qty: 0 | Refills: 0 | DISCHARGE

## 2020-08-22 RX ORDER — ALBUTEROL 90 UG/1
2 AEROSOL, METERED ORAL
Qty: 0 | Refills: 0 | DISCHARGE

## 2020-08-22 RX ORDER — FUROSEMIDE 40 MG
40 TABLET ORAL DAILY
Refills: 0 | Status: DISCONTINUED | OUTPATIENT
Start: 2020-08-22 | End: 2020-08-28

## 2020-08-22 RX ORDER — DEFEROXAMINE MESYLATE 500 MG/1
0.25 INJECTION, POWDER, LYOPHILIZED, FOR SOLUTION INTRAMUSCULAR; INTRAVENOUS; SUBCUTANEOUS ONCE
Refills: 0 | Status: COMPLETED | OUTPATIENT
Start: 2020-08-22 | End: 2020-08-23

## 2020-08-22 RX ORDER — MIRTAZAPINE 45 MG/1
15 TABLET, ORALLY DISINTEGRATING ORAL AT BEDTIME
Refills: 0 | Status: DISCONTINUED | OUTPATIENT
Start: 2020-08-22 | End: 2020-08-29

## 2020-08-22 RX ORDER — CALCITRIOL 0.5 UG/1
1 CAPSULE ORAL
Qty: 0 | Refills: 0 | DISCHARGE

## 2020-08-22 RX ORDER — DEXTROSE 50 % IN WATER 50 %
12.5 SYRINGE (ML) INTRAVENOUS ONCE
Refills: 0 | Status: DISCONTINUED | OUTPATIENT
Start: 2020-08-22 | End: 2020-08-29

## 2020-08-22 RX ORDER — DEXTROSE 50 % IN WATER 50 %
15 SYRINGE (ML) INTRAVENOUS ONCE
Refills: 0 | Status: DISCONTINUED | OUTPATIENT
Start: 2020-08-22 | End: 2020-08-29

## 2020-08-22 RX ORDER — CARVEDILOL PHOSPHATE 80 MG/1
25 CAPSULE, EXTENDED RELEASE ORAL ONCE
Refills: 0 | Status: COMPLETED | OUTPATIENT
Start: 2020-08-22 | End: 2020-08-22

## 2020-08-22 RX ORDER — ALLOPURINOL 300 MG
100 TABLET ORAL DAILY
Refills: 0 | Status: DISCONTINUED | OUTPATIENT
Start: 2020-08-22 | End: 2020-08-29

## 2020-08-22 RX ORDER — ASPIRIN/CALCIUM CARB/MAGNESIUM 324 MG
1 TABLET ORAL
Qty: 0 | Refills: 0 | DISCHARGE

## 2020-08-22 RX ORDER — HEPARIN SODIUM 5000 [USP'U]/ML
5000 INJECTION INTRAVENOUS; SUBCUTANEOUS EVERY 12 HOURS
Refills: 0 | Status: DISCONTINUED | OUTPATIENT
Start: 2020-08-22 | End: 2020-08-29

## 2020-08-22 RX ORDER — FUROSEMIDE 40 MG
1 TABLET ORAL
Qty: 0 | Refills: 0 | DISCHARGE

## 2020-08-22 RX ORDER — ISOSORBIDE MONONITRATE 60 MG/1
60 TABLET, EXTENDED RELEASE ORAL DAILY
Refills: 0 | Status: DISCONTINUED | OUTPATIENT
Start: 2020-08-22 | End: 2020-08-30

## 2020-08-22 RX ORDER — ASPIRIN/CALCIUM CARB/MAGNESIUM 324 MG
81 TABLET ORAL DAILY
Refills: 0 | Status: DISCONTINUED | OUTPATIENT
Start: 2020-08-22 | End: 2020-08-29

## 2020-08-22 RX ORDER — SODIUM BICARBONATE 1 MEQ/ML
650 SYRINGE (ML) INTRAVENOUS
Refills: 0 | Status: DISCONTINUED | OUTPATIENT
Start: 2020-08-22 | End: 2020-08-28

## 2020-08-22 RX ORDER — GLUCAGON INJECTION, SOLUTION 0.5 MG/.1ML
1 INJECTION, SOLUTION SUBCUTANEOUS ONCE
Refills: 0 | Status: DISCONTINUED | OUTPATIENT
Start: 2020-08-22 | End: 2020-08-29

## 2020-08-22 RX ORDER — CARVEDILOL PHOSPHATE 80 MG/1
25 CAPSULE, EXTENDED RELEASE ORAL EVERY 12 HOURS
Refills: 0 | Status: DISCONTINUED | OUTPATIENT
Start: 2020-08-22 | End: 2020-08-29

## 2020-08-22 RX ADMIN — MIRTAZAPINE 15 MILLIGRAM(S): 45 TABLET, ORALLY DISINTEGRATING ORAL at 21:39

## 2020-08-22 RX ADMIN — ATORVASTATIN CALCIUM 20 MILLIGRAM(S): 80 TABLET, FILM COATED ORAL at 21:39

## 2020-08-22 RX ADMIN — CARVEDILOL PHOSPHATE 25 MILLIGRAM(S): 80 CAPSULE, EXTENDED RELEASE ORAL at 23:27

## 2020-08-22 RX ADMIN — Medication 100 MILLIGRAM(S): at 17:24

## 2020-08-22 RX ADMIN — Medication 650 MILLIGRAM(S): at 21:43

## 2020-08-22 RX ADMIN — HEPARIN SODIUM 5000 UNIT(S): 5000 INJECTION INTRAVENOUS; SUBCUTANEOUS at 21:43

## 2020-08-22 RX ADMIN — SODIUM ZIRCONIUM CYCLOSILICATE 10 GRAM(S): 10 POWDER, FOR SUSPENSION ORAL at 17:07

## 2020-08-22 NOTE — ED ADULT TRIAGE NOTE - CHIEF COMPLAINT QUOTE
Pt sent from [facility] for PIV and transfusion of defroxine.  Son: Jorge Luis [phone]

## 2020-08-22 NOTE — H&P ADULT - ASSESSMENT
81 y/o F with HTN, CAD with CABG, DVT, CKD, chronic anemia (unknown etiology) sent in from [facility], [place], scheduled for a routine transfusion and IV Deferoxamine today "unable to get a peripheral line". Receives transfusion q 6 weeks. Also states ability to ambulate diminishing over the past few months. In ED, noted to have acute on chronic kidney failure, rising Cr and hyperkalemia. Admitted for work up.

## 2020-08-22 NOTE — H&P ADULT - PROBLEM SELECTOR PLAN 2
Likely anemia of renal disease  - per pt, require pRBC transfusion q4-5 weeks with Dr. Sharpe (heme/onc [facility].  - no Aranesp shots since she believes it gave her arm DVT (completed coumadin, stopped 3-4 months ago)  - report brown stool, occult stool negative in the past. Refused colonoscopy in the past.   - hemodynamically stable.    - plan for 2 units PRBC for severe anemia. plan for IV Iron transfusion (ordered)

## 2020-08-22 NOTE — ED PROVIDER NOTE - ATTENDING CONTRIBUTION TO CARE
Dr. Medley:  I performed a face to face bedside interview with patient regarding history of present illness, review of symptoms and past medical history. I completed an independent physical exam.  I have discussed patient's plan of care with PA.   I agree with note as stated above, having amended the EMR as needed to reflect my findings.   This includes HISTORY OF PRESENT ILLNESS, HIV, PAST MEDICAL/SURGICAL/FAMILY/SOCIAL HISTORY, ALLERGIES AND HOME MEDICATIONS, REVIEW OF SYSTEMS, PHYSICAL EXAM, and any PROGRESS NOTES during the time I functioned as the attending physician for this patient.    80F h/o HTN, CAD s/p CABG, CKD, chronic anemia receiving transfusions every 6-8 weeks, sent in from [facility] because they were unable to obtain peripheral IV access for a scheduled routine blood transfusion.   Pt also recently noted to have elevated ferritin levels and scheduled for deferoxamine infusion as well.  Pt denies any acute complaints except recently has been unable to ambulate, currently being worked up outpatient with neurology.     Exam:  - nad  - rrr  - ctab   -abd soft ntnd    A/P  - anemia requiring transfusion, and elevated ferritin requiring deferoxamine  - cbc, cmp, coags, ferritin levels  - hematology consulted Dr. Medley:  I performed a face to face bedside interview with patient regarding history of present illness, review of symptoms and past medical history. I completed an independent physical exam.  I have discussed patient's plan of care with PA.   I agree with note as stated above, having amended the EMR as needed to reflect my findings.   This includes HISTORY OF PRESENT ILLNESS, HIV, PAST MEDICAL/SURGICAL/FAMILY/SOCIAL HISTORY, ALLERGIES AND HOME MEDICATIONS, REVIEW OF SYSTEMS, PHYSICAL EXAM, and any PROGRESS NOTES during the time I functioned as the attending physician for this patient.    80F h/o HTN, CAD s/p CABG, DVT, CKD, chronic anemia receiving transfusions every 6-8 weeks, sent in from [facility] because they were unable to obtain peripheral IV access for a scheduled routine blood transfusion.   Pt also recently noted to have elevated ferritin levels and scheduled for deferoxamine infusion as well.  Pt denies any acute complaints except recently has been unable to ambulate, currently being worked up outpatient with neurology.     Exam:  - nad  - rrr  - ctab   -abd soft ntnd    A/P  - anemia requiring transfusion, and elevated ferritin requiring deferoxamine  - cbc, cmp, coags, ferritin levels  - hematology consulted

## 2020-08-22 NOTE — H&P ADULT - PROBLEM SELECTOR PLAN 5
Hydralazine, Imdur, Coreg, Terazosin, Clonidine as ordered only on Januvia 50 mg  check hgbA1c  insulin sliding coverage

## 2020-08-22 NOTE — H&P ADULT - PROBLEM SELECTOR PLAN 4
only on Januvia 50 mg  check hgbA1c  insulin sliding coverage Per the family, pt has been weak. Spent mostly on wheel chair. Recently saw neuro, had CT head that shows just mild volume loss and chronic changes. (her ppm not MRI compatible).  recent started Mirtazapine and Cyproheptadine for appetite. Pt has been forgetful.   c/w supportive care

## 2020-08-22 NOTE — ED ADULT NURSE NOTE - OBJECTIVE STATEMENT
Pt rec'd in 25, A&Ox4, sent from [facility] for a routine transfusion of PRBCs and iron. Pt states she was there this morning for the transfusions, but the staff was unable to place an IV, so she was sent here. Pt denies any complaints. Has a fistula to left arm, states it was placed years ago "just in case" and never used. PIV placed to right AC, labs sent.

## 2020-08-22 NOTE — H&P ADULT - HISTORY OF PRESENT ILLNESS
81 y/o F with HTN, CAD with CABG, DVT, CKD, chronic anemia (unknown etiology) sent in from [facility] scheduled for a routine transfusion and IV Deferoxamine today "unable to get a peripheral line". Receives transfusion q 6 weeks. Also states ability to ambulate diminishing over the past few months. Denies fever, chills, chest pain, shortness of breath, weakness, abdominal pain, nausea, vomiting, rectal bleeding. In ED, noted to have acute on chronic kidney failure, rising Cr and hyperkalemia. Admitted for work up. 81 y/o F with HTN, CAD with CABG, DVT, CKD, chronic anemia (unknown etiology, likely CKD induced) sent in from [facility] scheduled for a routine transfusion and IV Deferoxamine today "unable to get a peripheral line". Receives transfusion q 6 weeks. Also states ability to ambulate diminishing over the past few months. Denies fever, chills, chest pain, shortness of breath, weakness, abdominal pain, nausea, vomiting, rectal bleeding. In ED, noted to have acute on chronic kidney failure, rising Cr and hyperkalemia. Admitted for work up.   D/w the son Wagner and the granddaughter on the phone. Per the family, pt has been weak. Spent mostly on wheel chair. Recently saw neuro, had CT head that shows just mild volume loss and chronic changes. Started Mirtazapine and Cyproheptadine for appetite. Pt has been forgetful. She is on Lasix 40 mg three times a week Rx by renal.

## 2020-08-22 NOTE — H&P ADULT - NSHPLABSRESULTS_GEN_ALL_CORE
LABS:                        5.6    3.68  )-----------( 100      ( 22 Aug 2020 10:45 )             18.9     08-22    138  |  105  |  105<H>  ----------------------------<  99  5.8<H>   |  20<L>  |  6.20<H>    Ca    9.1      22 Aug 2020 10:45    TPro  5.6<L>  /  Alb  3.5  /  TBili  < 0.2<L>  /  DBili  x   /  AST  16  /  ALT  9   /  AlkPhos  44  08-22    PT/INR - ( 22 Aug 2020 10:45 )   PT: 11.2 SEC;   INR: 0.97          PTT - ( 22 Aug 2020 10:45 )  PTT:29.6 SEC  CAPILLARY BLOOD GLUCOSE                RADIOLOGY & ADDITIONAL TESTS:    Imaging Personally Reviewed:  [x] YES  [ ] NO    Consultant(s) Notes Reviewed:  [x] YES  [ ] NO    Care Discussed with Consultants/Other Providers [x] YES  [ ] NO

## 2020-08-22 NOTE — H&P ADULT - PROBLEM SELECTOR PLAN 1
Acute on chronic  on Lasix 40 mg M,W,F. will hold for now.  House renal consult (known to Dr. Rocha)   Will trend Cr post transfusion. will hold IVF  monitor urine outpt Acute on chronic, likely progression of her CKD stage V  on Lasix 40 mg M,W,F at home. will hold for now.  House renal consult (known to Dr. Rocha)   Will trend Cr post transfusion. will hold IVF  monitor urine outpt

## 2020-08-22 NOTE — CHART NOTE - NSCHARTNOTEFT_GEN_A_CORE
Discussed with renal fellow. Suggest to continue Lasix 40 mg to avoid fluid overload.  Consult place.     - Dr. KAMARA et ([facility])  - [phone]

## 2020-08-22 NOTE — H&P ADULT - NSICDXPASTSURGICALHX_GEN_ALL_CORE_FT
PAST SURGICAL HISTORY:  A-V fistula left arm    History of pacemaker     S/P AVR TAVR    S/P CABG (coronary artery bypass graft) triple in 2007    S/P cholecystectomy     Stented coronary artery s/p 3 stents, then CABG 2007

## 2020-08-22 NOTE — H&P ADULT - NSHPREVIEWOFSYSTEMS_GEN_ALL_CORE
General: no weakness, no fever/chills, no weight loss/gain, +Anemia  Skin/Breast: no rash, no jaundice  Ophthalmologic: no vision changes, no dry eyes   Respiratory and Thorax: no cough, no wheezing, no hemoptysis, no dyspnea  Cardiovascular: no chest pain, no shortness of breath, no orthopnea  Gastrointestinal: no n/v/d, no abdominal pain, no dysphagia   Genitourinary: no dysuria, no frequency, no nocturia, no hematuria  Musculoskeletal: no trauma, no sprain/strain, no myalgias, no arthralgias, no fracture  Neurological: no HA, no dizziness, no weakness, no numbness  Psychiatric: no depression, no SI/HI  Hematology/Lymphatics: no easy bruising  Endocrine: no heat or cold intolerance. no weight gain or loss  Allergic/Immunologic: no allergy or recent reaction

## 2020-08-22 NOTE — CONSULT NOTE ADULT - SUBJECTIVE AND OBJECTIVE BOX
Hematology Consult Note    HPI:      Allergies    codeine (Other)  Lortab (Other)  morphine (Other)  Percocet 10/325 (Other)  PPM not compatible with  MRI (Other (Fatal))  Reglan (Other; [place])  sulfa drugs ([place])    Intolerances        MEDICATIONS  (STANDING):  deferoxamine IVPB 0.25 Gram(s) IV Intermittent once    MEDICATIONS  (PRN):      PAST MEDICAL & SURGICAL HISTORY:  Thyroid nodule: awaiting FNB in the near future  Severe aortic stenosis  Osteoarthritis: bilateral knees  CKD (chronic kidney disease) stage 4, GFR 15-29 ml/min  DM2 (diabetes mellitus, type 2)  Arthritis  CAD (coronary artery disease)  Gout  Anemia Associated with Chronic Renal Failure  HTN (Hypertension)  S/P AVR: TAVR  History of pacemaker  A-V fistula: left arm  S/P cholecystectomy  Stented coronary artery: s/p 3 stents, then CABG 2007  S/P CABG (coronary artery bypass graft): triple in 2007      FAMILY HISTORY:  No pertinent family history in first degree relatives      SOCIAL HISTORY: No EtOH, no tobacco    REVIEW OF SYSTEMS:    CONSTITUTIONAL: No weakness, fevers or chills  EYES/ENT: No visual changes;  No vertigo or throat pain   NECK: No pain or stiffness  RESPIRATORY: No cough, wheezing, hemoptysis; No shortness of breath  CARDIOVASCULAR: No chest pain or palpitations  GASTROINTESTINAL: No abdominal or epigastric pain. No nausea, vomiting, or hematemesis; No diarrhea or constipation. No melena or hematochezia.  GENITOURINARY: No dysuria, frequency or hematuria  NEUROLOGICAL: No numbness or weakness  SKIN: No itching, burning, rashes, or lesions   All other review of systems is negative unless indicated above.        T(F): 97.9 (08-22-20 @ 10:13), Max: 97.9 (08-22-20 @ 10:13)  HR: 60 (08-22-20 @ 10:13)  BP: 142/56 (08-22-20 @ 10:13)  RR: 16 (08-22-20 @ 10:13)  SpO2: 100% (08-22-20 @ 10:13)  Wt(kg): --    Physical Exam  GENERAL: NAD, well-developed  HEAD:  Atraumatic, Normocephalic  EYES: EOMI, PERRLA, conjunctiva and sclera clear  NECK: Supple, No JVD  CHEST/LUNG: Clear to auscultation bilaterally; No wheeze  HEART: Regular rate and rhythm; No murmurs, rubs, or gallops  ABDOMEN: Soft, Nontender, Nondistended; Bowel sounds present  EXTREMITIES:  2+ Peripheral Pulses, No clubbing, cyanosis, or edema  NEUROLOGY: non-focal  SKIN: No rashes or lesions                          5.6    3.68  )-----------( 100      ( 22 Aug 2020 10:45 )             18.9       08-22    138  |  105  |  105<H>  ----------------------------<  99  5.8<H>   |  20<L>  |  6.20<H>    Ca    9.1      22 Aug 2020 10:45    TPro  5.6<L>  /  Alb  3.5  /  TBili  < 0.2<L>  /  DBili  x   /  AST  16  /  ALT  9   /  AlkPhos  44  08-22          Ferritin, Serum: 2330 ng/mL (08-22-20 @ 10:45)  Iron Total, Serum: 83 ug/dL (08-22-20 @ 10:45)  Unsaturated Iron Binding Capacity: 83.6 ug/dL (08-22-20 @ 10:45) Hematology Consult Note    HPI: 79 y/o F with HTN, CAD with CABG, DVT, CKD, chronic anemia (unknown etiology) sent in from [facility] scheduled for a routine transfusion and IV Deferoxamine today "unable to get a peripheral line". Receives transfusion q 6 weeks. Denies fever, chills, chest pain, shortness of breath, weakness, abdominal pain, nausea, vomiting, rectal bleeding.       Patient has been treated with PRN transfusion and deferoxamine IV infusion every week. Today patient presented to [facility] for scheduled treatment however could not get an IV access. Sent to ED to finish 2U PRBC and Deferoxamine infusion.      Allergies    codeine (Other)  Lortab (Other)  morphine (Other)  Percocet 10/325 (Other)  PPM not compatible with  MRI (Other (Fatal))  Reglan (Other; [place])  sulfa drugs ([place])    Intolerances        MEDICATIONS  (STANDING):  deferoxamine IVPB 0.25 Gram(s) IV Intermittent once    MEDICATIONS  (PRN):      PAST MEDICAL & SURGICAL HISTORY:  Thyroid nodule: awaiting FNB in the near future  Severe aortic stenosis  Osteoarthritis: bilateral knees  CKD (chronic kidney disease) stage 4, GFR 15-29 ml/min  DM2 (diabetes mellitus, type 2)  Arthritis  CAD (coronary artery disease)  Gout  Anemia Associated with Chronic Renal Failure  HTN (Hypertension)  S/P AVR: TAVR  History of pacemaker  A-V fistula: left arm  S/P cholecystectomy  Stented coronary artery: s/p 3 stents, then CABG 2007  S/P CABG (coronary artery bypass graft): triple in 2007      FAMILY HISTORY:  No pertinent family history in first degree relatives      SOCIAL HISTORY: No EtOH, no tobacco    REVIEW OF SYSTEMS:    CONSTITUTIONAL: No weakness, fevers or chills  EYES/ENT: No visual changes;  No vertigo or throat pain   NECK: No pain or stiffness  RESPIRATORY: No cough, wheezing, hemoptysis; No shortness of breath  CARDIOVASCULAR: No chest pain or palpitations  GASTROINTESTINAL: No abdominal or epigastric pain. No nausea, vomiting, or hematemesis; No diarrhea or constipation. No melena or hematochezia.  GENITOURINARY: No dysuria, frequency or hematuria  NEUROLOGICAL: No numbness or weakness  SKIN: No itching, burning, rashes, or lesions   All other review of systems is negative unless indicated above.        T(F): 97.9 (08-22-20 @ 10:13), Max: 97.9 (08-22-20 @ 10:13)  HR: 60 (08-22-20 @ 10:13)  BP: 142/56 (08-22-20 @ 10:13)  RR: 16 (08-22-20 @ 10:13)  SpO2: 100% (08-22-20 @ 10:13)  Wt(kg): --    Physical Exam  GENERAL: NAD, well-developed  HEAD:  Atraumatic, Normocephalic  EYES: EOMI, PERRLA, conjunctiva and sclera clear  NECK: Supple, No JVD  CHEST/LUNG: Clear to auscultation bilaterally; No wheeze  HEART: Regular rate and rhythm; No murmurs, rubs, or gallops  ABDOMEN: Soft, Nontender, Nondistended; Bowel sounds present  EXTREMITIES:  2+ Peripheral Pulses, No clubbing, cyanosis, or edema  NEUROLOGY: non-focal  SKIN: No rashes or lesions                          5.6    3.68  )-----------( 100      ( 22 Aug 2020 10:45 )             18.9       08-22    138  |  105  |  105<H>  ----------------------------<  99  5.8<H>   |  20<L>  |  6.20<H>    Ca    9.1      22 Aug 2020 10:45    TPro  5.6<L>  /  Alb  3.5  /  TBili  < 0.2<L>  /  DBili  x   /  AST  16  /  ALT  9   /  AlkPhos  44  08-22          Ferritin, Serum: 2330 ng/mL (08-22-20 @ 10:45)  Iron Total, Serum: 83 ug/dL (08-22-20 @ 10:45)  Unsaturated Iron Binding Capacity: 83.6 ug/dL (08-22-20 @ 10:45)

## 2020-08-22 NOTE — ED PROVIDER NOTE - OBJECTIVE STATEMENT
81 y/o F with HTN, CAD with CABG, CKD, chronic anemia (unknown etiology) sent in from [facility] scheduled for a routine transfusion and IV Deferoxamine today "unable to get a peripheral line". Receives transfusion q 6 weeks. Denies fever, chills, chest pain, shortness of breath, weakness, abdominal pain, nausea, vomiting, rectal bleeding.   Son: Jorge Luis [phone] 79 y/o F with HTN, CAD with CABG, DVT, CKD, chronic anemia (unknown etiology) sent in from [facility] scheduled for a routine transfusion and IV Deferoxamine today "unable to get a peripheral line". Receives transfusion q 6 weeks. Denies fever, chills, chest pain, shortness of breath, weakness, abdominal pain, nausea, vomiting, rectal bleeding.   Son: Jorge Luis [phone]  PMD- Dario Knott  Nephro- Dr. Garcia 81 y/o F with HTN, CAD with CABG, DVT, CKD, chronic anemia (unknown etiology) sent in from [facility] scheduled for a routine transfusion and IV Deferoxamine today "unable to get a peripheral line". Receives transfusion q 6 weeks. Also states ability to ambulate diminishing over the past few months. Denies fever, chills, chest pain, shortness of breath, weakness, abdominal pain, nausea, vomiting, rectal bleeding.   Son: Jorge Luis [phone]  PMD- Dario Knott  Nephro- Dr. Garcia

## 2020-08-22 NOTE — ED PROVIDER NOTE - PROGRESS NOTE DETAILS
Dr. Medley spoke with Hematology Onc fellow on call- orders for PRBC's and iron infusion confirmed. Will order 0.25g Deferoxamine over 2 hours and 2 units PRBC's over 3 hours each unit. Dr. Medley spoke with Hematology Onc fellow on call- orders for PRBC's and iron infusion confirmed. Will order 0.25g Deferoxamine over 2 hours and 2 units PRBC's over 3 hours each unit. Requested for Heme to come and see patient. Jean Newsome- Heme/Onc at bedside. Deferoxamine will be given over 8 hours not 2. Spoke with CDU team JEAN Dozier and Dr. Bianchi. Accepted to unit. COVID and labs sent TELLO Newsome- labs revealing creatinine 6.2. CDU dispo changed to admission. [facility] Gustaf[facility]. TELLO Newsome- Spoke to son to give him update on admission. Called and signed out case to ED ACP at [phone]

## 2020-08-22 NOTE — CONSULT NOTE ADULT - ASSESSMENT
79 y/o F with HTN, CAD with CABG, DVT, CKD, chronic anemia (likely from CKD) sent in from [facility] scheduled for a routine transfusion and IV Deferoxamine today.      # Anemia and iron overload  - Patient has been treated with PRN transfusion based on H/H results and deferoxamine IV infusion every week.   - Today patient presented to [facility] for scheduled treatment however could not get an IV access. Sent to ED to finish 2U PRBC and Deferoxamine infusion.  - Her H/H from today shows H/H of 5.6/18.9, will transfuse 2U PRBC and give 0.25g Deferoxamine as previously ordered.  - Spoke with the pharmacy and confirmed the order.  - After finishing the treatment, patient can be discharged and f/u long term with Dr. Fox.          Dior Up MD  PGY 5, Oncology/Hematology fellow  (P) [phone]  After 5pm, please contact on-call team. 79 y/o F with HTN, CAD with CABG, DVT, CKD, chronic anemia (likely from CKD) sent in from [facility] scheduled for a routine transfusion and IV Deferoxamine today.      # Anemia and iron overload  - Patient has been treated with PRN transfusion based on H/H results and deferoxamine IV infusion every week  - Today patient presented to [facility] for scheduled treatment however could not get an IV access. Sent to ED to finish 2U PRBC and Deferoxamine infusion  - Her H/H from today shows H/H of 5.6/18.9, will transfuse 2U PRBC and give 0.25g Deferoxamine as previously ordered  - Spoke with the pharmacy and confirmed the order.  - After finishing the treatment, patient can be discharged if doing well and f/u retirement with Dr. Fox.          Dior Up MD  PGY 5, Oncology/Hematology fellow  (P) [phone]  After 5pm, please contact on-call team.

## 2020-08-22 NOTE — ED PROVIDER NOTE - CLINICAL SUMMARY MEDICAL DECISION MAKING FREE TEXT BOX
79 y/o F with HTN, CAD with CABG, CKD, chronic anemia (unknown etiology) sent in from [facility] scheduled for a routine transfusion PRBC's and IV Deferoxamine today "unable to get a peripheral line". Receives transfusion q 6 weeks. Denies fever, chills, chest pain, shortness of breath, weakness, abdominal pain, nausea, vomiting, rectal bleeding.  Plan: Spoke with Hem/onc fellow- will order and give 0.25g Deferoxamine over 2 hours and 2 units PRBC's over 3 hours each unit. Consent completed. Will transfer to CDU.   Plan: Contact heme, place peripheral line, transfuse 2 units PRBC's 79 y/o F with HTN, CAD with CABG, CKD, chronic anemia (unknown etiology) sent in from [facility] scheduled for a routine transfusion PRBC's and IV Deferoxamine today "unable to get a peripheral line". Receives transfusion q 6 weeks. Denies fever, chills, chest pain, shortness of breath, weakness, abdominal pain, nausea, vomiting, rectal bleeding.     Plan: Spoke with Hem/onc fellow- will order and give 0.25g Deferoxamine over 2 hours and 2 units PRBC's over 3 hours each unit. Consent completed. Will transfer to CDU. 81 y/o F with HTN, CAD with CABG, DVT, CKD, chronic anemia (unknown etiology) sent in from [facility] scheduled for a routine transfusion PRBC's and IV Deferoxamine today "unable to get a peripheral line". Receives transfusion q 6 weeks. Denies fever, chills, chest pain, shortness of breath, weakness, abdominal pain, nausea, vomiting, rectal bleeding.     Plan: Spoke with Hem/onc fellow- will order and give 0.25g Deferoxamine over 2 hours and 2 units PRBC's over 3 hours each unit. Consent completed. Will transfer to CDU. 81 y/o F with HTN, CAD with CABG, DVT, CKD, chronic anemia (unknown etiology) sent in from [facility] scheduled for a routine transfusion PRBC's and IV Deferoxamine today "unable to get a peripheral line". Receives transfusion q 6 weeks. Also states ability to ambulate diminishing over the past few months. Denies fever, chills, chest pain, shortness of breath, weakness, abdominal pain, nausea, vomiting, rectal bleeding.     Plan: Spoke with Hem/onc fellow- will order and give 0.25g Deferoxamine over 2 hours and 2 units PRBC's over 3 hours each unit. Consent completed. Will transfer to CDU.

## 2020-08-22 NOTE — H&P ADULT - NSHPPHYSICALEXAM_GEN_ALL_CORE
Vital Signs Last 24 Hrs  T(C): 36.4 (22 Aug 2020 12:15), Max: 36.6 (22 Aug 2020 10:13)  T(F): 97.5 (22 Aug 2020 12:15), Max: 97.9 (22 Aug 2020 10:13)  HR: 61 (22 Aug 2020 12:15) (60 - 61)  BP: 164/43 (22 Aug 2020 12:15) (142/56 - 164/43)  BP(mean): --  RR: 16 (22 Aug 2020 12:15) (16 - 16)  SpO2: 100% (22 Aug 2020 12:15) (100% - 100%)    PHYSICAL EXAM:  GENERAL: NAD, well-developed, comfortable  HEAD:  Atraumatic, Normocephalic  EYES: EOMI, PERRLA, conjunctiva and sclera clear  NECK: Supple, No JVD  CHEST/LUNG: Clear to auscultation bilaterally; No wheeze  HEART: Regular rate and rhythm; No murmurs, rubs, or gallops  ABDOMEN: Soft, Nontender, Nondistended; Bowel sounds present  Neuro: AAOx3, no focal weakness, 5/5 b/l extremity strength  EXTREMITIES:  2+ Peripheral Pulses, No clubbing, cyanosis, or edema  SKIN: No rashes or lesions

## 2020-08-22 NOTE — ED PROVIDER NOTE - CARE PLAN
Principal Discharge DX:	Chronic anemia Principal Discharge DX:	Acute renal failure, unspecified acute renal failure type  Secondary Diagnosis:	Chronic anemia

## 2020-08-23 DIAGNOSIS — E87.5 HYPERKALEMIA: ICD-10-CM

## 2020-08-23 DIAGNOSIS — N18.5 CHRONIC KIDNEY DISEASE, STAGE 5: ICD-10-CM

## 2020-08-23 LAB
ANION GAP SERPL CALC-SCNC: 14 MMO/L — SIGNIFICANT CHANGE UP (ref 7–14)
ANION GAP SERPL CALC-SCNC: 14 MMO/L — SIGNIFICANT CHANGE UP (ref 7–14)
BUN SERPL-MCNC: 100 MG/DL — HIGH (ref 7–23)
BUN SERPL-MCNC: 100 MG/DL — HIGH (ref 7–23)
CALCIUM SERPL-MCNC: 8.6 MG/DL — SIGNIFICANT CHANGE UP (ref 8.4–10.5)
CALCIUM SERPL-MCNC: 8.6 MG/DL — SIGNIFICANT CHANGE UP (ref 8.4–10.5)
CHLORIDE SERPL-SCNC: 106 MMOL/L — SIGNIFICANT CHANGE UP (ref 98–107)
CHLORIDE SERPL-SCNC: 107 MMOL/L — SIGNIFICANT CHANGE UP (ref 98–107)
CO2 SERPL-SCNC: 19 MMOL/L — LOW (ref 22–31)
CO2 SERPL-SCNC: 20 MMOL/L — LOW (ref 22–31)
CREAT SERPL-MCNC: 5.82 MG/DL — HIGH (ref 0.5–1.3)
CREAT SERPL-MCNC: 6.19 MG/DL — HIGH (ref 0.5–1.3)
GLUCOSE SERPL-MCNC: 129 MG/DL — HIGH (ref 70–99)
GLUCOSE SERPL-MCNC: 91 MG/DL — SIGNIFICANT CHANGE UP (ref 70–99)
HBA1C BLD-MCNC: 4.8 % — SIGNIFICANT CHANGE UP (ref 4–5.6)
HCT VFR BLD CALC: 25.8 % — LOW (ref 34.5–45)
HCT VFR BLD CALC: 27.5 % — LOW (ref 34.5–45)
HGB BLD-MCNC: 8.1 G/DL — LOW (ref 11.5–15.5)
HGB BLD-MCNC: 8.3 G/DL — LOW (ref 11.5–15.5)
MAGNESIUM SERPL-MCNC: 2.6 MG/DL — SIGNIFICANT CHANGE UP (ref 1.6–2.6)
MAGNESIUM SERPL-MCNC: 2.6 MG/DL — SIGNIFICANT CHANGE UP (ref 1.6–2.6)
MCHC RBC-ENTMCNC: 26.6 PG — LOW (ref 27–34)
MCHC RBC-ENTMCNC: 26.9 PG — LOW (ref 27–34)
MCHC RBC-ENTMCNC: 30.2 % — LOW (ref 32–36)
MCHC RBC-ENTMCNC: 31.4 % — LOW (ref 32–36)
MCV RBC AUTO: 85.7 FL — SIGNIFICANT CHANGE UP (ref 80–100)
MCV RBC AUTO: 88.1 FL — SIGNIFICANT CHANGE UP (ref 80–100)
NRBC # FLD: 0 K/UL — SIGNIFICANT CHANGE UP (ref 0–0)
NRBC # FLD: 0 K/UL — SIGNIFICANT CHANGE UP (ref 0–0)
PHOSPHATE SERPL-MCNC: 4.8 MG/DL — HIGH (ref 2.5–4.5)
PHOSPHATE SERPL-MCNC: 4.9 MG/DL — HIGH (ref 2.5–4.5)
PLATELET # BLD AUTO: 88 K/UL — LOW (ref 150–400)
PLATELET # BLD AUTO: 89 K/UL — LOW (ref 150–400)
PMV BLD: 10.7 FL — SIGNIFICANT CHANGE UP (ref 7–13)
PMV BLD: 12.1 FL — SIGNIFICANT CHANGE UP (ref 7–13)
POTASSIUM SERPL-MCNC: 4.9 MMOL/L — SIGNIFICANT CHANGE UP (ref 3.5–5.3)
POTASSIUM SERPL-MCNC: 5.5 MMOL/L — HIGH (ref 3.5–5.3)
POTASSIUM SERPL-SCNC: 4.9 MMOL/L — SIGNIFICANT CHANGE UP (ref 3.5–5.3)
POTASSIUM SERPL-SCNC: 5.5 MMOL/L — HIGH (ref 3.5–5.3)
RBC # BLD: 3.01 M/UL — LOW (ref 3.8–5.2)
RBC # BLD: 3.12 M/UL — LOW (ref 3.8–5.2)
RBC # FLD: 14.3 % — SIGNIFICANT CHANGE UP (ref 10.3–14.5)
RBC # FLD: 14.6 % — HIGH (ref 10.3–14.5)
SODIUM SERPL-SCNC: 139 MMOL/L — SIGNIFICANT CHANGE UP (ref 135–145)
SODIUM SERPL-SCNC: 141 MMOL/L — SIGNIFICANT CHANGE UP (ref 135–145)
WBC # BLD: 3.5 K/UL — LOW (ref 3.8–10.5)
WBC # BLD: 3.54 K/UL — LOW (ref 3.8–10.5)
WBC # FLD AUTO: 3.5 K/UL — LOW (ref 3.8–10.5)
WBC # FLD AUTO: 3.54 K/UL — LOW (ref 3.8–10.5)

## 2020-08-23 PROCEDURE — 99223 1ST HOSP IP/OBS HIGH 75: CPT | Mod: GC

## 2020-08-23 RX ORDER — SODIUM ZIRCONIUM CYCLOSILICATE 10 G/10G
10 POWDER, FOR SUSPENSION ORAL ONCE
Refills: 0 | Status: COMPLETED | OUTPATIENT
Start: 2020-08-23 | End: 2020-08-23

## 2020-08-23 RX ADMIN — Medication 100 MILLIGRAM(S): at 08:57

## 2020-08-23 RX ADMIN — HEPARIN SODIUM 5000 UNIT(S): 5000 INJECTION INTRAVENOUS; SUBCUTANEOUS at 08:57

## 2020-08-23 RX ADMIN — Medication 100 MILLIGRAM(S): at 12:37

## 2020-08-23 RX ADMIN — DEFEROXAMINE MESYLATE 125 GRAM(S): 500 INJECTION, POWDER, LYOPHILIZED, FOR SOLUTION INTRAMUSCULAR; INTRAVENOUS; SUBCUTANEOUS at 05:03

## 2020-08-23 RX ADMIN — Medication 100 MILLIGRAM(S): at 21:59

## 2020-08-23 RX ADMIN — Medication 100 MILLIGRAM(S): at 16:29

## 2020-08-23 RX ADMIN — CARVEDILOL PHOSPHATE 25 MILLIGRAM(S): 80 CAPSULE, EXTENDED RELEASE ORAL at 17:33

## 2020-08-23 RX ADMIN — MIRTAZAPINE 15 MILLIGRAM(S): 45 TABLET, ORALLY DISINTEGRATING ORAL at 22:00

## 2020-08-23 RX ADMIN — Medication 650 MILLIGRAM(S): at 17:34

## 2020-08-23 RX ADMIN — HEPARIN SODIUM 5000 UNIT(S): 5000 INJECTION INTRAVENOUS; SUBCUTANEOUS at 17:34

## 2020-08-23 RX ADMIN — Medication 40 MILLIGRAM(S): at 05:03

## 2020-08-23 RX ADMIN — CARVEDILOL PHOSPHATE 25 MILLIGRAM(S): 80 CAPSULE, EXTENDED RELEASE ORAL at 05:03

## 2020-08-23 RX ADMIN — Medication 650 MILLIGRAM(S): at 05:03

## 2020-08-23 RX ADMIN — Medication 0.2 MILLIGRAM(S): at 05:03

## 2020-08-23 RX ADMIN — ISOSORBIDE MONONITRATE 60 MILLIGRAM(S): 60 TABLET, EXTENDED RELEASE ORAL at 12:37

## 2020-08-23 RX ADMIN — SODIUM ZIRCONIUM CYCLOSILICATE 10 GRAM(S): 10 POWDER, FOR SUSPENSION ORAL at 16:30

## 2020-08-23 RX ADMIN — Medication 100 MILLIGRAM(S): at 00:46

## 2020-08-23 RX ADMIN — Medication 81 MILLIGRAM(S): at 12:37

## 2020-08-23 RX ADMIN — ATORVASTATIN CALCIUM 20 MILLIGRAM(S): 80 TABLET, FILM COATED ORAL at 21:59

## 2020-08-23 NOTE — PHYSICAL THERAPY INITIAL EVALUATION ADULT - ADDITIONAL COMMENTS
Patient report lives with son in house, no stairs from rear end, non ambulatory, uses wheel chair, son helps her to get in to the wheel chair. Patient state does not stand at all.

## 2020-08-23 NOTE — CONSULT NOTE ADULT - ASSESSMENT
80 year old woman with history of HTN, CAD, s/p PCI, CABG, severe AS, s/p TAVR, s/p PPM, diastolic HF, RUE DVT, CKD, chronic anemia (unknown etiology, likely CKD induced) sent in from [facility] scheduled for a routine transfusion and IV Deferoxamine today "unable to get a peripheral line"    1. CAD, s/p PCI, CABG  -stable, no chest pain, acs  -cont med tx of CAD with asa, statin, bb    2. RUE DVT  -completed course of a/c  -cont asa  -vasc f/u Dr Flores    3. AS s/p TAVR/ PPM  -cv stable     4. HTN   -bp stable/acceptable  -cont current tx    5. anemia, hx  -associated with chronic renal failure   -no overt s/s of bleeding  -prbc's per medicine/renal     6. Diastolic HF, chronic  -stable, cont lasix daily     7. CKD   -renal f/u    8. Pituitary macroadenoma, recent leg weakness  -neuro eval       dvt ppx 80 year old woman with history of HTN, CAD, s/p PCI, CABG, severe AS, s/p TAVR, s/p PPM, diastolic HF, RUE DVT, CKD, chronic anemia (unknown etiology, likely CKD induced) sent in from [facility] scheduled for a routine transfusion and IV Deferoxamine today "unable to get a peripheral line"    1. CAD, s/p PCI, CABG  -stable, no chest pain, acs  -cont med tx of CAD with asa, statin, bb    2. RUE DVT  -completed course of a/c  -cont asa  -vasc f/u Dr Flores    3. AS s/p TAVR/ PPM  -cv stable     4. HTN   -bp stable/acceptable  -cont current tx    5. anemia, hx  -associated with chronic renal failure   -no overt s/s of bleeding  -prbc's per medicine/renal     6. Diastolic HF, chronic  -stable, cont lasix daily     7. CKD, hyperkalemia  -renal f/u, tx per renal     8. Pituitary macroadenoma, recent leg weakness  -neuro eval       dvt ppx

## 2020-08-23 NOTE — CONSULT NOTE ADULT - PROBLEM SELECTOR RECOMMENDATION 2
Pt with hyperkalemia in the setting of advanced CKD. Serum K elevated at 5.5 today. Can give Lokelma 10mg once. Medical management of hyperkalemia. Monitor potassium levels. Low potassium diet    Kellie Allan  Nephrology Fellow  Pager: [phone]

## 2020-08-23 NOTE — CONSULT NOTE ADULT - SUBJECTIVE AND OBJECTIVE BOX
[facility] Division of Kidney Diseases & Hypertension  INITIAL CONSULT NOTE  [phone]--------------------------------------------------------------------------------  HPI: 80 year old Female with HTN, CAD with CABG, DVT, Advanced CKD, chronic anemia (unknown etiology) sent in from [facility] scheduled for a routine transfusion and IV Deferoxamine on 8/22/20 since they were "unable to get a peripheral line" Nephrology team consulted for history of advanced CKD.     Patient seen and examined today. Pt with known history of kidney disease and follows Dr. Herron as her outpatient nephrologist. Pt with known CKD and has an AVF placed 10 years ago but has not used it since. Patient with baseline SCr: 4.7-5.7. Last outpatient SCr increased to 6.36 on 8/20/20. On admission, pt with SCr: 6.20 decreased to 5.82 today (8/23/20). Patient seen and examined this morning. Pt reports feeling tired. Denies chest pain, SOB, nausea/vomiting, palpitations, abdominal pain, pruritus.      PAST HISTORY  --------------------------------------------------------------------------------  PAST MEDICAL & SURGICAL HISTORY:  Thyroid nodule: awaiting FNB in the near future  Severe aortic stenosis  Osteoarthritis: bilateral knees  CKD (chronic kidney disease) stage 4, GFR 15-29 ml/min  DM2 (diabetes mellitus, type 2)  Arthritis  CAD (coronary artery disease)  Gout  Anemia Associated with Chronic Renal Failure  HTN (Hypertension)  S/P AVR: TAVR  History of pacemaker  A-V fistula: left arm  S/P cholecystectomy  Stented coronary artery: s/p 3 stents, then CABG 2007  S/P CABG (coronary artery bypass graft): triple in 2007    FAMILY HISTORY:  No pertinent family history in first degree relatives    PAST SOCIAL HISTORY:    ALLERGIES & MEDICATIONS  --------------------------------------------------------------------------------  Allergies    codeine (Other)  Lortab (Other)  morphine (Other)  Percocet 10/325 (Other)  PPM not compatible with  MRI (Other (Fatal))  Reglan (Other; Flushing)  sulfa drugs (Flushing)    Intolerances      Standing Inpatient Medications  allopurinol 100 milliGRAM(s) Oral daily  aspirin enteric coated 81 milliGRAM(s) Oral daily  atorvastatin 20 milliGRAM(s) Oral at bedtime  carvedilol 25 milliGRAM(s) Oral every 12 hours  cloNIDine 0.2 milliGRAM(s) Oral daily  dextrose 5%. 1000 milliLiter(s) IV Continuous <Continuous>  dextrose 50% Injectable 12.5 Gram(s) IV Push once  dextrose 50% Injectable 25 Gram(s) IV Push once  dextrose 50% Injectable 25 Gram(s) IV Push once  furosemide    Tablet 40 milliGRAM(s) Oral daily  heparin   Injectable 5000 Unit(s) SubCutaneous every 12 hours  hydrALAZINE 100 milliGRAM(s) Oral every 8 hours  insulin lispro (HumaLOG) corrective regimen sliding scale   SubCutaneous three times a day before meals  isosorbide   mononitrate ER Tablet (IMDUR) 60 milliGRAM(s) Oral daily  mirtazapine 15 milliGRAM(s) Oral at bedtime  sodium bicarbonate 650 milliGRAM(s) Oral two times a day    PRN Inpatient Medications  ALBUTerol    90 MICROgram(s) HFA Inhaler 2 Puff(s) Inhalation every 6 hours PRN  dextrose 40% Gel 15 Gram(s) Oral once PRN  glucagon  Injectable 1 milliGRAM(s) IntraMuscular once PRN      REVIEW OF SYSTEMS  --------------------------------------------------------------------------------  Gen: +, weakness  Skin: No rashes  Head/Eyes/Ears/Mouth: No headache; Normal hearing; Normal vision w/o blurriness;   Respiratory: No dyspnea, cough, wheezing, hemoptysis  CV: No chest pain,   GI: No abdominal pain, diarrhea, constipation, nausea, vomiting  : No increased frequency, dysuria, hematuria, nocturia  MSK: No joint pain/swelling;   Neuro: No dizziness/lightheadedness, weakness, seizures    All other systems were reviewed and are negative, except as noted.    VITALS/PHYSICAL EXAM  --------------------------------------------------------------------------------  T(C): 36.5 (08-23-20 @ 05:02), Max: 36.6 (08-22-20 @ 10:13)  HR: 65 (08-23-20 @ 05:02) (59 - 70)  BP: 172/71 (08-23-20 @ 05:02) (142/56 - 179/72)  RR: 18 (08-23-20 @ 05:02) (16 - 18)  SpO2: 100% (08-23-20 @ 05:02) (98% - 100%)  Wt(kg): --  Height (cm): 154.9 (08-22-20 @ 18:00)  Weight (kg): 61.9 (08-22-20 @ 18:00)  BMI (kg/m2): 25.8 (08-22-20 @ 18:00)  BSA (m2): 1.61 (08-22-20 @ 18:00)      Physical Exam:  	Gen: NAD, well-appearing  	HEENT: supple neck  	Pulm: CTA B/L  	CV:  S1S2  	Abd: +BS, soft   	Ext: No B/L Lower ext edema  	Neuro: No focal deficits  	Skin: Warm, without rashes  	Vascular access: LUE AVF + thrill/bruit    LABS/STUDIES  --------------------------------------------------------------------------------              8.3    3.50  >-----------<  88       [08-23-20 @ 06:35]              27.5     141  |  107  |  100  ----------------------------<  91      [08-23-20 @ 06:35]  5.5   |  20  |  5.82        Ca     8.6     [08-23-20 @ 06:35]      Mg     2.6     [08-23-20 @ 06:35]      Phos  4.8     [08-23-20 @ 06:35]    TPro  5.6  /  Alb  3.5  /  TBili  < 0.2  /  DBili  x   /  AST  16  /  ALT  9   /  AlkPhos  44  [08-22-20 @ 10:45]    PT/INR: PT 11.2 , INR 0.97       [08-22-20 @ 10:45]  PTT: 29.6       [08-22-20 @ 10:45]    Creatinine Trend:  SCr 5.82 [08-23 @ 06:35]  SCr 6.20 [08-22 @ 10:45]    Iron 83, TIBC 167, %sat --      [08-22-20 @ 10:45]  Ferritin 2330      [08-22-20 @ 10:45]

## 2020-08-23 NOTE — CONSULT NOTE ADULT - SUBJECTIVE AND OBJECTIVE BOX
CARDIOLOGY CONSULT NOTE - DR. NAVARRO    HPI:    Patient is a 80 year old woman with history of HTN, CAD, s/p PCI, CABG, severe AS, s/p TAVR, s/p PPM, diastolic HF, DVT, CKD, chronic anemia (unknown etiology, likely CKD induced) sent in from [facility] scheduled for a routine transfusion and IV Deferoxamine today "unable to get a peripheral line"    Patient denies any chest pain, dyspnea, palpitations, cough, syncope, edema, exertional symptoms, nausea, abdominal pain, fever, chills,  or rash.    + recent leg weakness past few weeks and inability to walk       PAST MEDICAL & SURGICAL HISTORY:  Thyroid nodule: awaiting FNB in the near future  Severe aortic stenosis  Osteoarthritis: bilateral knees  CKD (chronic kidney disease) stage 4, GFR 15-29 ml/min  DM2 (diabetes mellitus, type 2)  Arthritis  CAD (coronary artery disease)  Gout  Anemia Associated with Chronic Renal Failure  HTN (Hypertension)  S/P AVR: TAVR  History of pacemaker  A-V fistula: left arm  S/P cholecystectomy  Stented coronary artery: s/p 3 stents, then CABG   S/P CABG (coronary artery bypass graft): triple in         PREVIOUS DIAGNOSTIC TESTING:    [ ] Echocardiogram:  [ ]  Catheterization:  [ ] Stress Test:  	    MEDICATIONS:    Home Medications:  allopurinol 100 mg oral tablet: 1 tab(s) orally once a day (22 Aug 2020 14:52)  carvedilol 25 mg oral tablet: 1 tab(s) orally 2 times a day (22 Aug 2020 14:52)  cloNIDine 0.2 mg oral tablet: 1 tab(s) orally once a day (22 Aug 2020 14:52)  Crestor 10 mg oral tablet: 1 tab(s) orally once a day (at bedtime) (22 Aug 2020 14:52)  cyproheptadine 4 mg oral tablet: 1 tab(s) orally once a day (22 Aug 2020 14:52)  hydrALAZINE 100 mg oral tablet: 1 tab(s) orally 4 times a day (22 Aug 2020 14:52)  Januvia 50 mg oral tablet: 1 tab(s) orally once a day (22 Aug 2020 14:52)  mirtazapine 15 mg oral tablet: 1 tab(s) orally once a day (at bedtime) (22 Aug 2020 14:52)  sodium bicarbonate 650 mg oral tablet: 1 tab(s) orally 2 times a day (22 Aug 2020 14:52)  terazosin 2 mg oral capsule: 1 cap(s) orally once a day (at bedtime) (22 Aug 2020 14:52)      MEDICATIONS  (STANDING):  allopurinol 100 milliGRAM(s) Oral daily  aspirin enteric coated 81 milliGRAM(s) Oral daily  atorvastatin 20 milliGRAM(s) Oral at bedtime  carvedilol 25 milliGRAM(s) Oral every 12 hours  cloNIDine 0.2 milliGRAM(s) Oral daily  dextrose 5%. 1000 milliLiter(s) (50 mL/Hr) IV Continuous <Continuous>  dextrose 50% Injectable 12.5 Gram(s) IV Push once  dextrose 50% Injectable 25 Gram(s) IV Push once  dextrose 50% Injectable 25 Gram(s) IV Push once  furosemide    Tablet 40 milliGRAM(s) Oral daily  heparin   Injectable 5000 Unit(s) SubCutaneous every 12 hours  hydrALAZINE 100 milliGRAM(s) Oral every 8 hours  insulin lispro (HumaLOG) corrective regimen sliding scale   SubCutaneous three times a day before meals  isosorbide   mononitrate ER Tablet (IMDUR) 60 milliGRAM(s) Oral daily  mirtazapine 15 milliGRAM(s) Oral at bedtime  sodium bicarbonate 650 milliGRAM(s) Oral two times a day  sodium zirconium cyclosilicate 10 Gram(s) Oral once      FAMILY HISTORY:  No pertinent family history in first degree relatives      SOCIAL HISTORY:    [x ] Non-smoker  [ ] Smoker  [ ] Alcohol    Allergies    codeine (Other)  Lortab (Other)  morphine (Other)  Percocet 10/325 (Other)  PPM not compatible with  MRI (Other (Fatal))  Reglan (Other; Flushing)  sulfa drugs (Flushing)    Intolerances    	    REVIEW OF SYSTEMS:  CONSTITUTIONAL: No fever, weight loss, ++ fatigue  EYES: No eye pain, visual disturbances, or discharge  ENMT:  No difficulty hearing, tinnitus, vertigo; No sinus or throat pain  NECK: No pain or stiffness  RESPIRATORY: No cough, wheezing, chills or hemoptysis; No Shortness of Breath  CARDIOVASCULAR: as HPI  GASTROINTESTINAL: No abdominal or epigastric pain. No nausea, vomiting, or hematemesis; No diarrhea or constipation. No melena or hematochezia.  GENITOURINARY: No dysuria, frequency, hematuria, or incontinence  NEUROLOGICAL: No headaches, memory loss, ++ loss of strength, numbness, or tremors  SKIN: No itching, burning, rashes, or lesions   	  [ ] All others negative	  [ ] Unable to obtain    PHYSICAL EXAM:    T(C): 36.5 (20 @ 05:02), Max: 36.6 (20 @ 13:30)  HR: 61 (20 @ 08:46) (59 - 70)  BP: 158/67 (20 @ 08:46) (154/54 - 179/72)  RR: 18 (20 @ 05:02) (16 - 18)  SpO2: 100% (20 @ 05:02) (98% - 100%)  Wt(kg): --  I&O's Summary    Daily Height in cm: 154.94 (22 Aug 2020 18:00)    Daily Weight in k.9 (22 Aug 2020 18:00)    Appearance: Normal	  Psychiatry: A & O x 3, Mood & affect appropriate  HEENT:   Normal oral mucosa, PERRL, EOMI	  Lymphatic: No lymphadenopathy  Cardiovascular: Normal S1 S2,RRR, No JVD, sm  Respiratory: Lungs clear to auscultation	  Gastrointestinal:  Soft, Non-tender, + BS	  Skin: No rashes, No ecchymoses, No cyanosis	  Neurologic: Non-focal  Extremities: Normal range of motion, No clubbing, cyanosis or edema  Vascular: Peripheral pulses palpable 2+ bilaterally    TELEMETRY: 	    ECG:  	sr, no acute changes  RADIOLOGY:  OTHER: 	  	  LABS:	 	    CARDIAC MARKERS:        proBNP:     Lipid Profile:   HgA1c:   TSH:                           8.3    3.50  )-----------( 88       ( 23 Aug 2020 06:35 )             27.5     08-23    141  |  107  |  100<H>  ----------------------------<  91  5.5<H>   |  20<L>  |  5.82<H>    Ca    8.6      23 Aug 2020 06:35  Phos  4.8     08  Mg     2.6         TPro  5.6<L>  /  Alb  3.5  /  TBili  < 0.2<L>  /  DBili  x   /  AST  16  /  ALT  9   /  AlkPhos  44      PT/INR - ( 22 Aug 2020 10:45 )   PT: 11.2 SEC;   INR: 0.97          PTT - ( 22 Aug 2020 10:45 )  PTT:29.6 SEC    Creatinine, Serum: 5.82 mg/dL (20 @ 06:35)  Creatinine, Serum: 6.20 mg/dL (20 @ 10:45)    ACP- Advanced Care Planning  -Advanced care planning discussed with patient. Advanced care planning forms discussed with patient and/or family.  Risks, benefits, and alternatives of medical/cardiac procedures were discussed in detail with all questions answered.  30 minutes were spent addressing advance care planning.      ASSESSMENT/PLAN:

## 2020-08-23 NOTE — PROVIDER CONTACT NOTE (OTHER) - SITUATION
Pt. hypertensive but asymptomatic. Electronic BP was 189/71. Manual /70. Pt. requiring 1 unit RBC blood transfusion

## 2020-08-23 NOTE — CONSULT NOTE ADULT - PROBLEM SELECTOR RECOMMENDATION 9
Pt with advanced CKD 5. Patient with baseline SCr: 4.7-5.7. Last outpatient SCr increased to 6.36 on 8/20/20. On admission, pt with SCr: 6.20 decreased to 5.82 today (8/23/20). Pt has AVF placed 10 years ago but has not used it since. Pt aware that she will required HD in the future. Labs reviewed. Pt without uremic symptoms. Monitor renal function and urine output. Dose medications as per eGFR.    Kellie Allan  Nephrology Fellow  Pager: [phone] Pt with advanced CKD 5. Patient with baseline SCr: 4.7-5.7. Last outpatient SCr increased to 6.36 on 8/20/20. On admission, pt with SCr: 6.20 decreased to 5.82 today (8/23/20). Pt has AVF placed 10 years ago but has not used it since. Pt aware that she will required HD in the future. Labs reviewed. Pt without uremic symptoms. Monitor renal function and urine output. Dose medications as per eGFR.

## 2020-08-23 NOTE — PROGRESS NOTE ADULT - ASSESSMENT
79 y/o F with HTN, CAD with CABG, DVT, CKD, chronic anemia (unknown etiology) sent in from [facility], scheduled for a routine transfusion and IV Deferoxamine today "unable to get a peripheral line". Receives transfusion q 6 weeks. Also states ability to ambulate diminishing over the past few months. In ED, noted to have acute on chronic kidney failure, rising Cr and hyperkalemia. Admitted for work up.

## 2020-08-23 NOTE — PROGRESS NOTE ADULT - SUBJECTIVE AND OBJECTIVE BOX
Patient is a 80y old  Female who presents with a chief complaint of lack of IV access for treatment (22 Aug 2020 12:25)      SUBJECTIVE / OVERNIGHT EVENTS:  feels a lot better this morning  now AAO x 3 and in much better mood  hgb stable  K improving  no cp, no sob, no n/v/d. no abdominal pain.  no headache, no dizziness.       Vital Signs Last 24 Hrs  T(C): 36.5 (23 Aug 2020 12:19), Max: 36.6 (22 Aug 2020 17:22)  T(F): 97.7 (23 Aug 2020 12:19), Max: 97.9 (22 Aug 2020 17:22)  HR: 62 (23 Aug 2020 12:19) (60 - 70)  BP: 158/67 (23 Aug 2020 08:46) (154/54 - 179/72)  BP(mean): --  RR: 18 (23 Aug 2020 12:19) (17 - 18)  SpO2: 100% (23 Aug 2020 12:19) (98% - 100%)  I&O's Summary      PHYSICAL EXAM:  GENERAL: NAD, well-developed, comfortable  HEAD:  Atraumatic, Normocephalic  EYES: EOMI, PERRLA, conjunctiva and sclera clear  NECK: Supple, No JVD  CHEST/LUNG: mild decrease breath sounds bilaterally, basilar crackles; No wheeze   HEART: Regular rate and rhythm; No murmurs, rubs, or gallops  ABDOMEN: Soft, Nontender, Nondistended; Bowel sounds present  Neuro: AAOx3, no focal weakness, 5/5 b/l extremity strength  EXTREMITIES:  2+ Peripheral Pulses, No clubbing, cyanosis, or edema  SKIN: No rashes or lesions      LABS:                        8.3    3.50  )-----------( 88       ( 23 Aug 2020 06:35 )             27.5     08-23    141  |  107  |  100<H>  ----------------------------<  91  5.5<H>   |  20<L>  |  5.82<H>    Ca    8.6      23 Aug 2020 06:35  Phos  4.8     08-23  Mg     2.6     08-23    TPro  5.6<L>  /  Alb  3.5  /  TBili  < 0.2<L>  /  DBili  x   /  AST  16  /  ALT  9   /  AlkPhos  44  08-22    PT/INR - ( 22 Aug 2020 10:45 )   PT: 11.2 SEC;   INR: 0.97          PTT - ( 22 Aug 2020 10:45 )  PTT:29.6 SEC  CAPILLARY BLOOD GLUCOSE      POCT Blood Glucose.: 91 mg/dL (23 Aug 2020 12:04)  POCT Blood Glucose.: 101 mg/dL (23 Aug 2020 08:21)  POCT Blood Glucose.: 108 mg/dL (22 Aug 2020 22:09)  POCT Blood Glucose.: 97 mg/dL (22 Aug 2020 17:06)            RADIOLOGY & ADDITIONAL TESTS:    Imaging Personally Reviewed:  [x] YES  [ ] NO    Consultant(s) Notes Reviewed:  [x] YES  [ ] NO      MEDICATIONS  (STANDING):  allopurinol 100 milliGRAM(s) Oral daily  aspirin enteric coated 81 milliGRAM(s) Oral daily  atorvastatin 20 milliGRAM(s) Oral at bedtime  carvedilol 25 milliGRAM(s) Oral every 12 hours  cloNIDine 0.2 milliGRAM(s) Oral daily  dextrose 5%. 1000 milliLiter(s) (50 mL/Hr) IV Continuous <Continuous>  dextrose 50% Injectable 12.5 Gram(s) IV Push once  dextrose 50% Injectable 25 Gram(s) IV Push once  dextrose 50% Injectable 25 Gram(s) IV Push once  furosemide    Tablet 40 milliGRAM(s) Oral daily  heparin   Injectable 5000 Unit(s) SubCutaneous every 12 hours  hydrALAZINE 100 milliGRAM(s) Oral every 8 hours  insulin lispro (HumaLOG) corrective regimen sliding scale   SubCutaneous three times a day before meals  isosorbide   mononitrate ER Tablet (IMDUR) 60 milliGRAM(s) Oral daily  mirtazapine 15 milliGRAM(s) Oral at bedtime  sodium bicarbonate 650 milliGRAM(s) Oral two times a day  sodium zirconium cyclosilicate 10 Gram(s) Oral once    MEDICATIONS  (PRN):  ALBUTerol    90 MICROgram(s) HFA Inhaler 2 Puff(s) Inhalation every 6 hours PRN Bronchospasm  dextrose 40% Gel 15 Gram(s) Oral once PRN Blood Glucose LESS THAN 70 milliGRAM(s)/deciliter  glucagon  Injectable 1 milliGRAM(s) IntraMuscular once PRN Glucose LESS THAN 70 milligrams/deciliter      Care Discussed with Consultants/Other Providers [x] YES  [ ] NO    HEALTH ISSUES - PROBLEM Dx:  Hyperkalemia: Hyperkalemia  CKD (chronic kidney disease) stage 5, GFR less than 15 ml/min: CKD (chronic kidney disease) stage 5, GFR less than 15 ml/min  Dementia: Dementia  Prophylactic measure: Prophylactic measure  HTN (Hypertension): HTN (Hypertension)  DM2 (diabetes mellitus, type 2): DM2 (diabetes mellitus, type 2)  CKD (chronic kidney disease) stage 4, GFR 15-29 ml/min: CKD (chronic kidney disease) stage 4, GFR 15-29 ml/min  Chronic anemia: Chronic anemia  Acute renal failure, unspecified acute renal failure type: Acute renal failure, unspecified acute renal failure type

## 2020-08-24 LAB
ANION GAP SERPL CALC-SCNC: 12 MMO/L — SIGNIFICANT CHANGE UP (ref 7–14)
ANION GAP SERPL CALC-SCNC: 13 MMO/L — SIGNIFICANT CHANGE UP (ref 7–14)
BUN SERPL-MCNC: 96 MG/DL — HIGH (ref 7–23)
BUN SERPL-MCNC: 97 MG/DL — HIGH (ref 7–23)
CALCIUM SERPL-MCNC: 8.6 MG/DL — SIGNIFICANT CHANGE UP (ref 8.4–10.5)
CALCIUM SERPL-MCNC: 8.7 MG/DL — SIGNIFICANT CHANGE UP (ref 8.4–10.5)
CHLORIDE SERPL-SCNC: 106 MMOL/L — SIGNIFICANT CHANGE UP (ref 98–107)
CHLORIDE SERPL-SCNC: 108 MMOL/L — HIGH (ref 98–107)
CO2 SERPL-SCNC: 20 MMOL/L — LOW (ref 22–31)
CO2 SERPL-SCNC: 20 MMOL/L — LOW (ref 22–31)
CREAT SERPL-MCNC: 5.98 MG/DL — HIGH (ref 0.5–1.3)
CREAT SERPL-MCNC: 6.1 MG/DL — HIGH (ref 0.5–1.3)
GLUCOSE SERPL-MCNC: 109 MG/DL — HIGH (ref 70–99)
GLUCOSE SERPL-MCNC: 80 MG/DL — SIGNIFICANT CHANGE UP (ref 70–99)
HCT VFR BLD CALC: 24.2 % — LOW (ref 34.5–45)
HGB BLD-MCNC: 7.5 G/DL — LOW (ref 11.5–15.5)
MAGNESIUM SERPL-MCNC: 2.5 MG/DL — SIGNIFICANT CHANGE UP (ref 1.6–2.6)
MCHC RBC-ENTMCNC: 26.6 PG — LOW (ref 27–34)
MCHC RBC-ENTMCNC: 31 % — LOW (ref 32–36)
MCV RBC AUTO: 85.8 FL — SIGNIFICANT CHANGE UP (ref 80–100)
NRBC # FLD: 0 K/UL — SIGNIFICANT CHANGE UP (ref 0–0)
PHOSPHATE SERPL-MCNC: 5.1 MG/DL — HIGH (ref 2.5–4.5)
PLATELET # BLD AUTO: 83 K/UL — LOW (ref 150–400)
PMV BLD: 11.4 FL — SIGNIFICANT CHANGE UP (ref 7–13)
POTASSIUM SERPL-MCNC: 4.9 MMOL/L — SIGNIFICANT CHANGE UP (ref 3.5–5.3)
POTASSIUM SERPL-MCNC: 4.9 MMOL/L — SIGNIFICANT CHANGE UP (ref 3.5–5.3)
POTASSIUM SERPL-SCNC: 4.9 MMOL/L — SIGNIFICANT CHANGE UP (ref 3.5–5.3)
POTASSIUM SERPL-SCNC: 4.9 MMOL/L — SIGNIFICANT CHANGE UP (ref 3.5–5.3)
RBC # BLD: 2.82 M/UL — LOW (ref 3.8–5.2)
RBC # FLD: 14.7 % — HIGH (ref 10.3–14.5)
SODIUM SERPL-SCNC: 139 MMOL/L — SIGNIFICANT CHANGE UP (ref 135–145)
SODIUM SERPL-SCNC: 140 MMOL/L — SIGNIFICANT CHANGE UP (ref 135–145)
WBC # BLD: 3.18 K/UL — LOW (ref 3.8–10.5)
WBC # FLD AUTO: 3.18 K/UL — LOW (ref 3.8–10.5)

## 2020-08-24 PROCEDURE — 95816 EEG AWAKE AND DROWSY: CPT | Mod: 26

## 2020-08-24 PROCEDURE — 99233 SBSQ HOSP IP/OBS HIGH 50: CPT | Mod: GC

## 2020-08-24 RX ADMIN — CARVEDILOL PHOSPHATE 25 MILLIGRAM(S): 80 CAPSULE, EXTENDED RELEASE ORAL at 05:47

## 2020-08-24 RX ADMIN — Medication 0.2 MILLIGRAM(S): at 05:47

## 2020-08-24 RX ADMIN — ATORVASTATIN CALCIUM 20 MILLIGRAM(S): 80 TABLET, FILM COATED ORAL at 23:19

## 2020-08-24 RX ADMIN — ISOSORBIDE MONONITRATE 60 MILLIGRAM(S): 60 TABLET, EXTENDED RELEASE ORAL at 12:52

## 2020-08-24 RX ADMIN — Medication 650 MILLIGRAM(S): at 18:10

## 2020-08-24 RX ADMIN — Medication 40 MILLIGRAM(S): at 05:47

## 2020-08-24 RX ADMIN — Medication 100 MILLIGRAM(S): at 23:19

## 2020-08-24 RX ADMIN — Medication 650 MILLIGRAM(S): at 05:47

## 2020-08-24 RX ADMIN — MIRTAZAPINE 15 MILLIGRAM(S): 45 TABLET, ORALLY DISINTEGRATING ORAL at 23:19

## 2020-08-24 RX ADMIN — CARVEDILOL PHOSPHATE 25 MILLIGRAM(S): 80 CAPSULE, EXTENDED RELEASE ORAL at 18:10

## 2020-08-24 RX ADMIN — HEPARIN SODIUM 5000 UNIT(S): 5000 INJECTION INTRAVENOUS; SUBCUTANEOUS at 05:47

## 2020-08-24 RX ADMIN — Medication 100 MILLIGRAM(S): at 12:52

## 2020-08-24 RX ADMIN — Medication 100 MILLIGRAM(S): at 05:47

## 2020-08-24 RX ADMIN — Medication 100 MILLIGRAM(S): at 12:53

## 2020-08-24 RX ADMIN — Medication 81 MILLIGRAM(S): at 12:52

## 2020-08-24 RX ADMIN — HEPARIN SODIUM 5000 UNIT(S): 5000 INJECTION INTRAVENOUS; SUBCUTANEOUS at 18:10

## 2020-08-24 NOTE — PROGRESS NOTE ADULT - ASSESSMENT
81 y/o F with HTN, CAD with CABG, DVT, CKD, chronic anemia (unknown etiology) sent in from [facility], scheduled for a routine transfusion and IV Deferoxamine today "unable to get a peripheral line". Receives transfusion q 6 weeks. Also states ability to ambulate diminishing over the past few months. In ED, noted to have acute on chronic kidney failure, rising Cr and hyperkalemia. Admitted for work up.

## 2020-08-24 NOTE — CONSULT NOTE ADULT - SUBJECTIVE AND OBJECTIVE BOX
[facility] Neurological [facility]        Patient is a 80y old  Female who presents with a chief complaint of [place] on CKD (24 Aug 2020 09:04)    Excerpt from H&P,"  79 y/o F with HTN, CAD with CABG, DVT, CKD, chronic anemia (unknown etiology, likely CKD induced) sent in from [facility] scheduled for a routine transfusion and IV Deferoxamine today "unable to get a peripheral line". Receives transfusion q 6 weeks. Also states ability to ambulate diminishing over the past few months. Denies fever, chills, chest pain, shortness of breath, weakness, abdominal pain, nausea, vomiting, rectal bleeding. In ED, noted to have acute on chronic kidney failure, rising Cr and hyperkalemia. Admitted for work up.   D/w the son Wagner and the granddaughter on the phone. Per the family, pt has been weak. Spent mostly on wheel chair. Recently saw neuro, had CT head that shows just mild volume loss and chronic changes. Started Mirtazapine and Cyproheptadine for appetite. Pt has been forgetful. She is on Lasix 40 mg three times a week Rx by renal.          *****PAST MEDICAL / Surgical  HISTORY:  PAST MEDICAL & SURGICAL HISTORY:  Thyroid nodule: awaiting FNB in the near future  Severe aortic stenosis  Osteoarthritis: bilateral knees  CKD (chronic kidney disease) stage 4, GFR 15-29 ml/min  DM2 (diabetes mellitus, type 2)  Arthritis  CAD (coronary artery disease)  Gout  Anemia Associated with Chronic Renal Failure  HTN (Hypertension)  S/P AVR: TAVR  History of pacemaker  A-V fistula: left arm  S/P cholecystectomy  Stented coronary artery: s/p 3 stents, then CABG   S/P CABG (coronary artery bypass graft): triple in            *****FAMILY HISTORY:  FAMILY HISTORY:  No pertinent family history in first degree relatives           *****SOCIAL HISTORY:  Alcohol: None  Smoking: None  retired token    pt lives with son and daughter in law who have been helping her ambulate          *****ALLERGIES:   Allergies    codeine (Other)  Lortab (Other)  morphine (Other)  Percocet 10/325 (Other)  PPM not compatible with  MRI (Other (Fatal))  Reglan (Other; Flushing)  sulfa drugs (Flushing)    Intolerances             *****MEDICATIONS: current medication reviewed and documented.   MEDICATIONS  (STANDING):  allopurinol 100 milliGRAM(s) Oral daily  aspirin enteric coated 81 milliGRAM(s) Oral daily  atorvastatin 20 milliGRAM(s) Oral at bedtime  carvedilol 25 milliGRAM(s) Oral every 12 hours  cloNIDine 0.2 milliGRAM(s) Oral daily  dextrose 5%. 1000 milliLiter(s) (50 mL/Hr) IV Continuous <Continuous>  dextrose 50% Injectable 12.5 Gram(s) IV Push once  dextrose 50% Injectable 25 Gram(s) IV Push once  dextrose 50% Injectable 25 Gram(s) IV Push once  furosemide    Tablet 40 milliGRAM(s) Oral daily  heparin   Injectable 5000 Unit(s) SubCutaneous every 12 hours  hydrALAZINE 100 milliGRAM(s) Oral every 8 hours  insulin lispro (HumaLOG) corrective regimen sliding scale   SubCutaneous three times a day before meals  isosorbide   mononitrate ER Tablet (IMDUR) 60 milliGRAM(s) Oral daily  mirtazapine 15 milliGRAM(s) Oral at bedtime  sodium bicarbonate 650 milliGRAM(s) Oral two times a day    MEDICATIONS  (PRN):  ALBUTerol    90 MICROgram(s) HFA Inhaler 2 Puff(s) Inhalation every 6 hours PRN Bronchospasm  dextrose 40% Gel 15 Gram(s) Oral once PRN Blood Glucose LESS THAN 70 milliGRAM(s)/deciliter  glucagon  Injectable 1 milliGRAM(s) IntraMuscular once PRN Glucose LESS THAN 70 milligrams/deciliter           *****REVIEW OF SYSTEM:  GEN: no fever, no chills, no pain  RESP: no SOB, no cough, no sputum  CVS: no chest pain, no palpitations, no edema  GI: no abdominal pain, no nausea, no vomiting, no constipation, no diarrhea  : no dysurea, no frequency, no hematurea  Neuro: no headache, no dizziness  PSYCH: no anxiety, no depression  Derm : no itching, no rash         *****VITAL SIGNS:  T(C): 36.7 (20 @ 12:49), Max: 37.1 (20 @ 20:45)  HR: 75 (20 @ 12:49) (64 - 76)  BP: 161/64 (20 @ 12:49) (137/52 - 161/64)  RR: 18 (20 @ 12:49) (18 - 18)  SpO2: 100% (20 @ 12:49) (98% - 100%)  Wt(kg): --           *****PHYSICAL EXAM:   Alert oriented x 3   Attention comprehension are fair. Able to name, repeat  without any difficulty.   Able to follow 1-2  step commands. recall 1/3   able to recall last 3 presidents.      EOMI fundi not visualized,  VFF to confrontration  No facial asymmetry   Tongue is midline   Palate elevates symmetrically   Moving all 4 ext symmetrically RUE limited mvt ( old due to hx of  RUE DVT)  Reflexes are symmetric throughout   sensation is grossly symmetric  Gait : not assessed.  B/L down going toes               *****LAB AND IMAGIN.5    3.18  )-----------( 83       ( 24 Aug 2020 06:45 )             24.2               08-24    139  |  106  |  97<H>  ----------------------------<  80  4.9   |  20<L>  |  5.98<H>    Ca    8.7      24 Aug 2020 06:45  Phos  5.1     08-24  Mg     2.5     08-24              Albumin, Serum: 3.5 g/dL (20 @ 10:45)                        [All pertinent recent Imaging reports reviewed]         *****A S S E S S M E N T   A N D   P L A N :     79 y/o F with HTN, CAD with CABG, DVT, CKD, chronic anemia (unknown etiology, likely CKD induced) sent in from [facility] scheduled for a routine transfusion and IV Deferoxamine today "unable to get a peripheral line". Receives transfusion q 6 weeks. Also states ability to ambulate diminishing over the past few months. Denies fever, chills, chest pain, shortness of breath, weakness, abdominal pain, nausea, vomiting, rectal bleeding. In ED, noted to have acute on chronic kidney failure, rising Cr and hyperkalemia. Admitted for work up.   D/w the son Wagner and the granddaughter on the phone. Per the family, pt has been weak. Spent mostly on wheel chair. Recently saw neuro, had CT head that shows just mild volume loss and chronic changes. Started Mirtazapine and Cyproheptadine for appetite. Pt has been forgetful. She is on Lasix 40 mg three times a week Rx by renal.          Problem/Recommendations 1: weakness likely multifactorial related to deconditioning, limitation due to anemia, ckd   last admission reported chest pain on walking, furthermore, stress test at the time revealed moderate ischemia to inferior wall. nl LV fx Further testing was deferrred due to advanced age/ ckd etc.     pt eval appreciated   Pt does not want to go to rehab.   ct head done recently  please obtain outpt results   denies any visual changes. however, would get ophthalmology f.u for formal visual fields  echo ef?   tele monitoring for any pauses ( given staring episode)   would get eeg to r/o sz   ck orthostatics       ___________________________  Will follow with you.  Thank you,  Chantal Sauceda MD  Diplomate of the American Board of Neurology and Psychiatry.  Diplomate of the American Board of Vascular Neurology.   [facility] ([facility]), [facility]   Ph: [phone]    Differential diagnosis and plan of care discussed with patient after the evaluation.   Advanced care planning options discussed.   Pain assessed and judicious use of narcotics when appropriate was discussed.  Importance of Fall prevention discussed.  Counseling on Smoking and Alcohol cessation was offered when appropriate.  Counseling on Diet, exercise, and medication compliance was done.   83 minutes spent on the total encounter;  more than 50 % of the visit was spent on counseling  and or coordinating care by the attending physician.    Thank you for allowing me to participate in the care of this rafat patient. Please do not hesitate to call me if you have any questions.     This and subsequent notes were partially created using voice recognition software and will  inherently be subject to errors including those of syntax and sound alike substitutions which may escape proofreading. In such instances original meaning may be extrapolated by contextual derivation. [facility] Neurological [facility]        Patient is a 80y old  Female who presents with a chief complaint of [place] on CKD (24 Aug 2020 09:04)    Excerpt from H&P,"  81 y/o F with HTN, CAD with CABG, DVT, CKD, chronic anemia (unknown etiology, likely CKD induced) sent in from [facility] scheduled for a routine transfusion and IV Deferoxamine today "unable to get a peripheral line". Receives transfusion q 6 weeks. Also states ability to ambulate diminishing over the past few months. Denies fever, chills, chest pain, shortness of breath, weakness, abdominal pain, nausea, vomiting, rectal bleeding. In ED, noted to have acute on chronic kidney failure, rising Cr and hyperkalemia. Admitted for work up.   D/w the son Wagner and the granddaughter on the phone. Per the family, pt has been weak. Spent mostly on wheel chair. Recently saw neuro, had CT head that shows just mild volume loss and chronic changes. Started Mirtazapine and Cyproheptadine for appetite. Pt has been forgetful. She is on Lasix 40 mg three times a week Rx by renal.          *****PAST MEDICAL / Surgical  HISTORY:  PAST MEDICAL & SURGICAL HISTORY:  Thyroid nodule: awaiting FNB in the near future  Severe aortic stenosis  Osteoarthritis: bilateral knees  CKD (chronic kidney disease) stage 4, GFR 15-29 ml/min  DM2 (diabetes mellitus, type 2)  Arthritis  CAD (coronary artery disease)  Gout  Anemia Associated with Chronic Renal Failure  HTN (Hypertension)  S/P AVR: TAVR  History of pacemaker  A-V fistula: left arm  S/P cholecystectomy  Stented coronary artery: s/p 3 stents, then CABG   S/P CABG (coronary artery bypass graft): triple in            *****FAMILY HISTORY:  FAMILY HISTORY:  No pertinent family history in first degree relatives           *****SOCIAL HISTORY:  Alcohol: None  Smoking: None  retired token    pt lives with son and daughter in law who have been helping her ambulate          *****ALLERGIES:   Allergies    codeine (Other)  Lortab (Other)  morphine (Other)  Percocet 10/325 (Other)  PPM not compatible with  MRI (Other (Fatal))  Reglan (Other; Flushing)  sulfa drugs (Flushing)    Intolerances             *****MEDICATIONS: current medication reviewed and documented.   MEDICATIONS  (STANDING):  allopurinol 100 milliGRAM(s) Oral daily  aspirin enteric coated 81 milliGRAM(s) Oral daily  atorvastatin 20 milliGRAM(s) Oral at bedtime  carvedilol 25 milliGRAM(s) Oral every 12 hours  cloNIDine 0.2 milliGRAM(s) Oral daily  dextrose 5%. 1000 milliLiter(s) (50 mL/Hr) IV Continuous <Continuous>  dextrose 50% Injectable 12.5 Gram(s) IV Push once  dextrose 50% Injectable 25 Gram(s) IV Push once  dextrose 50% Injectable 25 Gram(s) IV Push once  furosemide    Tablet 40 milliGRAM(s) Oral daily  heparin   Injectable 5000 Unit(s) SubCutaneous every 12 hours  hydrALAZINE 100 milliGRAM(s) Oral every 8 hours  insulin lispro (HumaLOG) corrective regimen sliding scale   SubCutaneous three times a day before meals  isosorbide   mononitrate ER Tablet (IMDUR) 60 milliGRAM(s) Oral daily  mirtazapine 15 milliGRAM(s) Oral at bedtime  sodium bicarbonate 650 milliGRAM(s) Oral two times a day    MEDICATIONS  (PRN):  ALBUTerol    90 MICROgram(s) HFA Inhaler 2 Puff(s) Inhalation every 6 hours PRN Bronchospasm  dextrose 40% Gel 15 Gram(s) Oral once PRN Blood Glucose LESS THAN 70 milliGRAM(s)/deciliter  glucagon  Injectable 1 milliGRAM(s) IntraMuscular once PRN Glucose LESS THAN 70 milligrams/deciliter           *****REVIEW OF SYSTEM:  GEN: no fever, no chills, no pain  RESP: no SOB, no cough, no sputum  CVS: no chest pain, no palpitations, no edema  GI: no abdominal pain, no nausea, no vomiting, no constipation, no diarrhea  : no dysurea, no frequency, no hematurea  Neuro: no headache, no dizziness  PSYCH: no anxiety, no depression  Derm : no itching, no rash         *****VITAL SIGNS:  T(C): 36.7 (20 @ 12:49), Max: 37.1 (20 @ 20:45)  HR: 75 (20 @ 12:49) (64 - 76)  BP: 161/64 (20 @ 12:49) (137/52 - 161/64)  RR: 18 (20 @ 12:49) (18 - 18)  SpO2: 100% (20 @ 12:49) (98% - 100%)  Wt(kg): --           *****PHYSICAL EXAM:   Alert oriented x 3   Attention comprehension are fair. Able to name, repeat  without any difficulty.   Able to follow 1-2  step commands. recall 1/3   able to recall last 3 presidents.      EOMI fundi not visualized,  VFF to confrontration  No facial asymmetry   Tongue is midline   Palate elevates symmetrically   Moving all 4 ext symmetrically RUE limited mvt ( old due to hx of  RUE DVT)  Reflexes are symmetric throughout   sensation is grossly symmetric  Gait : not assessed.  B/L down going toes               *****LAB AND IMAGIN.5    3.18  )-----------( 83       ( 24 Aug 2020 06:45 )             24.2               08-24    139  |  106  |  97<H>  ----------------------------<  80  4.9   |  20<L>  |  5.98<H>    Ca    8.7      24 Aug 2020 06:45  Phos  5.1     08-24  Mg     2.5     08-24              Albumin, Serum: 3.5 g/dL (20 @ 10:45)  < from: CT Head No Cont (20 @ 05:52) >    There is no evidence for acute infarct, acute hemorrhage, mass effect, calvarial fracture, or hydrocephalus.    The pituitary gland is mildly enlarged for the patient's age suspicious for a pituitary macroadenoma. There appears to be slight suprasellar extension.    Mild to moderate patchy hypodensity without mass effect is noted in the periventricular white matter which most likely represents chronic microvascular ischemic changes given the patient's age.    Cerebral volume loss is present with secondary proportional prominence of the sulci and ventricles.    No lytic or blastic calvarial lesions are noted. The visualized portions of the paranasal sinuses and mastoid air cells are clear.    IMPRESSION:    No acute intracranial pathology is noted. If the patient has new and persistent symptoms, consider short interval follow-up exam.    The pituitary gland is mildly enlarged for the patient's age suspicious for a pituitary macroadenoma. There appears to be slight suprasellar extension.        < end of copied text >                        [All pertinent recent Imaging reports reviewed]         *****A S S E S S M E N T   A N D   P L A N :     81 y/o F with HTN, CAD with CABG, DVT, CKD, chronic anemia (unknown etiology, likely CKD induced) sent in from [facility] scheduled for a routine transfusion and IV Deferoxamine today "unable to get a peripheral line". Receives transfusion q 6 weeks. Also states ability to ambulate diminishing over the past few months. Denies fever, chills, chest pain, shortness of breath, weakness, abdominal pain, nausea, vomiting, rectal bleeding. In ED, noted to have acute on chronic kidney failure, rising Cr and hyperkalemia. Admitted for work up.   D/w the son Wagner and the granddaughter on the phone. Per the family, pt has been weak. Spent mostly on wheel chair. Recently saw neuro, had CT head that shows just mild volume loss and chronic changes. Started Mirtazapine and Cyproheptadine for appetite. Pt has been forgetful. She is on Lasix 40 mg three times a week Rx by renal.          Problem/Recommendations 1: progressive weakness likely multifactorial related to deconditioning, limitation due to anemia, ckd   last admission reported chest pain on walking, furthermore, stress test at the time revealed moderate ischemia to inferior wall. nl LV fx Further testing was deferrred due to advanced age/ ckd etc.     pt eval appreciated   Pt does not want to go to rehab.   ct head done recently  please obtain outpt results  Dr. 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 Pt was instructed to follow up with ophtho and endo at the time which she has not     denies any visual changes. however, would get ophthalmology f.u for formal visual fields  echo ef?   interrogate pacer.   ? hypoglycemic events, pt on januvia at home? a1c 4.8 ( as per son only takes it if am fs is >100) although endo recommended stopping januvia last admission.   would get eeg to r/o sz   ck orthostatics       ___________________________  Will follow with you.  Thank you,  Chantal Sauceda MD  Diplomate of the American Board of Neurology and Psychiatry.  Diplomate of the American Board of Vascular Neurology.   [facility] Neurological [facility] ([facility]), [facility]   Ph: [phone]    Differential diagnosis and plan of care discussed with patient after the evaluation.   Advanced care planning options discussed.   Pain assessed and judicious use of narcotics when appropriate was discussed.  Importance of Fall prevention discussed.  Counseling on Smoking and Alcohol cessation was offered when appropriate.  Counseling on Diet, exercise, and medication compliance was done.   83 minutes spent on the total encounter;  more than 50 % of the visit was spent on counseling  and or coordinating care by the attending physician.    Thank you for allowing me to participate in the care of this rafat patient. Please do not hesitate to call me if you have any questions.     This and subsequent notes were partially created using voice recognition software and will  inherently be subject to errors including those of syntax and sound alike substitutions which may escape proofreading. In such instances original meaning may be extrapolated by contextual derivation.

## 2020-08-24 NOTE — PROGRESS NOTE ADULT - ASSESSMENT
80 year old woman with history of HTN, CAD, s/p PCI, CABG, severe AS, s/p TAVR, s/p PPM, diastolic HF, RUE DVT, CKD, chronic anemia (unknown etiology, likely CKD induced) sent in from [facility] scheduled for a routine transfusion and IV Deferoxamine today "unable to get a peripheral line"    1. CAD, s/p PCI, CABG  -stable, no chest pain, acs  -cont med tx of CAD with asa, statin, bb    2. RUE DVT  -completed course of a/c  -cont asa  -vasc f/u Dr Flores    3. AS s/p TAVR/ PPM  -cv stable     4. HTN   -bp stable/acceptable  -cont current tx    5. anemia, hx  -associated with chronic renal failure   -no overt s/s of bleeding  -prbc's per medicine/renal   -plan for mediport insertion  6. Diastolic HF, chronic  -stable, cont lasix daily     7. CKD, hyperkalemia  -renal f/u, tx per renal     8. Pituitary macroadenoma, recent leg weakness  -neuro eval , med f/u      dvt ppx

## 2020-08-24 NOTE — PROGRESS NOTE ADULT - SUBJECTIVE AND OBJECTIVE BOX
Hematology Follow-up    INTERVAL HPI/OVERNIGHT EVENTS:  Patient S&E at bedside. No o/n events, patient resting comfortably. No complaints at this time. Patient denies fever, chills, dizziness, weakness, CP, palpitations, SOB, cough, N/V/D/C, dysuria, changes in bowel movements, LE edema.    VITAL SIGNS:  T(F): 98.5 (08-24-20 @ 05:45)  HR: 76 (08-24-20 @ 05:45)  BP: 150/59 (08-24-20 @ 05:45)  RR: 18 (08-24-20 @ 05:45)  SpO2: 100% (08-24-20 @ 05:45)  Wt(kg): --    PHYSICAL EXAM:    Constitutional: AAOx3, NAD  Eyes: EOMI, sclera non-icteric  Neck: supple, no masses, no JVD  Respiratory: CTA b/l, good air entry b/l  Cardiovascular: RRR, normal S1S2  Gastrointestinal: soft, NTND, no masses palpable, BS normal in all four quadrants  Extremities:  no c/c/e  Neurological: Grossly intact  Skin: Normal temperature    MEDICATIONS  (STANDING):  allopurinol 100 milliGRAM(s) Oral daily  aspirin enteric coated 81 milliGRAM(s) Oral daily  atorvastatin 20 milliGRAM(s) Oral at bedtime  carvedilol 25 milliGRAM(s) Oral every 12 hours  cloNIDine 0.2 milliGRAM(s) Oral daily  dextrose 5%. 1000 milliLiter(s) (50 mL/Hr) IV Continuous <Continuous>  dextrose 50% Injectable 12.5 Gram(s) IV Push once  dextrose 50% Injectable 25 Gram(s) IV Push once  dextrose 50% Injectable 25 Gram(s) IV Push once  furosemide    Tablet 40 milliGRAM(s) Oral daily  heparin   Injectable 5000 Unit(s) SubCutaneous every 12 hours  hydrALAZINE 100 milliGRAM(s) Oral every 8 hours  insulin lispro (HumaLOG) corrective regimen sliding scale   SubCutaneous three times a day before meals  isosorbide   mononitrate ER Tablet (IMDUR) 60 milliGRAM(s) Oral daily  mirtazapine 15 milliGRAM(s) Oral at bedtime  sodium bicarbonate 650 milliGRAM(s) Oral two times a day    MEDICATIONS  (PRN):  ALBUTerol    90 MICROgram(s) HFA Inhaler 2 Puff(s) Inhalation every 6 hours PRN Bronchospasm  dextrose 40% Gel 15 Gram(s) Oral once PRN Blood Glucose LESS THAN 70 milliGRAM(s)/deciliter  glucagon  Injectable 1 milliGRAM(s) IntraMuscular once PRN Glucose LESS THAN 70 milligrams/deciliter      codeine (Other)  Lortab (Other)  morphine (Other)  Percocet 10/325 (Other)  PPM not compatible with  MRI (Other (Fatal))  Reglan (Other; Flushing)  sulfa drugs (Flushing)      LABS:                        7.5    3.18  )-----------( 83       ( 24 Aug 2020 06:45 )             24.2     08-24    139  |  106  |  97<H>  ----------------------------<  80  4.9   |  20<L>  |  5.98<H>    Ca    8.7      24 Aug 2020 06:45  Phos  5.1     08-24  Mg     2.5     08-24    TPro  5.6<L>  /  Alb  3.5  /  TBili  < 0.2<L>  /  DBili  x   /  AST  16  /  ALT  9   /  AlkPhos  44  08-22    PT/INR - ( 22 Aug 2020 10:45 )   PT: 11.2 SEC;   INR: 0.97          PTT - ( 22 Aug 2020 10:45 )  PTT:29.6 SEC     Ferritin, Serum: 2330 ng/mL (08-22-20 @ 10:45)  Iron Total, Serum: 83 ug/dL (08-22-20 @ 10:45)  Unsaturated Iron Binding Capacity: 83.6 ug/dL (08-22-20 @ 10:45)        RADIOLOGY & ADDITIONAL TESTS:  Studies reviewed. Hematology Follow-up    INTERVAL HPI/OVERNIGHT EVENTS:  Patient S&E at bedside. No o/n events, patient resting comfortably. No complaints at this time. Patient denies fever, chills, dizziness, weakness, CP, palpitations, SOB, cough, N/V/D/C, dysuria, changes in bowel movements, LE edema.    VITAL SIGNS:  T(F): 98.5 (08-24-20 @ 05:45)  HR: 76 (08-24-20 @ 05:45)  BP: 150/59 (08-24-20 @ 05:45)  RR: 18 (08-24-20 @ 05:45)  SpO2: 100% (08-24-20 @ 05:45)  Wt(kg): --    PHYSICAL EXAM:    Constitutional: AAOx3, NAD  Eyes: EOMI, sclera non-icteric  Neck: supple, no masses, no JVD  Respiratory: CTA b/l, good air entry b/l  Cardiovascular: RRR, normal S1S2, + systolic murmur  Gastrointestinal: soft, NTND, no masses palpable, BS normal in all four quadrants  Extremities:  no c/c/e  Neurological: Grossly intact  Skin: Normal temperature    MEDICATIONS  (STANDING):  allopurinol 100 milliGRAM(s) Oral daily  aspirin enteric coated 81 milliGRAM(s) Oral daily  atorvastatin 20 milliGRAM(s) Oral at bedtime  carvedilol 25 milliGRAM(s) Oral every 12 hours  cloNIDine 0.2 milliGRAM(s) Oral daily  dextrose 5%. 1000 milliLiter(s) (50 mL/Hr) IV Continuous <Continuous>  dextrose 50% Injectable 12.5 Gram(s) IV Push once  dextrose 50% Injectable 25 Gram(s) IV Push once  dextrose 50% Injectable 25 Gram(s) IV Push once  furosemide    Tablet 40 milliGRAM(s) Oral daily  heparin   Injectable 5000 Unit(s) SubCutaneous every 12 hours  hydrALAZINE 100 milliGRAM(s) Oral every 8 hours  insulin lispro (HumaLOG) corrective regimen sliding scale   SubCutaneous three times a day before meals  isosorbide   mononitrate ER Tablet (IMDUR) 60 milliGRAM(s) Oral daily  mirtazapine 15 milliGRAM(s) Oral at bedtime  sodium bicarbonate 650 milliGRAM(s) Oral two times a day    MEDICATIONS  (PRN):  ALBUTerol    90 MICROgram(s) HFA Inhaler 2 Puff(s) Inhalation every 6 hours PRN Bronchospasm  dextrose 40% Gel 15 Gram(s) Oral once PRN Blood Glucose LESS THAN 70 milliGRAM(s)/deciliter  glucagon  Injectable 1 milliGRAM(s) IntraMuscular once PRN Glucose LESS THAN 70 milligrams/deciliter      codeine (Other)  Lortab (Other)  morphine (Other)  Percocet 10/325 (Other)  PPM not compatible with  MRI (Other (Fatal))  Reglan (Other; [place]ushing)  sulfa drugs (Flushing)      LABS:                        7.5    3.18  )-----------( 83       ( 24 Aug 2020 06:45 )             24.2     08-24    139  |  106  |  97<H>  ----------------------------<  80  4.9   |  20<L>  |  5.98<H>    Ca    8.7      24 Aug 2020 06:45  Phos  5.1     08-24  Mg     2.5     08-24    TPro  5.6<L>  /  Alb  3.5  /  TBili  < 0.2<L>  /  DBili  x   /  AST  16  /  ALT  9   /  AlkPhos  44  08-22    PT/INR - ( 22 Aug 2020 10:45 )   PT: 11.2 SEC;   INR: 0.97          PTT - ( 22 Aug 2020 10:45 )  PTT:29.6 SEC     Ferritin, Serum: 2330 ng/mL (08-22-20 @ 10:45)  Iron Total, Serum: 83 ug/dL (08-22-20 @ 10:45)  Unsaturated Iron Binding Capacity: 83.6 ug/dL (08-22-20 @ 10:45)        RADIOLOGY & ADDITIONAL TESTS:  Studies reviewed.

## 2020-08-24 NOTE — PROGRESS NOTE ADULT - ASSESSMENT
79 y/o F with HTN, CAD with CABG, DVT, CKD, chronic anemia (likely from CKD) sent in from [facility] scheduled for a routine transfusion and IV Deferoxamine today.      # Anemia and iron overload  - Patient has been treated with PRN transfusion based on H/H results and deferoxamine IV infusion every week  - Patient presented to [facility] on Saturday for scheduled treatment however could not get an IV access. Sent to ED to finish 2U PRBC and Deferoxamine infusion; finished during this hospital stay.  - Her H/H from today shows H/H of 7.5/24.2, appropriate response after 2U PRBC   - After finishing the treatment, patient can be discharged if doing well and cleared by renal and f/u skilled nursing with Dr. Fox.          Dior Up MD  PGY 5, Oncology/Hematology fellow  (P) [phone]  After 5pm, please contact on-call team. 79 y/o F with HTN, CAD with CABG, DVT, CKD, chronic anemia (likely from CKD) sent in from [facility] scheduled for a routine transfusion and IV Deferoxamine today.      # Anemia and iron overload  - Patient has been treated with PRN transfusion based on H/H results and deferoxamine IV infusion every week  - Patient presented to [facility] on Saturday for scheduled treatment however could not get an IV access. Sent to ED to finish 2U PRBC and Deferoxamine infusion; finished during this hospital stay.  - Her H/H from today shows H/H of 7.5/24.2, appropriate response after 2U PRBC   - Given difficult peripheral access, agree with getting mediport insertion if cleared by renal and IR.  - Patient can be discharged if doing well and cleared by renal and f/u [facility] with Dr. Fox.          Dior Up MD  PGY 5, Oncology/Hematology fellow  (P) [phone]  After 5pm, please contact on-call team.

## 2020-08-24 NOTE — PROGRESS NOTE ADULT - SUBJECTIVE AND OBJECTIVE BOX
CARDIOLOGY FOLLOW UP - Dr. Damico    CC no cp or sob       PHYSICAL EXAM:  T(C): 36.9 (08-24-20 @ 05:45), Max: 37.1 (08-23-20 @ 20:45)  HR: 76 (08-24-20 @ 05:45) (62 - 76)  BP: 150/59 (08-24-20 @ 05:45) (137/52 - 150/59)  RR: 18 (08-24-20 @ 05:45) (18 - 18)  SpO2: 100% (08-24-20 @ 05:45) (98% - 100%)  Wt(kg): --  I&O's Summary      Appearance: Normal	  Cardiovascular: Normal S1 S2,RRR, + murmur  Respiratory: Lungs clear to auscultation	  Gastrointestinal:  Soft, Non-tender, + BS	  Extremities: Normal range of motion, No clubbing, cyanosis or edema        MEDICATIONS  (STANDING):  allopurinol 100 milliGRAM(s) Oral daily  aspirin enteric coated 81 milliGRAM(s) Oral daily  atorvastatin 20 milliGRAM(s) Oral at bedtime  carvedilol 25 milliGRAM(s) Oral every 12 hours  cloNIDine 0.2 milliGRAM(s) Oral daily  dextrose 5%. 1000 milliLiter(s) (50 mL/Hr) IV Continuous <Continuous>  dextrose 50% Injectable 12.5 Gram(s) IV Push once  dextrose 50% Injectable 25 Gram(s) IV Push once  dextrose 50% Injectable 25 Gram(s) IV Push once  furosemide    Tablet 40 milliGRAM(s) Oral daily  heparin   Injectable 5000 Unit(s) SubCutaneous every 12 hours  hydrALAZINE 100 milliGRAM(s) Oral every 8 hours  insulin lispro (HumaLOG) corrective regimen sliding scale   SubCutaneous three times a day before meals  isosorbide   mononitrate ER Tablet (IMDUR) 60 milliGRAM(s) Oral daily  mirtazapine 15 milliGRAM(s) Oral at bedtime  sodium bicarbonate 650 milliGRAM(s) Oral two times a day      TELEMETRY: 	    ECG:  	  RADIOLOGY:   DIAGNOSTIC TESTING:  [ ] Echocardiogram:  [ ]  Catheterization:  [ ] Stress Test:    OTHER: 	    LABS:	 	                            7.5    3.18  )-----------( 83       ( 24 Aug 2020 06:45 )             24.2     08-24    139  |  106  |  97<H>  ----------------------------<  80  4.9   |  20<L>  |  5.98<H>    Ca    8.7      24 Aug 2020 06:45  Phos  5.1     08-24  Mg     2.5     08-24

## 2020-08-24 NOTE — PROGRESS NOTE ADULT - SUBJECTIVE AND OBJECTIVE BOX
Patient is a 80y old  Female who presents with a chief complaint of [place] on CKD (24 Aug 2020 09:04)      SUBJECTIVE / OVERNIGHT EVENTS:  Feeling better today.  No overnight event.  No complaints.  Chest pain free. no SOB, no N/V/D.  Denied HA/dizziness, abdominal pain.   seen by neuro  EEG ordered      Vital Signs Last 24 Hrs  T(C): 36.7 (24 Aug 2020 12:49), Max: 37.1 (23 Aug 2020 20:45)  T(F): 98 (24 Aug 2020 12:49), Max: 98.8 (23 Aug 2020 20:45)  HR: 72 (24 Aug 2020 18:09) (64 - 76)  BP: 156/53 (24 Aug 2020 18:09) (137/52 - 161/64)  BP(mean): --  RR: 18 (24 Aug 2020 12:49) (18 - 18)  SpO2: 100% (24 Aug 2020 12:49) (98% - 100%)  I&O's Summary        PHYSICAL EXAM:  GENERAL: NAD, well-developed, comfortable  HEAD:  Atraumatic, Normocephalic  EYES: EOMI, PERRLA, conjunctiva and sclera clear  NECK: Supple, No JVD  CHEST/LUNG: mild decrease breath sounds bilaterally, basilar crackles; No wheeze   HEART: Regular rate and rhythm; No murmurs, rubs, or gallops  ABDOMEN: Soft, Nontender, Nondistended; Bowel sounds present  Neuro: AAOx3, no focal weakness, 5/5 b/l extremity strength  EXTREMITIES:  2+ Peripheral Pulses, No clubbing, cyanosis, or edema, +left arm fistula  SKIN: No rashes or lesions      LABS:                        7.5    3.18  )-----------( 83       ( 24 Aug 2020 06:45 )             24.2     08-24    139  |  106  |  97<H>  ----------------------------<  80  4.9   |  20<L>  |  5.98<H>    Ca    8.7      24 Aug 2020 06:45  Phos  5.1     08-24  Mg     2.5     08-24        CAPILLARY BLOOD GLUCOSE      POCT Blood Glucose.: 102 mg/dL (24 Aug 2020 17:21)  POCT Blood Glucose.: 94 mg/dL (24 Aug 2020 12:24)  POCT Blood Glucose.: 90 mg/dL (24 Aug 2020 08:53)  POCT Blood Glucose.: 136 mg/dL (23 Aug 2020 22:26)            RADIOLOGY & ADDITIONAL TESTS:    Imaging Personally Reviewed:  [x] YES  [ ] NO    Consultant(s) Notes Reviewed:  [x] YES  [ ] NO      MEDICATIONS  (STANDING):  allopurinol 100 milliGRAM(s) Oral daily  aspirin enteric coated 81 milliGRAM(s) Oral daily  atorvastatin 20 milliGRAM(s) Oral at bedtime  carvedilol 25 milliGRAM(s) Oral every 12 hours  cloNIDine 0.2 milliGRAM(s) Oral daily  dextrose 5%. 1000 milliLiter(s) (50 mL/Hr) IV Continuous <Continuous>  dextrose 50% Injectable 12.5 Gram(s) IV Push once  dextrose 50% Injectable 25 Gram(s) IV Push once  dextrose 50% Injectable 25 Gram(s) IV Push once  furosemide    Tablet 40 milliGRAM(s) Oral daily  heparin   Injectable 5000 Unit(s) SubCutaneous every 12 hours  hydrALAZINE 100 milliGRAM(s) Oral every 8 hours  insulin lispro (HumaLOG) corrective regimen sliding scale   SubCutaneous three times a day before meals  isosorbide   mononitrate ER Tablet (IMDUR) 60 milliGRAM(s) Oral daily  mirtazapine 15 milliGRAM(s) Oral at bedtime  sodium bicarbonate 650 milliGRAM(s) Oral two times a day    MEDICATIONS  (PRN):  ALBUTerol    90 MICROgram(s) HFA Inhaler 2 Puff(s) Inhalation every 6 hours PRN Bronchospasm  dextrose 40% Gel 15 Gram(s) Oral once PRN Blood Glucose LESS THAN 70 milliGRAM(s)/deciliter  glucagon  Injectable 1 milliGRAM(s) IntraMuscular once PRN Glucose LESS THAN 70 milligrams/deciliter      Care Discussed with Consultants/Other Providers [x] YES  [ ] NO    HEALTH ISSUES - PROBLEM Dx:  Hyperkalemia: Hyperkalemia  CKD (chronic kidney disease) stage 5, GFR less than 15 ml/min: CKD (chronic kidney disease) stage 5, GFR less than 15 ml/min  Dementia: Dementia  Prophylactic measure: Prophylactic measure  HTN (Hypertension): HTN (Hypertension)  DM2 (diabetes mellitus, type 2): DM2 (diabetes mellitus, type 2)  CKD (chronic kidney disease) stage 4, GFR 15-29 ml/min: CKD (chronic kidney disease) stage 4, GFR 15-29 ml/min  Chronic anemia: Chronic anemia  Acute renal failure, unspecified acute renal failure type: Acute renal failure, unspecified acute renal failure type

## 2020-08-25 ENCOUNTER — APPOINTMENT (OUTPATIENT)
Dept: ULTRASOUND IMAGING | Facility: CLINIC | Age: 80
End: 2020-08-25

## 2020-08-25 LAB
ANION GAP SERPL CALC-SCNC: 15 MMO/L — HIGH (ref 7–14)
BUN SERPL-MCNC: 95 MG/DL — HIGH (ref 7–23)
CALCIUM SERPL-MCNC: 8.9 MG/DL — SIGNIFICANT CHANGE UP (ref 8.4–10.5)
CHLORIDE SERPL-SCNC: 106 MMOL/L — SIGNIFICANT CHANGE UP (ref 98–107)
CO2 SERPL-SCNC: 20 MMOL/L — LOW (ref 22–31)
CREAT SERPL-MCNC: 6.01 MG/DL — HIGH (ref 0.5–1.3)
FOLATE SERPL-MCNC: 5.9 NG/ML — SIGNIFICANT CHANGE UP (ref 4.7–20)
GLUCOSE SERPL-MCNC: 93 MG/DL — SIGNIFICANT CHANGE UP (ref 70–99)
HCT VFR BLD CALC: 24.6 % — LOW (ref 34.5–45)
HGB BLD-MCNC: 7.9 G/DL — LOW (ref 11.5–15.5)
MAGNESIUM SERPL-MCNC: 2.6 MG/DL — SIGNIFICANT CHANGE UP (ref 1.6–2.6)
MCHC RBC-ENTMCNC: 27.4 PG — SIGNIFICANT CHANGE UP (ref 27–34)
MCHC RBC-ENTMCNC: 32.1 % — SIGNIFICANT CHANGE UP (ref 32–36)
MCV RBC AUTO: 85.4 FL — SIGNIFICANT CHANGE UP (ref 80–100)
NRBC # FLD: 0 K/UL — SIGNIFICANT CHANGE UP (ref 0–0)
PHOSPHATE SERPL-MCNC: 5.1 MG/DL — HIGH (ref 2.5–4.5)
PLATELET # BLD AUTO: 91 K/UL — LOW (ref 150–400)
PMV BLD: 11.5 FL — SIGNIFICANT CHANGE UP (ref 7–13)
POTASSIUM SERPL-MCNC: 4.9 MMOL/L — SIGNIFICANT CHANGE UP (ref 3.5–5.3)
POTASSIUM SERPL-SCNC: 4.9 MMOL/L — SIGNIFICANT CHANGE UP (ref 3.5–5.3)
RBC # BLD: 2.88 M/UL — LOW (ref 3.8–5.2)
RBC # FLD: 15 % — HIGH (ref 10.3–14.5)
SODIUM SERPL-SCNC: 141 MMOL/L — SIGNIFICANT CHANGE UP (ref 135–145)
T4 AB SER-ACNC: 6.16 UG/DL — SIGNIFICANT CHANGE UP (ref 5.1–13)
TSH SERPL-MCNC: 1.56 UIU/ML — SIGNIFICANT CHANGE UP (ref 0.27–4.2)
VIT B12 SERPL-MCNC: 411 PG/ML — SIGNIFICANT CHANGE UP (ref 200–900)
WBC # BLD: 3.48 K/UL — LOW (ref 3.8–10.5)
WBC # FLD AUTO: 3.48 K/UL — LOW (ref 3.8–10.5)

## 2020-08-25 RX ADMIN — Medication 100 MILLIGRAM(S): at 22:06

## 2020-08-25 RX ADMIN — Medication 650 MILLIGRAM(S): at 17:56

## 2020-08-25 RX ADMIN — Medication 40 MILLIGRAM(S): at 06:03

## 2020-08-25 RX ADMIN — CARVEDILOL PHOSPHATE 25 MILLIGRAM(S): 80 CAPSULE, EXTENDED RELEASE ORAL at 06:04

## 2020-08-25 RX ADMIN — Medication 0.2 MILLIGRAM(S): at 06:04

## 2020-08-25 RX ADMIN — HEPARIN SODIUM 5000 UNIT(S): 5000 INJECTION INTRAVENOUS; SUBCUTANEOUS at 06:04

## 2020-08-25 RX ADMIN — Medication 100 MILLIGRAM(S): at 06:03

## 2020-08-25 RX ADMIN — Medication 100 MILLIGRAM(S): at 12:23

## 2020-08-25 RX ADMIN — CARVEDILOL PHOSPHATE 25 MILLIGRAM(S): 80 CAPSULE, EXTENDED RELEASE ORAL at 17:52

## 2020-08-25 RX ADMIN — MIRTAZAPINE 15 MILLIGRAM(S): 45 TABLET, ORALLY DISINTEGRATING ORAL at 22:06

## 2020-08-25 RX ADMIN — Medication 81 MILLIGRAM(S): at 12:23

## 2020-08-25 RX ADMIN — ATORVASTATIN CALCIUM 20 MILLIGRAM(S): 80 TABLET, FILM COATED ORAL at 22:05

## 2020-08-25 RX ADMIN — Medication 650 MILLIGRAM(S): at 06:01

## 2020-08-25 RX ADMIN — ISOSORBIDE MONONITRATE 60 MILLIGRAM(S): 60 TABLET, EXTENDED RELEASE ORAL at 12:22

## 2020-08-25 RX ADMIN — HEPARIN SODIUM 5000 UNIT(S): 5000 INJECTION INTRAVENOUS; SUBCUTANEOUS at 17:58

## 2020-08-25 NOTE — EEG REPORT - NS EEG TEXT BOX
[facility]   COMPREHENSIVE EPILEPSY CENTER   REPORT OF ROUTINE VIDEO EEG     [facility]: [address], [place] [address], Ph#: [phone]  [facility]: [address], [place] [address], Ph#: [phone]  [facility]: [address] [place] [address], Ph#: [phone]    Patient Name: IRA ACEVEDO  Age and : 80y (40)  MRN #: [id number]  Location: [place] [address] A  Referring Physician: Sai Gustafson    Study Date: 20    _____________________________________________________________  TECHNICAL INFORMATION    Placement and Labeling of Electrodes:  The EEG was performed utilizing 20 channels referential EEG connections (coronal over temporal over parasagittal montage) using all standard 10-20 electrode placements with EKG.  Recording was at a sampling rate of 256 samples per second per channel.  Time synchronized digital video recording was done simultaneously with EEG recording.  A low light infrared camera was used for low light recording.  Jayy and seizure detection algorithms were utilized.    _____________________________________________________________  HISTORY    Patient is a 80y old  Female who presents with a chief complaint of AMERICA on CKD (24 Aug 2020 09:04)      PERTINENT MEDICATION:  none    _____________________________________________________________  STUDY INTERPRETATION    Findings: The background was continuous, spontaneously variable and reactive. During wakefulness, the posterior dominant rhythm consisted of symmetric, well-modulated 7 Hz activity, with amplitude to 30 uV, that attenuated to eye opening.     Background Slowing:  No generalized background slowing was present.    Focal Slowing:   Intermittent independent theta/delta slowing in the left (max F7/T7) > right (max F8/T8) temporal regions.    Sleep Background:  Drowsiness was characterized by fragmentation, attenuation, and slowing of the background activity.    Stage II sleep transients were not recorded.    Other Non-Epileptiform Findings:  None were present.    Interictal Epileptiform Activity:   None were present.    Events:  Clinical events: None recorded.  Seizures: None recorded.    Activation Procedures:   Hyperventilation was not performed.    Photic stimulation was not performed.     Artifacts:  Intermittent myogenic and movement artifacts were noted.    ECG:  The heart rate on single channel ECG was predominantly between 70-80 BPM.    _____________________________________________________________  EEG SUMMARY/CLASSIFICATION    Abnormal EEG in the awake, drowsy states.  - Intermittent independent theta/delta slowing in the left (max F7/T7) > right (max F8/T8) temporal regions.  - Mild generalized slowing.    _____________________________________________________________  EEG IMPRESSION/CLINICAL CORRELATE    Abnormal EEG study.  1. Functional abnormality in the left greater than right temporal regions.   2. Mild nonspecific diffuse or multifocal cerebral dysfunction.   3. No epileptiform pattern or seizure seen.    _____________________________________________________________    Dipti Barakat MD  Attending Physician, [facility]

## 2020-08-25 NOTE — PROGRESS NOTE ADULT - SUBJECTIVE AND OBJECTIVE BOX
[facility]      Seen earlier today, and examined.  - Today, patient is without complaints.           *****MEDICATIONS: Current medication reviewed and documented.    MEDICATIONS  (STANDING):  allopurinol 100 milliGRAM(s) Oral daily  aspirin enteric coated 81 milliGRAM(s) Oral daily  atorvastatin 20 milliGRAM(s) Oral at bedtime  carvedilol 25 milliGRAM(s) Oral every 12 hours  cloNIDine 0.2 milliGRAM(s) Oral daily  dextrose 5%. 1000 milliLiter(s) (50 mL/Hr) IV Continuous <Continuous>  dextrose 50% Injectable 12.5 Gram(s) IV Push once  dextrose 50% Injectable 25 Gram(s) IV Push once  dextrose 50% Injectable 25 Gram(s) IV Push once  furosemide    Tablet 40 milliGRAM(s) Oral daily  heparin   Injectable 5000 Unit(s) SubCutaneous every 12 hours  hydrALAZINE 100 milliGRAM(s) Oral every 8 hours  insulin lispro (HumaLOG) corrective regimen sliding scale   SubCutaneous three times a day before meals  isosorbide   mononitrate ER Tablet (IMDUR) 60 milliGRAM(s) Oral daily  mirtazapine 15 milliGRAM(s) Oral at bedtime  sodium bicarbonate 650 milliGRAM(s) Oral two times a day    MEDICATIONS  (PRN):  ALBUTerol    90 MICROgram(s) HFA Inhaler 2 Puff(s) Inhalation every 6 hours PRN Bronchospasm  dextrose 40% Gel 15 Gram(s) Oral once PRN Blood Glucose LESS THAN 70 milliGRAM(s)/deciliter  glucagon  Injectable 1 milliGRAM(s) IntraMuscular once PRN Glucose LESS THAN 70 milligrams/deciliter          ***** VITAL SIGNS:  T(F): 97.4 (20 @ 12:09), Max: 98.2 (20 @ 22:09)  HR: 66 (20 @ 12:09) (66 - 72)  BP: 137/49 (20 @ 12:09) (137/49 - 156/53)  RR: 17 (20 @ 12:09) (17 - 18)  SpO2: 96% (20 @ 12:09) (96% - 100%)  Wt(kg): --  ,   I&O's Summary           *****PHYSICAL EXAM: Alert oriented x 3   Attention comprehension are fair. Able to name, repeat  without any difficulty.   Able to follow 1-2  step commands. recall3 /3   able to recall last 3 presidents.      EOMI fundi not visualized,  VFF to confrontration  No facial asymmetry   Tongue is midline   Palate elevates symmetrically   Moving all 4 ext symmetrically RUE limited mvt ( old due to hx of  RUE DVT)  Reflexes are symmetric throughout   sensation is grossly symmetric  Gait : not assessed.  B/L down going toes        *****LAB AND IMAGIN.9    3.48  )-----------( 91       ( 25 Aug 2020 05:22 )             24.6               08-25    141  |  106  |  95<H>  ----------------------------<  93  4.9   |  20<L>  |  6.01<H>    Ca    8.9      25 Aug 2020 05:22  Phos  5.1     08-25  Mg     2.6     08-25                           [All pertinent recent Imaging/Reports reviewed]           *****A S S E S S M E N T   A N D   P L A N :  79 y/o F with HTN, CAD with CABG, DVT, CKD, chronic anemia (unknown etiology, likely CKD induced) sent in from [facility] scheduled for a routine transfusion and IV Deferoxamine today "unable to get a peripheral line". Receives transfusion q 6 weeks. Also states ability to ambulate diminishing over the past few months. Denies fever, chills, chest pain, shortness of breath, weakness, abdominal pain, nausea, vomiting, rectal bleeding. In ED, noted to have acute on chronic kidney failure, rising Cr and hyperkalemia. Admitted for work up.   D/w the son Wagner and the granddaughter on the phone. Per the family, pt has been weak. Spent mostly on wheel chair. Recently saw neuro, had CT head that shows just mild volume loss and chronic changes. Started Mirtazapine and Cyproheptadine for appetite. Pt has been forgetful. She is on Lasix 40 mg three times a week Rx by renal.          Problem/Recommendations 1: progressive weakness likely multifactorial related to deconditioning, limitation due to anemia, ckd   last admission reported chest pain on walking, furthermore, stress test at the time revealed moderate ischemia to inferior wall. nl LV fx Further testing was deferrred due to advanced age/ ckd etc.     pt eval appreciated   Pt does not want to go to rehab.   ct head done recently  please obtain outpt results  Dr. 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 Pt was instructed to follow up with ophtho and endo at the time which she has not     denies any visual changes. however, would get ophthalmology f.u for formal visual fields  echo ef?   interrogate pacer.   ? hypoglycemic events, pt on januvia at home? a1c 4.8 ( as per son only takes it if am fs is >100) although endo recommended stopping januvia last admission.   would get eeg to r/o sz shows slowing in left > right temporal area, would get 24 h eeg     ck orthostatics          Thank you for allowing me to participate in the care of this patient. Please do not hesitate to call me if you have any  questions.        ________________  Chantal Sauceda MD  [facility] ([facility]  [phone]      33 minutes spent on total encounter; more than 50 % of the visit was  spent counseling about plan of care, compliance to diet/exercise and medication regimen and or  coordinating care by the attending physician.      It is advised that stroke patients follow up with TRISHA Reeves @ [phone] in 1- 2 weeks.   Others please follow up with Dr. Michael Nissenbaum [phone]

## 2020-08-25 NOTE — PROGRESS NOTE ADULT - SUBJECTIVE AND OBJECTIVE BOX
CARDIOLOGY FOLLOW UP - Dr. Damico    CC no cp or sob        PHYSICAL EXAM:  T(C): 36.6 (08-25-20 @ 06:00), Max: 36.8 (08-24-20 @ 22:09)  HR: 69 (08-25-20 @ 06:00) (68 - 75)  BP: 146/52 (08-25-20 @ 06:00) (146/52 - 161/64)  RR: 18 (08-25-20 @ 06:00) (18 - 18)  SpO2: 100% (08-25-20 @ 06:00) (100% - 100%)  Wt(kg): --  I&O's Summary      Appearance: Normal	  Cardiovascular: Normal S1 S2,RRR, No JVD, No murmurs  Respiratory: Lungs clear to auscultation	  Gastrointestinal:  Soft, Non-tender, + BS	  Extremities: Normal range of motion, No clubbing, cyanosis or edema        MEDICATIONS  (STANDING):  allopurinol 100 milliGRAM(s) Oral daily  aspirin enteric coated 81 milliGRAM(s) Oral daily  atorvastatin 20 milliGRAM(s) Oral at bedtime  carvedilol 25 milliGRAM(s) Oral every 12 hours  cloNIDine 0.2 milliGRAM(s) Oral daily  dextrose 5%. 1000 milliLiter(s) (50 mL/Hr) IV Continuous <Continuous>  dextrose 50% Injectable 12.5 Gram(s) IV Push once  dextrose 50% Injectable 25 Gram(s) IV Push once  dextrose 50% Injectable 25 Gram(s) IV Push once  furosemide    Tablet 40 milliGRAM(s) Oral daily  heparin   Injectable 5000 Unit(s) SubCutaneous every 12 hours  hydrALAZINE 100 milliGRAM(s) Oral every 8 hours  insulin lispro (HumaLOG) corrective regimen sliding scale   SubCutaneous three times a day before meals  isosorbide   mononitrate ER Tablet (IMDUR) 60 milliGRAM(s) Oral daily  mirtazapine 15 milliGRAM(s) Oral at bedtime  sodium bicarbonate 650 milliGRAM(s) Oral two times a day      TELEMETRY: 	    ECG:  	  RADIOLOGY:   DIAGNOSTIC TESTING:  [ ] Echocardiogram:  [ ]  Catheterization:  [ ] Stress Test:    OTHER: 	    LABS:	 	                            7.9    3.48  )-----------( 91       ( 25 Aug 2020 05:22 )             24.6     08-25    141  |  106  |  95<H>  ----------------------------<  93  4.9   |  20<L>  |  6.01<H>    Ca    8.9      25 Aug 2020 05:22  Phos  5.1     08-25  Mg     2.6     08-25

## 2020-08-25 NOTE — CONSULT NOTE ADULT - SUBJECTIVE AND OBJECTIVE BOX
VASCULAR & INTERVENTIONAL RADIOLOGY    Consult is for mediport placement.    Case discussed and reviewed with Dr. Navarrete.     Patient is a 80y old  Female who presents with a chief complaint of [place] on CKD. IR has been consulted for mediport placement for frequent transfusions and IV deferoxamine. IR has been consulted for mediport placement. IR recommends less invasive tunneled PICC line given patient co-morbidities.      PAST MEDICAL & SURGICAL HISTORY:  Thyroid nodule: awaiting FNB in the near future  Severe aortic stenosis  Osteoarthritis: bilateral knees  CKD (chronic kidney disease) stage 4, GFR 15-29 ml/min  DM2 (diabetes mellitus, type 2)  Arthritis  CAD (coronary artery disease)  Gout  Anemia Associated with Chronic Renal Failure  HTN (Hypertension)  S/P AVR: TAVR  History of pacemaker  A-V fistula: left arm  S/P cholecystectomy  Stented coronary artery: s/p 3 stents, then CABG 2007  S/P CABG (coronary artery bypass graft): triple in 2007     Allergies: codeine (Other)  Lortab (Other)  morphine (Other)  Percocet 10/325 (Other)  PPM not compatible with  MRI (Other (Fatal))  Reglan (Other; [place])  sulfa drugs ([place])      LABS:  CBC Full  -  ( 25 Aug 2020 05:22 )  WBC Count : 3.48 K/uL  RBC Count : 2.88 M/uL  Hemoglobin : 7.9 g/dL  Hematocrit : 24.6 %  Platelet Count - Automated : 91 K/uL      08-25    141  |  106  |  95<H>  ----------------------------<  93  4.9   |  20<L>  |  6.01<H>    Ca    8.9      25 Aug 2020 05:22  Phos  5.1     08-25  Mg     2.6     08-25    Plan:  - Image guided tunneled PICC line placement.   - NPO after midnight  - CBC, BMP and coags in AM  - IR procedure order under Dr. Navarrete for 8/26/2020.  - please complete IR pre-procedure checklist and pre-procedure note    Please contact us if the clinical situation changes or if you have any questions/comments/concerns.    Ngozi Norris MD

## 2020-08-25 NOTE — PROGRESS NOTE ADULT - ASSESSMENT
80 year old woman with history of HTN, CAD, s/p PCI, CABG, severe AS, s/p TAVR, s/p PPM, diastolic HF, RUE DVT, CKD, chronic anemia (unknown etiology, likely CKD induced) sent in from [facility] scheduled for a routine transfusion and IV Deferoxamine today "unable to get a peripheral line"    1. CAD, s/p PCI, CABG  -stable, no chest pain, acs  -cont med tx of CAD with asa, statin, bb    2. RUE DVT  -completed course of a/c  -cont asa  -vasc f/u Dr Flores    3. AS s/p TAVR/ PPM  -cv stable     4. HTN   -bp stable/acceptable  -cont current tx    5. anemia, hx  -associated with chronic renal failure   -no overt s/s of bleeding  -prbc's per medicine/renal   -plan for mediport insertion    6. Diastolic HF, chronic  -stable, cont lasix daily     7. CKD, hyperkalemia  -K stable   -renal f/u, tx per renal     8. Pituitary macroadenoma, recent leg weakness  -neuro f/u , med f/u      dvt ppx

## 2020-08-25 NOTE — PROGRESS NOTE ADULT - SUBJECTIVE AND OBJECTIVE BOX
Patient is a 80y old  Female who presents with a chief complaint of [place] on CKD (24 Aug 2020 09:04)      SUBJECTIVE / OVERNIGHT EVENTS:  no cp, no sob, no n/v/d. no abdominal pain.  no headache, no dizziness.   no complaints  EEG neg  await IR mediport for outpt PRN transfusions         Vital Signs Last 24 Hrs  T(C): 36.3 (25 Aug 2020 12:09), Max: 36.8 (24 Aug 2020 22:09)  T(F): 97.4 (25 Aug 2020 12:09), Max: 98.2 (24 Aug 2020 22:09)  HR: 67 (25 Aug 2020 17:51) (66 - 70)  BP: 144/52 (25 Aug 2020 17:51) (137/49 - 151/62)  BP(mean): --  RR: 17 (25 Aug 2020 12:09) (17 - 18)  SpO2: 96% (25 Aug 2020 12:09) (96% - 100%)  I&O's Summary      PHYSICAL EXAM:  GENERAL: NAD, well-developed, comfortable  HEAD:  Atraumatic, Normocephalic  EYES: EOMI, PERRLA, conjunctiva and sclera clear  NECK: Supple, No JVD  CHEST/LUNG: mild decrease breath sounds bilaterally, basilar crackles improved; No wheeze   HEART: Regular rate and rhythm; No murmurs, rubs, or gallops  ABDOMEN: Soft, Nontender, Nondistended; Bowel sounds present  Neuro: AAOx3, no focal weakness, 5/5 b/l extremity strength  EXTREMITIES:  2+ Peripheral Pulses, No clubbing, cyanosis, or edema, +left arm fistula  SKIN: No rashes or lesions      LABS:                        7.9    3.48  )-----------( 91       ( 25 Aug 2020 05:22 )             24.6     08-25    141  |  106  |  95<H>  ----------------------------<  93  4.9   |  20<L>  |  6.01<H>    Ca    8.9      25 Aug 2020 05:22  Phos  5.1     08-25  Mg     2.6     08-25        CAPILLARY BLOOD GLUCOSE      POCT Blood Glucose.: 145 mg/dL (25 Aug 2020 17:19)  POCT Blood Glucose.: 142 mg/dL (25 Aug 2020 12:04)  POCT Blood Glucose.: 101 mg/dL (25 Aug 2020 08:23)            RADIOLOGY & ADDITIONAL TESTS:    Imaging Personally Reviewed:  [x] YES  [ ] NO    Consultant(s) Notes Reviewed:  [x] YES  [ ] NO      MEDICATIONS  (STANDING):  allopurinol 100 milliGRAM(s) Oral daily  aspirin enteric coated 81 milliGRAM(s) Oral daily  atorvastatin 20 milliGRAM(s) Oral at bedtime  carvedilol 25 milliGRAM(s) Oral every 12 hours  cloNIDine 0.2 milliGRAM(s) Oral daily  dextrose 5%. 1000 milliLiter(s) (50 mL/Hr) IV Continuous <Continuous>  dextrose 50% Injectable 12.5 Gram(s) IV Push once  dextrose 50% Injectable 25 Gram(s) IV Push once  dextrose 50% Injectable 25 Gram(s) IV Push once  furosemide    Tablet 40 milliGRAM(s) Oral daily  heparin   Injectable 5000 Unit(s) SubCutaneous every 12 hours  hydrALAZINE 100 milliGRAM(s) Oral every 8 hours  insulin lispro (HumaLOG) corrective regimen sliding scale   SubCutaneous three times a day before meals  isosorbide   mononitrate ER Tablet (IMDUR) 60 milliGRAM(s) Oral daily  mirtazapine 15 milliGRAM(s) Oral at bedtime  sodium bicarbonate 650 milliGRAM(s) Oral two times a day    MEDICATIONS  (PRN):  ALBUTerol    90 MICROgram(s) HFA Inhaler 2 Puff(s) Inhalation every 6 hours PRN Bronchospasm  dextrose 40% Gel 15 Gram(s) Oral once PRN Blood Glucose LESS THAN 70 milliGRAM(s)/deciliter  glucagon  Injectable 1 milliGRAM(s) IntraMuscular once PRN Glucose LESS THAN 70 milligrams/deciliter      Care Discussed with Consultants/Other Providers [x] YES  [ ] NO    HEALTH ISSUES - PROBLEM Dx:  Hyperkalemia: Hyperkalemia  CKD (chronic kidney disease) stage 5, GFR less than 15 ml/min: CKD (chronic kidney disease) stage 5, GFR less than 15 ml/min  Dementia: Dementia  Prophylactic measure: Prophylactic measure  HTN (Hypertension): HTN (Hypertension)  DM2 (diabetes mellitus, type 2): DM2 (diabetes mellitus, type 2)  CKD (chronic kidney disease) stage 4, GFR 15-29 ml/min: CKD (chronic kidney disease) stage 4, GFR 15-29 ml/min  Chronic anemia: Chronic anemia  Acute renal failure, unspecified acute renal failure type: Acute renal failure, unspecified acute renal failure type

## 2020-08-25 NOTE — CONSULT NOTE ADULT - ATTENDING COMMENTS
Patient is a good candidate for tunneled PICC given his comorbidities. Essentially same risk for infection as a frequently accessed/deaccessed port. Tunneled picc requires no anesthesia for placement or removal, as opposed to a port which would require anesthesia for placement and removal if infected.
The patient was seen and examined today with the fellow, Dr. Up, and I agree with the History, Physical Exam and Plan in the record. Labs and imaging reviewed by me.
ckd 5  anemia    Will need close outpatient follow up with Dr. Dixon

## 2020-08-25 NOTE — CHART NOTE - NSCHARTNOTEFT_GEN_A_CORE
PRE-INTERVENTIONAL RADIOLOGY PROCEDURE NOTE for 8/26/20      Patient Age: 80    Patient Gender: Female    Procedure: IR Tunnel PICC Catheter    Diagnosis/Indication: Chronic Anemia    Interventional Radiology Attending Physician: Dr. Navarrete.       Ordering Attending Physician: Dr Gustafson    Pertinent Medical History:  Patient is a 80y old  Female who presents with a chief complaint of AMERICA on CKD. IR  consulted for mediport placement for frequent transfusions and IV deferoxamine. IR recommends less invasive tunneled PICC line given patient co-morbidities.    Pertinent labs:                      7.9    3.48  )-----------( 91       ( 25 Aug 2020 05:22 )             24.6       08-25    141  |  106  |  95<H>  ----------------------------<  93  4.9   |  20<L>  |  6.01<H>    Ca    8.9      25 Aug 2020 05:22  Phos  5.1     08-25  Mg     2.6     08-25          Patient and Family Aware ? Yes    Andreina Anguiano [facility]   Department of Medicine

## 2020-08-25 NOTE — PROGRESS NOTE ADULT - ASSESSMENT
81 y/o F with HTN, CAD with CABG, DVT, CKD, chronic anemia (unknown etiology) sent in from [facility], scheduled for a routine transfusion and IV Deferoxamine today "unable to get a peripheral line". Receives transfusion q 6 weeks. Also states ability to ambulate diminishing over the past few months. In ED, noted to have acute on chronic kidney failure, rising Cr and hyperkalemia. Admitted for work up.

## 2020-08-26 LAB
ANION GAP SERPL CALC-SCNC: 14 MMO/L — SIGNIFICANT CHANGE UP (ref 7–14)
APTT BLD: 29.9 SEC — SIGNIFICANT CHANGE UP (ref 27–36.3)
BUN SERPL-MCNC: 100 MG/DL — HIGH (ref 7–23)
CALCIUM SERPL-MCNC: 8.7 MG/DL — SIGNIFICANT CHANGE UP (ref 8.4–10.5)
CHLORIDE SERPL-SCNC: 108 MMOL/L — HIGH (ref 98–107)
CO2 SERPL-SCNC: 18 MMOL/L — LOW (ref 22–31)
CREAT SERPL-MCNC: 6.05 MG/DL — HIGH (ref 0.5–1.3)
GLUCOSE SERPL-MCNC: 83 MG/DL — SIGNIFICANT CHANGE UP (ref 70–99)
HCT VFR BLD CALC: 25.3 % — LOW (ref 34.5–45)
HGB BLD-MCNC: 7.5 G/DL — LOW (ref 11.5–15.5)
INR BLD: 0.97 — SIGNIFICANT CHANGE UP (ref 0.88–1.16)
MCHC RBC-ENTMCNC: 26.5 PG — LOW (ref 27–34)
MCHC RBC-ENTMCNC: 29.6 % — LOW (ref 32–36)
MCV RBC AUTO: 89.4 FL — SIGNIFICANT CHANGE UP (ref 80–100)
NRBC # FLD: 0 K/UL — SIGNIFICANT CHANGE UP (ref 0–0)
PLATELET # BLD AUTO: 83 K/UL — LOW (ref 150–400)
PMV BLD: 10.8 FL — SIGNIFICANT CHANGE UP (ref 7–13)
POTASSIUM SERPL-MCNC: 5.1 MMOL/L — SIGNIFICANT CHANGE UP (ref 3.5–5.3)
POTASSIUM SERPL-SCNC: 5.1 MMOL/L — SIGNIFICANT CHANGE UP (ref 3.5–5.3)
PROTHROM AB SERPL-ACNC: 11.1 SEC — SIGNIFICANT CHANGE UP (ref 10.6–13.6)
RBC # BLD: 2.83 M/UL — LOW (ref 3.8–5.2)
RBC # FLD: 14.8 % — HIGH (ref 10.3–14.5)
SODIUM SERPL-SCNC: 140 MMOL/L — SIGNIFICANT CHANGE UP (ref 135–145)
WBC # BLD: 3.54 K/UL — LOW (ref 3.8–10.5)
WBC # FLD AUTO: 3.54 K/UL — LOW (ref 3.8–10.5)

## 2020-08-26 PROCEDURE — 77001 FLUOROGUIDE FOR VEIN DEVICE: CPT | Mod: 26,GC

## 2020-08-26 PROCEDURE — 76937 US GUIDE VASCULAR ACCESS: CPT | Mod: 26

## 2020-08-26 PROCEDURE — 36558 INSERT TUNNELED CV CATH: CPT

## 2020-08-26 RX ORDER — SODIUM CHLORIDE 9 MG/ML
10 INJECTION INTRAMUSCULAR; INTRAVENOUS; SUBCUTANEOUS
Refills: 0 | Status: DISCONTINUED | OUTPATIENT
Start: 2020-08-26 | End: 2020-08-29

## 2020-08-26 RX ORDER — IBUPROFEN 200 MG
200 TABLET ORAL ONCE
Refills: 0 | Status: COMPLETED | OUTPATIENT
Start: 2020-08-26 | End: 2020-08-26

## 2020-08-26 RX ORDER — CHLORHEXIDINE GLUCONATE 213 G/1000ML
1 SOLUTION TOPICAL
Refills: 0 | Status: DISCONTINUED | OUTPATIENT
Start: 2020-08-26 | End: 2020-08-29

## 2020-08-26 RX ADMIN — CARVEDILOL PHOSPHATE 25 MILLIGRAM(S): 80 CAPSULE, EXTENDED RELEASE ORAL at 05:57

## 2020-08-26 RX ADMIN — MIRTAZAPINE 15 MILLIGRAM(S): 45 TABLET, ORALLY DISINTEGRATING ORAL at 21:00

## 2020-08-26 RX ADMIN — HEPARIN SODIUM 5000 UNIT(S): 5000 INJECTION INTRAVENOUS; SUBCUTANEOUS at 05:56

## 2020-08-26 RX ADMIN — Medication 40 MILLIGRAM(S): at 05:57

## 2020-08-26 RX ADMIN — ATORVASTATIN CALCIUM 20 MILLIGRAM(S): 80 TABLET, FILM COATED ORAL at 21:00

## 2020-08-26 RX ADMIN — Medication 650 MILLIGRAM(S): at 17:21

## 2020-08-26 RX ADMIN — ISOSORBIDE MONONITRATE 60 MILLIGRAM(S): 60 TABLET, EXTENDED RELEASE ORAL at 12:32

## 2020-08-26 RX ADMIN — Medication 100 MILLIGRAM(S): at 12:32

## 2020-08-26 RX ADMIN — Medication 200 MILLIGRAM(S): at 21:01

## 2020-08-26 RX ADMIN — CHLORHEXIDINE GLUCONATE 1 APPLICATION(S): 213 SOLUTION TOPICAL at 12:32

## 2020-08-26 RX ADMIN — Medication 81 MILLIGRAM(S): at 12:32

## 2020-08-26 RX ADMIN — Medication 100 MILLIGRAM(S): at 13:00

## 2020-08-26 RX ADMIN — Medication 200 MILLIGRAM(S): at 22:01

## 2020-08-26 RX ADMIN — Medication 100 MILLIGRAM(S): at 05:57

## 2020-08-26 RX ADMIN — CARVEDILOL PHOSPHATE 25 MILLIGRAM(S): 80 CAPSULE, EXTENDED RELEASE ORAL at 17:21

## 2020-08-26 RX ADMIN — Medication 0.2 MILLIGRAM(S): at 05:57

## 2020-08-26 RX ADMIN — Medication 100 MILLIGRAM(S): at 21:01

## 2020-08-26 RX ADMIN — Medication 650 MILLIGRAM(S): at 05:57

## 2020-08-26 NOTE — PROGRESS NOTE ADULT - SUBJECTIVE AND OBJECTIVE BOX
[facility]      Seen earlier today, and examined.  - Today, patient is without complaints.           *****MEDICATIONS: Current medication reviewed and documented.    MEDICATIONS  (STANDING):  allopurinol 100 milliGRAM(s) Oral daily  aspirin enteric coated 81 milliGRAM(s) Oral daily  atorvastatin 20 milliGRAM(s) Oral at bedtime  carvedilol 25 milliGRAM(s) Oral every 12 hours  chlorhexidine 4% Liquid 1 Application(s) Topical <User Schedule>  cloNIDine 0.2 milliGRAM(s) Oral daily  dextrose 5%. 1000 milliLiter(s) (50 mL/Hr) IV Continuous <Continuous>  dextrose 50% Injectable 12.5 Gram(s) IV Push once  dextrose 50% Injectable 25 Gram(s) IV Push once  dextrose 50% Injectable 25 Gram(s) IV Push once  furosemide    Tablet 40 milliGRAM(s) Oral daily  heparin   Injectable 5000 Unit(s) SubCutaneous every 12 hours  hydrALAZINE 100 milliGRAM(s) Oral every 8 hours  insulin lispro (HumaLOG) corrective regimen sliding scale   SubCutaneous three times a day before meals  isosorbide   mononitrate ER Tablet (IMDUR) 60 milliGRAM(s) Oral daily  mirtazapine 15 milliGRAM(s) Oral at bedtime  sodium bicarbonate 650 milliGRAM(s) Oral two times a day    MEDICATIONS  (PRN):  ALBUTerol    90 MICROgram(s) HFA Inhaler 2 Puff(s) Inhalation every 6 hours PRN Bronchospasm  dextrose 40% Gel 15 Gram(s) Oral once PRN Blood Glucose LESS THAN 70 milliGRAM(s)/deciliter  glucagon  Injectable 1 milliGRAM(s) IntraMuscular once PRN Glucose LESS THAN 70 milligrams/deciliter  sodium chloride 0.9% lock flush 10 milliLiter(s) IV Push every 1 hour PRN Pre/post blood products, medications, blood draw, and to maintain line patency          ***** VITAL SIGNS:  T(F): 98 (20 @ 20:15), Max: 98.4 (20 @ 05:54)  HR: 63 (20 @ 20:15) (61 - 68)  BP: 122/55 (20 @ 20:15) (122/55 - 168/53)  RR: 17 (08-26-20 @ 20:15) (16 - 18)  SpO2: 98% (-20 @ 20:15) (98% - 100%)  Wt(kg): --  ,   I&O's Summary           *****PHYSICAL EXAM: Alert oriented x 3   Attention comprehension are fair. Able to name, repeat  without any difficulty.   Able to follow 1-2  step commands. recall 3 /3   able to recall last 3 presidents.      EOMI fundi not visualized,  VFF to confrontration  No facial asymmetry   Tongue is midline   Palate elevates symmetrically   Moving all 4 ext symmetrically RUE limited mvt ( old due to hx of  RUE DVT)  Reflexes are symmetric throughout   sensation is grossly symmetric  Gait : not assessed.  B/L down going toes            *****LAB AND IMAGIN.5    3.54  )-----------( 83       ( 26 Aug 2020 05:00 )             25.3               08-26    140  |  108<H>  |  100<H>  ----------------------------<  83  5.1   |  18<L>  |  6.05<H>    Ca    8.7      26 Aug 2020 05:00  Phos  5.1     08-25  Mg     2.6     08-25      PT/INR - ( 26 Aug 2020 08:20 )   PT: 11.1 SEC;   INR: 0.97          PTT - ( 26 Aug 2020 08:20 )  PTT:29.9 SEC                     [All pertinent recent Imaging/Reports reviewed]           *****A S S E S S M E N T   A N D   P L A N :      81 y/o F with HTN, CAD with CABG, DVT, CKD, chronic anemia (unknown etiology, likely CKD induced) sent in from [facility] scheduled for a routine transfusion and IV Deferoxamine today "unable to get a peripheral line". Receives transfusion q 6 weeks. Also states ability to ambulate diminishing over the past few months. Denies fever, chills, chest pain, shortness of breath, weakness, abdominal pain, nausea, vomiting, rectal bleeding. In ED, noted to have acute on chronic kidney failure, rising Cr and hyperkalemia. Admitted for work up.   D/w the son Bernard and the granddaughter on the phone. Per the family, pt has been weak. Spent mostly on wheel chair. Recently saw neuro, had CT head that shows just mild volume loss and chronic changes. Started Mirtazapine and Cyproheptadine for appetite. Pt has been forgetful. She is on Lasix 40 mg three times a week Rx by renal.          Problem/Recommendations 1: progressive weakness likely multifactorial related to deconditioning, limitation due to anemia, ckd and pulm htn      pt eval appreciated   Pt does not want to go to rehab.   ct head done recently  please obtain outpt results  Dr. 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 Pt was instructed to follow up with ophtho and endo at the time which she has not     denies any visual changes. however, would get ophthalmology f.u  outpt for formal visual fields  echo ef normal however there is increased pa pressure and moderate pulm htn.   interrogate pacer.   ? hypoglycemic events, pt on januvia at home? a1c 4.8 ( as per son only takes it if am fs is >100) although endo recommended stopping januvia last admission.   would get eeg to r/o sz shows slowing in left > right temporal area, would get 24 h eeg     ck orthostatics      Thank you for allowing me to participate in the care of this patient. Please do not hesitate to call me if you have any  questions.        ________________  Chantal Sauceda MD  [facility] ([facility]  [phone]      33 minutes spent on total encounter; more than 50 % of the visit was  spent counseling about plan of care, compliance to diet/exercise and medication regimen and or  coordinating care by the attending physician.      It is advised that stroke patients follow up with TRISHA Reeves @ [phone] in 1- 2 weeks.   Others please follow up with Dr. Michael Nissenbaum [phone]

## 2020-08-26 NOTE — PROGRESS NOTE ADULT - SUBJECTIVE AND OBJECTIVE BOX
S/p Mediportplacement  Feels some malaise  Otherwise feels OK  Hungry    Vital Signs Last 24 Hrs  T(C): 36.9 (26 Aug 2020 05:54), Max: 36.9 (26 Aug 2020 05:54)  T(F): 98.4 (26 Aug 2020 05:54), Max: 98.4 (26 Aug 2020 05:54)  HR: 65 (26 Aug 2020 05:54) (65 - 68)  BP: 162/47 (26 Aug 2020 05:54) (142/59 - 168/53)  BP(mean): --  RR: 18 (26 Aug 2020 05:54) (18 - 18)  SpO2: 100% (26 Aug 2020 05:54) (96% - 100%)    I&O's Summary        PHYSICAL EXAM:  GENERAL: NAD, well-developed, comfortable  HEAD:  Atraumatic, Normocephalic  EYES: EOMI, PERRLA, conjunctiva and sclera clear  NECK: Supple, No JVD  CHEST/LUNG: mild decrease breath sounds bilaterally, basilar crackles improved; No wheeze   HEART: Regular rate and rhythm; No murmurs, rubs, or gallops  ABDOMEN: Soft, Nontender, Nondistended; Bowel sounds present  Neuro: AAOx3, no focal weakness, 5/5 b/l extremity strength  EXTREMITIES:  2+ Peripheral Pulses, No clubbing, cyanosis, or edema, +left arm fistula  SKIN: No rashes or lesions      LABS:                        7.5    3.54  )-----------( 83       ( 26 Aug 2020 05:00 )             25.3     08-26    140  |  108<H>  |  100<H>  ----------------------------<  83  5.1   |  18<L>  |  6.05<H>    Ca    8.7      26 Aug 2020 05:00  Phos  5.1     08-25  Mg     2.6     08-25      PT/INR - ( 26 Aug 2020 08:20 )   PT: 11.1 SEC;   INR: 0.97          PTT - ( 26 Aug 2020 08:20 )  PTT:29.9 SEC  CAPILLARY BLOOD GLUCOSE      POCT Blood Glucose.: 100 mg/dL (26 Aug 2020 12:10)  POCT Blood Glucose.: 101 mg/dL (26 Aug 2020 07:23)  POCT Blood Glucose.: 78 mg/dL (25 Aug 2020 21:56)  POCT Blood Glucose.: 145 mg/dL (25 Aug 2020 17:19)            RADIOLOGY & ADDITIONAL TESTS:    Imaging Personally Reviewed:  [x] YES  [ ] NO    Consultant(s) Notes Reviewed:  [x] YES  [ ] NO

## 2020-08-26 NOTE — PROGRESS NOTE ADULT - ASSESSMENT
80 year old woman with history of HTN, CAD, s/p PCI, CABG, severe AS, s/p TAVR, s/p PPM, diastolic HF, RUE DVT, CKD, chronic anemia (unknown etiology, likely CKD induced) sent in from [facility] scheduled for a routine transfusion and IV Deferoxamine today "unable to get a peripheral line"    1. CAD, s/p PCI, CABG  -stable, no chest pain, acs  -cont med tx of CAD with asa, statin, bb    2. RUE DVT  -completed course of a/c  -cont asa  -vasc f/u Dr Flores    3. AS s/p TAVR/ PPM  -cv stable     4. HTN   -bp mildly elevated, increase imdur to120 mg daily  -cont current tx    5. anemia, hx  -associated with chronic renal failure   -no overt s/s of bleeding  -prbc's per medicine/renal   -s/p picc placement     6. Diastolic HF, chronic  -stable, cont lasix daily     7. CKD, hyperkalemia  -K stable   -renal f/u, tx per renal     8. Pituitary macroadenoma, recent leg weakness  -neuro f/u , med f/u      dvt ppx 80 year old woman with history of HTN, CAD, s/p PCI, CABG, severe AS, s/p TAVR, s/p PPM, diastolic HF, RUE DVT, CKD, chronic anemia (unknown etiology, likely CKD induced) sent in from [facility] scheduled for a routine transfusion and IV Deferoxamine today "unable to get a peripheral line"    1. CAD, s/p PCI, CABG  -stable, no chest pain, acs  -cont med tx of CAD with asa, statin, bb    2. RUE DVT  -completed course of a/c  -cont asa  -vasc f/u Dr Flores    3. AS s/p TAVR/ PPM ([employer])  -cv stable     4. HTN   -bp mildly elevated, increase imdur to120 mg daily  -cont current tx    5. anemia, hx  -associated with chronic renal failure   -no overt s/s of bleeding  -prbc's per medicine/renal   -s/p picc placement     6. Diastolic HF, chronic  -stable, cont lasix daily     7. CKD, hyperkalemia  -K stable   -renal f/u, tx per renal     8. Pituitary macroadenoma, recent leg weakness  -neuro f/u , med f/u      dvt ppx

## 2020-08-26 NOTE — PROGRESS NOTE ADULT - ASSESSMENT
81 y/o F with HTN, CAD with CABG, DVT, CKD, chronic anemia (unknown etiology) sent in from [facility], scheduled for a routine transfusion and IV Deferoxamine today "unable to get a peripheral line". Receives transfusion q 6 weeks. Also states ability to ambulate diminishing over the past few months. In ED, noted to have acute on chronic kidney failure, rising Cr and hyperkalemia. Admitted for work up.

## 2020-08-26 NOTE — PROGRESS NOTE ADULT - SUBJECTIVE AND OBJECTIVE BOX
CARDIOLOGY FOLLOW UP - Dr. Damico    CC no cp or sob        PHYSICAL EXAM:  T(C): 36.9 (08-26-20 @ 05:54), Max: 36.9 (08-26-20 @ 05:54)  HR: 65 (08-26-20 @ 05:54) (65 - 68)  BP: 162/47 (08-26-20 @ 05:54) (142/59 - 168/53)  RR: 18 (08-26-20 @ 05:54) (18 - 18)  SpO2: 100% (08-26-20 @ 05:54) (96% - 100%)  Wt(kg): --  I&O's Summary      Appearance: Normal	  Cardiovascular: Normal S1 S2,RRR, No JVD, No murmurs  Respiratory: Lungs clear to auscultation	  Gastrointestinal:  Soft, Non-tender, + BS	  Extremities: Normal range of motion, No clubbing, cyanosis or edema        MEDICATIONS  (STANDING):  allopurinol 100 milliGRAM(s) Oral daily  aspirin enteric coated 81 milliGRAM(s) Oral daily  atorvastatin 20 milliGRAM(s) Oral at bedtime  carvedilol 25 milliGRAM(s) Oral every 12 hours  chlorhexidine 4% Liquid 1 Application(s) Topical <User Schedule>  cloNIDine 0.2 milliGRAM(s) Oral daily  dextrose 5%. 1000 milliLiter(s) (50 mL/Hr) IV Continuous <Continuous>  dextrose 50% Injectable 12.5 Gram(s) IV Push once  dextrose 50% Injectable 25 Gram(s) IV Push once  dextrose 50% Injectable 25 Gram(s) IV Push once  furosemide    Tablet 40 milliGRAM(s) Oral daily  heparin   Injectable 5000 Unit(s) SubCutaneous every 12 hours  hydrALAZINE 100 milliGRAM(s) Oral every 8 hours  insulin lispro (HumaLOG) corrective regimen sliding scale   SubCutaneous three times a day before meals  isosorbide   mononitrate ER Tablet (IMDUR) 60 milliGRAM(s) Oral daily  mirtazapine 15 milliGRAM(s) Oral at bedtime  sodium bicarbonate 650 milliGRAM(s) Oral two times a day      TELEMETRY: 	    ECG:  	  RADIOLOGY:   DIAGNOSTIC TESTING:  [ ] Echocardiogram:  [ ]  Catheterization:  [ ] Stress Test:    OTHER: 	    LABS:	 	                            7.5    3.54  )-----------( 83       ( 26 Aug 2020 05:00 )             25.3     08-26    140  |  108<H>  |  100<H>  ----------------------------<  83  5.1   |  18<L>  |  6.05<H>    Ca    8.7      26 Aug 2020 05:00  Phos  5.1     08-25  Mg     2.6     08-25      PT/INR - ( 26 Aug 2020 08:20 )   PT: 11.1 SEC;   INR: 0.97          PTT - ( 26 Aug 2020 08:20 )  PTT:29.9 SEC

## 2020-08-27 ENCOUNTER — OUTPATIENT (OUTPATIENT)
Dept: OUTPATIENT SERVICES | Facility: HOSPITAL | Age: 80
LOS: 1 days | Discharge: HOME | End: 2020-08-27
Payer: MEDICARE

## 2020-08-27 ENCOUNTER — APPOINTMENT (OUTPATIENT)
Dept: INFUSION THERAPY | Facility: HOSPITAL | Age: 80
End: 2020-08-27

## 2020-08-27 DIAGNOSIS — I77.0 ARTERIOVENOUS FISTULA, ACQUIRED: Chronic | ICD-10-CM

## 2020-08-27 DIAGNOSIS — Z95.0 PRESENCE OF CARDIAC PACEMAKER: Chronic | ICD-10-CM

## 2020-08-27 DIAGNOSIS — D63.1 ANEMIA IN CHRONIC KIDNEY DISEASE: ICD-10-CM

## 2020-08-27 DIAGNOSIS — Z95.2 PRESENCE OF PROSTHETIC HEART VALVE: Chronic | ICD-10-CM

## 2020-08-27 DIAGNOSIS — Z90.49 ACQUIRED ABSENCE OF OTHER SPECIFIED PARTS OF DIGESTIVE TRACT: Chronic | ICD-10-CM

## 2020-08-27 LAB
ANION GAP SERPL CALC-SCNC: 11 MMO/L — SIGNIFICANT CHANGE UP (ref 7–14)
ANION GAP SERPL CALC-SCNC: 12 MMO/L — SIGNIFICANT CHANGE UP (ref 7–14)
BLD GP AB SCN SERPL QL: NEGATIVE — SIGNIFICANT CHANGE UP
BUN SERPL-MCNC: 103 MG/DL — HIGH (ref 7–23)
BUN SERPL-MCNC: 105 MG/DL — HIGH (ref 7–23)
CALCIUM SERPL-MCNC: 8.6 MG/DL — SIGNIFICANT CHANGE UP (ref 8.4–10.5)
CALCIUM SERPL-MCNC: 8.6 MG/DL — SIGNIFICANT CHANGE UP (ref 8.4–10.5)
CHLORIDE SERPL-SCNC: 109 MMOL/L — HIGH (ref 98–107)
CHLORIDE SERPL-SCNC: 115 MMOL/L — HIGH (ref 98–107)
CO2 SERPL-SCNC: 21 MMOL/L — LOW (ref 22–31)
CO2 SERPL-SCNC: 21 MMOL/L — LOW (ref 22–31)
CREAT SERPL-MCNC: 6.08 MG/DL — HIGH (ref 0.5–1.3)
CREAT SERPL-MCNC: 6.34 MG/DL — HIGH (ref 0.5–1.3)
GLUCOSE SERPL-MCNC: 135 MG/DL — HIGH (ref 70–99)
GLUCOSE SERPL-MCNC: 92 MG/DL — SIGNIFICANT CHANGE UP (ref 70–99)
HCT VFR BLD CALC: 22.9 % — LOW (ref 34.5–45)
HCT VFR BLD CALC: 24.3 % — LOW (ref 34.5–45)
HGB BLD-MCNC: 6.9 G/DL — CRITICAL LOW (ref 11.5–15.5)
HGB BLD-MCNC: 7 G/DL — CRITICAL LOW (ref 11.5–15.5)
MAGNESIUM SERPL-MCNC: 2.5 MG/DL — SIGNIFICANT CHANGE UP (ref 1.6–2.6)
MCHC RBC-ENTMCNC: 26.1 PG — LOW (ref 27–34)
MCHC RBC-ENTMCNC: 26.4 PG — LOW (ref 27–34)
MCHC RBC-ENTMCNC: 28.8 % — LOW (ref 32–36)
MCHC RBC-ENTMCNC: 30.1 % — LOW (ref 32–36)
MCV RBC AUTO: 87.7 FL — SIGNIFICANT CHANGE UP (ref 80–100)
MCV RBC AUTO: 90.7 FL — SIGNIFICANT CHANGE UP (ref 80–100)
NRBC # FLD: 0 K/UL — SIGNIFICANT CHANGE UP (ref 0–0)
NRBC # FLD: 0 K/UL — SIGNIFICANT CHANGE UP (ref 0–0)
PHOSPHATE SERPL-MCNC: 4.5 MG/DL — SIGNIFICANT CHANGE UP (ref 2.5–4.5)
PLATELET # BLD AUTO: 71 K/UL — LOW (ref 150–400)
PLATELET # BLD AUTO: 86 K/UL — LOW (ref 150–400)
PMV BLD: 10.6 FL — SIGNIFICANT CHANGE UP (ref 7–13)
PMV BLD: 12.5 FL — SIGNIFICANT CHANGE UP (ref 7–13)
POTASSIUM SERPL-MCNC: 5.4 MMOL/L — HIGH (ref 3.5–5.3)
POTASSIUM SERPL-MCNC: 5.7 MMOL/L — HIGH (ref 3.5–5.3)
POTASSIUM SERPL-SCNC: 5.4 MMOL/L — HIGH (ref 3.5–5.3)
POTASSIUM SERPL-SCNC: 5.7 MMOL/L — HIGH (ref 3.5–5.3)
RBC # BLD: 2.61 M/UL — LOW (ref 3.8–5.2)
RBC # BLD: 2.68 M/UL — LOW (ref 3.8–5.2)
RBC # FLD: 15.1 % — HIGH (ref 10.3–14.5)
RBC # FLD: 15.2 % — HIGH (ref 10.3–14.5)
RH IG SCN BLD-IMP: POSITIVE — SIGNIFICANT CHANGE UP
SODIUM SERPL-SCNC: 141 MMOL/L — SIGNIFICANT CHANGE UP (ref 135–145)
SODIUM SERPL-SCNC: 148 MMOL/L — HIGH (ref 135–145)
WBC # BLD: 3.06 K/UL — LOW (ref 3.8–10.5)
WBC # BLD: 3.17 K/UL — LOW (ref 3.8–10.5)
WBC # FLD AUTO: 3.06 K/UL — LOW (ref 3.8–10.5)
WBC # FLD AUTO: 3.17 K/UL — LOW (ref 3.8–10.5)

## 2020-08-27 PROCEDURE — 93010 ELECTROCARDIOGRAM REPORT: CPT

## 2020-08-27 RX ORDER — SODIUM ZIRCONIUM CYCLOSILICATE 10 G/10G
10 POWDER, FOR SUSPENSION ORAL ONCE
Refills: 0 | Status: COMPLETED | OUTPATIENT
Start: 2020-08-27 | End: 2020-08-27

## 2020-08-27 RX ADMIN — MIRTAZAPINE 15 MILLIGRAM(S): 45 TABLET, ORALLY DISINTEGRATING ORAL at 22:25

## 2020-08-27 RX ADMIN — CARVEDILOL PHOSPHATE 25 MILLIGRAM(S): 80 CAPSULE, EXTENDED RELEASE ORAL at 05:16

## 2020-08-27 RX ADMIN — HEPARIN SODIUM 5000 UNIT(S): 5000 INJECTION INTRAVENOUS; SUBCUTANEOUS at 17:25

## 2020-08-27 RX ADMIN — Medication 100 MILLIGRAM(S): at 05:16

## 2020-08-27 RX ADMIN — HEPARIN SODIUM 5000 UNIT(S): 5000 INJECTION INTRAVENOUS; SUBCUTANEOUS at 05:17

## 2020-08-27 RX ADMIN — Medication 0.2 MILLIGRAM(S): at 05:17

## 2020-08-27 RX ADMIN — Medication 100 MILLIGRAM(S): at 22:25

## 2020-08-27 RX ADMIN — Medication 100 MILLIGRAM(S): at 11:56

## 2020-08-27 RX ADMIN — CARVEDILOL PHOSPHATE 25 MILLIGRAM(S): 80 CAPSULE, EXTENDED RELEASE ORAL at 17:25

## 2020-08-27 RX ADMIN — Medication 81 MILLIGRAM(S): at 11:56

## 2020-08-27 RX ADMIN — SODIUM ZIRCONIUM CYCLOSILICATE 10 GRAM(S): 10 POWDER, FOR SUSPENSION ORAL at 15:17

## 2020-08-27 RX ADMIN — Medication 650 MILLIGRAM(S): at 05:17

## 2020-08-27 RX ADMIN — ATORVASTATIN CALCIUM 20 MILLIGRAM(S): 80 TABLET, FILM COATED ORAL at 22:25

## 2020-08-27 RX ADMIN — Medication 100 MILLIGRAM(S): at 15:17

## 2020-08-27 RX ADMIN — ISOSORBIDE MONONITRATE 60 MILLIGRAM(S): 60 TABLET, EXTENDED RELEASE ORAL at 11:56

## 2020-08-27 RX ADMIN — Medication 650 MILLIGRAM(S): at 17:25

## 2020-08-27 RX ADMIN — Medication 40 MILLIGRAM(S): at 05:17

## 2020-08-27 RX ADMIN — CHLORHEXIDINE GLUCONATE 1 APPLICATION(S): 213 SOLUTION TOPICAL at 05:17

## 2020-08-27 RX ADMIN — SODIUM ZIRCONIUM CYCLOSILICATE 10 GRAM(S): 10 POWDER, FOR SUSPENSION ORAL at 20:22

## 2020-08-27 NOTE — CHART NOTE - NSCHARTNOTEFT_GEN_A_CORE
PRELIMINARY EEG REPORT    EEG reviewed to 11:28   Date: 08-27-20      - Intermittent, independent theta/delta slowing in the bitemporal regions.   - Mild generalized slowing.  - No epileptiform pattern or seizure seen.    ____________________________________    Dipti Barakat MD  Attending Physician, [facility] [place]

## 2020-08-27 NOTE — PROVIDER CONTACT NOTE (OTHER) - SITUATION
Pt complaining of 5/10 pain in R great toe. Pt states she is unsure if it is gout pain. Pt claims this is new pain.

## 2020-08-27 NOTE — PROGRESS NOTE ADULT - SUBJECTIVE AND OBJECTIVE BOX
No acute palpitations or dizziness  (+)malaise  Breathing feels about the same    Vital Signs Last 24 Hrs  T(C): 36.9 (27 Aug 2020 13:56), Max: 37 (27 Aug 2020 05:15)  T(F): 98.5 (27 Aug 2020 13:56), Max: 98.6 (27 Aug 2020 05:15)  HR: 64 (27 Aug 2020 13:56) (60 - 64)  BP: 126/48 (27 Aug 2020 13:56) (122/55 - 155/52)  BP(mean): --  RR: 18 (27 Aug 2020 13:56) (16 - 18)  SpO2: 100% (27 Aug 2020 13:56) (98% - 100%)    I&O's Summary      PHYSICAL EXAM:  GENERAL: NAD, well-developed, comfortable  HEAD:  Atraumatic, Normocephalic  EYES: EOMI, PERRLA, conjunctiva and sclera clear  NECK: Supple, No JVD  CHEST/LUNG: mild decrease breath sounds bilaterally, basilar crackles improved; No wheeze   HEART: Regular rate and rhythm; No murmurs, rubs, or gallops  ABDOMEN: Soft, Nontender, Nondistended; Bowel sounds present  Neuro: AAOx3, no focal weakness, 5/5 b/l extremity strength  EXTREMITIES:  2+ Peripheral Pulses, No clubbing, cyanosis, or edema, +left arm fistula  SKIN: No rashes or lesions    LABS:                        7.0    3.06  )-----------( 86       ( 27 Aug 2020 13:30 )             24.3     08-27    141  |  109<H>  |  103<H>  ----------------------------<  92  5.4<H>   |  21<L>  |  6.08<H>    Ca    8.6      27 Aug 2020 06:55      PT/INR - ( 26 Aug 2020 08:20 )   PT: 11.1 SEC;   INR: 0.97          PTT - ( 26 Aug 2020 08:20 )  PTT:29.9 SEC  CAPILLARY BLOOD GLUCOSE      POCT Blood Glucose.: 134 mg/dL (27 Aug 2020 12:03)  POCT Blood Glucose.: 107 mg/dL (27 Aug 2020 08:30)  POCT Blood Glucose.: 127 mg/dL (26 Aug 2020 22:14)  POCT Blood Glucose.: 149 mg/dL (26 Aug 2020 17:01)            RADIOLOGY & ADDITIONAL TESTS:    Imaging Personally Reviewed:  [x] YES  [ ] NO    Consultant(s) Notes Reviewed:  [x] YES  [ ] NO

## 2020-08-27 NOTE — PROGRESS NOTE ADULT - ASSESSMENT
80 year old woman with history of HTN, CAD, s/p PCI, CABG, severe AS, s/p TAVR, s/p PPM, diastolic HF, RUE DVT, CKD, chronic anemia (unknown etiology, likely CKD induced) sent in from [facility] scheduled for a routine transfusion and IV Deferoxamine today "unable to get a peripheral line"    1. CAD, s/p PCI, CABG  -stable, no chest pain, acs  -cont med tx of CAD with asa, statin, bb    2. RUE DVT  -completed course of a/c  -cont asa  -vasc f/u Dr Flores    3. AS s/p TAVR/ PPM ([facility])  -cv stable     4. HTN   -bp stable  -cont current tx    5. anemia, hx  -associated with chronic renal failure   -no overt s/s of bleeding  -prbc's per medicine/renal   -s/p picc placement     6. Diastolic HF, chronic  -stable, cont lasix daily     7. CKD, hyperkalemia  -K noted, s/p lokelma today   -renal f/u, tx per renal     8. Pituitary macroadenoma, recent leg weakness  -neuro f/u , med f/u EEG ongoing as per neuro.      dvt ppx

## 2020-08-27 NOTE — CHART NOTE - NSCHARTNOTEFT_GEN_A_CORE
late entry;    08-27    141  |  109<H>  |  103<H>  ----------------------------<  92  5.4<H>   |  21<L>  |  6.08<H>    Ca    8.6      27 Aug 2020 06:55                          7.0    3.06  )-----------( 86       ( 27 Aug 2020 13:30 )             24.3     Noted with above labs, K 5.4 and Hgb 6.9/7. Discussed with Dr. Mayer- ordered for 1u PRBC today. Lokelma x1 and EKG ordered. No acute complaints of chest pain, SOB, dizziness, palpitations, chills, vision changes noted. S/P Tunneled cath on 8/26 for transfusion therapy.   Discussed with house nephro-> at this time no HD is indicated.  EEG ongoing as per neuro

## 2020-08-27 NOTE — PROGRESS NOTE ADULT - ASSESSMENT
79 y/o F with HTN, CAD with CABG, DVT, CKD, chronic anemia (unknown etiology) sent in from [facility], scheduled for a routine transfusion and IV Deferoxamine today "unable to get a peripheral line". Receives transfusion q 6 weeks. Also states ability to ambulate diminishing over the past few months. In ED, noted to have acute on chronic kidney failure, rising Cr and hyperkalemia. Admitted for work up.

## 2020-08-27 NOTE — PROGRESS NOTE ADULT - SUBJECTIVE AND OBJECTIVE BOX
CARDIOLOGY FOLLOW UP - Dr. Damico    CC no cp or sob   labs noted  K 5.4 and Hgb 6.9/7.- repeat 7.0/ 24.3 , pending PRBC   s/p [facility]         PHYSICAL EXAM:  T(C): 36.9 (08-27-20 @ 13:56), Max: 37 (08-27-20 @ 05:15)  HR: 64 (08-27-20 @ 13:56) (60 - 64)  BP: 126/48 (08-27-20 @ 13:56) (122/55 - 155/52)  RR: 18 (08-27-20 @ 13:56) (16 - 18)  SpO2: 100% (08-27-20 @ 13:56) (98% - 100%)  Wt(kg): --  I&O's Summary      Appearance: Normal	  Cardiovascular: Normal S1 S2,RRR, + murmur  Respiratory: Lungs clear to auscultation	  Gastrointestinal:  Soft, Non-tender, + BS	  Extremities: Normal range of motion, No clubbing, cyanosis or edema        MEDICATIONS  (STANDING):  allopurinol 100 milliGRAM(s) Oral daily  aspirin enteric coated 81 milliGRAM(s) Oral daily  atorvastatin 20 milliGRAM(s) Oral at bedtime  carvedilol 25 milliGRAM(s) Oral every 12 hours  chlorhexidine 4% Liquid 1 Application(s) Topical <User Schedule>  cloNIDine 0.2 milliGRAM(s) Oral daily  dextrose 5%. 1000 milliLiter(s) (50 mL/Hr) IV Continuous <Continuous>  dextrose 50% Injectable 12.5 Gram(s) IV Push once  dextrose 50% Injectable 25 Gram(s) IV Push once  dextrose 50% Injectable 25 Gram(s) IV Push once  furosemide    Tablet 40 milliGRAM(s) Oral daily  heparin   Injectable 5000 Unit(s) SubCutaneous every 12 hours  hydrALAZINE 100 milliGRAM(s) Oral every 8 hours  insulin lispro (HumaLOG) corrective regimen sliding scale   SubCutaneous three times a day before meals  isosorbide   mononitrate ER Tablet (IMDUR) 60 milliGRAM(s) Oral daily  mirtazapine 15 milliGRAM(s) Oral at bedtime  sodium bicarbonate 650 milliGRAM(s) Oral two times a day      TELEMETRY: 	    ECG:  	  RADIOLOGY:   DIAGNOSTIC TESTING:  [ ] Echocardiogram:  [ ]  Catheterization:  [ ] Stress Test:    OTHER: 	    LABS:	 	                            7.0    3.06  )-----------( 86       ( 27 Aug 2020 13:30 )             24.3     08-27    141  |  109<H>  |  103<H>  ----------------------------<  92  5.4<H>   |  21<L>  |  6.08<H>    Ca    8.6      27 Aug 2020 06:55      PT/INR - ( 26 Aug 2020 08:20 )   PT: 11.1 SEC;   INR: 0.97          PTT - ( 26 Aug 2020 08:20 )  PTT:29.9 SEC

## 2020-08-28 ENCOUNTER — TRANSCRIPTION ENCOUNTER (OUTPATIENT)
Age: 80
End: 2020-08-28

## 2020-08-28 LAB
ANION GAP SERPL CALC-SCNC: 11 MMO/L — SIGNIFICANT CHANGE UP (ref 7–14)
ANION GAP SERPL CALC-SCNC: 14 MMO/L — SIGNIFICANT CHANGE UP (ref 7–14)
BUN SERPL-MCNC: 103 MG/DL — HIGH (ref 7–23)
BUN SERPL-MCNC: 107 MG/DL — HIGH (ref 7–23)
CALCIUM SERPL-MCNC: 8.9 MG/DL — SIGNIFICANT CHANGE UP (ref 8.4–10.5)
CALCIUM SERPL-MCNC: 9 MG/DL — SIGNIFICANT CHANGE UP (ref 8.4–10.5)
CHLORIDE SERPL-SCNC: 107 MMOL/L — SIGNIFICANT CHANGE UP (ref 98–107)
CHLORIDE SERPL-SCNC: 108 MMOL/L — HIGH (ref 98–107)
CO2 SERPL-SCNC: 19 MMOL/L — LOW (ref 22–31)
CO2 SERPL-SCNC: 21 MMOL/L — LOW (ref 22–31)
CREAT SERPL-MCNC: 6.05 MG/DL — HIGH (ref 0.5–1.3)
CREAT SERPL-MCNC: 6.16 MG/DL — HIGH (ref 0.5–1.3)
GLUCOSE SERPL-MCNC: 105 MG/DL — HIGH (ref 70–99)
GLUCOSE SERPL-MCNC: 93 MG/DL — SIGNIFICANT CHANGE UP (ref 70–99)
HCT VFR BLD CALC: 25.7 % — LOW (ref 34.5–45)
HCT VFR BLD CALC: 26.3 % — LOW (ref 34.5–45)
HGB BLD-MCNC: 7.9 G/DL — LOW (ref 11.5–15.5)
HGB BLD-MCNC: 8.3 G/DL — LOW (ref 11.5–15.5)
MAGNESIUM SERPL-MCNC: 2.4 MG/DL — SIGNIFICANT CHANGE UP (ref 1.6–2.6)
MAGNESIUM SERPL-MCNC: 2.5 MG/DL — SIGNIFICANT CHANGE UP (ref 1.6–2.6)
MCHC RBC-ENTMCNC: 26.6 PG — LOW (ref 27–34)
MCHC RBC-ENTMCNC: 27.9 PG — SIGNIFICANT CHANGE UP (ref 27–34)
MCHC RBC-ENTMCNC: 30.7 % — LOW (ref 32–36)
MCHC RBC-ENTMCNC: 31.6 % — LOW (ref 32–36)
MCV RBC AUTO: 86.5 FL — SIGNIFICANT CHANGE UP (ref 80–100)
MCV RBC AUTO: 88.3 FL — SIGNIFICANT CHANGE UP (ref 80–100)
NRBC # FLD: 0 K/UL — SIGNIFICANT CHANGE UP (ref 0–0)
NRBC # FLD: 0 K/UL — SIGNIFICANT CHANGE UP (ref 0–0)
PHOSPHATE SERPL-MCNC: 4.7 MG/DL — HIGH (ref 2.5–4.5)
PHOSPHATE SERPL-MCNC: 4.9 MG/DL — HIGH (ref 2.5–4.5)
PLATELET # BLD AUTO: 82 K/UL — LOW (ref 150–400)
PLATELET # BLD AUTO: 83 K/UL — LOW (ref 150–400)
PMV BLD: 11.4 FL — SIGNIFICANT CHANGE UP (ref 7–13)
PMV BLD: 12.2 FL — SIGNIFICANT CHANGE UP (ref 7–13)
POTASSIUM SERPL-MCNC: 4.7 MMOL/L — SIGNIFICANT CHANGE UP (ref 3.5–5.3)
POTASSIUM SERPL-MCNC: 4.9 MMOL/L — SIGNIFICANT CHANGE UP (ref 3.5–5.3)
POTASSIUM SERPL-SCNC: 4.7 MMOL/L — SIGNIFICANT CHANGE UP (ref 3.5–5.3)
POTASSIUM SERPL-SCNC: 4.9 MMOL/L — SIGNIFICANT CHANGE UP (ref 3.5–5.3)
RBC # BLD: 2.97 M/UL — LOW (ref 3.8–5.2)
RBC # BLD: 2.98 M/UL — LOW (ref 3.8–5.2)
RBC # FLD: 14.8 % — HIGH (ref 10.3–14.5)
RBC # FLD: 15.1 % — HIGH (ref 10.3–14.5)
SODIUM SERPL-SCNC: 140 MMOL/L — SIGNIFICANT CHANGE UP (ref 135–145)
SODIUM SERPL-SCNC: 140 MMOL/L — SIGNIFICANT CHANGE UP (ref 135–145)
TRANSFERRIN SERPL-MCNC: 126 — SIGNIFICANT CHANGE UP
WBC # BLD: 4.06 K/UL — SIGNIFICANT CHANGE UP (ref 3.8–10.5)
WBC # BLD: 4.19 K/UL — SIGNIFICANT CHANGE UP (ref 3.8–10.5)
WBC # FLD AUTO: 4.06 K/UL — SIGNIFICANT CHANGE UP (ref 3.8–10.5)
WBC # FLD AUTO: 4.19 K/UL — SIGNIFICANT CHANGE UP (ref 3.8–10.5)

## 2020-08-28 PROCEDURE — 93280 PM DEVICE PROGR EVAL DUAL: CPT | Mod: 26

## 2020-08-28 PROCEDURE — 93306 TTE W/DOPPLER COMPLETE: CPT | Mod: 26

## 2020-08-28 PROCEDURE — 99232 SBSQ HOSP IP/OBS MODERATE 35: CPT | Mod: GC

## 2020-08-28 PROCEDURE — 95720 EEG PHY/QHP EA INCR W/VEEG: CPT

## 2020-08-28 RX ORDER — FUROSEMIDE 40 MG
40 TABLET ORAL DAILY
Refills: 0 | Status: DISCONTINUED | OUTPATIENT
Start: 2020-08-28 | End: 2020-08-28

## 2020-08-28 RX ORDER — SODIUM ZIRCONIUM CYCLOSILICATE 10 G/10G
10 POWDER, FOR SUSPENSION ORAL EVERY OTHER DAY
Refills: 0 | Status: DISCONTINUED | OUTPATIENT
Start: 2020-08-28 | End: 2020-08-29

## 2020-08-28 RX ORDER — FUROSEMIDE 40 MG
40 TABLET ORAL DAILY
Refills: 0 | Status: DISCONTINUED | OUTPATIENT
Start: 2020-08-29 | End: 2020-08-29

## 2020-08-28 RX ORDER — SODIUM BICARBONATE 1 MEQ/ML
650 SYRINGE (ML) INTRAVENOUS THREE TIMES A DAY
Refills: 0 | Status: DISCONTINUED | OUTPATIENT
Start: 2020-08-28 | End: 2020-08-29

## 2020-08-28 RX ADMIN — CHLORHEXIDINE GLUCONATE 1 APPLICATION(S): 213 SOLUTION TOPICAL at 05:54

## 2020-08-28 RX ADMIN — Medication 100 MILLIGRAM(S): at 12:37

## 2020-08-28 RX ADMIN — Medication 650 MILLIGRAM(S): at 05:54

## 2020-08-28 RX ADMIN — ATORVASTATIN CALCIUM 20 MILLIGRAM(S): 80 TABLET, FILM COATED ORAL at 21:27

## 2020-08-28 RX ADMIN — Medication 100 MILLIGRAM(S): at 05:54

## 2020-08-28 RX ADMIN — Medication 100 MILLIGRAM(S): at 12:38

## 2020-08-28 RX ADMIN — HEPARIN SODIUM 5000 UNIT(S): 5000 INJECTION INTRAVENOUS; SUBCUTANEOUS at 17:54

## 2020-08-28 RX ADMIN — Medication 81 MILLIGRAM(S): at 12:38

## 2020-08-28 RX ADMIN — MIRTAZAPINE 15 MILLIGRAM(S): 45 TABLET, ORALLY DISINTEGRATING ORAL at 21:27

## 2020-08-28 RX ADMIN — Medication 650 MILLIGRAM(S): at 21:27

## 2020-08-28 RX ADMIN — CARVEDILOL PHOSPHATE 25 MILLIGRAM(S): 80 CAPSULE, EXTENDED RELEASE ORAL at 05:54

## 2020-08-28 RX ADMIN — Medication 40 MILLIGRAM(S): at 05:54

## 2020-08-28 RX ADMIN — Medication 0.2 MILLIGRAM(S): at 05:54

## 2020-08-28 RX ADMIN — ISOSORBIDE MONONITRATE 60 MILLIGRAM(S): 60 TABLET, EXTENDED RELEASE ORAL at 12:37

## 2020-08-28 RX ADMIN — HEPARIN SODIUM 5000 UNIT(S): 5000 INJECTION INTRAVENOUS; SUBCUTANEOUS at 05:54

## 2020-08-28 RX ADMIN — Medication 100 MILLIGRAM(S): at 21:27

## 2020-08-28 RX ADMIN — CARVEDILOL PHOSPHATE 25 MILLIGRAM(S): 80 CAPSULE, EXTENDED RELEASE ORAL at 17:54

## 2020-08-28 NOTE — PROGRESS NOTE ADULT - ATTENDING COMMENTS
Agree with above NP note.  cv stable  trend heme  await port insertion  cont cv meds
Agree with above NP note.  cv stable  trend heme  await port insertion  cont cv meds
Agree with above NP note.  cv stable  trend heme  prbcs per med   cont cv meds
Agree with above NP note.  cv stable  trend heme  prbcs per med   cont cv meds
Agree with above NP note.  cv stable  trend heme  s/p port insertion  cont cv meds
Patient known to me for nearly ten year; she has chronic renal failure stage 4 not requiring dialysis and renal dysfunction related anemia. She is ot on epoetin alpha because of the development of deep venous thrombosis in the right upper arm 16 months ago while on standard low dose darbepoetin. (She was treated when HGB was noted to be less than 9g/dL.  She is now requiring transfusion therapy monthly and weekly deferoxamine IV (unable to have coverage for SC deferoxamine). Agree with plan for tunneled 2 lumen PICC for venous access
not uremic and not in CHF  acidosis on Na Bicarbonate. increase to TID  Hyperkalemia better. continue with Lasix daily  start standing Lokelma TIW
Edward Medrano will be covering for me starting 8/26/20. He can be reached at  if needed.     - Dr. ANH Gustafson ([facility])  - [phone]
- Dr. ANH Gustafson ([facility])  - [phone]
- Dr. ANH Gustafson ([facility])  - [phone]

## 2020-08-28 NOTE — PROGRESS NOTE ADULT - NSHPATTENDINGPLANDISCUSS_GEN_ALL_CORE
patient and interventional radiology
team
paige and TRISHA Hdz
paige and Ayush Damico who know her well.
pt

## 2020-08-28 NOTE — DISCHARGE NOTE PROVIDER - NSFOLLOWUPCLINICS_GEN_ALL_ED_FT
[facility]  Hematology/Oncology  [address] [id number]  Phone: [phone]  Fax:   Follow Up Time:

## 2020-08-28 NOTE — DISCHARGE NOTE PROVIDER - HOSPITAL COURSE
81 y/o F with HTN, CAD with CABG, DVT, CKD, chronic anemia (unknown etiology) sent in from [facility], scheduled for a routine transfusion and IV Deferoxamine today "unable to get a peripheral line". Receives transfusion q 6 weeks. Also states ability to ambulate diminishing over the past few months. In ED, noted to have acute on chronic kidney failure, rising Cr and hyperkalemia. Admitted for work up.         Hospital Course:     Acute renal failure    -Acute on chronic, likely progression of her CKD stage V    -on Lasix 40 mg M,W,F at home. c/w lasix PO 40mg QD for now given recent transfusion    -renal following     -Patient with baseline SCr: 4.7-5.7.     -Last outpatient SCr increased to 6.36 on 8/20/20    -Pt has AVF placed 10 years ago but has not used it since.     -Pt aware that she will required HD in the future. Not indicated at this time per nephro    -Pt without uremic symptoms.     -Monitor renal function and urine output.    -Dose medications as per eGFR    -Lokelma every other ordered, increased Na bicarb TID    -outpatient follow up Dr. Rocha         Chronic anemia     -Likely anemia of renal disease    -heme/onc following     -per pt, require pRBC transfusion q4-5 weeks with Dr. Sharpe (heme/onc [facility]) [facility].    -no Aranesp shots since she believes it gave her arm DVT (completed coumadin, stopped 3-4 months ago)    -reports brown stool, occult stool negative in the past. Refused colonoscopy in the past.     -s/p 2u prbc on 8/23 and 1u on 8/27    -PRBC transfusion if Hgb < 7.0. Received IV Iron transfusion    -S/P Mediport placement (tunneled cath) on 8/2 for future transfusions     - f/u [facility] with Dr. Fox.        CKD (chronic kidney disease) stage 4, GFR 15-29 ml/min.      -as above    -hyperkalemia: renal diet, lokelma every other day     -nephro following         Dementia/Weakness    -mild dementia     -Per the family, pt has been weak. Spent mostly on wheel chair. Recently saw neuro, had CT head that shows just mild volume loss and chronic changes. (her ppm not MRI compatible) ?Macroadenoma of pituitary     -recent started Mirtazapine and Cyproheptadine for appetite. Pt has been forgetful.     -neuro consulted. Past 2 months pt spent mostly on wheel chair. strength intact afraid to walk    -EEG neg for seizure. Video EEG 8/28- Functional abnormality in the left greater than right temporal regions. Mild nonspecific diffuse or multifocal cerebral dysfunction. No epileptiform pattern or seizure seen.    -Prolactin, Serum: 94.1 ng/mL. no visual field deficit.+Orthostatics         DM2 (diabetes mellitus, type 2)    -only on Januvia 50 mg    -hgbA1c:4.8    -ISS, FS    -may not need diabetes meds on discharge.         HTN (Hypertension)    -c/w Hydralazine, Imdur, Coreg, Terazosin, Clonidine     -PPM [facility] - EP interrogation 8/28- normal    -ECHO 8/28- EF 60%, Minimal MR. No aortic valve    regurgitation. Mildly dilated left atrium, Mild concentric LVH. Normal LV/RV Function, mild pulmonary hypertension         Dispo: home         On_________, case was discussed with Dr. Mayer, patient is medically cleared and optimized for discharge today. All medications were reviewed with attending, and sent to mutually agreed upon pharmacy. 79 y/o F with HTN, CAD with CABG, DVT, CKD, chronic anemia (unknown etiology) sent in from [facility], scheduled for a routine transfusion and IV Deferoxamine today "unable to get a peripheral line". Receives transfusion q 6 weeks. Also states ability to ambulate diminishing over the past few months. In ED, noted to have acute on chronic kidney failure, rising Cr and hyperkalemia. Admitted for work up.         Hospital Course:     Acute renal failure    -Acute on chronic, likely progression of her CKD stage V    -on Lasix 40 mg M,W,F at home. c/w lasix PO 40mg QD for now given recent transfusion    -renal following     -Patient with baseline SCr: 4.7-5.7.     -Last outpatient SCr increased to 6.36 on 8/20/20    -Pt has AVF placed 10 years ago but has not used it since.     -Pt aware that she will required HD in the future. Not indicated at this time per nephro    -Pt without uremic symptoms.     -Monitor renal function and urine output.    -Dose medications as per eGFR    -Lokelma every other ordered, increased Na bicarb TID    -outpatient follow up Dr. Rocha         Chronic anemia     -Likely anemia of renal disease    -heme/onc following     -per pt, require pRBC transfusion q4-5 weeks with Dr. Sharpe (heme/onc [facility]) [facility].    -no Aranesp shots since she believes it gave her arm DVT (completed coumadin, stopped 3-4 months ago)    -reports brown stool, occult stool negative in the past. Refused colonoscopy in the past.     -s/p 2u prbc on 8/23 and 1u on 8/27    -PRBC transfusion if Hgb < 7.0. Received IV Iron transfusion    -S/P Mediport placement (tunneled cath) on 8/2 for future transfusions     - f/u [facility] with Dr. Fox.        CKD (chronic kidney disease) stage 4, GFR 15-29 ml/min.      -as above    -hyperkalemia: renal diet, lokelma every other day     -nephro following         Dementia/Weakness    -mild dementia     -Per the family, pt has been weak. Spent mostly on wheel chair. Recently saw neuro, had CT head that shows just mild volume loss and chronic changes. (her ppm not MRI compatible) ?Macroadenoma of pituitary     -recent started Mirtazapine and Cyproheptadine for appetite. Pt has been forgetful.     -neuro consulted. Past 2 months pt spent mostly on wheel chair. strength intact afraid to walk    -EEG neg for seizure. Video EEG 8/28- Functional abnormality in the left greater than right temporal regions. Mild nonspecific diffuse or multifocal cerebral dysfunction. No epileptiform pattern or seizure seen.    -Prolactin, Serum: 94.1 ng/mL. no visual field deficit.+Orthostatics         DM2 (diabetes mellitus, type 2)    -only on Januvia 50 mg    -hgbA1c:4.8    -ISS, FS    -may not need diabetes meds on discharge.         HTN (Hypertension)    -c/w Hydralazine, Imdur, Coreg, Terazosin, Clonidine     -PPM [facility] - EP interrogation 8/28- normal    -ECHO 8/28- EF 60%, Minimal MR. No aortic valve    regurgitation. Mildly dilated left atrium, Mild concentric LVH. Normal LV/RV Function, mild pulmonary hypertension     - Imdur increased 8/30/2020        Dispo: home         On 8/30/2020 case was discussed with Dr. Mayer, patient is medically cleared and optimized for discharge today. All medications were reviewed with attending, and sent to mutually agreed upon pharmacy.

## 2020-08-28 NOTE — EEG REPORT - NS EEG TEXT BOX
[facility]   COMPREHENSIVE EPILEPSY CENTER   REPORT OF LONG-TERM VIDEO EEG     [facility]: [address], [place] [address], Ph#: [phone]  [facility]: [address] [place] [address], Ph#: [phone]  [facility]: [address] [place] [address], Ph#: [phone]    Patient Name: IRA ACEVEDO  Age and : 80y (40)  MRN #: [id number]  Location: [address] [place] [address] [address]  Referring Physician: Sai Gustafson    Start Time/Date: 11:30 on 20  End Time/Date: 08:00 on 20  Duration: 20hr 30m    _____________________________________________________________  STUDY INFORMATION    EEG Recording Technique:  The patient underwent continuous Video-EEG monitoring, using Telemetry System hardware on the XLTek Digital System. EEG and video data were stored on a computer hard drive with important events saved in digital archive files. The material was reviewed by a physician (electroencephalographer / epileptologist) on a daily basis. Jayy and seizure detection algorithms were utilized and reviewed. An EEG Technician attended to the patient, and was available throughout daytime work hours.  The epilepsy center neurologist was available in person or on call 24-hours per day.    EEG Placement and Labeling of Electrodes:  The EEG was performed utilizing 20 channel referential EEG connections (coronal over temporal over parasagittal montage) using all standard 10-20 electrode placements with EKG, with additional electrodes placed in the inferior temporal region using the modified 10-10 montage electrode placements for elective admissions, or if deemed necessary. Recording was at a sampling rate of 256 samples per second per channel. Time synchronized digital video recording was done simultaneously with EEG recording. A low light infrared camera was used for low light recording.     _____________________________________________________________  HISTORY    Patient is a 80y old  Female who presents with a chief complaint of [place] on CKD (24 Aug 2020 09:04)      PERTINENT MEDICATION:  none    _____________________________________________________________  STUDY INTERPRETATION    Findings: The background was continuous, spontaneously variable and reactive. During wakefulness, the posterior dominant rhythm consisted of symmetric, poorly-modulated 8 Hz activity, with amplitude to 30 uV, that attenuated to eye opening.      Background Slowing:  No generalized background slowing was present.    Focal Slowing:   Intermittent, independent theta/delta slowing in the left (max F7/T7) > right (max F8/T8) temporal regions.     Sleep Background:  Drowsiness was characterized by fragmentation, attenuation, and slowing of the background activity.    Sleep was characterized by the presence of vertex waves, symmetric sleep spindles and K-complexes.    Other Non-Epileptiform Findings:  None were present.    Interictal Epileptiform Activity:   None were present.    Events:  Clinical events: None recorded.  Seizures: None recorded.    Activation Procedures:   Hyperventilation was not performed.    Photic stimulation was performed and did not elicit any abnormality.     Artifacts:  Intermittent myogenic and movement artifacts were noted.    ECG:  The heart rate on single channel ECG was predominantly between 60-70 BPM.    _____________________________________________________________  EEG SUMMARY/CLASSIFICATION    Abnormal EEG in the awake, drowsy and asleep states.  - Intermittent, independent theta/delta slowing in the left (max F7/T7) > right (max F8/T8) temporal regions.   - Mild generalized slowing.    _____________________________________________________________  EEG IMPRESSION/CLINICAL CORRELATE    Abnormal EEG study.  1. Functional abnormality in the left greater than right temporal regions.  2. Mild nonspecific diffuse or multifocal cerebral dysfunction.   3. No epileptiform pattern or seizure seen.    _____________________________________________________________    Dipti Barakat MD  Attending Physician, [facility]   COMPREHENSIVE EPILEPSY CENTER   REPORT OF LONG-TERM VIDEO EEG     [facility]: [address], [place] [address], Ph#: [phone]  [facility]: [address] [place] [address], Ph#: [phone]  [facility]: [address] [place] [address], Ph#: [phone]    Patient Name: IRA ACEVEDO  Age and : 80y (40)  MRN #: [id number]  Location: [address] [place] [address] [address]  Referring Physician: Sai Gustafson    Start Time/Date: 11:30 on 20  End Time/Date: 12:03 on 20  Duration: 24hr 30m    _____________________________________________________________  STUDY INFORMATION    EEG Recording Technique:  The patient underwent continuous Video-EEG monitoring, using Telemetry System hardware on the XLTek Digital System. EEG and video data were stored on a computer hard drive with important events saved in digital archive files. The material was reviewed by a physician (electroencephalographer / epileptologist) on a daily basis. Jayy and seizure detection algorithms were utilized and reviewed. An EEG Technician attended to the patient, and was available throughout daytime work hours.  The epilepsy center neurologist was available in person or on call 24-hours per day.    EEG Placement and Labeling of Electrodes:  The EEG was performed utilizing 20 channel referential EEG connections (coronal over temporal over parasagittal montage) using all standard 10-20 electrode placements with EKG, with additional electrodes placed in the inferior temporal region using the modified 10-10 montage electrode placements for elective admissions, or if deemed necessary. Recording was at a sampling rate of 256 samples per second per channel. Time synchronized digital video recording was done simultaneously with EEG recording. A low light infrared camera was used for low light recording.     _____________________________________________________________  HISTORY    Patient is a 80y old  Female who presents with a chief complaint of [place] on CKD (24 Aug 2020 09:04)      PERTINENT MEDICATION:  none    _____________________________________________________________  STUDY INTERPRETATION    Findings: The background was continuous, spontaneously variable and reactive. During wakefulness, the posterior dominant rhythm consisted of symmetric, poorly-modulated 8 Hz activity, with amplitude to 30 uV, that attenuated to eye opening.      Background Slowing:  No generalized background slowing was present.    Focal Slowing:   Intermittent, independent theta/delta slowing in the left (max F7/T7) > right (max F8/T8) temporal regions.     Sleep Background:  Drowsiness was characterized by fragmentation, attenuation, and slowing of the background activity.    Sleep was characterized by the presence of vertex waves, symmetric sleep spindles and K-complexes.    Other Non-Epileptiform Findings:  None were present.    Interictal Epileptiform Activity:   None were present.    Events:  Clinical events: None recorded.  Seizures: None recorded.    Activation Procedures:   Hyperventilation was not performed.    Photic stimulation was performed and did not elicit any abnormality.     Artifacts:  Intermittent myogenic and movement artifacts were noted.    ECG:  The heart rate on single channel ECG was predominantly between 60-70 BPM.    _____________________________________________________________  EEG SUMMARY/CLASSIFICATION    Abnormal EEG in the awake, drowsy and asleep states.  - Intermittent, independent theta/delta slowing in the left (max F7/T7) > right (max F8/T8) temporal regions.   - Mild generalized slowing.    _____________________________________________________________  EEG IMPRESSION/CLINICAL CORRELATE    Abnormal EEG study.  1. Functional abnormality in the left greater than right temporal regions.  2. Mild nonspecific diffuse or multifocal cerebral dysfunction.   3. No epileptiform pattern or seizure seen.    _____________________________________________________________    Dipti Barakat MD  Attending Physician, [facility]

## 2020-08-28 NOTE — PROGRESS NOTE ADULT - ASSESSMENT
79 y/o F with HTN, CAD with CABG, DVT, CKD, chronic anemia (unknown etiology) sent in from [facility], scheduled for a routine transfusion and IV Deferoxamine today "unable to get a peripheral line". Receives transfusion q 6 weeks. Also states ability to ambulate diminishing over the past few months. In ED, noted to have acute on chronic kidney failure, rising Cr and hyperkalemia. Admitted for work up.

## 2020-08-28 NOTE — DISCHARGE NOTE PROVIDER - NSDCFUSCHEDAPPT_GEN_ALL_CORE_FT
IRA ACEVEDO ; 09/03/2020 ; NPP [facility]  IRA ACEVEDO ; 09/10/2020 ; P Med Nephro [address] IRA Akhtar ; 09/10/2020 ; NPP [facility]  IRA ACEVEDO ; 09/17/2020 ; NPP [facility]  IRA ACEVEDO ; 09/17/2020 ; NPP [facility]  IRA ACEVEDO ; 09/24/2020 ; NPP [facility]

## 2020-08-28 NOTE — DISCHARGE NOTE PROVIDER - NSDCFUADDAPPT_GEN_ALL_CORE_FT
Please follow up with your primary care provider within 1 week of discharge from the hospital. If you do not have a primary care provider please follow up with the [facility], call [phone]     Please follow up with Dr. Rocha within 1 week of discharge from the hospital.

## 2020-08-28 NOTE — DISCHARGE NOTE PROVIDER - NSDCMRMEDTOKEN_GEN_ALL_CORE_FT
allopurinol 100 mg oral tablet: 1 tab(s) orally once a day  carvedilol 25 mg oral tablet: 1 tab(s) orally 2 times a day  cloNIDine 0.2 mg oral tablet: 1 tab(s) orally once a day  Crestor 10 mg oral tablet: 1 tab(s) orally once a day (at bedtime)  cyproheptadine 4 mg oral tablet: 1 tab(s) orally once a day  hydrALAZINE 100 mg oral tablet: 1 tab(s) orally 4 times a day  Januvia 50 mg oral tablet: 1 tab(s) orally once a day  mirtazapine 15 mg oral tablet: 1 tab(s) orally once a day (at bedtime)  sodium bicarbonate 650 mg oral tablet: 1 tab(s) orally 2 times a day  terazosin 2 mg oral capsule: 1 cap(s) orally once a day (at bedtime) albuterol 90 mcg/inh inhalation aerosol: 2 puff(s) inhaled every 6 hours, As needed, Bronchospasm  allopurinol 100 mg oral tablet: 1 tab(s) orally once a day  aspirin 81 mg oral delayed release tablet: 1 tab(s) orally once a day  carvedilol 25 mg oral tablet: 1 tab(s) orally 2 times a day  cloNIDine 0.2 mg oral tablet: 1 tab(s) orally once a day  Crestor 10 mg oral tablet: 1 tab(s) orally once a day (at bedtime)  furosemide 40 mg oral tablet: 1 tab(s) orally once a day  hydrALAZINE 100 mg oral tablet: 1 tab(s) orally every 8 hours  isosorbide mononitrate 60 mg oral tablet, extended release: 1 tab(s) orally once a day  Januvia 50 mg oral tablet: 1 tab(s) orally once a day  mirtazapine 15 mg oral tablet: 1 tab(s) orally once a day (at bedtime)  sodium bicarbonate 650 mg oral tablet: 1 tab(s) orally 3 times a day albuterol 90 mcg/inh inhalation aerosol: 2 puff(s) inhaled every 6 hours x 30 days, As Needed -Bronchospasm   allopurinol 100 mg oral tablet: 1 tab(s) orally once a day  aspirin 81 mg oral delayed release tablet: 1 tab(s) orally once a day  carvedilol 25 mg oral tablet: 1 tab(s) orally 2 times a day  cloNIDine 0.2 mg oral tablet: 1 tab(s) orally once a day  Crestor 10 mg oral tablet: 1 tab(s) orally once a day (at bedtime)  furosemide 40 mg oral tablet: 1 tab(s) orally once a day  hydrALAZINE 100 mg oral tablet: 1 tab(s) orally every 8 hours  isosorbide mononitrate 120 mg oral tablet, extended release: 1 tab(s) orally once a day  Lokelma 10 g oral powder for reconstitution: 10 gram(s) orally every other day NEXT DOSE DUE 8/31/20  mirtazapine 15 mg oral tablet: 1 tab(s) orally once a day (at bedtime)  sodium bicarbonate 650 mg oral tablet: 1 tab(s) orally 3 times a day

## 2020-08-28 NOTE — DISCHARGE NOTE PROVIDER - PROVIDER TOKENS
PROVIDER:[TOKEN:[[id number]],FOLLOWUP:[1 week],ESTABLISHEDPATIENT:[T]],PROVIDER:[TOKEN:[[id number]],ESTABLISHEDPATIENT:[T]] PROVIDER:[TOKEN:[[id number]],FOLLOWUP:[1 week],ESTABLISHEDPATIENT:[T]],PROVIDER:[TOKEN:[[id number]],ESTABLISHEDPATIENT:[T]],PROVIDER:[TOKEN:[[id number]]],FREE:[LAST:[Dr. Rocha (your outpatient nephrologist)],PHONE:[(   )    -],FAX:[(   )    -]]

## 2020-08-28 NOTE — DISCHARGE NOTE PROVIDER - CARE PROVIDER_API CALL
Anjum Fox  HEMATOLOGY  [address] [place] [address]  Phone: [phone]  Fax: [phone]  Established Patient  Follow Up Time: 1 week    BAMBI ALFARO  Neurology  [address] [place] [address]  Phone: ()-  Fax: ()-  Established Patient  Follow Up Time: Anjum Fox  HEMATOLOGY  [address] [place] [address]  Phone: [phone]  Fax: [phone]  Established Patient  Follow Up Time: 1 week    BAMBI ALFARO  Neurology  [address] [place] [address]  Phone: ()-  Fax: ()-  Established Patient  Follow Up Time:     Estrada Damico  CARDIOLOGY  [address] [facility], [address]  [place] [address]  Phone: [phone]  Fax: [phone]  Follow Up Time:     Dr. Rocha (your outpatient nephrologist),   Phone: (   )    -  Fax: (   )    -  Follow Up Time:

## 2020-08-28 NOTE — PROGRESS NOTE ADULT - SUBJECTIVE AND OBJECTIVE BOX
[facility] Division of Kidney Diseases & Hypertension  FOLLOW UP NOTE  [phone]--------------------------------------------------------------------------------  Chief Complaint:Acute renal failure      24 hour events/subjective:        PAST HISTORY  --------------------------------------------------------------------------------  No significant changes to PMH, PSH, FHx, SHx, unless otherwise noted    ALLERGIES & MEDICATIONS  --------------------------------------------------------------------------------  Allergies    codeine (Other)  Lortab (Other)  morphine (Other)  Percocet 10/325 (Other)  PPM not compatible with  MRI (Other (Fatal))  Reglan (Other; Flushing)  sulfa drugs (Flushing)    Intolerances      Standing Inpatient Medications  allopurinol 100 milliGRAM(s) Oral daily  aspirin enteric coated 81 milliGRAM(s) Oral daily  atorvastatin 20 milliGRAM(s) Oral at bedtime  carvedilol 25 milliGRAM(s) Oral every 12 hours  chlorhexidine 4% Liquid 1 Application(s) Topical <User Schedule>  cloNIDine 0.2 milliGRAM(s) Oral daily  dextrose 5%. 1000 milliLiter(s) IV Continuous <Continuous>  dextrose 50% Injectable 12.5 Gram(s) IV Push once  dextrose 50% Injectable 25 Gram(s) IV Push once  dextrose 50% Injectable 25 Gram(s) IV Push once  furosemide    Tablet 40 milliGRAM(s) Oral daily  heparin   Injectable 5000 Unit(s) SubCutaneous every 12 hours  hydrALAZINE 100 milliGRAM(s) Oral every 8 hours  insulin lispro (HumaLOG) corrective regimen sliding scale   SubCutaneous three times a day before meals  isosorbide   mononitrate ER Tablet (IMDUR) 60 milliGRAM(s) Oral daily  mirtazapine 15 milliGRAM(s) Oral at bedtime  sodium bicarbonate 650 milliGRAM(s) Oral two times a day    PRN Inpatient Medications  ALBUTerol    90 MICROgram(s) HFA Inhaler 2 Puff(s) Inhalation every 6 hours PRN  dextrose 40% Gel 15 Gram(s) Oral once PRN  glucagon  Injectable 1 milliGRAM(s) IntraMuscular once PRN  sodium chloride 0.9% lock flush 10 milliLiter(s) IV Push every 1 hour PRN      REVIEW OF SYSTEMS  --------------------------------------------------------------------------------  Gen: No  fevers/chills  Skin: No rashes  Head/Eyes/Ears/Mouth: No headache; Normal hearing; Normal vision w/o blurriness  Respiratory: No dyspnea, cough, wheezing, hemoptysis  CV: No chest pain, PND, orthopnea  GI: No abdominal pain, diarrhea, constipation, nausea, vomiting  : No increased frequency, dysuria, hematuria, nocturia  MSK: No joint pain/swelling; no back pain; no edema  Neuro: No dizziness/lightheadedness, weakness, seizures, numbness, tingling      All other systems were reviewed and are negative, except as noted.    VITALS/PHYSICAL EXAM  --------------------------------------------------------------------------------  T(C): 36.8 (08-28-20 @ 12:33), Max: 37.3 (08-28-20 @ 05:53)  HR: 62 (08-28-20 @ 12:33) (60 - 73)  BP: 160/55 (08-28-20 @ 12:33) (122/54 - 163/64)  RR: 17 (08-28-20 @ 12:33) (16 - 18)  SpO2: 100% (08-28-20 @ 12:33) (100% - 100%)  Wt(kg): --        08-27-20 @ 07:01  -  08-28-20 @ 07:00  --------------------------------------------------------  IN: 150 mL / OUT: 400 mL / NET: -250 mL      Physical Exam:  	Gen: NAD, well-appearing  	HEENT: PERRL, supple neck, clear oropharynx  	Pulm: CTA B/L  	CV: RRR, S1S2;  	Back: No spinal or CVA tenderness  	Abd: +BS, soft, nontender/nondistended  	: No suprapubic tenderness                      Extremities: no bilateral LE edema noted.                       Neuro: No focal deficits, intact gait  	Skin: Warm, without rashes  	Vascular access:    LABS/STUDIES  --------------------------------------------------------------------------------              7.9    4.19  >-----------<  82       [08-28-20 @ 00:18]              25.7     140  |  107  |  107  ----------------------------<  105      [08-28-20 @ 00:18]  4.9   |  19  |  6.16        Ca     9.0     [08-28-20 @ 00:18]      Mg     2.5     [08-28-20 @ 00:18]      Phos  4.7     [08-28-20 @ 00:18]      Creatinine Trend:  SCr 6.16 [08-28 @ 00:18]  SCr 6.34 [08-27 @ 17:00]  SCr 6.08 [08-27 @ 06:55]  SCr 6.05 [08-26 @ 05:00]  SCr 6.01 [08-25 @ 05:22]        Iron 83, TIBC 167, %sat --      [08-22-20 @ 10:45]  Ferritin 2330      [08-22-20 @ 10:45]  TSH 1.56      [08-25-20 @ 05:22] [facility] Division of Kidney Diseases & Hypertension  FOLLOW UP NOTE  [phone]--------------------------------------------------------------------------------  80 year old Female with HTN, CAD with CABG, DVT, Advanced CKD, chronic anemia (unknown etiology) sent in from [facility] scheduled for a routine transfusion and IV Deferoxamine on 8/22/20 since they were "unable to get a peripheral line" Nephrology team following for history of advanced CKD. Patient has been receiving PRBC transfusions for anemia. Pt s/p mediport placement 8/26/20. Patient seen this morning. She appears comfortable without signs of distress. Appetite has been good. Pt denies chest pain, nausea, vomiting, diarrhea, SOB. Pt reports she still makes urine.       PAST HISTORY  --------------------------------------------------------------------------------  No significant changes to PMH, PSH, FHx, SHx, unless otherwise noted    ALLERGIES & MEDICATIONS  --------------------------------------------------------------------------------  Allergies    codeine (Other)  Lortab (Other)  morphine (Other)  Percocet 10/325 (Other)  PPM not compatible with  MRI (Other (Fatal))  Reglan (Other; Flushing)  sulfa drugs (Flushing)    Intolerances      Standing Inpatient Medications  allopurinol 100 milliGRAM(s) Oral daily  aspirin enteric coated 81 milliGRAM(s) Oral daily  atorvastatin 20 milliGRAM(s) Oral at bedtime  carvedilol 25 milliGRAM(s) Oral every 12 hours  chlorhexidine 4% Liquid 1 Application(s) Topical <User Schedule>  cloNIDine 0.2 milliGRAM(s) Oral daily  dextrose 5%. 1000 milliLiter(s) IV Continuous <Continuous>  dextrose 50% Injectable 12.5 Gram(s) IV Push once  dextrose 50% Injectable 25 Gram(s) IV Push once  dextrose 50% Injectable 25 Gram(s) IV Push once  furosemide    Tablet 40 milliGRAM(s) Oral daily  heparin   Injectable 5000 Unit(s) SubCutaneous every 12 hours  hydrALAZINE 100 milliGRAM(s) Oral every 8 hours  insulin lispro (HumaLOG) corrective regimen sliding scale   SubCutaneous three times a day before meals  isosorbide   mononitrate ER Tablet (IMDUR) 60 milliGRAM(s) Oral daily  mirtazapine 15 milliGRAM(s) Oral at bedtime  sodium bicarbonate 650 milliGRAM(s) Oral two times a day    PRN Inpatient Medications  ALBUTerol    90 MICROgram(s) HFA Inhaler 2 Puff(s) Inhalation every 6 hours PRN  dextrose 40% Gel 15 Gram(s) Oral once PRN  glucagon  Injectable 1 milliGRAM(s) IntraMuscular once PRN  sodium chloride 0.9% lock flush 10 milliLiter(s) IV Push every 1 hour PRN      REVIEW OF SYSTEMS  --------------------------------------------------------------------------------  Head/Eyes/Ears/Mouth: No headache  Respiratory: No dyspnea, cough  CV: No chest pain,   GI: No abdominal pain, diarrhea, constipation, nausea, vomiting  MSK: No joint pain/swelling;   Neuro: No dizziness/lightheadedness    All other systems were reviewed and are negative, except as noted.    VITALS/PHYSICAL EXAM  --------------------------------------------------------------------------------  T(C): 36.8 (08-28-20 @ 12:33), Max: 37.3 (08-28-20 @ 05:53)  HR: 62 (08-28-20 @ 12:33) (60 - 73)  BP: 160/55 (08-28-20 @ 12:33) (122/54 - 163/64)  RR: 17 (08-28-20 @ 12:33) (16 - 18)  SpO2: 100% (08-28-20 @ 12:33) (100% - 100%)  Wt(kg): --        08-27-20 @ 07:01  -  08-28-20 @ 07:00  --------------------------------------------------------  IN: 150 mL / OUT: 400 mL / NET: -250 ml      Physical Exam:  	Gen: NAD, well-appearing  	HEENT: supple neck  	Pulm: CTA B/L  	CV:  S1S2  	Abd: +BS, soft   	Ext: No B/L Lower ext edema  	Neuro: No focal deficits  	Skin: Warm, without rashes  	Vascular access: LUE AVF + alexis/aj    LABS/STUDIES  --------------------------------------------------------------------------------              7.9    4.19  >-----------<  82       [08-28-20 @ 00:18]              25.7     140  |  107  |  107  ----------------------------<  105      [08-28-20 @ 00:18]  4.9   |  19  |  6.16        Ca     9.0     [08-28-20 @ 00:18]      Mg     2.5     [08-28-20 @ 00:18]      Phos  4.7     [08-28-20 @ 00:18]      Creatinine Trend:  SCr 6.16 [08-28 @ 00:18]  SCr 6.34 [08-27 @ 17:00]  SCr 6.08 [08-27 @ 06:55]  SCr 6.05 [08-26 @ 05:00]  SCr 6.01 [08-25 @ 05:22]      Iron 83, TIBC 167, %sat --      [08-22-20 @ 10:45]  Ferritin 2330      [08-22-20 @ 10:45]  TSH 1.56      [08-25-20 @ 05:22]

## 2020-08-28 NOTE — PROGRESS NOTE ADULT - ASSESSMENT
80 year old woman with history of HTN, CAD, s/p PCI, CABG, severe AS, s/p TAVR, s/p PPM, diastolic HF, RUE DVT, CKD, chronic anemia (unknown etiology, likely CKD induced) sent in from [facility] scheduled for a routine transfusion and IV Deferoxamine today "unable to get a peripheral line"    1. CAD, s/p PCI, CABG  -stable, no chest pain, acs  -cont med tx of CAD with asa, statin, bb    2. RUE DVT  -completed course of a/c  -cont asa  -vasc f/u Dr Flores    3. AS s/p TAVR/ PPM ([employer])  -cv stable     4. HTN   -bp stable  -cont current tx    5. anemia, hx  -associated with chronic renal failure   -no overt s/s of bleeding  -prbc's per medicine/renal   -s/p picc placement     6. Diastolic HF, chronic  -stable, creat noted, ok to continue lasix daily per renal     7. CKD, hyperkalemia  -K  stable  -renal f/u, tx per renal     8. Pituitary macroadenoma, recent leg weakness  -neuro f/u , med f/u EEG results   -s/p PPm interrogation, normal   -pending echo    dvt ppx

## 2020-08-28 NOTE — CHART NOTE - NSCHARTNOTEFT_GEN_A_CORE
Called Echo to expedite test, will follow up  Called EP [phone]) for pacemaker interrogation as requested by neuro  Patient on video EEG Called Echo to expedite test, will follow up  Called EP ([phone]) for pacemaker interrogation as requested by neuro  Patient on video EEG    Addendum: Spoke with Nephro-> keep patient on PO lasix for now, increase Na bicarb TID, and start lokelma every other day Called Echo to expedite test, will follow up  Called EP ([phone]) for pacemaker interrogation as requested by neuro  Patient on video EEG    Addendum: Spoke with Nephro-> keep patient on PO lasix for now, increase Na bicarb TID, and start lokelma every other day   Discussed plan with Dr. Mayer

## 2020-08-28 NOTE — DISCHARGE NOTE PROVIDER - NSDCCPCAREPLAN_GEN_ALL_CORE_FT
PRINCIPAL DISCHARGE DIAGNOSIS  Diagnosis: Acute renal failure, unspecified acute renal failure type  Assessment and Plan of Treatment: Noted with elevated kidney from baseline. Seen by the nephrologist (kidney doctor). At this time dialysis is not indicated  Follow up with nephrologist in 1 week------      SECONDARY DISCHARGE DIAGNOSES  Diagnosis: Chronic anemia  Assessment and Plan of Treatment: You received transfusions in the hospital, your blood ocunts have improved. You were seen by the hematologist, you had a mediport placed on 8/26 for future transfusions.  Please follow up at [facility] with Dr. Jacobson  Follow-up with your outpatient provider for further monitoring of your blood counts. Monitor for signs/symptoms indicating worsening of disease, such as, easy bleeding/bruising, pale skin, fatigue, dizziness, increased heart rate, or chest pain. Repeat CBC in 1 week    Diagnosis: Hyperkalemia  Assessment and Plan of Treatment: Started on lokelma as per nephrology recommendations-----  Repeat BMP in 1 week    Diagnosis: Dementia  Assessment and Plan of Treatment: Acute weakness, evaluated by neurologist. Your EEG revealed----  Outpatient Neuro follow up    Diagnosis: DM2 (diabetes mellitus, type 2)  Assessment and Plan of Treatment: A1C 4.8%. You may not need diabetic medications --------  Continue a consistent carbohydrate diet (Meaning eating the same amount of carbohydrates at the same time each day). Monitor blood glucose levels throughout the day before meals and at bedtime. Record blood sugars and bring to outpatient providers appointment in order to be reviewed by your doctor for management modifications. If your sugars are more than 400 or less than 70 you should contact your PCP immediately. Monitor for signs/symptoms of low blood glucose, such as, dizziness, altered mental status, or cool/clammy skin. In addition, monitor for signs/symptoms of high blood glucose, such as, feeling hot, dry, fatigued, or with increased thirst/urination. Make regular podiatry appointments in order to have feet checked for wounds and uncontrolled toe nail growth to prevent infections, as well as, appointments with an ophthalmologist to monitor your vision. (diabetes mellitus, type 2) PRINCIPAL DISCHARGE DIAGNOSIS  Diagnosis: Acute renal failure, unspecified acute renal failure type  Assessment and Plan of Treatment: Noted with elevated kidney from baseline. Seen by the nephrologist (kidney doctor). At this time dialysis is not indicated. Continue lokelma and lasix as prescribed.   Follow up with nephrologist in 1 week of discharge from the hospital.      SECONDARY DISCHARGE DIAGNOSES  Diagnosis: DM2 (diabetes mellitus, type 2)  Assessment and Plan of Treatment: A1C 4.8%. You may not need diabetic medications --------  Continue a consistent carbohydrate diet (Meaning eating the same amount of carbohydrates at the same time each day). Monitor blood glucose levels throughout the day before meals and at bedtime. Record blood sugars and bring to outpatient providers appointment in order to be reviewed by your doctor for management modifications. If your sugars are more than 400 or less than 70 you should contact your PCP immediately. Monitor for signs/symptoms of low blood glucose, such as, dizziness, altered mental status, or cool/clammy skin. In addition, monitor for signs/symptoms of high blood glucose, such as, feeling hot, dry, fatigued, or with increased thirst/urination. Make regular podiatry appointments in order to have feet checked for wounds and uncontrolled toe nail growth to prevent infections, as well as, appointments with an ophthalmologist to monitor your vision. (diabetes mellitus, type 2)    Diagnosis: Dementia  Assessment and Plan of Treatment: Acute weakness, evaluated by neurologist. EEG negative for seizure. Video EEG 8/28- Functional abnormality in the left greater than right temporal regions. Mild nonspecific diffuse or multifocal cerebral dysfunction. No epileptiform pattern or seizure seen.  Outpatient Neuro follow up    Diagnosis: HTN (Hypertension)  Assessment and Plan of Treatment: Imdur increased in the hospital.   -ECHO 8/28- EF 60%, Minimal MR. No aortic valve  regurgitation. Mildly dilated left atrium, Mild concentric LVH. Normal LV/RV Function, mild pulmonary hypertension   Continue blood pressure medication regimen as directed. Monitor for any visual changes, headaches or dizziness.  Monitor blood pressure regularly.  Follow up with your PCP for further management for high blood pressure.    Diagnosis: Hyperkalemia  Assessment and Plan of Treatment: Started on lokelma as per nephrology recommendations. Follow up with nephrologist in 1 week of discharge from the hospital. Repeat BMP in 1 week with primary care and/or nephrologist.    Diagnosis: Chronic anemia  Assessment and Plan of Treatment: You received transfusions in the hospital, your blood counts have improved. You were seen by the hematologist, you had a mediport placed on 8/26 for future transfusions.  Please follow up at [facility] with Dr. Jacobson  Follow-up with your outpatient provider for further monitoring of your blood counts. Monitor for signs/symptoms indicating worsening of disease, such as, easy bleeding/bruising, pale skin, fatigue, dizziness, increased heart rate, or chest pain. Repeat CBC in 1 week PRINCIPAL DISCHARGE DIAGNOSIS  Diagnosis: Acute renal failure, unspecified acute renal failure type  Assessment and Plan of Treatment: Noted with elevated kidney from baseline. Seen by the nephrologist (kidney doctor). At this time dialysis is not indicated. Continue lokelma and lasix as prescribed.   Follow up with nephrologist in 1 week of discharge from the hospital.      SECONDARY DISCHARGE DIAGNOSES  Diagnosis: DM2 (diabetes mellitus, type 2)  Assessment and Plan of Treatment: A1C 4.8%. FINGERSTICKS LOW TO NORMAL IN HOSPITAL.  JANUMET DISCONTINUED, FOLLOW UP WITH PRIMARY CARE PROVIDER WITHIN 1 WEEK OF DISCHARGE FOR FURTHER INSTRUCTIONS.   Continue a consistent carbohydrate diet (Meaning eating the same amount of carbohydrates at the same time each day). Monitor blood glucose levels throughout the day before meals and at bedtime. Record blood sugars and bring to outpatient providers appointment in order to be reviewed by your doctor for management modifications. If your sugars are more than 400 or less than 70 you should contact your PCP immediately. Monitor for signs/symptoms of low blood glucose, such as, dizziness, altered mental status, or cool/clammy skin. In addition, monitor for signs/symptoms of high blood glucose, such as, feeling hot, dry, fatigued, or with increased thirst/urination. Make regular podiatry appointments in order to have feet checked for wounds and uncontrolled toe nail growth to prevent infections, as well as, appointments with an ophthalmologist to monitor your vision. (diabetes mellitus, type 2)    Diagnosis: Dementia  Assessment and Plan of Treatment: Acute weakness, evaluated by neurologist. EEG negative for seizure. Video EEG 8/28- Functional abnormality in the left greater than right temporal regions. Mild nonspecific diffuse or multifocal cerebral dysfunction. No epileptiform pattern or seizure seen.  Outpatient Neuro follow up    Diagnosis: HTN (Hypertension)  Assessment and Plan of Treatment: Imdur increased in the hospital.   -ECHO 8/28- EF 60%, Minimal MR. No aortic valve  regurgitation. Mildly dilated left atrium, Mild concentric LVH. Normal LV/RV Function, mild pulmonary hypertension   Continue blood pressure medication regimen as directed. Monitor for any visual changes, headaches or dizziness.  Monitor blood pressure regularly.  Follow up with your PCP for further management for high blood pressure.    Diagnosis: Hyperkalemia  Assessment and Plan of Treatment: Started on lokelma as per nephrology recommendations. Follow up with nephrologist in 1 week of discharge from the hospital. Repeat BMP in 1 week with primary care and/or nephrologist.    Diagnosis: Chronic anemia  Assessment and Plan of Treatment: You received transfusions in the hospital, your blood counts have improved. You were seen by the hematologist, you had a mediport placed on 8/26 for future transfusions.  Please follow up at [facility] with Dr. Jacobson  Follow-up with your outpatient provider for further monitoring of your blood counts. Monitor for signs/symptoms indicating worsening of disease, such as, easy bleeding/bruising, pale skin, fatigue, dizziness, increased heart rate, or chest pain. Repeat CBC in 1 week

## 2020-08-28 NOTE — PROGRESS NOTE ADULT - SUBJECTIVE AND OBJECTIVE BOX
Currently undergoing EEG  Hgb 7.9 this morning    Vital Signs Last 24 Hrs  T(C): 37.3 (28 Aug 2020 05:53), Max: 37.3 (28 Aug 2020 05:53)  T(F): 99.1 (28 Aug 2020 05:53), Max: 99.1 (28 Aug 2020 05:53)  HR: 73 (28 Aug 2020 05:53) (60 - 73)  BP: 160/88 (28 Aug 2020 05:53) (122/54 - 163/64)  BP(mean): --  RR: 16 (28 Aug 2020 05:53) (16 - 18)  SpO2: 100% (28 Aug 2020 05:53) (100% - 100%)    I&O's Summary    08-27-20 @ 07:01  -  08-28-20 @ 07:00  --------------------------------------------------------  IN: 150 mL / OUT: 400 mL / NET: -250 mL        PHYSICAL EXAM:  GENERAL: NAD, well-developed, comfortable  HEAD:  Atraumatic, Normocephalic  EYES: EOMI, PERRLA, conjunctiva and sclera clear  NECK: Supple, No JVD  CHEST/LUNG: mild decrease breath sounds bilaterally, basilar crackles improved; No wheeze   HEART: Regular rate and rhythm; No murmurs, rubs, or gallops  ABDOMEN: Soft, Nontender, Nondistended; Bowel sounds present  Neuro: AAOx3, no focal weakness, 5/5 b/l extremity strength  EXTREMITIES:  2+ Peripheral Pulses, No clubbing, cyanosis, or edema, +left arm fistula  SKIN: No rashes or lesions    LABS:                        7.9    4.19  )-----------( 82       ( 28 Aug 2020 00:18 )             25.7     08-28    140  |  107  |  107<H>  ----------------------------<  105<H>  4.9   |  19<L>  |  6.16<H>    Ca    9.0      28 Aug 2020 00:18  Phos  4.7     08-28  Mg     2.5     08-28        CAPILLARY BLOOD GLUCOSE      POCT Blood Glucose.: 99 mg/dL (28 Aug 2020 08:25)  POCT Blood Glucose.: 98 mg/dL (27 Aug 2020 22:12)  POCT Blood Glucose.: 142 mg/dL (27 Aug 2020 16:58)  POCT Blood Glucose.: 134 mg/dL (27 Aug 2020 12:03)            RADIOLOGY & ADDITIONAL TESTS:    Imaging Personally Reviewed:  [x] YES  [ ] NO    Consultant(s) Notes Reviewed:  [x] YES  [ ] NO

## 2020-08-28 NOTE — DISCHARGE NOTE PROVIDER - CARE PROVIDERS DIRECT ADDRESSES
[email or web address],DirectAddress_Unknown [email or web address],DirectAddress_Unknown,DirectAddress_Unknown,DirectAddress_Unknown

## 2020-08-28 NOTE — PROGRESS NOTE ADULT - SUBJECTIVE AND OBJECTIVE BOX
CARDIOLOGY FOLLOW UP - Dr. Damico    CC no cp or sob       PHYSICAL EXAM:  T(C): 36.8 (08-28-20 @ 12:33), Max: 37.3 (08-28-20 @ 05:53)  HR: 62 (08-28-20 @ 12:33) (60 - 73)  BP: 160/55 (08-28-20 @ 12:33) (122/54 - 163/64)  RR: 17 (08-28-20 @ 12:33) (16 - 18)  SpO2: 100% (08-28-20 @ 12:33) (100% - 100%)  Wt(kg): --  I&O's Summary    27 Aug 2020 07:01  -  28 Aug 2020 07:00  --------------------------------------------------------  IN: 150 mL / OUT: 400 mL / NET: -250 mL        Appearance: Normal	  Cardiovascular: Normal S1 S2,RRR + murmurs  Respiratory: Lungs clear to auscultation	  Gastrointestinal:  Soft, Non-tender, + BS	  Extremities: Normal range of motion, No clubbing, cyanosis or edema        MEDICATIONS  (STANDING):  allopurinol 100 milliGRAM(s) Oral daily  aspirin enteric coated 81 milliGRAM(s) Oral daily  atorvastatin 20 milliGRAM(s) Oral at bedtime  carvedilol 25 milliGRAM(s) Oral every 12 hours  chlorhexidine 4% Liquid 1 Application(s) Topical <User Schedule>  cloNIDine 0.2 milliGRAM(s) Oral daily  dextrose 5%. 1000 milliLiter(s) (50 mL/Hr) IV Continuous <Continuous>  dextrose 50% Injectable 12.5 Gram(s) IV Push once  dextrose 50% Injectable 25 Gram(s) IV Push once  dextrose 50% Injectable 25 Gram(s) IV Push once  heparin   Injectable 5000 Unit(s) SubCutaneous every 12 hours  hydrALAZINE 100 milliGRAM(s) Oral every 8 hours  insulin lispro (HumaLOG) corrective regimen sliding scale   SubCutaneous three times a day before meals  isosorbide   mononitrate ER Tablet (IMDUR) 60 milliGRAM(s) Oral daily  mirtazapine 15 milliGRAM(s) Oral at bedtime  sodium bicarbonate 650 milliGRAM(s) Oral three times a day  sodium zirconium cyclosilicate 10 Gram(s) Oral every other day      TELEMETRY: 	    ECG:  	  RADIOLOGY:   DIAGNOSTIC TESTING:  [ ] Echocardiogram:  [ ]  Catheterization:  [ ] Stress Test:    OTHER: 	    LABS:	 	                            8.3    4.06  )-----------( 83       ( 28 Aug 2020 12:33 )             26.3     08-28    140  |  108<H>  |  103<H>  ----------------------------<  93  4.7   |  21<L>  |  6.05<H>    Ca    8.9      28 Aug 2020 12:31  Phos  4.9     08-28  Mg     2.4     08-28              1.

## 2020-08-29 ENCOUNTER — TRANSCRIPTION ENCOUNTER (OUTPATIENT)
Age: 80
End: 2020-08-29

## 2020-08-29 LAB
ANION GAP SERPL CALC-SCNC: 13 MMO/L — SIGNIFICANT CHANGE UP (ref 7–14)
BUN SERPL-MCNC: 101 MG/DL — HIGH (ref 7–23)
CALCIUM SERPL-MCNC: 9 MG/DL — SIGNIFICANT CHANGE UP (ref 8.4–10.5)
CHLORIDE SERPL-SCNC: 106 MMOL/L — SIGNIFICANT CHANGE UP (ref 98–107)
CO2 SERPL-SCNC: 21 MMOL/L — LOW (ref 22–31)
CREAT SERPL-MCNC: 5.69 MG/DL — HIGH (ref 0.5–1.3)
GLUCOSE SERPL-MCNC: 85 MG/DL — SIGNIFICANT CHANGE UP (ref 70–99)
HCT VFR BLD CALC: 25.5 % — LOW (ref 34.5–45)
HGB BLD-MCNC: 8 G/DL — LOW (ref 11.5–15.5)
MAGNESIUM SERPL-MCNC: 2.4 MG/DL — SIGNIFICANT CHANGE UP (ref 1.6–2.6)
MCHC RBC-ENTMCNC: 26.8 PG — LOW (ref 27–34)
MCHC RBC-ENTMCNC: 31.4 % — LOW (ref 32–36)
MCV RBC AUTO: 85.6 FL — SIGNIFICANT CHANGE UP (ref 80–100)
NRBC # FLD: 0 K/UL — SIGNIFICANT CHANGE UP (ref 0–0)
PHOSPHATE SERPL-MCNC: 4.6 MG/DL — HIGH (ref 2.5–4.5)
PLATELET # BLD AUTO: 75 K/UL — LOW (ref 150–400)
PMV BLD: 10.8 FL — SIGNIFICANT CHANGE UP (ref 7–13)
POTASSIUM SERPL-MCNC: 4.7 MMOL/L — SIGNIFICANT CHANGE UP (ref 3.5–5.3)
POTASSIUM SERPL-SCNC: 4.7 MMOL/L — SIGNIFICANT CHANGE UP (ref 3.5–5.3)
RBC # BLD: 2.98 M/UL — LOW (ref 3.8–5.2)
RBC # FLD: 14.7 % — HIGH (ref 10.3–14.5)
SODIUM SERPL-SCNC: 140 MMOL/L — SIGNIFICANT CHANGE UP (ref 135–145)
WBC # BLD: 3.18 K/UL — LOW (ref 3.8–10.5)
WBC # FLD AUTO: 3.18 K/UL — LOW (ref 3.8–10.5)

## 2020-08-29 RX ORDER — CYPROHEPTADINE HYDROCHLORIDE 4 MG/1
1 TABLET ORAL
Qty: 0 | Refills: 0 | DISCHARGE

## 2020-08-29 RX ORDER — HYDRALAZINE HCL 50 MG
1 TABLET ORAL
Qty: 0 | Refills: 0 | DISCHARGE
Start: 2020-08-29

## 2020-08-29 RX ORDER — ASPIRIN/CALCIUM CARB/MAGNESIUM 324 MG
1 TABLET ORAL
Qty: 0 | Refills: 0 | DISCHARGE
Start: 2020-08-29

## 2020-08-29 RX ORDER — TERAZOSIN HYDROCHLORIDE 10 MG/1
1 CAPSULE ORAL
Qty: 0 | Refills: 0 | DISCHARGE

## 2020-08-29 RX ORDER — SODIUM BICARBONATE 1 MEQ/ML
1 SYRINGE (ML) INTRAVENOUS
Qty: 0 | Refills: 0 | DISCHARGE

## 2020-08-29 RX ORDER — ISOSORBIDE MONONITRATE 60 MG/1
1 TABLET, EXTENDED RELEASE ORAL
Qty: 0 | Refills: 0 | DISCHARGE
Start: 2020-08-29

## 2020-08-29 RX ORDER — ALBUTEROL 90 UG/1
2 AEROSOL, METERED ORAL
Qty: 0 | Refills: 0 | DISCHARGE
Start: 2020-08-29

## 2020-08-29 RX ORDER — SODIUM BICARBONATE 1 MEQ/ML
1 SYRINGE (ML) INTRAVENOUS
Qty: 0 | Refills: 0 | DISCHARGE
Start: 2020-08-29

## 2020-08-29 RX ORDER — FUROSEMIDE 40 MG
1 TABLET ORAL
Qty: 0 | Refills: 0 | DISCHARGE
Start: 2020-08-29

## 2020-08-29 RX ADMIN — CHLORHEXIDINE GLUCONATE 1 APPLICATION(S): 213 SOLUTION TOPICAL at 05:27

## 2020-08-29 RX ADMIN — Medication 650 MILLIGRAM(S): at 21:37

## 2020-08-29 RX ADMIN — CARVEDILOL PHOSPHATE 25 MILLIGRAM(S): 80 CAPSULE, EXTENDED RELEASE ORAL at 18:01

## 2020-08-29 RX ADMIN — Medication 81 MILLIGRAM(S): at 13:06

## 2020-08-29 RX ADMIN — MIRTAZAPINE 15 MILLIGRAM(S): 45 TABLET, ORALLY DISINTEGRATING ORAL at 21:38

## 2020-08-29 RX ADMIN — SODIUM ZIRCONIUM CYCLOSILICATE 10 GRAM(S): 10 POWDER, FOR SUSPENSION ORAL at 13:07

## 2020-08-29 RX ADMIN — ATORVASTATIN CALCIUM 20 MILLIGRAM(S): 80 TABLET, FILM COATED ORAL at 21:37

## 2020-08-29 RX ADMIN — HEPARIN SODIUM 5000 UNIT(S): 5000 INJECTION INTRAVENOUS; SUBCUTANEOUS at 05:27

## 2020-08-29 RX ADMIN — ISOSORBIDE MONONITRATE 60 MILLIGRAM(S): 60 TABLET, EXTENDED RELEASE ORAL at 13:07

## 2020-08-29 RX ADMIN — Medication 100 MILLIGRAM(S): at 13:06

## 2020-08-29 RX ADMIN — Medication 100 MILLIGRAM(S): at 05:27

## 2020-08-29 RX ADMIN — CARVEDILOL PHOSPHATE 25 MILLIGRAM(S): 80 CAPSULE, EXTENDED RELEASE ORAL at 05:27

## 2020-08-29 RX ADMIN — Medication 100 MILLIGRAM(S): at 13:07

## 2020-08-29 RX ADMIN — Medication 100 MILLIGRAM(S): at 21:37

## 2020-08-29 RX ADMIN — Medication 650 MILLIGRAM(S): at 13:07

## 2020-08-29 RX ADMIN — Medication 0.2 MILLIGRAM(S): at 05:26

## 2020-08-29 RX ADMIN — HEPARIN SODIUM 5000 UNIT(S): 5000 INJECTION INTRAVENOUS; SUBCUTANEOUS at 18:01

## 2020-08-29 RX ADMIN — Medication 40 MILLIGRAM(S): at 05:27

## 2020-08-29 RX ADMIN — Medication 650 MILLIGRAM(S): at 05:26

## 2020-08-29 NOTE — PROGRESS NOTE ADULT - PROBLEM SELECTOR PLAN 7
Hep SQ  renal diet

## 2020-08-29 NOTE — DISCHARGE NOTE NURSING/CASE MANAGEMENT/SOCIAL WORK - NSDCFUADDAPPT_GEN_ALL_CORE_FT
Please follow up with your primary care provider within 1 week of discharge from the hospital. If you do not have a primary care provider please follow up with the [facility], call [phone]     Please follow up with Dr. Rocha within 1 week of discharge from the hospital.

## 2020-08-29 NOTE — PROGRESS NOTE ADULT - PROBLEM SELECTOR PLAN 5
Alert/Awake/Cooperative
only on Januvia 50 mg PRN  hgbA1c 4.8%  sliding insulin sliding coverage  may not need diabetes meds on discharge
only on Januvia 50 mg  hgbA1c 4.8%  sliding insulin sliding coverage
only on Januvia 50 mg PRN  hgbA1c 4.8%  sliding insulin sliding coverage
only on Januvia 50 mg PRN  hgbA1c 4.8%  sliding insulin sliding coverage  may not need diabetes meds on discharge
only on Januvia 50 mg PRN  hgbA1c 4.8%  sliding insulin sliding coverage  potentially hypoglycemia may have caused her to feel weak @ home

## 2020-08-29 NOTE — PROGRESS NOTE ADULT - PROBLEM SELECTOR PLAN 1
Acute on chronic, likely progression of her CKD stage V  on Lasix 40 mg M,W,F at home. continue for now given recent transfusions and basilar crackles, tolerating daily dosing   House renal consult (known to Dr. Rocha)   Cr relatively stable post transfusion.   will hold IVF given risk of fluid overload  monitor urine outpt  s/p Lokema x 2. hyperkalemia resolved.  pt already have left sided AV fistula (placed many years ago in anticipation)
Acute on chronic, likely progression of her CKD stage V  on Lasix 40 mg M,W,F at home. continue for now given recent transfusions and basilar crackles  House renal consult (known to Dr. Rocha)   Cr relatively stable post transfusion. will hold IVF given risk of fluid overload  monitor urine outpt  s/p Lokema x 2. hyperkalemia resolved.
Acute on chronic, likely progression of her CKD stage V  on Lasix 40 mg M,W,F at home. continue for now given recent transfusions and basilar crackles  House renal consult (known to Dr. Rocha)   Will trend Cr post transfusion. will hold IVF  monitor urine outpt  Lokema x 1 again for hyperkalemia
Acute on chronic, likely progression of her CKD stage V  on Lasix 40 mg M,W,F at home. continue for now given recent transfusions and basilar crackles, tolerating 40 mg daily dosing   House renal consult (known to Dr. Rocha)   no indication for HD for now  monitor urine outpt  lokelma prn  pt already had a left sided AV fistula (placed many years ago in anticipation)
Acute on chronic, likely progression of her CKD stage V  on Lasix 40 mg M,W,F at home. continue for now given recent transfusions and basilar crackles, tolerating 40 mg daily dosing   House renal consult (known to Dr. Rocha)   no indication for HD for now  monitor urine outpt  lokelma prn  pt already have left sided AV fistula (placed many years ago in anticipation)
Acute on chronic, likely progression of her CKD stage V  on Lasix 40 mg M,W,F at home. continue for now given recent transfusions and basilar crackles, tolerating 40 mg daily dosing   House renal consult (known to Dr. Rocha)   no indication for HD for now  monitor urine outpt  lokelma prn  pt already have left sided AV fistula (placed many years ago in anticipation)
Acute on chronic, likely progression of her CKD stage V  on Lasix 40 mg M,W,F at home. continue for now given recent transfusions and basilar crackles, tolerating daily dosing   House renal consult (known to Dr. Rocha)   Cr relatively stable post transfusion.   will hold IVF given risk of fluid overload  monitor urine outpt  s/p Lokema x 2. hyperkalemia resolved.  pt already have left sided AV fistula (placed many years ago in anticipation)
Pt with advanced CKD 5. Patient with baseline SCr: 4.7-5.7. Last outpatient SCr increased to 6.36 on 8/20/20. On admission, pt with SCr: 6.20 and has been stable today. Pt has AVF placed 10 years ago but has not used it since. Pt aware that she will require HD in the future. Labs reviewed. Pt without uremic symptoms. No plans for HD. Continue Lasix 40mg daily. Monitor renal function and urine output. Dose medications as per eGFR.    If you have any questions, please feel free to contact me  Kellie Allan  Nephrology Fellow  Pager: [phone] / [phone]  (After 5pm or on weekends please page the on-call fellow)

## 2020-08-29 NOTE — PROGRESS NOTE ADULT - PROBLEM SELECTOR PROBLEM 2
Chronic anemia

## 2020-08-29 NOTE — DISCHARGE NOTE NURSING/CASE MANAGEMENT/SOCIAL WORK - PATIENT PORTAL LINK FT
You can access the FollowMyHealth Patient Portal offered by [facility] by registering at the following website: [email or web address]. By joining [facility]’s FollowMyHealth portal, you will also be able to view your health information using other applications (apps) compatible with our system.

## 2020-08-29 NOTE — PROGRESS NOTE ADULT - PROBLEM SELECTOR PROBLEM 5
DM2 (diabetes mellitus, type 2)

## 2020-08-29 NOTE — PROGRESS NOTE ADULT - SUBJECTIVE AND OBJECTIVE BOX
No epileptiform activity on EEG  PPM interrogation without any arrhythmic events noted    Vital Signs Last 24 Hrs  T(C): 36.8 (29 Aug 2020 05:19), Max: 37.1 (28 Aug 2020 20:46)  T(F): 98.2 (29 Aug 2020 05:19), Max: 98.8 (28 Aug 2020 20:46)  HR: 66 (29 Aug 2020 05:19) (62 - 66)  BP: 172/67 (29 Aug 2020 05:19) (144/50 - 172/67)  BP(mean): --  RR: 16 (29 Aug 2020 05:19) (16 - 17)  SpO2: 95% (29 Aug 2020 05:19) (95% - 100%)    I&O's Summary      PHYSICAL EXAM:  GENERAL: NAD, well-developed, comfortable  HEAD:  Atraumatic, Normocephalic  EYES: EOMI, PERRLA, conjunctiva and sclera clear  NECK: Supple, No JVD  CHEST/LUNG: mild decrease breath sounds bilaterally, basilar crackles improved; No wheeze   HEART: Regular rate and rhythm; No murmurs, rubs, or gallops  ABDOMEN: Soft, Nontender, Nondistended; Bowel sounds present  Neuro: AAOx3, no focal weakness, 5/5 b/l extremity strength  EXTREMITIES:  2+ Peripheral Pulses, No clubbing, cyanosis, or edema, +left arm fistula  SKIN: No rashes or lesions    LABS:                        8.0    3.18  )-----------( 75       ( 29 Aug 2020 07:07 )             25.5     08-29    140  |  106  |  101<H>  ----------------------------<  85  4.7   |  21<L>  |  5.69<H>    Ca    9.0      29 Aug 2020 07:07  Phos  4.6     08-29  Mg     2.4     08-29        CAPILLARY BLOOD GLUCOSE      POCT Blood Glucose.: 124 mg/dL (29 Aug 2020 11:58)  POCT Blood Glucose.: 106 mg/dL (29 Aug 2020 08:28)  POCT Blood Glucose.: 89 mg/dL (28 Aug 2020 22:21)  POCT Blood Glucose.: 81 mg/dL (28 Aug 2020 17:05)            RADIOLOGY & ADDITIONAL TESTS:    Imaging Personally Reviewed:  [x] YES  [ ] NO    Consultant(s) Notes Reviewed:  [x] YES  [ ] NO

## 2020-08-29 NOTE — PROGRESS NOTE ADULT - PROBLEM SELECTOR PLAN 6
Hydralazine, Imdur, Coreg, Terazosin, Clonidine as ordered
Hydralazine, Imdur, Coreg, Terazosin, Clonidine as ordered  TTE pending
Hydralazine, Imdur, Coreg, Terazosin, Clonidine as ordered  TTE shows no LV segmental wall abnormalities.  TAVR is only change since 2017

## 2020-08-29 NOTE — PROGRESS NOTE ADULT - ASSESSMENT
80 year old woman with history of HTN, CAD, s/p PCI, CABG, severe AS, s/p TAVR, s/p PPM, diastolic HF, RUE DVT, CKD, chronic anemia (unknown etiology, likely CKD induced) sent in from [facility] scheduled for a routine transfusion and IV Deferoxamine today "unable to get a peripheral line"    1. CAD, s/p PCI, CABG  -stable, no chest pain, acs  -cont med tx of CAD with asa, statin, bb    2. RUE DVT  -completed course of a/c  -cont asa  -vasc f/u Dr Flores    3. AS s/p TAVR/ PPM ([employer])  -cv stable     4. HTN   -bp stable  -cont current tx    5. anemia, hx  -associated with chronic renal failure   -no overt s/s of bleeding  -prbc's per medicine/renal   -s/p picc placement     6. Diastolic HF, chronic  -stable, creat noted, ok to continue lasix daily per renal     7. CKD, hyperkalemia  -K  stable  -renal f/u, tx per renal     8. Pituitary macroadenoma, recent leg weakness  -neuro f/u , med f/u EEG results   -s/p PPm interrogation, normal   -pending echo    dvt ppx

## 2020-08-29 NOTE — PROGRESS NOTE ADULT - PROBLEM SELECTOR PROBLEM 1
Acute renal failure, unspecified acute renal failure type
CKD (chronic kidney disease) stage 5, GFR less than 15 ml/min

## 2020-08-29 NOTE — PROGRESS NOTE ADULT - SUBJECTIVE AND OBJECTIVE BOX
CC: no cp/sob    TELEMETRY:     PHYSICAL EXAM:    T(C): 36.8 (08-29-20 @ 05:19), Max: 37.1 (08-28-20 @ 20:46)  HR: 66 (08-29-20 @ 05:19) (62 - 66)  BP: 172/67 (08-29-20 @ 05:19) (144/50 - 172/67)  RR: 16 (08-29-20 @ 05:19) (16 - 17)  SpO2: 95% (08-29-20 @ 05:19) (95% - 100%)  Wt(kg): --  I&O's Summary      Appearance: Normal	  Cardiovascular: Normal S1 S2,RRR, No JVD, No murmurs  Respiratory: Lungs clear to auscultation	  Gastrointestinal:  Soft, Non-tender, + BS	  Extremities: Normal range of motion, No clubbing, cyanosis or edema  Vascular: Peripheral pulses palpable 2+ bilaterally     LABS:	 	                          8.0    3.18  )-----------( 75       ( 29 Aug 2020 07:07 )             25.5     08-29    140  |  106  |  101<H>  ----------------------------<  85  4.7   |  21<L>  |  5.69<H>    Ca    9.0      29 Aug 2020 07:07  Phos  4.6     08-29  Mg     2.4     08-29            CARDIAC MARKERS:

## 2020-08-29 NOTE — PROGRESS NOTE ADULT - SUBJECTIVE AND OBJECTIVE BOX
[facility]      Seen earlier today, and examined.  - Today, patient is without complaints.           *****MEDICATIONS: Current medication reviewed and documented.    MEDICATIONS  (STANDING):  allopurinol 100 milliGRAM(s) Oral daily  aspirin enteric coated 81 milliGRAM(s) Oral daily  atorvastatin 20 milliGRAM(s) Oral at bedtime  carvedilol 25 milliGRAM(s) Oral every 12 hours  chlorhexidine 4% Liquid 1 Application(s) Topical <User Schedule>  cloNIDine 0.2 milliGRAM(s) Oral daily  dextrose 5%. 1000 milliLiter(s) (50 mL/Hr) IV Continuous <Continuous>  dextrose 50% Injectable 12.5 Gram(s) IV Push once  dextrose 50% Injectable 25 Gram(s) IV Push once  dextrose 50% Injectable 25 Gram(s) IV Push once  furosemide    Tablet 40 milliGRAM(s) Oral daily  heparin   Injectable 5000 Unit(s) SubCutaneous every 12 hours  hydrALAZINE 100 milliGRAM(s) Oral every 8 hours  insulin lispro (HumaLOG) corrective regimen sliding scale   SubCutaneous three times a day before meals  isosorbide   mononitrate ER Tablet (IMDUR) 60 milliGRAM(s) Oral daily  mirtazapine 15 milliGRAM(s) Oral at bedtime  sodium bicarbonate 650 milliGRAM(s) Oral three times a day  sodium zirconium cyclosilicate 10 Gram(s) Oral every other day    MEDICATIONS  (PRN):  ALBUTerol    90 MICROgram(s) HFA Inhaler 2 Puff(s) Inhalation every 6 hours PRN Bronchospasm  dextrose 40% Gel 15 Gram(s) Oral once PRN Blood Glucose LESS THAN 70 milliGRAM(s)/deciliter  glucagon  Injectable 1 milliGRAM(s) IntraMuscular once PRN Glucose LESS THAN 70 milligrams/deciliter  sodium chloride 0.9% lock flush 10 milliLiter(s) IV Push every 1 hour PRN Pre/post blood products, medications, blood draw, and to maintain line patency          ***** VITAL SIGNS:  T(F): 98.2 (20 @ 05:19), Max: 98.8 (20 @ 20:46)  HR: 66 (20 @ 05:19) (62 - 66)  BP: 172/67 (20 @ 05:19) (144/50 - 172/67)  RR: 16 (20 @ 05:19) (16 - 17)  SpO2: 95% (20 @ 05:19) (95% - 100%)  Wt(kg): --  ,   I&O's Summary           *****PHYSICAL EXAM: : Alert oriented x 3   Attention comprehension are fair. Able to name, repeat  without any difficulty.   Able to follow 1-2  step commands. recall 3 /3   able to recall last 3 presidents.      EOMI fundi not visualized,  VFF to confrontration  No facial asymmetry   Tongue is midline   Palate elevates symmetrically   Moving all 4 ext symmetrically RUE limited mvt ( old due to hx of  RUE DVT)  Reflexes are symmetric throughout   sensation is grossly symmetric  Gait : not assessed.  B/L down going toes          *****LAB AND IMAGIN.0    3.18  )-----------( 75       ( 29 Aug 2020 07:07 )             25.5               08-    140  |  106  |  101<H>  ----------------------------<  85  4.7   |  21<L>  |  5.69<H>    Ca    9.0      29 Aug 2020 07:07Thyroid Stimulating Hormone, Serum: 1.56 uIU/mL (20 @ 05:22)      Phos  4.6       Mg     2.4                                [All pertinent recent Imaging/Reports reviewed]           *****A S S E S S M E N T   A N D   P L A N :        79 y/o F with HTN, CAD with CABG, DVT, CKD, chronic anemia (unknown etiology, likely CKD induced) sent in from [facility] scheduled for a routine transfusion and IV Deferoxamine today "unable to get a peripheral line". Receives transfusion q 6 weeks. Also states ability to ambulate diminishing over the past few months. Denies fever, chills, chest pain, shortness of breath, weakness, abdominal pain, nausea, vomiting, rectal bleeding. In ED, noted to have acute on chronic kidney failure, rising Cr and hyperkalemia. Admitted for work up.   D/w the son Bernard and the granddaughter on the phone. Per the family, pt has been weak. Spent mostly on wheel chair. Recently saw neuro, had CT head that shows just mild volume loss and chronic changes. Started Mirtazapine and Cyproheptadine for appetite. Pt has been forgetful. She is on Lasix 40 mg three times a week Rx by renal.          Problem/Recommendations 1: progressive weakness likely multifactorial related to deconditioning, limitation due to anemia, ckd and pulm htn      pt eval appreciated   Pt does not want to go to rehab.   ct head done recently  please obtain outpt results  Dr. 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 Pt was instructed to follow up with ophtho and endo at the time which she has not     denies any visual changes. however, would get ophthalmology f.u  outpt for formal visual fields  echo ef normal however there is increased pa pressure and moderate pulm htn.   interrogate pacer.   ? hypoglycemic events, pt on januvia at home? a1c 4.8 ( as per son only takes it if am fs is >100) although endo recommended stopping januvia last admission.   would get eeg to r/o sz shows slowing in left > right temporal area, 24 h eeg did not reveal any abn     + orthostatics  by diastolic parameters     Thank you for allowing me to participate in the care of this patient. Please do not hesitate to call me if you have any  questions.        ________________  Chantal Sauceda MD  [facility] ([facility]  [phone]      33 minutes spent on total encounter; more than 50 % of the visit was  spent counseling about plan of care, compliance to diet/exercise and medication regimen and or  coordinating care by the attending physician.      It is advised that stroke patients follow up with TRISHA Reeves @ [phone] in 1- 2 weeks.   Others please follow up with Dr. Michael Nissenbaum [phone]

## 2020-08-29 NOTE — PROGRESS NOTE ADULT - PROBLEM SELECTOR PLAN 4
dementia is mild   Per the family, pt has been weak. Spent mostly on wheel chair. Recently saw neuro, had CT head that shows just mild volume loss and chronic changes. (her ppm not MRI compatible). ? Macroadenoma of pituitary   recent started Mirtazapine and Cyproheptadine for appetite.   c/w supportive care  neurology consult Dr. Sauceda. Past 2 months pt spent mostly on wheel chair. strength intact afraid to walk  EEG neg for seizure  Prolactin, Serum: 94.1 ng/mL   no visual field deficit
dementia is mild   Per the family, pt has been weak. Spent mostly on wheel chair. Recently saw neuro, had CT head that shows just mild volume loss and chronic changes. (her ppm not MRI compatible). ? Macroadenoma of pituitary   recent started Mirtazapine and Cyproheptadine for appetite.   c/w supportive care  neurology consult Dr. Sauceda. Past 2 months pt spent mostly on wheel chair. strength intact afraid to walk
dementia is mild   Per the family, pt has been weak. Spent mostly on wheel chair. Recently saw neuro, had CT head that shows just mild volume loss and chronic changes. (her ppm not MRI compatible). ? Macroadenoma of pituitary   recent started Mirtazapine and Cyproheptadine for appetite.   c/w supportive care  neurology consult Dr. Sauceda. Past 2 months pt spent mostly on wheel chair. strength intact afraid to walk  EEG neg for seizure  Prolactin, Serum: 94.1 ng/mL   no visual field deficit
dementia is mild   Per the family, pt has been weak. Spent mostly on wheel chair. Recently saw neuro, had CT head that shows just mild volume loss and chronic changes. (her ppm not MRI compatible). ? Macroadenoma of pituitary   recent started Mirtazapine and Cyproheptadine for appetite.   c/w supportive care  neurology consult Dr. Sauceda. Past 2 months pt spent mostly on wheel chair. strength intact afraid to walk  plan for EEG though seizure low suspicion  Prolactin, Serum: 94.1 ng/mL   no visual field deficit

## 2020-08-29 NOTE — PROGRESS NOTE ADULT - PROBLEM SELECTOR PLAN 3
as above  hyperkalemia: renal diet,   s/p Lokema 10 mg
as above  hyperkalemia: renal diet,   lokelma prn
as above  hyperkalemia: renal diet,   lokelma prn
as above  hyperkalemia: renal diet,   s/p Lokema 10 mg
as above  hyperkalemia: renal diet,   s/p Lokema 10 mg
as above  hyperkalemia: renal diet, Lokema 10 mg   repeat K in the evening
as above  hyperkalemia: renal diet, Lokema 10 mg   repeat K in the evening

## 2020-08-29 NOTE — PROGRESS NOTE ADULT - PROBLEM SELECTOR PLAN 2
Likely anemia of renal disease  - per pt, require pRBC transfusion q4-5 weeks with Dr. Sharpe (heme/onc [facility].  - no Aranesp shots since she believes it gave her arm DVT (completed coumadin, stopped 3-4 months ago)  - report brown stool, occult stool negative in the past. Refused colonoscopy in the past.   - hemodynamically stable.    - received 2 units PRBC for severe anemia. Received IV Iron transfusion  - hgb stable  - per heme/onc, plan for mediport (not PICC) for future transfusions.
Likely anemia of renal disease  - per pt, require pRBC transfusion q4-5 weeks with Dr. Sharpe (heme/onc [facility][1].  - no Aranesp shots since she believes it gave her arm DVT (completed coumadin, stopped 3-4 months ago)  - report brown stool, occult stool negative in the past. Refused colonoscopy in the past.   - hemodynamically stable.    - received 2 units PRBC for severe anemia. Received IV Iron transfusion  - hgb stable  - per heme/onc, s/p mediport placement 8/26/20 (not PICC) for future transfusions.
Likely anemia of renal disease  - per pt, require pRBC transfusion q4-5 weeks with Dr. Sharpe (heme/onc [facility].  - no Aranesp shots since she believes it gave her arm DVT (completed coumadin, stopped 3-4 months ago)  - report brown stool, occult stool negative in the past. Refused colonoscopy in the past.   - hemodynamically stable.    - received 2 units PRBC for severe anemia. Received IV Iron transfusion
Likely anemia of renal disease  - per pt, require pRBC transfusion q4-5 weeks with Dr. Sharpe (heme/onc [facility].  - no Aranesp shots since she believes it gave her arm DVT (completed coumadin, stopped 3-4 months ago)  - report brown stool, occult stool negative in the past. Refused colonoscopy in the past.   - hemodynamically stable.    - received 2 units PRBC for severe anemia. Received IV Iron transfusion
Likely anemia of renal disease  per pt, require pRBC transfusion q4-5 weeks with Dr. Sharpe (heme/onc [facility].  no Aranesp shots since she believes it gave her arm DVT (completed coumadin, stopped 3-4 months ago)  reports brown stool, occult stool negative in the past. Refused colonoscopy in the past.   hemodynamically stable.    PRBC transfusion if Hgb < 7.0. Received IV Iron transfusion  hgb stable  per heme/onc, s/p mediport placement 8/26/20 (not PICC) for future transfusions.
Likely anemia of renal disease  per pt, require pRBC transfusion q4-5 weeks with Dr. Sharpe (heme/onc [facility].  no Aranesp shots since she believes it gave her arm DVT (completed coumadin, stopped 3-4 months ago)  reports brown stool, occult stool negative in the past. Refused colonoscopy in the past.   hemodynamically stable.    PRBC transfusion if Hgb < 7.0. Received IV Iron transfusion  hgb stable  per heme/onc, s/p mediport placement 8/26/20 (not PICC) for future transfusions.
Likely anemia of renal disease  per pt, require pRBC transfusion q4-5 weeks with Dr. Sharpe (heme/onc [facility].  no Aranesp shots since she believes it gave her arm DVT (completed coumadin, stopped 3-4 months ago)  reports brown stool, occult stool negative in the past. Refused colonoscopy in the past.   hemodynamically stable.    PRBC transfusion if Hgb < 7.0. Received IV Iron transfusion  hgb stable  per heme/onc, s/p mediport placement 8/26/20 (not PICC) for future transfusions.

## 2020-08-29 NOTE — PROGRESS NOTE ADULT - ASSESSMENT
81 y/o F with HTN, CAD with CABG, DVT, CKD, chronic anemia (unknown etiology) sent in from [facility], scheduled for a routine transfusion and IV Deferoxamine today "unable to get a peripheral line". Receives transfusion q 6 weeks. Also states ability to ambulate diminishing over the past few months. In ED, noted to have acute on chronic kidney failure, rising Cr and hyperkalemia. Admitted for work up.

## 2020-08-30 VITALS — DIASTOLIC BLOOD PRESSURE: 62 MMHG | SYSTOLIC BLOOD PRESSURE: 155 MMHG

## 2020-08-30 LAB
ANION GAP SERPL CALC-SCNC: 12 MMO/L — SIGNIFICANT CHANGE UP (ref 7–14)
BLD GP AB SCN SERPL QL: NEGATIVE — SIGNIFICANT CHANGE UP
BUN SERPL-MCNC: 101 MG/DL — HIGH (ref 7–23)
CALCIUM SERPL-MCNC: 8.7 MG/DL — SIGNIFICANT CHANGE UP (ref 8.4–10.5)
CHLORIDE SERPL-SCNC: 106 MMOL/L — SIGNIFICANT CHANGE UP (ref 98–107)
CO2 SERPL-SCNC: 21 MMOL/L — LOW (ref 22–31)
CREAT SERPL-MCNC: 5.69 MG/DL — HIGH (ref 0.5–1.3)
GLUCOSE SERPL-MCNC: 91 MG/DL — SIGNIFICANT CHANGE UP (ref 70–99)
HCT VFR BLD CALC: 24.6 % — LOW (ref 34.5–45)
HGB BLD-MCNC: 7.8 G/DL — LOW (ref 11.5–15.5)
MAGNESIUM SERPL-MCNC: 2.4 MG/DL — SIGNIFICANT CHANGE UP (ref 1.6–2.6)
MCHC RBC-ENTMCNC: 27.3 PG — SIGNIFICANT CHANGE UP (ref 27–34)
MCHC RBC-ENTMCNC: 31.7 % — LOW (ref 32–36)
MCV RBC AUTO: 86 FL — SIGNIFICANT CHANGE UP (ref 80–100)
NRBC # FLD: 0 K/UL — SIGNIFICANT CHANGE UP (ref 0–0)
PHOSPHATE SERPL-MCNC: 4.7 MG/DL — HIGH (ref 2.5–4.5)
PLATELET # BLD AUTO: 82 K/UL — LOW (ref 150–400)
PMV BLD: 12.6 FL — SIGNIFICANT CHANGE UP (ref 7–13)
POTASSIUM SERPL-MCNC: 4.6 MMOL/L — SIGNIFICANT CHANGE UP (ref 3.5–5.3)
POTASSIUM SERPL-SCNC: 4.6 MMOL/L — SIGNIFICANT CHANGE UP (ref 3.5–5.3)
RBC # BLD: 2.86 M/UL — LOW (ref 3.8–5.2)
RBC # FLD: 14.3 % — SIGNIFICANT CHANGE UP (ref 10.3–14.5)
RH IG SCN BLD-IMP: POSITIVE — SIGNIFICANT CHANGE UP
SODIUM SERPL-SCNC: 139 MMOL/L — SIGNIFICANT CHANGE UP (ref 135–145)
WBC # BLD: 2.95 K/UL — LOW (ref 3.8–10.5)
WBC # FLD AUTO: 2.95 K/UL — LOW (ref 3.8–10.5)

## 2020-08-30 RX ORDER — CARVEDILOL PHOSPHATE 80 MG/1
1 CAPSULE, EXTENDED RELEASE ORAL
Qty: 60 | Refills: 0
Start: 2020-08-30 | End: 2020-09-28

## 2020-08-30 RX ORDER — ALBUTEROL 90 UG/1
2 AEROSOL, METERED ORAL
Qty: 1 | Refills: 0
Start: 2020-08-30 | End: 2020-09-28

## 2020-08-30 RX ORDER — SITAGLIPTIN 50 MG/1
1 TABLET, FILM COATED ORAL
Qty: 0 | Refills: 0 | DISCHARGE

## 2020-08-30 RX ORDER — ISOSORBIDE MONONITRATE 60 MG/1
120 TABLET, EXTENDED RELEASE ORAL DAILY
Refills: 0 | Status: DISCONTINUED | OUTPATIENT
Start: 2020-08-30 | End: 2020-08-29

## 2020-08-30 RX ORDER — ASPIRIN/CALCIUM CARB/MAGNESIUM 324 MG
1 TABLET ORAL
Qty: 30 | Refills: 0
Start: 2020-08-30 | End: 2020-09-28

## 2020-08-30 RX ORDER — SODIUM BICARBONATE 1 MEQ/ML
1 SYRINGE (ML) INTRAVENOUS
Qty: 90 | Refills: 0
Start: 2020-08-30 | End: 2020-09-28

## 2020-08-30 RX ORDER — ISOSORBIDE MONONITRATE 60 MG/1
1 TABLET, EXTENDED RELEASE ORAL
Qty: 30 | Refills: 0
Start: 2020-08-30 | End: 2020-09-28

## 2020-08-30 RX ORDER — SODIUM ZIRCONIUM CYCLOSILICATE 10 G/10G
10 POWDER, FOR SUSPENSION ORAL
Qty: 150 | Refills: 0
Start: 2020-08-30 | End: 2020-09-28

## 2020-08-30 RX ORDER — CARVEDILOL PHOSPHATE 80 MG/1
1 CAPSULE, EXTENDED RELEASE ORAL
Qty: 0 | Refills: 0 | DISCHARGE

## 2020-08-30 RX ORDER — FUROSEMIDE 40 MG
1 TABLET ORAL
Qty: 30 | Refills: 0
Start: 2020-08-30 | End: 2020-09-28

## 2020-08-30 RX ORDER — HYDRALAZINE HCL 50 MG
1 TABLET ORAL
Qty: 90 | Refills: 0
Start: 2020-08-30 | End: 2020-09-28

## 2020-08-30 RX ADMIN — Medication 40 MILLIGRAM(S): at 05:33

## 2020-08-30 RX ADMIN — Medication 100 MILLIGRAM(S): at 12:30

## 2020-08-30 RX ADMIN — Medication 81 MILLIGRAM(S): at 12:31

## 2020-08-30 RX ADMIN — ISOSORBIDE MONONITRATE 120 MILLIGRAM(S): 60 TABLET, EXTENDED RELEASE ORAL at 12:30

## 2020-08-30 RX ADMIN — CHLORHEXIDINE GLUCONATE 1 APPLICATION(S): 213 SOLUTION TOPICAL at 05:34

## 2020-08-30 RX ADMIN — Medication 100 MILLIGRAM(S): at 05:33

## 2020-08-30 RX ADMIN — CARVEDILOL PHOSPHATE 25 MILLIGRAM(S): 80 CAPSULE, EXTENDED RELEASE ORAL at 05:33

## 2020-08-30 RX ADMIN — Medication 650 MILLIGRAM(S): at 05:33

## 2020-08-30 RX ADMIN — Medication 0.2 MILLIGRAM(S): at 05:33

## 2020-08-30 RX ADMIN — Medication 100 MILLIGRAM(S): at 12:31

## 2020-08-30 RX ADMIN — Medication 650 MILLIGRAM(S): at 13:21

## 2020-08-30 RX ADMIN — HEPARIN SODIUM 5000 UNIT(S): 5000 INJECTION INTRAVENOUS; SUBCUTANEOUS at 05:33

## 2020-08-30 NOTE — PROGRESS NOTE ADULT - SUBJECTIVE AND OBJECTIVE BOX
CC: no events, no cp/sob    TELEMETRY:     PHYSICAL EXAM:    T(C): 36.9 (08-30-20 @ 05:28), Max: 36.9 (08-29-20 @ 21:32)  HR: 63 (08-30-20 @ 05:28) (60 - 64)  BP: 172/69 (08-30-20 @ 05:28) (147/56 - 172/69)  RR: 17 (08-30-20 @ 05:28) (17 - 17)  SpO2: 95% (08-30-20 @ 05:28) (95% - 100%)  Wt(kg): --  I&O's Summary      Appearance: Normal	  Cardiovascular: Normal S1 S2,RRR, No JVD, No murmurs  Respiratory: Lungs clear to auscultation	  Gastrointestinal:  Soft, Non-tender, + BS	  Extremities: Normal range of motion, No clubbing, cyanosis or edema  Vascular: Peripheral pulses palpable 2+ bilaterally     LABS:	 	                          7.8    2.95  )-----------( 82       ( 30 Aug 2020 06:55 )             24.6     08-30    139  |  106  |  101<H>  ----------------------------<  91  4.6   |  21<L>  |  5.69<H>    Ca    8.7      30 Aug 2020 06:55  Phos  4.7     08-30  Mg     2.4     08-30            CARDIAC MARKERS:      MEDICATIONS  (STANDING):  allopurinol 100 milliGRAM(s) Oral daily  aspirin enteric coated 81 milliGRAM(s) Oral daily  atorvastatin 20 milliGRAM(s) Oral at bedtime  carvedilol 25 milliGRAM(s) Oral every 12 hours  chlorhexidine 4% Liquid 1 Application(s) Topical <User Schedule>  cloNIDine 0.2 milliGRAM(s) Oral daily  dextrose 5%. 1000 milliLiter(s) (50 mL/Hr) IV Continuous <Continuous>  dextrose 50% Injectable 12.5 Gram(s) IV Push once  dextrose 50% Injectable 25 Gram(s) IV Push once  dextrose 50% Injectable 25 Gram(s) IV Push once  furosemide    Tablet 40 milliGRAM(s) Oral daily  heparin   Injectable 5000 Unit(s) SubCutaneous every 12 hours  hydrALAZINE 100 milliGRAM(s) Oral every 8 hours  insulin lispro (HumaLOG) corrective regimen sliding scale   SubCutaneous three times a day before meals  isosorbide   mononitrate ER Tablet (IMDUR) 60 milliGRAM(s) Oral daily  mirtazapine 15 milliGRAM(s) Oral at bedtime  sodium bicarbonate 650 milliGRAM(s) Oral three times a day  sodium zirconium cyclosilicate 10 Gram(s) Oral every other day

## 2020-08-30 NOTE — PROGRESS NOTE ADULT - PROVIDER SPECIALTY LIST ADULT
Cardiology
Heme/Onc
Internal Medicine
Nephrology
Neurology
Cardiology
Internal Medicine

## 2020-08-30 NOTE — PROGRESS NOTE ADULT - ASSESSMENT
80 year old woman with history of HTN, CAD, s/p PCI, CABG, severe AS, s/p TAVR, s/p PPM, diastolic HF, RUE DVT, CKD, chronic anemia (unknown etiology, likely CKD induced) sent in from [facility] scheduled for a routine transfusion and IV Deferoxamine today "unable to get a peripheral line"    1. CAD, s/p PCI, CABG  -stable, no chest pain, acs  -cont med tx of CAD with asa, statin, bb    2. RUE DVT  -completed course of a/c  -cont asa  -vasc f/u Dr Flores    3. AS s/p TAVR/ PPM ([employer])  -cv stable     4. HTN   -bp mildly elevated  -increase imdur    5. anemia, hx  -associated with chronic renal failure   -no overt s/s of bleeding  -prbc's per medicine/renal   -s/p picc placement     6. Diastolic HF, chronic  -stable, creat noted, ok to continue lasix daily per renal     7. CKD, hyperkalemia  -K  stable  -renal f/u, tx per renal     8. Pituitary macroadenoma, recent leg weakness  -neuro f/u , med f/u EEG results   -s/p PPm interrogation, normal   -pending echo    dvt ppx

## 2020-09-03 ENCOUNTER — RESULT REVIEW (OUTPATIENT)
Age: 80
End: 2020-09-03

## 2020-09-03 ENCOUNTER — APPOINTMENT (OUTPATIENT)
Dept: INFUSION THERAPY | Facility: HOSPITAL | Age: 80
End: 2020-09-03

## 2020-09-03 LAB
BASOPHILS # BLD AUTO: 0.03 K/UL — SIGNIFICANT CHANGE UP (ref 0–0.2)
BASOPHILS NFR BLD AUTO: 0.9 % — SIGNIFICANT CHANGE UP (ref 0–2)
EOSINOPHIL # BLD AUTO: 0.23 K/UL — SIGNIFICANT CHANGE UP (ref 0–0.5)
EOSINOPHIL NFR BLD AUTO: 6.7 % — HIGH (ref 0–6)
HCT VFR BLD CALC: 24 % — LOW (ref 34.5–45)
HGB BLD-MCNC: 7.4 G/DL — LOW (ref 11.5–15.5)
IMM GRANULOCYTES NFR BLD AUTO: 3.5 % — HIGH (ref 0–1.5)
LYMPHOCYTES # BLD AUTO: 0.89 K/UL — LOW (ref 1–3.3)
LYMPHOCYTES # BLD AUTO: 25.8 % — SIGNIFICANT CHANGE UP (ref 13–44)
MCHC RBC-ENTMCNC: 27.2 PG — SIGNIFICANT CHANGE UP (ref 27–34)
MCHC RBC-ENTMCNC: 30.8 GM/DL — LOW (ref 32–36)
MCV RBC AUTO: 88.2 FL — SIGNIFICANT CHANGE UP (ref 80–100)
MONOCYTES # BLD AUTO: 0.32 K/UL — SIGNIFICANT CHANGE UP (ref 0–0.9)
MONOCYTES NFR BLD AUTO: 9.3 % — SIGNIFICANT CHANGE UP (ref 2–14)
NEUTROPHILS # BLD AUTO: 1.86 K/UL — SIGNIFICANT CHANGE UP (ref 1.8–7.4)
NEUTROPHILS NFR BLD AUTO: 53.8 % — SIGNIFICANT CHANGE UP (ref 43–77)
NRBC # BLD: 0 /100 WBCS — SIGNIFICANT CHANGE UP (ref 0–0)
PLATELET # BLD AUTO: 88 K/UL — LOW (ref 150–400)
RBC # BLD: 2.72 M/UL — LOW (ref 3.8–5.2)
RBC # FLD: 13.9 % — SIGNIFICANT CHANGE UP (ref 10.3–14.5)
WBC # BLD: 3.45 K/UL — LOW (ref 3.8–10.5)
WBC # FLD AUTO: 3.45 K/UL — LOW (ref 3.8–10.5)

## 2020-09-04 DIAGNOSIS — D46.1 REFRACTORY ANEMIA WITH RING SIDEROBLASTS: ICD-10-CM

## 2020-09-04 DIAGNOSIS — N18.4 CHRONIC KIDNEY DISEASE, STAGE 4 (SEVERE): ICD-10-CM

## 2020-09-10 ENCOUNTER — APPOINTMENT (OUTPATIENT)
Dept: INFUSION THERAPY | Facility: HOSPITAL | Age: 80
End: 2020-09-10

## 2020-09-10 ENCOUNTER — APPOINTMENT (OUTPATIENT)
Dept: NEPHROLOGY | Facility: CLINIC | Age: 80
End: 2020-09-10

## 2020-09-15 ENCOUNTER — APPOINTMENT (OUTPATIENT)
Dept: NEPHROLOGY | Facility: CLINIC | Age: 80
End: 2020-09-15
Payer: MEDICARE

## 2020-09-15 VITALS — OXYGEN SATURATION: 100 % | SYSTOLIC BLOOD PRESSURE: 150 MMHG | DIASTOLIC BLOOD PRESSURE: 65 MMHG | HEART RATE: 70 BPM

## 2020-09-15 DIAGNOSIS — Z00.00 ENCOUNTER FOR GENERAL ADULT MEDICAL EXAMINATION W/OUT ABNORMAL FINDINGS: ICD-10-CM

## 2020-09-15 PROCEDURE — 99214 OFFICE O/P EST MOD 30 MIN: CPT

## 2020-09-15 RX ORDER — FUROSEMIDE 40 MG/1
40 TABLET ORAL
Refills: 0 | Status: DISCONTINUED | COMMUNITY
Start: 2019-01-29 | End: 2020-09-15

## 2020-09-15 RX ORDER — CLONIDINE HYDROCHLORIDE 0.2 MG/1
0.2 TABLET ORAL
Qty: 60 | Refills: 0 | Status: DISCONTINUED | COMMUNITY
Start: 2018-04-17 | End: 2020-09-15

## 2020-09-15 RX ORDER — DEFEROXAMINE MESYLATE 500 MG/1
500 INJECTION, POWDER, LYOPHILIZED, FOR SOLUTION INTRAMUSCULAR; INTRAVENOUS; SUBCUTANEOUS
Refills: 0 | Status: DISCONTINUED | COMMUNITY
Start: 2020-01-26 | End: 2020-09-15

## 2020-09-15 RX ORDER — SAXAGLIPTIN 2.5 MG/1
2.5 TABLET, FILM COATED ORAL DAILY
Qty: 90 | Refills: 0 | Status: DISCONTINUED | COMMUNITY
End: 2020-09-15

## 2020-09-15 NOTE — HISTORY OF PRESENT ILLNESS
[FreeTextEntry1] : Ms. Seymour presents for follow-up for CKD 5 - healthy transitions patient.\par Hx of TAVR, previous 3V CABG, PPM, AVF placed 11 years ago, has steal syndrome. \par \par Since last visit, had several admissions. in August 2020, had admission to [facility] for anemia and needing transfusion, no access, had a IV catheter placed for access that she was discharged with on Left IJ.\par She also has a working AVF. She was dc with crt in mid 5 range and  range. She was then admited last week to [facility] volume overload requriing IV diuresis, dc on lasix 40mg BID dosing. She is here now, tired, on a wheelchair, unable to walk as there is fatigue. She is here with her son. She has no appetite and has some mild tremors. Meds were reviewed and updated. \par \par

## 2020-09-15 NOTE — PHYSICAL EXAM
[General Appearance - Alert] : alert [General Appearance - In No Acute Distress] : in no acute distress [General Appearance - Well Developed] : well developed [General Appearance - Well Nourished] : well nourished [Sclera] : the sclera and conjunctiva were normal [Extraocular Movements] : extraocular movements were intact [Oropharynx] : the oropharynx was normal [Neck Cervical Mass (___cm)] : no neck mass was observed [Jugular Venous Distention Increased] : there was no jugular-venous distention [Auscultation Breath Sounds / Voice Sounds] : lungs were clear to auscultation bilaterally [Heart Rate And Rhythm] : heart rate was normal and rhythm regular [Heart Sounds Gallop] : no gallops [Heart Sounds Pericardial Friction Rub] : no pericardial rub [FreeTextEntry1] : +2 systolic murmur [Edema] : there was no peripheral edema [Abdomen Tenderness] : non-tender [Abdomen Soft] : soft [No CVA Tenderness] : no ~M costovertebral angle tenderness [] : no hepato-splenomegaly [No Spinal Tenderness] : no spinal tenderness [___ (cm) Fistula] : [unfilled] (cm) fistula [Bruit] : a bruit was present [Thrill] : a thrill was present [Oriented To Time, Place, And Person] : oriented to person, place, and time

## 2020-09-15 NOTE — ASSESSMENT
[FreeTextEntry1] : Ms. Seymour is a 80 y old F presenting for follow-up for CKD 5.\par \par 1)  CKD 5: \par mildly uremic. Feels well overall. \par Will continue to monitor.\par Has fistula ready to use( good thrill and bruit), needs follow up for steal syndrome. \par But may need dialysis in next few weeks. Will ask HT nurse to start looking for dialysis units\par Volume overloaded on exam= change to torsemide 20mg BID dosing. \par \par 2)  Anemia of CKD\par Iron low, planned for IV iron and contuining JEISON by heme\par \par 3)  Essential hypertension\par - stable\par \par 4)  Renal osteodystrophy\par -cont calcitriol for now \par \par 5)  Access: Left avf- good thrill and bruit\par -patient will continue to follow-up with vascular surgery as recommended\par \par Monthly follow up needed as likely to start HD soon- need dialysis unit closer to home

## 2020-09-17 ENCOUNTER — LABORATORY RESULT (OUTPATIENT)
Age: 80
End: 2020-09-17

## 2020-09-17 ENCOUNTER — APPOINTMENT (OUTPATIENT)
Dept: HEMATOLOGY ONCOLOGY | Facility: CLINIC | Age: 80
End: 2020-09-17
Payer: MEDICARE

## 2020-09-17 ENCOUNTER — RESULT REVIEW (OUTPATIENT)
Age: 80
End: 2020-09-17

## 2020-09-17 ENCOUNTER — APPOINTMENT (OUTPATIENT)
Dept: INFUSION THERAPY | Facility: HOSPITAL | Age: 80
End: 2020-09-17

## 2020-09-17 VITALS
SYSTOLIC BLOOD PRESSURE: 124 MMHG | RESPIRATION RATE: 16 BRPM | OXYGEN SATURATION: 83 % | BODY MASS INDEX: 29.55 KG/M2 | HEIGHT: 60.98 IN | DIASTOLIC BLOOD PRESSURE: 69 MMHG | WEIGHT: 156.53 LBS | TEMPERATURE: 98.7 F | HEART RATE: 70 BPM

## 2020-09-17 PROCEDURE — 99215 OFFICE O/P EST HI 40 MIN: CPT

## 2020-09-18 RX ORDER — CLONIDINE 0.2 MG/24H
0.2 PATCH, EXTENDED RELEASE TRANSDERMAL
Qty: 4 | Refills: 0 | Status: ACTIVE | COMMUNITY
Start: 2020-09-11

## 2020-09-24 ENCOUNTER — RESULT REVIEW (OUTPATIENT)
Age: 80
End: 2020-09-24

## 2020-09-24 ENCOUNTER — APPOINTMENT (OUTPATIENT)
Dept: INFUSION THERAPY | Facility: HOSPITAL | Age: 80
End: 2020-09-24

## 2020-09-24 ENCOUNTER — LABORATORY RESULT (OUTPATIENT)
Age: 80
End: 2020-09-24

## 2020-09-24 LAB
BASOPHILS # BLD AUTO: 0.03 K/UL — SIGNIFICANT CHANGE UP (ref 0–0.2)
BASOPHILS NFR BLD AUTO: 0.7 % — SIGNIFICANT CHANGE UP (ref 0–2)
EOSINOPHIL # BLD AUTO: 0.15 K/UL — SIGNIFICANT CHANGE UP (ref 0–0.5)
EOSINOPHIL NFR BLD AUTO: 3.5 % — SIGNIFICANT CHANGE UP (ref 0–6)
HCT VFR BLD CALC: 25.7 % — LOW (ref 34.5–45)
HGB BLD-MCNC: 7.9 G/DL — LOW (ref 11.5–15.5)
IMM GRANULOCYTES NFR BLD AUTO: 1.6 % — HIGH (ref 0–1.5)
LYMPHOCYTES # BLD AUTO: 0.83 K/UL — LOW (ref 1–3.3)
LYMPHOCYTES # BLD AUTO: 19.2 % — SIGNIFICANT CHANGE UP (ref 13–44)
MCHC RBC-ENTMCNC: 27.3 PG — SIGNIFICANT CHANGE UP (ref 27–34)
MCHC RBC-ENTMCNC: 30.7 G/DL — LOW (ref 32–36)
MCV RBC AUTO: 88.9 FL — SIGNIFICANT CHANGE UP (ref 80–100)
MONOCYTES # BLD AUTO: 0.36 K/UL — SIGNIFICANT CHANGE UP (ref 0–0.9)
MONOCYTES NFR BLD AUTO: 8.3 % — SIGNIFICANT CHANGE UP (ref 2–14)
NEUTROPHILS # BLD AUTO: 2.89 K/UL — SIGNIFICANT CHANGE UP (ref 1.8–7.4)
NEUTROPHILS NFR BLD AUTO: 66.7 % — SIGNIFICANT CHANGE UP (ref 43–77)
NRBC # BLD: 0 /100 WBCS — SIGNIFICANT CHANGE UP (ref 0–0)
PLATELET # BLD AUTO: 114 K/UL — LOW (ref 150–400)
RBC # BLD: 2.89 M/UL — LOW (ref 3.8–5.2)
RBC # FLD: 16 % — HIGH (ref 10.3–14.5)
WBC # BLD: 4.33 K/UL — SIGNIFICANT CHANGE UP (ref 3.8–10.5)
WBC # FLD AUTO: 4.33 K/UL — SIGNIFICANT CHANGE UP (ref 3.8–10.5)

## 2020-09-25 NOTE — HISTORY OF PRESENT ILLNESS
[Disease:__________________________] : Disease: [unfilled] [Treatment Protocol] : Treatment Protocol [de-identified] : 80 year old female presenting to the office for hematologic care. 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 She had a left sided fistula placed approximately in 2010 by her physician in [place] but she does not require dialysis; she is followed in the renal clinic by Dr. Markell Dixon. The use of colony stimulating agents had been safe for the patient when given when hemoglobins are less than 10 g/dL which has led to a frequency of administration of one ejection every 4 to 6 weeks; she received Aranesp injections, completing 57 injections from May 2011 until January 2019. \par \par Patient was hospitalized from 1/22/2019 - 1/29/2019 for right upper extremity DVT. Prior to admission, she noted RUE swelling and pain/discomfort since receiving her last dose of Aranesp on 1/18/2019. 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 Since then, her Aranesp treatments were discontinued and she was transitioned to blood transfusions, which she requires 1 or 2 units every 4-6 weeks. \par \par Due to frequent blood transfusions, patient developed increased ferritin levels and was subsequently started on Desferal as of 7/25/2020. She was hospitalized at [facility] from 8/22/2020 - 8/30/2020; prior to admission, she was scheduled for routine transfusion and Desferal at [facility] but peripheral IV access could not be established. Her ability to ambulate had also diminished significantly. She underwent a left sided IJ Barba central line placement for IV access. Shortly after that, she was admitted at [facility] from 9/8/2020 - 9/14/2020 due to severe shortness of breath; she was noted to have volume overload and required IV diuresis. Her Lasix was adjusted and also started on Torsemide. \par \par Past medical history includes hypertension (required multiple medications for control), type two diabetes mellitus. There is no prior history of cerebrovascular accident or bleeding. She has had mildly elevated ferritin and iron levels. [FreeTextEntry1] : Darbepoetin 200 mg q 6 weeks (s/p 57 treatments - held.due to RUE DVT); blood transfusions since January 2019 (1-2 units packed RBC every 4-6 weeks); Desferal (1-2 times a week since 7/25/2020) [de-identified] : Patient was recently admitted from 9/8/2020 - 9/14/2020 at [facility] for 1 week due to severe shortness of breath and worsening inability to ambulate. Her Lasix was adjusted and she was started on Torsemide. She also received 1 unit of blood while admitted. Patient was recently evaluated by nephrology; recommended to begin hemodialysis.

## 2020-09-25 NOTE — REVIEW OF SYSTEMS
[Chills] : chills [Fatigue] : fatigue [SOB on Exertion] : shortness of breath during exertion [Joint Pain] : joint pain [Joint Stiffness] : joint stiffness [Difficulty Walking] : difficulty walking [Negative] : Allergic/Immunologic [Fever] : no fever [Night Sweats] : no night sweats [Recent Change In Weight] : ~T no recent weight change [Eye Pain] : no eye pain [Red Eyes] : eyes not red [Dry Eyes] : no dryness of the eyes [Vision Problems] : no vision problems [Dysphagia] : no dysphagia [Loss of Hearing] : no loss of hearing [Nosebleeds] : no nosebleeds [Hoarseness] : no hoarseness [Odynophagia] : no odynophagia [Mucosal Pain] : no mucosal pain [Chest Pain] : no chest pain [Palpitations] : no palpitations [Leg Claudication] : no intermittent leg claudication [Lower Ext Edema] : no lower extremity edema [Shortness Of Breath] : no shortness of breath [Wheezing] : no wheezing [Cough] : no cough [Abdominal Pain] : no abdominal pain [Vomiting] : no vomiting [Constipation] : no constipation [Diarrhea] : no diarrhea [Dysuria] : no dysuria [Vaginal Discharge] : no vaginal discharge [Dysmenorrhea/Abn Vaginal Bleeding] : no dysmenorrhea/abnormal vaginal bleeding [Muscle Pain] : no muscle pain [Skin Rash] : no skin rash [Skin Wound] : no skin wound [Confused] : no confusion [Dizziness] : no dizziness [Fainting] : no fainting [Suicidal] : not suicidal [Insomnia] : no insomnia [Anxiety] : no anxiety [Depression] : no depression [Proptosis] : no proptosis [Hot Flashes] : no hot flashes [Muscle Weakness] : no muscle weakness [Deepening Of The Voice] : no deepening of the voice [Easy Bleeding] : no tendency for easy bleeding [Easy Bruising] : no tendency for easy bruising [Swollen Glands] : no swollen glands

## 2020-09-25 NOTE — ASSESSMENT
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 She also has a history of leukopenia (benign) and mild thrombocytopenia. Bone marrow biopsy last performed on 7/15/2011 demonstrated maturing and mature myeloid elements with no significant lymphocytosis or immaturity. Due to frequency of blood transfusions, she was started on IV Desferal (1-2 times a week) since 7/25/2020 to address her chronic iron overload. \par \par Despite patient's recent hospital admissions and reported symptoms, her physical examination findings remain unchanged when compared to the last visit. She does not require a blood transfusion based on her CBC result today. She and her son agreed on the following: \par \par - Additional blood studies done today. \par - Continue Desferal on weekly basis.\par - Follow up with nephrology, neurology, cardiology, PCP as directed.  \par - Plan to request home care service for Barba line care + dressing change. \par - Return to the office on 10/15/2020 at 11 am for re-evaluation. Discussed with Dr. Anjum Fox.

## 2020-09-25 NOTE — PHYSICAL EXAM
[Capable of only limited self care, confined to bed or chair more than 50% of waking hours] : Status 3- Capable of only limited self care, confined to bed or chair more than 50% of waking hours [Normal] : affect appropriate [de-identified] : ambulating with cane, currently in wheelchair [de-identified] : left sided IJ Barba central line noted [de-identified] : left upper extremity AV fistula present

## 2020-09-28 ENCOUNTER — OUTPATIENT (OUTPATIENT)
Dept: OUTPATIENT SERVICES | Facility: HOSPITAL | Age: 80
LOS: 1 days | Discharge: HOME | End: 2020-09-28

## 2020-09-28 DIAGNOSIS — I77.0 ARTERIOVENOUS FISTULA, ACQUIRED: Chronic | ICD-10-CM

## 2020-09-28 DIAGNOSIS — D63.1 ANEMIA IN CHRONIC KIDNEY DISEASE: ICD-10-CM

## 2020-09-28 DIAGNOSIS — Z95.0 PRESENCE OF CARDIAC PACEMAKER: Chronic | ICD-10-CM

## 2020-09-28 DIAGNOSIS — Z95.2 PRESENCE OF PROSTHETIC HEART VALVE: Chronic | ICD-10-CM

## 2020-09-28 DIAGNOSIS — Z90.49 ACQUIRED ABSENCE OF OTHER SPECIFIED PARTS OF DIGESTIVE TRACT: Chronic | ICD-10-CM

## 2020-10-01 ENCOUNTER — APPOINTMENT (OUTPATIENT)
Dept: INFUSION THERAPY | Facility: HOSPITAL | Age: 80
End: 2020-10-01

## 2020-10-02 DIAGNOSIS — D46.1 REFRACTORY ANEMIA WITH RING SIDEROBLASTS: ICD-10-CM

## 2020-10-02 DIAGNOSIS — N18.4 CHRONIC KIDNEY DISEASE, STAGE 4 (SEVERE): ICD-10-CM

## 2020-10-07 NOTE — H&P PST ADULT - NS PRO REFERRAL CMGT
[facility]  Sports Medicine  [address] [place] [address]  Phone: [phone]  Fax:   Follow Up Time:     
Discharge planning

## 2020-10-08 ENCOUNTER — APPOINTMENT (OUTPATIENT)
Dept: INFUSION THERAPY | Facility: HOSPITAL | Age: 80
End: 2020-10-08

## 2020-10-14 DIAGNOSIS — E78.5 HYPERLIPIDEMIA, UNSPECIFIED: ICD-10-CM

## 2020-10-15 ENCOUNTER — RESULT REVIEW (OUTPATIENT)
Age: 80
End: 2020-10-15

## 2020-10-15 ENCOUNTER — LABORATORY RESULT (OUTPATIENT)
Age: 80
End: 2020-10-15

## 2020-10-15 ENCOUNTER — OUTPATIENT (OUTPATIENT)
Dept: OUTPATIENT SERVICES | Facility: HOSPITAL | Age: 80
LOS: 1 days | End: 2020-10-15
Payer: MEDICARE

## 2020-10-15 ENCOUNTER — APPOINTMENT (OUTPATIENT)
Dept: INFUSION THERAPY | Facility: HOSPITAL | Age: 80
End: 2020-10-15

## 2020-10-15 ENCOUNTER — APPOINTMENT (OUTPATIENT)
Dept: HEMATOLOGY ONCOLOGY | Facility: CLINIC | Age: 80
End: 2020-10-15
Payer: MEDICARE

## 2020-10-15 DIAGNOSIS — I77.0 ARTERIOVENOUS FISTULA, ACQUIRED: Chronic | ICD-10-CM

## 2020-10-15 DIAGNOSIS — Z95.2 PRESENCE OF PROSTHETIC HEART VALVE: Chronic | ICD-10-CM

## 2020-10-15 DIAGNOSIS — N18.9 CHRONIC KIDNEY DISEASE, UNSPECIFIED: ICD-10-CM

## 2020-10-15 DIAGNOSIS — Z95.0 PRESENCE OF CARDIAC PACEMAKER: Chronic | ICD-10-CM

## 2020-10-15 DIAGNOSIS — Z90.49 ACQUIRED ABSENCE OF OTHER SPECIFIED PARTS OF DIGESTIVE TRACT: Chronic | ICD-10-CM

## 2020-10-15 DIAGNOSIS — M19.90 UNSPECIFIED OSTEOARTHRITIS, UNSPECIFIED SITE: ICD-10-CM

## 2020-10-15 LAB
ALBUMIN SERPL ELPH-MCNC: 3.7 G/DL
ALP BLD-CCNC: 50 U/L
ALT SERPL-CCNC: 9 U/L
ANION GAP SERPL CALC-SCNC: 16 MMOL/L
AST SERPL-CCNC: 18 U/L
BASOPHILS # BLD AUTO: 0.02 K/UL — SIGNIFICANT CHANGE UP (ref 0–0.2)
BASOPHILS NFR BLD AUTO: 0.7 % — SIGNIFICANT CHANGE UP (ref 0–2)
BILIRUB SERPL-MCNC: 0.2 MG/DL
BUN SERPL-MCNC: 114 MG/DL
CALCIUM SERPL-MCNC: 8.7 MG/DL
CHLORIDE SERPL-SCNC: 107 MMOL/L
CO2 SERPL-SCNC: 18 MMOL/L
CREAT SERPL-MCNC: 6.12 MG/DL
EOSINOPHIL # BLD AUTO: 0.1 K/UL — SIGNIFICANT CHANGE UP (ref 0–0.5)
EOSINOPHIL NFR BLD AUTO: 3.5 % — SIGNIFICANT CHANGE UP (ref 0–6)
FERRITIN SERPL-MCNC: 1909 NG/ML
GLUCOSE SERPL-MCNC: 111 MG/DL
HCT VFR BLD CALC: 22.3 % — LOW (ref 34.5–45)
HGB BLD-MCNC: 6.6 G/DL — CRITICAL LOW (ref 11.5–15.5)
IMM GRANULOCYTES NFR BLD AUTO: 0.3 % — SIGNIFICANT CHANGE UP (ref 0–1.5)
IRON SATN MFR SERPL: 67 %
IRON SERPL-MCNC: 116 UG/DL
LYMPHOCYTES # BLD AUTO: 0.89 K/UL — LOW (ref 1–3.3)
LYMPHOCYTES # BLD AUTO: 31 % — SIGNIFICANT CHANGE UP (ref 13–44)
MCHC RBC-ENTMCNC: 25.9 PG — LOW (ref 27–34)
MCHC RBC-ENTMCNC: 29.6 G/DL — LOW (ref 32–36)
MCV RBC AUTO: 87.5 FL — SIGNIFICANT CHANGE UP (ref 80–100)
MONOCYTES # BLD AUTO: 0.25 K/UL — SIGNIFICANT CHANGE UP (ref 0–0.9)
MONOCYTES NFR BLD AUTO: 8.7 % — SIGNIFICANT CHANGE UP (ref 2–14)
NEUTROPHILS # BLD AUTO: 1.6 K/UL — LOW (ref 1.8–7.4)
NEUTROPHILS NFR BLD AUTO: 55.8 % — SIGNIFICANT CHANGE UP (ref 43–77)
NRBC # BLD: 0 /100 WBCS — SIGNIFICANT CHANGE UP (ref 0–0)
PLATELET # BLD AUTO: 84 K/UL — LOW (ref 150–400)
POTASSIUM SERPL-SCNC: 5.6 MMOL/L
PROT SERPL-MCNC: 5.8 G/DL
RBC # BLD: 2.55 M/UL — LOW (ref 3.8–5.2)
RBC # FLD: 14.5 % — SIGNIFICANT CHANGE UP (ref 10.3–14.5)
SODIUM SERPL-SCNC: 141 MMOL/L
TIBC SERPL-MCNC: 174 UG/DL
UIBC SERPL-MCNC: 58 UG/DL
WBC # BLD: 2.87 K/UL — LOW (ref 3.8–10.5)
WBC # FLD AUTO: 2.87 K/UL — LOW (ref 3.8–10.5)

## 2020-10-15 PROCEDURE — 99215 OFFICE O/P EST HI 40 MIN: CPT

## 2020-10-16 NOTE — ASSESSMENT
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 She also has a history of leukopenia (benign) and mild thrombocytopenia. Bone marrow biopsy last performed on 7/15/2011 demonstrated maturing and mature myeloid elements with no significant lymphocytosis or immaturity. Due to frequency of blood transfusions, she was started on IV Desferal (1-2 times a week) since 7/25/2020 to address her chronic iron overload. \par \par Despite patient's recent hospital admissions and reported symptoms, her physical examination findings remain unchanged when compared to the last visit. She was advised to receive 1 unit of blood but she deferred due to transportation availability this weekend. She and her son agreed on the following: \par \par - Repeat CBC, Type & Cross next week. \par - Continue Desferal on weekly basis until dialysis begins.\par - Follow up with nephrology, neurology, cardiology, PCP as directed.  \par - Plan to contact patient and her son to help set up blood transfusion in 1 week. Discussed with Dr. Anjum Fox.

## 2020-10-16 NOTE — HISTORY OF PRESENT ILLNESS
[Disease:__________________________] : Disease: [unfilled] [Treatment Protocol] : Treatment Protocol [de-identified] : 80 year old female presenting to the office for hematologic care. The patient has been followed by [facility] for anemia secondary to endstage renal disease since April 2011. She had a left sided fistula placed approximately in 2010 by her physician in [place] but she does not require dialysis; she is followed in the renal clinic by Dr. Markell Dixon. The use of colony stimulating agents had been safe for the patient when given when hemoglobins are less than 10 g/dL which has led to a frequency of administration of one ejection every 4 to 6 weeks; she received Aranesp injections, completing 57 injections from May 2011 until January 2019. \par \par Patient was hospitalized from 1/22/2019 - 1/29/2019 for right upper extremity DVT. Prior to admission, she noted RUE swelling and pain/discomfort since receiving her last dose of Aranesp on 1/18/2019. 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 Since then, her Aranesp treatments were discontinued and she was transitioned to blood transfusions, which she requires 1 or 2 units every 4-6 weeks. \par \par Due to frequent blood transfusions, patient developed increased ferritin levels and was subsequently started on Desferal as of 7/25/2020. She was hospitalized at [facility] from 8/22/2020 - 8/30/2020; prior to admission, she was scheduled for routine transfusion and Desferal at [facility] but peripheral IV access could not be established. Her ability to ambulate had also diminished significantly. She underwent a left sided IJ Barba central line placement for IV access. Shortly after that, she was admitted at [facility] from 9/8/2020 - 9/14/2020 due to severe shortness of breath; she was noted to have volume overload and required IV diuresis. Her Lasix was adjusted and also started on Torsemide. \par \par Past medical history includes hypertension (required multiple medications for control), type two diabetes mellitus. There is no prior history of cerebrovascular accident or bleeding. She has had mildly elevated ferritin and iron levels. [FreeTextEntry1] : Darbepoetin 200 mg q 6 weeks (s/p 57 treatments - held.due to RUE DVT); blood transfusions since January 2019 (1-2 units packed RBC every 4-6 weeks); Desferal (1-2 times a week since 7/25/2020) [de-identified] : Patient presented to [facility] for blood count check and Desferal later today. She has been receiving home care for the Barba line for the past 1-2 weeks. She is expected to begin dialysis soon.

## 2020-10-16 NOTE — PHYSICAL EXAM
[Capable of only limited self care, confined to bed or chair more than 50% of waking hours] : Status 3- Capable of only limited self care, confined to bed or chair more than 50% of waking hours [Normal] : affect appropriate [de-identified] : left sided IJ Barba central line present [de-identified] : ambulating with cane, currently in wheelchair [de-identified] : left upper extremity AV fistula present

## 2020-10-16 NOTE — REVIEW OF SYSTEMS
[Chills] : chills [Fatigue] : fatigue [SOB on Exertion] : shortness of breath during exertion [Joint Stiffness] : joint stiffness [Joint Pain] : joint pain [Difficulty Walking] : difficulty walking [Negative] : Allergic/Immunologic [Fever] : no fever [Eye Pain] : no eye pain [Night Sweats] : no night sweats [Recent Change In Weight] : ~T no recent weight change [Dry Eyes] : no dryness of the eyes [Vision Problems] : no vision problems [Red Eyes] : eyes not red [Dysphagia] : no dysphagia [Loss of Hearing] : no loss of hearing [Nosebleeds] : no nosebleeds [Hoarseness] : no hoarseness [Mucosal Pain] : no mucosal pain [Odynophagia] : no odynophagia [Palpitations] : no palpitations [Chest Pain] : no chest pain [Shortness Of Breath] : no shortness of breath [Leg Claudication] : no intermittent leg claudication [Lower Ext Edema] : no lower extremity edema [Cough] : no cough [Wheezing] : no wheezing [Abdominal Pain] : no abdominal pain [Vomiting] : no vomiting [Constipation] : no constipation [Diarrhea] : no diarrhea [Dysuria] : no dysuria [Vaginal Discharge] : no vaginal discharge [Dysmenorrhea/Abn Vaginal Bleeding] : no dysmenorrhea/abnormal vaginal bleeding [Muscle Pain] : no muscle pain [Skin Rash] : no skin rash [Skin Wound] : no skin wound [Dizziness] : no dizziness [Confused] : no confusion [Suicidal] : not suicidal [Fainting] : no fainting [Insomnia] : no insomnia [Anxiety] : no anxiety [Depression] : no depression [Proptosis] : no proptosis [Hot Flashes] : no hot flashes [Easy Bleeding] : no tendency for easy bleeding [Muscle Weakness] : no muscle weakness [Deepening Of The Voice] : no deepening of the voice [Easy Bruising] : no tendency for easy bruising [Swollen Glands] : no swollen glands

## 2020-10-17 ENCOUNTER — APPOINTMENT (OUTPATIENT)
Dept: INFUSION THERAPY | Facility: HOSPITAL | Age: 80
End: 2020-10-17

## 2020-10-22 ENCOUNTER — RESULT REVIEW (OUTPATIENT)
Age: 80
End: 2020-10-22

## 2020-10-22 ENCOUNTER — NON-APPOINTMENT (OUTPATIENT)
Age: 80
End: 2020-10-22

## 2020-10-22 ENCOUNTER — APPOINTMENT (OUTPATIENT)
Dept: HEMATOLOGY ONCOLOGY | Facility: CLINIC | Age: 80
End: 2020-10-22

## 2020-10-22 ENCOUNTER — APPOINTMENT (OUTPATIENT)
Dept: INFUSION THERAPY | Facility: HOSPITAL | Age: 80
End: 2020-10-22

## 2020-10-22 DIAGNOSIS — E83.19 OTHER DISORDERS OF IRON METABOLISM: ICD-10-CM

## 2020-10-22 DIAGNOSIS — R63.0 ANOREXIA: ICD-10-CM

## 2020-10-22 DIAGNOSIS — D63.1 CHRONIC KIDNEY DISEASE, UNSPECIFIED: ICD-10-CM

## 2020-10-22 DIAGNOSIS — N18.9 CHRONIC KIDNEY DISEASE, UNSPECIFIED: ICD-10-CM

## 2020-10-22 LAB
BASOPHILS # BLD AUTO: 0.02 K/UL — SIGNIFICANT CHANGE UP (ref 0–0.2)
BASOPHILS NFR BLD AUTO: 0.7 % — SIGNIFICANT CHANGE UP (ref 0–2)
EOSINOPHIL # BLD AUTO: 0.16 K/UL — SIGNIFICANT CHANGE UP (ref 0–0.5)
EOSINOPHIL NFR BLD AUTO: 5.4 % — SIGNIFICANT CHANGE UP (ref 0–6)
HCT VFR BLD CALC: 20.2 % — CRITICAL LOW (ref 34.5–45)
HGB BLD-MCNC: 6 G/DL — CRITICAL LOW (ref 11.5–15.5)
IMM GRANULOCYTES NFR BLD AUTO: 0.3 % — SIGNIFICANT CHANGE UP (ref 0–1.5)
LYMPHOCYTES # BLD AUTO: 0.64 K/UL — LOW (ref 1–3.3)
LYMPHOCYTES # BLD AUTO: 21.6 % — SIGNIFICANT CHANGE UP (ref 13–44)
MCHC RBC-ENTMCNC: 25.6 PG — LOW (ref 27–34)
MCHC RBC-ENTMCNC: 29.7 G/DL — LOW (ref 32–36)
MCV RBC AUTO: 86.3 FL — SIGNIFICANT CHANGE UP (ref 80–100)
MONOCYTES # BLD AUTO: 0.23 K/UL — SIGNIFICANT CHANGE UP (ref 0–0.9)
MONOCYTES NFR BLD AUTO: 7.8 % — SIGNIFICANT CHANGE UP (ref 2–14)
NEUTROPHILS # BLD AUTO: 1.9 K/UL — SIGNIFICANT CHANGE UP (ref 1.8–7.4)
NEUTROPHILS NFR BLD AUTO: 64.2 % — SIGNIFICANT CHANGE UP (ref 43–77)
NRBC # BLD: 0 /100 WBCS — SIGNIFICANT CHANGE UP (ref 0–0)
PLATELET # BLD AUTO: 71 K/UL — LOW (ref 150–400)
RBC # BLD: 2.34 M/UL — LOW (ref 3.8–5.2)
RBC # FLD: 14.3 % — SIGNIFICANT CHANGE UP (ref 10.3–14.5)
WBC # BLD: 2.96 K/UL — LOW (ref 3.8–10.5)
WBC # FLD AUTO: 2.96 K/UL — LOW (ref 3.8–10.5)

## 2020-10-22 PROCEDURE — 86850 RBC ANTIBODY SCREEN: CPT

## 2020-10-22 PROCEDURE — 86901 BLOOD TYPING SEROLOGIC RH(D): CPT

## 2020-10-22 PROCEDURE — 86900 BLOOD TYPING SEROLOGIC ABO: CPT

## 2020-10-22 RX ORDER — MIRTAZAPINE 15 MG/1
15 TABLET, FILM COATED ORAL
Qty: 30 | Refills: 5 | Status: ACTIVE | COMMUNITY
Start: 2020-10-22 | End: 1900-01-01

## 2020-10-23 ENCOUNTER — INPATIENT (INPATIENT)
Facility: HOSPITAL | Age: 80
LOS: 0 days | Discharge: HOME | DRG: 682 | End: 2020-10-24
Attending: STUDENT IN AN ORGANIZED HEALTH CARE EDUCATION/TRAINING PROGRAM | Admitting: STUDENT IN AN ORGANIZED HEALTH CARE EDUCATION/TRAINING PROGRAM
Payer: MEDICARE

## 2020-10-23 VITALS
HEIGHT: 61 IN | OXYGEN SATURATION: 100 % | RESPIRATION RATE: 16 BRPM | TEMPERATURE: 99 F | WEIGHT: 139.99 LBS | DIASTOLIC BLOOD PRESSURE: 83 MMHG | SYSTOLIC BLOOD PRESSURE: 168 MMHG | HEART RATE: 68 BPM

## 2020-10-23 DIAGNOSIS — N18.4 CHRONIC KIDNEY DISEASE, STAGE 4 (SEVERE): ICD-10-CM

## 2020-10-23 DIAGNOSIS — Z90.49 ACQUIRED ABSENCE OF OTHER SPECIFIED PARTS OF DIGESTIVE TRACT: Chronic | ICD-10-CM

## 2020-10-23 DIAGNOSIS — Z95.2 PRESENCE OF PROSTHETIC HEART VALVE: Chronic | ICD-10-CM

## 2020-10-23 DIAGNOSIS — Z95.0 PRESENCE OF CARDIAC PACEMAKER: Chronic | ICD-10-CM

## 2020-10-23 DIAGNOSIS — I77.0 ARTERIOVENOUS FISTULA, ACQUIRED: Chronic | ICD-10-CM

## 2020-10-23 LAB
ABO RH CONFIRMATION: SIGNIFICANT CHANGE UP
ALBUMIN SERPL ELPH-MCNC: 3.3 G/DL — SIGNIFICANT CHANGE UP (ref 3.3–5.2)
ALP SERPL-CCNC: 51 U/L — SIGNIFICANT CHANGE UP (ref 40–120)
ALT FLD-CCNC: 5 U/L — SIGNIFICANT CHANGE UP
ANION GAP SERPL CALC-SCNC: 14 MMOL/L — SIGNIFICANT CHANGE UP (ref 5–17)
APTT BLD: 29.5 SEC — SIGNIFICANT CHANGE UP (ref 27.5–35.5)
AST SERPL-CCNC: 12 U/L — SIGNIFICANT CHANGE UP
BILIRUB SERPL-MCNC: 0.2 MG/DL — LOW (ref 0.4–2)
BLD GP AB SCN SERPL QL: SIGNIFICANT CHANGE UP
BUN SERPL-MCNC: 119 MG/DL — HIGH (ref 8–20)
CALCIUM SERPL-MCNC: 8.6 MG/DL — SIGNIFICANT CHANGE UP (ref 8.6–10.2)
CHLORIDE SERPL-SCNC: 106 MMOL/L — SIGNIFICANT CHANGE UP (ref 98–107)
CO2 SERPL-SCNC: 19 MMOL/L — LOW (ref 22–29)
CREAT SERPL-MCNC: 6.81 MG/DL — HIGH (ref 0.5–1.3)
GLUCOSE BLDC GLUCOMTR-MCNC: 130 MG/DL — HIGH (ref 70–99)
GLUCOSE BLDC GLUCOMTR-MCNC: 96 MG/DL — SIGNIFICANT CHANGE UP (ref 70–99)
GLUCOSE SERPL-MCNC: 135 MG/DL — HIGH (ref 70–99)
HBV CORE IGM SER-ACNC: SIGNIFICANT CHANGE UP
HBV SURFACE AB SER-ACNC: 94.8 MIU/ML — SIGNIFICANT CHANGE UP
HBV SURFACE AB SER-ACNC: REACTIVE
HBV SURFACE AG SER-ACNC: SIGNIFICANT CHANGE UP
HCT VFR BLD CALC: 18.9 % — CRITICAL LOW (ref 34.5–45)
HCT VFR BLD CALC: 25.7 % — LOW (ref 34.5–45)
HCV AB S/CO SERPL IA: 0.07 S/CO — SIGNIFICANT CHANGE UP (ref 0–0.99)
HCV AB SERPL-IMP: SIGNIFICANT CHANGE UP
HGB BLD-MCNC: 5.6 G/DL — CRITICAL LOW (ref 11.5–15.5)
HGB BLD-MCNC: 7.5 G/DL — LOW (ref 11.5–15.5)
INR BLD: 1.09 RATIO — SIGNIFICANT CHANGE UP (ref 0.88–1.16)
MCHC RBC-ENTMCNC: 25.5 PG — LOW (ref 27–34)
MCHC RBC-ENTMCNC: 26 PG — LOW (ref 27–34)
MCHC RBC-ENTMCNC: 29.2 GM/DL — LOW (ref 32–36)
MCHC RBC-ENTMCNC: 29.6 GM/DL — LOW (ref 32–36)
MCV RBC AUTO: 85.9 FL — SIGNIFICANT CHANGE UP (ref 80–100)
MCV RBC AUTO: 89.2 FL — SIGNIFICANT CHANGE UP (ref 80–100)
PLATELET # BLD AUTO: 60 K/UL — LOW (ref 150–400)
PLATELET # BLD AUTO: 66 K/UL — LOW (ref 150–400)
POTASSIUM SERPL-MCNC: 5 MMOL/L — SIGNIFICANT CHANGE UP (ref 3.5–5.3)
POTASSIUM SERPL-SCNC: 5 MMOL/L — SIGNIFICANT CHANGE UP (ref 3.5–5.3)
PROT SERPL-MCNC: 5.7 G/DL — LOW (ref 6.6–8.7)
PROTHROM AB SERPL-ACNC: 12.6 SEC — SIGNIFICANT CHANGE UP (ref 10.6–13.6)
RBC # BLD: 2.2 M/UL — LOW (ref 3.8–5.2)
RBC # BLD: 2.88 M/UL — LOW (ref 3.8–5.2)
RBC # FLD: 14.1 % — SIGNIFICANT CHANGE UP (ref 10.3–14.5)
RBC # FLD: 14.1 % — SIGNIFICANT CHANGE UP (ref 10.3–14.5)
SARS-COV-2 RNA SPEC QL NAA+PROBE: SIGNIFICANT CHANGE UP
SODIUM SERPL-SCNC: 139 MMOL/L — SIGNIFICANT CHANGE UP (ref 135–145)
WBC # BLD: 2.99 K/UL — LOW (ref 3.8–10.5)
WBC # BLD: 4.04 K/UL — SIGNIFICANT CHANGE UP (ref 3.8–10.5)
WBC # FLD AUTO: 2.99 K/UL — LOW (ref 3.8–10.5)
WBC # FLD AUTO: 4.04 K/UL — SIGNIFICANT CHANGE UP (ref 3.8–10.5)

## 2020-10-23 PROCEDURE — 90937 HEMODIALYSIS REPEATED EVAL: CPT

## 2020-10-23 PROCEDURE — 99223 1ST HOSP IP/OBS HIGH 75: CPT | Mod: 25

## 2020-10-23 PROCEDURE — 99285 EMERGENCY DEPT VISIT HI MDM: CPT | Mod: CS

## 2020-10-23 PROCEDURE — 99223 1ST HOSP IP/OBS HIGH 75: CPT | Mod: AI

## 2020-10-23 PROCEDURE — 99222 1ST HOSP IP/OBS MODERATE 55: CPT

## 2020-10-23 PROCEDURE — 71045 X-RAY EXAM CHEST 1 VIEW: CPT | Mod: 26

## 2020-10-23 PROCEDURE — 93010 ELECTROCARDIOGRAM REPORT: CPT

## 2020-10-23 RX ORDER — DEXTROSE 50 % IN WATER 50 %
25 SYRINGE (ML) INTRAVENOUS ONCE
Refills: 0 | Status: DISCONTINUED | OUTPATIENT
Start: 2020-10-23 | End: 2020-10-24

## 2020-10-23 RX ORDER — INFLUENZA VIRUS VACCINE 15; 15; 15; 15 UG/.5ML; UG/.5ML; UG/.5ML; UG/.5ML
0.5 SUSPENSION INTRAMUSCULAR ONCE
Refills: 0 | Status: COMPLETED | OUTPATIENT
Start: 2020-10-23 | End: 2020-10-23

## 2020-10-23 RX ORDER — ASPIRIN/CALCIUM CARB/MAGNESIUM 324 MG
81 TABLET ORAL DAILY
Refills: 0 | Status: DISCONTINUED | OUTPATIENT
Start: 2020-10-23 | End: 2020-10-24

## 2020-10-23 RX ORDER — CARVEDILOL PHOSPHATE 80 MG/1
25 CAPSULE, EXTENDED RELEASE ORAL EVERY 12 HOURS
Refills: 0 | Status: DISCONTINUED | OUTPATIENT
Start: 2020-10-23 | End: 2020-10-24

## 2020-10-23 RX ORDER — SODIUM BICARBONATE 1 MEQ/ML
650 SYRINGE (ML) INTRAVENOUS THREE TIMES A DAY
Refills: 0 | Status: DISCONTINUED | OUTPATIENT
Start: 2020-10-23 | End: 2020-10-24

## 2020-10-23 RX ORDER — DEXTROSE 50 % IN WATER 50 %
15 SYRINGE (ML) INTRAVENOUS ONCE
Refills: 0 | Status: DISCONTINUED | OUTPATIENT
Start: 2020-10-23 | End: 2020-10-24

## 2020-10-23 RX ORDER — HYDRALAZINE HCL 50 MG
100 TABLET ORAL EVERY 8 HOURS
Refills: 0 | Status: DISCONTINUED | OUTPATIENT
Start: 2020-10-23 | End: 2020-10-24

## 2020-10-23 RX ORDER — ISOSORBIDE MONONITRATE 60 MG/1
120 TABLET, EXTENDED RELEASE ORAL DAILY
Refills: 0 | Status: DISCONTINUED | OUTPATIENT
Start: 2020-10-23 | End: 2020-10-24

## 2020-10-23 RX ORDER — GLUCAGON INJECTION, SOLUTION 0.5 MG/.1ML
1 INJECTION, SOLUTION SUBCUTANEOUS ONCE
Refills: 0 | Status: DISCONTINUED | OUTPATIENT
Start: 2020-10-23 | End: 2020-10-24

## 2020-10-23 RX ORDER — ALLOPURINOL 300 MG
100 TABLET ORAL DAILY
Refills: 0 | Status: DISCONTINUED | OUTPATIENT
Start: 2020-10-23 | End: 2020-10-24

## 2020-10-23 RX ORDER — SODIUM CHLORIDE 9 MG/ML
1000 INJECTION, SOLUTION INTRAVENOUS
Refills: 0 | Status: DISCONTINUED | OUTPATIENT
Start: 2020-10-23 | End: 2020-10-24

## 2020-10-23 RX ORDER — ROSUVASTATIN CALCIUM 5 MG/1
1 TABLET ORAL
Qty: 0 | Refills: 0 | DISCHARGE

## 2020-10-23 RX ORDER — ALBUTEROL 90 UG/1
2 AEROSOL, METERED ORAL EVERY 6 HOURS
Refills: 0 | Status: DISCONTINUED | OUTPATIENT
Start: 2020-10-23 | End: 2020-10-24

## 2020-10-23 RX ORDER — INSULIN LISPRO 100/ML
VIAL (ML) SUBCUTANEOUS AT BEDTIME
Refills: 0 | Status: DISCONTINUED | OUTPATIENT
Start: 2020-10-23 | End: 2020-10-24

## 2020-10-23 RX ORDER — DEXTROSE 50 % IN WATER 50 %
12.5 SYRINGE (ML) INTRAVENOUS ONCE
Refills: 0 | Status: DISCONTINUED | OUTPATIENT
Start: 2020-10-23 | End: 2020-10-24

## 2020-10-23 RX ORDER — MIRTAZAPINE 45 MG/1
15 TABLET, ORALLY DISINTEGRATING ORAL AT BEDTIME
Refills: 0 | Status: DISCONTINUED | OUTPATIENT
Start: 2020-10-23 | End: 2020-10-24

## 2020-10-23 RX ORDER — INSULIN LISPRO 100/ML
VIAL (ML) SUBCUTANEOUS
Refills: 0 | Status: DISCONTINUED | OUTPATIENT
Start: 2020-10-23 | End: 2020-10-24

## 2020-10-23 RX ORDER — ACETAMINOPHEN 500 MG
650 TABLET ORAL EVERY 6 HOURS
Refills: 0 | Status: DISCONTINUED | OUTPATIENT
Start: 2020-10-23 | End: 2020-10-24

## 2020-10-23 RX ADMIN — Medication 650 MILLIGRAM(S): at 22:33

## 2020-10-23 RX ADMIN — Medication 100 MILLIGRAM(S): at 16:33

## 2020-10-23 RX ADMIN — Medication 100 MILLIGRAM(S): at 22:33

## 2020-10-23 RX ADMIN — ISOSORBIDE MONONITRATE 120 MILLIGRAM(S): 60 TABLET, EXTENDED RELEASE ORAL at 16:31

## 2020-10-23 RX ADMIN — Medication 650 MILLIGRAM(S): at 15:05

## 2020-10-23 RX ADMIN — Medication 81 MILLIGRAM(S): at 16:32

## 2020-10-23 RX ADMIN — Medication 100 MILLIGRAM(S): at 16:32

## 2020-10-23 RX ADMIN — MIRTAZAPINE 15 MILLIGRAM(S): 45 TABLET, ORALLY DISINTEGRATING ORAL at 22:33

## 2020-10-23 RX ADMIN — Medication 0.2 MILLIGRAM(S): at 16:32

## 2020-10-23 NOTE — ED PROVIDER NOTE - OBJECTIVE STATEMENT
Pt is an 79yo F with history of ESRD from DM II presenting for dialysis. She reports that she follows with Dr. Moreland for nephrology and was told to come to [facility] to be admitted and seen by Dr. Brown for dialysis. She reports that she got blood work last week that showed she was anemic. She states that she had a fistula put in 6 years ago that has not been used yet. She reports she has never undergone dialysis. She denies any symptoms at this time. Denies f/c/n/v/cp/sob.

## 2020-10-23 NOTE — H&P ADULT - ATTENDING COMMENTS
GENERAL: pt examined bedside, she was lying in bed in NAD  HEENT: NC/AT, dry mucous membranes, pale conjunctiva, sclera non icteric   NECK: supple, trachea midline, no JVD  LYMPH: no lymphadenopathy   CARDIOVASCULAR: RRR, normal s1, s2, 3/6 systolic murmur appreciated   RESPIRATORY: CTA b/l, poor inspiratory effort, no wheezing, rhonchi, rales   ABDOMEN: soft abdomen,  NT/ND, +BS    EXTREMITIES: no clubbing, no edema, no cyanosis, pulses 2+ b/l, LUE fistula present w/ palpable thrill   NEURO: AAOx3, no focal deficits appreciated  PSYCH: normal affect and cooperative  SKIN: intact, no rashes or lesions

## 2020-10-23 NOTE — H&P ADULT - NSHPPHYSICALEXAM_GEN_ALL_CORE
Vital Signs Last 24 Hrs  T(C): 37.2 (23 Oct 2020 09:25), Max: 37.2 (23 Oct 2020 09:25)  T(F): 99 (23 Oct 2020 09:25), Max: 99 (23 Oct 2020 09:25)  HR: 68 (23 Oct 2020 09:25) (68 - 70)  BP: 168/83 (23 Oct 2020 09:25) (104/62 - 168/83)  BP(mean): --  RR: 16 (23 Oct 2020 09:25) (16 - 18)  SpO2: 100% (23 Oct 2020 09:25) (100% - 100%)    Telemedicine precludes detailed physical examination  Pt appears frail  Able to speak in sentences while wearing surgical face mask

## 2020-10-23 NOTE — H&P ADULT - ASSESSMENT
80 year old female w AS s/p TAVR, CAD with CABG, DVT, CKD, chronic anemia (unknown etiology,  likely CKD induced), HTN, PPM was sent to the ED by her MD for dialysis      #Anemia  #Chronic anemia likely due to CKD  Hb 5.6  1. admit to telemetry  2. will be transfused 1 unit when blood available  3. trend trop  4. ekg in AM    #Uremia  pt w/o much appetite  BUN and Cr elevated  hx CKD IV  1. HD site: fistula or port as per renal  2. BMP, P in AM  3. NaHCO3 1 tab TID  4. lasix 40 mg    #AS s/p TAVR  #CAD s/p CABG  1. ASA 81 mg  2. no longer on statin  3. coreg 25 mg BID w parameters  4. imdur  120 mg qd      #HTN  1. clonidine 0.2 mg qd  2. hydralazine 100 mg q 8 hrs w parameters  3. coreg as above      #Gout  1. allopurinol 100 mg        #MISC  1. mirtazapine 15 mg      #IMPROVE VTE Individual Risk Assessment    RISK                                                                Points    [ X ] Previous VTE                                                  3    [  ] Thrombophilia                                               2    [  ] Lower limb paralysis                                      2        (unable to hold up >15 seconds)      [  ] Current Cancer                                              2         (within 6 months)    [  ] Immobilization > 24 hrs                                1    [  ] ICU/CCU stay > 24 hours                              1    [X  ] Age > 60                                                      1    IMPROVE VTE Score _____4____    IMPROVE Score 0-1: Low Risk, No VTE prophylaxis required for most patients, encourage ambulation.   IMPROVE Score 2-3: At risk, pharmacologic VTE prophylaxis is indicated for most patients (in the absence of a contraindication)  IMPROVE Score > or = 4: High Risk, pharmacologic VTE prophylaxis is indicated for most patients (in the absence of a contraindication)      VTE  sq heparin  med rec done w pt  called pt's son  [phone] @13:00 to advise of admission and transfusion 80 year old female w AS s/p TAVR, CAD with CABG, DVT, CKD, chronic anemia (unknown etiology,  likely CKD induced), HTN, PPM was sent to the ED by her MD for dialysis      #Anemia  #Chronic anemia likely due to CKD  Hb 5.6  1. admit to telemetry  2. will be transfused 1 unit when blood available  3. trend trop  4. ekg in AM    #Uremia  pt w/o much appetite  BUN and Cr elevated  hx CKD IV  1. HD site: fistula or port as per renal  2. BMP, P in AM  3. NaHCO3 1 tab TID  4. Held lasix 40 mg and lokelma    #AS s/p TAVR  #CAD s/p CABG  1. ASA 81 mg  2. no longer on statin  3. coreg 25 mg BID w parameters  4. imdur  120 mg qd      #HTN  1. clonidine 0.2 mg qd  2. hydralazine 100 mg q 8 hrs w parameters  3. coreg as above      #Gout  1. allopurinol 100 mg        #MISC  1. mirtazapine 15 mg      #IMPROVE VTE Individual Risk Assessment    RISK                                                                Points    [ X ] Previous VTE                                                  3    [  ] Thrombophilia                                               2    [  ] Lower limb paralysis                                      2        (unable to hold up >15 seconds)      [  ] Current Cancer                                              2         (within 6 months)    [  ] Immobilization > 24 hrs                                1    [  ] ICU/CCU stay > 24 hours                              1    [X  ] Age > 60                                                      1    IMPROVE VTE Score _____4____    IMPROVE Score 0-1: Low Risk, No VTE prophylaxis required for most patients, encourage ambulation.   IMPROVE Score 2-3: At risk, pharmacologic VTE prophylaxis is indicated for most patients (in the absence of a contraindication)  IMPROVE Score > or = 4: High Risk, pharmacologic VTE prophylaxis is indicated for most patients (in the absence of a contraindication)      VTE  sq heparin  inpatient certification  med rec done w pt  called pt's son  [phone] @13:00 to advise of admission and transfusion

## 2020-10-23 NOTE — ED ADULT TRIAGE NOTE - CHIEF COMPLAINT QUOTE
pt states was told by renal dr patricia to come to ED to begin HD. pt states LUE AVF, has never been dialyzed before. denies cp, sob, fever, chills, cough, congestion. no BLE edema

## 2020-10-23 NOTE — ED PROVIDER NOTE - ATTENDING CONTRIBUTION TO CARE
I performed a face to face history and physical exam of the patient and discussed their management with the student/resident/ACP. I reviewed the student/resident/ACP's note and agree with the documented findings and plan of care.    Pt with worsening anemia and kidney function.  Pt was sent in for dialysis. no acute complaints.    physical - rrr. ctab. abd - soft, nt. no edema. no rahs.    plan - labs and imaging reviewed. DR. Brown from nephro to see patient. will admit for furhter management.

## 2020-10-23 NOTE — ED PROVIDER NOTE - PHYSICAL EXAMINATION
General: Well appearing female in no acute distress  HEENT: Normocephalic, atraumatic. Moist mucous membranes.   Eyes: Pale conjunctiva. No scleral icterus. EOMI. TIA.  Neck: Soft and supple. Full ROM without pain. No midline tenderness  Cardiac: Regular rate and regular rhythm. No murmurs. No LE edema.  Resp: Lungs CTAB. No wheezes, rales or rhonchi.  Abd: Soft, non-tender, non-distended. No guarding or rebound. No scars, masses, or lesions.  Back: Spine midline and non-tender. No CVA tenderness.    Skin: Port on L upper chest. Fistula on LUE.   Neuro: AO x 3. Moves all extremities symmetrically. Motor strength and sensation grossly intact

## 2020-10-23 NOTE — CONSULT NOTE ADULT - SUBJECTIVE AND OBJECTIVE BOX
[facility] DIVISION OF KIDNEY DISEASES AND HYPERTENSION -- INITIAL CONSULT NOTE  --------------------------------------------------------------------------------  HPI:   80 year old woman with HTN, CAD with CABG, DVT, Advanced CKD, chronic anemia (unknown etiology) sent in by our team for PRBC transfusion and HD initiation. She has known history of advanced kidney disease and [facility] and has an AVF placed 10 years (never been used). Pt was planned to start HD outpt on Tuesday in [facility] dialysis unit under my care but was found to have worsening anemia and instructed to go to ED. Pt has a history of chronic anemia and gets biweekly IV iron infusion and PRBC transfusion via a PICC line.   Patient seen and examined. States she feels well, denies any acute complaint.      PAST HISTORY  --------------------------------------------------------------------------------  PAST MEDICAL & SURGICAL HISTORY:  Thyroid nodule  awaiting FNB in the near future    Severe aortic stenosis    Osteoarthritis  bilateral knees    CKD (chronic kidney disease) stage 4, GFR 15-29 ml/min    DM2 (diabetes mellitus, type 2)    Arthritis    CAD (coronary artery disease)    Gout    Anemia Associated with Chronic Renal Failure    HTN (Hypertension)    S/P AVR  TAVR    History of pacemaker    A-V fistula  left arm    S/P cholecystectomy    Stented coronary artery  s/p 3 stents, then CABG 2007    S/P CABG (coronary artery bypass graft)  triple in 2007      FAMILY HISTORY:  No pertinent family history in first degree relatives      PAST SOCIAL HISTORY: lives at home with son    ALLERGIES & MEDICATIONS  --------------------------------------------------------------------------------  Allergies    codeine (Other)  Lortab (Other)  morphine (Other)  Percocet 10/325 (Other)  PPM not compatible with  MRI (Other (Fatal))  Reglan (Other; Flushing)  sulfa drugs (Flushing)    Intolerances      Standing Inpatient Medications  allopurinol 100 milliGRAM(s) Oral daily  aspirin enteric coated 81 milliGRAM(s) Oral daily  carvedilol 25 milliGRAM(s) Oral every 12 hours  cloNIDine 0.2 milliGRAM(s) Oral daily  dextrose 5%. 1000 milliLiter(s) IV Continuous <Continuous>  dextrose 50% Injectable 12.5 Gram(s) IV Push once  dextrose 50% Injectable 25 Gram(s) IV Push once  dextrose 50% Injectable 25 Gram(s) IV Push once  hydrALAZINE 100 milliGRAM(s) Oral every 8 hours  insulin lispro (ADMELOG) corrective regimen sliding scale   SubCutaneous three times a day before meals  insulin lispro (ADMELOG) corrective regimen sliding scale   SubCutaneous at bedtime  isosorbide   mononitrate ER Tablet (IMDUR) 120 milliGRAM(s) Oral daily  mirtazapine 15 milliGRAM(s) Oral at bedtime  sodium bicarbonate 650 milliGRAM(s) Oral three times a day    PRN Inpatient Medications  acetaminophen   Tablet .. 650 milliGRAM(s) Oral every 6 hours PRN  ALBUTerol    90 MICROgram(s) HFA Inhaler 2 Puff(s) Inhalation every 6 hours PRN  dextrose 40% Gel 15 Gram(s) Oral once PRN  glucagon  Injectable 1 milliGRAM(s) IntraMuscular once PRN      REVIEW OF SYSTEMS  --------------------------------------------------------------------------------  Gen: No weight changes, fatigue, fevers/chills, weakness  Skin: No rashes  Head/Eyes/Ears/Mouth: No headache; Normal hearing; Normal vision w/o blurriness; No sinus pain/discomfort, sore throat  Respiratory: No dyspnea, cough, wheezing, hemoptysis  CV: No chest pain, PND, orthopnea  GI: No abdominal pain, diarrhea, constipation, nausea, vomiting, melena, hematochezia  : No increased frequency, dysuria, hematuria, nocturia  MSK: No joint pain/swelling; no back pain; no edema  Neuro: No dizziness/lightheadedness, weakness, seizures, numbness, tingling  Heme: No easy bruising or bleeding  Endo: No heat/cold intolerance  Psych: No significant nervousness, anxiety, stress, depression    All other systems were reviewed and are negative, except as noted.    VITALS/PHYSICAL EXAM  --------------------------------------------------------------------------------  T(C): 36.8 (10-23-20 @ 13:10), Max: 37.2 (10-23-20 @ 09:25)  HR: 64 (10-23-20 @ 13:10) (64 - 68)  BP: 153/69 (10-23-20 @ 13:10) (153/69 - 175/71)  RR: 16 (10-23-20 @ 13:10) (16 - 16)  SpO2: 99% (10-23-20 @ 13:10) (99% - 100%)  Wt(kg): --  Height (cm): 154.9 (10-23-20 @ 09:25)  Weight (kg): 63.5 (10-23-20 @ 09:25)  BMI (kg/m2): 26.5 (10-23-20 @ 09:25)  BSA (m2): 1.62 (10-23-20 @ 09:25)      Physical Exam:  	Gen: NAD, frail old lady  	HEENT: PERRL, supple neck, clear oropharynx  	Pulm: faint crackles  	CV: RRR, S1S2; no rub  	Back: No spinal or CVA tenderness; no sacral edema  	Abd: +BS, soft, nontender/nondistended  	: No suprapubic tenderness  	UE: Warm, ; no edema; no asterixis  	LE: Warm,  no edema  	Neuro: No focal deficits  	Psych: Normal affect and mood  	Skin: Warm, without rashes  	Vascular access: LUE AVF, anuerysmal, thrill/ bruit+    LABS/STUDIES  --------------------------------------------------------------------------------              5.6    2.99  >-----------<  66       [10-23-20 @ 10:51]              18.9     139  |  106  |  119.0  ----------------------------<  135      [10-23-20 @ 10:51]  5.0   |  19.0  |  6.81        Ca     8.6     [10-23-20 @ 10:51]    TPro  5.7  /  Alb  3.3  /  TBili  0.2  /  DBili  x   /  AST  12  /  ALT  5   /  AlkPhos  51  [10-23-20 @ 10:51]    PT/INR: PT 12.6 , INR 1.09       [10-23-20 @ 10:51]  PTT: 29.5       [10-23-20 @ 10:51]      Creatinine Trend:  SCr 6.81 [10-23 @ 10:51]    Urinalysis - [08-06-18 @ 23:39]      Color Yellow / Appearance Clear / SG 1.012 / pH 7.0      Gluc 50 / Ketone Negative  / Bili Negative / Urobili Negative       Blood Negative / Protein 500 / Leuk Est Moderate / Nitrite Negative      RBC 0-2 / WBC 3-5 / Hyaline 0-2 / Gran  / Sq Epi  / Non Sq Epi OCC / Bacteria Few      Iron 83, TIBC 167, %sat --      [08-22-20 @ 10:45]  Ferritin 2330      [08-22-20 @ 10:45]  .7 (Ca --)      [02-02-20 @ 05:15]   --  HbA1c 4.9      [02-02-20 @ 05:15]  TSH 1.56      [08-25-20 @ 05:22]  Lipid: chol 122, TG 63, HDL 44, LDL 71      [02-02-20 @ 05:15]

## 2020-10-23 NOTE — H&P ADULT - NSHPLABSRESULTS_GEN_ALL_CORE
<copied text>  ECHO  CONCLUSIONS:  1. Mitral annular calcification, otherwise normal mitral  valve. Minimal mitral regurgitation.  2. Transcatheter aortic valve replacement. Peak transaortic  valve gradient equals 11 mm Hg, mean transaortic valve  gradient equals 6 mm Hg, which is probably normal in the  presence of a prosthetic valve. No aortic valve  regurgitation seen.  3. Mildly dilated left atrium.  LA volume index = 37 cc/m2.  4. Mild concentric left ventricular hypertrophy.  5. Normal left ventricular systolic function. No segmental  wall motion abnormalities.  6. Normal right ventricular size and function.  A device  wire is noted in the right heart.  7. Estimated right ventricular systolic pressure equals 40  mm Hg, assuming right atrial pressure equals 10 mm Hg,  consistent with mild pulmonary hypertension.  *** Compared with echocardiogram of 1/27/2017, a  transcatheter aortic valve replacement is now present.  ------------------------------------------------------------------------  Confirmed on  8/28/2020 - 16:16:54 by Casey Hagan M.D.    <end of copied text>

## 2020-10-23 NOTE — ED PROVIDER NOTE - CARE PLAN
Principal Discharge DX:	CKD (chronic kidney disease) stage 4, GFR 15-29 ml/min  Secondary Diagnosis:	HTN (hypertension)  Secondary Diagnosis:	DM2 (diabetes mellitus, type 2)

## 2020-10-23 NOTE — H&P ADULT - HISTORY OF PRESENT ILLNESS
80 year old female w AS s/p TAVR, CAD with CABG, DVT, CKD, chronic anemia (unknown etiology,  likely CKD induced), HTN, PPM was sent to the ED by her MD for dialysis and a transfusion    Son indicated that pt has been going q Thursday for IV iron infusion.  Bloods yesterday revealed need for transfusion    In ED /83   HR 68  RR 16   T 99   100% sat RA  to be transfused 1 unit for Hb 5.6  Cr 6.8/    K 5  EKG  LBBB        PAST MEDICAL HX  Anemia Associated with Chronic Renal Failure  Arthritis  CAD (coronary artery disease)  CKD (chronic kidney disease) stage 4, GFR 15-29 ml/min  DM2 (diabetes mellitus, type 2)  DVT deep venous thrombosis completed coumadin   Gout  HTN (Hypertension)  Osteoarthritis bilateral knees  Severe aortic stenosis  Thyroid nodule awaiting FNB in the near future.    PAST SURGICAL HX  A-V fistula left arm  History of pacemaker  S/P AVR TAVR  S/P CABG (coronary artery bypass graft) triple in 2007  S/P cholecystectomy  Stented coronary artery s/p 3 stents, then CABG 2007.    FAMILY HX  No pertinent family history in first degree relatives.        SOCIAL HX  Denied smoking/  EtOH   Lives w son

## 2020-10-23 NOTE — CONSULT NOTE ADULT - ASSESSMENT
1) CKD stage V  2) Chronic Anemia  3) HTN  4) CKD-MBD    Plan to start HD; has a LUE AVF  Consent obtained  Hb low; plan to transfuse 2 U PRBC  Obtain iron studies  UF 1-1.5 kg as tolerated; continue current antihypertensive regimen  Obtain phos, iPTH and 25 OH vitamin D levels  Outpt HD set up at [facility]; pt has a TTS spot
81 yo F w/ PMH AS sp TAVR, CAD s/p multiple stents/CABG, CKD, DMII admitted to medicine with severe anemia to 5.6. Patient reports regular followup with nephrologist. patient had LUE AVF which was placed ~4 years ago in [place] which has never been accessed. Patient admitted for transfusion and initiation of HD. Vascular Surgery consulted to evaluate LUE AVF.    - AVF to be accessed by HD today  - Vascular surgery to follow for HD tolerance

## 2020-10-23 NOTE — CONSULT NOTE ADULT - SUBJECTIVE AND OBJECTIVE BOX
Vascular Surgery Consult Note    HPI:       PMH:  Thyroid nodule  awaiting FNB in the near future    Severe aortic stenosis    Osteoarthritis  bilateral knees    CKD (chronic kidney disease) stage 4, GFR 15-29 ml/min    DM2 (diabetes mellitus, type 2)    Arthritis    CAD (coronary artery disease)    Gout    Anemia Associated with Chronic Renal Failure    HTN (Hypertension)    HTN (Hypertension)    Diabetes      ***    PSH:  S/P AVR  TAVR    History of pacemaker    A-V fistula  left arm    S/P cholecystectomy    Stented coronary artery  s/p 3 stents, then CABG 2007    S/P CABG (coronary artery bypass graft)  triple in 2007      ***    Home Medications:  albuterol 90 mcg/inh inhalation aerosol: 2 puff(s) inhaled every 6 hours x 30 days, As Needed -Bronchospasm   allopurinol 100 mg oral tablet: 1 tab(s) orally once a day  aspirin 81 mg oral delayed release tablet: 1 tab(s) orally once a day  carvedilol 25 mg oral tablet: 1 tab(s) orally 2 times a day  cloNIDine 0.2 mg oral tablet: 1 tab(s) orally once a day  furosemide 40 mg oral tablet: 1 tab(s) orally once a day  hydrALAZINE 100 mg oral tablet: 1 tab(s) orally every 8 hours  isosorbide mononitrate 120 mg oral tablet, extended release: 1 tab(s) orally once a day  Lokelma 10 g oral powder for reconstitution: 10 gram(s) orally every other day NEXT DOSE DUE 8/31/20  mirtazapine 15 mg oral tablet: 1 tab(s) orally once a day (at bedtime)  sodium bicarbonate 650 mg oral tablet: 1 tab(s) orally 3 times a day    ***    ALL:  codeine (Other)  Lortab (Other)  morphine (Other)  Percocet 10/325 (Other)  PPM not compatible with  MRI (Other (Fatal))  Reglan (Other; Flushing)  sulfa drugs (Flushing)      FMH:  Denies family history of gastrointestinal malignancy   ***    SOC Hx:   Denies etoh, tobacco, or drug use   ***    Physical Exam:   Gen: well appearing female, NAD  Neuro: AOx3, EOMI, PERRLA, no gross deficit on exam  HEENT: No oral/mucosal lesions, no neck masses or lymphadenopathy  RESP: CTABL, nonlabored breathing  CVS: RRR,   GI: abd soft, NT, ND, no rebound/guarding  Extremities: 2+ peripheral pulses Vascular Surgery Consult Note    HPI: 79 yo F w/ PMH AS sp TAVR, CAD s/p multiple stents/CABG, CKD, DMII admitted to medicine with severe anemia to 5.6. Patient reports regular followup with nephrologist. patient had LUE AVF which was placed ~4 years ago in [place] which has never been accessed. Patient reports nephrologist sent patient to hospital for blood transfusion and initiation of HD via L AVF. Patient currently denies any other symptoms including f/c/sob/n/v.      PMH:  Thyroid nodule  awaiting FNB in the near future    Severe aortic stenosis    Osteoarthritis  bilateral knees    CKD (chronic kidney disease) stage 4, GFR 15-29 ml/min    DM2 (diabetes mellitus, type 2)    Arthritis    CAD (coronary artery disease)    Gout    Anemia Associated with Chronic Renal Failure    HTN (Hypertension)    HTN (Hypertension)    Diabetes    PSH:  S/P AVR  TAVR    History of pacemaker    A-V fistula  left arm    S/P cholecystectomy    Stented coronary artery  s/p 3 stents, then CABG 2007    S/P CABG (coronary artery bypass graft)  triple in 2007      Home Medications:  albuterol 90 mcg/inh inhalation aerosol: 2 puff(s) inhaled every 6 hours x 30 days, As Needed -Bronchospasm   allopurinol 100 mg oral tablet: 1 tab(s) orally once a day  aspirin 81 mg oral delayed release tablet: 1 tab(s) orally once a day  carvedilol 25 mg oral tablet: 1 tab(s) orally 2 times a day  cloNIDine 0.2 mg oral tablet: 1 tab(s) orally once a day  furosemide 40 mg oral tablet: 1 tab(s) orally once a day  hydrALAZINE 100 mg oral tablet: 1 tab(s) orally every 8 hours  isosorbide mononitrate 120 mg oral tablet, extended release: 1 tab(s) orally once a day  Lokelma 10 g oral powder for reconstitution: 10 gram(s) orally every other day NEXT DOSE DUE 8/31/20  mirtazapine 15 mg oral tablet: 1 tab(s) orally once a day (at bedtime)  sodium bicarbonate 650 mg oral tablet: 1 tab(s) orally 3 times a day    ALL:  codeine (Other)  Lortab (Other)  morphine (Other)  Percocet 10/325 (Other)  PPM not compatible with  MRI (Other (Fatal))  Reglan (Other; Flushing)  sulfa drugs (Flushing)      FMH: non contributory  SOC Hx:   Denies etoh, tobacco, or drug use     Physical Exam:   Gen: well appearing female, NAD  Neuro: AOx3, EOMI, PERRLA, no gross deficit on exam  HEENT: No oral/mucosal lesions, no neck masses or lymphadenopathy  RESP: CTABL, nonlabored breathing, L chest with tunneled double lumen catheter in place  CVS: RR  GI: abd soft, NT, ND, no rebound/guarding  Extremities: 2+ peripheral pulses. LUE AVF with palpable thrill with some proximal pulsatility.          LABS:  cret                        7.5    4.04  )-----------( 60       ( 23 Oct 2020 16:54 )             25.7     10-23    139  |  106  |  119.0<H>  ----------------------------<  135<H>  5.0   |  19.0<L>  |  6.81<H>    Ca    8.6      23 Oct 2020 10:51    TPro  5.7<L>  /  Alb  3.3  /  TBili  0.2<L>  /  DBili  x   /  AST  12  /  ALT  5   /  AlkPhos  51  10-23    PT/INR - ( 23 Oct 2020 10:51 )   PT: 12.6 sec;   INR: 1.09 ratio         PTT - ( 23 Oct 2020 10:51 )  PTT:29.5 sec

## 2020-10-24 ENCOUNTER — TRANSCRIPTION ENCOUNTER (OUTPATIENT)
Age: 80
End: 2020-10-24

## 2020-10-24 VITALS — SYSTOLIC BLOOD PRESSURE: 142 MMHG | HEART RATE: 62 BPM | DIASTOLIC BLOOD PRESSURE: 66 MMHG

## 2020-10-24 LAB
A1C WITH ESTIMATED AVERAGE GLUCOSE RESULT: 4.6 % — SIGNIFICANT CHANGE UP (ref 4–5.6)
ANION GAP SERPL CALC-SCNC: 12 MMOL/L — SIGNIFICANT CHANGE UP (ref 5–17)
BASOPHILS # BLD AUTO: 0.01 K/UL — SIGNIFICANT CHANGE UP (ref 0–0.2)
BASOPHILS NFR BLD AUTO: 0.3 % — SIGNIFICANT CHANGE UP (ref 0–2)
BUN SERPL-MCNC: 65 MG/DL — HIGH (ref 8–20)
CALCIUM SERPL-MCNC: 8.4 MG/DL — LOW (ref 8.6–10.2)
CHLORIDE SERPL-SCNC: 103 MMOL/L — SIGNIFICANT CHANGE UP (ref 98–107)
CO2 SERPL-SCNC: 24 MMOL/L — SIGNIFICANT CHANGE UP (ref 22–29)
CREAT SERPL-MCNC: 4.19 MG/DL — HIGH (ref 0.5–1.3)
EOSINOPHIL # BLD AUTO: 0.17 K/UL — SIGNIFICANT CHANGE UP (ref 0–0.5)
EOSINOPHIL NFR BLD AUTO: 4.9 % — SIGNIFICANT CHANGE UP (ref 0–6)
ESTIMATED AVERAGE GLUCOSE: 85 MG/DL — SIGNIFICANT CHANGE UP (ref 68–114)
GLUCOSE BLDC GLUCOMTR-MCNC: 82 MG/DL — SIGNIFICANT CHANGE UP (ref 70–99)
GLUCOSE BLDC GLUCOMTR-MCNC: 85 MG/DL — SIGNIFICANT CHANGE UP (ref 70–99)
GLUCOSE BLDC GLUCOMTR-MCNC: 91 MG/DL — SIGNIFICANT CHANGE UP (ref 70–99)
GLUCOSE SERPL-MCNC: 78 MG/DL — SIGNIFICANT CHANGE UP (ref 70–99)
HCT VFR BLD CALC: 27.1 % — LOW (ref 34.5–45)
HGB BLD-MCNC: 8.5 G/DL — LOW (ref 11.5–15.5)
IMM GRANULOCYTES NFR BLD AUTO: 0.3 % — SIGNIFICANT CHANGE UP (ref 0–1.5)
LYMPHOCYTES # BLD AUTO: 0.72 K/UL — LOW (ref 1–3.3)
LYMPHOCYTES # BLD AUTO: 20.8 % — SIGNIFICANT CHANGE UP (ref 13–44)
MCHC RBC-ENTMCNC: 26.6 PG — LOW (ref 27–34)
MCHC RBC-ENTMCNC: 31.4 GM/DL — LOW (ref 32–36)
MCV RBC AUTO: 85 FL — SIGNIFICANT CHANGE UP (ref 80–100)
MONOCYTES # BLD AUTO: 0.29 K/UL — SIGNIFICANT CHANGE UP (ref 0–0.9)
MONOCYTES NFR BLD AUTO: 8.4 % — SIGNIFICANT CHANGE UP (ref 2–14)
NEUTROPHILS # BLD AUTO: 2.26 K/UL — SIGNIFICANT CHANGE UP (ref 1.8–7.4)
NEUTROPHILS NFR BLD AUTO: 65.3 % — SIGNIFICANT CHANGE UP (ref 43–77)
PHOSPHATE SERPL-MCNC: 4.1 MG/DL — SIGNIFICANT CHANGE UP (ref 2.4–4.7)
PLATELET # BLD AUTO: 65 K/UL — LOW (ref 150–400)
POTASSIUM SERPL-MCNC: 4.8 MMOL/L — SIGNIFICANT CHANGE UP (ref 3.5–5.3)
POTASSIUM SERPL-SCNC: 4.8 MMOL/L — SIGNIFICANT CHANGE UP (ref 3.5–5.3)
RBC # BLD: 3.19 M/UL — LOW (ref 3.8–5.2)
RBC # FLD: 13.5 % — SIGNIFICANT CHANGE UP (ref 10.3–14.5)
SARS-COV-2 IGG SERPL QL IA: NEGATIVE — SIGNIFICANT CHANGE UP
SARS-COV-2 IGM SERPL IA-ACNC: <0.1 INDEX — SIGNIFICANT CHANGE UP
SODIUM SERPL-SCNC: 139 MMOL/L — SIGNIFICANT CHANGE UP (ref 135–145)
TROPONIN T SERPL-MCNC: 0.11 NG/ML — HIGH (ref 0–0.06)
WBC # BLD: 3.46 K/UL — LOW (ref 3.8–10.5)
WBC # FLD AUTO: 3.46 K/UL — LOW (ref 3.8–10.5)

## 2020-10-24 PROCEDURE — 82962 GLUCOSE BLOOD TEST: CPT

## 2020-10-24 PROCEDURE — 85730 THROMBOPLASTIN TIME PARTIAL: CPT

## 2020-10-24 PROCEDURE — 87340 HEPATITIS B SURFACE AG IA: CPT

## 2020-10-24 PROCEDURE — 85027 COMPLETE CBC AUTOMATED: CPT

## 2020-10-24 PROCEDURE — 71045 X-RAY EXAM CHEST 1 VIEW: CPT

## 2020-10-24 PROCEDURE — 86923 COMPATIBILITY TEST ELECTRIC: CPT

## 2020-10-24 PROCEDURE — 84100 ASSAY OF PHOSPHORUS: CPT

## 2020-10-24 PROCEDURE — 36430 TRANSFUSION BLD/BLD COMPNT: CPT

## 2020-10-24 PROCEDURE — 86900 BLOOD TYPING SEROLOGIC ABO: CPT

## 2020-10-24 PROCEDURE — 85610 PROTHROMBIN TIME: CPT

## 2020-10-24 PROCEDURE — 86901 BLOOD TYPING SEROLOGIC RH(D): CPT

## 2020-10-24 PROCEDURE — 80048 BASIC METABOLIC PNL TOTAL CA: CPT

## 2020-10-24 PROCEDURE — 86803 HEPATITIS C AB TEST: CPT

## 2020-10-24 PROCEDURE — 84484 ASSAY OF TROPONIN QUANT: CPT

## 2020-10-24 PROCEDURE — 99285 EMERGENCY DEPT VISIT HI MDM: CPT | Mod: 25

## 2020-10-24 PROCEDURE — 86706 HEP B SURFACE ANTIBODY: CPT

## 2020-10-24 PROCEDURE — 90937 HEMODIALYSIS REPEATED EVAL: CPT

## 2020-10-24 PROCEDURE — P9016: CPT

## 2020-10-24 PROCEDURE — 99239 HOSP IP/OBS DSCHRG MGMT >30: CPT

## 2020-10-24 PROCEDURE — 36415 COLL VENOUS BLD VENIPUNCTURE: CPT

## 2020-10-24 PROCEDURE — 93005 ELECTROCARDIOGRAM TRACING: CPT

## 2020-10-24 PROCEDURE — 86850 RBC ANTIBODY SCREEN: CPT

## 2020-10-24 PROCEDURE — 83036 HEMOGLOBIN GLYCOSYLATED A1C: CPT

## 2020-10-24 PROCEDURE — 85025 COMPLETE CBC W/AUTO DIFF WBC: CPT

## 2020-10-24 PROCEDURE — 86769 SARS-COV-2 COVID-19 ANTIBODY: CPT

## 2020-10-24 PROCEDURE — 80053 COMPREHEN METABOLIC PANEL: CPT

## 2020-10-24 PROCEDURE — 99261: CPT

## 2020-10-24 PROCEDURE — 93010 ELECTROCARDIOGRAM REPORT: CPT

## 2020-10-24 PROCEDURE — U0003: CPT

## 2020-10-24 PROCEDURE — 86705 HEP B CORE ANTIBODY IGM: CPT

## 2020-10-24 RX ORDER — ERYTHROPOIETIN 10000 [IU]/ML
10000 INJECTION, SOLUTION INTRAVENOUS; SUBCUTANEOUS ONCE
Refills: 0 | Status: COMPLETED | OUTPATIENT
Start: 2020-10-24 | End: 2020-10-24

## 2020-10-24 RX ADMIN — ERYTHROPOIETIN 10000 UNIT(S): 10000 INJECTION, SOLUTION INTRAVENOUS; SUBCUTANEOUS at 14:49

## 2020-10-24 RX ADMIN — Medication 100 MILLIGRAM(S): at 15:48

## 2020-10-24 RX ADMIN — Medication 650 MILLIGRAM(S): at 15:48

## 2020-10-24 RX ADMIN — ISOSORBIDE MONONITRATE 120 MILLIGRAM(S): 60 TABLET, EXTENDED RELEASE ORAL at 15:48

## 2020-10-24 RX ADMIN — Medication 81 MILLIGRAM(S): at 15:47

## 2020-10-24 RX ADMIN — Medication 100 MILLIGRAM(S): at 14:04

## 2020-10-24 RX ADMIN — Medication 100 MILLIGRAM(S): at 06:05

## 2020-10-24 RX ADMIN — CARVEDILOL PHOSPHATE 25 MILLIGRAM(S): 80 CAPSULE, EXTENDED RELEASE ORAL at 06:06

## 2020-10-24 RX ADMIN — Medication 650 MILLIGRAM(S): at 06:05

## 2020-10-24 RX ADMIN — Medication 0.2 MILLIGRAM(S): at 06:06

## 2020-10-24 NOTE — DISCHARGE NOTE PROVIDER - HOSPITAL COURSE
80 year old female w AS s/p TAVR, CAD with CABG, DVT, CKD, chronic anemia (unknown etiology,  likely CKD induced), HTN, PPM was sent to the ED by her MD for dialysis and a transfusion.   During this admission she had 2 sessions of HD and one transfusion of PRBC. Tolerated HD well. H/H improved.   She is stable for discharge and continue with HD at [facility].     ROS at the time of discharge negative.   PE:   General: sitting on the bed and eating   Head: temporal wasting   Cardio: Regular rate and rhythm   Pulm: clear breath sounds   GI: abdomen soft  Extr: no edema   Neuro: AOx3, no focal weakness     Vital Signs Last 24 Hrs  T(C): 37.2 (24 Oct 2020 11:13), Max: 37.2 (24 Oct 2020 11:13)  T(F): 98.9 (24 Oct 2020 11:13), Max: 98.9 (24 Oct 2020 11:13)  HR: 65 (24 Oct 2020 11:13) (60 - 74)  BP: 151/69 (24 Oct 2020 11:13) (116/58 - 178/67)  BP(mean): --  RR: 18 (24 Oct 2020 11:13) (17 - 18)  SpO2: 100% (24 Oct 2020 11:13) (96% - 100%)      1. Normocytic anemia of chronic kidney disease: s/p 1 unit of PRBC.   2. ESRD on HD. Had 2 units of HD. To follow with regular scheduled HD on Tuesday   3. Hx of CAD with CABG: c/w Aspirin and Imdur   4. HTN: c/w Hydralazine, clonidine and carvedilol

## 2020-10-24 NOTE — DISCHARGE NOTE PROVIDER - NSDCCPCAREPLAN_GEN_ALL_CORE_FT
PRINCIPAL DISCHARGE DIAGNOSIS  Diagnosis: CKD (chronic kidney disease) stage 4, GFR 15-29 ml/min  Assessment and Plan of Treatment: Continue with your scheduled HD as of next tuesday      SECONDARY DISCHARGE DIAGNOSES  Diagnosis: CAD (coronary artery disease)  Assessment and Plan of Treatment: Continue with your prescribed medications    Diagnosis: HTN (hypertension)  Assessment and Plan of Treatment: Continue with your prescribed medications

## 2020-10-24 NOTE — DISCHARGE NOTE PROVIDER - CARE PROVIDER_API CALL
Lauren Brown)  Medicine  Nephrology  [address] [place] [address]  Phone: [phone]  Fax: [phone]  Follow Up Time:

## 2020-10-24 NOTE — PROGRESS NOTE ADULT - SUBJECTIVE AND OBJECTIVE BOX
[facility] DIVISION OF KIDNEY DISEASES AND HYPERTENSION -- HEMODIALYSIS NOTE  --------------------------------------------------------------------------------  Chief Complaint: ESRD/Ongoing hemodialysis requirement    24 hour events/subjective:  s/p HD yesterday tolerated well        PAST HISTORY  --------------------------------------------------------------------------------  No significant changes to PMH, PSH, FHx, SHx, unless otherwise noted    ALLERGIES & MEDICATIONS  --------------------------------------------------------------------------------  Allergies    codeine (Other)  Lortab (Other)  morphine (Other)  Percocet 10/325 (Other)  PPM not compatible with  MRI (Other (Fatal))  Reglan (Other; [place])  sulfa drugs ([place])    Intolerances      Standing Inpatient Medications  allopurinol 100 milliGRAM(s) Oral daily  aspirin enteric coated 81 milliGRAM(s) Oral daily  carvedilol 25 milliGRAM(s) Oral every 12 hours  cloNIDine 0.2 milliGRAM(s) Oral daily  dextrose 5%. 1000 milliLiter(s) IV Continuous <Continuous>  dextrose 50% Injectable 12.5 Gram(s) IV Push once  dextrose 50% Injectable 25 Gram(s) IV Push once  dextrose 50% Injectable 25 Gram(s) IV Push once  epoetin vinnie-epbx (RETACRIT) Injectable 96089 Unit(s) IV Push once  hydrALAZINE 100 milliGRAM(s) Oral every 8 hours  influenza   Vaccine 0.5 milliLiter(s) IntraMuscular once  insulin lispro (ADMELOG) corrective regimen sliding scale   SubCutaneous three times a day before meals  insulin lispro (ADMELOG) corrective regimen sliding scale   SubCutaneous at bedtime  isosorbide   mononitrate ER Tablet (IMDUR) 120 milliGRAM(s) Oral daily  mirtazapine 15 milliGRAM(s) Oral at bedtime  sodium bicarbonate 650 milliGRAM(s) Oral three times a day    PRN Inpatient Medications  acetaminophen   Tablet .. 650 milliGRAM(s) Oral every 6 hours PRN  ALBUTerol    90 MICROgram(s) HFA Inhaler 2 Puff(s) Inhalation every 6 hours PRN  dextrose 40% Gel 15 Gram(s) Oral once PRN  glucagon  Injectable 1 milliGRAM(s) IntraMuscular once PRN      REVIEW OF SYSTEMS  --------------------------------------------------------------------------------  Gen: No weight changes, fatigue, fevers/chills, weakness  Skin: No rashes  Head/Eyes/Ears/Mouth: No headache  Respiratory: No dyspnea, cough,  CV: No chest pain, orthopnea  GI: No abdominal pain, diarrhea, constipation, nausea, vomiting,  MSK: No joint pain  Neuro: No dizziness/lightheadedness, weakness  Heme: No bleeding  Psych: No significant nervousness, anxiety, stress, depression    All other systems were reviewed and are negative, except as noted.    VITALS/PHYSICAL EXAM  --------------------------------------------------------------------------------  T(C): 37.2 (10-24-20 @ 11:13), Max: 37.2 (10-24-20 @ 11:13)  HR: 65 (10-24-20 @ 11:13) (60 - 74)  BP: 151/69 (10-24-20 @ 11:13) (116/58 - 178/67)  RR: 18 (10-24-20 @ 11:13) (17 - 18)  SpO2: 100% (10-24-20 @ 11:13) (96% - 100%)  Wt(kg): --  Height (cm): 154.9 (10-23-20 @ 09:25)  Weight (kg): 63.5 (10-23-20 @ 09:25)  BMI (kg/m2): 26.5 (10-23-20 @ 09:25)  BSA (m2): 1.62 (10-23-20 @ 09:25)      Physical Exam:  	Gen: NAD  	HEENT:  supple neck  	Pulm: CTA B/L  	CV: RRR, S1S2; no rub  	Abd: +BS, soft, nontender  	UE: Warm, no asterixis  	LE: Warm, no edema  	Neuro: No focal deficits  	Psych: Normal affect and mood  	Skin: Warm, without rashes  	Vascular access: UBALDOE JILLIAN    LABS/STUDIES  --------------------------------------------------------------------------------              8.5    3.46  >-----------<  65       [10-24-20 @ 06:19]              27.1     139  |  103  |  65.0  ----------------------------<  78      [10-24-20 @ 06:19]  4.8   |  24.0  |  4.19        Ca     8.4     [10-24-20 @ 06:19]      Phos  4.1     [10-24-20 @ 06:19]    TPro  5.7  /  Alb  3.3  /  TBili  0.2  /  DBili  x   /  AST  12  /  ALT  5   /  AlkPhos  51  [10-23-20 @ 10:51]    PT/INR: PT 12.6 , INR 1.09       [10-23-20 @ 10:51]  PTT: 29.5       [10-23-20 @ 10:51]    Troponin 0.11      [10-24-20 @ 06:19]    Iron 83, TIBC 167, %sat --      [08-22-20 @ 10:45]  Ferritin 2330      [08-22-20 @ 10:45]  .7 (Ca --)      [02-02-20 @ 05:15]   --  HbA1c 4.9      [02-02-20 @ 05:15]  TSH 1.56      [08-25-20 @ 05:22]  Lipid: chol 122, TG 63, HDL 44, LDL 71      [02-02-20 @ 05:15]    HBsAb 94.8      [10-23-20 @ 16:08]  HBsAb Reactive      [10-23-20 @ 16:08]  HBsAg Nonreact      [10-23-20 @ 16:08]  HCV 0.07, Nonreact      [10-23-20 @ 16:08]

## 2020-10-24 NOTE — DISCHARGE NOTE NURSING/CASE MANAGEMENT/SOCIAL WORK - PATIENT PORTAL LINK FT
You can access the FollowMyHealth Patient Portal offered by [facility] by registering at the following website: [email or web address]. By joining [employer]’s FollowMyHealth portal, you will also be able to view your health information using other applications (apps) compatible with our system.

## 2020-10-24 NOTE — PROGRESS NOTE ADULT - SUBJECTIVE AND OBJECTIVE BOX
INTERVAL HPI/OVERNIGHT EVENTS:    SUBJECTIVE: Patient evaluated at bedside, found resting comfortably in bed, nad. No acute complaints or concerns. Tolerated HD yesterday via RUE AVF. Denies sob, chest pain, n/v, f/c.       MEDICATIONS  (STANDING):  allopurinol 100 milliGRAM(s) Oral daily  aspirin enteric coated 81 milliGRAM(s) Oral daily  carvedilol 25 milliGRAM(s) Oral every 12 hours  cloNIDine 0.2 milliGRAM(s) Oral daily  dextrose 5%. 1000 milliLiter(s) (50 mL/Hr) IV Continuous <Continuous>  dextrose 50% Injectable 12.5 Gram(s) IV Push once  dextrose 50% Injectable 25 Gram(s) IV Push once  dextrose 50% Injectable 25 Gram(s) IV Push once  hydrALAZINE 100 milliGRAM(s) Oral every 8 hours  influenza   Vaccine 0.5 milliLiter(s) IntraMuscular once  insulin lispro (ADMELOG) corrective regimen sliding scale   SubCutaneous three times a day before meals  insulin lispro (ADMELOG) corrective regimen sliding scale   SubCutaneous at bedtime  isosorbide   mononitrate ER Tablet (IMDUR) 120 milliGRAM(s) Oral daily  mirtazapine 15 milliGRAM(s) Oral at bedtime  sodium bicarbonate 650 milliGRAM(s) Oral three times a day    MEDICATIONS  (PRN):  acetaminophen   Tablet .. 650 milliGRAM(s) Oral every 6 hours PRN Temp greater or equal to 38C (100.4F), Mild Pain (1 - 3)  ALBUTerol    90 MICROgram(s) HFA Inhaler 2 Puff(s) Inhalation every 6 hours PRN Bronchospasm  dextrose 40% Gel 15 Gram(s) Oral once PRN Blood Glucose LESS THAN 70 milliGRAM(s)/deciliter  glucagon  Injectable 1 milliGRAM(s) IntraMuscular once PRN Glucose LESS THAN 70 milligrams/deciliter      Vital Signs Last 24 Hrs  T(C): 36.6 (23 Oct 2020 21:25), Max: 37.2 (23 Oct 2020 09:25)  T(F): 97.9 (23 Oct 2020 21:25), Max: 99 (23 Oct 2020 09:25)  HR: 74 (23 Oct 2020 21:25) (60 - 74)  BP: 161/74 (23 Oct 2020 21:25) (122/57 - 178/67)  BP(mean): --  RR: 18 (23 Oct 2020 21:25) (16 - 18)  SpO2: 99% (23 Oct 2020 21:25) (99% - 100%)    PE  Gen: resting comfortably in bed, nad  Pulm: no increased wob, no conversational dyspnea, L chest with tunneled double lumen catheter in place  Abd: non-distended, soft, non-tender  Ext: distal extremities warm and well-perfused, no peripheral edema, LUE AVF with palpable thrill with some proximal pulsatility. palpable radial pulse        I&O's Detail      LABS:                        7.5    4.04  )-----------( 60       ( 23 Oct 2020 16:54 )             25.7     10-23    139  |  106  |  119.0<H>  ----------------------------<  135<H>  5.0   |  19.0<L>  |  6.81<H>    Ca    8.6      23 Oct 2020 10:51    TPro  5.7<L>  /  Alb  3.3  /  TBili  0.2<L>  /  DBili  x   /  AST  12  /  ALT  5   /  AlkPhos  51  10-23    PT/INR - ( 23 Oct 2020 10:51 )   PT: 12.6 sec;   INR: 1.09 ratio         PTT - ( 23 Oct 2020 10:51 )  PTT:29.5 sec      RADIOLOGY & ADDITIONAL STUDIES:

## 2020-10-24 NOTE — PROGRESS NOTE ADULT - ASSESSMENT
81 yo F w/ PMH AS sp TAVR, CAD s/p multiple stents/CABG, CKD, DMII admitted to medicine with severe anemia to 5.6. Patient reports regular followup with nephrologist. patient had LUE AVF which was placed ~4 years ago in [place] which has never been accessed. Patient admitted for transfusion and initiation of HD. Vascular Surgery consulted to evaluate LUE AVF. Successfully dialyzed via LUE AVF yesterday.    - continued HD via LUE  - vascular to follow

## 2020-10-24 NOTE — PROGRESS NOTE ADULT - ASSESSMENT
1) CKD stage V  2) Chronic Anemia  3) HTN  4) CKD-MBD    Plan for HD today  Continue JEISON  UF 1 kg as tolerated; continue current antihypertensive regimen  Outpt HD set up at [facility] [place]; pt has a TTS spot    discussed with Dr. Bass

## 2020-10-29 DIAGNOSIS — E55.9 VITAMIN D DEFICIENCY, UNSPECIFIED: ICD-10-CM

## 2020-10-29 RX ORDER — TORSEMIDE 20 MG/1
20 TABLET ORAL DAILY
Qty: 90 | Refills: 3 | Status: DISCONTINUED | COMMUNITY
Start: 2020-09-15 | End: 2020-10-29

## 2020-10-29 RX ORDER — CALCITRIOL 0.25 UG/1
0.25 CAPSULE, LIQUID FILLED ORAL
Qty: 7 | Refills: 0 | Status: DISCONTINUED | COMMUNITY
Start: 2019-05-07 | End: 2020-10-29

## 2020-10-29 RX ORDER — CARVEDILOL 12.5 MG/1
12.5 TABLET, FILM COATED ORAL
Qty: 60 | Refills: 0 | Status: DISCONTINUED | COMMUNITY
Start: 2020-09-11 | End: 2020-10-29

## 2020-10-29 RX ORDER — SODIUM ZIRCONIUM CYCLOSILICATE 10 G/10G
10 POWDER, FOR SUSPENSION ORAL
Refills: 0 | Status: ACTIVE | COMMUNITY
Start: 2020-10-29

## 2020-10-29 RX ORDER — CARVEDILOL 25 MG/1
25 TABLET, FILM COATED ORAL
Refills: 0 | Status: ACTIVE | COMMUNITY
Start: 2020-10-29

## 2020-10-29 RX ORDER — FUROSEMIDE 40 MG/1
40 TABLET ORAL
Refills: 0 | Status: ACTIVE | COMMUNITY
Start: 2020-10-29

## 2020-10-29 RX ORDER — ISOSORBIDE MONONITRATE 60 MG/1
60 TABLET, EXTENDED RELEASE ORAL
Qty: 180 | Refills: 0 | Status: DISCONTINUED | COMMUNITY
Start: 2017-04-19 | End: 2020-10-29

## 2020-10-30 ENCOUNTER — APPOINTMENT (OUTPATIENT)
Dept: NEPHROLOGY | Facility: CLINIC | Age: 80
End: 2020-10-30

## 2020-11-20 ENCOUNTER — RX RENEWAL (OUTPATIENT)
Age: 80
End: 2020-11-20

## 2020-11-20 RX ORDER — ATORVASTATIN CALCIUM 20 MG/1
20 TABLET, FILM COATED ORAL
Qty: 90 | Refills: 0 | Status: ACTIVE | COMMUNITY
Start: 2020-09-11 | End: 1900-01-01

## 2020-12-02 ENCOUNTER — RX CHANGE (OUTPATIENT)
Age: 80
End: 2020-12-02

## 2020-12-02 RX ORDER — ERGOCALCIFEROL 1.25 MG/1
1.25 MG CAPSULE, LIQUID FILLED ORAL
Qty: 12 | Refills: 2 | Status: ACTIVE | COMMUNITY
Start: 2020-12-02 | End: 1900-01-01

## 2020-12-02 RX ORDER — ERGOCALCIFEROL 1.25 MG/1
1.25 MG CAPSULE, LIQUID FILLED ORAL
Qty: 4 | Refills: 5 | Status: DISCONTINUED | COMMUNITY
Start: 2020-11-10 | End: 2020-12-02

## 2020-12-03 ENCOUNTER — EMERGENCY (EMERGENCY)
Facility: HOSPITAL | Age: 80
LOS: 1 days | Discharge: HOME | End: 2020-12-03
Attending: EMERGENCY MEDICINE
Payer: MEDICARE

## 2020-12-03 VITALS
HEART RATE: 67 BPM | OXYGEN SATURATION: 100 % | DIASTOLIC BLOOD PRESSURE: 50 MMHG | WEIGHT: 139.99 LBS | RESPIRATION RATE: 16 BRPM | TEMPERATURE: 99 F | SYSTOLIC BLOOD PRESSURE: 92 MMHG | HEIGHT: 61 IN

## 2020-12-03 DIAGNOSIS — Z90.49 ACQUIRED ABSENCE OF OTHER SPECIFIED PARTS OF DIGESTIVE TRACT: Chronic | ICD-10-CM

## 2020-12-03 DIAGNOSIS — Z95.2 PRESENCE OF PROSTHETIC HEART VALVE: Chronic | ICD-10-CM

## 2020-12-03 DIAGNOSIS — Z95.0 PRESENCE OF CARDIAC PACEMAKER: Chronic | ICD-10-CM

## 2020-12-03 DIAGNOSIS — I77.0 ARTERIOVENOUS FISTULA, ACQUIRED: Chronic | ICD-10-CM

## 2020-12-03 LAB
ALBUMIN SERPL ELPH-MCNC: 2.9 G/DL — LOW (ref 3.3–5.2)
ALP SERPL-CCNC: 64 U/L — SIGNIFICANT CHANGE UP (ref 40–120)
ALT FLD-CCNC: 5 U/L — SIGNIFICANT CHANGE UP
ANION GAP SERPL CALC-SCNC: 13 MMOL/L — SIGNIFICANT CHANGE UP (ref 5–17)
ANISOCYTOSIS BLD QL: SLIGHT — SIGNIFICANT CHANGE UP
APTT BLD: 26.3 SEC — LOW (ref 27.5–35.5)
AST SERPL-CCNC: 18 U/L — SIGNIFICANT CHANGE UP
BASOPHILS # BLD AUTO: 0.07 K/UL — SIGNIFICANT CHANGE UP (ref 0–0.2)
BASOPHILS NFR BLD AUTO: 0.9 % — SIGNIFICANT CHANGE UP (ref 0–2)
BILIRUB SERPL-MCNC: 0.2 MG/DL — LOW (ref 0.4–2)
BLD GP AB SCN SERPL QL: SIGNIFICANT CHANGE UP
BUN SERPL-MCNC: 27 MG/DL — HIGH (ref 8–20)
CALCIUM SERPL-MCNC: 8.9 MG/DL — SIGNIFICANT CHANGE UP (ref 8.6–10.2)
CHLORIDE SERPL-SCNC: 95 MMOL/L — LOW (ref 98–107)
CO2 SERPL-SCNC: 32 MMOL/L — HIGH (ref 22–29)
CREAT SERPL-MCNC: 2.75 MG/DL — HIGH (ref 0.5–1.3)
DACRYOCYTES BLD QL SMEAR: SLIGHT — SIGNIFICANT CHANGE UP
ELLIPTOCYTES BLD QL SMEAR: SLIGHT — SIGNIFICANT CHANGE UP
EOSINOPHIL # BLD AUTO: 0.13 K/UL — SIGNIFICANT CHANGE UP (ref 0–0.5)
EOSINOPHIL NFR BLD AUTO: 1.7 % — SIGNIFICANT CHANGE UP (ref 0–6)
GLUCOSE SERPL-MCNC: 118 MG/DL — HIGH (ref 70–99)
HCT VFR BLD CALC: 21.3 % — LOW (ref 34.5–45)
HGB BLD-MCNC: 6.1 G/DL — CRITICAL LOW (ref 11.5–15.5)
HYPOCHROMIA BLD QL: SIGNIFICANT CHANGE UP
INR BLD: 1.08 RATIO — SIGNIFICANT CHANGE UP (ref 0.88–1.16)
LYMPHOCYTES # BLD AUTO: 0.62 K/UL — LOW (ref 1–3.3)
LYMPHOCYTES # BLD AUTO: 7.9 % — LOW (ref 13–44)
MANUAL SMEAR VERIFICATION: SIGNIFICANT CHANGE UP
MCHC RBC-ENTMCNC: 24.8 PG — LOW (ref 27–34)
MCHC RBC-ENTMCNC: 28.6 GM/DL — LOW (ref 32–36)
MCV RBC AUTO: 86.6 FL — SIGNIFICANT CHANGE UP (ref 80–100)
MICROCYTES BLD QL: SLIGHT — SIGNIFICANT CHANGE UP
MONOCYTES # BLD AUTO: 0.35 K/UL — SIGNIFICANT CHANGE UP (ref 0–0.9)
MONOCYTES NFR BLD AUTO: 4.4 % — SIGNIFICANT CHANGE UP (ref 2–14)
NEUTROPHILS # BLD AUTO: 6.56 K/UL — SIGNIFICANT CHANGE UP (ref 1.8–7.4)
NEUTROPHILS NFR BLD AUTO: 79.8 % — HIGH (ref 43–77)
NEUTS BAND # BLD: 3.5 % — SIGNIFICANT CHANGE UP (ref 0–8)
OVALOCYTES BLD QL SMEAR: SLIGHT — SIGNIFICANT CHANGE UP
PLAT MORPH BLD: NORMAL — SIGNIFICANT CHANGE UP
PLATELET # BLD AUTO: 187 K/UL — SIGNIFICANT CHANGE UP (ref 150–400)
POIKILOCYTOSIS BLD QL AUTO: SLIGHT — SIGNIFICANT CHANGE UP
POLYCHROMASIA BLD QL SMEAR: SIGNIFICANT CHANGE UP
POTASSIUM SERPL-MCNC: 3.9 MMOL/L — SIGNIFICANT CHANGE UP (ref 3.5–5.3)
POTASSIUM SERPL-SCNC: 3.9 MMOL/L — SIGNIFICANT CHANGE UP (ref 3.5–5.3)
PROT SERPL-MCNC: 5.5 G/DL — LOW (ref 6.6–8.7)
PROTHROM AB SERPL-ACNC: 12.5 SEC — SIGNIFICANT CHANGE UP (ref 10.6–13.6)
RBC # BLD: 2.46 M/UL — LOW (ref 3.8–5.2)
RBC # FLD: 14.3 % — SIGNIFICANT CHANGE UP (ref 10.3–14.5)
RBC BLD AUTO: ABNORMAL
SODIUM SERPL-SCNC: 140 MMOL/L — SIGNIFICANT CHANGE UP (ref 135–145)
VARIANT LYMPHS # BLD: 1.8 % — SIGNIFICANT CHANGE UP (ref 0–6)
WBC # BLD: 7.87 K/UL — SIGNIFICANT CHANGE UP (ref 3.8–10.5)
WBC # FLD AUTO: 7.87 K/UL — SIGNIFICANT CHANGE UP (ref 3.8–10.5)

## 2020-12-03 PROCEDURE — 99220: CPT

## 2020-12-03 RX ORDER — MIRTAZAPINE 45 MG/1
15 TABLET, ORALLY DISINTEGRATING ORAL AT BEDTIME
Refills: 0 | Status: DISCONTINUED | OUTPATIENT
Start: 2020-12-03 | End: 2020-12-08

## 2020-12-03 RX ORDER — FUROSEMIDE 40 MG
40 TABLET ORAL DAILY
Refills: 0 | Status: DISCONTINUED | OUTPATIENT
Start: 2020-12-03 | End: 2020-12-08

## 2020-12-03 RX ORDER — ALLOPURINOL 300 MG
100 TABLET ORAL DAILY
Refills: 0 | Status: DISCONTINUED | OUTPATIENT
Start: 2020-12-04 | End: 2020-12-08

## 2020-12-03 RX ORDER — CARVEDILOL PHOSPHATE 80 MG/1
25 CAPSULE, EXTENDED RELEASE ORAL EVERY 12 HOURS
Refills: 0 | Status: DISCONTINUED | OUTPATIENT
Start: 2020-12-03 | End: 2020-12-08

## 2020-12-03 RX ORDER — ACETAMINOPHEN 500 MG
650 TABLET ORAL ONCE
Refills: 0 | Status: COMPLETED | OUTPATIENT
Start: 2020-12-03 | End: 2020-12-03

## 2020-12-03 RX ORDER — ISOSORBIDE MONONITRATE 60 MG/1
120 TABLET, EXTENDED RELEASE ORAL DAILY
Refills: 0 | Status: DISCONTINUED | OUTPATIENT
Start: 2020-12-03 | End: 2020-12-08

## 2020-12-03 RX ORDER — ALBUTEROL 90 UG/1
2 AEROSOL, METERED ORAL EVERY 6 HOURS
Refills: 0 | Status: DISCONTINUED | OUTPATIENT
Start: 2020-12-03 | End: 2020-12-08

## 2020-12-03 RX ORDER — HYDRALAZINE HCL 50 MG
100 TABLET ORAL THREE TIMES A DAY
Refills: 0 | Status: DISCONTINUED | OUTPATIENT
Start: 2020-12-03 | End: 2020-12-08

## 2020-12-03 RX ADMIN — Medication 650 MILLIGRAM(S): at 21:24

## 2020-12-03 RX ADMIN — Medication 100 MILLIGRAM(S): at 22:00

## 2020-12-03 RX ADMIN — MIRTAZAPINE 15 MILLIGRAM(S): 45 TABLET, ORALLY DISINTEGRATING ORAL at 22:00

## 2020-12-03 NOTE — ED PROVIDER NOTE - NS_EDPROVIDERDISPOUSERTYPE_ED_A_ED
HOSPITALIST  PROGRESS NOTE:    Patient:  Len Colby Attending:  Caesar Mireles MD   :  1954 Date:  10/31/2019 3:21 PM   AGE:  65 year old Current Hospital Day: Hospital Day: 2   SEX:  male        Chief Complaint:    There are no active hospital problems to display for this patient.       Subjective:  Len Colby is a 65 year old male who was admitted on 10/30/2019 for s/p right knee total replacement .  Patient seen and examined.  States that continued to have severe pain on the right knee.  No chest pain or shortness of breath.  No other acute overnight event.    Objective:       Intake/Output:      I&O Last shift:  No intake/output data recorded.    I&O Last 24 hours:      Intake/Output Summary (Last 24 hours) at 10/31/2019 1521  Last data filed at 10/31/2019 0400  Gross per 24 hour   Intake 1600 ml   Output 0 ml   Net 1600 ml       Last Stool Occurrence:       Vitals 24 Hour Range Last Value   Temperature Temp  Min: 97.3 °F (36.3 °C)  Max: 98.8 °F (37.1 °C) 98.1 °F (36.7 °C) (10/31/19 0750)   Pulse Pulse  Min: 85  Max: 104 85 (10/31/19 0750)   Respiratory Resp  Min: 13  Max: 24 16 (10/31/19 1501)   Non-Invasive Blood Pressure BP  Min: 108/64  Max: 208/88 126/75 (10/31/19 0750)   Pulse Oximetry SpO2  Min: 90 %  Max: 96 % 93 % (10/31/19 1501)       Vital Most Recent Value First Value   Weight 83 kg Weight: 83 kg   Height 5' 4\" (162.6 cm) Height: 5' 4\" (162.6 cm)     Physical Exam:  General - Alert and Oriented to person, place and time.  Patient is in no acute distress.   Patient is conversing freely in full sentences.    HEENT -  Sclerae are anicteric. Oropharynx is clear without erythema; buccal mucous membranes are moist.  Neck is soft and supple.   CV - Regular rate and rhythm, normal S1 and S2, no S3 or S4, no murmurs or rubs.   Pulm -  Lungs are clear to auscultation bilaterally.  No wheezes, rales or rhonchi.  Abd - Soft, non-tender and non-distended.  Bowel sounds are normoactive.  No guarding or  rebound tenderness.   Ext -right knee with surgical dressing  Skin - No rashes or erythema.    Neuro - CNs II-XII are intact.  No focal deficits.     Laboratory Results:  Recent Labs   Lab 10/31/19  0529   SODIUM 136   POTASSIUM 4.7   CHLORIDE 106   CO2 25   BUN 17   CREATININE 0.82   GLUCOSE 183*     Recent Labs   Lab 10/31/19  0529   WBC 9.7   HGB 11.6*   HCT 34.8*          URINE  No results found    Cultures:  Not Applicable    Radiology Results:  No orders to display       Assessment and plan:   Osteoarthritis of the right knee status post right knee arthroplasty.  -admitted by Ortho medics for elective surgery.  -patient has no history of DVT or pulmonary embolism.  -patient is working with physical and occupational therapy.  - is working with the patient for disposition.    Anxiety and depression  -continue home medication.  He has been on Zyprexa.  Discussed with orthopedics surgery PA.  Orthopedics will assume attending for this patient    Diet:  resume prior diet and regular diet    Best practice:  DVT prophylaxis:SCDs, TEDs and as per Orthopedics.   Garrett in Place: No     Goals of Care:    Patient is decisional: Yes  Patient has POA documents in the chart: Yes  Code Status: Full Code  Rationale behind code status: self      DISCHARGE PLANNING:  EXPECTED DISCHARGE DATE: 1-2 days- medically has no issue for discharge  POSSIBLE BARRIERS TO DISCHARGE:  Working with physical and occupational therapy  TENTATIVE DISCHARGE DISPOSITION:  To be determined          PT and OT Orders Placed this Encounter   Procedures   • Occupational Therapy   • Physical Therapy         Care plan  discussed in detail with:  Patient, Family, RN and MD      10/31/2019  3:21 PM    Caesar Jenkins  [phone]  Hospitalist,[place] [address].   TEL. [phone]   FAX. [phone]  Addendum  Discussed with orthopedic PA- they prefer ASA 81 mg BID and no  Lovenox or heparin at this time       Attending Attestation (For Attendings USE Only)...

## 2020-12-03 NOTE — ED CDU PROVIDER INITIAL DAY NOTE - MEDICAL DECISION MAKING DETAILS
Pt with chronic anemia 2/2 CKD, will keep in obs for 1 unit PRBC transfusion over 4 hours, plan for dc in morning and to have dialysis tomorrow as scheduled

## 2020-12-03 NOTE — ED ADULT NURSE REASSESSMENT NOTE - NSIMPLEMENTINTERV_GEN_ALL_ED
Implemented All Universal Safety Interventions:  [place] to call system. Call bell, personal items and telephone within reach. Instruct patient to call for assistance. Room bathroom lighting operational. Non-slip footwear when patient is off stretcher. Physically safe environment: no spills, clutter or unnecessary equipment. Stretcher in lowest position, wheels locked, appropriate side rails in place.

## 2020-12-03 NOTE — ED ADULT NURSE REASSESSMENT NOTE - NS ED NURSE REASSESS COMMENT FT1
Received patient from the main ED  RN with 1unit PRBC in progress. v/s stable afebrile. denies chills back pain or any discomfort  Pt AxO4, VSS.  Pt denies chest pain/SOB at this time.  Cardio NSR on cardiac monitor.   IV insertion site intact with angio cath #20 noted ..  Safety measures taken, bed in low position, call bell within reach, side rails up x2.  Plan of care explained.  Pt verbalized understanding.  Will continue to monitor.

## 2020-12-03 NOTE — ED ADULT NURSE REASSESSMENT NOTE - NS ED NURSE REASSESS COMMENT FT1
received pt aao x4. respirations even and unlabored skin color wnl warm and dry. left a/v fistula noted and left chest wall HD  port noted. dressing in tact. pt denies dizziness, nausea, vomiting, chest pain, sob. vital signs stable. MD at bedside for blood consent.

## 2020-12-03 NOTE — ED PROVIDER NOTE - OBJECTIVE STATEMENT
79 y/o female with h/o ESRD comes in for blood transfusion   states has intermittent episodes of SOB / fatigue   no chest pain  h/o chronic anemia likely secondary to CKD

## 2020-12-03 NOTE — ED CDU PROVIDER INITIAL DAY NOTE - PHYSICAL EXAMINATION
Gen: WD/WN, NAD, non-toxic  Head: NCAT  Neck:. Soft and supple. No JVD  Cardiac: RRR +S1S2.   Resp: Speaking in full sentences. CTAB, no wheezes, rales rhonchi.  Abd: +BS x 4. Soft, non-tender, non-distended. No guarding or rebound.  Ext: No c/c/e, peripheral pulses 2+, symmetric  MSK: FROM extremities x 4, no bony ttp  Skin: LUE AV Fistula, left chest wall port (not in use)  Neuro: Awake, alert & oriented x 3. No focal deficits

## 2020-12-03 NOTE — ED CDU PROVIDER INITIAL DAY NOTE - OBJECTIVE STATEMENT
79yo F pmhx ESRD on HD M/W/F, HTN, gout presents to ED for anemia. Pt had dialysis appointment yesterday, no complications, blood work done at center showed low hemoglobin. Pt told to come to ED for transfusion. Baseline hgb 7-8. Pt is due for dialysis tomorrow at 1600. Spoke to pt's son Bernard [phone] to confirm med rec, states he can pick his mother up at 0700 tomorrow morning. Lives with family, wheelchair bound. Pt without complaints at this time. Denies fever, dyspnea, cp, headache, dizziness.   Nephrology: Dr. Willis

## 2020-12-03 NOTE — ED ADULT TRIAGE NOTE - CHIEF COMPLAINT QUOTE
Pt A&Ox4 states "Mt doctor sent me for a blood transfusion." Patients Dr. Brown said  Hemoglobin was 6.7 and needs a transfusion.

## 2020-12-04 VITALS
OXYGEN SATURATION: 100 % | HEART RATE: 60 BPM | RESPIRATION RATE: 18 BRPM | SYSTOLIC BLOOD PRESSURE: 152 MMHG | DIASTOLIC BLOOD PRESSURE: 59 MMHG | TEMPERATURE: 98 F

## 2020-12-04 PROCEDURE — 86923 COMPATIBILITY TEST ELECTRIC: CPT

## 2020-12-04 PROCEDURE — 85025 COMPLETE CBC W/AUTO DIFF WBC: CPT

## 2020-12-04 PROCEDURE — 85610 PROTHROMBIN TIME: CPT

## 2020-12-04 PROCEDURE — 85730 THROMBOPLASTIN TIME PARTIAL: CPT

## 2020-12-04 PROCEDURE — 86901 BLOOD TYPING SEROLOGIC RH(D): CPT

## 2020-12-04 PROCEDURE — 36430 TRANSFUSION BLD/BLD COMPNT: CPT

## 2020-12-04 PROCEDURE — 99285 EMERGENCY DEPT VISIT HI MDM: CPT | Mod: 25

## 2020-12-04 PROCEDURE — P9040: CPT

## 2020-12-04 PROCEDURE — 80053 COMPREHEN METABOLIC PANEL: CPT

## 2020-12-04 PROCEDURE — 86850 RBC ANTIBODY SCREEN: CPT

## 2020-12-04 PROCEDURE — 36415 COLL VENOUS BLD VENIPUNCTURE: CPT

## 2020-12-04 PROCEDURE — G0378: CPT

## 2020-12-04 PROCEDURE — 86900 BLOOD TYPING SEROLOGIC ABO: CPT

## 2020-12-04 PROCEDURE — 99217: CPT

## 2020-12-04 RX ADMIN — CARVEDILOL PHOSPHATE 25 MILLIGRAM(S): 80 CAPSULE, EXTENDED RELEASE ORAL at 06:51

## 2020-12-04 RX ADMIN — Medication 100 MILLIGRAM(S): at 06:51

## 2020-12-04 RX ADMIN — Medication 0.2 MILLIGRAM(S): at 06:51

## 2020-12-04 NOTE — ED CDU PROVIDER SUBSEQUENT DAY NOTE - ATTENDING CONTRIBUTION TO CARE
Acute on chronic anemia requiring transfusion, s/p 1u PRBC, plan for dc to get dialysis at scheduled appt.

## 2020-12-04 NOTE — ED CDU PROVIDER DISPOSITION NOTE - NSFOLLOWUPINSTRUCTIONS_ED_ALL_ED_FT
- Follow up with your doctor within 2-3 days.   - Return to the ED for any new or worsening symptoms.    - GO TO YOUR DIALYSIS APPOINTMENT AS SCHEDULED TODAY    Anemia    Anemia is a condition in which the concentration of red blood cells or hemoglobin in the blood is below normal. Hemoglobin is a substance in red blood cells that carries oxygen to the tissues of the body. Anemia results in not enough oxygen reaching these tissues which can cause symptoms such as weakness, dizziness/lightheadedness, shortness of breath, chest pain, paleness, or nausea. The cause of your anemia may or may not be determined immediately. If your hemoglobin was dangerously low, you may have received a blood transfusion. Usually reactions to transfusions occur immediately but monitor yourself for any fevers, rash, or shortness of breath.    SEEK IMMEDIATE MEDICAL CARE IF YOU HAVE ANY OF THE FOLLOWING SYMPTOMS: extreme weakness/chest pain/shortness of breath, black or bloody stools, vomiting blood, fainting, fever, or any signs of dehydration.

## 2020-12-04 NOTE — ED CDU PROVIDER SUBSEQUENT DAY NOTE - HISTORY
No events overnight, pt resting comfortably at time of re-assessment. Transfusion completed, no complications. VSS. Will dc at 0700 when son arrives.

## 2020-12-04 NOTE — ED CDU PROVIDER DISPOSITION NOTE - PATIENT PORTAL LINK FT
You can access the FollowMyHealth Patient Portal offered by [facility] by registering at the following website: [email or web address]. By joining [employer]’s FollowMyHealth portal, you will also be able to view your health information using other applications (apps) compatible with our system.

## 2020-12-04 NOTE — ED CDU PROVIDER DISPOSITION NOTE - CLINICAL COURSE
81yo F pmhx ESRD on HD M/W/F, HTN, gout presented to ED for anemia. Pt had dialysis appointment Wednesday, no complications, blood work done at center showed low hemoglobin. Pt told to come to ED for transfusion. Pt transfused 1 unit PRBC over 4 hours while in cdu, no complications. Pt observed following transfusion. Feeling well, VSS, NAD. Pt given copy of all results. Pt's son coming to pick pt up at 0700. Has dialysis today at 1600. Return precautions discussed, stable for dc

## 2020-12-07 ENCOUNTER — INPATIENT (INPATIENT)
Facility: HOSPITAL | Age: 80
LOS: 6 days | Discharge: HOME | DRG: 314 | End: 2020-12-14
Attending: INTERNAL MEDICINE | Admitting: HOSPITALIST
Payer: MEDICARE

## 2020-12-07 VITALS
RESPIRATION RATE: 22 BRPM | HEIGHT: 61 IN | OXYGEN SATURATION: 98 % | TEMPERATURE: 103 F | SYSTOLIC BLOOD PRESSURE: 133 MMHG | HEART RATE: 93 BPM | DIASTOLIC BLOOD PRESSURE: 69 MMHG

## 2020-12-07 DIAGNOSIS — Z90.49 ACQUIRED ABSENCE OF OTHER SPECIFIED PARTS OF DIGESTIVE TRACT: Chronic | ICD-10-CM

## 2020-12-07 DIAGNOSIS — Z95.2 PRESENCE OF PROSTHETIC HEART VALVE: Chronic | ICD-10-CM

## 2020-12-07 DIAGNOSIS — I77.0 ARTERIOVENOUS FISTULA, ACQUIRED: Chronic | ICD-10-CM

## 2020-12-07 DIAGNOSIS — Z95.0 PRESENCE OF CARDIAC PACEMAKER: Chronic | ICD-10-CM

## 2020-12-07 LAB
ALBUMIN SERPL ELPH-MCNC: 3.5 G/DL — SIGNIFICANT CHANGE UP (ref 3.3–5.2)
ALP SERPL-CCNC: 85 U/L — SIGNIFICANT CHANGE UP (ref 40–120)
ALT FLD-CCNC: 7 U/L — SIGNIFICANT CHANGE UP
ANION GAP SERPL CALC-SCNC: 11 MMOL/L — SIGNIFICANT CHANGE UP (ref 5–17)
ANISOCYTOSIS BLD QL: SLIGHT — SIGNIFICANT CHANGE UP
APTT BLD: 28.4 SEC — SIGNIFICANT CHANGE UP (ref 27.5–35.5)
AST SERPL-CCNC: 24 U/L — SIGNIFICANT CHANGE UP
BASOPHILS # BLD AUTO: 0 K/UL — SIGNIFICANT CHANGE UP (ref 0–0.2)
BASOPHILS NFR BLD AUTO: 0 % — SIGNIFICANT CHANGE UP (ref 0–2)
BILIRUB SERPL-MCNC: 0.3 MG/DL — LOW (ref 0.4–2)
BUN SERPL-MCNC: 18 MG/DL — SIGNIFICANT CHANGE UP (ref 8–20)
CALCIUM SERPL-MCNC: 8.5 MG/DL — LOW (ref 8.6–10.2)
CHLORIDE SERPL-SCNC: 95 MMOL/L — LOW (ref 98–107)
CO2 SERPL-SCNC: 30 MMOL/L — HIGH (ref 22–29)
CREAT SERPL-MCNC: 1.99 MG/DL — HIGH (ref 0.5–1.3)
DACRYOCYTES BLD QL SMEAR: SLIGHT — SIGNIFICANT CHANGE UP
ELLIPTOCYTES BLD QL SMEAR: SLIGHT — SIGNIFICANT CHANGE UP
EOSINOPHIL # BLD AUTO: 0 K/UL — SIGNIFICANT CHANGE UP (ref 0–0.5)
EOSINOPHIL NFR BLD AUTO: 0 % — SIGNIFICANT CHANGE UP (ref 0–6)
GIANT PLATELETS BLD QL SMEAR: PRESENT — SIGNIFICANT CHANGE UP
GLUCOSE SERPL-MCNC: 77 MG/DL — SIGNIFICANT CHANGE UP (ref 70–99)
HCT VFR BLD CALC: 32 % — LOW (ref 34.5–45)
HGB BLD-MCNC: 9.4 G/DL — LOW (ref 11.5–15.5)
INR BLD: 1.09 RATIO — SIGNIFICANT CHANGE UP (ref 0.88–1.16)
LACTATE BLDV-MCNC: 2.7 MMOL/L — HIGH (ref 0.5–2)
LYMPHOCYTES # BLD AUTO: 0.1 K/UL — LOW (ref 1–3.3)
LYMPHOCYTES # BLD AUTO: 2.6 % — LOW (ref 13–44)
MACROCYTES BLD QL: SLIGHT — SIGNIFICANT CHANGE UP
MANUAL SMEAR VERIFICATION: SIGNIFICANT CHANGE UP
MCHC RBC-ENTMCNC: 25.4 PG — LOW (ref 27–34)
MCHC RBC-ENTMCNC: 29.4 GM/DL — LOW (ref 32–36)
MCV RBC AUTO: 86.5 FL — SIGNIFICANT CHANGE UP (ref 80–100)
METAMYELOCYTES # FLD: 3.5 % — HIGH (ref 0–0)
MICROCYTES BLD QL: SLIGHT — SIGNIFICANT CHANGE UP
MONOCYTES # BLD AUTO: 0.13 K/UL — SIGNIFICANT CHANGE UP (ref 0–0.9)
MONOCYTES NFR BLD AUTO: 3.5 % — SIGNIFICANT CHANGE UP (ref 2–14)
NEUTROPHILS # BLD AUTO: 3.35 K/UL — SIGNIFICANT CHANGE UP (ref 1.8–7.4)
NEUTROPHILS NFR BLD AUTO: 82.5 % — HIGH (ref 43–77)
NEUTS BAND # BLD: 7 % — SIGNIFICANT CHANGE UP (ref 0–8)
NRBC # BLD: 6 /100 — HIGH (ref 0–0)
OVALOCYTES BLD QL SMEAR: SLIGHT — SIGNIFICANT CHANGE UP
PLAT MORPH BLD: NORMAL — SIGNIFICANT CHANGE UP
PLATELET # BLD AUTO: 177 K/UL — SIGNIFICANT CHANGE UP (ref 150–400)
POIKILOCYTOSIS BLD QL AUTO: SLIGHT — SIGNIFICANT CHANGE UP
POLYCHROMASIA BLD QL SMEAR: SLIGHT — SIGNIFICANT CHANGE UP
POTASSIUM SERPL-MCNC: 4.1 MMOL/L — SIGNIFICANT CHANGE UP (ref 3.5–5.3)
POTASSIUM SERPL-SCNC: 4.1 MMOL/L — SIGNIFICANT CHANGE UP (ref 3.5–5.3)
PROT SERPL-MCNC: 6.6 G/DL — SIGNIFICANT CHANGE UP (ref 6.6–8.7)
PROTHROM AB SERPL-ACNC: 12.6 SEC — SIGNIFICANT CHANGE UP (ref 10.6–13.6)
RBC # BLD: 3.7 M/UL — LOW (ref 3.8–5.2)
RBC # FLD: 14.6 % — HIGH (ref 10.3–14.5)
RBC BLD AUTO: ABNORMAL
SODIUM SERPL-SCNC: 136 MMOL/L — SIGNIFICANT CHANGE UP (ref 135–145)
VARIANT LYMPHS # BLD: 0.9 % — SIGNIFICANT CHANGE UP (ref 0–6)
WBC # BLD: 3.74 K/UL — LOW (ref 3.8–10.5)
WBC # FLD AUTO: 3.74 K/UL — LOW (ref 3.8–10.5)

## 2020-12-07 PROCEDURE — 93010 ELECTROCARDIOGRAM REPORT: CPT

## 2020-12-07 PROCEDURE — 99285 EMERGENCY DEPT VISIT HI MDM: CPT | Mod: CS

## 2020-12-07 PROCEDURE — 71045 X-RAY EXAM CHEST 1 VIEW: CPT | Mod: 26

## 2020-12-07 RX ORDER — ACETAMINOPHEN 500 MG
650 TABLET ORAL ONCE
Refills: 0 | Status: COMPLETED | OUTPATIENT
Start: 2020-12-07 | End: 2020-12-07

## 2020-12-07 RX ORDER — CEFTRIAXONE 500 MG/1
1000 INJECTION, POWDER, FOR SOLUTION INTRAMUSCULAR; INTRAVENOUS ONCE
Refills: 0 | Status: COMPLETED | OUTPATIENT
Start: 2020-12-07 | End: 2020-12-07

## 2020-12-07 RX ORDER — SODIUM CHLORIDE 9 MG/ML
1600 INJECTION INTRAMUSCULAR; INTRAVENOUS; SUBCUTANEOUS ONCE
Refills: 0 | Status: COMPLETED | OUTPATIENT
Start: 2020-12-07 | End: 2020-12-07

## 2020-12-07 RX ADMIN — CEFTRIAXONE 100 MILLIGRAM(S): 500 INJECTION, POWDER, FOR SOLUTION INTRAMUSCULAR; INTRAVENOUS at 21:45

## 2020-12-07 RX ADMIN — SODIUM CHLORIDE 1600 MILLILITER(S): 9 INJECTION INTRAMUSCULAR; INTRAVENOUS; SUBCUTANEOUS at 21:45

## 2020-12-07 RX ADMIN — Medication 650 MILLIGRAM(S): at 21:45

## 2020-12-07 NOTE — ED ADULT TRIAGE NOTE - CHIEF COMPLAINT QUOTE
patient sent from dialysis for hypoglycemia as per ems patient blood sugar 60 received 1amp d50, ems states that patients BGL went up to 80, ems states that dialysis center was also complaining of shortness of breath, patient denies any complaints at this time, patient denies any fevers at home, denies any sick contacts

## 2020-12-07 NOTE — ED PROVIDER NOTE - OBJECTIVE STATEMENT
79 yo female with above c/c. Pt was hypoglycemic at dialysis to 53 as per son. She received meds and glucose christopher to 63. She received more meds and sugar was 93 by the time EMS arrived. In ER she had tachypnea and temp to 101.   ED sepsis order set followed 30 cc/ hr  and iv AB  med hx Anemia Associated with Chronic Renal Failure    Arthritis    CAD (coronary artery disease)    CKD (chronic kidney disease) stage 4, GFR 15-29 ml/min    DM2 (diabetes mellitus, type 2)    Gout    HTN (Hypertension)    Osteoarthritis  bilateral knees  Severe aortic stenosis    Thyroid nodule  awaiting FNB in the near future

## 2020-12-07 NOTE — ED ADULT NURSE NOTE - NSIMPLEMENTINTERV_GEN_ALL_ED
Implemented All Fall with Harm Risk Interventions:  [place] to call system. Call bell, personal items and telephone within reach. Instruct patient to call for assistance. Room bathroom lighting operational. Non-slip footwear when patient is off stretcher. Physically safe environment: no spills, clutter or unnecessary equipment. Stretcher in lowest position, wheels locked, appropriate side rails in place. Provide visual cue, wrist band, yellow gown, etc. Monitor gait and stability. Monitor for mental status changes and reorient to person, place, and time. Review medications for side effects contributing to fall risk. Reinforce activity limits and safety measures with patient and family. Provide visual clues: red socks.

## 2020-12-07 NOTE — ED PROVIDER NOTE - CLINICAL SUMMARY MEDICAL DECISION MAKING FREE TEXT BOX
pt [id number] at dialyssi sent to ER  + tachypnea and fever ...code sepsis called  ed seppsi protocol with 30 cc per kg and iv ab followed   pe as doc  originally slight confused and headache then improved after fluids  will admit as unknown source of fever possible covid

## 2020-12-07 NOTE — ED ADULT NURSE REASSESSMENT NOTE - NS ED NURSE REASSESS COMMENT FT1
Report received from JAMAR Crowe. Pt received drowsy, arouseable, oriented to self and . Pt with respirations tachypenic, fine crackles auscultated at base of lungs bilaterally. Pt reporting SOB, reporting as baseline. Pt denies chest pain. pt with no nausea/vomiting. Report received from JAMAR Crowe at 2315. Pt received drowsy, arouseable, oriented to self and . Pt with respirations tachypenic, fine crackles auscultated at base of lungs bilaterally. Pt reporting SOB, reporting as baseline. Pt denies chest pain. pt with no nausea/vomiting. Skin warm, dry, appropriate for race. Peripheral pulses palpable. L Arm AVF + for bruit and thrill, dressing CDI. 20g PIV in R AC with NS infusing. Cardiac monitor and continuous pulse ox monitor in place. Will continue to monitor.

## 2020-12-08 DIAGNOSIS — N18.9 CHRONIC KIDNEY DISEASE, UNSPECIFIED: ICD-10-CM

## 2020-12-08 DIAGNOSIS — R65.10 SYSTEMIC INFLAMMATORY RESPONSE SYNDROME (SIRS) OF NON-INFECTIOUS ORIGIN WITHOUT ACUTE ORGAN DYSFUNCTION: ICD-10-CM

## 2020-12-08 DIAGNOSIS — E16.2 HYPOGLYCEMIA, UNSPECIFIED: ICD-10-CM

## 2020-12-08 DIAGNOSIS — A41.9 SEPSIS, UNSPECIFIED ORGANISM: ICD-10-CM

## 2020-12-08 DIAGNOSIS — N18.6 END STAGE RENAL DISEASE: ICD-10-CM

## 2020-12-08 DIAGNOSIS — I10 ESSENTIAL (PRIMARY) HYPERTENSION: ICD-10-CM

## 2020-12-08 LAB
ALBUMIN SERPL ELPH-MCNC: 2.8 G/DL — LOW (ref 3.3–5.2)
ALBUMIN SERPL ELPH-MCNC: 2.9 G/DL — LOW (ref 3.3–5.2)
ALP SERPL-CCNC: 63 U/L — SIGNIFICANT CHANGE UP (ref 40–120)
ALP SERPL-CCNC: 68 U/L — SIGNIFICANT CHANGE UP (ref 40–120)
ALT FLD-CCNC: 7 U/L — SIGNIFICANT CHANGE UP
ALT FLD-CCNC: 7 U/L — SIGNIFICANT CHANGE UP
ANION GAP SERPL CALC-SCNC: 13 MMOL/L — SIGNIFICANT CHANGE UP (ref 5–17)
ANION GAP SERPL CALC-SCNC: 13 MMOL/L — SIGNIFICANT CHANGE UP (ref 5–17)
ANION GAP SERPL CALC-SCNC: 16 MMOL/L — SIGNIFICANT CHANGE UP (ref 5–17)
AST SERPL-CCNC: 20 U/L — SIGNIFICANT CHANGE UP
AST SERPL-CCNC: 21 U/L — SIGNIFICANT CHANGE UP
BILIRUB SERPL-MCNC: 0.2 MG/DL — LOW (ref 0.4–2)
BILIRUB SERPL-MCNC: 0.3 MG/DL — LOW (ref 0.4–2)
BLD GP AB SCN SERPL QL: SIGNIFICANT CHANGE UP
BUN SERPL-MCNC: 24 MG/DL — HIGH (ref 8–20)
BUN SERPL-MCNC: 28 MG/DL — HIGH (ref 8–20)
BUN SERPL-MCNC: 31 MG/DL — HIGH (ref 8–20)
CALCIUM SERPL-MCNC: 8 MG/DL — LOW (ref 8.6–10.2)
CALCIUM SERPL-MCNC: 8.4 MG/DL — LOW (ref 8.6–10.2)
CALCIUM SERPL-MCNC: 8.5 MG/DL — LOW (ref 8.6–10.2)
CHLORIDE SERPL-SCNC: 100 MMOL/L — SIGNIFICANT CHANGE UP (ref 98–107)
CHLORIDE SERPL-SCNC: 96 MMOL/L — LOW (ref 98–107)
CHLORIDE SERPL-SCNC: 97 MMOL/L — LOW (ref 98–107)
CO2 SERPL-SCNC: 22 MMOL/L — SIGNIFICANT CHANGE UP (ref 22–29)
CO2 SERPL-SCNC: 24 MMOL/L — SIGNIFICANT CHANGE UP (ref 22–29)
CO2 SERPL-SCNC: 24 MMOL/L — SIGNIFICANT CHANGE UP (ref 22–29)
CREAT SERPL-MCNC: 2.47 MG/DL — HIGH (ref 0.5–1.3)
CREAT SERPL-MCNC: 2.59 MG/DL — HIGH (ref 0.5–1.3)
CREAT SERPL-MCNC: 2.63 MG/DL — HIGH (ref 0.5–1.3)
CRP SERPL-MCNC: 3.98 MG/DL — HIGH (ref 0–0.4)
D DIMER BLD IA.RAPID-MCNC: 4697 NG/ML DDU — HIGH
D DIMER BLD IA.RAPID-MCNC: 5480 NG/ML DDU — HIGH
E COLI DNA BLD POS QL NAA+NON-PROBE: SIGNIFICANT CHANGE UP
FERRITIN SERPL-MCNC: 5890 NG/ML — HIGH (ref 15–150)
GAS PNL BLDA: SIGNIFICANT CHANGE UP
GLUCOSE BLDC GLUCOMTR-MCNC: 100 MG/DL — HIGH (ref 70–99)
GLUCOSE BLDC GLUCOMTR-MCNC: 100 MG/DL — HIGH (ref 70–99)
GLUCOSE BLDC GLUCOMTR-MCNC: 108 MG/DL — HIGH (ref 70–99)
GLUCOSE BLDC GLUCOMTR-MCNC: 56 MG/DL — LOW (ref 70–99)
GLUCOSE BLDC GLUCOMTR-MCNC: 62 MG/DL — LOW (ref 70–99)
GLUCOSE BLDC GLUCOMTR-MCNC: 66 MG/DL — LOW (ref 70–99)
GLUCOSE BLDC GLUCOMTR-MCNC: 72 MG/DL — SIGNIFICANT CHANGE UP (ref 70–99)
GLUCOSE BLDC GLUCOMTR-MCNC: 73 MG/DL — SIGNIFICANT CHANGE UP (ref 70–99)
GLUCOSE BLDC GLUCOMTR-MCNC: 74 MG/DL — SIGNIFICANT CHANGE UP (ref 70–99)
GLUCOSE BLDC GLUCOMTR-MCNC: 74 MG/DL — SIGNIFICANT CHANGE UP (ref 70–99)
GLUCOSE BLDC GLUCOMTR-MCNC: 78 MG/DL — SIGNIFICANT CHANGE UP (ref 70–99)
GLUCOSE BLDC GLUCOMTR-MCNC: 82 MG/DL — SIGNIFICANT CHANGE UP (ref 70–99)
GLUCOSE BLDC GLUCOMTR-MCNC: 86 MG/DL — SIGNIFICANT CHANGE UP (ref 70–99)
GLUCOSE BLDC GLUCOMTR-MCNC: 91 MG/DL — SIGNIFICANT CHANGE UP (ref 70–99)
GLUCOSE SERPL-MCNC: 110 MG/DL — HIGH (ref 70–99)
GLUCOSE SERPL-MCNC: 54 MG/DL — CRITICAL LOW (ref 70–99)
GLUCOSE SERPL-MCNC: 56 MG/DL — LOW (ref 70–99)
GLUCOSE SERPL-MCNC: 64 MG/DL — LOW (ref 70–99)
GRAM STN FLD: SIGNIFICANT CHANGE UP
GRAM STN FLD: SIGNIFICANT CHANGE UP
HCT VFR BLD CALC: 26.7 % — LOW (ref 34.5–45)
HCT VFR BLD CALC: 26.8 % — LOW (ref 34.5–45)
HCT VFR BLD CALC: 28.3 % — LOW (ref 34.5–45)
HGB BLD-MCNC: 7.9 G/DL — LOW (ref 11.5–15.5)
HGB BLD-MCNC: 8 G/DL — LOW (ref 11.5–15.5)
HGB BLD-MCNC: 8.1 G/DL — LOW (ref 11.5–15.5)
INSULIN SERPL-MCNC: 6.3 UU/ML — SIGNIFICANT CHANGE UP (ref 2.6–24.9)
MAGNESIUM SERPL-MCNC: 1.7 MG/DL — SIGNIFICANT CHANGE UP (ref 1.6–2.6)
MAGNESIUM SERPL-MCNC: 1.8 MG/DL — SIGNIFICANT CHANGE UP (ref 1.6–2.6)
MCHC RBC-ENTMCNC: 25.1 PG — LOW (ref 27–34)
MCHC RBC-ENTMCNC: 25.3 PG — LOW (ref 27–34)
MCHC RBC-ENTMCNC: 26 PG — LOW (ref 27–34)
MCHC RBC-ENTMCNC: 28.6 GM/DL — LOW (ref 32–36)
MCHC RBC-ENTMCNC: 29.6 GM/DL — LOW (ref 32–36)
MCHC RBC-ENTMCNC: 29.9 GM/DL — LOW (ref 32–36)
MCV RBC AUTO: 85.6 FL — SIGNIFICANT CHANGE UP (ref 80–100)
MCV RBC AUTO: 87 FL — SIGNIFICANT CHANGE UP (ref 80–100)
MCV RBC AUTO: 87.6 FL — SIGNIFICANT CHANGE UP (ref 80–100)
METHOD TYPE: SIGNIFICANT CHANGE UP
ORGANISM # SPEC MICROSCOPIC CNT: SIGNIFICANT CHANGE UP
PHOSPHATE SERPL-MCNC: 2.2 MG/DL — LOW (ref 2.4–4.7)
PHOSPHATE SERPL-MCNC: 4.1 MG/DL — SIGNIFICANT CHANGE UP (ref 2.4–4.7)
PLATELET # BLD AUTO: 141 K/UL — LOW (ref 150–400)
PLATELET # BLD AUTO: 153 K/UL — SIGNIFICANT CHANGE UP (ref 150–400)
PLATELET # BLD AUTO: 157 K/UL — SIGNIFICANT CHANGE UP (ref 150–400)
POTASSIUM SERPL-MCNC: 2.9 MMOL/L — CRITICAL LOW (ref 3.5–5.3)
POTASSIUM SERPL-MCNC: 3.7 MMOL/L — SIGNIFICANT CHANGE UP (ref 3.5–5.3)
POTASSIUM SERPL-MCNC: 4.7 MMOL/L — SIGNIFICANT CHANGE UP (ref 3.5–5.3)
POTASSIUM SERPL-SCNC: 2.9 MMOL/L — CRITICAL LOW (ref 3.5–5.3)
POTASSIUM SERPL-SCNC: 3.7 MMOL/L — SIGNIFICANT CHANGE UP (ref 3.5–5.3)
POTASSIUM SERPL-SCNC: 4.7 MMOL/L — SIGNIFICANT CHANGE UP (ref 3.5–5.3)
PROCALCITONIN SERPL-MCNC: >100 NG/ML — HIGH (ref 0.02–0.1)
PROT SERPL-MCNC: 5.6 G/DL — LOW (ref 6.6–8.7)
PROT SERPL-MCNC: 5.7 G/DL — LOW (ref 6.6–8.7)
RBC # BLD: 3.08 M/UL — LOW (ref 3.8–5.2)
RBC # BLD: 3.12 M/UL — LOW (ref 3.8–5.2)
RBC # BLD: 3.23 M/UL — LOW (ref 3.8–5.2)
RBC # FLD: 15 % — HIGH (ref 10.3–14.5)
RBC # FLD: 15.1 % — HIGH (ref 10.3–14.5)
RBC # FLD: 15.4 % — HIGH (ref 10.3–14.5)
SARS-COV-2 IGG SERPL QL IA: NEGATIVE — SIGNIFICANT CHANGE UP
SARS-COV-2 IGM SERPL IA-ACNC: <0.1 INDEX — SIGNIFICANT CHANGE UP
SARS-COV-2 RNA SPEC QL NAA+PROBE: SIGNIFICANT CHANGE UP
SODIUM SERPL-SCNC: 133 MMOL/L — LOW (ref 135–145)
SODIUM SERPL-SCNC: 134 MMOL/L — LOW (ref 135–145)
SODIUM SERPL-SCNC: 137 MMOL/L — SIGNIFICANT CHANGE UP (ref 135–145)
SPECIMEN SOURCE: SIGNIFICANT CHANGE UP
SPECIMEN SOURCE: SIGNIFICANT CHANGE UP
WBC # BLD: 13.53 K/UL — HIGH (ref 3.8–10.5)
WBC # BLD: 18 K/UL — HIGH (ref 3.8–10.5)
WBC # BLD: 20.46 K/UL — HIGH (ref 3.8–10.5)
WBC # FLD AUTO: 13.53 K/UL — HIGH (ref 3.8–10.5)
WBC # FLD AUTO: 18 K/UL — HIGH (ref 3.8–10.5)
WBC # FLD AUTO: 20.46 K/UL — HIGH (ref 3.8–10.5)

## 2020-12-08 PROCEDURE — 12345: CPT | Mod: NC

## 2020-12-08 PROCEDURE — 99223 1ST HOSP IP/OBS HIGH 75: CPT

## 2020-12-08 PROCEDURE — 71045 X-RAY EXAM CHEST 1 VIEW: CPT | Mod: 26

## 2020-12-08 PROCEDURE — 74176 CT ABD & PELVIS W/O CONTRAST: CPT | Mod: 26

## 2020-12-08 PROCEDURE — 71250 CT THORAX DX C-: CPT | Mod: 26

## 2020-12-08 RX ORDER — PIPERACILLIN AND TAZOBACTAM 4; .5 G/20ML; G/20ML
2.25 INJECTION, POWDER, LYOPHILIZED, FOR SOLUTION INTRAVENOUS EVERY 12 HOURS
Refills: 0 | Status: DISCONTINUED | OUTPATIENT
Start: 2020-12-08 | End: 2020-12-08

## 2020-12-08 RX ORDER — DEXTROSE 50 % IN WATER 50 %
25 SYRINGE (ML) INTRAVENOUS ONCE
Refills: 0 | Status: DISCONTINUED | OUTPATIENT
Start: 2020-12-08 | End: 2020-12-14

## 2020-12-08 RX ORDER — ALLOPURINOL 300 MG
100 TABLET ORAL DAILY
Refills: 0 | Status: DISCONTINUED | OUTPATIENT
Start: 2020-12-08 | End: 2020-12-14

## 2020-12-08 RX ORDER — MIRTAZAPINE 45 MG/1
15 TABLET, ORALLY DISINTEGRATING ORAL AT BEDTIME
Refills: 0 | Status: DISCONTINUED | OUTPATIENT
Start: 2020-12-08 | End: 2020-12-14

## 2020-12-08 RX ORDER — DEXTROSE 50 % IN WATER 50 %
15 SYRINGE (ML) INTRAVENOUS ONCE
Refills: 0 | Status: COMPLETED | OUTPATIENT
Start: 2020-12-08 | End: 2020-12-08

## 2020-12-08 RX ORDER — DEXTROSE 50 % IN WATER 50 %
15 SYRINGE (ML) INTRAVENOUS ONCE
Refills: 0 | Status: DISCONTINUED | OUTPATIENT
Start: 2020-12-08 | End: 2020-12-14

## 2020-12-08 RX ORDER — LACTOBACILLUS ACIDOPHILUS 100MM CELL
1 CAPSULE ORAL
Refills: 0 | Status: DISCONTINUED | OUTPATIENT
Start: 2020-12-08 | End: 2020-12-14

## 2020-12-08 RX ORDER — SODIUM CHLORIDE 9 MG/ML
250 INJECTION INTRAMUSCULAR; INTRAVENOUS; SUBCUTANEOUS ONCE
Refills: 0 | Status: DISCONTINUED | OUTPATIENT
Start: 2020-12-08 | End: 2020-12-08

## 2020-12-08 RX ORDER — SODIUM CHLORIDE 9 MG/ML
500 INJECTION INTRAMUSCULAR; INTRAVENOUS; SUBCUTANEOUS ONCE
Refills: 0 | Status: COMPLETED | OUTPATIENT
Start: 2020-12-08 | End: 2020-12-08

## 2020-12-08 RX ORDER — ASPIRIN/CALCIUM CARB/MAGNESIUM 324 MG
81 TABLET ORAL DAILY
Refills: 0 | Status: DISCONTINUED | OUTPATIENT
Start: 2020-12-08 | End: 2020-12-08

## 2020-12-08 RX ORDER — POTASSIUM PHOSPHATE, MONOBASIC POTASSIUM PHOSPHATE, DIBASIC 236; 224 MG/ML; MG/ML
15 INJECTION, SOLUTION INTRAVENOUS ONCE
Refills: 0 | Status: COMPLETED | OUTPATIENT
Start: 2020-12-08 | End: 2020-12-08

## 2020-12-08 RX ORDER — HEPARIN SODIUM 5000 [USP'U]/ML
5000 INJECTION INTRAVENOUS; SUBCUTANEOUS EVERY 12 HOURS
Refills: 0 | Status: DISCONTINUED | OUTPATIENT
Start: 2020-12-08 | End: 2020-12-08

## 2020-12-08 RX ORDER — SODIUM CHLORIDE 9 MG/ML
1000 INJECTION, SOLUTION INTRAVENOUS
Refills: 0 | Status: DISCONTINUED | OUTPATIENT
Start: 2020-12-08 | End: 2020-12-09

## 2020-12-08 RX ORDER — VANCOMYCIN HCL 1 G
750 VIAL (EA) INTRAVENOUS ONCE
Refills: 0 | Status: COMPLETED | OUTPATIENT
Start: 2020-12-08 | End: 2020-12-08

## 2020-12-08 RX ORDER — ACETAMINOPHEN 500 MG
650 TABLET ORAL EVERY 6 HOURS
Refills: 0 | Status: DISCONTINUED | OUTPATIENT
Start: 2020-12-08 | End: 2020-12-10

## 2020-12-08 RX ORDER — POTASSIUM CHLORIDE 20 MEQ
10 PACKET (EA) ORAL ONCE
Refills: 0 | Status: COMPLETED | OUTPATIENT
Start: 2020-12-08 | End: 2020-12-08

## 2020-12-08 RX ORDER — CARVEDILOL PHOSPHATE 80 MG/1
12.5 CAPSULE, EXTENDED RELEASE ORAL EVERY 12 HOURS
Refills: 0 | Status: DISCONTINUED | OUTPATIENT
Start: 2020-12-08 | End: 2020-12-08

## 2020-12-08 RX ORDER — ISOSORBIDE MONONITRATE 60 MG/1
120 TABLET, EXTENDED RELEASE ORAL DAILY
Refills: 0 | Status: DISCONTINUED | OUTPATIENT
Start: 2020-12-08 | End: 2020-12-08

## 2020-12-08 RX ORDER — HEPARIN SODIUM 5000 [USP'U]/ML
5000 INJECTION INTRAVENOUS; SUBCUTANEOUS EVERY 8 HOURS
Refills: 0 | Status: DISCONTINUED | OUTPATIENT
Start: 2020-12-08 | End: 2020-12-14

## 2020-12-08 RX ORDER — CALCIUM GLUCONATE 100 MG/ML
2 VIAL (ML) INTRAVENOUS ONCE
Refills: 0 | Status: COMPLETED | OUTPATIENT
Start: 2020-12-08 | End: 2020-12-08

## 2020-12-08 RX ORDER — GLUCAGON INJECTION, SOLUTION 0.5 MG/.1ML
1 INJECTION, SOLUTION SUBCUTANEOUS ONCE
Refills: 0 | Status: DISCONTINUED | OUTPATIENT
Start: 2020-12-08 | End: 2020-12-14

## 2020-12-08 RX ORDER — CARVEDILOL PHOSPHATE 80 MG/1
12.5 CAPSULE, EXTENDED RELEASE ORAL EVERY 12 HOURS
Refills: 0 | Status: DISCONTINUED | OUTPATIENT
Start: 2020-12-08 | End: 2020-12-10

## 2020-12-08 RX ORDER — PIPERACILLIN AND TAZOBACTAM 4; .5 G/20ML; G/20ML
3.38 INJECTION, POWDER, LYOPHILIZED, FOR SOLUTION INTRAVENOUS EVERY 12 HOURS
Refills: 0 | Status: DISCONTINUED | OUTPATIENT
Start: 2020-12-08 | End: 2020-12-11

## 2020-12-08 RX ORDER — SODIUM CHLORIDE 9 MG/ML
500 INJECTION, SOLUTION INTRAVENOUS
Refills: 0 | Status: DISCONTINUED | OUTPATIENT
Start: 2020-12-08 | End: 2020-12-08

## 2020-12-08 RX ORDER — DEXTROSE 50 % IN WATER 50 %
12.5 SYRINGE (ML) INTRAVENOUS ONCE
Refills: 0 | Status: DISCONTINUED | OUTPATIENT
Start: 2020-12-08 | End: 2020-12-14

## 2020-12-08 RX ORDER — HYDRALAZINE HCL 50 MG
25 TABLET ORAL EVERY 8 HOURS
Refills: 0 | Status: DISCONTINUED | OUTPATIENT
Start: 2020-12-08 | End: 2020-12-08

## 2020-12-08 RX ORDER — INFLUENZA VIRUS VACCINE 15; 15; 15; 15 UG/.5ML; UG/.5ML; UG/.5ML; UG/.5ML
0.5 SUSPENSION INTRAMUSCULAR ONCE
Refills: 0 | Status: DISCONTINUED | OUTPATIENT
Start: 2020-12-08 | End: 2020-12-14

## 2020-12-08 RX ADMIN — Medication 1 TABLET(S): at 08:23

## 2020-12-08 RX ADMIN — Medication 15 GRAM(S): at 11:42

## 2020-12-08 RX ADMIN — Medication 100 MILLIGRAM(S): at 11:42

## 2020-12-08 RX ADMIN — POTASSIUM PHOSPHATE, MONOBASIC POTASSIUM PHOSPHATE, DIBASIC 62.5 MILLIMOLE(S): 236; 224 INJECTION, SOLUTION INTRAVENOUS at 08:23

## 2020-12-08 RX ADMIN — Medication 200 GRAM(S): at 05:46

## 2020-12-08 RX ADMIN — CARVEDILOL PHOSPHATE 12.5 MILLIGRAM(S): 80 CAPSULE, EXTENDED RELEASE ORAL at 08:23

## 2020-12-08 RX ADMIN — SODIUM CHLORIDE 75 MILLILITER(S): 9 INJECTION, SOLUTION INTRAVENOUS at 03:08

## 2020-12-08 RX ADMIN — PIPERACILLIN AND TAZOBACTAM 25 GRAM(S): 4; .5 INJECTION, POWDER, LYOPHILIZED, FOR SOLUTION INTRAVENOUS at 15:59

## 2020-12-08 RX ADMIN — Medication 100 MILLIEQUIVALENT(S): at 06:39

## 2020-12-08 RX ADMIN — HEPARIN SODIUM 5000 UNIT(S): 5000 INJECTION INTRAVENOUS; SUBCUTANEOUS at 23:17

## 2020-12-08 RX ADMIN — SODIUM CHLORIDE 50 MILLILITER(S): 9 INJECTION, SOLUTION INTRAVENOUS at 16:08

## 2020-12-08 RX ADMIN — PIPERACILLIN AND TAZOBACTAM 200 GRAM(S): 4; .5 INJECTION, POWDER, LYOPHILIZED, FOR SOLUTION INTRAVENOUS at 03:28

## 2020-12-08 RX ADMIN — Medication 0.2 MILLIGRAM(S): at 08:23

## 2020-12-08 RX ADMIN — Medication 1 TABLET(S): at 15:59

## 2020-12-08 RX ADMIN — Medication 250 MILLIGRAM(S): at 04:37

## 2020-12-08 RX ADMIN — SODIUM CHLORIDE 500 MILLILITER(S): 9 INJECTION INTRAMUSCULAR; INTRAVENOUS; SUBCUTANEOUS at 13:41

## 2020-12-08 NOTE — PROGRESS NOTE ADULT - ASSESSMENT
Mr. Seymour is F with pmh of ESRD on HD MWF via LUE fistular, CAD s/p remote CABG, sever AS s/p TAVR, with pacemaker in place, HTN, HLD, chronic anemia with Iron infusion via tunneled line by Hematologist, hx of DVT/PE? who presents to ED via EMS due to episode of hypoglycemia in 60s and confusion. Patient was found to be septic, hypoglycemic with G - bacteremia with unclear source of infection on admission.     # Sepsis c/b G - neg  bacteremia,  with unclear source of infection on admission  # Hypotention  - cont'e on Zosyn and Vanco  - sent Blood Cx and urine cx  - on physical exam no clear sours of infection, except possible infected tunneled line  - ID team consulted  - given 500cc/bolus totall and continue D5w @ 50 cc/hr for now given persistent hypoglycemia  - d/c BP medication except Cored with holding parametrs    # Hypoglycemia in pt w/o po hypoglycemic med or Insuli, most likely related to sepsis and poor PO intake  -  D5w @ 50 cc/hr  as above  - encourage po intake  - will check Insulin level, C-peptide, Cortisol level   - may involve Endocrinology if persistent    #ESRD on HD MWF via LUE fistular  - HD as per Nephrology team plan  - will hold Diuretics ( as per family she is on Torsemide 20 mg daily)    # HTN, CAD s/p remote CABG, sever AS s/p TAVR, with pacemaker in place,  - bb as above  - holding Imdur, Clonidin, Hydralazin for now given softish BP    # Acute on chronic normocytic anemia 2/2 ACD  - will monitor for now    # Gout - allopurinol home dose    # Depression - c/w Mirtazapin    # DVT ppx - SCD for now given drop in H/H, will reassess if safe to put on Heparin tid tomorrow Mr. Seymour is F with pmh of ESRD on HD MWF via LUE fistular, CAD s/p remote CABG, sever AS s/p TAVR, with pacemaker in place, HTN, HLD, chronic anemia with Iron infusion via tunneled line by Hematologist, hx of DVT/PE? who presents to ED via EMS due to episode of hypoglycemia in 60s and confusion. Patient was found to be septic, hypoglycemic with G - bacteremia with unclear source of infection on admission.     # Sepsis c/b G - neg  bacteremia,  with unclear source of infection on admission  # Hypotention  - cont'e on Zosyn and Vanco  - sent Blood Cx and urine cx  - on physical exam no clear sours of infection, except possible infected tunneled line  - ID team consulted  - given 500cc/bolus totall and continue D5w @ 50 cc/hr for now given persistent hypoglycemia  - d/c BP medication except Cored with holding parametrs, ordered TTE to assess EF    # Hypoglycemia in pt w/o po hypoglycemic med or Insuli, most likely related to sepsis and poor PO intake  -  D5w @ 50 cc/hr  as above  - encourage po intake  - will check Insulin level, C-peptide, Cortisol level   - may involve Endocrinology if persistent    #ESRD on HD MWF via LUE fistular  - HD as per Nephrology team plan  - will hold Diuretics ( as per family she is on Torsemide 20 mg daily)    # HTN, CAD s/p remote CABG, sever AS s/p TAVR, with pacemaker in place,  - bb as above  - holding Imdur, Clonidin, Hydralazin for now given softish BP    # Acute on chronic normocytic anemia 2/2 ACD  - will monitor for now    # Gout - allopurinol home dose    # Depression - c/w Mirtazapin    # DVT ppx - SCD for now given drop in H/H, will reassess if safe to put on Heparin tid tomorrow

## 2020-12-08 NOTE — H&P ADULT - HISTORY OF PRESENT ILLNESS
pt. is an 81 y/o female who was sent in from her dialysis center for hypoglycemia, as per traige note ems stated that pt's BG was 60 got 1 amp of D50 and BG went up to 80. pt. appears a poor historian . reports no cough, no cp, no sob, says no to most of the questions, no sick contact. does not make any urine, t max in the .6. pt. stated that she has chronic leg weakness and moves around on wheelchair. In the ER tmax 102.6. pt. cannot tell which medicine she takes for her DM. pt. is an 79 y/o female who was sent in from her dialysis center for hypoglycemia, as per traige note ems stated that pt's BG was 60 got 1 amp of D50 and BG went up to 80. pt. appears a poor historian . reports no cough, no cp, no sob, says no to most of the questions, no sick contact. does not make any urine, t max in the .6. pt. stated that she has chronic leg weakness and moves around in wheelchair. In the ER tmax 102.6. pt. cannot tell which medicine she takes for her DM.

## 2020-12-08 NOTE — CONSULT NOTE ADULT - SUBJECTIVE AND OBJECTIVE BOX
[facility] DIVISION OF KIDNEY DISEASES AND HYPERTENSION -- INITIAL CONSULT NOTE  --------------------------------------------------------------------------------  HPI:  ''81 y/o female pt sent in [facility] dialysis center for hypoglycemia, as per traige note ems stated that pt's BG was 60 got 1 amp of D50 and BG went up to 80. pt. appears a poor historian . reports no cough, no cp, no sob, says no to most of the questions, no sick contact. does not make any urine, t max in the .6. pt. stated that she has chronic leg weakness and moves around in wheelchair. In the ER tmax 102.6. pt. cannot tell which medicine she takes for her DM. '  Nephrology consulted for ESRD.  Miss Seymour is being followed by me at outpt HD unit at [facility].  She was reportedly lethargic at the unit yesterday during HD and was found to be hypoglycemic to 60's- given 1 amp D50 with improvement of sugars to 110 and dropped down to 57 at the end of HD.  Sent to ED for further evaluation.   Of note, pt was recently sent to ED for blood transfusion for anemia. She has a history of chronic anemia and developed UE DVT on Aranesp previously. She was requiring frequent blood transfusions and developed secondary hemochromatosis and was getting IV Desferral via mediport with Dr. Wren. Last session was prior to starting HD in october.  She has been getting low dose JEISON at HD unit and blood transfusions prn.  Pt seen and examined in ED. She cant tell why she is in the ED. Unable to provide meaningful history or ROS.        PAST HISTORY  --------------------------------------------------------------------------------  PAST MEDICAL & SURGICAL HISTORY:  Thyroid nodule  awaiting FNB in the near future    Severe aortic stenosis    Osteoarthritis  bilateral knees    CKD (chronic kidney disease) stage 4, GFR 15-29 ml/min    DM2 (diabetes mellitus, type 2)    Arthritis    CAD (coronary artery disease)    Gout    Anemia Associated with Chronic Renal Failure    HTN (Hypertension)    S/P AVR  TAVR    History of pacemaker    A-V fistula  left arm    S/P cholecystectomy    Stented coronary artery  s/p 3 stents, then CABG 2007    S/P CABG (coronary artery bypass graft)  triple in 2007      FAMILY HISTORY:  No pertinent family history in first degree relatives      PAST SOCIAL HISTORY:    ALLERGIES & MEDICATIONS  --------------------------------------------------------------------------------  Allergies    codeine (Other)  Lortab (Other)  morphine (Other)  Percocet 10/325 (Other)  PPM not compatible with  MRI (Other (Fatal))  Reglan (Other; [place])  sulfa drugs (Flushing)    Intolerances      Standing Inpatient Medications  allopurinol 100 milliGRAM(s) Oral daily  carvedilol 12.5 milliGRAM(s) Oral every 12 hours  cloNIDine 0.2 milliGRAM(s) Oral daily  dextrose 40% Gel 15 Gram(s) Oral once  dextrose 5% + sodium chloride 0.9%. 1000 milliLiter(s) IV Continuous <Continuous>  dextrose 50% Injectable 25 Gram(s) IV Push once  dextrose 50% Injectable 12.5 Gram(s) IV Push once  dextrose 50% Injectable 25 Gram(s) IV Push once  glucagon  Injectable 1 milliGRAM(s) IntraMuscular once  lactobacillus acidophilus 1 Tablet(s) Oral two times a day with meals  mirtazapine 15 milliGRAM(s) Oral at bedtime  piperacillin/tazobactam IVPB.. 2.25 Gram(s) IV Intermittent every 12 hours    PRN Inpatient Medications  acetaminophen   Tablet .. 650 milliGRAM(s) Oral every 6 hours PRN      REVIEW OF SYSTEMS  --------------------------------------------------------------------------------  unable to obtain    VITALS/PHYSICAL EXAM  --------------------------------------------------------------------------------  T(C): 37.7 (12-08-20 @ 11:26), Max: 39.2 (12-07-20 @ 20:49)  HR: 77 (12-08-20 @ 11:26) (76 - 110)  BP: 93/48 (12-08-20 @ 11:26) (93/48 - 176/77)  RR: 20 (12-08-20 @ 11:26) (20 - 30)  SpO2: 98% (12-08-20 @ 11:26) (95% - 100%)  Wt(kg): --  Height (cm): 154.9 (12-07-20 @ 20:49)  Weight (kg): 52.163 (12-07-20 @ 21:13)  BMI (kg/m2): 21.7 (12-07-20 @ 21:13)  BSA (m2): 1.49 (12-07-20 @ 21:13)      Physical Exam:  	Gen: lethargic  	HEENT:  supple neck, clear oropharynx  	Pulm: CTA B/L  	CV: RRR, S1S2; no rub  	Back:  no sacral edema  	Abd: +BS, soft, nontender/nondistended  	: No suprapubic tenderness  	UE: Warm, no edema; no asterixis  	LE: Warm, no edema  	Neuro: No focal deficit  	Skin: Warm, dry  	Vascular access: MASHA WALSH    LABS/STUDIES  --------------------------------------------------------------------------------              8.1    20.46 >-----------<  153      [12-08-20 @ 10:53]              28.3     133  |  97  |  28.0  ----------------------------<  56      [12-08-20 @ 10:53]  3.7   |  24.0  |  2.59        Ca     8.5     [12-08-20 @ 10:53]      Mg     1.7     [12-08-20 @ 10:53]      Phos  4.1     [12-08-20 @ 10:53]    TPro  5.7  /  Alb  2.8  /  TBili  0.3  /  DBili  x   /  AST  21  /  ALT  7   /  AlkPhos  68  [12-08-20 @ 10:53]    PT/INR: PT 12.6 , INR 1.09       [12-07-20 @ 21:25]  PTT: 28.4       [12-07-20 @ 21:25]      Creatinine Trend:  SCr 2.59 [12-08 @ 10:53]  SCr 2.47 [12-08 @ 04:24]  SCr 1.99 [12-07 @ 21:25]  SCr 2.75 [12-03 @ 18:33]    Urinalysis - [08-06-18 @ 23:39]      Color Yellow / Appearance Clear / SG 1.012 / pH 7.0      Gluc 50 / Ketone Negative  / Bili Negative / Urobili Negative       Blood Negative / Protein 500 / Leuk Est Moderate / Nitrite Negative      RBC 0-2 / WBC 3-5 / Hyaline 0-2 / Gran  / Sq Epi  / Non Sq Epi OCC / Bacteria Few      Iron 83, TIBC 167, %sat --      [08-22-20 @ 10:45]  Ferritin 5890      [12-08-20 @ 01:15]  .7 (Ca --)      [02-02-20 @ 05:15]   --  HbA1c 4.9      [02-02-20 @ 05:15]  TSH 1.56      [08-25-20 @ 05:22]  Lipid: chol 122, TG 63, HDL 44, LDL 71      [02-02-20 @ 05:15]    HBsAb 94.8      [10-23-20 @ 16:08]  HBsAb Reactive      [10-23-20 @ 16:08]  HBsAg Nonreact      [10-23-20 @ 16:08]  HCV 0.07, Nonreact      [10-23-20 @ 16:08]

## 2020-12-08 NOTE — PATIENT PROFILE ADULT - ARRIVAL FROM
REFERRING PHYSICIAN: Dr. Roy ref. provider found    HPI:         Thank you very much for requesting me to see the patient. As you know, Tina Anderson is a 71year old male who presented yesterday due to overt progressive jaundice.  Pt c/o gen weakness; a the left superior clivus and 8 Gy in 1 fraction to the right femur at [facility].  His abducens nerve palsy nearly resolved towards the end of treatment.      He also has a history of iron deficiency anemia     He started palliative intent chemotherapy wi significant intrahepatic or extrahepatic biliary ductal dilation. 2. Nonspecific gallbladder wall thickening with dependent sludge in the gallbladder and trace pericholecystic fluid. These findings may be reactive to liver disease.  3. Nodular hepatic conto NEOBLADDER   • CYSTOSCOPY,INSERT URETERAL STENT      may 2018   • ELECTROCARDIOGRAM, COMPLETE      Scanned to media tab - DOS 03-   • KNEE REPLACEMENT SURGERY Bilateral    • TOTAL KNEE REPLACEMENT       Social History    Tobacco Use      Smok bruits  LUNGS: clear to auscultation  CARDIO: RRR, nl s1 and s2. No murmers appreciated.   GI: Soft, R abd ileal conduit; infraumbilical fistulas tracts/ileostomy, bilious stool;   EXTREMITIES: no cyanosis, clubbing or edema    _____________________________ Home

## 2020-12-08 NOTE — CHART NOTE - NSCHARTNOTEFT_GEN_A_CORE
Called by bedside RN that patient appeared tachypneic. Pt saturating well on room air. Pt also became tachy to 110 and bp dropped to 98/58. ABG was done which was unremarkable. Repeat labs significant for drop in hemoglobin from 9.4 to 7.9, potassium of 2.9, calcium of 8 and phosphorous 2.2. Lactate increased to 3.7. Pt already received 1600 mL previously and was on d5 at 75 cc/hr. Denies any source of bleed, brbpr and melena. Pt states that often requires transfusions due to kidney disease and required a transfusion 7 days ago. Pt just received zosyn and vancomycin. Upon going to see patient heart rate improved to 70's and blood pressure improved to 127/80. On exam, patient cardiac exam RRR without murmurs. Lungs grossly clear to auscultation and abdominal exam benign.       Plan:  -d/c hydralazine and imdur  -d/c d5  gtt given stable glucose, don't want patient to become overloaded given esrd  -bp and heart rate improving, continue to monitor  -hold aspirin and heparin sq given decrease in h/h  -check type and screen  -h/h drop likely dilutional, if remains stable on repeat would restart aspirin and pharmacologic dvt ppx  -continue to trend cbc  -potassium, calcium and phosphate repleted  -continue to trend lactate  -c/w abx, f/u cultures.

## 2020-12-08 NOTE — H&P ADULT - ASSESSMENT
In summary pt. is an 79 y/o female who was sent in from her dialysis center for hypoglycemia, as per traige note ems stated that pt's BG was 60 got 1 amp of D50 and BG went up to 80. pt. appears a poor historian . reports no cough, no cp, no sob, says no to most of the questions, no sick contact. does not make any urine, t max in the .6. pt. stated that she has chronic leg weakness and moves around in wheelchair. In the ER tmax 102.6. pt. cannot tell which medicine she takes for her DM. pt. will be admitted for hypoglycemia, will be on hypoglycemia protocol. sirs, no obvious source of fever, covdi-19 ? cxr not typical for covid-19.

## 2020-12-08 NOTE — H&P ADULT - NSHPPHYSICALEXAM_GEN_ALL_CORE
General: Pt. lying in bed not in distress.   HEENT: AT, NC. PERRL. intact EOM. no throat erythema or exudate.   Neck: supple. no JVD.   Chest: air entry is fair bilaterally.   Heart: S1,S2. RRR. no heart murmur. no edema.   Abdomen: soft. non-tender. non-distended. + BS.   Ext: no calf tenderness. distal pulses intact. LUE av graft with thrill.  Neuro: pt. is somewhat tired looking but not in distress, knows that she is in the hospital, reports feeling ok, moves upper ext. independently, tries to move lower ext, has some movement but does not move lower ext. actively, has h/o arthritis of knee per record, related to that ? pt. stated that her legs are weak for long time and she moves around in wheel chair. no focal weakness. speech is clear. Cns ii to xii intact.  Skin: no rash noted, warm andr dry.  psych : no agitation, co-operative, no si/hi.

## 2020-12-08 NOTE — CONSULT NOTE ADULT - SUBJECTIVE AND OBJECTIVE BOX
[facility]  INFECTIOUS DISEASES AND INTERNAL MEDICINE at [place]  =======================================================  Rick Kyle MD  Diplomates American Board of Internal Medicine and Infectious Diseases  Tel: [phone]      Fax: [phone]  =======================================================      MRN-[id number]  IRA ACEVEDO is a 80y  Female     CC: Patient is a 80y old  Female who presents with a chief complaint of hypoglycemia (08 Dec 2020 14:00)    HPI:  pt. is an 79 y/o female who was sent in from her dialysis center for hypoglycemia, as per traige note ems stated that pt's BG was 60 got 1 amp of D50 and BG went up to 80. pt. appears a poor historian . reports no cough, no cp, no sob, says no to most of the questions, no sick contact. does not make any urine, t max in the .6. pt. stated that she has chronic leg weakness and moves around in wheelchair. In the ER tmax 102.6. pt. cannot tell which medicine she takes for her DM.  (08 Dec 2020 00:39)      PAST MEDICAL & SURGICAL HISTORY:  Thyroid nodule  awaiting FNB in the near future    Severe aortic stenosis    Osteoarthritis  bilateral knees    CKD (chronic kidney disease) stage 4, GFR 15-29 ml/min    DM2 (diabetes mellitus, type 2)    Arthritis    CAD (coronary artery disease)    Gout    Anemia Associated with Chronic Renal Failure    HTN (Hypertension)    S/P AVR  TAVR    History of pacemaker    A-V fistula  left arm    S/P cholecystectomy    Stented coronary artery  s/p 3 stents, then CABG 2007    S/P CABG (coronary artery bypass graft)  triple in 2007        Social Hx:    FAMILY HISTORY:  No pertinent family history in first degree relatives        Allergies    codeine (Other)  Lortab (Other)  morphine (Other)  Percocet 10/325 (Other)  PPM not compatible with  MRI (Other (Fatal))  Reglan (Other; Flushing)  sulfa drugs (Flushing)    Intolerances        MEDICATIONS  (STANDING):  allopurinol 100 milliGRAM(s) Oral daily  carvedilol 12.5 milliGRAM(s) Oral every 12 hours  dextrose 40% Gel 15 Gram(s) Oral once  dextrose 5% + sodium chloride 0.9%. 1000 milliLiter(s) (50 mL/Hr) IV Continuous <Continuous>  dextrose 50% Injectable 25 Gram(s) IV Push once  dextrose 50% Injectable 12.5 Gram(s) IV Push once  dextrose 50% Injectable 25 Gram(s) IV Push once  glucagon  Injectable 1 milliGRAM(s) IntraMuscular once  heparin   Injectable 5000 Unit(s) SubCutaneous every 8 hours  lactobacillus acidophilus 1 Tablet(s) Oral two times a day with meals  mirtazapine 15 milliGRAM(s) Oral at bedtime  piperacillin/tazobactam IVPB.. 3.375 Gram(s) IV Intermittent every 12 hours    MEDICATIONS  (PRN):  acetaminophen   Tablet .. 650 milliGRAM(s) Oral every 6 hours PRN Temp greater or equal to 38C (100.4F)      ANTIMICROBIALS:  piperacillin/tazobactam IVPB.. 3.375 every 12 hours      OTHER MEDS: MEDICATIONS  (STANDING):  acetaminophen   Tablet .. 650 every 6 hours PRN  allopurinol 100 daily  carvedilol 12.5 every 12 hours  dextrose 40% Gel 15 once  dextrose 50% Injectable 25 once  dextrose 50% Injectable 12.5 once  dextrose 50% Injectable 25 once  glucagon  Injectable 1 once  heparin   Injectable 5000 every 8 hours  mirtazapine 15 at bedtime             REVIEW OF SYSTEMS:  CONSTITUTIONAL:  No Fever or chills  HEENT:  No diplopia or blurred vision.  No earache, sore throat or runny nose.  CARDIOVASCULAR:  No pressure, squeezing, strangling, tightness, heaviness or aching about the chest, neck, axilla or epigastrium.  RESPIRATORY:  No cough, shortness of breath  GASTROINTESTINAL:  No nausea, vomiting or diarrhea.  GENITOURINARY:  No dysuria, frequency or urgency. No Blood in urine  MUSCULOSKELETAL:  no joint aches, no muscle pain  SKIN:  No change in skin, hair or nails.  NEUROLOGIC:  No Headaches, seizures or weakness.  PSYCHIATRIC:  No disorder of thought or mood.  ENDOCRINE:  No heat or cold intolerance  HEMATOLOGICAL:  No easy bruising or bleeding.           I&O's Detail        Physical Exam:  Vital Signs Last 24 Hrs  T(C): 37.5 (08 Dec 2020 15:57), Max: 39.2 (07 Dec 2020 20:49)  T(F): 99.5 (08 Dec 2020 15:57), Max: 102.6 (07 Dec 2020 20:49)  HR: 75 (08 Dec 2020 15:57) (75 - 110)  BP: 116/69 (08 Dec 2020 15:57) (93/48 - 176/77)  BP(mean): --  RR: 20 (08 Dec 2020 15:57) (20 - 30)  SpO2: 98% (08 Dec 2020 15:57) (95% - 100%)  Height (cm): 154.9 (12-07 @ 20:49)  Weight (kg): 52.163 (12-07 @ 21:13)  BMI (kg/m2): 21.7 (12-07 @ 21:13)  BSA (m2): 1.49 (12-07 @ 21:13)  GEN: NAD, pleasant  HEENT: normocephalic and atraumatic. EOMI. PERRL.  Anicteric  NECK: Supple.   LUNGS: Clear to auscultation.  HEART: Regular rate and rhythm without murmur.  ABDOMEN: Soft, nontender, and nondistended.  Positive bowel sounds.    : No CVA tenderness  EXTREMITIES: Without any edema.  MSK: No joint swelling  NEUROLOGIC: Cranial nerves II through XII are grossly intact. No Focal Deficits  PSYCHIATRIC: Appropriate affect .  SKIN: No Rash        Labs:      Radiology:   [facility]  INFECTIOUS DISEASES AND INTERNAL MEDICINE at [place]  =======================================================  Rick Kyle MD  Diplomates American Board of Internal Medicine and Infectious Diseases  Tel: [phone]      Fax: [phone]  =======================================================      MRN-[id number]  IRA ACEVEDO is a 80y  Female     CC: Patient is a 80y old  Female who presents with a chief complaint of hypoglycemia (08 Dec 2020 14:00)    HPI:  pt. is an 81 y/o female who was sent in from her dialysis center for hypoglycemia, as per traige note ems stated that pt's BG was 60 got 1 amp of D50 and BG went up to 80. pt. appears a poor historian . reports no cough, no cp, no sob, says no to most of the questions, no sick contact. does not make any urine, t max in the .6. pt. stated that she has chronic leg weakness and moves around in wheelchair. In the ER tmax 102.6. pt. cannot tell which medicine she takes for her DM.  (08 Dec 2020 00:39)      PAST MEDICAL & SURGICAL HISTORY:  Thyroid nodule  awaiting FNB in the near future    Severe aortic stenosis    Osteoarthritis  bilateral knees    CKD (chronic kidney disease) stage 4, GFR 15-29 ml/min    DM2 (diabetes mellitus, type 2)    Arthritis    CAD (coronary artery disease)    Gout    Anemia Associated with Chronic Renal Failure    HTN (Hypertension)    S/P AVR  TAVR    History of pacemaker    A-V fistula  left arm    S/P cholecystectomy    Stented coronary artery  s/p 3 stents, then CABG 2007    S/P CABG (coronary artery bypass graft)  triple in 2007        Social Hx:    FAMILY HISTORY:  No pertinent family history in first degree relatives        Allergies    codeine (Other)  Lortab (Other)  morphine (Other)  Percocet 10/325 (Other)  PPM not compatible with  MRI (Other (Fatal))  Reglan (Other; Flushing)  sulfa drugs (Flushing)    Intolerances        MEDICATIONS  (STANDING):  allopurinol 100 milliGRAM(s) Oral daily  carvedilol 12.5 milliGRAM(s) Oral every 12 hours  dextrose 40% Gel 15 Gram(s) Oral once  dextrose 5% + sodium chloride 0.9%. 1000 milliLiter(s) (50 mL/Hr) IV Continuous <Continuous>  dextrose 50% Injectable 25 Gram(s) IV Push once  dextrose 50% Injectable 12.5 Gram(s) IV Push once  dextrose 50% Injectable 25 Gram(s) IV Push once  glucagon  Injectable 1 milliGRAM(s) IntraMuscular once  heparin   Injectable 5000 Unit(s) SubCutaneous every 8 hours  lactobacillus acidophilus 1 Tablet(s) Oral two times a day with meals  mirtazapine 15 milliGRAM(s) Oral at bedtime  piperacillin/tazobactam IVPB.. 3.375 Gram(s) IV Intermittent every 12 hours    MEDICATIONS  (PRN):  acetaminophen   Tablet .. 650 milliGRAM(s) Oral every 6 hours PRN Temp greater or equal to 38C (100.4F)      ANTIMICROBIALS:  piperacillin/tazobactam IVPB.. 3.375 every 12 hours      OTHER MEDS: MEDICATIONS  (STANDING):  acetaminophen   Tablet .. 650 every 6 hours PRN  allopurinol 100 daily  carvedilol 12.5 every 12 hours  dextrose 40% Gel 15 once  dextrose 50% Injectable 25 once  dextrose 50% Injectable 12.5 once  dextrose 50% Injectable 25 once  glucagon  Injectable 1 once  heparin   Injectable 5000 every 8 hours  mirtazapine 15 at bedtime             REVIEW OF SYSTEMS:  CONSTITUTIONAL:  No Fever or chills  HEENT:  No diplopia or blurred vision.  No earache, sore throat or runny nose.  CARDIOVASCULAR:  No pressure, squeezing, strangling, tightness, heaviness or aching about the chest, neck, axilla or epigastrium.  RESPIRATORY:  No cough, shortness of breath  GASTROINTESTINAL:  No nausea, vomiting or diarrhea.  GENITOURINARY:  No dysuria, frequency or urgency. No Blood in urine  MUSCULOSKELETAL:  no joint aches, no muscle pain  SKIN:  No change in skin, hair or nails.  NEUROLOGIC:  No Headaches, seizures or weakness.  PSYCHIATRIC:  No disorder of thought or mood.  ENDOCRINE:  No heat or cold intolerance  HEMATOLOGICAL:  No easy bruising or bleeding.           I&O's Detail        Physical Exam:  Vital Signs Last 24 Hrs  T(C): 37.5 (08 Dec 2020 15:57), Max: 39.2 (07 Dec 2020 20:49)  T(F): 99.5 (08 Dec 2020 15:57), Max: 102.6 (07 Dec 2020 20:49)  HR: 75 (08 Dec 2020 15:57) (75 - 110)  BP: 116/69 (08 Dec 2020 15:57) (93/48 - 176/77)  BP(mean): --  RR: 20 (08 Dec 2020 15:57) (20 - 30)  SpO2: 98% (08 Dec 2020 15:57) (95% - 100%)  Height (cm): 154.9 (12-07 @ 20:49)  Weight (kg): 52.163 (12-07 @ 21:13)  BMI (kg/m2): 21.7 (12-07 @ 21:13)  BSA (m2): 1.49 (12-07 @ 21:13)  GEN: NAD, pleasant  HEENT: normocephalic and atraumatic. EOMI. PERRL.  Anicteric  NECK: Supple.   LUNGS: Clear to auscultation.  HEART: Regular rate and rhythm without murmur. left chest line intact  ABDOMEN: Soft, nontender, and nondistended.  Positive bowel sounds.    : No CVA tenderness  EXTREMITIES: Without any edema.  MSK: No joint swelling  NEUROLOGIC: Cranial nerves II through XII are grossly intact. No Focal Deficits  PSYCHIATRIC: Appropriate affect .  SKIN: No Rash        Labs:                        8.1    20.46 )-----------( 153      ( 08 Dec 2020 10:53 )             28.3   12-08    134<L>  |  96<L>  |  31.0<H>  ----------------------------<  54<LL>  4.7   |  22.0  |  2.63<H>    Ca    8.4<L>      08 Dec 2020 15:59  Phos  4.1     12-08  Mg     1.7     12-08    TPro  5.7<L>  /  Alb  2.8<L>  /  TBili  0.3<L>  /  DBili  x   /  AST  21  /  ALT  7   /  AlkPhos  68  12-08      Radiology:  < from: CT Abdomen and Pelvis No Cont (12.08.20 @ 14:52) >  IMPRESSION:  No acute pathology or cause for sepsis visualized.    1 cm indeterminate left renal lesion, new since 2018. Recommend ultrasound to further evaluate.        < end of copied text >

## 2020-12-08 NOTE — CONSULT NOTE ADULT - ASSESSMENT
81 y/o female PM Cad AS HTN gout s/p AVR ESRD on Hd via AVF has mediport for iron infusions who was sent in from her dialysis center for hypoglycemia, as per traige note ems stated that pt's BG was 60 got 1 amp of D50 and BG went up to 80. pt. appears a poor historian . reports no cough, no cp, no sob, says no to most of the questions, no sick contact. does not make any urine, t max in the .6. pt. stated that she has chronic leg weakness and moves around in wheelchair.   In the ER tmax 102.6. pt. cannot tell which medicine she takes for her DM.  (08 Dec 2020 00:39)    Leukocytosis  Bacteremia  ESRD on HD    - CT a/p negative  - Blood cultures + e.coli  - patient is on HD, does not pass much urine  - Repeat blood cultures via port- may need to be removed as this is the likely source  - zosyn 3.375 g Iv q12h, dc vanco  - Trend Fever  - Trend Leukocytosis    d/w attending, RN, pharmacy  Will Follow

## 2020-12-08 NOTE — PATIENT PROFILE ADULT - NSTRANSFERBELONGINGSDISPO_GEN_A_NUR
[facility] [facility] [address] 
[phone] Patient: Kathie Khan MRN: [id number] Visit Information Date & Time Provider Department Dept. Phone Encounter #  
 4/24/2018  2:15 PM [address] [facility] [phone] [id number] Upcoming Health Maintenance Date Due Hepatitis C Screening 1958 DTaP/Tdap/Td series (1 - Tdap) 7/14/1979 PAP AKA CERVICAL CYTOLOGY 7/14/1979 BREAST CANCER SCRN MAMMOGRAM 7/14/2008 FOBT Q 1 YEAR AGE 50-75 7/14/2008 Influenza Age 5 to Adult 8/1/2017 Allergies as of 4/24/2018  Review Complete On: 4/24/2018 By: Kael Coleman RN No Known Allergies Current Immunizations  Never Reviewed No immunizations on file. Not reviewed this visit You Were Diagnosed With   
  
 Codes Comments Insect bite, initial encounter    -  Primary ICD-10-CM: W57. Beau Seal ICD-9-CM: 919.4, E906.4 Vitals BP Pulse Temp Resp Height(growth percentile) Weight(growth percentile) 139/83 68 97.8 °F (36.6 °C) 18 5' 3\" (1.6 m) 238 lb (108 kg) SpO2 BMI OB Status Smoking Status 99% 42.16 kg/m2 Postmenopausal Never Smoker Vitals History BMI and BSA Data Body Mass Index Body Surface Area  
 42.16 kg/m 2 2.19 m 2 Preferred Pharmacy Pharmacy Name Phone NO PHARMACY PREFERENCE Your Updated Medication List  
  
   
This list is accurate as of 4/24/18  2:29 PM.  Always use your most recent med list.  
  
  
  
  
 predniSONE 10 mg dose pack Commonly known as:  STERAPRED DS Take as directed for 6 days, with food Prescriptions Printed Refills  
 predniSONE (STERAPRED DS) 10 mg dose pack 0 Sig: Take as directed for 6 days, with food Class: Print Introducing [facility]!    
 Inocencia Foreman introduces [employer] patient portal. Now you can access parts of your medical record, email your doctor's office, and request medication refills online. 1. In your internet browser, go to [email or web address] 2. Click on the First Time User? Click Here link in the Sign In box. You will see the New Member Sign Up page. 3. Enter your BlogHer Access Code exactly as it appears below. You will not need to use this code after youve completed the sign-up process. If you do not sign up before the expiration date, you must request a new code. · BlogHer Access Code: [email or web address] Expires: 7/23/2018  2:16 PM 
 
4. Enter the last four digits of your Social Security Number (xxxx) and Date of Birth (mm/dd/yyyy) as indicated and click Submit. You will be taken to the next sign-up page. 5. Create a BlogHer ID. This will be your BlogHer login ID and cannot be changed, so think of one that is secure and easy to remember. 6. Create a BlogHer password. You can change your password at any time. 7. Enter your Password Reset Question and Answer. This can be used at a later time if you forget your password. 8. Enter your e-mail address. You will receive e-mail notification when new information is available in [address] 9. Click Sign Up. You can now view and download portions of your medical record. 10. Click the Download Summary menu link to download a portable copy of your medical information. If you have questions, please visit the Frequently Asked Questions section of the BlogHer website. Remember, BlogHer is NOT to be used for urgent needs. For medical emergencies, dial 911. Now available from your iPhone and Android! Please provide this summary of care documentation to your next provider. If you have any questions after today's visit, please call [phone]. not applicable

## 2020-12-08 NOTE — PROGRESS NOTE ADULT - SUBJECTIVE AND OBJECTIVE BOX
[facility] Division of Hospital Medicine    Chief Complaint:  hypoglycemia, SIRS     SUBJECTIVE: reports feeing ok, poor historian    OVERNIGHT EVENTS: none    Patient denies chest pain, SOB, abd pain, N/V, fever, chills, dysuria or any other complaints. All remainder ROS negative.     MEDICATIONS  (STANDING):  allopurinol 100 milliGRAM(s) Oral daily  carvedilol 12.5 milliGRAM(s) Oral every 12 hours  cloNIDine 0.2 milliGRAM(s) Oral daily  dextrose 40% Gel 15 Gram(s) Oral once  dextrose 50% Injectable 25 Gram(s) IV Push once  dextrose 50% Injectable 12.5 Gram(s) IV Push once  dextrose 50% Injectable 25 Gram(s) IV Push once  glucagon  Injectable 1 milliGRAM(s) IntraMuscular once  lactobacillus acidophilus 1 Tablet(s) Oral two times a day with meals  mirtazapine 15 milliGRAM(s) Oral at bedtime  piperacillin/tazobactam IVPB.. 2.25 Gram(s) IV Intermittent every 12 hours    MEDICATIONS  (PRN):  acetaminophen   Tablet .. 650 milliGRAM(s) Oral every 6 hours PRN Temp greater or equal to 38C (100.4F)        I&O's Summary      PHYSICAL EXAM:  Vital Signs Last 24 Hrs  T(C): 37.1 (08 Dec 2020 08:01), Max: 39.2 (07 Dec 2020 20:49)  T(F): 98.8 (08 Dec 2020 08:01), Max: 102.6 (07 Dec 2020 20:49)  HR: 84 (08 Dec 2020 08:01) (76 - 110)  BP: 121/60 (08 Dec 2020 08:01) (98/51 - 176/77)  BP(mean): --  RR: 20 (08 Dec 2020 08:01) (20 - 30)  SpO2: 96% (08 Dec 2020 08:01) (95% - 100%)        CONSTITUTIONAL: NAD, well-developed, well-groomed  ENMT: Moist oral mucosa, no pharyngeal injection or exudates; normal dentition; No JVD  RESPIRATORY: Normal respiratory effort; lungs are clear to auscultation bilaterally  CARDIOVASCULAR: Regular rate and rhythm, normal S1 and S2, no murmur/rub/gallop; No lower extremity edema; Peripheral pulses are 2+ bilaterally  ABDOMEN: Nontender to palpation, normoactive bowel sounds, no rebound/guarding; No hepatosplenomegaly  MUSCLOSKELETAL:  Normal gait; no clubbing or cyanosis of digits; no joint swelling or tenderness to palpation  PSYCH: A+O to person, place, and time; affect appropriate  NEUROLOGY: CN 2-12 are intact and symmetric; no gross sensory deficits; was observed moving all 4 ext against gravity cooperating with exam.   SKIN: No rashes; no palpable lesions    LABS:                        7.9    13.53 )-----------( 157      ( 08 Dec 2020 04:24 )             26.7     12-08    137  |  100  |  24.0<H>  ----------------------------<  110<H>  2.9<LL>   |  24.0  |  2.47<H>    Ca    8.0<L>      08 Dec 2020 04:24  Phos  2.2     12-08  Mg     1.8     12-08    TPro  5.6<L>  /  Alb  2.9<L>  /  TBili  0.2<L>  /  DBili  x   /  AST  20  /  ALT  7   /  AlkPhos  63  12-08    PT/INR - ( 07 Dec 2020 21:25 )   PT: 12.6 sec;   INR: 1.09 ratio         PTT - ( 07 Dec 2020 21:25 )  PTT:28.4 sec          CAPILLARY BLOOD GLUCOSE      POCT Blood Glucose.: 100 mg/dL (08 Dec 2020 06:54)  POCT Blood Glucose.: 108 mg/dL (08 Dec 2020 03:51)  POCT Blood Glucose.: 100 mg/dL (08 Dec 2020 02:54)  POCT Blood Glucose.: 78 mg/dL (08 Dec 2020 02:27)  POCT Blood Glucose.: 75 mg/dL (07 Dec 2020 23:46)  POCT Blood Glucose.: 81 mg/dL (07 Dec 2020 20:46)        RADIOLOGY & ADDITIONAL TESTS:  Results Reviewed:   Imaging Personally Reviewed:  Electrocardiogram Personally Reviewed: [facility]    Chief Complaint:  hypoglycemia, SIRS     SUBJECTIVE: reports feeing ok, poor historian    OVERNIGHT EVENTS: none    Patient denies chest pain, SOB, abd pain, N/V, fever, chills, dysuria or any other complaints. All remainder ROS negative.     MEDICATIONS  (STANDING):  allopurinol 100 milliGRAM(s) Oral daily  carvedilol 12.5 milliGRAM(s) Oral every 12 hours  dextrose 40% Gel 15 Gram(s) Oral once  dextrose 50% Injectable 25 Gram(s) IV Push once  dextrose 50% Injectable 12.5 Gram(s) IV Push once  dextrose 50% Injectable 25 Gram(s) IV Push once  glucagon  Injectable 1 milliGRAM(s) IntraMuscular once  lactobacillus acidophilus 1 Tablet(s) Oral two times a day with meals  mirtazapine 15 milliGRAM(s) Oral at bedtime  piperacillin/tazobactam IVPB.. 2.25 Gram(s) IV Intermittent every 12 hours    MEDICATIONS  (PRN):  acetaminophen   Tablet .. 650 milliGRAM(s) Oral every 6 hours PRN Temp greater or equal to 38C (100.4F)        I&O's Summary      PHYSICAL EXAM:  Vital Signs Last 24 Hrs  T(C): 37.1 (08 Dec 2020 08:01), Max: 39.2 (07 Dec 2020 20:49)  T(F): 98.8 (08 Dec 2020 08:01), Max: 102.6 (07 Dec 2020 20:49)  HR: 84 (08 Dec 2020 08:01) (76 - 110)  BP: 121/60 (08 Dec 2020 08:01) (98/51 - 176/77)  BP(mean): --  RR: 20 (08 Dec 2020 08:01) (20 - 30)  SpO2: 96% (08 Dec 2020 08:01) (95% - 100%)        CONSTITUTIONAL: NAD, fragile lady   ENMT: Moist oral mucosa, no pharyngeal injection or exudates; normal dentition; No JVD  RESPIRATORY: Normal respiratory effort; lungs are clear to auscultation bilaterally  CARDIOVASCULAR: Regular rate and rhythm, normal S1 and S2, no murmur/rub/gallop; No lower extremity edema; Peripheral pulses are 2+ bilaterally, L side chest tunneled line not tender on palpation or infected appearing.   ABDOMEN: Nontender to palpation, normoactive bowel sounds, no rebound/guarding; No hepatosplenomegaly  MUSCLOSKELETAL:  Normal gait; no clubbing or cyanosis of digits; no joint swelling or tenderness to palpation  PSYCH: A+O to person, place, and almost time, but with confusion about events of last night.  NEUROLOGY: CN 2-12 are intact and symmetric; no gross sensory deficits; was observed moving all 4 ext against gravity cooperating with exam.   SKIN: No rashes; no palpable lesions    LABS:                        7.9    13.53 )-----------( 157      ( 08 Dec 2020 04:24 )             26.7     12-08    137  |  100  |  24.0<H>  ----------------------------<  110<H>  2.9<LL>   |  24.0  |  2.47<H>    Ca    8.0<L>      08 Dec 2020 04:24  Phos  2.2     12-08  Mg     1.8     12-08    TPro  5.6<L>  /  Alb  2.9<L>  /  TBili  0.2<L>  /  DBili  x   /  AST  20  /  ALT  7   /  AlkPhos  63  12-08    PT/INR - ( 07 Dec 2020 21:25 )   PT: 12.6 sec;   INR: 1.09 ratio         PTT - ( 07 Dec 2020 21:25 )  PTT:28.4 sec          CAPILLARY BLOOD GLUCOSE      POCT Blood Glucose.: 100 mg/dL (08 Dec 2020 06:54)  POCT Blood Glucose.: 108 mg/dL (08 Dec 2020 03:51)  POCT Blood Glucose.: 100 mg/dL (08 Dec 2020 02:54)  POCT Blood Glucose.: 78 mg/dL (08 Dec 2020 02:27)  POCT Blood Glucose.: 75 mg/dL (07 Dec 2020 23:46)  POCT Blood Glucose.: 81 mg/dL (07 Dec 2020 20:46)        RADIOLOGY & ADDITIONAL TESTS:  Results Reviewed:   Imaging Personally Reviewed:  Electrocardiogram Personally Reviewed:

## 2020-12-08 NOTE — H&P ADULT - NSHPLABSRESULTS_GEN_ALL_CORE
ekg is sinus with 1 st degree av block, inf. lateral t wave inversions, reported in october 2020 ekg report as well.  cxr reviewed by me, no obvious infiltrate noted, cardiac device noted, radiologist read pending.

## 2020-12-08 NOTE — H&P ADULT - PROBLEM SELECTOR PLAN 2
source of fever ? covid-19 / rvp ?  bacteremia ? follow cultures, will keep on zosyn, one dose of vanco, will request for ID consult.

## 2020-12-08 NOTE — H&P ADULT - PROBLEM SELECTOR PLAN 3
will request nephrology consult [facility] group, pt. got 1600 cc of iv fluids in the ER. dialysis plan as per nephrology team. will request nephrology consult [facility] group, pt. got 1600 cc of iv fluids in the ER. dialysis plan as per nephrology team.   dimer ordered by ER team , elevated which likely related to her ckd,  but will request v/q scan to r/o PE.

## 2020-12-08 NOTE — H&P ADULT - PROBLEM SELECTOR PLAN 1
pt. does not know what medicine she takes for her DM, pt. does not know what medicine she takes for her DM, pt's accuchk to be monitored very closely, pt. on hypoglycemia protocol, if BG not improving may need ICu consult . Last accuchk 75. pt. does not know what medicine she takes for her DM, pt's accuchk to be monitored very closely, pt. on hypoglycemia protocol, if BG not improving may need ICu consult . Last accuchk 75. pt. is alert, awake and tolerated apple juice couple of times when given in the ER. will keep on d5 NS at 75 ml /hr for 4hrs for now.

## 2020-12-08 NOTE — CONSULT NOTE ADULT - ASSESSMENT
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 She was requiring frequent blood transfusions and developed secondary hemochromatosis and was getting IV Desferral via [facility] with Dr. Wren.  She has been getting low dose JEISON at HD unit  3) hypoglycemia  4) HTN    Pt with hypoglycemia, not on oral hypoglycemics or insulin- also with elevated WBC count - ? Sepsis  No apparent source of infection ? lore  Reached out to hematology to discuss whether port can be removed; she hasn't followed up them since starting HD    Plan for HD tomorrow  Minimal UF         1) ESRD on HD- MWF schedule via LUE AVF  2) Chronic Anemia in setting of CKD, developed UE DVT on Aranesp previously. She was requiring frequent blood transfusions and developed secondary hemochromatosis and was getting IV Desferral via mediport with Dr. Fox.  She has been getting low dose JEISON at HD unit  3) hypoglycemia  4) HTN    Pt with hypoglycemia, not on oral hypoglycemics or insulin- also with elevated WBC count - ? Sepsis  No apparent source of infection ? lore  Reached out to hematology to discuss whether port can be removed if BCx +  Plan for HD tomorrow  Minimal UF

## 2020-12-09 LAB
ANION GAP SERPL CALC-SCNC: 11 MMOL/L — SIGNIFICANT CHANGE UP (ref 5–17)
BLD GP AB SCN SERPL QL: SIGNIFICANT CHANGE UP
BUN SERPL-MCNC: 42 MG/DL — HIGH (ref 8–20)
C PEPTIDE SERPL-MCNC: 4.3 NG/ML — SIGNIFICANT CHANGE UP (ref 1.1–4.4)
CALCIUM SERPL-MCNC: 8.2 MG/DL — LOW (ref 8.6–10.2)
CHLORIDE SERPL-SCNC: 98 MMOL/L — SIGNIFICANT CHANGE UP (ref 98–107)
CO2 SERPL-SCNC: 23 MMOL/L — SIGNIFICANT CHANGE UP (ref 22–29)
CREAT SERPL-MCNC: 3.1 MG/DL — HIGH (ref 0.5–1.3)
GLUCOSE BLDC GLUCOMTR-MCNC: 100 MG/DL — HIGH (ref 70–99)
GLUCOSE BLDC GLUCOMTR-MCNC: 104 MG/DL — HIGH (ref 70–99)
GLUCOSE BLDC GLUCOMTR-MCNC: 58 MG/DL — LOW (ref 70–99)
GLUCOSE BLDC GLUCOMTR-MCNC: 69 MG/DL — LOW (ref 70–99)
GLUCOSE BLDC GLUCOMTR-MCNC: 71 MG/DL — SIGNIFICANT CHANGE UP (ref 70–99)
GLUCOSE BLDC GLUCOMTR-MCNC: 81 MG/DL — SIGNIFICANT CHANGE UP (ref 70–99)
GLUCOSE BLDC GLUCOMTR-MCNC: 84 MG/DL — SIGNIFICANT CHANGE UP (ref 70–99)
GLUCOSE BLDC GLUCOMTR-MCNC: 85 MG/DL — SIGNIFICANT CHANGE UP (ref 70–99)
GLUCOSE BLDC GLUCOMTR-MCNC: 86 MG/DL — SIGNIFICANT CHANGE UP (ref 70–99)
GLUCOSE BLDC GLUCOMTR-MCNC: 94 MG/DL — SIGNIFICANT CHANGE UP (ref 70–99)
GLUCOSE SERPL-MCNC: 122 MG/DL — HIGH (ref 70–99)
GRAM STN FLD: SIGNIFICANT CHANGE UP
HCT VFR BLD CALC: 23.5 % — LOW (ref 34.5–45)
HCT VFR BLD CALC: 29.8 % — LOW (ref 34.5–45)
HGB BLD-MCNC: 6.9 G/DL — CRITICAL LOW (ref 11.5–15.5)
HGB BLD-MCNC: 9.1 G/DL — LOW (ref 11.5–15.5)
MCHC RBC-ENTMCNC: 25.2 PG — LOW (ref 27–34)
MCHC RBC-ENTMCNC: 29.4 GM/DL — LOW (ref 32–36)
MCV RBC AUTO: 85.8 FL — SIGNIFICANT CHANGE UP (ref 80–100)
MELD SCORE WITH DIALYSIS: 25 POINTS — SIGNIFICANT CHANGE UP
PLATELET # BLD AUTO: 134 K/UL — LOW (ref 150–400)
POTASSIUM SERPL-MCNC: 4.1 MMOL/L — SIGNIFICANT CHANGE UP (ref 3.5–5.3)
POTASSIUM SERPL-SCNC: 4.1 MMOL/L — SIGNIFICANT CHANGE UP (ref 3.5–5.3)
RBC # BLD: 2.74 M/UL — LOW (ref 3.8–5.2)
RBC # FLD: 15.4 % — HIGH (ref 10.3–14.5)
SODIUM SERPL-SCNC: 132 MMOL/L — LOW (ref 135–145)
SPECIMEN SOURCE: SIGNIFICANT CHANGE UP
WBC # BLD: 15.73 K/UL — HIGH (ref 3.8–10.5)
WBC # FLD AUTO: 15.73 K/UL — HIGH (ref 3.8–10.5)

## 2020-12-09 PROCEDURE — 99221 1ST HOSP IP/OBS SF/LOW 40: CPT

## 2020-12-09 PROCEDURE — 90937 HEMODIALYSIS REPEATED EVAL: CPT

## 2020-12-09 PROCEDURE — 99233 SBSQ HOSP IP/OBS HIGH 50: CPT

## 2020-12-09 PROCEDURE — 99232 SBSQ HOSP IP/OBS MODERATE 35: CPT

## 2020-12-09 PROCEDURE — 99223 1ST HOSP IP/OBS HIGH 75: CPT

## 2020-12-09 RX ORDER — ERYTHROPOIETIN 10000 [IU]/ML
10000 INJECTION, SOLUTION INTRAVENOUS; SUBCUTANEOUS
Refills: 0 | Status: DISCONTINUED | OUTPATIENT
Start: 2020-12-09 | End: 2020-12-14

## 2020-12-09 RX ORDER — DEXTROSE 50 % IN WATER 50 %
15 SYRINGE (ML) INTRAVENOUS ONCE
Refills: 0 | Status: COMPLETED | OUTPATIENT
Start: 2020-12-09 | End: 2020-12-09

## 2020-12-09 RX ORDER — CHLORHEXIDINE GLUCONATE 213 G/1000ML
1 SOLUTION TOPICAL
Refills: 0 | Status: DISCONTINUED | OUTPATIENT
Start: 2020-12-09 | End: 2020-12-14

## 2020-12-09 RX ADMIN — PIPERACILLIN AND TAZOBACTAM 25 GRAM(S): 4; .5 INJECTION, POWDER, LYOPHILIZED, FOR SOLUTION INTRAVENOUS at 04:36

## 2020-12-09 RX ADMIN — Medication 1 TABLET(S): at 12:51

## 2020-12-09 RX ADMIN — ERYTHROPOIETIN 10000 UNIT(S): 10000 INJECTION, SOLUTION INTRAVENOUS; SUBCUTANEOUS at 17:53

## 2020-12-09 RX ADMIN — CARVEDILOL PHOSPHATE 12.5 MILLIGRAM(S): 80 CAPSULE, EXTENDED RELEASE ORAL at 05:25

## 2020-12-09 RX ADMIN — Medication 15 GRAM(S): at 04:36

## 2020-12-09 RX ADMIN — Medication 100 MILLIGRAM(S): at 12:51

## 2020-12-09 RX ADMIN — HEPARIN SODIUM 5000 UNIT(S): 5000 INJECTION INTRAVENOUS; SUBCUTANEOUS at 21:07

## 2020-12-09 RX ADMIN — HEPARIN SODIUM 5000 UNIT(S): 5000 INJECTION INTRAVENOUS; SUBCUTANEOUS at 05:25

## 2020-12-09 RX ADMIN — SODIUM CHLORIDE 50 MILLILITER(S): 9 INJECTION, SOLUTION INTRAVENOUS at 12:54

## 2020-12-09 NOTE — CONSULT NOTE ADULT - ATTENDING COMMENTS
Plan for port removal as requested by medicine team. Plan for port removal as requested by medicine team.  Will remove

## 2020-12-09 NOTE — CONSULT NOTE ADULT - ASSESSMENT
80F, poor historian, w/ thyroid nodule, CKD 3-4, T2DM, HTN, CAD s/p CABG who was sent in from her dialysis center for hypoglycemia, as per traige note ems stated that pt's BG was 60 got 1 amp of D50 and BG went up to 80. reports no cough, no cp, no sob, says no to most of the questions, no sick contact. does not make any urine, t max in the .6. pt. stated that she has chronic leg weakness and moves around in wheelchair. In the ER tmax 102.6. pt. cannot tell which medicine she takes for her DM. Consult for hypoglycemia    Hypoglycemia- resolved  -might be low due to poor perfusion due to renal disease    Uncontrolled T2DM- hyperglycemic  -A1c pending  -check sugars AC and bedtime  -ensure diabetic diet    HLD- continue statin   80F, poor historian, w/ thyroid nodule, ESRD on HD, T2DM, HTN, CAD s/p CABG who was sent in from her dialysis center for hypoglycemia, as per traige note ems stated that pt's BG was 60 got 1 amp of D50 and BG went up to 80. reports no cough, no cp, no sob, says no to most of the questions, no sick contact. does not make any urine, t max in the .6. pt. stated that she has chronic leg weakness and moves around in wheelchair. In the ER tmax 102.6. pt. cannot tell which medicine she takes for her DM. Admitted for GNR bacteremia. Consult for hypoglycemia    Hypoglycemia- resolved  -might be low due to poor perfusion due to renal disease in the setting of GNR bacteremia  -dced fluids    Uncontrolled T2DM- hyperglycemic  -A1c pending  -check sugars AC and bedtime  -ensure diabetic diet  -dc fluids as patient has ESRD on HD    GNR bacteremia- on abx. care per primary team    Thyroid nodule- not an active concern, can follow up as outpatient    HLD- continue statin

## 2020-12-09 NOTE — CHART NOTE - NSCHARTNOTEFT_GEN_A_CORE
Called by Dr Jacome to remove left central line that was present on admission. Pt is an 81y/o F with PMHx of ESRD on HD via Left AVF, acute on chronic anemia, hypoglycemia likely due to GNR bacteremia. Risks vs benefits reviewed by MD and requested for colleague and myself to remove the catheter and to culture tip. Pt unclear on when line was placed. Catheter has not been used this admission. Upon exam, dressing clean, dry and intact. Double lumen IJ catheter is in the right innominate vein based on CXR on admission. Does not appear tunnelled. Sutures and dressing removed sterilely. Due to resistance during removal, stopped attempt to remove catheter. Upon later chart review Pt has a Mediport for frequent blood transfusions. Called surgical team for line removal and team is aware of request to culture tip of catheter. MD aware. Pt is afebrile in NAD, A&Ox3, VSS.     Also this AM, obtained consent for 1 unit PRBC blood transfusion. Pt is alert and oriented x3, lying in bed comfortably. Discussed the reasoning and the risk and benefits of having a blood transfusion. Pt states understanding of the risks of acute reactions such as fever, shortness of breath, dyspnea, tachycardia and death. Explained that Pt will be continuously monitored before, during and after transfusion and to ask for assistance if onset of any new symptoms. Questions answered, concerns addressed. Pt states she has had multiple blood transfusions. Consent signed, date and timed. Consent placed in Pt's chart. Type and screen ordered. Pt is hemodynamically stable. No active or acute signs or symptoms of hemorrhage or bleed. No hematuria or BRBPR or tary colored stools. To receive blood in HD. RN to continue to monitor, call Provider for worsening condition. Called by Dr Jacome to remove left central line that was present on admission. Pt is an 81y/o F with PMHx of ESRD on HD via Left AVF, acute on chronic anemia, hypoglycemia likely due to GNR bacteremia. Risks vs benefits reviewed by MD and requested for colleague and myself to remove the catheter and to culture tip. Pt unclear on when line was placed. Catheter has not been used this admission. Upon exam, dressing clean, dry and intact. Double lumen IJ catheter is in the right innominate vein based on CXR on admission. Does not appear tunnelled. Sutures and dressing removed sterilely. Due to resistance during removal, stopped attempt to remove catheter. No hematoma, bleeding or any other complications. Catheter still fully intact. Upon later chart review Pt has a Mediport for frequent blood transfusions. Called surgical team for line removal and team is aware of request to culture tip of catheter. MD aware. Pt is afebrile in NAD, A&Ox3, VSS.     Also this AM, obtained consent for 1 unit PRBC blood transfusion. Pt is alert and oriented x3, lying in bed comfortably. Discussed the reasoning and the risk and benefits of having a blood transfusion. Pt states understanding of the risks of acute reactions such as fever, shortness of breath, dyspnea, tachycardia and death. Explained that Pt will be continuously monitored before, during and after transfusion and to ask for assistance if onset of any new symptoms. Questions answered, concerns addressed. Pt states she has had multiple blood transfusions. Consent signed, date and timed. Consent placed in Pt's chart. Type and screen ordered. Pt is hemodynamically stable. No active or acute signs or symptoms of hemorrhage or bleed. No hematuria or BRBPR or tary colored stools. To receive blood in HD. RN to continue to monitor, call Provider for worsening condition. Called by Dr Jacome to remove left central line that was present on admission. Pt is an 79y/o F with PMHx of ESRD on HD via Left AVF, acute on chronic anemia, hypoglycemia likely due to GNR bacteremia. Risks vs benefits reviewed by MD and requested for colleague and myself to remove the catheter and to culture tip. Pt unclear on when line was placed. Catheter has not been used this admission. Upon exam, dressing clean, dry and intact. Double lumen IJ catheter is in the right innominate vein based on CXR on admission. Does not appear tunnelled. Sutures and dressing removed sterilely. Due to resistance during removal, stopped attempt to remove catheter. No hematoma, bleeding or any other complications. Catheter still fully intact. Upon later chart review Pt has a Mediport for frequent blood transfusions. Called surgical team for line removal and team is aware of request to culture tip of catheter. MD aware. Pt is afebrile in NAD, A&Ox3, VSS. Pt has alternative peripheral IV access.    Also this AM, obtained consent for 1 unit PRBC blood transfusion. Pt is alert and oriented x3, lying in bed comfortably. Discussed the reasoning and the risk and benefits of having a blood transfusion. Pt states understanding of the risks of acute reactions such as fever, shortness of breath, dyspnea, tachycardia and death. Explained that Pt will be continuously monitored before, during and after transfusion and to ask for assistance if onset of any new symptoms. Questions answered, concerns addressed. Pt states she has had multiple blood transfusions. Consent signed, date and timed. Consent placed in Pt's chart. Type and screen ordered. Pt is hemodynamically stable. No active or acute signs or symptoms of hemorrhage or bleed. No hematuria or BRBPR or tary colored stools. To receive blood in HD. RN to continue to monitor, call Provider for worsening condition.

## 2020-12-09 NOTE — PROGRESS NOTE ADULT - SUBJECTIVE AND OBJECTIVE BOX
[facility] Division of Hospital Medicine    Chief Complaint:  sepsis/bacteremia     SUBJECTIVE: reports feeing ok,aox 2-3, and more interactive     OVERNIGHT EVENTS: none    Patient denies chest pain, SOB, abd pain, N/V, fever, chills, dysuria or any other complaints. All remainder ROS negative.     MEDICATIONS  (STANDING):  allopurinol 100 milliGRAM(s) Oral daily  carvedilol 12.5 milliGRAM(s) Oral every 12 hours  chlorhexidine 4% Liquid 1 Application(s) Topical <User Schedule>  dextrose 40% Gel 15 Gram(s) Oral once  dextrose 5% + sodium chloride 0.9%. 1000 milliLiter(s) (50 mL/Hr) IV Continuous <Continuous>  dextrose 50% Injectable 25 Gram(s) IV Push once  dextrose 50% Injectable 12.5 Gram(s) IV Push once  dextrose 50% Injectable 25 Gram(s) IV Push once  glucagon  Injectable 1 milliGRAM(s) IntraMuscular once  heparin   Injectable 5000 Unit(s) SubCutaneous every 8 hours  influenza   Vaccine 0.5 milliLiter(s) IntraMuscular once  lactobacillus acidophilus 1 Tablet(s) Oral two times a day with meals  mirtazapine 15 milliGRAM(s) Oral at bedtime  piperacillin/tazobactam IVPB.. 3.375 Gram(s) IV Intermittent every 12 hours    MEDICATIONS  (PRN):  acetaminophen   Tablet .. 650 milliGRAM(s) Oral every 6 hours PRN Temp greater or equal to 38C (100.4F)      I&O's Summary      PHYSICAL EXAM:  Vital Signs Last 24 Hrs  T(C): 36.4 (09 Dec 2020 08:08), Max: 37.7 (08 Dec 2020 11:26)  T(F): 97.6 (09 Dec 2020 08:08), Max: 99.8 (08 Dec 2020 11:26)  HR: 62 (09 Dec 2020 08:08) (59 - 89)  BP: 96/61 (09 Dec 2020 08:08) (90/43 - 121/60)  BP(mean): 81 (09 Dec 2020 04:14) (81 - 83)  RR: 18 (09 Dec 2020 08:08) (18 - 20)  SpO2: 100% (09 Dec 2020 04:14) (98% - 100%)        CONSTITUTIONAL: NAD, fragile lady   ENMT: Moist oral mucosa, no pharyngeal injection or exudates; normal dentition; No JVD  RESPIRATORY: Normal respiratory effort; lungs are clear to auscultation bilaterally  CARDIOVASCULAR: Regular rate and rhythm, normal S1 and S2, no murmur/rub/gallop; No lower extremity edema; Peripheral pulses are 2+ bilaterally, L side chest tunneled line minimally  tender on palpation or infected appearing.   ABDOMEN: Nontender to palpation, normoactive bowel sounds, no rebound/guarding; No hepatosplenomegaly  MUSCLOSKELETAL:  Normal gait; no clubbing or cyanosis of digits; no joint swelling or tenderness to palpation  PSYCH: A+O to person, place, and almost time, less confusion  and more interactive today  NEUROLOGY: CN 2-12 are intact and symmetric; no gross sensory deficits; was observed moving all 4 ext against gravity cooperating with exam.   SKIN: No rashes; no palpable lesions    LABS:                        7.9    13.53 )-----------( 157      ( 08 Dec 2020 04:24 )             26.7     12-08    137  |  100  |  24.0<H>  ----------------------------<  110<H>  2.9<LL>   |  24.0  |  2.47<H>    Ca    8.0<L>      08 Dec 2020 04:24  Phos  2.2     12-08  Mg     1.8     12-08    TPro  5.6<L>  /  Alb  2.9<L>  /  TBili  0.2<L>  /  DBili  x   /  AST  20  /  ALT  7   /  AlkPhos  63  12-08    PT/INR - ( 07 Dec 2020 21:25 )   PT: 12.6 sec;   INR: 1.09 ratio         PTT - ( 07 Dec 2020 21:25 )  PTT:28.4 sec          CAPILLARY BLOOD GLUCOSE      POCT Blood Glucose.: 100 mg/dL (08 Dec 2020 06:54)  POCT Blood Glucose.: 108 mg/dL (08 Dec 2020 03:51)  POCT Blood Glucose.: 100 mg/dL (08 Dec 2020 02:54)  POCT Blood Glucose.: 78 mg/dL (08 Dec 2020 02:27)  POCT Blood Glucose.: 75 mg/dL (07 Dec 2020 23:46)  POCT Blood Glucose.: 81 mg/dL (07 Dec 2020 20:46)        RADIOLOGY & ADDITIONAL TESTS:  Results Reviewed:   Imaging Personally Reviewed:  Electrocardiogram Personally Reviewed:

## 2020-12-09 NOTE — PROGRESS NOTE ADULT - SUBJECTIVE AND OBJECTIVE BOX
[facility]  INFECTIOUS DISEASES AND INTERNAL MEDICINE at [place]  =======================================================  Rick Kyle MD  Diplomates American Board of Internal Medicine and Infectious Diseases  Tel: [phone]      Fax: [phone]  =======================================================    IRA ACEVEDO [id number]    Follow up: e.coli bacteremia  feels well  more alert today    Allergies:  codeine (Other)  Lortab (Other)  morphine (Other)  Percocet 10/325 (Other)  PPM not compatible with  MRI (Other (Fatal))  Reglan (Other; [place])  sulfa drugs ([place])      Medications:  acetaminophen   Tablet .. 650 milliGRAM(s) Oral every 6 hours PRN  allopurinol 100 milliGRAM(s) Oral daily  carvedilol 12.5 milliGRAM(s) Oral every 12 hours  chlorhexidine 4% Liquid 1 Application(s) Topical <User Schedule>  dextrose 40% Gel 15 Gram(s) Oral once  dextrose 50% Injectable 25 Gram(s) IV Push once  dextrose 50% Injectable 12.5 Gram(s) IV Push once  dextrose 50% Injectable 25 Gram(s) IV Push once  epoetin vinnie-epbx (RETACRIT) Injectable 50638 Unit(s) IV Push <User Schedule>  glucagon  Injectable 1 milliGRAM(s) IntraMuscular once  heparin   Injectable 5000 Unit(s) SubCutaneous every 8 hours  influenza   Vaccine 0.5 milliLiter(s) IntraMuscular once  lactobacillus acidophilus 1 Tablet(s) Oral two times a day with meals  mirtazapine 15 milliGRAM(s) Oral at bedtime  piperacillin/tazobactam IVPB.. 3.375 Gram(s) IV Intermittent every 12 hours            REVIEW OF SYSTEMS:  CONSTITUTIONAL:  No Fever or chills  HEENT:   No diplopia or blurred vision.  No earache, sore throat or runny nose.  CARDIOVASCULAR:  No pressure, squeezing, strangling, tightness, heaviness or aching about the chest, neck, axilla or epigastrium.  RESPIRATORY:  No cough, shortness of breath  GASTROINTESTINAL:  No nausea, vomiting or diarrhea.  GENITOURINARY:  No dysuria, frequency or urgency. No Blood in urine  MUSCULOSKELETAL:  no joint aches, no muscle pain  SKIN:  No change in skin, hair or nails.  NEUROLOGIC:  No Headaches, seizures or weakness.  PSYCHIATRIC:  No disorder of thought or mood.  ENDOCRINE:  No heat or cold intolerance  HEMATOLOGICAL:  No easy bruising or bleeding.            Physical Exam:  ICU Vital Signs Last 24 Hrs  T(C): 36.9 (09 Dec 2020 15:40), Max: 37.2 (08 Dec 2020 18:54)  T(F): 98.5 (09 Dec 2020 15:40), Max: 99 (08 Dec 2020 18:54)  HR: 65 (09 Dec 2020 15:40) (59 - 89)  BP: 131/42 (09 Dec 2020 15:40) (90/43 - 132/65)  BP(mean): 81 (09 Dec 2020 04:14) (81 - 83)  ABP: --  ABP(mean): --  RR: 18 (09 Dec 2020 15:40) (18 - 18)  SpO2: 100% (09 Dec 2020 15:40) (97% - 100%)      GEN: NAD, pleasant  HEENT: normocephalic and atraumatic. EOMI. PERRL.  Anicteric   NECK: Supple.   LUNGS: Clear to auscultation.  HEART: Regular rate and rhythm without murmur. left chest wall port intact  ABDOMEN: Soft, nontender, and nondistended.  Positive bowel sounds.    : No CVA tenderness  EXTREMITIES: Without any edema.   MSK: no joint swelling  NEUROLOGIC: Cranial nerves II through XII are grossly intact. No focal deficits  PSYCHIATRIC: Appropriate affect .  SKIN: No Rash      Labs:                          6.9    15.73 )-----------( 134      ( 09 Dec 2020 07:45 )             23.5   12-09    132<L>  |  98  |  42.0<H>  ----------------------------<  122<H>  4.1   |  23.0  |  3.10<H>    Ca    8.2<L>      09 Dec 2020 07:45  Phos  4.1     12-08  Mg     1.7     12-08    TPro  x   /  Alb  x   /  TBili  0.3<L>  /  DBili  x   /  AST  x   /  ALT  x   /  AlkPhos  x   12-09    Culture Results:   Growth in aerobic and anaerobic bottles: Escherichia coli  Susceptibility to follow. (12-07-20 @ 21:54)  Culture Results:   Growth in aerobic and anaerobic bottles: Escherichia coli  See previous culture  [id number] (12-07-20 @ 21:53)

## 2020-12-09 NOTE — PROGRESS NOTE ADULT - ASSESSMENT
Mr. Seymour is F with pmh of ESRD on HD MWF via LUE fistular, CAD s/p remote CABG, sever AS s/p TAVR, with pacemaker in place, HTN, HLD, chronic anemia with Iron infusion via tunneled line by Hematologist, hx of DVT/PE? who presents to ED via EMS due to episode of hypoglycemia in 60s and confusion. Patient was found to be septic, hypoglycemic with G - bacteremia with unclear source of infection on admission.     # Sepsis c/b G - neg  bacteremia, unclear ethiology, could be Line infection  # Hypotention  - cont'e on Zosyn  - sent Blood Cx and urine cx, blood Cx from line, will obtaine Cx from tip of line  - on physical exam no clear sours of infection, except possible infected tunneled line  - ID team consulted  - given 500cc/bolus totall and continue D5w @ 50 cc/hr for now given persistent hypoglycemia  - d/c BP medication except Cored with holding parametrs, ordered TTE to assess EF    # Hypoglycemia in pt w/o po hypoglycemic med or Insuli, most likely related to sepsis and poor PO intake  -  D5w @ 50 cc/hr  as above  - encourage po intake  - will check Insulin level, C-peptide, Cortisol level   - may involve Endocrinology if persistent    #ESRD on HD MWF via LUE fistular  - HD as per Nephrology team plan  - will hold Diuretics ( as per family she is on Torsemide 20 mg daily)    # HTN, CAD s/p remote CABG, sever AS s/p TAVR, with pacemaker in place,  - bb as above  - holding Imdur, Clonidin, Hydralazin for now given softish BP    # Acute on chronic normocytic anemia 2/2 ACD  - will monitor for now    # Gout - allopurinol home dose    # Depression - c/w Mirtazapin    # DVT ppx - SCD for now given drop in H/H, will reassess if safe to put on Heparin tid tomorrow Mr. Seymour is F with pmh of ESRD on HD MWF via LUE fistular, CAD s/p remote CABG, sever AS s/p TAVR, with pacemaker in place, HTN, HLD, chronic anemia with Iron infusion via tunneled line by Hematologist, hx of DVT/PE? who presents to ED via EMS due to episode of hypoglycemia in 60s and confusion. Patient was found to be septic, hypoglycemic with G - bacteremia with unclear source of infection on admission.     # Sepsis c/b G - neg  bacteremia, unclear ethiology, could be Line infection, stable  - cont'e on Zosyn  - CT abdomen is not revealing >> will pul out L IJ central line and obtain Cx from tip  - sent Blood Cx and urine cx, blood Cx from line on 12/08   - ID team input noted  - c/w  D5w @ 50 cc/hr for now given persistent hypoglycemia >> consulted Endocrinologist today       # Hypoglycemia in pt w/o po hypoglycemic med or Insulin most likely related to sepsis and poor PO intake  - c/w  D5w @ 50 cc/hr for now given persistent hypoglycemia and no gross volume overload and HD today >> consulted Endocrinologist today   - encourage po intake  - pending Insulin level, C-peptide, Cortisol level       #ESRD on HD MWF via LUE fistular  - HD as per Nephrology team plan  - will hold Diuretics ( as per family she is on Torsemide 20 mg daily)    # HTN, CAD s/p remote CABG, sever AS s/p TAVR, with pacemaker in place,  - bb as above  - holding Imdur, Clonidin, Hydralazin for now given softish BP  - TTE ordered    # Acute on chronic normocytic anemia 2/2 ACD  - will monitor for now    # Gout - allopurinol home dose    # Depression - c/w Mirtazapin    # DVT ppx - SCD for now given drop in H/H, will reassess if safe to put on Heparin tid tomorrow  # CODE - full code, HCP - Son Wagner   # LOS - will need 3-4 more days, most likely will need long term IV abx but it also culd be done on HD days, d/c home vs VARSHA ( base on PT/OT assessment when feels better)

## 2020-12-09 NOTE — PROGRESS NOTE ADULT - ASSESSMENT
81 y/o female PM Cad AS HTN gout s/p AVR ESRD on Hd via AVF has mediport for iron infusions who was sent in from her dialysis center for hypoglycemia, as per traige note ems stated that pt's BG was 60 got 1 amp of D50 and BG went up to 80. pt. appears a poor historian . reports no cough, no cp, no sob, says no to most of the questions, no sick contact. does not make any urine, t max in the .6. pt. stated that she has chronic leg weakness and moves around in wheelchair.   In the ER tmax 102.6. pt. cannot tell which medicine she takes for her DM.  (08 Dec 2020 00:39)    Leukocytosis  Bacteremia  ESRD on HD    - CT a/p negative  - Blood cultures + e.coli  - Repeat blood cultures via port- also + e.coli  - Dc port  - repeat blood cultures x 2  - zosyn 3.375 g Iv q12h  - Trend Fever  - Trend Leukocytosis    d/w attending  Will Follow

## 2020-12-09 NOTE — PROGRESS NOTE ADULT - ASSESSMENT
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 She was requiring frequent blood transfusions and developed secondary hemochromatosis and was getting IV Desferral via mediport with Dr. Fox.  She has been getting low dose JEISON at HD unit; will increase to retacrit 10k units TIW  3) hypoglycemia  4) HTN    Pt with hypoglycemia, not on oral hypoglycemics or insulin- also with elevated WBC count - ? Sepsis  No apparent source of infection ? mediport ; can remove if fistula being used;  Reached out to hematology to discuss whether port can be removed if BCx +  HD today  Minimal UF    dw Dr Jacome

## 2020-12-09 NOTE — CONSULT NOTE ADULT - ASSESSMENT
80F, poor historian, w/ thyroid nodule, CKD 3-4, T2DM, HTN, CAD s/p CABG who was sent in from her dialysis center for hypoglycemia, as per traige note ems stated that pt's BG was 60 got 1 amp of D50 and BG went up to 80. reports no cough, no cp, no sob, says no to most of the questions, no sick contact. does not make any urine, t max in the .6. pt. stated that she has chronic leg weakness and moves around in wheelchair. In the ER tmax 102.6. pt. cannot tell which medicine she takes for her DM. Consult for hypoglycemia.   Surgery consulted today for assistance removing the chemo port in the L chest where the patient has been receiving Iron and deferoxamine since October. Patient admitted for bacteremia.     Plan pending 80F, poor historian, w/ thyroid nodule, CKD 3-4, T2DM, HTN, CAD s/p CABG who was sent in from her dialysis center for hypoglycemia, as per traige note ems stated that pt's BG was 60 got 1 amp of D50 and BG went up to 80. reports no cough, no cp, no sob, says no to most of the questions, no sick contact. does not make any urine, t max in the .6. pt. stated that she has chronic leg weakness and moves around in wheelchair. In the ER tmax 102.6. pt. cannot tell which medicine she takes for her DM. Consult for hypoglycemia.   Surgery consulted today for assistance removing the chemo port in the L chest where the patient has been receiving Iron and deferoxamine since October. Patient admitted for bacteremia.     Surgery will remove catheter and send the tip for culture.   Dressing changes with 4x4 folded and Tegaderm as needed as per the primary team .

## 2020-12-09 NOTE — PROGRESS NOTE ADULT - SUBJECTIVE AND OBJECTIVE BOX
[facility] DIVISION OF KIDNEY DISEASES AND HYPERTENSION -- HEMODIALYSIS NOTE  --------------------------------------------------------------------------------  Chief Complaint: ESRD/Ongoing hemodialysis requirement    24 hour events/subjective:  Seen/examined  HD today; reevaluated;      PAST HISTORY  --------------------------------------------------------------------------------  No significant changes to PMH, PSH, FHx, SHx, unless otherwise noted    ALLERGIES & MEDICATIONS  --------------------------------------------------------------------------------  Allergies    codeine (Other)  Lortab (Other)  morphine (Other)  Percocet 10/325 (Other)  PPM not compatible with  MRI (Other (Fatal))  Reglan (Other; Flushing)  sulfa drugs (Flushing)    Intolerances      Standing Inpatient Medications  allopurinol 100 milliGRAM(s) Oral daily  carvedilol 12.5 milliGRAM(s) Oral every 12 hours  chlorhexidine 4% Liquid 1 Application(s) Topical <User Schedule>  dextrose 40% Gel 15 Gram(s) Oral once  dextrose 5% + sodium chloride 0.9%. 1000 milliLiter(s) IV Continuous <Continuous>  dextrose 50% Injectable 25 Gram(s) IV Push once  dextrose 50% Injectable 12.5 Gram(s) IV Push once  dextrose 50% Injectable 25 Gram(s) IV Push once  glucagon  Injectable 1 milliGRAM(s) IntraMuscular once  heparin   Injectable 5000 Unit(s) SubCutaneous every 8 hours  influenza   Vaccine 0.5 milliLiter(s) IntraMuscular once  lactobacillus acidophilus 1 Tablet(s) Oral two times a day with meals  mirtazapine 15 milliGRAM(s) Oral at bedtime  piperacillin/tazobactam IVPB.. 3.375 Gram(s) IV Intermittent every 12 hours    PRN Inpatient Medications  acetaminophen   Tablet .. 650 milliGRAM(s) Oral every 6 hours PRN      REVIEW OF SYSTEMS  --------------------------------------------------------------------------------  Gen: No weight changes, fatigue, fevers/chills, weakness  Skin: No rashes  Head/Eyes/Ears/Mouth: No headache; Normal hearing; Normal vision w/o blurriness; No sinus pain/discomfort, sore throat  Respiratory: No dyspnea, cough, wheezing, hemoptysis  CV: No chest pain, PND, orthopnea  GI: No abdominal pain, diarrhea, constipation, nausea, vomiting, melena, hematochezia  : No increased frequency, dysuria, hematuria, nocturia  MSK: No joint pain/swelling; no back pain; no edema  Neuro: No dizziness/lightheadedness, weakness, seizures, numbness, tingling  Heme: No easy bruising or bleeding  Endo: No heat/cold intolerance  Psych: No significant nervousness, anxiety, stress, depression    All other systems were reviewed and are negative, except as noted.    VITALS/PHYSICAL EXAM  --------------------------------------------------------------------------------  T(C): 36.4 (12-09-20 @ 11:33), Max: 37.5 (12-08-20 @ 15:57)  HR: 85 (12-09-20 @ 11:33) (59 - 89)  BP: 132/65 (12-09-20 @ 11:33) (90/43 - 132/65)  RR: 18 (12-09-20 @ 11:33) (18 - 20)  SpO2: 97% (12-09-20 @ 11:33) (97% - 100%)  Wt(kg): --  Height (cm): 154.9 (12-07-20 @ 20:49)  Weight (kg): 52.163 (12-07-20 @ 21:13)  BMI (kg/m2): 21.7 (12-07-20 @ 21:13)  BSA (m2): 1.49 (12-07-20 @ 21:13)      Physical Exam:  	Gen: NAD  	HEENT:  supple neck, clear oropharynx  	Pulm: CTA B/L  	CV: RRR, S1S2; no rub  	Back:  no sacral edema  	Abd: +BS, soft, nontender/nondistended  	: No suprapubic tenderness  	UE: Warm, no edema; no asterixis  	LE: Warm, no edema  	Neuro: No focal deficit  	Skin: Warm, dry  	Vascular access: UBALDOE JLILIAN  LABS/STUDIES  --------------------------------------------------------------------------------              6.9    15.73 >-----------<  134      [12-09-20 @ 07:45]              23.5     132  |  98  |  42.0  ----------------------------<  122      [12-09-20 @ 07:45]  4.1   |  23.0  |  3.10        Ca     8.2     [12-09-20 @ 07:45]      Mg     1.7     [12-08-20 @ 10:53]      Phos  4.1     [12-08-20 @ 10:53]    TPro  x   /  Alb  x   /  TBili  0.3  /  DBili  x   /  AST  x   /  ALT  x   /  AlkPhos  x   [12-09-20 @ 07:45]    PT/INR: PT 17.2 , INR 1.51       [12-09-20 @ 07:45]  PTT: 28.4       [12-07-20 @ 21:25]      Iron 83, TIBC 167, %sat --      [08-22-20 @ 10:45]  Ferritin 5890      [12-08-20 @ 01:15]  .7 (Ca --)      [02-02-20 @ 05:15]   --  HbA1c 4.9      [02-02-20 @ 05:15]  TSH 1.56      [08-25-20 @ 05:22]  Lipid: chol 122, TG 63, HDL 44, LDL 71      [02-02-20 @ 05:15]

## 2020-12-09 NOTE — CONSULT NOTE ADULT - SUBJECTIVE AND OBJECTIVE BOX
ACUTE CARE SURGERY CONSULT    Consulting surgical team: [facility] - Acute Care Surgery  Consulting attending: Annie POSADA  Patient seen and examined: 12-09-20 @ 15:24      HPI:  pt. is an 81 y/o female who was sent in from her dialysis center for hypoglycemia, as per traige note ems stated that pt's BG was 60 got 1 amp of D50 and BG went up to 80. pt. appears a poor historian . reports no cough, no cp, no sob, says no to most of the questions, no sick contact. does not make any urine, t max in the .6. pt. stated that she has chronic leg weakness and moves around in wheelchair. In the ER tmax 102.6. pt. cannot tell which medicine she takes for her DM.  (08 Dec 2020 00:39)  Surgery consulted today for removal of the chemo port port is a 2 lumen and has been present since the end of August due to difficulty obtaining an IV. patient has been receiving Deferoxamine and Iron transfusions due to chronic anemia probably secondary due to CKD.   Medicine PA tried to remove catheter but felt some resistance.     PAST MEDICAL HISTORY:  Thyroid nodule    Severe aortic stenosis    Osteoarthritis    CKD (chronic kidney disease) stage 4, GFR 15-29 ml/min    DM2 (diabetes mellitus, type 2)    Arthritis    CAD (coronary artery disease)    Gout    Anemia Associated with Chronic Renal Failure    HTN (Hypertension)    HTN (Hypertension)    Diabetes        PAST SURGICAL HISTORY:  S/P AVR    History of pacemaker    A-V fistula    S/P cholecystectomy    Stented coronary artery    S/P CABG (coronary artery bypass graft)        ALLERGIES:  codeine (Other)  Lortab (Other)  morphine (Other)  Percocet 10/325 (Other)  PPM not compatible with  MRI (Other (Fatal))  Reglan (Other; Flushing)  sulfa drugs (Flushing)      MEDICATIONS  (STANDING):  allopurinol 100 milliGRAM(s) Oral daily  carvedilol 12.5 milliGRAM(s) Oral every 12 hours  chlorhexidine 4% Liquid 1 Application(s) Topical <User Schedule>  dextrose 40% Gel 15 Gram(s) Oral once  dextrose 5% + sodium chloride 0.9%. 1000 milliLiter(s) (50 mL/Hr) IV Continuous <Continuous>  dextrose 50% Injectable 25 Gram(s) IV Push once  dextrose 50% Injectable 12.5 Gram(s) IV Push once  dextrose 50% Injectable 25 Gram(s) IV Push once  epoetin vinnie-epbx (RETACRIT) Injectable 06562 Unit(s) IV Push <User Schedule>  glucagon  Injectable 1 milliGRAM(s) IntraMuscular once  heparin   Injectable 5000 Unit(s) SubCutaneous every 8 hours  influenza   Vaccine 0.5 milliLiter(s) IntraMuscular once  lactobacillus acidophilus 1 Tablet(s) Oral two times a day with meals  mirtazapine 15 milliGRAM(s) Oral at bedtime  piperacillin/tazobactam IVPB.. 3.375 Gram(s) IV Intermittent every 12 hours    MEDICATIONS  (PRN):  acetaminophen   Tablet .. 650 milliGRAM(s) Oral every 6 hours PRN Temp greater or equal to 38C (100.4F)      VITALS & I/Os:  Vital Signs Last 24 Hrs  T(C): 36.4 (09 Dec 2020 11:33), Max: 37.5 (08 Dec 2020 15:57)  T(F): 97.5 (09 Dec 2020 11:33), Max: 99.5 (08 Dec 2020 15:57)  HR: 85 (09 Dec 2020 11:33) (59 - 89)  BP: 132/65 (09 Dec 2020 11:33) (90/43 - 132/65)  BP(mean): 81 (09 Dec 2020 04:14) (81 - 83)  RR: 18 (09 Dec 2020 11:33) (18 - 20)  SpO2: 97% (09 Dec 2020 11:33) (97% - 100%)  CAPILLARY BLOOD GLUCOSE      POCT Blood Glucose.: 94 mg/dL (09 Dec 2020 12:33)  POCT Blood Glucose.: 85 mg/dL (09 Dec 2020 08:15)  POCT Blood Glucose.: 84 mg/dL (09 Dec 2020 05:28)  POCT Blood Glucose.: 71 mg/dL (09 Dec 2020 04:59)  POCT Blood Glucose.: 69 mg/dL (09 Dec 2020 04:58)  POCT Blood Glucose.: 58 mg/dL (09 Dec 2020 04:09)  POCT Blood Glucose.: 100 mg/dL (09 Dec 2020 00:09)  POCT Blood Glucose.: 73 mg/dL (08 Dec 2020 23:21)  POCT Blood Glucose.: 82 mg/dL (08 Dec 2020 20:04)  POCT Blood Glucose.: 91 mg/dL (08 Dec 2020 18:54)  POCT Blood Glucose.: 74 mg/dL (08 Dec 2020 17:45)  POCT Blood Glucose.: 62 mg/dL (08 Dec 2020 15:51)      I&O's Summary        GEN: NAD, alert and oriented x 3  HEENT: WNL. Chemo port in the L chest, subcutaneous to the subclavian vs IJ.   CHEST: Symmetrical chest rise, breath sounds CTAB  HEART: RRR, non-muffled heart sounds  ABD: Soft, non-tender, non-distended    LABS:                        6.9    15.73 )-----------( 134      ( 09 Dec 2020 07:45 )             23.5     12-09    132<L>  |  98  |  42.0<H>  ----------------------------<  122<H>  4.1   |  23.0  |  3.10<H>    Ca    8.2<L>      09 Dec 2020 07:45  Phos  4.1     12-08  Mg     1.7     12-08    TPro  x   /  Alb  x   /  TBili  0.3<L>  /  DBili  x   /  AST  x   /  ALT  x   /  AlkPhos  x   12-09      PT/INR - ( 09 Dec 2020 07:45 )   PT: 17.2 sec;   INR: 1.51 ratio         PTT - ( 07 Dec 2020 21:25 )  PTT:28.4 sec  ABG - ( 08 Dec 2020 04:10 )  pH, Arterial: 7.42  pH, Blood: x     /  pCO2: 43    /  pO2: 99    / HCO3: 27    / Base Excess: 3.0   /  SaO2: 99                          IMAGING:    CXR Chemo port in place

## 2020-12-09 NOTE — CONSULT NOTE ADULT - SUBJECTIVE AND OBJECTIVE BOX
Patient is a 80y old  Female who presents with a chief complaint of ESRD HD (09 Dec 2020 12:52)    HPI:  80F, poor historian, w/ thyroid nodule, CKD 3-4, T2DM, HTN, CAD s/p CABG who was sent in from her dialysis center for hypoglycemia, as per traige note ems stated that pt's BG was 60 got 1 amp of D50 and BG went up to 80. reports no cough, no cp, no sob, says no to most of the questions, no sick contact. does not make any urine, t max in the .6. pt. stated that she has chronic leg weakness and moves around in wheelchair. In the ER tmax 102.6. pt. cannot tell which medicine she takes for her DM.  reviewed chart    PAST MEDICAL & SURGICAL HISTORY:  Thyroid nodule  awaiting FNB in the near future    Severe aortic stenosis    Osteoarthritis  bilateral knees    CKD (chronic kidney disease) stage 4, GFR 15-29 ml/min    DM2 (diabetes mellitus, type 2)    Arthritis    CAD (coronary artery disease)    Gout    Anemia Associated with Chronic Renal Failure    HTN (Hypertension)    S/P AVR  TAVR    History of pacemaker    A-V fistula  left arm    S/P cholecystectomy    Stented coronary artery  s/p 3 stents, then CABG 2007    S/P CABG (coronary artery bypass graft)  triple in 2007        Social History:  no smoking, no etoh. (08 Dec 2020 00:39)      FAMILY HISTORY:  No pertinent family history in first degree relatives          Allergies    codeine (Other)  Lortab (Other)  morphine (Other)  Percocet 10/325 (Other)  PPM not compatible with  MRI (Other (Fatal))  Reglan (Other; Flushing)  sulfa drugs (Flushing)    Intolerances        REVIEW OF SYSTEMS:    CONSTITUTIONAL: No fever, weight loss, or fatigue  EYES: No eye pain, visual disturbances, or discharge  ENMT:  No difficulty hearing, tinnitus, vertigo; No sinus or throat pain  NECK: No pain or stiffness  RESPIRATORY: No cough, wheezing, chills or hemoptysis; No shortness of breath  CARDIOVASCULAR: No chest pain, palpitations, dizziness, or leg swelling  GASTROINTESTINAL: No abdominal or epigastric pain. No nausea, vomiting, or hematemesis; No diarrhea or constipation. No melena or hematochezia.  NEUROLOGICAL: No headaches, memory loss, loss of strength, numbness, or tremors  SKIN: No itching, burning, rashes, or lesions   MUSCULOSKELETAL: No joint pain or swelling; No muscle, back, or extremity pain  PSYCHIATRIC: No depression, anxiety, mood swings, or difficulty sleeping        MEDICATIONS  (STANDING):  allopurinol 100 milliGRAM(s) Oral daily  carvedilol 12.5 milliGRAM(s) Oral every 12 hours  chlorhexidine 4% Liquid 1 Application(s) Topical <User Schedule>  dextrose 40% Gel 15 Gram(s) Oral once  dextrose 5% + sodium chloride 0.9%. 1000 milliLiter(s) (50 mL/Hr) IV Continuous <Continuous>  dextrose 50% Injectable 25 Gram(s) IV Push once  dextrose 50% Injectable 12.5 Gram(s) IV Push once  dextrose 50% Injectable 25 Gram(s) IV Push once  epoetin vinnie-epbx (RETACRIT) Injectable 92412 Unit(s) IV Push <User Schedule>  glucagon  Injectable 1 milliGRAM(s) IntraMuscular once  heparin   Injectable 5000 Unit(s) SubCutaneous every 8 hours  influenza   Vaccine 0.5 milliLiter(s) IntraMuscular once  lactobacillus acidophilus 1 Tablet(s) Oral two times a day with meals  mirtazapine 15 milliGRAM(s) Oral at bedtime  piperacillin/tazobactam IVPB.. 3.375 Gram(s) IV Intermittent every 12 hours    MEDICATIONS  (PRN):  acetaminophen   Tablet .. 650 milliGRAM(s) Oral every 6 hours PRN Temp greater or equal to 38C (100.4F)      Vital Signs Last 24 Hrs  T(C): 36.4 (09 Dec 2020 11:33), Max: 37.5 (08 Dec 2020 15:57)  T(F): 97.5 (09 Dec 2020 11:33), Max: 99.5 (08 Dec 2020 15:57)  HR: 85 (09 Dec 2020 11:33) (59 - 89)  BP: 132/65 (09 Dec 2020 11:33) (90/43 - 132/65)  BP(mean): 81 (09 Dec 2020 04:14) (81 - 83)  RR: 18 (09 Dec 2020 11:33) (18 - 20)  SpO2: 97% (09 Dec 2020 11:33) (97% - 100%)    Physical Exam:    Constitutional: NAD, well-developed  HEENT: EOMI, no exophalmos  Neck: trachea midline, no thyroid enlargement  Respiratory: CTAB, normal respirations  Cardiovascular: S1 and S2, RRR  Gastrointestinal: BS+, soft, ntnd  Extremities: No peripheral edema  Neurological: AOx3, no focal deficits  Psychiatric: Normal mood and normal affect  Skin: no rashes, no acanthosis    LABS  12-09    132<L>  |  98  |  42.0<H>  ----------------------------<  122<H>  4.1   |  23.0  |  3.10<H>    Ca    8.2<L>      09 Dec 2020 07:45  Phos  4.1     12-08  Mg     1.7     12-08    TPro  x   /  Alb  x   /  TBili  0.3<L>  /  DBili  x   /  AST  x   /  ALT  x   /  AlkPhos  x   12-09                          6.9    15.73 )-----------( 134      ( 09 Dec 2020 07:45 )             23.5               Aspartate Aminotransferase (AST/SGOT): 21 U/L (12-08-20 @ 10:53)  Alkaline Phosphatase, Serum: 68 U/L (12-08-20 @ 10:53)  Alanine Aminotransferase (ALT/SGPT): 7 U/L (12-08-20 @ 10:53)  Albumin, Serum: 2.8 g/dL (12-08-20 @ 10:53)  Aspartate Aminotransferase (AST/SGOT): 20 U/L (12-08-20 @ 04:24)  Alkaline Phosphatase, Serum: 63 U/L (12-08-20 @ 04:24)  Alanine Aminotransferase (ALT/SGPT): 7 U/L (12-08-20 @ 04:24)  Albumin, Serum: 2.9 g/dL (12-08-20 @ 04:24)  Albumin, Serum: 3.5 g/dL (12-07-20 @ 21:25)  Aspartate Aminotransferase (AST/SGOT): 24 U/L (12-07-20 @ 21:25)  Alkaline Phosphatase, Serum: 85 U/L (12-07-20 @ 21:25)  Alanine Aminotransferase (ALT/SGPT): 7 U/L (12-07-20 @ 21:25)      CAPILLARY BLOOD GLUCOSE      POCT Blood Glucose.: 94 mg/dL (09 Dec 2020 12:33)  POCT Blood Glucose.: 85 mg/dL (09 Dec 2020 08:15)  POCT Blood Glucose.: 84 mg/dL (09 Dec 2020 05:28)  POCT Blood Glucose.: 71 mg/dL (09 Dec 2020 04:59)  POCT Blood Glucose.: 69 mg/dL (09 Dec 2020 04:58)  POCT Blood Glucose.: 58 mg/dL (09 Dec 2020 04:09)  POCT Blood Glucose.: 100 mg/dL (09 Dec 2020 00:09)  POCT Blood Glucose.: 73 mg/dL (08 Dec 2020 23:21)  POCT Blood Glucose.: 82 mg/dL (08 Dec 2020 20:04)  POCT Blood Glucose.: 91 mg/dL (08 Dec 2020 18:54)  POCT Blood Glucose.: 74 mg/dL (08 Dec 2020 17:45)  POCT Blood Glucose.: 62 mg/dL (08 Dec 2020 15:51)     Patient is a 80y old  Female who presents with a chief complaint of ESRD HD (09 Dec 2020 12:52)    HPI:  80F, poor historian, w/ thyroid nodule, ESRD on HD, T2DM, HTN, CAD s/p CABG who was sent in from her dialysis center for hypoglycemia, as per traige note ems stated that pt's BG was 60 got 1 amp of D50 and BG went up to 80. reports no cough, no cp, no sob, says no to most of the questions, no sick contact. does not make any urine, t max in the .6. pt. stated that she has chronic leg weakness and moves around in wheelchair. In the ER tmax 102.6. pt. cannot tell which medicine she takes for her DM. Admitted for GNR bacteremia. Consult for hypoglycemia  reviewed chart  spoke to Bernard, patient's son  diabetes for many years  off januvia for last 2 months as per son. not on any meds for diabetes  family checks FS once daily in the AM and never was low  fhx of diabetes in one of the daughters  no smoking/etoh, lives with family at home    PAST MEDICAL & SURGICAL HISTORY:  Thyroid nodule  awaiting FNB in the near future    Severe aortic stenosis    Osteoarthritis  bilateral knees    CKD (chronic kidney disease) stage 4, GFR 15-29 ml/min    DM2 (diabetes mellitus, type 2)    Arthritis    CAD (coronary artery disease)    Gout    Anemia Associated with Chronic Renal Failure    HTN (Hypertension)    S/P AVR  TAVR    History of pacemaker    A-V fistula  left arm    S/P cholecystectomy    Stented coronary artery  s/p 3 stents, then CABG 2007    S/P CABG (coronary artery bypass graft)  triple in 2007        Social History:  no smoking, no etoh. (08 Dec 2020 00:39)      FAMILY HISTORY:  No pertinent family history in first degree relatives          Allergies    codeine (Other)  Lortab (Other)  morphine (Other)  Percocet 10/325 (Other)  PPM not compatible with  MRI (Other (Fatal))  Reglan (Other; Flushing)  sulfa drugs (Flushing)    Intolerances        REVIEW OF SYSTEMS:    CONSTITUTIONAL: No fever, weight loss, or fatigue  EYES: No eye pain, visual disturbances, or discharge  ENMT:  No difficulty hearing, tinnitus, vertigo; No sinus or throat pain  NECK: No pain or stiffness  RESPIRATORY: No cough, wheezing, chills or hemoptysis; No shortness of breath  CARDIOVASCULAR: No chest pain, palpitations, dizziness, or leg swelling  GASTROINTESTINAL: No abdominal or epigastric pain. No nausea, vomiting, or hematemesis; No diarrhea or constipation. No melena or hematochezia.  NEUROLOGICAL: No headaches, memory loss, loss of strength, numbness, or tremors  SKIN: No itching, burning, rashes, or lesions   MUSCULOSKELETAL: No joint pain or swelling; No muscle, back, or extremity pain  PSYCHIATRIC: No depression, anxiety, mood swings, or difficulty sleeping        MEDICATIONS  (STANDING):  allopurinol 100 milliGRAM(s) Oral daily  carvedilol 12.5 milliGRAM(s) Oral every 12 hours  chlorhexidine 4% Liquid 1 Application(s) Topical <User Schedule>  dextrose 40% Gel 15 Gram(s) Oral once  dextrose 5% + sodium chloride 0.9%. 1000 milliLiter(s) (50 mL/Hr) IV Continuous <Continuous>  dextrose 50% Injectable 25 Gram(s) IV Push once  dextrose 50% Injectable 12.5 Gram(s) IV Push once  dextrose 50% Injectable 25 Gram(s) IV Push once  epoetin vinnie-epbx (RETACRIT) Injectable 23333 Unit(s) IV Push <User Schedule>  glucagon  Injectable 1 milliGRAM(s) IntraMuscular once  heparin   Injectable 5000 Unit(s) SubCutaneous every 8 hours  influenza   Vaccine 0.5 milliLiter(s) IntraMuscular once  lactobacillus acidophilus 1 Tablet(s) Oral two times a day with meals  mirtazapine 15 milliGRAM(s) Oral at bedtime  piperacillin/tazobactam IVPB.. 3.375 Gram(s) IV Intermittent every 12 hours    MEDICATIONS  (PRN):  acetaminophen   Tablet .. 650 milliGRAM(s) Oral every 6 hours PRN Temp greater or equal to 38C (100.4F)      Vital Signs Last 24 Hrs  T(C): 36.4 (09 Dec 2020 11:33), Max: 37.5 (08 Dec 2020 15:57)  T(F): 97.5 (09 Dec 2020 11:33), Max: 99.5 (08 Dec 2020 15:57)  HR: 85 (09 Dec 2020 11:33) (59 - 89)  BP: 132/65 (09 Dec 2020 11:33) (90/43 - 132/65)  BP(mean): 81 (09 Dec 2020 04:14) (81 - 83)  RR: 18 (09 Dec 2020 11:33) (18 - 20)  SpO2: 97% (09 Dec 2020 11:33) (97% - 100%)    Physical Exam:    Constitutional: NAD, well-developed  HEENT: EOMI, no exophalmos  Neck: trachea midline, no thyroid enlargement  Respiratory: CTAB, normal respirations  Cardiovascular: S1 and S2, RRR  Gastrointestinal: BS+, soft, ntnd  Extremities: No peripheral edema, hands colder than rest of the body but still warm. AVF in left arm  Neurological: Alert, awake, no focal deficits  Psychiatric: Normal mood and normal affect  Skin: no rashes, no acanthosis    LABS  12-09    132<L>  |  98  |  42.0<H>  ----------------------------<  122<H>  4.1   |  23.0  |  3.10<H>    Ca    8.2<L>      09 Dec 2020 07:45  Phos  4.1     12-08  Mg     1.7     12-08    TPro  x   /  Alb  x   /  TBili  0.3<L>  /  DBili  x   /  AST  x   /  ALT  x   /  AlkPhos  x   12-09                          6.9    15.73 )-----------( 134      ( 09 Dec 2020 07:45 )             23.5               Aspartate Aminotransferase (AST/SGOT): 21 U/L (12-08-20 @ 10:53)  Alkaline Phosphatase, Serum: 68 U/L (12-08-20 @ 10:53)  Alanine Aminotransferase (ALT/SGPT): 7 U/L (12-08-20 @ 10:53)  Albumin, Serum: 2.8 g/dL (12-08-20 @ 10:53)  Aspartate Aminotransferase (AST/SGOT): 20 U/L (12-08-20 @ 04:24)  Alkaline Phosphatase, Serum: 63 U/L (12-08-20 @ 04:24)  Alanine Aminotransferase (ALT/SGPT): 7 U/L (12-08-20 @ 04:24)  Albumin, Serum: 2.9 g/dL (12-08-20 @ 04:24)  Albumin, Serum: 3.5 g/dL (12-07-20 @ 21:25)  Aspartate Aminotransferase (AST/SGOT): 24 U/L (12-07-20 @ 21:25)  Alkaline Phosphatase, Serum: 85 U/L (12-07-20 @ 21:25)  Alanine Aminotransferase (ALT/SGPT): 7 U/L (12-07-20 @ 21:25)      CAPILLARY BLOOD GLUCOSE      POCT Blood Glucose.: 94 mg/dL (09 Dec 2020 12:33)  POCT Blood Glucose.: 85 mg/dL (09 Dec 2020 08:15)  POCT Blood Glucose.: 84 mg/dL (09 Dec 2020 05:28)  POCT Blood Glucose.: 71 mg/dL (09 Dec 2020 04:59)  POCT Blood Glucose.: 69 mg/dL (09 Dec 2020 04:58)  POCT Blood Glucose.: 58 mg/dL (09 Dec 2020 04:09)  POCT Blood Glucose.: 100 mg/dL (09 Dec 2020 00:09)  POCT Blood Glucose.: 73 mg/dL (08 Dec 2020 23:21)  POCT Blood Glucose.: 82 mg/dL (08 Dec 2020 20:04)  POCT Blood Glucose.: 91 mg/dL (08 Dec 2020 18:54)  POCT Blood Glucose.: 74 mg/dL (08 Dec 2020 17:45)  POCT Blood Glucose.: 62 mg/dL (08 Dec 2020 15:51)

## 2020-12-10 ENCOUNTER — OUTPATIENT (OUTPATIENT)
Dept: OUTPATIENT SERVICES | Facility: HOSPITAL | Age: 80
LOS: 1 days | Discharge: HOME | End: 2020-12-10

## 2020-12-10 DIAGNOSIS — Z95.0 PRESENCE OF CARDIAC PACEMAKER: Chronic | ICD-10-CM

## 2020-12-10 DIAGNOSIS — Z90.49 ACQUIRED ABSENCE OF OTHER SPECIFIED PARTS OF DIGESTIVE TRACT: Chronic | ICD-10-CM

## 2020-12-10 DIAGNOSIS — I77.0 ARTERIOVENOUS FISTULA, ACQUIRED: Chronic | ICD-10-CM

## 2020-12-10 DIAGNOSIS — Z95.2 PRESENCE OF PROSTHETIC HEART VALVE: Chronic | ICD-10-CM

## 2020-12-10 LAB
-  AMIKACIN: SIGNIFICANT CHANGE UP
-  AMPICILLIN/SULBACTAM: SIGNIFICANT CHANGE UP
-  AMPICILLIN: SIGNIFICANT CHANGE UP
-  AZTREONAM: SIGNIFICANT CHANGE UP
-  CEFAZOLIN: SIGNIFICANT CHANGE UP
-  CEFEPIME: SIGNIFICANT CHANGE UP
-  CEFOXITIN: SIGNIFICANT CHANGE UP
-  CEFTRIAXONE: SIGNIFICANT CHANGE UP
-  CIPROFLOXACIN: SIGNIFICANT CHANGE UP
-  ERTAPENEM: SIGNIFICANT CHANGE UP
-  GENTAMICIN: SIGNIFICANT CHANGE UP
-  IMIPENEM: SIGNIFICANT CHANGE UP
-  LEVOFLOXACIN: SIGNIFICANT CHANGE UP
-  MEROPENEM: SIGNIFICANT CHANGE UP
-  PIPERACILLIN/TAZOBACTAM: SIGNIFICANT CHANGE UP
-  TOBRAMYCIN: SIGNIFICANT CHANGE UP
-  TRIMETHOPRIM/SULFAMETHOXAZOLE: SIGNIFICANT CHANGE UP
ANION GAP SERPL CALC-SCNC: 10 MMOL/L — SIGNIFICANT CHANGE UP (ref 5–17)
BUN SERPL-MCNC: 26 MG/DL — HIGH (ref 8–20)
CALCIUM SERPL-MCNC: 8.2 MG/DL — LOW (ref 8.6–10.2)
CHLORIDE SERPL-SCNC: 99 MMOL/L — SIGNIFICANT CHANGE UP (ref 98–107)
CO2 SERPL-SCNC: 28 MMOL/L — SIGNIFICANT CHANGE UP (ref 22–29)
CREAT SERPL-MCNC: 2.37 MG/DL — HIGH (ref 0.5–1.3)
CULTURE RESULTS: SIGNIFICANT CHANGE UP
CULTURE RESULTS: SIGNIFICANT CHANGE UP
GLUCOSE BLDC GLUCOMTR-MCNC: 100 MG/DL — HIGH (ref 70–99)
GLUCOSE BLDC GLUCOMTR-MCNC: 109 MG/DL — HIGH (ref 70–99)
GLUCOSE BLDC GLUCOMTR-MCNC: 161 MG/DL — HIGH (ref 70–99)
GLUCOSE BLDC GLUCOMTR-MCNC: 67 MG/DL — LOW (ref 70–99)
GLUCOSE BLDC GLUCOMTR-MCNC: 73 MG/DL — SIGNIFICANT CHANGE UP (ref 70–99)
GLUCOSE BLDC GLUCOMTR-MCNC: 78 MG/DL — SIGNIFICANT CHANGE UP (ref 70–99)
GLUCOSE SERPL-MCNC: 66 MG/DL — LOW (ref 70–99)
HCT VFR BLD CALC: 28.4 % — LOW (ref 34.5–45)
HGB BLD-MCNC: 8.7 G/DL — LOW (ref 11.5–15.5)
MCHC RBC-ENTMCNC: 26.3 PG — LOW (ref 27–34)
MCHC RBC-ENTMCNC: 30.6 GM/DL — LOW (ref 32–36)
MCV RBC AUTO: 85.8 FL — SIGNIFICANT CHANGE UP (ref 80–100)
METHOD TYPE: SIGNIFICANT CHANGE UP
MRSA PCR RESULT.: SIGNIFICANT CHANGE UP
ORGANISM # SPEC MICROSCOPIC CNT: SIGNIFICANT CHANGE UP
PLATELET # BLD AUTO: 141 K/UL — LOW (ref 150–400)
POTASSIUM SERPL-MCNC: 4 MMOL/L — SIGNIFICANT CHANGE UP (ref 3.5–5.3)
POTASSIUM SERPL-SCNC: 4 MMOL/L — SIGNIFICANT CHANGE UP (ref 3.5–5.3)
RBC # BLD: 3.31 M/UL — LOW (ref 3.8–5.2)
RBC # FLD: 15.3 % — HIGH (ref 10.3–14.5)
S AUREUS DNA NOSE QL NAA+PROBE: SIGNIFICANT CHANGE UP
SODIUM SERPL-SCNC: 137 MMOL/L — SIGNIFICANT CHANGE UP (ref 135–145)
SPECIMEN SOURCE: SIGNIFICANT CHANGE UP
WBC # BLD: 12.97 K/UL — HIGH (ref 3.8–10.5)
WBC # FLD AUTO: 12.97 K/UL — HIGH (ref 3.8–10.5)

## 2020-12-10 PROCEDURE — 99233 SBSQ HOSP IP/OBS HIGH 50: CPT

## 2020-12-10 PROCEDURE — 93306 TTE W/DOPPLER COMPLETE: CPT | Mod: 26

## 2020-12-10 PROCEDURE — 71045 X-RAY EXAM CHEST 1 VIEW: CPT | Mod: 26

## 2020-12-10 PROCEDURE — 99232 SBSQ HOSP IP/OBS MODERATE 35: CPT

## 2020-12-10 RX ORDER — ACETAMINOPHEN 500 MG
975 TABLET ORAL EVERY 8 HOURS
Refills: 0 | Status: DISCONTINUED | OUTPATIENT
Start: 2020-12-10 | End: 2020-12-14

## 2020-12-10 RX ORDER — ISOSORBIDE MONONITRATE 60 MG/1
120 TABLET, EXTENDED RELEASE ORAL DAILY
Refills: 0 | Status: DISCONTINUED | OUTPATIENT
Start: 2020-12-10 | End: 2020-12-14

## 2020-12-10 RX ORDER — DEXTROSE 50 % IN WATER 50 %
15 SYRINGE (ML) INTRAVENOUS ONCE
Refills: 0 | Status: DISCONTINUED | OUTPATIENT
Start: 2020-12-10 | End: 2020-12-14

## 2020-12-10 RX ORDER — CARVEDILOL PHOSPHATE 80 MG/1
25 CAPSULE, EXTENDED RELEASE ORAL EVERY 12 HOURS
Refills: 0 | Status: DISCONTINUED | OUTPATIENT
Start: 2020-12-10 | End: 2020-12-14

## 2020-12-10 RX ADMIN — PIPERACILLIN AND TAZOBACTAM 25 GRAM(S): 4; .5 INJECTION, POWDER, LYOPHILIZED, FOR SOLUTION INTRAVENOUS at 15:24

## 2020-12-10 RX ADMIN — HEPARIN SODIUM 5000 UNIT(S): 5000 INJECTION INTRAVENOUS; SUBCUTANEOUS at 05:08

## 2020-12-10 RX ADMIN — HEPARIN SODIUM 5000 UNIT(S): 5000 INJECTION INTRAVENOUS; SUBCUTANEOUS at 21:03

## 2020-12-10 RX ADMIN — CARVEDILOL PHOSPHATE 25 MILLIGRAM(S): 80 CAPSULE, EXTENDED RELEASE ORAL at 17:59

## 2020-12-10 RX ADMIN — Medication 1 TABLET(S): at 17:59

## 2020-12-10 RX ADMIN — CARVEDILOL PHOSPHATE 12.5 MILLIGRAM(S): 80 CAPSULE, EXTENDED RELEASE ORAL at 05:08

## 2020-12-10 RX ADMIN — ISOSORBIDE MONONITRATE 120 MILLIGRAM(S): 60 TABLET, EXTENDED RELEASE ORAL at 15:24

## 2020-12-10 RX ADMIN — Medication 1 TABLET(S): at 09:48

## 2020-12-10 RX ADMIN — CHLORHEXIDINE GLUCONATE 1 APPLICATION(S): 213 SOLUTION TOPICAL at 05:07

## 2020-12-10 RX ADMIN — Medication 100 MILLIGRAM(S): at 15:24

## 2020-12-10 RX ADMIN — HEPARIN SODIUM 5000 UNIT(S): 5000 INJECTION INTRAVENOUS; SUBCUTANEOUS at 15:24

## 2020-12-10 RX ADMIN — PIPERACILLIN AND TAZOBACTAM 25 GRAM(S): 4; .5 INJECTION, POWDER, LYOPHILIZED, FOR SOLUTION INTRAVENOUS at 05:07

## 2020-12-10 NOTE — CONSULT NOTE ADULT - ASSESSMENT
89 y/o F with PMHX Severe Pulm HTN, Severe TR, Chronic R CHF, AFIB on AC (Warfarin) with VVI PPM, Hx DVT s/p IVCF and Warfarin that comes to the ED for Hyponatremia  to 124 and leukocytosis   Vascular surgery consulted for steal syndrome.     Patient does not have signs or symptoms suggestive of steal syndrome. Findings are more suggestive of palmar fasciitis (Dupuytren's contracture). Continue HD thru AVF. Can follow up with her Vascular Surgeon as OP.     Thank you for this interesting consult. Any questions please call.     Discussed with attending Dr. Olivier who agrees.

## 2020-12-10 NOTE — CONSULT NOTE ADULT - SUBJECTIVE AND OBJECTIVE BOX
VASCULAR SURGERY CONSULT    Consulting surgical team: VASCULAR Surgery  Consulting attending: Charline POSADA  Patient seen and examined: 12-10-20 @ 15:49      HPI:  89 y/o F with PMHX Severe Pulm HTN, Severe TR, Chronic R CHF, AFIB on AC (Warfarin) with VVI PPM, Hx DVT s/p IVCF and Warfarin, s/p R THALIA 20 sent to [facility] ER from [facility] for Na 125 and weeping edema on exam. Denies any associated CP/palpitations/SOB/ONOFRE. Worsening bilateral LE edema for past 2 weeks. 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 Documented Stage 3 per ER documentation. Leukocytosis/Anemia/CKD/NAGMA/Hyponatremia noted on admission labs. Unable to obtain BL LE Venous Dopplers due to patient's cooperation with compression. CTAP with +Moderate Ascites, Sacral Decub Ulcer but no abscess. Pt seen/examined. Appears comfortable. No complaints. ROS negative unless mentioned above.  (10 Dec 2020 01:59)    Vascular Surgery consulted today by Medicine attending stating that the patient has pain in the hand specially during HD. This patient was seen and examined by this same provider yesterday during HD to remove a catheter from the IJ and the patient had no complain. Patient examined again and states that she has pain in the palmar surface that prevents her from closing her hand. Denies difference in pain during or after or before HD. Denies difference in temperature. Denies deficits in sensitivity or motor function     PAST MEDICAL HISTORY:  CKD (chronic kidney disease)    Hyponatremia    Congestive heart failure, unspecified HF chronicity, unspecified heart failure type    Pulmonary HTN    Afib    DVT (deep venous thrombosis)        PAST SURGICAL HISTORY:  Femoral neck fracture    S/P IVC filter    S/P IVC filter        ALLERGIES:  No Known Allergies      MEDICATIONS  (STANDING):  ferrous    sulfate 325 milliGRAM(s) Oral daily  folic acid 1 milliGRAM(s) Oral daily  furosemide   Injectable 40 milliGRAM(s) IV Push every 12 hours  levothyroxine 25 MICROGram(s) Oral daily  Nephro-john 1 Tablet(s) Oral daily  pantoprazole    Tablet 40 milliGRAM(s) Oral before breakfast  piperacillin/tazobactam IVPB.. 3.375 Gram(s) IV Intermittent every 12 hours  saccharomyces boulardii 250 milliGRAM(s) Oral two times a day  sodium chloride 1 Gram(s) Oral two times a day    MEDICATIONS  (PRN):  acetaminophen   Tablet .. 650 milliGRAM(s) Oral every 6 hours PRN Temp greater or equal to 38C (100.4F), Mild Pain (1 - 3), Moderate Pain (4 - 6)  oxyCODONE    IR 2.5 milliGRAM(s) Oral every 6 hours PRN Severe Pain (7 - 10)      VITALS & I/Os:  Vital Signs Last 24 Hrs  T(C): 36.6 (10 Dec 2020 11:), Max: 37.4 (10 Dec 2020 07:34)  T(F): 97.9 (10 Dec 2020 11:), Max: 99.3 (10 Dec 2020 07:34)  HR: 62 (10 Dec 2020 11:26) (62 - 74)  BP: 112/61 (10 Dec 2020 11:) (109/65 - 135/82)  BP(mean): --  RR: 19 (10 Dec 2020 11:) (16 - 19)  SpO2: 98% (10 Dec 2020 11:) (91% - 98%)  CAPILLARY BLOOD GLUCOSE          I&O's Summary    09 Dec 2020 07:01  -  10 Dec 2020 07:00  --------------------------------------------------------  IN: 100 mL / OUT: 0 mL / NET: 100 mL          GEN: NAD, alert and oriented x 3  HEENT: WNL  CHEST: Symmetrical chest rise, breath sounds CTAB  HEART: RRR, non-muffled heart sounds  ABD: Soft, non-tender, non-distended  LUE: radial pulse palpable in radial artery in the forearm and in the snuff box in the wrist (signed in the skin by this provider). no poikilothermia, no deficits in motor function except impossibility of the patient to close the hand. Harder UBALDO hand palmar surface.     LABS:                        9.5    16.11 )-----------( 119      ( 10 Dec 2020 04:20 )             29.3     12-10    124<L>  |  87<L>  |  84.0<H>  ----------------------------<  140<H>  4.7   |  22.0  |  2.65<H>    Ca    10.2      10 Dec 2020 04:20  Phos  4.4     12-10  Mg     2.1     12-10    TPro  7.2  /  Alb  3.3  /  TBili  2.5<H>  /  DBili  x   /  AST  19  /  ALT  13  /  AlkPhos  131<H>  12-10      PT/INR - ( 10 Dec 2020 04:20 )   PT: 46.0 sec;   INR: 4.25 ratio         PTT - ( 09 Dec 2020 17:02 )  PTT:43.8 sec          Urinalysis Basic - ( 09 Dec 2020 18:47 )    Color: Yellow / Appearance: Clear / S.015 / pH: x  Gluc: x / Ketone: Negative  / Bili: Negative / Urobili: Negative mg/dL   Blood: x / Protein: Negative mg/dL / Nitrite: Negative   Leuk Esterase: Negative / RBC: x / WBC x   Sq Epi: x / Non Sq Epi: x / Bacteria: x        IMAGING:

## 2020-12-10 NOTE — PROGRESS NOTE ADULT - PROBLEM SELECTOR PROBLEM 2
SIRS (systemic inflammatory response syndrome)

## 2020-12-10 NOTE — PROGRESS NOTE ADULT - ASSESSMENT
1) ESRD on HD- MWF schedule via LUE AVF  2) Chronic anemia in setting of CKD, developed UE DVT on Aranesp previously. She was requiring frequent blood transfusions and developed secondary hemochromatosis and was getting IV Desferral via mediport with Dr. Fox.  She has been getting low dose JEISON at HD unit; will increase to retacrit 10k units TIW- Goal Hb 8-9 as she developed DVT at higher Hb.   3) E coli bacteremia, Repeat blood cultures via port- also + e.coli. Mediport now s/p removal. Continue Zosyn.   4) HTN; Bp stable. UF as tolerated     1) ESRD on HD- MWF schedule via LUE AVF  2) Chronic anemia in setting of CKD, developed UE DVT on Aranesp previously. She was requiring frequent blood transfusions and developed secondary hemochromatosis and was getting IV Desferral via mediport with Dr. Fox.  She has been getting low dose JEISON at HD unit; will increase to retacrit 10k units TIW- Goal Hb 8-9 as she developed DVT at higher Hb.   3) E coli bacteremia, Repeat blood cultures via port- also + e.coli. Mediport now s/p removal. Continue Zosyn.   4) HTN; Bp stable. UF as tolerated  5) vascular eval; r/o steal syndrome    d/w Dr. Jacome

## 2020-12-10 NOTE — PROGRESS NOTE ADULT - SUBJECTIVE AND OBJECTIVE BOX
[facility] DIVISION OF KIDNEY DISEASES AND HYPERTENSION -- HEMODIALYSIS NOTE  --------------------------------------------------------------------------------  Chief Complaint: ESRD/Ongoing hemodialysis requirement    24 hour events/subjective:  s/p HD yesterday  Mediport removed  Pt seen and examined; feels well      PAST HISTORY  --------------------------------------------------------------------------------  No significant changes to PMH, PSH, FHx, SHx, unless otherwise noted    ALLERGIES & MEDICATIONS  --------------------------------------------------------------------------------  Allergies    codeine (Other)  Lortab (Other)  morphine (Other)  Percocet 10/325 (Other)  PPM not compatible with  MRI (Other (Fatal))  Reglan (Other; Flushing)  sulfa drugs (Flushing)    Intolerances      Standing Inpatient Medications  allopurinol 100 milliGRAM(s) Oral daily  carvedilol 25 milliGRAM(s) Oral every 12 hours  chlorhexidine 4% Liquid 1 Application(s) Topical <User Schedule>  dextrose 40% Gel 15 Gram(s) Oral once  dextrose 40% Gel 15 Gram(s) Oral once  dextrose 50% Injectable 25 Gram(s) IV Push once  dextrose 50% Injectable 12.5 Gram(s) IV Push once  dextrose 50% Injectable 25 Gram(s) IV Push once  epoetin vinnie-epbx (RETACRIT) Injectable 82567 Unit(s) IV Push <User Schedule>  glucagon  Injectable 1 milliGRAM(s) IntraMuscular once  heparin   Injectable 5000 Unit(s) SubCutaneous every 8 hours  influenza   Vaccine 0.5 milliLiter(s) IntraMuscular once  isosorbide   mononitrate ER Tablet (IMDUR) 120 milliGRAM(s) Oral daily  lactobacillus acidophilus 1 Tablet(s) Oral two times a day with meals  mirtazapine 15 milliGRAM(s) Oral at bedtime  piperacillin/tazobactam IVPB.. 3.375 Gram(s) IV Intermittent every 12 hours    PRN Inpatient Medications  acetaminophen   Tablet .. 975 milliGRAM(s) Oral every 8 hours PRN      REVIEW OF SYSTEMS  --------------------------------------------------------------------------------  Gen: No weight changes, fatigue, fevers/chills, weakness  Skin: No rashes  Head/Eyes/Ears/Mouth: No headache  Respiratory: No dyspnea, cough,  CV: No chest pain, orthopnea  GI: No abdominal pain, diarrhea, constipation, nausea, vomiting,  MSK: No joint pain  Neuro: No dizziness/lightheadedness, weakness  Heme: No bleeding  Psych: No significant nervousness, anxiety, stress, depression    All other systems were reviewed and are negative, except as noted.    VITALS/PHYSICAL EXAM  --------------------------------------------------------------------------------  T(C): 36.7 (12-10-20 @ 11:40), Max: 37.3 (12-10-20 @ 05:22)  HR: 70 (12-10-20 @ 11:40) (65 - 85)  BP: 148/69 (12-10-20 @ 11:40) (121/80 - 148/69)  RR: 18 (12-10-20 @ 11:40) (18 - 20)  SpO2: 98% (12-10-20 @ 11:40) (97% - 100%)  Wt(kg): --        12-09-20 @ 07:01  -  12-10-20 @ 07:00  --------------------------------------------------------  IN: 0 mL / OUT: 500 mL / NET: -500 mL    12-10-20 @ 07:01  -  12-10-20 @ 14:02  --------------------------------------------------------  IN: 0 mL / OUT: 800 mL / NET: -800 mL      Physical Exam:  	Gen: NAD, well-appearing  	HEENT: PERRL, supple neck,  	Pulm: CTA B/L  	CV: RRR, S1S2; no rub  	Abd: +BS, soft, nontender  	UE: Warm, no edema  	LE: Warm, no edema  	Neuro: No focal deficits  	Psych: Normal affect and mood  	Skin: Warm, without rashes  	Vascular access: MASHA WALSH    LABS/STUDIES  --------------------------------------------------------------------------------              8.7    12.97 >-----------<  141      [12-10-20 @ 10:05]              28.4     137  |  99  |  26.0  ----------------------------<  66      [12-10-20 @ 10:05]  4.0   |  28.0  |  2.37        Ca     8.2     [12-10-20 @ 10:05]    TPro  x   /  Alb  x   /  TBili  0.3  /  DBili  x   /  AST  x   /  ALT  x   /  AlkPhos  x   [12-09-20 @ 07:45]    PT/INR: PT 17.2 , INR 1.51       [12-09-20 @ 07:45]      Iron 83, TIBC 167, %sat --      [08-22-20 @ 10:45]  Ferritin 5890      [12-08-20 @ 01:15]  .7 (Ca --)      [02-02-20 @ 05:15]   --  HbA1c 4.9      [02-02-20 @ 05:15]  TSH 1.56      [08-25-20 @ 05:22]  Lipid: chol 122, TG 63, HDL 44, LDL 71      [02-02-20 @ 05:15]     [facility] DIVISION OF KIDNEY DISEASES AND HYPERTENSION -- HEMODIALYSIS NOTE  --------------------------------------------------------------------------------  Chief Complaint: ESRD/Ongoing hemodialysis requirement    24 hour events/subjective:  s/p HD yesterday  Mediport removed  Pt seen and examined; feels well  c/o numbness/ tingling in LUE AVF    PAST HISTORY  --------------------------------------------------------------------------------  No significant changes to PMH, PSH, FHx, SHx, unless otherwise noted    ALLERGIES & MEDICATIONS  --------------------------------------------------------------------------------  Allergies    codeine (Other)  Lortab (Other)  morphine (Other)  Percocet 10/325 (Other)  PPM not compatible with  MRI (Other (Fatal))  Reglan (Other; Flushing)  sulfa drugs (Flushing)    Intolerances      Standing Inpatient Medications  allopurinol 100 milliGRAM(s) Oral daily  carvedilol 25 milliGRAM(s) Oral every 12 hours  chlorhexidine 4% Liquid 1 Application(s) Topical <User Schedule>  dextrose 40% Gel 15 Gram(s) Oral once  dextrose 40% Gel 15 Gram(s) Oral once  dextrose 50% Injectable 25 Gram(s) IV Push once  dextrose 50% Injectable 12.5 Gram(s) IV Push once  dextrose 50% Injectable 25 Gram(s) IV Push once  epoetin vinnie-epbx (RETACRIT) Injectable 12070 Unit(s) IV Push <User Schedule>  glucagon  Injectable 1 milliGRAM(s) IntraMuscular once  heparin   Injectable 5000 Unit(s) SubCutaneous every 8 hours  influenza   Vaccine 0.5 milliLiter(s) IntraMuscular once  isosorbide   mononitrate ER Tablet (IMDUR) 120 milliGRAM(s) Oral daily  lactobacillus acidophilus 1 Tablet(s) Oral two times a day with meals  mirtazapine 15 milliGRAM(s) Oral at bedtime  piperacillin/tazobactam IVPB.. 3.375 Gram(s) IV Intermittent every 12 hours    PRN Inpatient Medications  acetaminophen   Tablet .. 975 milliGRAM(s) Oral every 8 hours PRN      REVIEW OF SYSTEMS  --------------------------------------------------------------------------------  Gen: No weight changes, fatigue, fevers/chills, weakness  Skin: No rashes  Head/Eyes/Ears/Mouth: No headache  Respiratory: No dyspnea, cough,  CV: No chest pain, orthopnea  GI: No abdominal pain, diarrhea, constipation, nausea, vomiting,  MSK: No joint pain  Neuro: No dizziness/lightheadedness, weakness  Heme: No bleeding  Psych: No significant nervousness, anxiety, stress, depression    All other systems were reviewed and are negative, except as noted.    VITALS/PHYSICAL EXAM  --------------------------------------------------------------------------------  T(C): 36.7 (12-10-20 @ 11:40), Max: 37.3 (12-10-20 @ 05:22)  HR: 70 (12-10-20 @ 11:40) (65 - 85)  BP: 148/69 (12-10-20 @ 11:40) (121/80 - 148/69)  RR: 18 (12-10-20 @ 11:40) (18 - 20)  SpO2: 98% (12-10-20 @ 11:40) (97% - 100%)  Wt(kg): --        12-09-20 @ 07:01  -  12-10-20 @ 07:00  --------------------------------------------------------  IN: 0 mL / OUT: 500 mL / NET: -500 mL    12-10-20 @ 07:01  -  12-10-20 @ 14:02  --------------------------------------------------------  IN: 0 mL / OUT: 800 mL / NET: -800 mL      Physical Exam:  	Gen: NAD, well-appearing  	HEENT: PERRL, supple neck,  	Pulm: CTA B/L  	CV: RRR, S1S2; no rub  	Abd: +BS, soft, nontender  	UE: Warm, no edema  	LE: Warm, no edema  	Neuro: No focal deficits  	Psych: Normal affect and mood  	Skin: Warm, without rashes  	Vascular access: MASHA WALSH    LABS/STUDIES  --------------------------------------------------------------------------------              8.7    12.97 >-----------<  141      [12-10-20 @ 10:05]              28.4     137  |  99  |  26.0  ----------------------------<  66      [12-10-20 @ 10:05]  4.0   |  28.0  |  2.37        Ca     8.2     [12-10-20 @ 10:05]    TPro  x   /  Alb  x   /  TBili  0.3  /  DBili  x   /  AST  x   /  ALT  x   /  AlkPhos  x   [12-09-20 @ 07:45]    PT/INR: PT 17.2 , INR 1.51       [12-09-20 @ 07:45]      Iron 83, TIBC 167, %sat --      [08-22-20 @ 10:45]  Ferritin 5890      [12-08-20 @ 01:15]  .7 (Ca --)      [02-02-20 @ 05:15]   --  HbA1c 4.9      [02-02-20 @ 05:15]  TSH 1.56      [08-25-20 @ 05:22]  Lipid: chol 122, TG 63, HDL 44, LDL 71      [02-02-20 @ 05:15]

## 2020-12-10 NOTE — PROGRESS NOTE ADULT - PROBLEM SELECTOR PROBLEM 5
Anemia Associated with Chronic Renal Failure

## 2020-12-10 NOTE — PROGRESS NOTE ADULT - SUBJECTIVE AND OBJECTIVE BOX
[facility]  INFECTIOUS DISEASES AND INTERNAL MEDICINE at [place]  =======================================================  Rick Kyle MD  Diplomates American Board of Internal Medicine and Infectious Diseases  Tel: [phone]      Fax: [phone]  =======================================================    IRA ACEVEDO [id number]    Follow up: e.coli bacteremia  feels well  port removed    Allergies:  codeine (Other)  Lortab (Other)  morphine (Other)  Percocet 10/325 (Other)  PPM not compatible with  MRI (Other (Fatal))  Reglan (Other; Flushing)  sulfa drugs (Flushing)      Medications:  acetaminophen   Tablet .. 650 milliGRAM(s) Oral every 6 hours PRN  allopurinol 100 milliGRAM(s) Oral daily  carvedilol 12.5 milliGRAM(s) Oral every 12 hours  chlorhexidine 4% Liquid 1 Application(s) Topical <User Schedule>  dextrose 40% Gel 15 Gram(s) Oral once  dextrose 50% Injectable 25 Gram(s) IV Push once  dextrose 50% Injectable 12.5 Gram(s) IV Push once  dextrose 50% Injectable 25 Gram(s) IV Push once  epoetin vinnie-epbx (RETACRIT) Injectable 98161 Unit(s) IV Push <User Schedule>  glucagon  Injectable 1 milliGRAM(s) IntraMuscular once  heparin   Injectable 5000 Unit(s) SubCutaneous every 8 hours  influenza   Vaccine 0.5 milliLiter(s) IntraMuscular once  lactobacillus acidophilus 1 Tablet(s) Oral two times a day with meals  mirtazapine 15 milliGRAM(s) Oral at bedtime  piperacillin/tazobactam IVPB.. 3.375 Gram(s) IV Intermittent every 12 hours            REVIEW OF SYSTEMS:  CONSTITUTIONAL:  No Fever or chills  HEENT:   No diplopia or blurred vision.  No earache, sore throat or runny nose.  CARDIOVASCULAR:  No pressure, squeezing, strangling, tightness, heaviness or aching about the chest, neck, axilla or epigastrium.  RESPIRATORY:  No cough, shortness of breath  GASTROINTESTINAL:  No nausea, vomiting or diarrhea.  GENITOURINARY:  No dysuria, frequency or urgency. No Blood in urine  MUSCULOSKELETAL:  no joint aches, no muscle pain  SKIN:  No change in skin, hair or nails.  NEUROLOGIC:  No Headaches, seizures or weakness.  PSYCHIATRIC:  No disorder of thought or mood.  ENDOCRINE:  No heat or cold intolerance  HEMATOLOGICAL:  No easy bruising or bleeding.            Physical Exam:  ICU Vital Signs Last 24 Hrs  T(C): 36.9 (09 Dec 2020 15:40), Max: 37.2 (08 Dec 2020 18:54)  T(F): 98.5 (09 Dec 2020 15:40), Max: 99 (08 Dec 2020 18:54)  HR: 65 (09 Dec 2020 15:40) (59 - 89)  BP: 131/42 (09 Dec 2020 15:40) (90/43 - 132/65)  BP(mean): 81 (09 Dec 2020 04:14) (81 - 83)  ABP: --  ABP(mean): --  RR: 18 (09 Dec 2020 15:40) (18 - 18)  SpO2: 100% (09 Dec 2020 15:40) (97% - 100%)      GEN: NAD, pleasant  HEENT: normocephalic and atraumatic. EOMI. PERRL.  Anicteric   NECK: Supple.   LUNGS: Clear to auscultation.  HEART: Regular rate and rhythm without murmur.  ABDOMEN: Soft, nontender, and nondistended.  Positive bowel sounds.    : No CVA tenderness  EXTREMITIES: Without any edema.  left avf  MSK: no joint swelling  NEUROLOGIC: Cranial nerves II through XII are grossly intact. No focal deficits  PSYCHIATRIC: Appropriate affect .  SKIN: No Rash      Labs:                                   8.7    12.97 )-----------( 141      ( 10 Dec 2020 10:05 )             28.4   12-10    137  |  99  |  26.0<H>  ----------------------------<  66<L>  4.0   |  28.0  |  2.37<H>    Ca    8.2<L>      10 Dec 2020 10:05    TPro  x   /  Alb  x   /  TBili  0.3<L>  /  DBili  x   /  AST  x   /  ALT  x   /  AlkPhos  x   12-09      Culture Results:   Growth in aerobic and anaerobic bottles: Escherichia coli  Susceptibility to follow. (12-07-20 @ 21:54)  Culture Results:   Growth in aerobic and anaerobic bottles: Escherichia coli  See previous culture  [id number] (12-07-20 @ 21:53)   [facility]  INFECTIOUS DISEASES AND INTERNAL MEDICINE at [place]  =======================================================  Rick Kyle MD  Diplomates American Board of Internal Medicine and Infectious Diseases  Tel: [phone]      Fax: [phone]  =======================================================    IRA ACEVEDO [id number]    Follow up: e.coli bacteremia  feels well  port removed, has some pain at site    Allergies:  codeine (Other)  Lortab (Other)  morphine (Other)  Percocet 10/325 (Other)  PPM not compatible with  MRI (Other (Fatal))  Reglan (Other; Flushing)  sulfa drugs (Flushing)      Medications:  acetaminophen   Tablet .. 650 milliGRAM(s) Oral every 6 hours PRN  allopurinol 100 milliGRAM(s) Oral daily  carvedilol 12.5 milliGRAM(s) Oral every 12 hours  chlorhexidine 4% Liquid 1 Application(s) Topical <User Schedule>  dextrose 40% Gel 15 Gram(s) Oral once  dextrose 50% Injectable 25 Gram(s) IV Push once  dextrose 50% Injectable 12.5 Gram(s) IV Push once  dextrose 50% Injectable 25 Gram(s) IV Push once  epoetin vinnie-epbx (RETACRIT) Injectable 86771 Unit(s) IV Push <User Schedule>  glucagon  Injectable 1 milliGRAM(s) IntraMuscular once  heparin   Injectable 5000 Unit(s) SubCutaneous every 8 hours  influenza   Vaccine 0.5 milliLiter(s) IntraMuscular once  lactobacillus acidophilus 1 Tablet(s) Oral two times a day with meals  mirtazapine 15 milliGRAM(s) Oral at bedtime  piperacillin/tazobactam IVPB.. 3.375 Gram(s) IV Intermittent every 12 hours            REVIEW OF SYSTEMS:  CONSTITUTIONAL:  No Fever or chills  HEENT:   No diplopia or blurred vision.  No earache, sore throat or runny nose.  CARDIOVASCULAR:  No pressure, squeezing, strangling, tightness, heaviness or aching about the chest, neck, axilla or epigastrium.  RESPIRATORY:  No cough, shortness of breath  GASTROINTESTINAL:  No nausea, vomiting or diarrhea.  GENITOURINARY:  No dysuria, frequency or urgency. No Blood in urine  MUSCULOSKELETAL:  no joint aches, no muscle pain  SKIN:  No change in skin, hair or nails.  NEUROLOGIC:  No Headaches, seizures or weakness.  PSYCHIATRIC:  No disorder of thought or mood.  ENDOCRINE:  No heat or cold intolerance  HEMATOLOGICAL:  No easy bruising or bleeding.            Physical Exam:  ICU Vital Signs Last 24 Hrs  T(C): 36.9 (09 Dec 2020 15:40), Max: 37.2 (08 Dec 2020 18:54)  T(F): 98.5 (09 Dec 2020 15:40), Max: 99 (08 Dec 2020 18:54)  HR: 65 (09 Dec 2020 15:40) (59 - 89)  BP: 131/42 (09 Dec 2020 15:40) (90/43 - 132/65)  BP(mean): 81 (09 Dec 2020 04:14) (81 - 83)  ABP: --  ABP(mean): --  RR: 18 (09 Dec 2020 15:40) (18 - 18)  SpO2: 100% (09 Dec 2020 15:40) (97% - 100%)      GEN: NAD, pleasant  HEENT: normocephalic and atraumatic. EOMI. PERRL.  Anicteric   NECK: Supple.   LUNGS: Clear to auscultation.  HEART: Regular rate and rhythm without murmur.  ABDOMEN: Soft, nontender, and nondistended.  Positive bowel sounds.    : No CVA tenderness  EXTREMITIES: Without any edema.  left avf  MSK: no joint swelling  NEUROLOGIC: Cranial nerves II through XII are grossly intact. No focal deficits  PSYCHIATRIC: Appropriate affect .  SKIN: No Rash      Labs:                                   8.7    12.97 )-----------( 141      ( 10 Dec 2020 10:05 )             28.4   12-10    137  |  99  |  26.0<H>  ----------------------------<  66<L>  4.0   |  28.0  |  2.37<H>    Ca    8.2<L>      10 Dec 2020 10:05    TPro  x   /  Alb  x   /  TBili  0.3<L>  /  DBili  x   /  AST  x   /  ALT  x   /  AlkPhos  x   12-09      Culture Results:   Growth in aerobic and anaerobic bottles: Escherichia coli  Susceptibility to follow. (12-07-20 @ 21:54)  Culture Results:   Growth in aerobic and anaerobic bottles: Escherichia coli  See previous culture  [id number] (12-07-20 @ 21:53)

## 2020-12-10 NOTE — PROCEDURE NOTE - ADDITIONAL PROCEDURE DETAILS
Catheter with double lumen removed from subcutaneous to IJ in the L upper chest. Tip sent for culture

## 2020-12-10 NOTE — PROGRESS NOTE ADULT - ASSESSMENT
81 y/o female PM Cad AS HTN gout s/p AVR ESRD on Hd via AVF has mediport for iron infusions who was sent in from her dialysis center for hypoglycemia, as per traige note ems stated that pt's BG was 60 got 1 amp of D50 and BG went up to 80. pt. appears a poor historian . reports no cough, no cp, no sob, says no to most of the questions, no sick contact. does not make any urine, t max in the .6. pt. stated that she has chronic leg weakness and moves around in wheelchair.   In the ER tmax 102.6. pt. cannot tell which medicine she takes for her DM.  (08 Dec 2020 00:39)    Leukocytosis  Bacteremia  ESRD on HD    - CT a/p negative  - Blood cultures + e.coli  - Repeat blood cultures via port- also + e.coli  - port removed  - repeat blood cultures x 2  - change zosyn-->cefepime 1 g IV daily  - TTE  - Trend Fever  - Trend Leukocytosis    d/w attending, RN  Will Follow

## 2020-12-10 NOTE — PROGRESS NOTE ADULT - ASSESSMENT
Mr. Seymour is F with pmh of ESRD on HD MWF via LUE fistular, CAD s/p remote CABG, sever AS s/p TAVR, with pacemaker in place, HTN, HLD, chronic anemia with Iron infusion via tunneled line by Hematologist, hx of DVT/PE? who presents to ED via EMS due to episode of hypoglycemia in 60s and confusion. Patient was found to be septic, hypoglycemic with G - bacteremia with unclear source of infection on admission.     # Sepsis c/b G - neg  bacteremia, unclear ethiology, could be Line infection, stable  - cont'e with Zosyn  - L IJ central line  was pulled out on 12/10  - sent Blood Cx and urine cx, blood Cx from line on 12/08, set of Cx from   - ID team input noted      # Hypoglycemia in pt w/o po hypoglycemic med or Insulin most likely related to sepsis and poor PO intake, has improved  - off of D5W  -  Endocrinologist input is noted   - encourage po intake  - pending Insulin level, C-peptide, Cortisol level       #ESRD on HD MWF via LUE fistular  - HD as per Nephrology team plan  - will hold Diuretics ( as per family she is on Torsemide 20 mg daily)    # HTN, CAD s/p remote CABG, sever AS s/p TAVR, with pacemaker in place,  - bb as above  - resumed Imdur and Cored, cont'e holding Clonidin, Hydralazin, may resume gradually base on BP  - TTE ordered    # Acute on chronic normocytic anemia 2/2 ACD  - s/p 1 PRBC during HD on 12/09 with apropriet response    # L hand pain and numbness   - could not palpate radial pulse on L side, but warm to touch  - consulted Vascular team    # Gout - allopurinol home dose    # Depression - c/w Mirtazapin    # DVT ppx - SCD for now given drop in H/H, will reassess if safe to put on Heparin tid tomorrow  # CODE - full code, HCP - Son Wagner   # LOS - will need 3-4 more days, most likely will need long term IV abx but it also coud be done on HD days, d/c home vs VARSHA ( base on PT/OT assessment when feels better)

## 2020-12-10 NOTE — PROGRESS NOTE ADULT - SUBJECTIVE AND OBJECTIVE BOX
[facility] Division of Hospital Medicine    Chief Complaint:  sepsis/hypoglycemia    SUBJECTIVE: reports feeling better, c/o of R hand pain and numbness  at rest which worse during HD. no other new conmplains    OVERNIGHT EVENTS: none reported     Patient denies chest pain, SOB, abd pain, N/V, fever, chills, dysuria or any other complaints. All remainder ROS negative.     MEDICATIONS  (STANDING):  allopurinol 100 milliGRAM(s) Oral daily  carvedilol 25 milliGRAM(s) Oral every 12 hours  chlorhexidine 4% Liquid 1 Application(s) Topical <User Schedule>  dextrose 40% Gel 15 Gram(s) Oral once  dextrose 40% Gel 15 Gram(s) Oral once  dextrose 50% Injectable 25 Gram(s) IV Push once  dextrose 50% Injectable 12.5 Gram(s) IV Push once  dextrose 50% Injectable 25 Gram(s) IV Push once  epoetin vinnie-epbx (RETACRIT) Injectable 77421 Unit(s) IV Push <User Schedule>  glucagon  Injectable 1 milliGRAM(s) IntraMuscular once  heparin   Injectable 5000 Unit(s) SubCutaneous every 8 hours  influenza   Vaccine 0.5 milliLiter(s) IntraMuscular once  isosorbide   mononitrate ER Tablet (IMDUR) 120 milliGRAM(s) Oral daily  lactobacillus acidophilus 1 Tablet(s) Oral two times a day with meals  mirtazapine 15 milliGRAM(s) Oral at bedtime  piperacillin/tazobactam IVPB.. 3.375 Gram(s) IV Intermittent every 12 hours    MEDICATIONS  (PRN):  acetaminophen   Tablet .. 975 milliGRAM(s) Oral every 8 hours PRN Moderate Pain (4 - 6)        I&O's Summary    09 Dec 2020 07:01  -  10 Dec 2020 07:00  --------------------------------------------------------  IN: 0 mL / OUT: 500 mL / NET: -500 mL    10 Dec 2020 07:01  -  10 Dec 2020 16:46  --------------------------------------------------------  IN: 0 mL / OUT: 800 mL / NET: -800 mL        PHYSICAL EXAM:  Vital Signs Last 24 Hrs  T(C): 36.7 (10 Dec 2020 11:40), Max: 37.3 (10 Dec 2020 05:22)  T(F): 98 (10 Dec 2020 11:40), Max: 99.2 (10 Dec 2020 05:22)  HR: 70 (10 Dec 2020 11:40) (69 - 85)  BP: 148/69 (10 Dec 2020 11:40) (121/80 - 148/69)  BP(mean): --  RR: 18 (10 Dec 2020 11:40) (18 - 20)  SpO2: 98% (10 Dec 2020 11:40) (97% - 100%)        CONSTITUTIONAL: NAD, fragile lady   ENMT: Moist oral mucosa, no pharyngeal injection or exudates; normal dentition; No JVD  RESPIRATORY: Normal respiratory effort; lungs are clear to auscultation bilaterally  CARDIOVASCULAR: Regular rate and rhythm, normal S1 and S2, no murmur/rub/gallop; No lower extremity edema; Peripheral pulses are 2+ bilaterally, L side chest tunneled line removed.    ABDOMEN: Nontender to palpation, normoactive bowel sounds, no rebound/guarding; No hepatosplenomegaly  MUSCLOSKELETAL:  Normal gait; no clubbing or cyanosis of digits; no joint swelling or tenderness to palpation  PSYCH: A+O to person, place, and almost time, less confusion  and more interactive today  NEUROLOGY: CN 2-12 are intact and symmetric; no gross sensory deficits; was observed moving all 4 ext against gravity cooperating with exam.   SKIN: No rashes; no palpable lesions      LABS:                        8.7    12.97 )-----------( 141      ( 10 Dec 2020 10:05 )             28.4     12-10    137  |  99  |  26.0<H>  ----------------------------<  66<L>  4.0   |  28.0  |  2.37<H>    Ca    8.2<L>      10 Dec 2020 10:05    TPro  x   /  Alb  x   /  TBili  0.3<L>  /  DBili  x   /  AST  x   /  ALT  x   /  AlkPhos  x   12-09    PT/INR - ( 09 Dec 2020 07:45 )   PT: 17.2 sec;   INR: 1.51 ratio                   Culture - Blood (collected 08 Dec 2020 14:44)  Source: .Blood Blood-Venous  Gram Stain (09 Dec 2020 10:12):    Growth in aerobic bottle: Gram Negative Rods    Gram Stain performed by:    [facility]    [address] [place] [address]    .    TYPE: (C=Critical, N=Notification, A=Abnormal) c    TESTS:  _ GS    DATE/TIME CALLED: _ 12/09/2020 10:12:03    CALLED TO: Emerald Choudhary RN    READ BACK (2 Patient Identifiers)(Y/N): _ Y    READ BACK VALUES (Y/N): _ Y    CALLED BY: Emerald Patel    Culture - Blood (collected 07 Dec 2020 21:54)  Source: .Blood Blood-Peripheral  Gram Stain (08 Dec 2020 12:21):    Growth in aerobic and anaerobic bottles: Gram Negative Rods    ***Blood Panel PCR results on this specimen are available    approximately 3 hours after the Gram stain result.***    Gram stain, PCR, and/or culture results may not always    correspond due todifference in methodologies.    ************************************************************    This PCR assay was performed using [employer].    The following targets are tested for: Enterococcus,    vancomycin resistant enterococci, Listeria monocytogenes,    coagulase negative staphylococci, S. aureus,    methicillin resistant S. aureus, Streptococcus agalactiae    (Group B), S. pneumoniae, S. pyogenes (Group A),    Acinetobacter baumannii, Enterobacter cloacae, E. coli,    Klebsiella oxytoca, K. pneumoniae, Proteus sp.,    Serratia marcescens, Haemophilus influenzae,    Neisseria meningitidis, Pseudomonas aeruginosa, Candida    albicans, C. glabrata, C krusei, C parapsilosis,    C. tropicalis and the KPC resistance gene.    Gram Stain and BCID performed by:    [facility]    [address] [place] [address]    .    TYPE: (C=Critical, N=Notification, A=Abnormal) C    TESTS:  _ GS    DATE/TIME CALLED: _ 12/08/2020 12:20:42    CALLED TO: Emerald Bailey RN    READ BACK (2 Patient Identifiers)(Y/N): _ Y    READ BACK VALUES (Y/N): _ Y    CALLED BY: Emerald Patel  Final Report (10 Dec 2020 10:49):    Growth in aerobic and anaerobic bottles: Escherichia coli  Organism: Blood Culture PCR  Escherichia coli (10 Dec 2020 10:49)  Organism: Escherichia coli (10 Dec 2020 10:49)  Organism: Blood Culture PCR (10 Dec 2020 10:49)    Culture - Blood (collected 07 Dec 2020 21:53)  Source: .Blood Blood-Peripheral  Gram Stain (08 Dec 2020 12:21):    Growth in aerobic and anaerobic bottles: Gram Negative Rods    Gram Stain performed by:    [facility]    [address] [place] [id number]    .    TYPE: (C=Critical, N=Notification, A=Abnormal) C    TESTS:  _ GS    DATE/TIME CALLED: _ 12/08/2020 12:21:18    CALLED TO: Emerald Bailey RN    READ BACK (2 Patient Identifiers)(Y/N): _ Y    READ BACK VALUES (Y/N): _ Y    CALLED BY: Emerald Patel  Preliminary Report (09 Dec 2020 16:50):    Growth in aerobic and anaerobic bottles: Escherichia coli    See previous culture  [id number]      CAPILLARY BLOOD GLUCOSE      POCT Blood Glucose.: 78 mg/dL (10 Dec 2020 14:16)  POCT Blood Glucose.: 73 mg/dL (10 Dec 2020 09:34)  POCT Blood Glucose.: 109 mg/dL (10 Dec 2020 05:39)  POCT Blood Glucose.: 67 mg/dL (10 Dec 2020 05:12)  POCT Blood Glucose.: 100 mg/dL (10 Dec 2020 01:09)  POCT Blood Glucose.: 104 mg/dL (09 Dec 2020 21:14)  POCT Blood Glucose.: 81 mg/dL (09 Dec 2020 18:16)  POCT Blood Glucose.: 86 mg/dL (09 Dec 2020 16:56)        RADIOLOGY & ADDITIONAL TESTS:  Results Reviewed:   Imaging Personally Reviewed:  Electrocardiogram Personally Reviewed:

## 2020-12-11 LAB
-  AMIKACIN: SIGNIFICANT CHANGE UP
-  AZTREONAM: SIGNIFICANT CHANGE UP
-  CEFEPIME: SIGNIFICANT CHANGE UP
-  CEFTAZIDIME: SIGNIFICANT CHANGE UP
-  CIPROFLOXACIN: SIGNIFICANT CHANGE UP
-  GENTAMICIN: SIGNIFICANT CHANGE UP
-  IMIPENEM: SIGNIFICANT CHANGE UP
-  LEVOFLOXACIN: SIGNIFICANT CHANGE UP
-  MEROPENEM: SIGNIFICANT CHANGE UP
-  PIPERACILLIN/TAZOBACTAM: SIGNIFICANT CHANGE UP
-  TOBRAMYCIN: SIGNIFICANT CHANGE UP
ANION GAP SERPL CALC-SCNC: 12 MMOL/L — SIGNIFICANT CHANGE UP (ref 5–17)
BUN SERPL-MCNC: 38 MG/DL — HIGH (ref 8–20)
CALCIUM SERPL-MCNC: 8.4 MG/DL — LOW (ref 8.6–10.2)
CHLORIDE SERPL-SCNC: 99 MMOL/L — SIGNIFICANT CHANGE UP (ref 98–107)
CO2 SERPL-SCNC: 26 MMOL/L — SIGNIFICANT CHANGE UP (ref 22–29)
CREAT SERPL-MCNC: 3.26 MG/DL — HIGH (ref 0.5–1.3)
CULTURE RESULTS: SIGNIFICANT CHANGE UP
GLUCOSE BLDC GLUCOMTR-MCNC: 110 MG/DL — HIGH (ref 70–99)
GLUCOSE BLDC GLUCOMTR-MCNC: 116 MG/DL — HIGH (ref 70–99)
GLUCOSE BLDC GLUCOMTR-MCNC: 185 MG/DL — HIGH (ref 70–99)
GLUCOSE BLDC GLUCOMTR-MCNC: 83 MG/DL — SIGNIFICANT CHANGE UP (ref 70–99)
GLUCOSE BLDC GLUCOMTR-MCNC: 96 MG/DL — SIGNIFICANT CHANGE UP (ref 70–99)
GLUCOSE SERPL-MCNC: 98 MG/DL — SIGNIFICANT CHANGE UP (ref 70–99)
HCT VFR BLD CALC: 29 % — LOW (ref 34.5–45)
HGB BLD-MCNC: 8.6 G/DL — LOW (ref 11.5–15.5)
MCHC RBC-ENTMCNC: 25.5 PG — LOW (ref 27–34)
MCHC RBC-ENTMCNC: 29.7 GM/DL — LOW (ref 32–36)
MCV RBC AUTO: 86.1 FL — SIGNIFICANT CHANGE UP (ref 80–100)
METHOD TYPE: SIGNIFICANT CHANGE UP
ORGANISM # SPEC MICROSCOPIC CNT: SIGNIFICANT CHANGE UP
ORGANISM # SPEC MICROSCOPIC CNT: SIGNIFICANT CHANGE UP
PLATELET # BLD AUTO: 140 K/UL — LOW (ref 150–400)
POTASSIUM SERPL-MCNC: 4.3 MMOL/L — SIGNIFICANT CHANGE UP (ref 3.5–5.3)
POTASSIUM SERPL-SCNC: 4.3 MMOL/L — SIGNIFICANT CHANGE UP (ref 3.5–5.3)
RBC # BLD: 3.37 M/UL — LOW (ref 3.8–5.2)
RBC # FLD: 15.9 % — HIGH (ref 10.3–14.5)
SODIUM SERPL-SCNC: 137 MMOL/L — SIGNIFICANT CHANGE UP (ref 135–145)
SPECIMEN SOURCE: SIGNIFICANT CHANGE UP
SPECIMEN SOURCE: SIGNIFICANT CHANGE UP
WBC # BLD: 8.93 K/UL — SIGNIFICANT CHANGE UP (ref 3.8–10.5)
WBC # FLD AUTO: 8.93 K/UL — SIGNIFICANT CHANGE UP (ref 3.8–10.5)

## 2020-12-11 PROCEDURE — 90937 HEMODIALYSIS REPEATED EVAL: CPT

## 2020-12-11 PROCEDURE — 99232 SBSQ HOSP IP/OBS MODERATE 35: CPT

## 2020-12-11 RX ORDER — CEFEPIME 1 G/1
1000 INJECTION, POWDER, FOR SOLUTION INTRAMUSCULAR; INTRAVENOUS DAILY
Refills: 0 | Status: DISCONTINUED | OUTPATIENT
Start: 2020-12-11 | End: 2020-12-14

## 2020-12-11 RX ADMIN — PIPERACILLIN AND TAZOBACTAM 25 GRAM(S): 4; .5 INJECTION, POWDER, LYOPHILIZED, FOR SOLUTION INTRAVENOUS at 05:10

## 2020-12-11 RX ADMIN — ISOSORBIDE MONONITRATE 120 MILLIGRAM(S): 60 TABLET, EXTENDED RELEASE ORAL at 12:43

## 2020-12-11 RX ADMIN — Medication 1 TABLET(S): at 17:23

## 2020-12-11 RX ADMIN — HEPARIN SODIUM 5000 UNIT(S): 5000 INJECTION INTRAVENOUS; SUBCUTANEOUS at 21:03

## 2020-12-11 RX ADMIN — CEFEPIME 100 MILLIGRAM(S): 1 INJECTION, POWDER, FOR SOLUTION INTRAMUSCULAR; INTRAVENOUS at 20:45

## 2020-12-11 RX ADMIN — Medication 100 MILLIGRAM(S): at 12:43

## 2020-12-11 RX ADMIN — Medication 1 TABLET(S): at 12:42

## 2020-12-11 RX ADMIN — HEPARIN SODIUM 5000 UNIT(S): 5000 INJECTION INTRAVENOUS; SUBCUTANEOUS at 17:23

## 2020-12-11 RX ADMIN — CARVEDILOL PHOSPHATE 25 MILLIGRAM(S): 80 CAPSULE, EXTENDED RELEASE ORAL at 05:11

## 2020-12-11 RX ADMIN — Medication 0.1 MILLIGRAM(S): at 17:23

## 2020-12-11 RX ADMIN — HEPARIN SODIUM 5000 UNIT(S): 5000 INJECTION INTRAVENOUS; SUBCUTANEOUS at 05:11

## 2020-12-11 RX ADMIN — ERYTHROPOIETIN 10000 UNIT(S): 10000 INJECTION, SOLUTION INTRAVENOUS; SUBCUTANEOUS at 14:44

## 2020-12-11 RX ADMIN — MIRTAZAPINE 15 MILLIGRAM(S): 45 TABLET, ORALLY DISINTEGRATING ORAL at 21:03

## 2020-12-11 RX ADMIN — CARVEDILOL PHOSPHATE 25 MILLIGRAM(S): 80 CAPSULE, EXTENDED RELEASE ORAL at 17:23

## 2020-12-11 NOTE — PROGRESS NOTE ADULT - ASSESSMENT
79 y/o female PM Cad AS HTN gout s/p AVR ESRD on Hd via AVF has mediport for iron infusions who was sent in from her dialysis center for hypoglycemia, as per traige note ems stated that pt's BG was 60 got 1 amp of D50 and BG went up to 80. pt. appears a poor historian . reports no cough, no cp, no sob, says no to most of the questions, no sick contact. does not make any urine, t max in the .6. pt. stated that she has chronic leg weakness and moves around in wheelchair.   In the ER tmax 102.6. pt. cannot tell which medicine she takes for her DM.  (08 Dec 2020 00:39)    Leukocytosis  Bacteremia  ESRD on HD  CLABSI    - CT a/p negative  - Blood cultures 12/7 + e.coli (S) to cefepime  - Repeat blood cultures 12/8 via port- +pseudomonas (S) to cefepime  - port removed  - repeat blood cultures 12/9 NGTD  - culture catheter and BCX from 12/10 pending  - c/w cefepime 1 g IV daily. When ready for discharge will plan for cefepime 2 g post HD through 12/22/20 with weekly cbc cmp  - TTE no vegetations  - Trend Fever  - Trend Leukocytosis

## 2020-12-11 NOTE — PROGRESS NOTE ADULT - ATTENDING COMMENTS
seen and examined   agree with above   repeat blood culture negative   first one was positive for pseudomonas   will need outpatient IV therapy   will follow recs from ID

## 2020-12-11 NOTE — PROCEDURE NOTE - PROCEDURE DATE TIME, MLM
[place] [address] [place] [address]  Phone: [phone]           Patient Discharge Instructions   Our goal is to set you up for success. This packet includes information on your condition, medications, and your home care.  It will help you care for yourself to prevent having to return to the hospital.     Please ask your nurse if you have any questions.      There are many details to remember when preparing to leave the hospital. Here is what you will need to do:    1. Take your medicine. If you are prescribed medications, review your Medication List on the following pages. You may have new medications to  at the pharmacy and others that you'll need to stop taking. Review the instructions for how and when to take your medications. Talk with your doctor or nurses if you are unsure of what to do.     2. Go to your follow-up appointments. Specific follow-up information is listed in the following pages. Your may be contacted by a nurse or clinical provider about future appointments. Be sure we have all of the phone numbers to reach you. Please contact your provider's office if you are unable to make an appointment.     3. Watch for warning signs. Your doctor or nurse will give you detailed warning signs to watch for and when to call for assistance. These instructions may also include educational information about your condition. If you experience any of warning signs to your health, call your doctor.           [facility]  Unless otherwise directed by your provider, please   contact [facility], our nurse care line   that is available for 24/7 assistance.     [phone] (toll-free)     Registered nurses in the [facility]   provide: appointment scheduling, clinical advisement, health education, and other advisory services.                  ** Verify the list of medication(s) below is accurate and up to date. Carry this with you in case of emergency. 
If your medications have changed, please notify your healthcare provider.             Medication List      START taking these medications        Additional Info                      ciprofloxacin HCl 500 MG tablet   Commonly known as:  CIPRO   Quantity:  4 tablet   Refills:  0   Dose:  500 mg    Instructions:  Take 1 tablet (500 mg total) by mouth 2 (two) times daily.     Begin Date    AM    Noon    PM    Bedtime       oxycodone-acetaminophen 5-325 mg per tablet   Commonly known as:  PERCOCET   Quantity:  6 tablet   Refills:  0   Dose:  1 tablet    Instructions:  Take 1 tablet by mouth every 4 (four) hours as needed for Pain.     Begin Date    AM    Noon    PM    Bedtime       phenazopyridine 100 MG tablet   Commonly known as:  PYRIDIUM   Quantity:  9 tablet   Refills:  0   Dose:  100 mg    Instructions:  Take 1 tablet (100 mg total) by mouth 3 (three) times daily as needed.     Begin Date    AM    Noon    PM    Bedtime         CONTINUE taking these medications        Additional Info                      aspirin 81 MG Chew   Refills:  0   Dose:  81 mg    Instructions:  Take 81 mg by mouth once daily.     Begin Date    AM    Noon    PM    Bedtime       fluconazole 150 MG Tab   Commonly known as:  DIFLUCAN   Quantity:  3 tablet   Refills:  0   Dose:  150 mg    Instructions:  Take 1 tablet (150 mg total) by mouth once daily.     Begin Date    AM    Noon    PM    Bedtime       isosorbide mononitrate 60 MG 24 hr tablet   Commonly known as:  IMDUR   Refills:  0   Dose:  60 mg    Instructions:  Take 60 mg by mouth every morning.     Begin Date    AM    Noon    PM    Bedtime       lancets Misc   Commonly known as:  MICROLET LANCET   Quantity:  300 each   Refills:  3   Comments:  **Patient requests 90 day supply**    Instructions:  Use as directed for blood sugar management     Begin Date    AM    Noon    PM    Bedtime       LEVEMIR FLEXTOUCH 100 unit/mL (3 mL) Inpn pen   Quantity:  60 mL   Refills:  4   Comments:  **Patient 
requests 90 days supply**   Generic drug:  insulin detemir    Instructions:  INJECT 35 UNITS UNDER THE SKIN TWICE DAILY     Begin Date    AM    Noon    PM    Bedtime       lisinopril 20 MG tablet   Commonly known as:  PRINIVIL,ZESTRIL   Quantity:  90 tablet   Refills:  0    Instructions:  TAKE 1 TABLET BY MOUTH TWICE DAILY     Begin Date    AM    Noon    PM    Bedtime       metformin 1000 MG tablet   Commonly known as:  GLUCOPHAGE   Refills:  0   Dose:  1000 mg    Instructions:  Take 1,000 mg by mouth 2 (two) times daily with meals.     Begin Date    AM    Noon    PM    Bedtime       metoprolol succinate 25 MG 24 hr tablet   Commonly known as:  TOPROL-XL   Refills:  0   Dose:  25 mg    Instructions:  Take 25 mg by mouth once daily.     Begin Date    AM    Noon    PM    Bedtime       mupirocin 2 % ointment   Commonly known as:  BACTROBAN   Refills:  0   Dose:  1 g    Instructions:  Apply 1 g topically 3 (three) times daily. To Left Great toe.     Begin Date    AM    Noon    PM    Bedtime       * MYRBETRIQ 50 mg Tb24   Refills:  5   Generic drug:  mirabegron    Instructions:  TK 1 T PO QD     Begin Date    AM    Noon    PM    Bedtime       * mirabegron 50 mg Tb24   Quantity:  30 tablet   Refills:  11   Dose:  50 mg    Instructions:  Take 1 tablet (50 mg total) by mouth once daily.     Begin Date    AM    Noon    PM    Bedtime       neomycin-bacitracin-polymyxin ointment   Commonly known as:  NEOSPORIN   Refills:  0   Dose:  1 g    Instructions:  Apply 1 g topically 2 (two) times daily. To Left Great toe.     Begin Date    AM    Noon    PM    Bedtime       nitroGLYCERIN 0.4 MG SL tablet   Commonly known as:  NITROSTAT   Refills:  0   Dose:  0.4 mg    Instructions:  Place 0.4 mg under the tongue every 5 (five) minutes as needed for Chest pain.     Begin Date    AM    Noon    PM    Bedtime       oxybutynin 10 MG 24 hr tablet   Commonly known as:  DITROPAN-XL   Quantity:  90 tablet   Refills:  3   Dose:  10 mg   Comments: 
" **Patient requests 90 days supply**    Instructions:  Take 1 tablet (10 mg total) by mouth once daily.     Begin Date    AM    Noon    PM    Bedtime       pen needle, diabetic 32 gauge x 5/32" Ndle   Commonly known as:  BD ULTRA-FINE [place]IDA PEN NEEDLES   Quantity:  100 each   Refills:  12   Dose:  1 Syringe   Comments:  90 day supply    Instructions:  Inject 1 Syringe into the skin once daily.     Begin Date    AM    Noon    PM    Bedtime       REFRESH TEARS 0.5 % Drop   Refills:  0   Dose:  1 drop   Generic drug:  carboxymethylcellulose sodium    Instructions:  Place 1 drop into both eyes daily as needed (Dry Eyes.).     Begin Date    AM    Noon    PM    Bedtime       simvastatin 20 MG tablet   Commonly known as:  ZOCOR   Refills:  3    Instructions:  TK 1 T PO ONCE A DAY     Begin Date    AM    Noon    PM    Bedtime       tramadol 50 mg tablet   Commonly known as:  ULTRAM   Refills:  0   Dose:  50 mg    Instructions:  Take 50 mg by mouth every 6 (six) hours as needed for Pain.     Begin Date    AM    Noon    PM    Bedtime       * Notice:  This list has 2 medication(s) that are the same as other medications prescribed for you. Read the directions carefully, and ask your doctor or other care provider to review them with you.         Where to Get Your Medications      These medications were sent to [place] [id number]  [place], [place] - [address]  [address] [place] [address]     Phone:  [phone]     ciprofloxacin HCl 500 MG tablet    oxycodone-acetaminophen 5-325 mg per tablet    phenazopyridine 100 MG tablet                  Please bring to all follow up appointments:    1. A copy of your discharge instructions.  2. All medicines you are currently taking in their original bottles.  3. Identification and insurance card.    Please arrive 15 minutes ahead of scheduled appointment time.    Please call 24 hours in advance if you must reschedule your appointment and/or time.        Your "
11-Dec-2020 21:11
10-Dec-2020 09:40
Scheduled Appointments     May 09, 2017  9:40 AM CDT   Established Patient Visit with Jud Silverman MD   [facility] ([facility])    [address] [facility] [address]  [place] [id number] [phone]              Follow-up Information     Follow up with Jud Silverman MD In 1 week.    Specialty:  Urology    Why:  For post-op follow up    Contact information:    [address]  [address]  [place] [address]  [phone]          Discharge Instructions     Future Orders    Activity as tolerated     Call MD for:  difficulty breathing, headache or visual disturbances     Call MD for:  persistent nausea and vomiting     Call MD for:  severe uncontrolled pain     Call MD for:  temperature >100.4     Diet general     Questions:    Total calories:      Fat restriction, if any:      Protein restriction, if any:      Na restriction, if any:      Fluid restriction:      Additional restrictions:      No dressing needed         Discharge Instructions       Expect blood and/or burning with urination. Drink plenty of fluids.    ***  BATHING/DRESSING:  ? Ok to shower tomorrow    ACTIVITY LEVEL: If you have received sedation or an anesthetic, you may feel sleepy for several hours. Rest until you are more awake. Gradually resume your normal activities.    No heavy lifting.      DIET: You may resume your home diet. If nausea is present, increase your diet gradually with fluids and bland foods.  Medications: Pain medication should be taken only if needed and as directed. If antibiotics are prescribed, the medication should be taken until completed. You will be given an updated list of you medications.  ? No driving, alcoholic beverages or signing legal documents for next 24 hours or while taking pain medication    CALL THE DOCTOR:    For any obvious bleeding (some dried blood over the incision is normal).    Some blood in your urine is normal.     Redness, swelling, foul smell around incision or fever 
over 101.   Shortness of breath, Coughing Up Bloody Sputum, or Pains or Swelling in your Calves..   Persistent pain or nausea not relieved by medication.   Problems urinating - unable to urinate or heavy bleeding (with our without clots) in urine.    If any unusual problems or difficulties occur contact your doctor. If you cannot contact your doctor but feel your signs and symptoms warrant a physicians attention return to the emergency room.       PLEASE CONTACT AND MAKE A VISIT WITH YOUR PRIMARY CARE PHYSICIAN REGARDING DIABETIC MANAGEMENT.    Fall Prevention  Millions of people fall every year and injure themselves. You may have had anesthesia or sedation which may increase your risk of falling. You may have health issues that put you at an increased risk of falling.     Here are ways to reduce your risk of falling.  ·   · Make your home safe by keeping walkways clear of objects you may trip over.  · Use non-slip pads under rugs. Do not use area rugs or small throw rugs.  · Use non-slip mats in bathtubs and showers.  · Install handrails and lights on staircases.  · Do not walk in poorly lit areas.  · Do not stand on chairs or wobbly ladders.  · Use caution when reaching overhead or looking upward. This position can cause a loss of balance.  · Be sure your shoes fit properly, have non-slip bottoms and are in good condition.   · Wear shoes both inside and out. Avoid going barefoot or wearing slippers.  · Be cautious when going up and down stairs, curbs, and when walking on uneven sidewalks.  · If your balance is poor, consider using a cane or walker.  · If your fall was related to alcohol use, stop or limit alcohol intake.   · If your fall was related to use of sleeping medicines, talk to your doctor about this. You may need to reduce your dosage at bedtime if you awaken during the night to go to the bathroom.    · To reduce the need for nighttime bathroom trips:  ¨ Avoid drinking fluids for several hours before 
"going to bed  ¨ Empty your bladder before going to bed  ¨ Men can keep a urinal at the bedside  · Stay as active as you can. Balance, flexibility, strength, and endurance all come from exercise. They all play a role in preventing falls. Ask your healthcare provider which types of activity are right for you.  · Get your vision checked on a regular basis.  · If you have pets, know where they are before you stand up or walk so you don't trip over them.  · Use night lights.      Discharge References/Attachments     ONABOTULINUMTOXINA INJECTION (MEDICAL USE) (ENGLISH)    CYSTOSCOPY (ENGLISH)        Primary Diagnosis     Your primary diagnosis was:  Loss Of Bladder Control      Admission Information     Date & Time Provider Department CSN    4/28/2017  6:25 AM Jud Silverman MD [facility] [id number]      Care Providers     Provider Role Specialty Primary office phone    Jud Silverman MD Attending Provider Urology [phone]    Jud Silverman MD Surgeon  Urology [phone]      Your Vitals Were     BP Pulse Temp Resp Height Weight    145/68 (BP Location: Left arm, Patient Position: Lying, BP Method: Automatic) 109 97.9 °F (36.6 °C) (Oral) 18 5' 2" (1.575 m) 93.7 kg (206 lb 9.1 oz)    SpO2 BMI             97% 37.78 kg/m2         Recent Lab Values        7/8/2011 6/1/2012                       12:40 PM 11:52 AM          A1C 9.7 (H) 12.6 (H)                     Allergies as of 4/28/2017        Reactions    Bactrim [Sulfamethoxazole-trimethoprim]     Sulfa (Sulfonamide Antibiotics)     Vicodin [Hydrocodone-acetaminophen]     Meclomen Rash      Advance Directives     An advance directive is a document which, in the event you are no longer able to make decisions for yourself, tells your healthcare team what kind of treatment you do or do not want to receive, or who you would like to make those decisions for you.  If you do not currently have an advance directive, [facility] encourages you "
to create one.  For more information call:  [phone]), [phone] ([phone]),  or log on to [email or web address].        Language Assistance Services     ATTENTION: Language assistance services are available, free of charge. Please call [phone].      ATENCIÓN: Si habla español, tiene a merchant disposición servicios gratuitos de asistencia lingüística. Llame al [phone].     CHÚ Ý: N?u b?n nói Ti?ng Vi?t, có các d?ch v? h? tr? ngôn ng? mi?n phí dành cho b?n. G?i s? [phone].        Diabetes Discharge Instructions                                   [facility] Sign-Up     Activating your [facility] account is as easy as 1-2-3!     1) Visit [email or web address], select Sign Up Now, enter this activation code and your date of birth, then select Next.  [email or web address]  Expires: 6/8/2017  2:06 PM      2) Create a username and password to use when you visit [facility] in the future and select a security question in case you lose your password and select Next.    3) Enter your e-mail address and click Sign Up!    Additional Information  If you have questions, please e-mail [email or web address] or call [phone] to talk to our [facility] staff. Remember, [facility] is NOT to be used for urgent needs. For medical emergencies, dial 911.          [facility] complies with applicable Federal civil rights laws and does not discriminate on the basis of race, color, national origin, age, disability, or sex.

## 2020-12-11 NOTE — PROGRESS NOTE ADULT - SUBJECTIVE AND OBJECTIVE BOX
[facility]  INFECTIOUS DISEASES AND INTERNAL MEDICINE at [place]  =======================================================  Rick Kyle MD  Diplomates American Board of Internal Medicine and Infectious Diseases  Tel: [phone]      Fax: [phone]  =======================================================    IRA ACEVEDO [id number]    Follow up: bacteremia  seen in HD  doing well      Allergies:  codeine (Other)  Lortab (Other)  morphine (Other)  Percocet 10/325 (Other)  PPM not compatible with  MRI (Other (Fatal))  Reglan (Other; Flushing)  sulfa drugs (Flushing)           REVIEW OF SYSTEMS:  CONSTITUTIONAL:  No Fever or chills  HEENT:   No diplopia or blurred vision.  No earache, sore throat or runny nose.  CARDIOVASCULAR:  No pressure, squeezing, strangling, tightness, heaviness or aching about the chest, neck, axilla or epigastrium.  RESPIRATORY:  No cough, shortness of breath  GASTROINTESTINAL:  No nausea, vomiting or diarrhea.  GENITOURINARY:  No dysuria, frequency or urgency. No Blood in urine  MUSCULOSKELETAL:  no joint aches, no muscle pain  SKIN:  No change in skin, hair or nails.  NEUROLOGIC:  No Headaches, seizures or weakness.  PSYCHIATRIC:  No disorder of thought or mood.  ENDOCRINE:  No heat or cold intolerance  HEMATOLOGICAL:  No easy bruising or bleeding.       Physical Exam:  GEN: NAD, pleasant  HEENT: normocephalic and atraumatic. EOMI. PERRL.  Anicteric   NECK: Supple.   LUNGS: Clear to auscultation.  HEART: Regular rate and rhythm without murmur.  ABDOMEN: Soft, nontender, and nondistended.  Positive bowel sounds.    : No CVA tenderness  EXTREMITIES: Without any edema. lue avf intact  MSK: no joint swelling  NEUROLOGIC: Cranial nerves II through XII are grossly intact. No focal deficits  PSYCHIATRIC: Appropriate affect .  SKIN: No Rash      Vitals:    T(F): 98.6 (11 Dec 2020 17:15), Max: 99.1 (11 Dec 2020 05:01)  HR: 78 (11 Dec 2020 17:21)  BP: 168/77 (11 Dec 2020 17:21)  RR: 18 (11 Dec 2020 17:21)  SpO2: 100% (11 Dec 2020 17:21) (98% - 100%)  temp max in last 48H T(F): , Max: 99.2 (12-10-20 @ 05:22)      Current Antibiotics:  cefepime   IVPB 1000 milliGRAM(s) IV Intermittent daily    Other medications:  allopurinol 100 milliGRAM(s) Oral daily  carvedilol 25 milliGRAM(s) Oral every 12 hours  chlorhexidine 4% Liquid 1 Application(s) Topical <User Schedule>  cloNIDine 0.1 milliGRAM(s) Oral every 12 hours  dextrose 40% Gel 15 Gram(s) Oral once  dextrose 40% Gel 15 Gram(s) Oral once  dextrose 50% Injectable 25 Gram(s) IV Push once  dextrose 50% Injectable 12.5 Gram(s) IV Push once  dextrose 50% Injectable 25 Gram(s) IV Push once  epoetin vinnie-epbx (RETACRIT) Injectable 81531 Unit(s) IV Push <User Schedule>  glucagon  Injectable 1 milliGRAM(s) IntraMuscular once  heparin   Injectable 5000 Unit(s) SubCutaneous every 8 hours  influenza   Vaccine 0.5 milliLiter(s) IntraMuscular once  isosorbide   mononitrate ER Tablet (IMDUR) 120 milliGRAM(s) Oral daily  lactobacillus acidophilus 1 Tablet(s) Oral two times a day with meals  mirtazapine 15 milliGRAM(s) Oral at bedtime        Labs:                        8.6    8.93  )-----------( 140      ( 11 Dec 2020 08:19 )             29.0      12-11    137  |  99  |  38.0<H>  ----------------------------<  98  4.3   |  26.0  |  3.26<H>    Ca    8.4<L>      11 Dec 2020 08:21        Culture - Blood (collected 12-09-20 @ 07:45)  Source: .Blood Blood-Venous    Culture - Blood (collected 12-08-20 @ 14:44)  Source: .Blood Blood-Venous  Gram Stain (12-09-20 @ 10:12):    Growth in aerobic bottle: Gram Negative Rods    Gram Stain performed by:    [facility]    [address] [place] [address]    .    TYPE: (C=Critical, N=Notification, A=Abnormal) c    TESTS:  _ GS    DATE/TIME CALLED: _ 12/09/2020 10:12:03    CALLED TO: Emerald Choudhary RN    READ BACK (2 Patient Identifiers)(Y/N): _ Y    READ BACK VALUES (Y/N): _ Y    CALLED BY: Emerald Patel  Final Report (12-11-20 @ 14:09):    Growth in aerobic bottle: Pseudomonas aeruginosa  Organism: Pseudomonas aeruginosa (12-11-20 @ 14:09)  Organism: Pseudomonas aeruginosa (12-11-20 @ 14:09)    Sensitivities:      -  Amikacin: S <=16      -  Aztreonam: S 8      -  Cefepime: S 4      -  Ceftazidime: S 8      -  Ciprofloxacin: S <=0.25      -  Gentamicin: S 4      -  Imipenem: I 4      -  Levofloxacin: S <=0.5      -  Meropenem: S <=1      -  Piperacillin/Tazobactam: S <=8      -  Tobramycin: S <=2      Method Type: LINDA    Culture - Blood (collected 12-07-20 @ 21:54)  Source: .Blood Blood-Peripheral  Gram Stain (12-08-20 @ 12:21):    Growth in aerobic and anaerobic bottles: Gram Negative Rods    ***Blood Panel PCR results on this specimen are available    approximately 3 hours after the Gram stain result.***    Gram stain, PCR, and/or culture results may not always    correspond due todifference in methodologies.    ************************************************************    This PCR assay was performed using [employer].    The following targets are tested for: Enterococcus,    vancomycin resistant enterococci, Listeria monocytogenes,    coagulase negative staphylococci, S. aureus,    methicillin resistant S. aureus, Streptococcus agalactiae    (Group B), S. pneumoniae, S. pyogenes (Group A),    Acinetobacter baumannii, Enterobacter cloacae, E. coli,    Klebsiella oxytoca, K. pneumoniae, Proteus sp.,    Serratia marcescens, Haemophilus influenzae,    Neisseria meningitidis, Pseudomonas aeruginosa, Candida    albicans, C. glabrata, C krusei, C parapsilosis,    C. tropicalis and the KPC resistance gene.    Gram Stain and BCID performed by:    [facility]    [address] [place] [address]    .    TYPE: (C=Critical, N=Notification, A=Abnormal) C    TESTS:  _ GS    DATE/TIME CALLED: _ 12/08/2020 12:20:42    CALLED TO: Emerald Bailey RN    READ BACK (2 Patient Identifiers)(Y/N): _ Y    READ BACK VALUES (Y/N): _ Y    CALLED BY: Emerald Patel  Final Report (12-10-20 @ 16:59):    Growth in aerobic and anaerobic bottles: Escherichia coli    Culture in progress  Organism: Blood Culture PCR  Escherichia coli (12-10-20 @ 10:49)  Organism: Escherichia coli (12-10-20 @ 10:49)    Sensitivities:      -  Amikacin: S <=16      -  Ampicillin: S <=8 These ampicillin results predict results for amoxicillin      -  Ampicillin/Sulbactam: S <=4/2 Enterobacter, Citrobacter, and Serratia may develop resistance during prolonged therapy (3-4 days)      -  Aztreonam: S <=4      -  Cefazolin: S <=2 Enterobacter, Citrobacter, and Serratia may develop resistance during prolonged therapy (3-4 days)      -  Cefepime: S <=2      -  Cefoxitin: S <=8      -  Ceftriaxone: S <=1 Enterobacter, Citrobacter, and Serratia may develop resistance during prolonged therapy      -  Ciprofloxacin: S <=0.25      -  Ertapenem: S <=0.5      -  Gentamicin: S <=2      -  Imipenem: S <=1      -  Levofloxacin: S <=0.5      -  Meropenem: S <=1      -  Piperacillin/Tazobactam: S <=8      -  Tobramycin: S <=2      -  Trimethoprim/Sulfamethoxazole: S <=0.5/9.5      Method Type: LINDA  Organism: Blood Culture PCR (12-10-20 @ 10:49)    Sensitivities:      -  Escherichia coli: Detec      Method Type: PCR    Culture - Blood (collected 12-07-20 @ 21:53)  Source: .Blood Blood-Peripheral  Gram Stain (12-08-20 @ 12:21):    Growth in aerobic and anaerobic bottles: Gram Negative Rods    Gram Stain performed by:    [facility]    [address] [place] [id number]    .    TYPE: (C=Critical, N=Notification, A=Abnormal) C    TESTS:  _ GS    DATE/TIME CALLED: _ 12/08/2020 12:21:18    CALLED TO: Emerald Bailey RN    READ BACK (2 Patient Identifiers)(Y/N): _ Y    READ BACK VALUES (Y/N): _ Y    CALLED BY: Emerald Patel      WBC Count: 8.93 K/uL (12-11-20 @ 08:19)  WBC Count: 12.97 K/uL (12-10-20 @ 10:05)  WBC Count: 15.73 K/uL (12-09-20 @ 07:45)  WBC Count: 18.00 K/uL (12-08-20 @ 21:59)  WBC Count: 20.46 K/uL (12-08-20 @ 10:53)  WBC Count: 13.53 K/uL (12-08-20 @ 04:24)  WBC Count: 3.74 K/uL (12-07-20 @ 21:25)    Creatinine, Serum: 3.26 mg/dL (12-11-20 @ 08:21)  Creatinine, Serum: 2.37 mg/dL (12-10-20 @ 10:05)  Creatinine, Serum: 3.10 mg/dL (12-09-20 @ 07:45)  Creatinine, Serum: 3.10 mg/dL (12-09-20 @ 07:45)  Creatinine, Serum: 2.63 mg/dL (12-08-20 @ 15:59)  Creatinine, Serum: 2.59 mg/dL (12-08-20 @ 10:53)  Creatinine, Serum: 2.47 mg/dL (12-08-20 @ 04:24)  Creatinine, Serum: 1.99 mg/dL (12-07-20 @ 21:25)    C-Reactive Protein, Serum: 3.98 mg/dL (12-08-20 @ 01:15)    Ferritin, Serum: 5890 ng/mL (12-08-20 @ 01:15)      Procalcitonin, Serum: >100.00 ng/mL (12-08-20 @ 01:15)       COVID-19 IgG Antibody Interpretation: Negative (12-08-20 @ 07:29)  COVID-19 IgG Antibody Index: <0.10 Index (12-08-20 @ 07:29)  COVID-19 PCR: NotDetec (12-07-20 @ 21:31)

## 2020-12-11 NOTE — PROGRESS NOTE ADULT - ASSESSMENT
Mr. Seymour is F with pmh of ESRD on HD MWF via LUE fistular, CAD s/p remote CABG, sever AS s/p TAVR, with pacemaker in place, HTN, HLD, chronic anemia with Iron infusion via tunneled line by Hematologist, hx of DVT/PE? who presents to ED via EMS due to episode of hypoglycemia in 60s and confusion. Patient was found to be septic, hypoglycemic with G - bacteremia with unclear source of infection on admission.     # Sepsis c/b G - neg  bacteremia, possible line infection  - cont'e with Zosyn  - L IJ central line  was pulled out on 12/10   - BC 12/9 negative, 12/10 pending  - ID team input noted      # Hypoglycemia in pt w/o po hypoglycemic med or Insulin most likely related to sepsis and poor PO intake, has improved  - off of D5W  -  Endocrinology following  - encourage po intake        #ESRD on HD MWF via LUE fistular  - HD as per Nephrology team plan  - will hold Diuretics ( as per family she is on Torsemide 20 mg daily)    # HTN, CAD s/p remote CABG, sever AS s/p TAVR, with pacemaker in place,  - bb as above  - resumed Imdur and Coreg, patient was on Clonidine and Hydralazine, will resume slowly as clinically indicated.   - TTE ordered    # Acute on chronic normocytic anemia 2/2 ACD  - s/p 1 PRBC during HD on 12/09  -H&H stable    # L hand pain and numbness   - could not palpate radial pulse on L side, but warm to touch  - As per Vascular: Patient does not have signs or symptoms suggestive of steal syndrome. Findings are more suggestive of palmar fasciitis (Dupuytren's contracture. F/U as outpatient    # Gout - allopurinol home dose    # Depression - c/w Mirtazapin    # DVT ppx - SCD   # CODE - full code, HCP - Son Wagner   # LOS - likely will need long term IV abx but it also coud be done on HD days, awaiting ID input

## 2020-12-11 NOTE — PROCEDURE NOTE - NSPROCDETAILS_GEN_ALL_CORE
dressing applied/flushes easily/secured in place/blood seen on insertion/location identified, draped/prepped, sterile technique used/sterile technique, catheter placed

## 2020-12-11 NOTE — PROGRESS NOTE ADULT - ASSESSMENT
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 She was requiring frequent blood transfusions and developed secondary hemochromatosis and was getting IV Desferral via mediport with Dr. Fox.  She has been getting low dose JEISON at HD unit; will increase to retacrit 10k units TIW- Goal Hb 8-9 as she developed DVT at higher Hb.   3) E coli bacteremia, Repeat blood cultures via port- also + e.coli. Mediport now s/p removal. Continue Zosyn.   4) HTN; Bp stable. UF as tolerated      d/w Dr. Ramsay

## 2020-12-11 NOTE — PROGRESS NOTE ADULT - SUBJECTIVE AND OBJECTIVE BOX
[facility] DIVISION OF KIDNEY DISEASES AND HYPERTENSION -- FOLLOW UP NOTE  --------------------------------------------------------------------------------  Chief Complaint:  ESRD HD  24 hour events/subjective:  Seen/examined  Reevaluated for HD;      PAST HISTORY  --------------------------------------------------------------------------------  No significant changes to PMH, PSH, FHx, SHx, unless otherwise noted    ALLERGIES & MEDICATIONS  --------------------------------------------------------------------------------  Allergies    codeine (Other)  Lortab (Other)  morphine (Other)  Percocet 10/325 (Other)  PPM not compatible with  MRI (Other (Fatal))  Reglan (Other; Flushing)  sulfa drugs (Flushing)    Intolerances      Standing Inpatient Medications  allopurinol 100 milliGRAM(s) Oral daily  carvedilol 25 milliGRAM(s) Oral every 12 hours  cefepime   IVPB 1000 milliGRAM(s) IV Intermittent daily  chlorhexidine 4% Liquid 1 Application(s) Topical <User Schedule>  cloNIDine 0.1 milliGRAM(s) Oral every 12 hours  dextrose 40% Gel 15 Gram(s) Oral once  dextrose 40% Gel 15 Gram(s) Oral once  dextrose 50% Injectable 25 Gram(s) IV Push once  dextrose 50% Injectable 12.5 Gram(s) IV Push once  dextrose 50% Injectable 25 Gram(s) IV Push once  epoetin vinnie-epbx (RETACRIT) Injectable 88437 Unit(s) IV Push <User Schedule>  glucagon  Injectable 1 milliGRAM(s) IntraMuscular once  heparin   Injectable 5000 Unit(s) SubCutaneous every 8 hours  influenza   Vaccine 0.5 milliLiter(s) IntraMuscular once  isosorbide   mononitrate ER Tablet (IMDUR) 120 milliGRAM(s) Oral daily  lactobacillus acidophilus 1 Tablet(s) Oral two times a day with meals  mirtazapine 15 milliGRAM(s) Oral at bedtime    PRN Inpatient Medications  acetaminophen   Tablet .. 975 milliGRAM(s) Oral every 8 hours PRN      REVIEW OF SYSTEMS  --------------------------------------------------------------------------------  Gen: No weight changes, fatigue, fevers/chills, weakness  Skin: No rashes  Head/Eyes/Ears/Mouth: No headache; Normal hearing; Normal vision w/o blurriness; No sinus pain/discomfort, sore throat  Respiratory: No dyspnea, cough, wheezing, hemoptysis  CV: No chest pain, PND, orthopnea  GI: No abdominal pain, diarrhea, constipation, nausea, vomiting, melena, hematochezia  : No increased frequency, dysuria, hematuria, nocturia  MSK: No joint pain/swelling; no back pain; no edema  Neuro: No dizziness/lightheadedness, weakness, seizures, numbness, tingling  Heme: No easy bruising or bleeding  Endo: No heat/cold intolerance  Psych: No significant nervousness, anxiety, stress, depression    All other systems were reviewed and are negative, except as noted.    VITALS/PHYSICAL EXAM  --------------------------------------------------------------------------------  T(C): 36.6 (12-11-20 @ 13:44), Max: 37.3 (12-11-20 @ 05:01)  HR: 72 (12-11-20 @ 13:44) (63 - 72)  BP: 175/85 (12-11-20 @ 13:44) (139/67 - 176/71)  RR: 18 (12-11-20 @ 13:44) (18 - 18)  SpO2: 99% (12-11-20 @ 13:44) (98% - 100%)  Wt(kg): --        12-10-20 @ 07:01  -  12-11-20 @ 07:00  --------------------------------------------------------  IN: 0 mL / OUT: 800 mL / NET: -800 mL      Physical Exam:  	Gen: NAD   	HEENT: PERRL, supple neck, clear oropharynx  	Pulm: CTA B/L  	CV: RRR, S1S2; no rub  	Back: No spinal or CVA tenderness; no sacral edema  	Abd: +BS, soft, nontender/nondistended  	: No suprapubic tenderness  	UE: Warm, FROM, no clubbing, intact strength; no edema; no asterixis  	LE: Warm, FROM, no clubbing, intact strength; no edema  	Neuro: No focal deficits, intact gait  	Psych: Normal affect and mood  	Skin: Warm, without rashes  	Vascular access:    LABS/STUDIES  --------------------------------------------------------------------------------              8.6    8.93  >-----------<  140      [12-11-20 @ 08:19]              29.0     137  |  99  |  38.0  ----------------------------<  98      [12-11-20 @ 08:21]  4.3   |  26.0  |  3.26        Ca     8.4     [12-11-20 @ 08:21]            Creatinine Trend:  SCr 3.26 [12-11 @ 08:21]  SCr 2.37 [12-10 @ 10:05]  SCr 3.10 [12-09 @ 07:45]  SCr 2.63 [12-08 @ 15:59]  SCr 2.59 [12-08 @ 10:53]        Iron 83, TIBC 167, %sat --      [08-22-20 @ 10:45]  Ferritin 5890      [12-08-20 @ 01:15]  .7 (Ca --)      [02-02-20 @ 05:15]   --  HbA1c 4.9      [02-02-20 @ 05:15]  TSH 1.56      [08-25-20 @ 05:22]  Lipid: chol 122, TG 63, HDL 44, LDL 71      [02-02-20 @ 05:15]

## 2020-12-12 LAB
-  AMIKACIN: SIGNIFICANT CHANGE UP
-  AZTREONAM: SIGNIFICANT CHANGE UP
-  CEFEPIME: SIGNIFICANT CHANGE UP
-  CEFTAZIDIME: SIGNIFICANT CHANGE UP
-  CIPROFLOXACIN: SIGNIFICANT CHANGE UP
-  GENTAMICIN: SIGNIFICANT CHANGE UP
-  IMIPENEM: SIGNIFICANT CHANGE UP
-  LEVOFLOXACIN: SIGNIFICANT CHANGE UP
-  MEROPENEM: SIGNIFICANT CHANGE UP
-  PIPERACILLIN/TAZOBACTAM: SIGNIFICANT CHANGE UP
-  TOBRAMYCIN: SIGNIFICANT CHANGE UP
CULTURE RESULTS: SIGNIFICANT CHANGE UP
GLUCOSE BLDC GLUCOMTR-MCNC: 108 MG/DL — HIGH (ref 70–99)
GLUCOSE BLDC GLUCOMTR-MCNC: 154 MG/DL — HIGH (ref 70–99)
GLUCOSE BLDC GLUCOMTR-MCNC: 252 MG/DL — HIGH (ref 70–99)
GLUCOSE BLDC GLUCOMTR-MCNC: 76 MG/DL — SIGNIFICANT CHANGE UP (ref 70–99)
GLUCOSE BLDC GLUCOMTR-MCNC: 83 MG/DL — SIGNIFICANT CHANGE UP (ref 70–99)
HCT VFR BLD CALC: 29.1 % — LOW (ref 34.5–45)
HGB BLD-MCNC: 8.6 G/DL — LOW (ref 11.5–15.5)
MCHC RBC-ENTMCNC: 25.9 PG — LOW (ref 27–34)
MCHC RBC-ENTMCNC: 29.6 GM/DL — LOW (ref 32–36)
MCV RBC AUTO: 87.7 FL — SIGNIFICANT CHANGE UP (ref 80–100)
METHOD TYPE: SIGNIFICANT CHANGE UP
ORGANISM # SPEC MICROSCOPIC CNT: SIGNIFICANT CHANGE UP
ORGANISM # SPEC MICROSCOPIC CNT: SIGNIFICANT CHANGE UP
PLATELET # BLD AUTO: 137 K/UL — LOW (ref 150–400)
RBC # BLD: 3.32 M/UL — LOW (ref 3.8–5.2)
RBC # FLD: 16.2 % — HIGH (ref 10.3–14.5)
SPECIMEN SOURCE: SIGNIFICANT CHANGE UP
WBC # BLD: 5.72 K/UL — SIGNIFICANT CHANGE UP (ref 3.8–10.5)
WBC # FLD AUTO: 5.72 K/UL — SIGNIFICANT CHANGE UP (ref 3.8–10.5)

## 2020-12-12 PROCEDURE — 99232 SBSQ HOSP IP/OBS MODERATE 35: CPT

## 2020-12-12 RX ADMIN — CHLORHEXIDINE GLUCONATE 1 APPLICATION(S): 213 SOLUTION TOPICAL at 06:04

## 2020-12-12 RX ADMIN — Medication 100 MILLIGRAM(S): at 13:07

## 2020-12-12 RX ADMIN — Medication 1 TABLET(S): at 17:13

## 2020-12-12 RX ADMIN — HEPARIN SODIUM 5000 UNIT(S): 5000 INJECTION INTRAVENOUS; SUBCUTANEOUS at 06:04

## 2020-12-12 RX ADMIN — HEPARIN SODIUM 5000 UNIT(S): 5000 INJECTION INTRAVENOUS; SUBCUTANEOUS at 21:01

## 2020-12-12 RX ADMIN — Medication 1 TABLET(S): at 10:04

## 2020-12-12 RX ADMIN — CEFEPIME 100 MILLIGRAM(S): 1 INJECTION, POWDER, FOR SOLUTION INTRAMUSCULAR; INTRAVENOUS at 14:00

## 2020-12-12 RX ADMIN — CARVEDILOL PHOSPHATE 25 MILLIGRAM(S): 80 CAPSULE, EXTENDED RELEASE ORAL at 06:04

## 2020-12-12 RX ADMIN — MIRTAZAPINE 15 MILLIGRAM(S): 45 TABLET, ORALLY DISINTEGRATING ORAL at 21:01

## 2020-12-12 RX ADMIN — Medication 0.1 MILLIGRAM(S): at 06:04

## 2020-12-12 RX ADMIN — Medication 0.1 MILLIGRAM(S): at 17:14

## 2020-12-12 RX ADMIN — CARVEDILOL PHOSPHATE 25 MILLIGRAM(S): 80 CAPSULE, EXTENDED RELEASE ORAL at 17:13

## 2020-12-12 RX ADMIN — HEPARIN SODIUM 5000 UNIT(S): 5000 INJECTION INTRAVENOUS; SUBCUTANEOUS at 13:06

## 2020-12-12 RX ADMIN — ISOSORBIDE MONONITRATE 120 MILLIGRAM(S): 60 TABLET, EXTENDED RELEASE ORAL at 14:45

## 2020-12-12 NOTE — PROGRESS NOTE ADULT - ASSESSMENT
# Pseudomonal/E coli  bacteremia / line infection   status post line removal   repeat blood cultures negative   on cefepime which will be done with HD outpatient     # Hypoglycemia  due to sepsis , resolved   patient has been seen by endocrine       #ESRD on HD MWF via LUE fistular  - HD as per Nephrology team plan      # HTN, CAD s/p remote CABG, sever AS s/p TAVR, with pacemaker in place,  - bb as above  - resumed Imdur and Coreg, patient was on Clonidine and Hydralazine, will resume slowly as clinically indicated.     # Acute on chronic normocytic anemia 2/2 ACD  - s/p 1 PRBC during HD on 12/09  -H&H stable    # L hand pain and numbness   - could not palpate radial pulse on L side, but warm to touch  - As per Vascular: Patient does not have signs or symptoms suggestive of steal syndrome. Findings are more suggestive of palmar fasciitis (Dupuytren's contracture. F/U as outpatient    # Gout - allopurinol home dose    # Depression - c/w Mirtazapin    # DVT ppx - heparin     son was called yesterday , patient is bedbound at baseline , son is main caregiver , he is out of town this weekend , will dc patient on monday morning

## 2020-12-12 NOTE — PROGRESS NOTE ADULT - SUBJECTIVE AND OBJECTIVE BOX
CC: bacteremia     INTERVAL HPI/OVERNIGHT EVENTS: seen and examined , feels well, pending last blood culture results   denies any complains   her son is out of town this weekend she is unable to return home as he is her main caregiver           Vital Signs Last 24 Hrs  T(C): 36.8 (12 Dec 2020 08:49), Max: 37 (11 Dec 2020 17:15)  T(F): 98.2 (12 Dec 2020 08:49), Max: 98.6 (11 Dec 2020 17:15)  HR: 81 (12 Dec 2020 08:49) (72 - 81)  BP: 119/54 (12 Dec 2020 08:49) (119/54 - 168/77)  BP(mean): --  RR: 18 (12 Dec 2020 08:49) (18 - 18)  SpO2: 96% (12 Dec 2020 08:49) (96% - 100%)    PHYSICAL EXAM:    GENERAL: NAD, resting comfortable in bed   CHEST/LUNG: CTAB , no wheezing , rales, rhonchi heard   HEART: S1S2+, Regular rate and rhythm  ABDOMEN: Soft, Nontender, Nondistended; Bowel sounds present  EXTREMITIES:  no pitting edema , pulses palpated BL.        LABS:                        8.6    5.72  )-----------( 137      ( 12 Dec 2020 08:10 )             29.1     12-11    137  |  99  |  38.0<H>  ----------------------------<  98  4.3   |  26.0  |  3.26<H>    Ca    8.4<L>      11 Dec 2020 08:21              MEDICATIONS  (STANDING):  allopurinol 100 milliGRAM(s) Oral daily  carvedilol 25 milliGRAM(s) Oral every 12 hours  cefepime   IVPB 1000 milliGRAM(s) IV Intermittent daily  chlorhexidine 4% Liquid 1 Application(s) Topical <User Schedule>  cloNIDine 0.1 milliGRAM(s) Oral every 12 hours  dextrose 40% Gel 15 Gram(s) Oral once  dextrose 40% Gel 15 Gram(s) Oral once  dextrose 50% Injectable 25 Gram(s) IV Push once  dextrose 50% Injectable 12.5 Gram(s) IV Push once  dextrose 50% Injectable 25 Gram(s) IV Push once  epoetin vinnie-epbx (RETACRIT) Injectable 18369 Unit(s) IV Push <User Schedule>  glucagon  Injectable 1 milliGRAM(s) IntraMuscular once  heparin   Injectable 5000 Unit(s) SubCutaneous every 8 hours  influenza   Vaccine 0.5 milliLiter(s) IntraMuscular once  isosorbide   mononitrate ER Tablet (IMDUR) 120 milliGRAM(s) Oral daily  lactobacillus acidophilus 1 Tablet(s) Oral two times a day with meals  mirtazapine 15 milliGRAM(s) Oral at bedtime    MEDICATIONS  (PRN):  acetaminophen   Tablet .. 975 milliGRAM(s) Oral every 8 hours PRN Moderate Pain (4 - 6)      RADIOLOGY & ADDITIONAL TESTS:

## 2020-12-13 LAB
GLUCOSE BLDC GLUCOMTR-MCNC: 101 MG/DL — HIGH (ref 70–99)
GLUCOSE BLDC GLUCOMTR-MCNC: 148 MG/DL — HIGH (ref 70–99)
GLUCOSE BLDC GLUCOMTR-MCNC: 153 MG/DL — HIGH (ref 70–99)
GLUCOSE BLDC GLUCOMTR-MCNC: 70 MG/DL — SIGNIFICANT CHANGE UP (ref 70–99)
GLUCOSE BLDC GLUCOMTR-MCNC: 99 MG/DL — SIGNIFICANT CHANGE UP (ref 70–99)
GLUCOSE BLDC GLUCOMTR-MCNC: 99 MG/DL — SIGNIFICANT CHANGE UP (ref 70–99)

## 2020-12-13 PROCEDURE — 99232 SBSQ HOSP IP/OBS MODERATE 35: CPT

## 2020-12-13 RX ADMIN — Medication 0.1 MILLIGRAM(S): at 17:36

## 2020-12-13 RX ADMIN — CHLORHEXIDINE GLUCONATE 1 APPLICATION(S): 213 SOLUTION TOPICAL at 05:21

## 2020-12-13 RX ADMIN — HEPARIN SODIUM 5000 UNIT(S): 5000 INJECTION INTRAVENOUS; SUBCUTANEOUS at 21:26

## 2020-12-13 RX ADMIN — HEPARIN SODIUM 5000 UNIT(S): 5000 INJECTION INTRAVENOUS; SUBCUTANEOUS at 14:14

## 2020-12-13 RX ADMIN — ISOSORBIDE MONONITRATE 120 MILLIGRAM(S): 60 TABLET, EXTENDED RELEASE ORAL at 11:35

## 2020-12-13 RX ADMIN — Medication 1 TABLET(S): at 09:10

## 2020-12-13 RX ADMIN — HEPARIN SODIUM 5000 UNIT(S): 5000 INJECTION INTRAVENOUS; SUBCUTANEOUS at 05:21

## 2020-12-13 RX ADMIN — Medication 100 MILLIGRAM(S): at 11:34

## 2020-12-13 RX ADMIN — Medication 1 TABLET(S): at 17:36

## 2020-12-13 RX ADMIN — CEFEPIME 100 MILLIGRAM(S): 1 INJECTION, POWDER, FOR SOLUTION INTRAMUSCULAR; INTRAVENOUS at 11:36

## 2020-12-13 RX ADMIN — MIRTAZAPINE 15 MILLIGRAM(S): 45 TABLET, ORALLY DISINTEGRATING ORAL at 21:26

## 2020-12-13 RX ADMIN — CARVEDILOL PHOSPHATE 25 MILLIGRAM(S): 80 CAPSULE, EXTENDED RELEASE ORAL at 17:36

## 2020-12-13 RX ADMIN — Medication 0.1 MILLIGRAM(S): at 05:21

## 2020-12-13 RX ADMIN — Medication 975 MILLIGRAM(S): at 12:34

## 2020-12-13 RX ADMIN — Medication 975 MILLIGRAM(S): at 11:34

## 2020-12-13 RX ADMIN — CARVEDILOL PHOSPHATE 25 MILLIGRAM(S): 80 CAPSULE, EXTENDED RELEASE ORAL at 05:21

## 2020-12-13 NOTE — CHART NOTE - NSCHARTNOTEFT_GEN_A_CORE
chart reviewed    no further episodes of hypoglycemia    will sign off  please reconsult as necessary  thank you for the consult. call with questions

## 2020-12-13 NOTE — DIETITIAN INITIAL EVALUATION ADULT. - PROBLEM SELECTOR PLAN 1
pt. does not know what medicine she takes for her DM, pt's accuchk to be monitored very closely, pt. on hypoglycemia protocol, if BG not improving may need ICu consult . Last accuchk 75. pt. is alert, awake and tolerated apple juice couple of times when given in the ER. will keep on d5 NS at 75 ml /hr for 4hrs for now.

## 2020-12-13 NOTE — DIETITIAN INITIAL EVALUATION ADULT. - PROBLEM SELECTOR PLAN 3
will request nephrology consult [facility], pt. got 1600 cc of iv fluids in the ER. dialysis plan as per nephrology team.   dimer ordered by ER team , elevated which likely related to her ckd,  but will request v/q scan to r/o PE.

## 2020-12-13 NOTE — DIETITIAN INITIAL EVALUATION ADULT. - PERTINENT MEDS FT
MEDICATIONS  (STANDING):  allopurinol 100 milliGRAM(s) Oral daily  carvedilol 25 milliGRAM(s) Oral every 12 hours  cefepime   IVPB 1000 milliGRAM(s) IV Intermittent daily  chlorhexidine 4% Liquid 1 Application(s) Topical <User Schedule>  cloNIDine 0.1 milliGRAM(s) Oral every 12 hours  dextrose 40% Gel 15 Gram(s) Oral once  dextrose 40% Gel 15 Gram(s) Oral once  dextrose 50% Injectable 25 Gram(s) IV Push once  dextrose 50% Injectable 12.5 Gram(s) IV Push once  dextrose 50% Injectable 25 Gram(s) IV Push once  epoetin vinnie-epbx (RETACRIT) Injectable 09588 Unit(s) IV Push <User Schedule>  glucagon  Injectable 1 milliGRAM(s) IntraMuscular once  heparin   Injectable 5000 Unit(s) SubCutaneous every 8 hours  influenza   Vaccine 0.5 milliLiter(s) IntraMuscular once  isosorbide   mononitrate ER Tablet (IMDUR) 120 milliGRAM(s) Oral daily  lactobacillus acidophilus 1 Tablet(s) Oral two times a day with meals  mirtazapine 15 milliGRAM(s) Oral at bedtime    MEDICATIONS  (PRN):  acetaminophen   Tablet .. 975 milliGRAM(s) Oral every 8 hours PRN Moderate Pain (4 - 6)

## 2020-12-13 NOTE — PROGRESS NOTE ADULT - ASSESSMENT
# Pseudomonal/E coli  bacteremia / line infection   status post line removal   repeat blood cultures negative   on cefepime which will be done with HD outpatient     # Hypoglycemia  due to sepsis , improved   patient has been seen by endocrine   will liberalize diet       #ESRD on HD MWF via LUE fistular  - HD as per Nephrology team plan      # HTN, CAD s/p remote CABG, sever AS s/p TAVR, with pacemaker in place,  - bb as above  - resumed Imdur and Coreg, patient was on Clonidine and Hydralazine, will resume slowly as clinically indicated.     # Acute on chronic normocytic anemia 2/2 ACD  - s/p 1 PRBC during HD on 12/09  -H&H stable    # L hand pain and numbness   - could not palpate radial pulse on L side, but warm to touch  - As per Vascular: Patient does not have signs or symptoms suggestive of steal syndrome. Findings are more suggestive of palmar fasciitis (Dupuytren's contracture. F/U as outpatient    # Gout - allopurinol home dose    # Depression - c/w Mirtazapin    # DVT ppx - heparin     son was called yesterday , patient is bedbound at baseline , son is main caregiver , he is out of town this weekend , will dc patient on monday morning

## 2020-12-13 NOTE — DIETITIAN INITIAL EVALUATION ADULT. - OTHER INFO
79 y/o female PM Cad AS HTN gout s/p AVR ESRD on Hd via AVF has mediport for iron infusions who was sent in from her dialysis center for hypoglycemia  # Pseudomonal/E coli  bacteremia / line infection   status post line removal     pt is a poor historian   reports poor appetite for a while- denies weight loss

## 2020-12-13 NOTE — PROGRESS NOTE ADULT - SUBJECTIVE AND OBJECTIVE BOX
CC: bacteremia     INTERVAL HPI/OVERNIGHT EVENTS: seen and examined , feels well , awaiting to be discharged home   denies any complains           Vital Signs Last 24 Hrs  T(C): 36.8 (13 Dec 2020 11:18), Max: 37.1 (12 Dec 2020 16:59)  T(F): 98.2 (13 Dec 2020 11:18), Max: 98.8 (12 Dec 2020 16:59)  HR: 71 (13 Dec 2020 11:18) (64 - 90)  BP: 150/64 (13 Dec 2020 11:18) (132/69 - 168/58)  BP(mean): --  RR: 18 (13 Dec 2020 11:18) (18 - 18)  SpO2: 95% (13 Dec 2020 11:18) (94% - 100%)    PHYSICAL EXAM:    GENERAL: NAD, resting comfortable in bed   CHEST/LUNG: CTAB , no wheezing , rales, rhonchi heard   HEART: S1S2+, Regular rate and rhythm  ABDOMEN: Soft, Nontender, Nondistended; Bowel sounds present  EXTREMITIES:  no pitting edema , pulses palpated BL.        LABS:                        8.6    5.72  )-----------( 137      ( 12 Dec 2020 08:10 )             29.1                   MEDICATIONS  (STANDING):  allopurinol 100 milliGRAM(s) Oral daily  carvedilol 25 milliGRAM(s) Oral every 12 hours  cefepime   IVPB 1000 milliGRAM(s) IV Intermittent daily  chlorhexidine 4% Liquid 1 Application(s) Topical <User Schedule>  cloNIDine 0.1 milliGRAM(s) Oral every 12 hours  dextrose 40% Gel 15 Gram(s) Oral once  dextrose 40% Gel 15 Gram(s) Oral once  dextrose 50% Injectable 25 Gram(s) IV Push once  dextrose 50% Injectable 12.5 Gram(s) IV Push once  dextrose 50% Injectable 25 Gram(s) IV Push once  epoetin vinnie-epbx (RETACRIT) Injectable 93577 Unit(s) IV Push <User Schedule>  glucagon  Injectable 1 milliGRAM(s) IntraMuscular once  heparin   Injectable 5000 Unit(s) SubCutaneous every 8 hours  influenza   Vaccine 0.5 milliLiter(s) IntraMuscular once  isosorbide   mononitrate ER Tablet (IMDUR) 120 milliGRAM(s) Oral daily  lactobacillus acidophilus 1 Tablet(s) Oral two times a day with meals  mirtazapine 15 milliGRAM(s) Oral at bedtime    MEDICATIONS  (PRN):  acetaminophen   Tablet .. 975 milliGRAM(s) Oral every 8 hours PRN Moderate Pain (4 - 6)      RADIOLOGY & ADDITIONAL TESTS:

## 2020-12-14 ENCOUNTER — TRANSCRIPTION ENCOUNTER (OUTPATIENT)
Age: 80
End: 2020-12-14

## 2020-12-14 VITALS
DIASTOLIC BLOOD PRESSURE: 62 MMHG | RESPIRATION RATE: 18 BRPM | HEART RATE: 71 BPM | TEMPERATURE: 99 F | OXYGEN SATURATION: 100 % | SYSTOLIC BLOOD PRESSURE: 148 MMHG

## 2020-12-14 DIAGNOSIS — I82.721 CHRONIC EMBOLISM AND THROMBOSIS OF DEEP VEINS OF RIGHT UPPER EXTREMITY: ICD-10-CM

## 2020-12-14 LAB
ANION GAP SERPL CALC-SCNC: 13 MMOL/L — SIGNIFICANT CHANGE UP (ref 5–17)
BUN SERPL-MCNC: 51 MG/DL — HIGH (ref 8–20)
CALCIUM SERPL-MCNC: 8.2 MG/DL — LOW (ref 8.6–10.2)
CHLORIDE SERPL-SCNC: 100 MMOL/L — SIGNIFICANT CHANGE UP (ref 98–107)
CO2 SERPL-SCNC: 25 MMOL/L — SIGNIFICANT CHANGE UP (ref 22–29)
CORTICOSTER SERPL-MCNC: 404 NG/DL — SIGNIFICANT CHANGE UP
CREAT SERPL-MCNC: 4.47 MG/DL — HIGH (ref 0.5–1.3)
CULTURE RESULTS: SIGNIFICANT CHANGE UP
GLUCOSE BLDC GLUCOMTR-MCNC: 106 MG/DL — HIGH (ref 70–99)
GLUCOSE BLDC GLUCOMTR-MCNC: 130 MG/DL — HIGH (ref 70–99)
GLUCOSE BLDC GLUCOMTR-MCNC: 68 MG/DL — LOW (ref 70–99)
GLUCOSE BLDC GLUCOMTR-MCNC: 94 MG/DL — SIGNIFICANT CHANGE UP (ref 70–99)
GLUCOSE BLDC GLUCOMTR-MCNC: 97 MG/DL — SIGNIFICANT CHANGE UP (ref 70–99)
GLUCOSE SERPL-MCNC: 91 MG/DL — SIGNIFICANT CHANGE UP (ref 70–99)
HCT VFR BLD CALC: 29.8 % — LOW (ref 34.5–45)
HGB BLD-MCNC: 8.8 G/DL — LOW (ref 11.5–15.5)
MCHC RBC-ENTMCNC: 26 PG — LOW (ref 27–34)
MCHC RBC-ENTMCNC: 29.5 GM/DL — LOW (ref 32–36)
MCV RBC AUTO: 88.2 FL — SIGNIFICANT CHANGE UP (ref 80–100)
NRBC # BLD: 1 /100 WBCS — HIGH (ref 0–0)
PHOSPHATE SERPL-MCNC: 5.1 MG/DL — HIGH (ref 2.4–4.7)
PLATELET # BLD AUTO: 178 K/UL — SIGNIFICANT CHANGE UP (ref 150–400)
POTASSIUM SERPL-MCNC: 3.9 MMOL/L — SIGNIFICANT CHANGE UP (ref 3.5–5.3)
POTASSIUM SERPL-SCNC: 3.9 MMOL/L — SIGNIFICANT CHANGE UP (ref 3.5–5.3)
RBC # BLD: 3.38 M/UL — LOW (ref 3.8–5.2)
RBC # FLD: 17.3 % — HIGH (ref 10.3–14.5)
SODIUM SERPL-SCNC: 138 MMOL/L — SIGNIFICANT CHANGE UP (ref 135–145)
SPECIMEN SOURCE: SIGNIFICANT CHANGE UP
WBC # BLD: 4.12 K/UL — SIGNIFICANT CHANGE UP (ref 3.8–10.5)
WBC # FLD AUTO: 4.12 K/UL — SIGNIFICANT CHANGE UP (ref 3.8–10.5)

## 2020-12-14 PROCEDURE — 99239 HOSP IP/OBS DSCHRG MGMT >30: CPT

## 2020-12-14 PROCEDURE — 90937 HEMODIALYSIS REPEATED EVAL: CPT

## 2020-12-14 RX ORDER — DEXTROSE 50 % IN WATER 50 %
15 SYRINGE (ML) INTRAVENOUS ONCE
Refills: 0 | Status: COMPLETED | OUTPATIENT
Start: 2020-12-14 | End: 2020-12-14

## 2020-12-14 RX ORDER — CEFEPIME 1 G/1
2 INJECTION, POWDER, FOR SOLUTION INTRAMUSCULAR; INTRAVENOUS
Qty: 0 | Refills: 0 | DISCHARGE
Start: 2020-12-14

## 2020-12-14 RX ORDER — LACTOBACILLUS ACIDOPHILUS 100MM CELL
1 CAPSULE ORAL
Qty: 0 | Refills: 0 | DISCHARGE
Start: 2020-12-14

## 2020-12-14 RX ORDER — ATORVASTATIN CALCIUM 80 MG/1
1 TABLET, FILM COATED ORAL
Qty: 0 | Refills: 0 | DISCHARGE

## 2020-12-14 RX ADMIN — CEFEPIME 100 MILLIGRAM(S): 1 INJECTION, POWDER, FOR SOLUTION INTRAMUSCULAR; INTRAVENOUS at 11:02

## 2020-12-14 RX ADMIN — CHLORHEXIDINE GLUCONATE 1 APPLICATION(S): 213 SOLUTION TOPICAL at 05:18

## 2020-12-14 RX ADMIN — Medication 1 TABLET(S): at 17:42

## 2020-12-14 RX ADMIN — Medication 0.1 MILLIGRAM(S): at 17:42

## 2020-12-14 RX ADMIN — Medication 1 TABLET(S): at 07:57

## 2020-12-14 RX ADMIN — ISOSORBIDE MONONITRATE 120 MILLIGRAM(S): 60 TABLET, EXTENDED RELEASE ORAL at 11:02

## 2020-12-14 RX ADMIN — CARVEDILOL PHOSPHATE 25 MILLIGRAM(S): 80 CAPSULE, EXTENDED RELEASE ORAL at 17:42

## 2020-12-14 RX ADMIN — Medication 0.1 MILLIGRAM(S): at 05:23

## 2020-12-14 RX ADMIN — Medication 100 MILLIGRAM(S): at 11:02

## 2020-12-14 RX ADMIN — CARVEDILOL PHOSPHATE 25 MILLIGRAM(S): 80 CAPSULE, EXTENDED RELEASE ORAL at 05:17

## 2020-12-14 RX ADMIN — ERYTHROPOIETIN 10000 UNIT(S): 10000 INJECTION, SOLUTION INTRAVENOUS; SUBCUTANEOUS at 14:45

## 2020-12-14 RX ADMIN — HEPARIN SODIUM 5000 UNIT(S): 5000 INJECTION INTRAVENOUS; SUBCUTANEOUS at 05:17

## 2020-12-14 RX ADMIN — Medication 15 GRAM(S): at 06:25

## 2020-12-14 NOTE — DISCHARGE NOTE NURSING/CASE MANAGEMENT/SOCIAL WORK - PATIENT PORTAL LINK FT
You can access the FollowMyHealth Patient Portal offered by [facility] by registering at the following website: [email or web address]. By joining [facility]’s FollowMyHealth portal, you will also be able to view your health information using other applications (apps) compatible with our system.

## 2020-12-14 NOTE — DISCHARGE NOTE PROVIDER - CARE PROVIDER_API CALL
Lauren Brown)  Medicine  Nephrology  [address] [place] [address]  Phone: [phone]  Fax: [phone]  Follow Up Time:     PMD,   Phone: (   )    -  Fax: (   )    -  Follow Up Time:     Eleni Kyle  INTERNAL MEDICINE  [address] [place] [id number]  Phone: [phone]  Fax: [phone]  Follow Up Time:

## 2020-12-14 NOTE — DISCHARGE NOTE PROVIDER - PROVIDER TOKENS
PROVIDER:[TOKEN:[[id number]]],FREE:[LAST:[PMD],PHONE:[(   )    -],FAX:[(   )    -]],PROVIDER:[TOKEN:[[id number]]]

## 2020-12-14 NOTE — PROGRESS NOTE ADULT - NUTRITIONAL ASSESSMENT
This patient has been assessed with a concern for Malnutrition and has been determined to have a diagnosis/diagnoses of Moderate protein-calorie malnutrition.    This patient is being managed with:   Diet DASH/TLC-  Sodium & Cholesterol Restricted  For patients receiving Renal Replacement - No Protein Restr No Conc K No Conc Phos Low  Sodium (RENAL)  Supplement Feeding Modality:  Oral  Ensure Enlive Cans or Servings Per Day:  3       Frequency:  Three Times a day  Entered: Dec 13 2020  8:32AM    
This patient has been assessed with a concern for Malnutrition and has been determined to have a diagnosis/diagnoses of Moderate protein-calorie malnutrition.    This patient is being managed with:   Diet DASH/TLC-  Sodium & Cholesterol Restricted  For patients receiving Renal Replacement - No Protein Restr No Conc K No Conc Phos Low  Sodium (RENAL)  Supplement Feeding Modality:  Oral  Ensure Enlive Cans or Servings Per Day:  3       Frequency:  Three Times a day  Entered: Dec 13 2020  8:32AM

## 2020-12-14 NOTE — DISCHARGE NOTE PROVIDER - NSDCMRMEDTOKEN_GEN_ALL_CORE_FT
albuterol 90 mcg/inh inhalation aerosol: 2 puff(s) inhaled every 6 hours x 30 days, As Needed -Bronchospasm   allopurinol 100 mg oral tablet: 1 tab(s) orally once a day  aspirin 81 mg oral delayed release tablet: 1 tab(s) orally once a day  carvedilol 25 mg oral tablet: 1 tab(s) orally 2 times a day  cefepime 2 g intravenous injection: 2 gram(s) intravenous 3 times a week after HD until 12/22, with weekly CBC and CMP  cloNIDine 0.1 mg oral tablet: 1 tab(s) orally every 12 hours  isosorbide mononitrate 120 mg oral tablet, extended release: 1 tab(s) orally once a day  lactobacillus acidophilus oral capsule: 1 cap(s) orally 2 times a day  mirtazapine 15 mg oral tablet: 1 tab(s) orally once a day (at bedtime)

## 2020-12-14 NOTE — PROGRESS NOTE ADULT - SUBJECTIVE AND OBJECTIVE BOX
Patient was seen and evaluated on dialysis.   Patient is tolerating the procedure well.   T(C): 36.9 (12-14-20 @ 11:20), Max: 37 (12-14-20 @ 09:05)  HR: 61 (12-14-20 @ 11:20) (59 - 67)  BP: 154/61 (12-14-20 @ 11:20) (136/56 - 168/63)  Continue dialysis:   Dialyzer:  Revaclear 300    QB:  400   QD: 500ml.,  Goal UF 1 L over 3 Hours

## 2020-12-14 NOTE — DISCHARGE NOTE PROVIDER - HOSPITAL COURSE
81y/o F with pmh of ESRD on HD MWF via LUE fistula, CAD s/p remote CABG, severe AS s/p TAVR, with pacemaker in place, HTN, HLD, chronic anemia with Iron infusion via tunneled line by Hematologist, hx of DVT/PE? who presents to ED via EMS due to episode of hypoglycemia in 60s and confusion. Patient was found to be septic, hypoglycemic with G - Pseudomonal/E. Coli bacteremia with line infection.  Port removed.  Repeat blood cultures negative.  Patient treated with IV antibiotics and will need to continue at HD on discharge until 12/22/20.  Endocrine followed for hypoglycemia.  Nephrology followed for HD.  Patient complained of left hand numbness, was evaluated by vascular, no signs or symptoms suggestive of steal syndrome, findings are more suggestive of palmar fasciitis (Dupuytren's contracture), and recommended F/U as outpatient.  Patient stable for discharge to home with outpatient follow up with primary doctor, nephrology, ID, and vascular. 79y/o F with pmh of ESRD on HD MWF via LUE fistula, CAD s/p remote CABG, severe AS s/p TAVR, with pacemaker in place, HTN, HLD, chronic anemia with Iron infusion via tunneled line by Hematologist, hx of DVT/PE? who presents to ED via EMS due to episode of hypoglycemia in 60s and confusion. Patient was found to be septic, hypoglycemic with G - Pseudomonal/E. Coli bacteremia with line infection.  Port removed.  Repeat blood cultures negative.  Patient treated with IV antibiotics and will need to continue at HD on discharge until 12/22/20.  Endocrine followed for hypoglycemia.  Nephrology followed for HD.  Patient complained of left hand numbness, was evaluated by vascular, no signs or symptoms suggestive of steal syndrome, findings are more suggestive of palmar fasciitis (Dupuytren's contracture), and recommended F/U as outpatient.  Patient stable for discharge to home with outpatient follow up with primary doctor, nephrology, ID, and vascular. sepsis resolved     addendum : seen and examined   agree with above   stable for discharge     GENERAL:  Well-appearing, not in acute distress  RESP:  Non-labored breathing pattern, lungs clear to ausculation in anterior fields  CV: Regular rate and rhythm  GI: Soft, non-tender, non-distended  EXT: no pitting edema     35 minutes were spent on discharge co ordination

## 2020-12-14 NOTE — DISCHARGE NOTE PROVIDER - NSDCCPCAREPLAN_GEN_ALL_CORE_FT
PRINCIPAL DISCHARGE DIAGNOSIS  Diagnosis: Sepsis, due to unspecified organism, unspecified whether acute organ dysfunction present  Assessment and Plan of Treatment: Complete antibiotic course.  Follow up with primary doctor and ID.      SECONDARY DISCHARGE DIAGNOSES  Diagnosis: Hypertension, unspecified type  Assessment and Plan of Treatment: Follow up with primary doctor.  Continue current medications as prescribed.    Diagnosis: ESRD (end stage renal disease)  Assessment and Plan of Treatment: Continue current medications as prescribed.  Follow up with nephrology.    Diagnosis: Anemia Associated with Chronic Renal Failure  Assessment and Plan of Treatment: Follow up with nephrology and hematology.

## 2020-12-15 ENCOUNTER — APPOINTMENT (OUTPATIENT)
Dept: HEMATOLOGY ONCOLOGY | Facility: CLINIC | Age: 80
End: 2020-12-15

## 2020-12-15 DIAGNOSIS — N18.4 CHRONIC KIDNEY DISEASE, STAGE 4 (SEVERE): ICD-10-CM

## 2020-12-15 DIAGNOSIS — N18.5 CHRONIC KIDNEY DISEASE, STAGE 5: ICD-10-CM

## 2020-12-15 LAB
CULTURE RESULTS: SIGNIFICANT CHANGE UP
CULTURE RESULTS: SIGNIFICANT CHANGE UP
SPECIMEN SOURCE: SIGNIFICANT CHANGE UP
SPECIMEN SOURCE: SIGNIFICANT CHANGE UP

## 2020-12-20 ENCOUNTER — INPATIENT (INPATIENT)
Facility: HOSPITAL | Age: 80
LOS: 10 days | Discharge: HOME | DRG: 69 | End: 2020-12-31
Attending: HOSPITALIST | Admitting: STUDENT IN AN ORGANIZED HEALTH CARE EDUCATION/TRAINING PROGRAM
Payer: MEDICARE

## 2020-12-20 VITALS — RESPIRATION RATE: 20 BRPM | TEMPERATURE: 98 F | HEIGHT: 61 IN

## 2020-12-20 DIAGNOSIS — I77.0 ARTERIOVENOUS FISTULA, ACQUIRED: Chronic | ICD-10-CM

## 2020-12-20 DIAGNOSIS — Z95.2 PRESENCE OF PROSTHETIC HEART VALVE: Chronic | ICD-10-CM

## 2020-12-20 DIAGNOSIS — G93.40 ENCEPHALOPATHY, UNSPECIFIED: ICD-10-CM

## 2020-12-20 DIAGNOSIS — Z95.0 PRESENCE OF CARDIAC PACEMAKER: Chronic | ICD-10-CM

## 2020-12-20 DIAGNOSIS — Z90.49 ACQUIRED ABSENCE OF OTHER SPECIFIED PARTS OF DIGESTIVE TRACT: Chronic | ICD-10-CM

## 2020-12-20 LAB
ALBUMIN SERPL ELPH-MCNC: 3.6 G/DL — SIGNIFICANT CHANGE UP (ref 3.3–5.2)
ALP SERPL-CCNC: 83 U/L — SIGNIFICANT CHANGE UP (ref 40–120)
ALT FLD-CCNC: 9 U/L — SIGNIFICANT CHANGE UP
ANION GAP SERPL CALC-SCNC: 14 MMOL/L — SIGNIFICANT CHANGE UP (ref 5–17)
APPEARANCE UR: CLEAR — SIGNIFICANT CHANGE UP
APTT BLD: 28.3 SEC — SIGNIFICANT CHANGE UP (ref 27.5–35.5)
AST SERPL-CCNC: 38 U/L — HIGH
BACTERIA # UR AUTO: ABNORMAL
BASOPHILS # BLD AUTO: 0.03 K/UL — SIGNIFICANT CHANGE UP (ref 0–0.2)
BASOPHILS NFR BLD AUTO: 0.7 % — SIGNIFICANT CHANGE UP (ref 0–2)
BILIRUB SERPL-MCNC: 0.3 MG/DL — LOW (ref 0.4–2)
BILIRUB UR-MCNC: NEGATIVE — SIGNIFICANT CHANGE UP
BUN SERPL-MCNC: 32 MG/DL — HIGH (ref 8–20)
CALCIUM SERPL-MCNC: 9.2 MG/DL — SIGNIFICANT CHANGE UP (ref 8.6–10.2)
CHLORIDE SERPL-SCNC: 98 MMOL/L — SIGNIFICANT CHANGE UP (ref 98–107)
CO2 SERPL-SCNC: 27 MMOL/L — SIGNIFICANT CHANGE UP (ref 22–29)
COLOR SPEC: YELLOW — SIGNIFICANT CHANGE UP
CREAT SERPL-MCNC: 3.41 MG/DL — HIGH (ref 0.5–1.3)
DIFF PNL FLD: ABNORMAL
EOSINOPHIL # BLD AUTO: 0.06 K/UL — SIGNIFICANT CHANGE UP (ref 0–0.5)
EOSINOPHIL NFR BLD AUTO: 1.4 % — SIGNIFICANT CHANGE UP (ref 0–6)
EPI CELLS # UR: SIGNIFICANT CHANGE UP
GLUCOSE SERPL-MCNC: 80 MG/DL — SIGNIFICANT CHANGE UP (ref 70–99)
GLUCOSE UR QL: NEGATIVE MG/DL — SIGNIFICANT CHANGE UP
HCT VFR BLD CALC: 37.6 % — SIGNIFICANT CHANGE UP (ref 34.5–45)
HGB BLD-MCNC: 10.8 G/DL — LOW (ref 11.5–15.5)
IMM GRANULOCYTES NFR BLD AUTO: 0.2 % — SIGNIFICANT CHANGE UP (ref 0–1.5)
INR BLD: 1.14 RATIO — SIGNIFICANT CHANGE UP (ref 0.88–1.16)
KETONES UR-MCNC: ABNORMAL
LACTATE BLDV-MCNC: 0.9 MMOL/L — SIGNIFICANT CHANGE UP (ref 0.5–2)
LEUKOCYTE ESTERASE UR-ACNC: ABNORMAL
LYMPHOCYTES # BLD AUTO: 1.22 K/UL — SIGNIFICANT CHANGE UP (ref 1–3.3)
LYMPHOCYTES # BLD AUTO: 27.6 % — SIGNIFICANT CHANGE UP (ref 13–44)
MCHC RBC-ENTMCNC: 25.5 PG — LOW (ref 27–34)
MCHC RBC-ENTMCNC: 28.7 GM/DL — LOW (ref 32–36)
MCV RBC AUTO: 88.9 FL — SIGNIFICANT CHANGE UP (ref 80–100)
MONOCYTES # BLD AUTO: 0.36 K/UL — SIGNIFICANT CHANGE UP (ref 0–0.9)
MONOCYTES NFR BLD AUTO: 8.1 % — SIGNIFICANT CHANGE UP (ref 2–14)
NEUTROPHILS # BLD AUTO: 2.74 K/UL — SIGNIFICANT CHANGE UP (ref 1.8–7.4)
NEUTROPHILS NFR BLD AUTO: 62 % — SIGNIFICANT CHANGE UP (ref 43–77)
NITRITE UR-MCNC: NEGATIVE — SIGNIFICANT CHANGE UP
PH UR: 8 — SIGNIFICANT CHANGE UP (ref 5–8)
PLATELET # BLD AUTO: 243 K/UL — SIGNIFICANT CHANGE UP (ref 150–400)
POTASSIUM SERPL-MCNC: 5.3 MMOL/L — SIGNIFICANT CHANGE UP (ref 3.5–5.3)
POTASSIUM SERPL-SCNC: 5.3 MMOL/L — SIGNIFICANT CHANGE UP (ref 3.5–5.3)
PROT SERPL-MCNC: 6.9 G/DL — SIGNIFICANT CHANGE UP (ref 6.6–8.7)
PROT UR-MCNC: 100 MG/DL
PROTHROM AB SERPL-ACNC: 13.1 SEC — SIGNIFICANT CHANGE UP (ref 10.6–13.6)
RBC # BLD: 4.23 M/UL — SIGNIFICANT CHANGE UP (ref 3.8–5.2)
RBC # FLD: 17.9 % — HIGH (ref 10.3–14.5)
RBC CASTS # UR COMP ASSIST: SIGNIFICANT CHANGE UP /HPF (ref 0–4)
SARS-COV-2 RNA SPEC QL NAA+PROBE: SIGNIFICANT CHANGE UP
SODIUM SERPL-SCNC: 139 MMOL/L — SIGNIFICANT CHANGE UP (ref 135–145)
SP GR SPEC: 1.01 — SIGNIFICANT CHANGE UP (ref 1.01–1.02)
TROPONIN T SERPL-MCNC: 0.1 NG/ML — HIGH (ref 0–0.06)
UROBILINOGEN FLD QL: NEGATIVE MG/DL — SIGNIFICANT CHANGE UP
WBC # BLD: 4.42 K/UL — SIGNIFICANT CHANGE UP (ref 3.8–10.5)
WBC # FLD AUTO: 4.42 K/UL — SIGNIFICANT CHANGE UP (ref 3.8–10.5)
WBC UR QL: SIGNIFICANT CHANGE UP

## 2020-12-20 PROCEDURE — 70496 CT ANGIOGRAPHY HEAD: CPT | Mod: 26

## 2020-12-20 PROCEDURE — 0042T: CPT

## 2020-12-20 PROCEDURE — 99223 1ST HOSP IP/OBS HIGH 75: CPT | Mod: AI

## 2020-12-20 PROCEDURE — 99285 EMERGENCY DEPT VISIT HI MDM: CPT

## 2020-12-20 PROCEDURE — 93010 ELECTROCARDIOGRAM REPORT: CPT

## 2020-12-20 PROCEDURE — 70498 CT ANGIOGRAPHY NECK: CPT | Mod: 26

## 2020-12-20 PROCEDURE — 71045 X-RAY EXAM CHEST 1 VIEW: CPT | Mod: 26

## 2020-12-20 RX ORDER — MIDAZOLAM HYDROCHLORIDE 1 MG/ML
2 INJECTION, SOLUTION INTRAMUSCULAR; INTRAVENOUS ONCE
Refills: 0 | Status: DISCONTINUED | OUTPATIENT
Start: 2020-12-20 | End: 2020-12-20

## 2020-12-20 RX ORDER — HEPARIN SODIUM 5000 [USP'U]/ML
5000 INJECTION INTRAVENOUS; SUBCUTANEOUS EVERY 8 HOURS
Refills: 0 | Status: DISCONTINUED | OUTPATIENT
Start: 2020-12-20 | End: 2020-12-31

## 2020-12-20 RX ORDER — ISOSORBIDE MONONITRATE 60 MG/1
120 TABLET, EXTENDED RELEASE ORAL DAILY
Refills: 0 | Status: DISCONTINUED | OUTPATIENT
Start: 2020-12-20 | End: 2020-12-31

## 2020-12-20 RX ORDER — ASPIRIN/CALCIUM CARB/MAGNESIUM 324 MG
81 TABLET ORAL DAILY
Refills: 0 | Status: DISCONTINUED | OUTPATIENT
Start: 2020-12-20 | End: 2020-12-21

## 2020-12-20 RX ORDER — CEFEPIME 1 G/1
1000 INJECTION, POWDER, FOR SOLUTION INTRAMUSCULAR; INTRAVENOUS ONCE
Refills: 0 | Status: COMPLETED | OUTPATIENT
Start: 2020-12-20 | End: 2020-12-20

## 2020-12-20 RX ORDER — HYDRALAZINE HCL 50 MG
5 TABLET ORAL EVERY 6 HOURS
Refills: 0 | Status: DISCONTINUED | OUTPATIENT
Start: 2020-12-20 | End: 2020-12-31

## 2020-12-20 RX ORDER — CARVEDILOL PHOSPHATE 80 MG/1
25 CAPSULE, EXTENDED RELEASE ORAL ONCE
Refills: 0 | Status: COMPLETED | OUTPATIENT
Start: 2020-12-20 | End: 2020-12-20

## 2020-12-20 RX ADMIN — CEFEPIME 100 MILLIGRAM(S): 1 INJECTION, POWDER, FOR SOLUTION INTRAMUSCULAR; INTRAVENOUS at 17:21

## 2020-12-20 RX ADMIN — Medication 1 MILLIGRAM(S): at 17:11

## 2020-12-20 RX ADMIN — HEPARIN SODIUM 5000 UNIT(S): 5000 INJECTION INTRAVENOUS; SUBCUTANEOUS at 23:52

## 2020-12-20 RX ADMIN — MIDAZOLAM HYDROCHLORIDE 2 MILLIGRAM(S): 1 INJECTION, SOLUTION INTRAMUSCULAR; INTRAVENOUS at 13:40

## 2020-12-20 RX ADMIN — Medication 1 MILLIGRAM(S): at 23:53

## 2020-12-20 NOTE — ED ADULT NURSE NOTE - OBJECTIVE STATEMENT
pt BIBA c/o AMS per family this morning, per EMS pt LKW in last night 2100. code stroke activated with md jara at bedside. Pt care assumed  at CT scan. Arrives awake and alert, unable to follow commands. Baseline NIH 26, left side neglect noted. Airway patent, screaming "hey hey" with no dysarthria noted. hx CKD with HD schedule, Left AVF noted +bruit +thrill. #20G IV placed to right ac with assistance needed. delay in CT scan d/t pt combative and needing IV access. Moving Right side upper and lower extremities with occasional movement to LLE. Patient guards LUE during assessment, md made aware. RR even and unlabored, skin warm and dry. Safety maintained

## 2020-12-20 NOTE — ED PROVIDER NOTE - OBJECTIVE STATEMENT
80y F w/ hx ESRD on HD MWF via LUE fistula, CAD s/p remote CABG, severe AS s/p TAVR, with pacemaker in place, HTN, HLD, chronic anemia with iron infusion via tunneled line by Hematologist; presenting for AMS.  Pt was recently discharged from this hospital after being treated for sepsis/bacteremia/line infection.  Per EMS, pt was last seen well at 2100 last night.  Pt was found by family screaming "Ey ey ey" at 0400 today.  Reportedly is AAOx4 at her baseline.  Pt continues to be altered and not following commands on arrival. Code Stroke activated on arrival.  Last dialyzed 2 days ago.

## 2020-12-20 NOTE — ED ADULT TRIAGE NOTE - CHIEF COMPLAINT QUOTE
Last seen well at 2100 last night with family.  Pt woke up screaming Hey! Hey! at 0400 this morning.  Pt arrives to ER still screaming Hey!.  Does not follow commands.  Finger stick 74. Unable to obtain full vital signs at triage due to patient's agitation.  Code stroke called.

## 2020-12-20 NOTE — H&P ADULT - NSHPPHYSICALEXAM_GEN_ALL_CORE
Vital Signs Last 24 Hrs  T(F): 98.3 (20 Dec 2020 14:15), Max: 98.3 (20 Dec 2020 14:15)  HR: 64 (20 Dec 2020 14:15) (60 - 68)  BP: 182/85 (20 Dec 2020 14:15) (180/110 - 194/79)  RR: 20 (20 Dec 2020 14:15) (20 - 20)  SpO2: 100% (20 Dec 2020 14:15) (98% - 100%)    Physical Exam:  Constitutional: Elderly female, comfortably laying in bed, agitated, not following commands, yelling "HEY HEY HEY"  HEENT: NC/AT, PERRL, EOMI, trachea midline, no JVD  Respiratory: CTA bilaterally  Cardiovascular: RRR, no m/g/r  Gastrointestinal: Soft, NT/ND, BS+  Vascular: 2+ peripheral pulses  Neurological: Unable to assess properly, patient moving extremities guarding LUE  Psych: Confused, agitated, yelling incoherently  Musculoskeletal: No edema, cyanosis, deformities. ROM normal  Skin: No obvious rash, lesions. No jaundice.

## 2020-12-20 NOTE — CHART NOTE - NSCHARTNOTEFT_GEN_A_CORE
RN called to report patient yelling "hey, Hey, Hey," and having agitation.  Requested medication to help her patient rest.  Patient seen and examined at bedside, Screaming Hey, Hey, Hey, moving all extremities, but not following simple request.  Ativan 1 mg, IV ordered for agitation.      Vital Signs Last 24 Hrs  T(C): 36.8 (20 Dec 2020 18:15), Max: 36.8 (20 Dec 2020 14:15)  T(F): 98.3 (20 Dec 2020 18:15), Max: 98.3 (20 Dec 2020 14:15)  HR: 62 (20 Dec 2020 18:15) (60 - 68)  BP: 158/67 (20 Dec 2020 18:15) (158/67 - 194/79)  BP(mean): --  RR: 20 (20 Dec 2020 18:15) (20 - 20)  SpO2: 100% (20 Dec 2020 18:15) (98% - 100%)

## 2020-12-20 NOTE — H&P ADULT - ASSESSMENT
79 yo F w/ PMH Of ESRD on HD (MWF via LUE fistula), CAD s/p remote CABG, severe AS s/p TAVR w/ pacemaker in place, HTN/HLD, recent line infection w/ bacteremia who was recently discharged from [facility] last week on IV Abx. Admitted for AMS, r/o CVA.    AMS of unknown etiology  - Code stroke called in ED, CTH unremarkable for acute changes. CT perfusion:There is an ischemic penumbra in nonvascular distribution in the left hemisphere as described.  - Neuro checks  - Neurology consult  - Enhanced supervision  - Ua, Bcx. Procal  - Enhanced supervision, fall precautions    ESRD on HD  - Last dialyzed Friday  - Nephrology consulted    HTN, CAD s/p CABG, Severe AS s/p TAVR w/ PPM  - Reusme home PO medications once patient tolerates PO  - IV hydralazine prn    #Recent admission for mediport infection, Bacteremia  - Currently on Cefepime on HD days. Received 1 dose in ED  - Repeat BCx  - ID consult    DVT ppx: Heparin sq    LOS/ancticipation: Pending workup of mental status, clinical improvement.

## 2020-12-20 NOTE — ED PROVIDER NOTE - ATTENDING CONTRIBUTION TO CARE
Chano: I performed a face to face bedside interview with patient regarding history of present illness, review of symptoms and past medical history. I completed an independent physical exam.  I have discussed patient's plan of care with resident.   I agree with note as stated above including HISTORY OF PRESENT ILLNESS, HIV, PAST MEDICAL/SURGICAL/FAMILY/SOCIAL HISTORY, ALLERGIES AND HOME MEDICATIONS, REVIEW OF SYSTEMS, PHYSICAL EXAM, MEDICAL DECISION MAKING and any PROGRESS NOTES during the time I functioned as the attending physician for this patient unless otherwise noted. My brief assessment is as follows: 80F h/o ESRD on HD, CAD, AS s/p TAVR, HTN, HLD anemia p/w AMS. Recent sepsis admission. As per EMS LKW 2100 last night, found screaminin "ey ey ey" at 0400 and not conversing otherwise. Code stroke called on arrival. Upon futher review patient's family reports AMS gradually starting x 2 days. Plan for labs, CTH, CTA, CT perfusion, cxr, UA, admit.

## 2020-12-20 NOTE — ED PROVIDER NOTE - CLINICAL SUMMARY MEDICAL DECISION MAKING FREE TEXT BOX
80y F w/ hx ESRD on HD, HTN, HLD, CAD s/p CABG, presenting for AMS. Code stroke activated on arrival.  Pt outside tPA window. Given versed IV for agitation.  Will obtain CT/CTA/CT perfusion, CXR, labs, EKG, reassess.

## 2020-12-20 NOTE — ED PROVIDER NOTE - PHYSICAL EXAMINATION
Constitutional: Awake, alert, yelling "ey ey ey"  Eyes: no scleral icterus  HENT: normocephalic, atraumatic, moist oral mucosa  CV: RRR, no murmur, +dialysis catheter L chest wall  Pulm: non-labored respirations, CTAB  Abdomen: soft, non-tender, non-distended  Extremities: no edema, no deformity, +LUE fistula with palpable thrill  Skin: no rash, no jaundice  Neuro: awake, not following commands, moving all extremities, no facial asymmetry

## 2020-12-20 NOTE — ED PROVIDER NOTE - PROGRESS NOTE DETAILS
Pt much calmer after versed given.  Diagnostics reviewed. Diagnostic results reviewed.  Additional hx obtained from pt's son Bernard ([phone]), who states that pt had been doing well initially since being discharged from the hospital 6 days ago. However, 2 days  ago after dialysis she started to seem more out of it, not wanting to take her medications, and then this morning she was much more confused.  He notes that pt does make urine.  She has been receiving IV antibiotics after dialysis (per review of prior notes, pt was supposed to be receiving cefepime 2g IV after dialysis until 12/22).  Will obtain straight cath urine sample, give cefepime 1g IV, admit. Neuro consult placed. Spoke with Dr. Sofia of neurology, who advises EEG and metabolic workup.  Spoke with hospitalist Dr. Gale, who will admit. Diagnostic results reviewed.  Additional hx obtained from pt's son Bernard ([phone]), who states that pt had been doing well initially since being discharged from the hospital 6 days ago. However, 2 days  ago after dialysis she started to seem more out of it, not wanting to take her medications, and then this morning she was much more confused.  He notes that pt does make urine.  She has been receiving IV antibiotics after dialysis (per review of prior notes, pt was supposed to be receiving cefepime 2g IV after dialysis until 12/22).  Will obtain straight cath urine sample, give cefepime 1g IV, admit. Spoke with Dr. Sofia of neurology, who advises EEG, repeat CT head and metabolic workup.  Spoke with hospitalist Dr. Gale, who will admit.

## 2020-12-21 LAB
ALBUMIN SERPL ELPH-MCNC: 3.3 G/DL — SIGNIFICANT CHANGE UP (ref 3.3–5.2)
ALP SERPL-CCNC: 83 U/L — SIGNIFICANT CHANGE UP (ref 40–120)
ALT FLD-CCNC: 5 U/L — SIGNIFICANT CHANGE UP
AMMONIA BLD-MCNC: 18 UMOL/L — SIGNIFICANT CHANGE UP (ref 11–55)
ANION GAP SERPL CALC-SCNC: 19 MMOL/L — HIGH (ref 5–17)
AST SERPL-CCNC: 14 U/L — SIGNIFICANT CHANGE UP
BILIRUB SERPL-MCNC: 0.2 MG/DL — LOW (ref 0.4–2)
BUN SERPL-MCNC: 41 MG/DL — HIGH (ref 8–20)
CALCIUM SERPL-MCNC: 8.6 MG/DL — SIGNIFICANT CHANGE UP (ref 8.6–10.2)
CHLORIDE SERPL-SCNC: 97 MMOL/L — LOW (ref 98–107)
CO2 SERPL-SCNC: 22 MMOL/L — SIGNIFICANT CHANGE UP (ref 22–29)
CREAT SERPL-MCNC: 4.06 MG/DL — HIGH (ref 0.5–1.3)
CULTURE RESULTS: SIGNIFICANT CHANGE UP
FOLATE SERPL-MCNC: 10.7 NG/ML — SIGNIFICANT CHANGE UP
GLUCOSE BLDC GLUCOMTR-MCNC: 134 MG/DL — HIGH (ref 70–99)
GLUCOSE BLDC GLUCOMTR-MCNC: 51 MG/DL — CRITICAL LOW (ref 70–99)
GLUCOSE SERPL-MCNC: 31 MG/DL — CRITICAL LOW (ref 70–99)
HCT VFR BLD CALC: 34.6 % — SIGNIFICANT CHANGE UP (ref 34.5–45)
HGB BLD-MCNC: 10.2 G/DL — LOW (ref 11.5–15.5)
MCHC RBC-ENTMCNC: 26.2 PG — LOW (ref 27–34)
MCHC RBC-ENTMCNC: 29.5 GM/DL — LOW (ref 32–36)
MCV RBC AUTO: 88.7 FL — SIGNIFICANT CHANGE UP (ref 80–100)
PLATELET # BLD AUTO: 225 K/UL — SIGNIFICANT CHANGE UP (ref 150–400)
POTASSIUM SERPL-MCNC: 4.2 MMOL/L — SIGNIFICANT CHANGE UP (ref 3.5–5.3)
POTASSIUM SERPL-SCNC: 4.2 MMOL/L — SIGNIFICANT CHANGE UP (ref 3.5–5.3)
PROCALCITONIN SERPL-MCNC: 2.53 NG/ML — HIGH (ref 0.02–0.1)
PROT SERPL-MCNC: 6.1 G/DL — LOW (ref 6.6–8.7)
RBC # BLD: 3.9 M/UL — SIGNIFICANT CHANGE UP (ref 3.8–5.2)
RBC # FLD: 17.9 % — HIGH (ref 10.3–14.5)
SODIUM SERPL-SCNC: 138 MMOL/L — SIGNIFICANT CHANGE UP (ref 135–145)
SPECIMEN SOURCE: SIGNIFICANT CHANGE UP
VIT B12 SERPL-MCNC: 882 PG/ML — SIGNIFICANT CHANGE UP (ref 232–1245)
WBC # BLD: 4.55 K/UL — SIGNIFICANT CHANGE UP (ref 3.8–10.5)
WBC # FLD AUTO: 4.55 K/UL — SIGNIFICANT CHANGE UP (ref 3.8–10.5)

## 2020-12-21 PROCEDURE — 99223 1ST HOSP IP/OBS HIGH 75: CPT

## 2020-12-21 PROCEDURE — 70450 CT HEAD/BRAIN W/O DYE: CPT | Mod: 26

## 2020-12-21 PROCEDURE — 99233 SBSQ HOSP IP/OBS HIGH 50: CPT

## 2020-12-21 RX ORDER — VANCOMYCIN HCL 1 G
1000 VIAL (EA) INTRAVENOUS ONCE
Refills: 0 | Status: COMPLETED | OUTPATIENT
Start: 2020-12-21 | End: 2020-12-22

## 2020-12-21 RX ORDER — ERYTHROPOIETIN 10000 [IU]/ML
8000 INJECTION, SOLUTION INTRAVENOUS; SUBCUTANEOUS
Refills: 0 | Status: DISCONTINUED | OUTPATIENT
Start: 2020-12-21 | End: 2020-12-28

## 2020-12-21 RX ORDER — CEFEPIME 1 G/1
2000 INJECTION, POWDER, FOR SOLUTION INTRAMUSCULAR; INTRAVENOUS ONCE
Refills: 0 | Status: COMPLETED | OUTPATIENT
Start: 2020-12-21 | End: 2020-12-22

## 2020-12-21 RX ORDER — SODIUM CHLORIDE 9 MG/ML
1000 INJECTION, SOLUTION INTRAVENOUS
Refills: 0 | Status: DISCONTINUED | OUTPATIENT
Start: 2020-12-21 | End: 2020-12-22

## 2020-12-21 RX ORDER — ASPIRIN/CALCIUM CARB/MAGNESIUM 324 MG
300 TABLET ORAL DAILY
Refills: 0 | Status: DISCONTINUED | OUTPATIENT
Start: 2020-12-21 | End: 2020-12-23

## 2020-12-21 RX ORDER — DEXTROSE 50 % IN WATER 50 %
25 SYRINGE (ML) INTRAVENOUS ONCE
Refills: 0 | Status: COMPLETED | OUTPATIENT
Start: 2020-12-21 | End: 2020-12-21

## 2020-12-21 RX ORDER — INFLUENZA VIRUS VACCINE 15; 15; 15; 15 UG/.5ML; UG/.5ML; UG/.5ML; UG/.5ML
0.5 SUSPENSION INTRAMUSCULAR ONCE
Refills: 0 | Status: COMPLETED | OUTPATIENT
Start: 2020-12-21 | End: 2020-12-21

## 2020-12-21 RX ADMIN — Medication 1 MILLIGRAM(S): at 09:06

## 2020-12-21 RX ADMIN — Medication 1 MILLIGRAM(S): at 22:15

## 2020-12-21 RX ADMIN — Medication 1 MILLIGRAM(S): at 13:32

## 2020-12-21 RX ADMIN — SODIUM CHLORIDE 50 MILLILITER(S): 9 INJECTION, SOLUTION INTRAVENOUS at 15:32

## 2020-12-21 RX ADMIN — HEPARIN SODIUM 5000 UNIT(S): 5000 INJECTION INTRAVENOUS; SUBCUTANEOUS at 22:14

## 2020-12-21 RX ADMIN — ERYTHROPOIETIN 8000 UNIT(S): 10000 INJECTION, SOLUTION INTRAVENOUS; SUBCUTANEOUS at 14:12

## 2020-12-21 RX ADMIN — Medication 25 GRAM(S): at 13:00

## 2020-12-21 RX ADMIN — Medication 5 MILLIGRAM(S): at 15:48

## 2020-12-21 RX ADMIN — Medication 1 MILLIGRAM(S): at 18:04

## 2020-12-21 RX ADMIN — HEPARIN SODIUM 5000 UNIT(S): 5000 INJECTION INTRAVENOUS; SUBCUTANEOUS at 05:05

## 2020-12-21 RX ADMIN — Medication 5 MILLIGRAM(S): at 22:14

## 2020-12-21 NOTE — CONSULT NOTE ADULT - SUBJECTIVE AND OBJECTIVE BOX
INFECTIOUS DISEASES AND INTERNAL MEDICINE at [place]  =======================================================  Rick Kyle MD  Diplomates American Board of Internal Medicine and Infectious Diseases  Telephone [phone]  Fax            [phone]  =======================================================    IRA [id number]      HPI:  79 yo F w/ PMH Of ESRD on HD (MWF via LUE fistula), CAD s/p remote CABG, severe AS s/p TAVR w/ pacemaker in place, HTN/HLD, recent line infection w/ bacteremia who was recently discharged from [facility] last week on IV CEFEPIME FOR PSEUDOMONAS . Unable to obtain history from patient due to mental status. History obtained from CHART   patient was recently discharged from [facility] last Monday s/p bacteremia and line infection and was doing well. She was noted to become more lethargic and confused after her HD session on Friday. Last night she was having difficulty taking her night time medications.     she woke up at 4AM yelling "HEY HEY HEY." He reports that the patient is normally A&Ox3 however requires assistance w/ daily activities A patient appears in no distress however does not follow commands or answer questions. She constantly yells out "HEY HEY HEY" and per staff she has been doing this since admission.  ASKED TO EVALUATE FROM ID STANDPOINT           PAST MEDICAL & SURGICAL HISTORY:  Thyroid nodule  awaiting FNB in the near future    Severe aortic stenosis    Osteoarthritis  bilateral knees    CKD (chronic kidney disease) stage 4, GFR 15-29 ml/min    DM2 (diabetes mellitus, type 2)    Arthritis    CAD (coronary artery disease)    Gout    Anemia Associated with Chronic Renal Failure    HTN (Hypertension)    S/P AVR  TAVR    History of pacemaker    A-V fistula  left arm    S/P cholecystectomy    Stented coronary artery  s/p 3 stents, then CABG     S/P CABG (coronary artery bypass graft)  triple in         ANTIBIOTICS      Allergies    codeine (Other)  Lortab (Other)  morphine (Other)  Percocet 10/325 (Other)  PPM not compatible with  MRI (Other (Fatal))  Reglan (Other; [place])  sulfa drugs ([place])    Intolerances        SOCIAL HISTORY:     FAMILY HX   FAMILY HISTORY:  No pertinent family history in first degree relatives        Vital Signs Last 24 Hrs  T(C): 36.6 (21 Dec 2020 12:08), Max: 37 (21 Dec 2020 00:24)  T(F): 97.8 (21 Dec 2020 12:08), Max: 98.6 (21 Dec 2020 00:24)  HR: 61 (21 Dec 2020 12:08) (61 - 71)  BP: 143/60 (21 Dec 2020 12:08) (129/71 - 194/79)  BP(mean): --  RR: 18 (21 Dec 2020 12:08) (18 - 20)  SpO2: 100% (21 Dec 2020 12:08) (100% - 100%)  Drug Dosing Weight  Height (cm): 154.9 (20 Dec 2020 13:29)  Weight (kg): 52.163 (07 Dec 2020 21:13)  BMI (kg/m2): 21.7 (20 Dec 2020 13:29)  BSA (m2): 1.49 (20 Dec 2020 13:29)      REVIEW OF SYSTEMS:    UNABLE TO OBTAIN                   PHYSICAL EXAMINATION:    GENERAL: The patient is NadegeIN JAZZ LEBLANCING HEY HEY HEY    VITAL SIGNS: T(C): 36.6 (20 @ 12:08), Max: 37 (20 @ 00:24)  HR: 61 (-20 @ 12:08) (61 - 71)  BP: 143/60 (-20 @ 12:08) (129/71 - 194/79)  RR: 18 (-20 @ 12:08) (18 - 20)  SpO2: 100% (-20 @ 12:08) (100% - 100%)  Wt(kg): --    HEENT: Head is normocephalic and atraumatic.  ANICTERIC  NECK: Supple. No carotid bruits.  No lymphadenopathy or thyromegaly.    LUNGS:COARSE BREATH SOUNDS    HEART: Regular rate and rhythm without murmur.    ABDOMEN: Soft, nontender, and nondistended.  Positive bowel sounds.  No hepatosplenomegaly was noted. NO REBOUND NO GUARDING    EXTREMITIES: NO EDEMA NO ERYTHEMA    NEUROLOGIC YELLING HEY HEY HEY        SKIN: No ulceration or induration present. NO RASH        BLOOD CULTURES       URINE CX          LABS:                        10.2   4.55  )-----------( 225      ( 21 Dec 2020 12:48 )             34.6     12-20    139  |  98  |  32.0<H>  ----------------------------<  80  5.3   |  27.0  |  3.41<H>    Ca    9.2      20 Dec 2020 14:12    TPro  6.9  /  Alb  3.6  /  TBili  0.3<L>  /  DBili  x   /  AST  38<H>  /  ALT  9   /  AlkPhos  83  12-20    PT/INR - ( 20 Dec 2020 14:12 )   PT: 13.1 sec;   INR: 1.14 ratio         PTT - ( 20 Dec 2020 14:12 )  PTT:28.3 sec  Urinalysis Basic - ( 20 Dec 2020 16:53 )    Color: Yellow / Appearance: Clear / S.010 / pH: x  Gluc: x / Ketone: Trace  / Bili: Negative / Urobili: Negative mg/dL   Blood: x / Protein: 100 mg/dL / Nitrite: Negative   Leuk Esterase: Trace / RBC: 0-2 /HPF / WBC 3-5   Sq Epi: x / Non Sq Epi: Few / Bacteria: Occasional        RADIOLOGY & ADDITIONAL STUDIES:      ASSESSMENT/PLAN    79 yo F w/ PMH Of ESRD on HD (MWF via LUE fistula), CAD s/p remote CABG, severe AS s/p TAVR w/ pacemaker in place, HTN/HLD, recent line infection w/ bacteremia who was recently discharged from [facility] last week on IV CEFEPIME FOR PSEUDOMONAS . Unable to obtain history from patient due to mental status. History obtained from CHART   patient was recently discharged from [facility] last Monday s/p bacteremia and line infection and was doing well. She was noted to become more lethargic and confused after her HD session on Friday. Last night she was having difficulty taking her night time medications.     she woke up at 4AM yelling "HEY HEY HEY." He reports that the patient is normally A&Ox3 however requires assistance w/ daily activities A patient appears in no distress however does not follow commands or answer questions. She constantly yells out "HEY HEY HEY" and per staff she has been doing this since admission.  AMS UNCLEAR ETIOLOGY ? STROKE WILL D/W NEUROLOGY  BLOOD CX X 2 AND URINE PENDING  CONITNEU CEFEPIME ON DIALYSIS  WOULD RECOMMEND VANCO X1   WILL FOLLOWUP WITH FURTHER RECOMMENDATIONS  SPOKE TO HOSPITALIST               NILESH LOUISE MD

## 2020-12-21 NOTE — CONSULT NOTE ADULT - SUBJECTIVE AND OBJECTIVE BOX
Patient is a 80y old  Female who presents with a chief complaint of AMS (21 Dec 2020 09:27)    HPI:  79 yo F w/ PMH Of ESRD on HD (MWF via LUE fistula), CAD s/p remote CABG, severe AS s/p TAVR w/ pacemaker in place, HTN/HLD, recent line infection w/ bacteremia who was recently discharged from [facility] last week on IV Abx. Unable to obtain history from patient due to mental status. History obtained from and son. As per son, patient was recently discharged from [facility] last Monday s/p bacteremia and line infection and was doing well. She was noted to become more lethargic and confused after her HD session on Friday. Last night she was having difficulty taking her night time medications. This morning she woke up at 4AM yelling "HEY HEY HEY." He reports that the patient is normally A&Ox3 however requires assistance w/ daily activities At time of my assessment, patient appears in no distress however does not follow commands or answer questions. She constantly yells out "HEY HEY HEY" and per staff she has been doing this since admission. Code stroke was activated in the ED.   (20 Dec 2020 17:11)    PAST MEDICAL & SURGICAL HISTORY:  Thyroid nodule  awaiting FNB in the near future    Severe aortic stenosis    Osteoarthritis  bilateral knees    CKD (chronic kidney disease) stage 4, GFR 15-29 ml/min    DM2 (diabetes mellitus, type 2)    Arthritis    CAD (coronary artery disease)    Gout    Anemia Associated with Chronic Renal Failure    HTN (Hypertension)    S/P AVR  TAVR    History of pacemaker    A-V fistula  left arm    S/P cholecystectomy    Stented coronary artery  s/p 3 stents, then CABG     S/P CABG (coronary artery bypass graft)  triple in     FAMILY HISTORY:  No pertinent family history in first degree relatives    Social History: Non Smoker,     MEDICATIONS  (STANDING):  aspirin Suppository 300 milliGRAM(s) Rectal daily  epoetin vinnie-epbx (RETACRIT) Injectable 8000 Unit(s) IV Push <User Schedule>  heparin   Injectable 5000 Unit(s) SubCutaneous every 8 hours  influenza   Vaccine 0.5 milliLiter(s) IntraMuscular once  isosorbide   mononitrate ER Tablet (IMDUR) 120 milliGRAM(s) Oral daily    MEDICATIONS  (PRN):  hydrALAZINE Injectable 5 milliGRAM(s) IV Push every 6 hours PRN SBP >185  LORazepam   Injectable 1 milliGRAM(s) IV Push every 4 hours PRN Agitation    Allergies    codeine (Other)  Lortab (Other)  morphine (Other)  Percocet 10/325 (Other)  PPM not compatible with  MRI (Other (Fatal))  Reglan (Other; Flushing)  sulfa drugs (Flushing)    REVIEW OF SYSTEMS: Patient is confused,     Vital Signs Last 24 Hrs  T(C): 36.3 (21 Dec 2020 04:46), Max: 37 (21 Dec 2020 00:24)  T(F): 97.3 (21 Dec 2020 04:46), Max: 98.6 (21 Dec 2020 00:24)  HR: 64 (21 Dec 2020 04:46) (60 - 71)  BP: 147/59 (21 Dec 2020 04:46) (129/71 - 194/79  RR: 20 (21 Dec 2020 04:46) (20 - 20)  SpO2: 100% (21 Dec 2020 04:46) (98% - 100%)    PHYSICAL EXAM:    GENERAL: NAD, Confused, Pale, Frail,   HEAD:  Atraumatic, Normocephalic  EYES: EOMI, PERRLA, conjunctiva and sclera clear  ENMT:Dry  mucous membranes,   NECK: Supple, No JVD,   NERVOUS SYSTEM:  Restless, Confused,   CHEST/LUNG: Clear to percussion bilaterally; No rales, rhonchi, wheezing, or rubs  HEART: Regular rate and rhythm; + SE murmur, No rubs, or gallops  ABDOMEN: Soft, Nontender, Nondistended; Bowel sounds present  EXTREMITIES:  2+ Peripheral Pulses, No clubbing, cyanosis, or edema  LYMPH: No lymphadenopathy noted  SKIN: No rashes or lesions    + L - TDC & AVF,       LABS:                        10.8   4.42  )-----------( 243      ( 20 Dec 2020 14:12 )             37.6     12-20    139  |  98  |  32.0<H>  ----------------------------<  80  5.3   |  27.0  |  3.41<H>    Ca    9.2      20 Dec 2020 14:12    TPro  6.9  /  Alb  3.6  /  TBili  0.3<L>  /  DBili  x   /  AST  38<H>  /  ALT  9   /  AlkPhos  83  12-20    PT/INR - ( 20 Dec 2020 14:12 )   PT: 13.1 sec;   INR: 1.14 ratio      PTT - ( 20 Dec 2020 14:12 )  PTT:28.3 sec  Urinalysis Basic - ( 20 Dec 2020 16:53 )    Color: Yellow / Appearance: Clear / S.010 / pH: x  Gluc: x / Ketone: Trace  / Bili: Negative / Urobili: Negative mg/dL   Blood: x / Protein: 100 mg/dL / Nitrite: Negative   Leuk Esterase: Trace / RBC: 0-2 /HPF / WBC 3-5   Sq Epi: x / Non Sq Epi: Few / Bacteria: Occasional    RADIOLOGY & ADDITIONAL TESTS:    CT Angio Head w/ IV Cont (20 @ 13:52)      EXAM:  CT PERFUSION W MAPS IC                         EXAM:  CT ANGIO BRAIN (W)AW IC                         EXAM:  CT BRAIN STROKE PROTOCOL                         EXAM:  CT ANGIO NECK (W)AW IC                          PROCEDURE DATE:  2020     INTERPRETATION:  CT OF THE HEAD WITHOUT CONTRAST, CT ANGIOGRAPHY OF HEAD AND NECK AND CT PERFUSION WITH INTRAVENOUS CONTRAST.    CLINICAL INDICATION: Code stroke.    TECHNIQUE: Initially volumetric CT acquisition was performed through the brain and reviewed using brain and bone window technique. Sagittal and coronal reconstructed images were obtained. Dose optimization techniques were utilized including kVp/mA modulation along with iterative reconstructions.  Subsequently volumetric acquisition was performed from the thoracic inlet to the vertex.  RAPID artificial intelligence was used for preliminary evaluation of intracranial hemorrhage.  A total of 70 cc of Omnipaque 350 was injected intravenously without complications.  Dose optimization techniques were utilized including kVp/mA modulation along with iterative reconstructions.  MIP image analysis was performed on a separate workstation.  CT brain perfusion was performed after intravenous injection 50 cc of intravenous contrast Omnipaque 350. Dose optimization techniques were utilized including kVp/mA modulation along with iterative reconstructions. 3D image analysis was performed on a dedicated workstation by a neuroradiologist.    DOSE REPORT: Radiation Dose Estimate (DLP) Dose 4230 mGy-cm.    COMPARISON: CT brain, 2020    FINDINGS:    Head CT:    The ventricular and sulcal size and configuration is age appropriate.  There are scattered white matter hypodensities which are nonspecific but most commonly represent chronic microvascular ischemic changes.   There is no acute loss of gray-white differentiation.    There is no evidence of mass effect, midline shift, acute intracranial hemorrhage, or extra-axial collections. Again noted is prominence of the pituitary gland measuring 1.2 x 1.2 cm in craniocaudal and AP dimensions, unchanged since prior comparison.    The visualized globes are symmetric bilaterally. The paranasal sinuses and tympanomastoid air cells are clear.    LEFT ASPECTS scoring:  Caudate nucl (C): 1  Lenticular nucl (L): 1  Insular Cortex (I): 1  Posterior limb (IC): 1  Low ant. MCA (M1): 1  Low mid MCA (M2): 1  Low post. MCA (M3): 1  High ant. MCA (M4): 1  High mid MCA (M5): 1  High post. MCA (M6): 1    Total = 10    RIGHT ASPECTSscoring:  Caudate nucl (C): 1  Lenticular nucl (L): 1  Insular Cortex (I): 1  Posterior limb (IC): 1  Low ant. MCA (M1): 1  Low mid MCA (M2): 1  Low post. MCA (M3): 1  High ant. MCA (M4): 1  High mid MCA (M5): 1  High post. MCA (M6): 1    Total = 10    CT angiography:    NECK:  The aortic arch is conventional in anatomy. Origin of supraaortic arteries are patent. The common carotid arteries are normal in course and caliber. There are atherosclerotic plaques at common carotid bifurcations and carotid bulbs without evidence of significant segmental stenosis.    The right internal carotid artery follows a retropharyngeal course. The right internal carotid artery is normal in caliber. There is 0 % stenosis using NASCET criteria (normal right ICA caliber is 4.9 mm).    The left internal carotid artery is normal in caliber. There is 0 % stenosis using NASCET criteria (normal left ICA caliber is 5.0 mm).    There is mild atherosclerotic narrowing anterior to the V1 segment of the right vertebral artery resulting in approximately 15-20% narrowing. Bilateral vertebral arteries are normal in course, caliber and contour, without evidence of dissection or occlusion.    Degenerative changes of the bony cervical spine are noted.    BRAIN:    The donald cavernous and supraclinoid ICA segments are normal in caliber.  The visualized MCA and JUAN branches are normal without evidence of stenosis or aneurysm. The ACOM is present. There is fetal origin of the right PCA. The left posterior communicatingartery is hypoplastic. The vertebral arteries, visualized cerebellar arteries, basilar and bilateral posterior cerebral arteries are patent without evidence of stenoses or aneurysm.    Other findings: There is multinodular goiter with coarse calcifications in the right upper lobe, amenable to ultrasound evaluation if not already performed..    CT Perfusion:    The regional cerebral blood flow (CBF), cerebral blood volume (CBV) for the JUAN, MCA and PCA territories are within normal limits without asymmetric perfusion. The time to maximum (Tmax) in the left MCA territory demonstrate a thin rind in nonvascular distribution of ischemic penumbra corresponding the left frontoparietal convexity and corresponding to volume of 12 mL..    IMPRESSION:    CT brain: There is no large acute lobar infarction, intracranial hemorrhage, mass lesion, mass effect or herniation.    Stable prominence of the pituitary gland may represent a pituitary micro/macroadenoma. Correlation with MRI of the brain with attention to pituitary fossa is recommended.    CTA brain: There is no large vessel occlusion or aneurysm involving major proximal intracranial arteries.    CTA NECK: There is no stenosis involving major neck arteries.    CT perfusion: There is an ischemic penumbra in nonvascular distribution in the left hemisphere as described.    NASCET CAROTID STENOSIS CRITERIA (distal normal appearing ICA as denominator for measurement): 0%-none, 1-49%-mild, 50-70%-moderate, 70-89%-severe, 90-99%-critical.    The threshold values for estimation of hypoperfused and infarction volume are based on previously utilized methodology in large clinical trial including [employer] ClinicalTrials.gov number, [id number], www.nejm.org/doi/full/10.1056/MUQLhl7695604    Notification to clinician of alert:    Provider   Dr. Madden  was notified about  the impression 1359  on  2020   via telephone by Neuroradiologist LORE Espinal.  Readback confirmation was obtained.    KIMBERLY ESPINAL M.D., ATTENDING RADIOLOGIST    This document has been electronically signed. Dec 20 2020  2:11PM    Xray Chest 1 View- PORTABLE-Urgent (Xray Chest 1 View- PORTABLE-Urgent .) (20 @ 14:10)     EXAM:  XR CHEST PORTABLE URGENT 1V                          PROCEDURE DATE:  2020      INTERPRETATION:  Portable chest radiograph    CLINICAL INFORMATION: Altered mental status. Chest x-ray ordered as part of standard stroke protocol /altered mental status workup.    TECHNIQUE:  Portable  AP view of the chest was obtained.    COMPARISON: 2020 chest available for review.    FINDINGS:  The lungs are clear of airspace consolidations or effusions. No pneumothorax.    The heart and mediastinum are within normal limits.  Cardiac device wire leads are within right atrium and right ventricle.  Status post TAVR procedure. Visualized osseous structures are intact.    IMPRESSION: No interval change. No evidence of active chest disease.    BRENDA SOL MD; Attending Radiologist  This document has been electronically signed. Dec 20 2020  2:39PM

## 2020-12-21 NOTE — PROGRESS NOTE ADULT - SUBJECTIVE AND OBJECTIVE BOX
CC: bacteremia     No overnight events    Pt seen and examined at bedside w/ MD  Pt laying in bed, repeatedly saying "ay ay ay"  Unable to verbalize complaints, A&Ox0  ROS unable to be obtained  VSS     Vital Signs Last 24 Hrs  T(C): 36.3 (21 Dec 2020 04:46), Max: 37 (21 Dec 2020 00:24)  T(F): 97.3 (21 Dec 2020 04:46), Max: 98.6 (21 Dec 2020 00:24)  HR: 64 (21 Dec 2020 04:46) (60 - 71)  BP: 147/59 (21 Dec 2020 04:46) (129/71 - 194/79)  BP(mean): --  RR: 20 (21 Dec 2020 04:46) (20 - 20)  SpO2: 100% (21 Dec 2020 04:46) (98% - 100%) on RA    PHYSICAL EXAM:  GENERAL: NAD, resting comfortable in bed   CHEST/LUNG: CTAB , no wheezing , rales, rhonchi heard   HEART: S1S2+, Regular rate and rhythm  ABDOMEN: Soft, Nontender, Nondistended; Bowel sounds present  EXTREMITIES:  no pitting edema , pulses palpated BL.  NEURO: A&OX 3, does not follow commands, laying in bed repeatedly yelling "ay ay ay"        LABS:                        10.8   4.42  )-----------( 243      ( 20 Dec 2020 14:12 )             37.6   12-20    139  |  98  |  32.0<H>  ----------------------------<  80  5.3   |  27.0  |  3.41<H>    Ca    9.2      20 Dec 2020 14:12    TPro  6.9  /  Alb  3.6  /  TBili  0.3<L>  /  DBili  x   /  AST  38<H>  /  ALT  9   /  AlkPhos  83  12-20    MEDICATIONS  (STANDING):  aspirin enteric coated 81 milliGRAM(s) Oral daily  epoetin vinnie-epbx (RETACRIT) Injectable 8000 Unit(s) IV Push <User Schedule>  heparin   Injectable 5000 Unit(s) SubCutaneous every 8 hours  influenza   Vaccine 0.5 milliLiter(s) IntraMuscular once  isosorbide   mononitrate ER Tablet (IMDUR) 120 milliGRAM(s) Oral daily    MEDICATIONS  (PRN):  hydrALAZINE Injectable 5 milliGRAM(s) IV Push every 6 hours PRN SBP >185  LORazepam   Injectable 1 milliGRAM(s) IV Push every 4 hours PRN Agitation

## 2020-12-21 NOTE — PROGRESS NOTE ADULT - ASSESSMENT
AMS of unknown etiology  - As of this morning, pt still lying in bed yelling "ay ay ay", A&O x0, does not follow commands.  - Code stroke called in ED, CTH unremarkable for acute changes. CT perfusion scan: There is an ischemic penumbra in nonvascular distribution in the left hemisphere as described. Unclear significance..  - Discussed with pts son, pt has ppm, unclear if MRI compatible. Called pts cardio office (Dr. Saba), unable to provide me with info at that time? For now will repeat CTH to assess for any evolution  - Neurology consulted  - No clinical s/s of new/acute infection, UA negative, CXR negative, covid negative. Blood cultures pending from 12/20  - Check folate, B12.  TSH wnl  - Enhanced supervision, fall precautions  - Will keep NPO until mentation improves and seen by S&S. S&S attempted evaluation this am, however pt sleeping s/p ativan, will reevaluate   - ASA suppository ordered     Recent admission for pseudomonal/E coli bacteremia w/ port infection   - On prior admission. Repeat bcx negative and TTE w/o signs of vegetations. Pt was discharged on 12/14 with directions to complete course of abx as o/p w/ HD through 12/22.   - ID consulted for further abx plan while inpatient   - Repeat bcx ordered from 12/20, follow     ESRD on HD  - Nephrology consulted for continuation of HD while inpatient     HTN, CAD s/p CABG, Severe AS s/p TAVR w/ PPM  - Resume home PO medications once patient tolerates PO (currently NPO due to mentation)  - IV hydralazine PRN    DVT ppx: Heparin sq    LOS/ancticipation: Pending workup of mental status, clinical improvement.    79 yo F w/ PMH Of ESRD on HD (MWF via LUE fistula), CAD s/p remote CABG, severe AS s/p TAVR w/ pacemaker in place, HTN/HLD, recent line infection w/ bacteremia who was recently discharged from [facility] last week on IV Abx. Admitted for AMS, r/o CVA.     AMS of unknown etiology  - As of this morning, pt still lying in bed yelling "ay ay ay", A&O x0, does not follow commands.  - Code stroke called in ED, CTH unremarkable for acute changes. CT perfusion scan: There is an ischemic penumbra in nonvascular distribution in the left hemisphere as described. Unclear significance..  - Discussed with pts son, pt has ppm, unclear if MRI compatible. Called pts cardio office (Dr. Saba), unable to provide me with info at that time? For now will repeat CTH to assess for any evolution  - Neurology consulted  - No clinical s/s of new/acute infection, UA negative, CXR negative, covid negative. Blood cultures pending from 12/20  - Check folate, B12.  TSH wnl  - Enhanced supervision, fall precautions  - Will keep NPO until mentation improves and seen by S&S. S&S attempted evaluation this am, however pt sleeping s/p ativan, will reevaluate   - ASA suppository ordered     Recent admission for pseudomonal/E coli bacteremia w/ port infection   - On prior admission. Repeat bcx negative and TTE w/o signs of vegetations. Pt was discharged on 12/14 with directions to complete course of abx as o/p w/ HD through 12/22.   - ID consulted for further abx plan while inpatient   - Repeat bcx ordered from 12/20, follow     ESRD on HD  - Nephrology consulted for continuation of HD while inpatient     HTN, CAD s/p CABG, Severe AS s/p TAVR w/ PPM  - Resume home PO medications once patient tolerates PO (currently NPO due to mentation)  - IV hydralazine PRN    DVT ppx: Heparin sq    LOS/ancticipation: Pending workup of mental status, clinical improvement.    79 yo F w/ PMH Of ESRD on HD (MWF via LUE fistula), CAD s/p remote CABG, severe AS s/p TAVR w/ pacemaker in place, HTN/HLD, recent line infection w/ bacteremia who was recently discharged from [facility] last week on IV Abx. Admitted for AMS, r/o CVA.     AMS of unknown etiology  - As of this morning, pt still lying in bed yelling "ay ay ay", A&O x0, does not follow commands.  - Code stroke called in ED, CTH unremarkable for acute changes. CT perfusion scan: There is an ischemic penumbra in nonvascular distribution in the left hemisphere as described. Unclear significance..  - Discussed with pts son, pt has ppm, unclear if MRI compatible. Called pts cardio office (Dr. Saba), unable to provide me with info at that time? For now will repeat CTH to assess for any evolution  - Neurology consulted  - No clinical s/s of new/acute infection, UA negative, CXR negative, covid negative. Blood cultures pending from 12/20  - Check folate, B12.  TSH wnl  - Enhanced supervision, fall precautions  - Will keep NPO until mentation improves and seen by S&S. S&S attempted evaluation this am, however pt sleeping s/p ativan, will reevaluate   - ASA suppository ordered     Recent admission for pseudomonal/E coli bacteremia w/ port infection   - On prior admission. Repeat bcx negative and TTE w/o signs of vegetations. Pt was discharged on 12/14 with directions to complete course of abx as o/p w/ HD through 12/22.   - ID consulted for further abx plan while inpatient   - Repeat bcx ordered from 12/20, follow     ESRD on HD  - Nephrology consulted for continuation of HD while inpatient     HTN, CAD s/p CABG, Severe AS s/p TAVR w/ PPM  - Resume home PO medications once patient tolerates PO (currently NPO due to mentation)  - IV hydralazine PRN  - Trop mildly elevated in setting of ESRD, monitor on tele     DVT ppx: Heparin sq    LOS/ancticipation: Pending workup of mental status, clinical improvement.

## 2020-12-21 NOTE — CONSULT NOTE ADULT - ASSESSMENT
The patient is a 80y Female with what appears to be a global aphasia with no clear hemiparesis.     Stroke.   Presumed left hemisphere stroke.   Check lipid panel.   Continue aspirin (currently suppository).   Will have speech path evaluate.     Hypertension   Control blood pressure.  Avoid hypotension.     ESRD   Dialysis per renal.     Case discussed with Dr Gale.

## 2020-12-21 NOTE — CONSULT NOTE ADULT - SUBJECTIVE AND OBJECTIVE BOX
[facility]                                        [facility] [place]                                  Heidi Alaniz, & Arcenio                                      [address]                                           [place], [place], [address]                                                [phone]        CC: Stroke.     HISTORY:  The patient is a 80y Female who presented with altered mental status.   She was apparently found screaming "Hey, hey, hey" by family. She was not following instructions.   Code stroke was activated upon her arrival however the last known well time was reportedly many hours prior to arrival (around 04:00)    PAST MEDICAL & SURGICAL HISTORY:  Thyroid nodule  awaiting FNB in the near future  Severe aortic stenosis  Osteoarthritis  bilateral knees  CKD (chronic kidney disease) stage 4, GFR 15-29 ml/min  DM2 (diabetes mellitus, type 2)  Arthritis  CAD (coronary artery disease)  Gout  Anemia Associated with Chronic Renal Failure  HTN (Hypertension)  S/P AVR  TAVR  History of pacemaker  A-V fistula  left arm  S/P cholecystectomy  Stented coronary artery  s/p 3 stents, then CABG 2007  S/P CABG (coronary artery bypass graft)  triple in 2007    MEDICATIONS  (STANDING):  aspirin Suppository 300 milliGRAM(s) Rectal daily  cefepime   IVPB 2000 milliGRAM(s) IV Intermittent once  dextrose 5% + sodium chloride 0.9%. 1000 milliLiter(s) (50 mL/Hr) IV Continuous <Continuous>  epoetin vinnie-epbx (RETACRIT) Injectable 8000 Unit(s) IV Push <User Schedule>  heparin   Injectable 5000 Unit(s) SubCutaneous every 8 hours  influenza   Vaccine 0.5 milliLiter(s) IntraMuscular once  isosorbide   mononitrate ER Tablet (IMDUR) 120 milliGRAM(s) Oral daily  vancomycin  IVPB 1000 milliGRAM(s) IV Intermittent once    MEDICATIONS  (PRN):  hydrALAZINE Injectable 5 milliGRAM(s) IV Push every 6 hours PRN SBP >185  LORazepam   Injectable 1 milliGRAM(s) IV Push every 4 hours PRN Agitation      Allergies  codeine (Other)  Lortab (Other)  morphine (Other)  Percocet 10/325 (Other)  PPM not compatible with  MRI (Other (Fatal))  Reglan (Other; Flushing)  sulfa drugs (Flushing)    SOCIAL HISTORY:  Reportedly non smoker.     FAMILY HISTORY:  No pertinent family history in first degree relatives  Patient unable to provide further family history.    ROS:  Constitutional: Unobtainable due to patient's condition.   Neuro: Unobtainable due to patient's condition.   Eyes: Unobtainable due to patient's condition.   Ears/nose/throat: Unobtainable due to patient's condition.   Cardiac: Unobtainable due to patient's condition.   Respiratory: Unobtainable due to patient's condition.   GI: Unobtainable due to patient's condition.   : Unobtainable due to patient's condition..  Integumentary: Unobtainable due to patient's condition.  Psych: Unobtainable due to patient's condition.  Heme: Unobtainable due to patient's condition.     Exam:  Vital Signs Last 24 Hrs  T(C): 36.6 (21 Dec 2020 12:08), Max: 37 (21 Dec 2020 00:24)  T(F): 97.8 (21 Dec 2020 12:08), Max: 98.6 (21 Dec 2020 00:24)  HR: 61 (21 Dec 2020 12:08) (61 - 71)  BP: 143/60 (21 Dec 2020 12:08) (129/71 - 186/84)  RR: 18 (21 Dec 2020 12:08) (18 - 20)  SpO2: 100% (21 Dec 2020 12:08) (100% - 100%)  General: NAD.   Carotid bruits absent.     Mental status: The patient is awake and alert. She is minimally verbal repeating "Hey, Hey, Hey."  She does not follow instructions.     Cranial nerves: There is no papilledema (direct ophthalmoscope). Pupils react symmetrically to light. There is no visual field deficit to confrontation. Extraocular motion: She tracks with gaze. There is no ptosis. Facial sensation is intact. Facial musculature is symmetric. Palate elevates symmetrically. Tongue is midline.    Motor/Sensory: There is normal bulk and tone.  Not following formal manual motor testing. Moving all extremity symmetrically to stimuli.     Reflexes: Trace throughout and plantar responses are flexor.    Cerebellar: Unable to assess.     LABS:                         10.2   4.55  )-----------( 225      ( 21 Dec 2020 12:48 )             34.6       12-21    138  |  97<L>  |  41.0<H>  ----------------------------<  31<LL>  4.2   |  22.0  |  4.06<H>    Ca    8.6      21 Dec 2020 12:48    TPro  6.1<L>  /  Alb  3.3  /  TBili  0.2<L>  /  DBili  x   /  AST  14  /  ALT  5   /  AlkPhos  83  12-21    PT/INR - ( 20 Dec 2020 14:12 )   PT: 13.1 sec;   INR: 1.14 ratio    PTT - ( 20 Dec 2020 14:12 )  PTT:28.3 sec    RADIOLOGY   CT head images reviewed (and concur with report): There is no acute pathology. There is severe ischemic change.     CT-A showed no large vessel occlusion or stenosis.     CT-P suggested ischemic penumbra in the left hemisphere but in a "nonvascular" distribution.

## 2020-12-21 NOTE — CONSULT NOTE ADULT - ASSESSMENT
AMS of unknown etiology    - Code stroke called in ED, CTH unremarkable for acute changes. CT perfusion scan: There is an ischemic penumbra in nonvascular distribution in the left hemisphere as described. Unclear significance.  - No clinical s/s of new/acute infection, UA negative, CXR negative, covid negative. Blood cultures pending from 12/20  - ASA suppository ordered     Recent admission for pseudomonal/E coli bacteremia w/ port infection   - On prior admission. Repeat bcx negative and TTE w/o signs of vegetations. Pt was discharged on 12/14 with directions to complete course of abx as o/p w/ HD through 12/22.     ESRD on HD    HTN, CAD s/p CABG, Severe AS s/p TAVR w/ PPM    Will Resume HD,    ( OP HD : [facility] Ferreira [facility] ) D/W son,

## 2020-12-22 ENCOUNTER — NON-APPOINTMENT (OUTPATIENT)
Age: 80
End: 2020-12-22

## 2020-12-22 ENCOUNTER — APPOINTMENT (OUTPATIENT)
Dept: HEMATOLOGY ONCOLOGY | Facility: CLINIC | Age: 80
End: 2020-12-22

## 2020-12-22 LAB
ALBUMIN SERPL ELPH-MCNC: 3.4 G/DL — SIGNIFICANT CHANGE UP (ref 3.3–5.2)
ALP SERPL-CCNC: 87 U/L — SIGNIFICANT CHANGE UP (ref 40–120)
ALT FLD-CCNC: 6 U/L — SIGNIFICANT CHANGE UP
ANION GAP SERPL CALC-SCNC: 11 MMOL/L — SIGNIFICANT CHANGE UP (ref 5–17)
AST SERPL-CCNC: 24 U/L — SIGNIFICANT CHANGE UP
BILIRUB SERPL-MCNC: 0.3 MG/DL — LOW (ref 0.4–2)
BUN SERPL-MCNC: 18 MG/DL — SIGNIFICANT CHANGE UP (ref 8–20)
CALCIUM SERPL-MCNC: 8.9 MG/DL — SIGNIFICANT CHANGE UP (ref 8.6–10.2)
CHLORIDE SERPL-SCNC: 97 MMOL/L — LOW (ref 98–107)
CHOLEST SERPL-MCNC: 237 MG/DL — HIGH
CO2 SERPL-SCNC: 24 MMOL/L — SIGNIFICANT CHANGE UP (ref 22–29)
CREAT SERPL-MCNC: 2.49 MG/DL — HIGH (ref 0.5–1.3)
GLUCOSE BLDC GLUCOMTR-MCNC: 169 MG/DL — HIGH (ref 70–99)
GLUCOSE BLDC GLUCOMTR-MCNC: 66 MG/DL — LOW (ref 70–99)
GLUCOSE BLDC GLUCOMTR-MCNC: 73 MG/DL — SIGNIFICANT CHANGE UP (ref 70–99)
GLUCOSE BLDC GLUCOMTR-MCNC: 89 MG/DL — SIGNIFICANT CHANGE UP (ref 70–99)
GLUCOSE BLDC GLUCOMTR-MCNC: 93 MG/DL — SIGNIFICANT CHANGE UP (ref 70–99)
GLUCOSE SERPL-MCNC: 49 MG/DL — CRITICAL LOW (ref 70–99)
HCT VFR BLD CALC: 38.7 % — SIGNIFICANT CHANGE UP (ref 34.5–45)
HDLC SERPL-MCNC: 53 MG/DL — SIGNIFICANT CHANGE UP
HGB BLD-MCNC: 11.1 G/DL — LOW (ref 11.5–15.5)
LIPID PNL WITH DIRECT LDL SERPL: 166 MG/DL — HIGH
MCHC RBC-ENTMCNC: 25.6 PG — LOW (ref 27–34)
MCHC RBC-ENTMCNC: 28.7 GM/DL — LOW (ref 32–36)
MCV RBC AUTO: 89.2 FL — SIGNIFICANT CHANGE UP (ref 80–100)
NON HDL CHOLESTEROL: 184 MG/DL — HIGH
PLATELET # BLD AUTO: 156 K/UL — SIGNIFICANT CHANGE UP (ref 150–400)
POTASSIUM SERPL-MCNC: 3.7 MMOL/L — SIGNIFICANT CHANGE UP (ref 3.5–5.3)
POTASSIUM SERPL-SCNC: 3.7 MMOL/L — SIGNIFICANT CHANGE UP (ref 3.5–5.3)
PROT SERPL-MCNC: 6.4 G/DL — LOW (ref 6.6–8.7)
RBC # BLD: 4.34 M/UL — SIGNIFICANT CHANGE UP (ref 3.8–5.2)
RBC # FLD: 17.6 % — HIGH (ref 10.3–14.5)
SARS-COV-2 IGG SERPL QL IA: NEGATIVE — SIGNIFICANT CHANGE UP
SARS-COV-2 IGM SERPL IA-ACNC: <0.1 INDEX — SIGNIFICANT CHANGE UP
SODIUM SERPL-SCNC: 132 MMOL/L — LOW (ref 135–145)
TRIGL SERPL-MCNC: 91 MG/DL — SIGNIFICANT CHANGE UP
WBC # BLD: 4.09 K/UL — SIGNIFICANT CHANGE UP (ref 3.8–10.5)
WBC # FLD AUTO: 4.09 K/UL — SIGNIFICANT CHANGE UP (ref 3.8–10.5)

## 2020-12-22 PROCEDURE — 99233 SBSQ HOSP IP/OBS HIGH 50: CPT

## 2020-12-22 PROCEDURE — 99232 SBSQ HOSP IP/OBS MODERATE 35: CPT

## 2020-12-22 PROCEDURE — 93280 PM DEVICE PROGR EVAL DUAL: CPT | Mod: 26

## 2020-12-22 RX ORDER — SODIUM CHLORIDE 9 MG/ML
1000 INJECTION, SOLUTION INTRAVENOUS
Refills: 0 | Status: DISCONTINUED | OUTPATIENT
Start: 2020-12-22 | End: 2020-12-23

## 2020-12-22 RX ORDER — CHLORHEXIDINE GLUCONATE 213 G/1000ML
1 SOLUTION TOPICAL
Refills: 0 | Status: DISCONTINUED | OUTPATIENT
Start: 2020-12-22 | End: 2020-12-31

## 2020-12-22 RX ORDER — DEXTROSE 50 % IN WATER 50 %
25 SYRINGE (ML) INTRAVENOUS ONCE
Refills: 0 | Status: COMPLETED | OUTPATIENT
Start: 2020-12-22 | End: 2020-12-22

## 2020-12-22 RX ORDER — DEXTROSE 50 % IN WATER 50 %
25 SYRINGE (ML) INTRAVENOUS ONCE
Refills: 0 | Status: DISCONTINUED | OUTPATIENT
Start: 2020-12-22 | End: 2020-12-31

## 2020-12-22 RX ORDER — GLUCAGON INJECTION, SOLUTION 0.5 MG/.1ML
1 INJECTION, SOLUTION SUBCUTANEOUS ONCE
Refills: 0 | Status: DISCONTINUED | OUTPATIENT
Start: 2020-12-22 | End: 2020-12-31

## 2020-12-22 RX ORDER — METOPROLOL TARTRATE 50 MG
5 TABLET ORAL ONCE
Refills: 0 | Status: COMPLETED | OUTPATIENT
Start: 2020-12-22 | End: 2020-12-22

## 2020-12-22 RX ORDER — DEXTROSE 50 % IN WATER 50 %
12.5 SYRINGE (ML) INTRAVENOUS ONCE
Refills: 0 | Status: DISCONTINUED | OUTPATIENT
Start: 2020-12-22 | End: 2020-12-31

## 2020-12-22 RX ORDER — DEXTROSE 50 % IN WATER 50 %
15 SYRINGE (ML) INTRAVENOUS ONCE
Refills: 0 | Status: DISCONTINUED | OUTPATIENT
Start: 2020-12-22 | End: 2020-12-31

## 2020-12-22 RX ADMIN — Medication 5 MILLIGRAM(S): at 02:00

## 2020-12-22 RX ADMIN — HEPARIN SODIUM 5000 UNIT(S): 5000 INJECTION INTRAVENOUS; SUBCUTANEOUS at 06:51

## 2020-12-22 RX ADMIN — Medication 5 MILLIGRAM(S): at 21:18

## 2020-12-22 RX ADMIN — Medication 250 MILLIGRAM(S): at 04:55

## 2020-12-22 RX ADMIN — CEFEPIME 100 MILLIGRAM(S): 1 INJECTION, POWDER, FOR SOLUTION INTRAMUSCULAR; INTRAVENOUS at 03:07

## 2020-12-22 RX ADMIN — SODIUM CHLORIDE 50 MILLILITER(S): 9 INJECTION, SOLUTION INTRAVENOUS at 17:43

## 2020-12-22 RX ADMIN — Medication 300 MILLIGRAM(S): at 14:21

## 2020-12-22 RX ADMIN — Medication 1 MILLIGRAM(S): at 21:17

## 2020-12-22 RX ADMIN — HEPARIN SODIUM 5000 UNIT(S): 5000 INJECTION INTRAVENOUS; SUBCUTANEOUS at 21:21

## 2020-12-22 RX ADMIN — Medication 25 GRAM(S): at 10:12

## 2020-12-22 RX ADMIN — Medication 5 MILLIGRAM(S): at 03:20

## 2020-12-22 RX ADMIN — HEPARIN SODIUM 5000 UNIT(S): 5000 INJECTION INTRAVENOUS; SUBCUTANEOUS at 14:21

## 2020-12-22 RX ADMIN — Medication 1 MILLIGRAM(S): at 15:34

## 2020-12-22 NOTE — PROCEDURE NOTE - ADDITIONAL PROCEDURE DETAILS
No arrhythmias recorded.   Although the generator is MRI contingent this is a mixed pacemaker system. RV lead is a Medtronic 4076. Therefore this system is NOT MRI contingent. Any MRI performance would be considered off label. Consider alternative imaging.   Programmed on Rythmiq algorithm to reduce RV pacing. No arrhythmias recorded.   Although the generator is MRI contingent this is a mixed pacemaker system. RV lead is a Medtronic [id number]. Therefore this system is NOT MRI contingent. Any MRI performance would be considered off label. Consider alternative imaging.   Programmed on Rythmiq algorithm to reduce RV pacing..

## 2020-12-22 NOTE — SWALLOW BEDSIDE ASSESSMENT ADULT - ORAL PREPARATORY PHASE
absent acceptance of PO, aversion w/head turning away, no benefit from max cues/Refuses to accept bolus into oral cavity

## 2020-12-22 NOTE — PROGRESS NOTE ADULT - ASSESSMENT
80y Female with what appears to be a global aphasia with no clear hemiparesis.     AMS vs aphasia.   Presumed left hemisphere stroke.   Check lipid panel.   Continue aspirin (currently suppository).   Will have speech path evaluate.   Brain MRI if hardware compatible.     Hypertension   Control blood pressure.  Avoid hypotension.     ESRD   Dialysis per renal.

## 2020-12-22 NOTE — PROGRESS NOTE ADULT - SUBJECTIVE AND OBJECTIVE BOX
[facility]                                        [facility]                                 Heidi Alaniz, & Arcenio                                  [address]                                        [place], [place], [address]                                             [phone]        CC: Stroke    HPI:   The patient is a 80y Female who presented with altered mental status.   She was apparently found screaming "Hey, hey, hey" by family. She was not following instructions.   Code stroke was activated upon her arrival however the last known well time was reportedly many hours prior to arrival (around 04:00)    Interim history:  now on 5 [place].   Remains non verbal.     ROS:   Denies headache or dizziness.  Denies chest pain.  Denies shortness of breath.    MEDICATIONS  (STANDING):  aspirin Suppository 300 milliGRAM(s) Rectal daily  chlorhexidine 4% Liquid 1 Application(s) Topical <User Schedule>  dextrose 40% Gel 15 Gram(s) Oral once  dextrose 5% + sodium chloride 0.9%. 1000 milliLiter(s) (50 mL/Hr) IV Continuous <Continuous>  dextrose 50% Injectable 25 Gram(s) IV Push once  dextrose 50% Injectable 12.5 Gram(s) IV Push once  dextrose 50% Injectable 25 Gram(s) IV Push once  epoetin vinnie-epbx (RETACRIT) Injectable 8000 Unit(s) IV Push <User Schedule>  glucagon  Injectable 1 milliGRAM(s) IntraMuscular once  heparin   Injectable 5000 Unit(s) SubCutaneous every 8 hours  influenza   Vaccine 0.5 milliLiter(s) IntraMuscular once  isosorbide   mononitrate ER Tablet (IMDUR) 120 milliGRAM(s) Oral daily      Vital Signs Last 24 Hrs  T(C): 36.9 (22 Dec 2020 10:20), Max: 37.4 (21 Dec 2020 23:45)  T(F): 98.4 (22 Dec 2020 10:20), Max: 99.3 (21 Dec 2020 23:45)  HR: 66 (22 Dec 2020 10:20) (66 - 96)  BP: 99/67 (22 Dec 2020 10:20) (99/67 - 207/88)  RR: 19 (22 Dec 2020 10:20) (18 - 21)  SpO2: 98% (22 Dec 2020 10:20) (97% - 100%)    Detailed Neurologic Exam:    Mental status: The patient is awake and alert. She is minimally verbal.  She does not follow instructions.     Cranial nerves: Pupils react symmetrically to light. There is no visual field deficit to confrontation. Extraocular motion: She tracks with gaze. There is no ptosis. Facial sensation is intact. Facial musculature is symmetric. Palate elevates symmetrically. Tongue is midline.    Motor/Sensory: There is normal bulk and tone.  Not following formal manual motor testing. Moving all extremity symmetrically to stimuli.     Reflexes: Trace throughout and plantar responses are flexor.    Cerebellar: Unable to assess.     Labs:     12-22    132<L>  |  97<L>  |  18.0  ----------------------------<  49<LL>  3.7   |  24.0  |  2.49<H>    Ca    8.9      22 Dec 2020 08:14    TPro  6.4<L>  /  Alb  3.4  /  TBili  0.3<L>  /  DBili  x   /  AST  24  /  ALT  6   /  AlkPhos  87  12-22                            11.1   4.09  )-----------( 156      ( 22 Dec 2020 08:14 )             38.7

## 2020-12-22 NOTE — PROGRESS NOTE ADULT - SUBJECTIVE AND OBJECTIVE BOX
CC: f/u AMS of unknown etiology, improving   INTERVAL HPI/OVERNIGHT EVENTS: Pt seen and examined at bedside this AM by Hospitalist and PA. Pt seen sleeping, wakes easily to voice. Denies fever, headaches, chest pain/pressure, palpitations, shortness of breath or any other complaints. Answers simple yes or no questions but repeats "ay-ay-ay" in between responses. Cardiology cleared MRI for compatibility MRI check list completed with Son Bernard who also provided consent.     REVIEW OF SYSTEMS:  limited due to mental status, otherwise negative     Allergies  codeine (Other)  Lortab (Other)  morphine (Other)  Percocet 10/325 (Other)  PPM not compatible with  MRI (Other (Fatal))  Reglan (Other; Flushing)  sulfa drugs (Flushing)    HEALTH ISSUES - PROBLEM Dx:    PAST MEDICAL & SURGICAL HISTORY:  Thyroid nodule  awaiting FNB in the near future    Severe aortic stenosis  Osteoarthritis  Bilateral knees  CKD (chronic kidney disease) stage 4, GFR 15-29 ml/min  DM2 (diabetes mellitus, type 2)  Arthritis  CAD (coronary artery disease)  Gout  Anemia Associated with Chronic Renal Failure  HTN (Hypertension)  S/P AVR  TAVR  History of pacemaker  A-V fistula  left arm  S/P cholecystectomy  Stented coronary artery  s/p 3 stents, then CABG   S/P CABG (coronary artery bypass graft)  triple in     VITAL SIGNS:  T(C): 36.9 (20 @ 10:20), Max: 37.4 (20 @ 23:45)  HR: 66 (20 @ 10:20) (66 - 96)  BP: 99/67 (20 @ 10:20) (99/67 - 207/88)  RR: 19 (20 @ 10:20) (18 - 21)  SpO2: 98% (20 @ 10:20) (97% - 100%)  Wt(kg): --Vital Signs Last 24 Hrs  T(C): 36.9 (22 Dec 2020 10:20), Max: 37.4 (21 Dec 2020 23:45)  T(F): 98.4 (22 Dec 2020 10:20), Max: 99.3 (21 Dec 2020 23:45)  HR: 66 (22 Dec 2020 10:20) (66 - 96)  BP: 99/67 (22 Dec 2020 10:20) (99/67 - 207/88)  RR: 19 (22 Dec 2020 10:20) (18 - 21)  SpO2: 98% (22 Dec 2020 10:20) (97% - 100%)    PHYSICAL EXAM:  GENERAL: Pt lying in bed comfortably in NAD  HEAD:  Atraumatic  EYES: EOMI, PERRL, conjunctiva and sclera clear  ENT: Dry mucous membranes  NECK: No JVD  CHEST/LUNG: Clear to auscultation bilaterally; No rales, rhonchi, wheezing, or rubs. Unlabored respirations  HEART: S1, S2, Regular rate and rhythm   ABDOMEN: Bowel sounds present; Soft, Nontender, Nondistended. No guarding or rigidity   EXTREMITIES:  2+ Peripheral Pulses, brisk capillary refill. No clubbing, cyanosis, or edema, LUE AVF +thrill   NERVOUS SYSTEM:  Alert & Oriented X1 (to name), FROM x 4 extremities. Follows only simple commands   SKIN: No rashes or lesions    LABS:               11.1   4.09  )-----------( 156      ( 22 Dec 2020 08:14 )             38.7     12-22  132<L>  |  97<L>  |  18.0  ----------------------------<  49<LL>  3.7   |  24.0  |  2.49<H>    Ca    8.9      22 Dec 2020 08:14    TPro  6.4<L>  /  Alb  3.4  /  TBili  0.3<L>  /  DBili  x   /  AST  24  /  ALT  6   /  AlkPhos  87  12-22    CAPILLARY BLOOD GLUCOSE  POCT Blood Glucose.: 89 mg/dL (22 Dec 2020 12:54)  POCT Blood Glucose.: 169 mg/dL (22 Dec 2020 10:33)  POCT Blood Glucose.: 66 mg/dL (22 Dec 2020 09:56)    Urinalysis Basic - ( 20 Dec 2020 16:53 )  Color: Yellow / Appearance: Clear / S.010 / pH: x  Gluc: x / Ketone: Trace  / Bili: Negative / Urobili: Negative mg/dL   Blood: x / Protein: 100 mg/dL / Nitrite: Negative   Leuk Esterase: Trace / RBC: 0-2 /HPF / WBC 3-5   Sq Epi: x / Non Sq Epi: Few / Bacteria: Occasional    MEDICATIONS  (STANDING):  aspirin Suppository 300 milliGRAM(s) Rectal daily  chlorhexidine 4% Liquid 1 Application(s) Topical <User Schedule>  dextrose 40% Gel 15 Gram(s) Oral once  dextrose 5% + sodium chloride 0.9%. 1000 milliLiter(s) (50 mL/Hr) IV Continuous <Continuous>  dextrose 50% Injectable 25 Gram(s) IV Push once  dextrose 50% Injectable 12.5 Gram(s) IV Push once  dextrose 50% Injectable 25 Gram(s) IV Push once  epoetin vinnie-epbx (RETACRIT) Injectable 8000 Unit(s) IV Push <User Schedule>  glucagon  Injectable 1 milliGRAM(s) IntraMuscular once  heparin   Injectable 5000 Unit(s) SubCutaneous every 8 hours  influenza   Vaccine 0.5 milliLiter(s) IntraMuscular once  isosorbide   mononitrate ER Tablet (IMDUR) 120 milliGRAM(s) Oral daily    MEDICATIONS  (PRN):  hydrALAZINE Injectable 5 milliGRAM(s) IV Push every 6 hours PRN SBP >185  LORazepam   Injectable 1 milliGRAM(s) IV Push every 4 hours PRN Agitation CC: f/u AMS of unknown etiology, improving   INTERVAL HPI/OVERNIGHT EVENTS: Pt seen and examined at bedside this AM by Hospitalist and PA. Pt seen sleeping, wakes easily to voice. Denies fever, headaches, chest pain/pressure, palpitations, shortness of breath or any other complaints. Answers simple yes or no questions but repeats "ay-ay-ay" in between responses. Pts cardiologist cleared MRI for compatibility MRI check list completed with Son Bernard who also provided consent but EP called and since 2 different leads, deemed not MRI compatible      on tele: NSR, Atrial paced, few PVCs no other alarms     REVIEW OF SYSTEMS:  limited due to mental status, otherwise negative     Allergies  codeine (Other)  Lortab (Other)  morphine (Other)  Percocet 10/325 (Other)  PPM not compatible with  MRI (Other (Fatal))  Reglan (Other; [place])  sulfa drugs (Flushing)    HEALTH ISSUES - PROBLEM Dx:    PAST MEDICAL & SURGICAL HISTORY:  Thyroid nodule  awaiting FNB in the near future    Severe aortic stenosis  Osteoarthritis  Bilateral knees  CKD (chronic kidney disease) stage 4, GFR 15-29 ml/min  DM2 (diabetes mellitus, type 2)  Arthritis  CAD (coronary artery disease)  Gout  Anemia Associated with Chronic Renal Failure  HTN (Hypertension)  S/P AVR  TAVR  History of pacemaker  A-V fistula  left arm  S/P cholecystectomy  Stented coronary artery  s/p 3 stents, then CABG   S/P CABG (coronary artery bypass graft)  triple in     VITAL SIGNS:  T(C): 36.9 (20 @ 10:20), Max: 37.4 (20 @ 23:45)  HR: 66 (20 @ 10:20) (66 - 96)  BP: 99/67 (-20 @ 10:20) (99/67 - 207/88)  RR: 19 (-20 @ 10:20) (18 - 21)  SpO2: 98% (20 @ 10:20) (97% - 100%)  Wt(kg): --Vital Signs Last 24 Hrs  T(C): 36.9 (22 Dec 2020 10:20), Max: 37.4 (21 Dec 2020 23:45)  T(F): 98.4 (22 Dec 2020 10:20), Max: 99.3 (21 Dec 2020 23:45)  HR: 66 (22 Dec 2020 10:20) (66 - 96)  BP: 99/67 (22 Dec 2020 10:20) (99/67 - 207/88)  RR: 19 (22 Dec 2020 10:20) (18 - 21)  SpO2: 98% (22 Dec 2020 10:20) (97% - 100%)    PHYSICAL EXAM:  GENERAL: Pt lying in bed comfortably in NAD  HEAD:  Atraumatic  EYES: EOMI, PERRL, conjunctiva and sclera clear  ENT: Dry mucous membranes  NECK: No JVD  CHEST/LUNG: Clear to auscultation bilaterally; No rales, rhonchi, wheezing, or rubs. Unlabored respirations  HEART: S1, S2, Regular rate and rhythm   ABDOMEN: Bowel sounds present; Soft, Nontender, Nondistended. No guarding or rigidity   EXTREMITIES:  2+ Peripheral Pulses, brisk capillary refill. No clubbing, cyanosis, or edema, LUE AVF +thrill   NERVOUS SYSTEM:  Alert & Oriented X1 (to name), FROM x 4 extremities. Follows only simple commands   SKIN: No rashes or lesions    LABS:               11.1   4.09  )-----------( 156      ( 22 Dec 2020 08:14 )             38.7     12-22  132<L>  |  97<L>  |  18.0  ----------------------------<  49<LL>  3.7   |  24.0  |  2.49<H>    Ca    8.9      22 Dec 2020 08:14    TPro  6.4<L>  /  Alb  3.4  /  TBili  0.3<L>  /  DBili  x   /  AST  24  /  ALT  6   /  AlkPhos  87  12-22    CAPILLARY BLOOD GLUCOSE  POCT Blood Glucose.: 89 mg/dL (22 Dec 2020 12:54)  POCT Blood Glucose.: 169 mg/dL (22 Dec 2020 10:33)  POCT Blood Glucose.: 66 mg/dL (22 Dec 2020 09:56)    Urinalysis Basic - ( 20 Dec 2020 16:53 )  Color: Yellow / Appearance: Clear / S.010 / pH: x  Gluc: x / Ketone: Trace  / Bili: Negative / Urobili: Negative mg/dL   Blood: x / Protein: 100 mg/dL / Nitrite: Negative   Leuk Esterase: Trace / RBC: 0-2 /HPF / WBC 3-5   Sq Epi: x / Non Sq Epi: Few / Bacteria: Occasional    MEDICATIONS  (STANDING):  aspirin Suppository 300 milliGRAM(s) Rectal daily  chlorhexidine 4% Liquid 1 Application(s) Topical <User Schedule>  dextrose 40% Gel 15 Gram(s) Oral once  dextrose 5% + sodium chloride 0.9%. 1000 milliLiter(s) (50 mL/Hr) IV Continuous <Continuous>  dextrose 50% Injectable 25 Gram(s) IV Push once  dextrose 50% Injectable 12.5 Gram(s) IV Push once  dextrose 50% Injectable 25 Gram(s) IV Push once  epoetin vinnie-epbx (RETACRIT) Injectable 8000 Unit(s) IV Push <User Schedule>  glucagon  Injectable 1 milliGRAM(s) IntraMuscular once  heparin   Injectable 5000 Unit(s) SubCutaneous every 8 hours  influenza   Vaccine 0.5 milliLiter(s) IntraMuscular once  isosorbide   mononitrate ER Tablet (IMDUR) 120 milliGRAM(s) Oral daily    MEDICATIONS  (PRN):  hydrALAZINE Injectable 5 milliGRAM(s) IV Push every 6 hours PRN SBP >185  LORazepam   Injectable 1 milliGRAM(s) IV Push every 4 hours PRN Agitation CC: f/u AMS of unknown etiology, improving   INTERVAL HPI/OVERNIGHT EVENTS: Pt seen and examined at bedside this AM by Hospitalist and PA. Pt seen sleeping, wakes easily to voice. Denies fever, headaches, chest pain/pressure, palpitations, shortness of breath or any other complaints. Answers simple yes or no questions but repeats "ay-ay-ay" in between responses. Pts cardiologist cleared MRI for compatibility MRI check list completed with Son Bernard who also provided consent but EP called and since 2 different leads, deemed not MRI compatible and any MRI would be off-label      on tele: NSR, Atrial paced, few PVCs no other alarms     REVIEW OF SYSTEMS:  limited due to mental status, otherwise negative     Allergies  codeine (Other)  Lortab (Other)  morphine (Other)  Percocet 10/325 (Other)  PPM not compatible with  MRI (Other (Fatal))  Reglan (Other; [place])  sulfa drugs (Flushing)    HEALTH ISSUES - PROBLEM Dx:    PAST MEDICAL & SURGICAL HISTORY:  Thyroid nodule  awaiting FNB in the near future    Severe aortic stenosis  Osteoarthritis  Bilateral knees  CKD (chronic kidney disease) stage 4, GFR 15-29 ml/min  DM2 (diabetes mellitus, type 2)  Arthritis  CAD (coronary artery disease)  Gout  Anemia Associated with Chronic Renal Failure  HTN (Hypertension)  S/P AVR  TAVR  History of pacemaker  A-V fistula  left arm  S/P cholecystectomy  Stented coronary artery  s/p 3 stents, then CABG   S/P CABG (coronary artery bypass graft)  triple in     VITAL SIGNS:  T(C): 36.9 (20 @ 10:20), Max: 37.4 (20 @ 23:45)  HR: 66 (20 @ 10:20) (66 - 96)  BP: 99/67 (20 @ 10:20) (99/67 - 207/88)  RR: 19 (- @ 10:20) (18 - 21)  SpO2: 98% (20 @ 10:20) (97% - 100%)  Wt(kg): --Vital Signs Last 24 Hrs  T(C): 36.9 (22 Dec 2020 10:20), Max: 37.4 (21 Dec 2020 23:45)  T(F): 98.4 (22 Dec 2020 10:20), Max: 99.3 (21 Dec 2020 23:45)  HR: 66 (22 Dec 2020 10:20) (66 - 96)  BP: 99/67 (22 Dec 2020 10:20) (99/67 - 207/88)  RR: 19 (22 Dec 2020 10:20) (18 - 21)  SpO2: 98% (22 Dec 2020 10:20) (97% - 100%)    PHYSICAL EXAM:  GENERAL: Pt lying in bed comfortably in NAD  HEAD:  Atraumatic  EYES: EOMI, PERRL, conjunctiva and sclera clear  ENT: Dry mucous membranes  NECK: No JVD  CHEST/LUNG: Clear to auscultation bilaterally; No rales, rhonchi, wheezing, or rubs. Unlabored respirations  HEART: S1, S2, Regular rate and rhythm   ABDOMEN: Bowel sounds present; Soft, Nontender, Nondistended. No guarding or rigidity   EXTREMITIES:  2+ Peripheral Pulses, brisk capillary refill. No clubbing, cyanosis, or edema, LUE AVF +thrill   NERVOUS SYSTEM:  Alert & Oriented X1 (to name), FROM x 4 extremities. Follows only simple commands   SKIN: No rashes or lesions    LABS:               11.1   4.09  )-----------( 156      ( 22 Dec 2020 08:14 )             38.7     12-22  132<L>  |  97<L>  |  18.0  ----------------------------<  49<LL>  3.7   |  24.0  |  2.49<H>    Ca    8.9      22 Dec 2020 08:14    TPro  6.4<L>  /  Alb  3.4  /  TBili  0.3<L>  /  DBili  x   /  AST  24  /  ALT  6   /  AlkPhos  87  12-22    CAPILLARY BLOOD GLUCOSE  POCT Blood Glucose.: 89 mg/dL (22 Dec 2020 12:54)  POCT Blood Glucose.: 169 mg/dL (22 Dec 2020 10:33)  POCT Blood Glucose.: 66 mg/dL (22 Dec 2020 09:56)    Urinalysis Basic - ( 20 Dec 2020 16:53 )  Color: Yellow / Appearance: Clear / S.010 / pH: x  Gluc: x / Ketone: Trace  / Bili: Negative / Urobili: Negative mg/dL   Blood: x / Protein: 100 mg/dL / Nitrite: Negative   Leuk Esterase: Trace / RBC: 0-2 /HPF / WBC 3-5   Sq Epi: x / Non Sq Epi: Few / Bacteria: Occasional    MEDICATIONS  (STANDING):  aspirin Suppository 300 milliGRAM(s) Rectal daily  chlorhexidine 4% Liquid 1 Application(s) Topical <User Schedule>  dextrose 40% Gel 15 Gram(s) Oral once  dextrose 5% + sodium chloride 0.9%. 1000 milliLiter(s) (50 mL/Hr) IV Continuous <Continuous>  dextrose 50% Injectable 25 Gram(s) IV Push once  dextrose 50% Injectable 12.5 Gram(s) IV Push once  dextrose 50% Injectable 25 Gram(s) IV Push once  epoetin vinnie-epbx (RETACRIT) Injectable 8000 Unit(s) IV Push <User Schedule>  glucagon  Injectable 1 milliGRAM(s) IntraMuscular once  heparin   Injectable 5000 Unit(s) SubCutaneous every 8 hours  influenza   Vaccine 0.5 milliLiter(s) IntraMuscular once  isosorbide   mononitrate ER Tablet (IMDUR) 120 milliGRAM(s) Oral daily    MEDICATIONS  (PRN):  hydrALAZINE Injectable 5 milliGRAM(s) IV Push every 6 hours PRN SBP >185  LORazepam   Injectable 1 milliGRAM(s) IV Push every 4 hours PRN Agitation

## 2020-12-22 NOTE — PHYSICAL THERAPY INITIAL EVALUATION ADULT - PERTINENT HX OF CURRENT PROBLEM, REHAB EVAL
Per EMR: 79 yo F w/ PMH Of ESRD on HD (MWF via LUE fistula), CAD s/p remote CABG, severe AS s/p TAVR w/ pacemaker in place, HTN/HLD, recent line infection w/ bacteremia who was recently discharged from [facility] last week on IV Abx. Admitted for AMS, r/o CVA. s/p Code stroke called in ED, CTH unremarkable for acute changes. CT perfusion scan: There is an ischemic penumbra in nonvascular distribution in the left hemisphere as described. Unclear significance. Pending MRI head.

## 2020-12-22 NOTE — SWALLOW BEDSIDE ASSESSMENT ADULT - SWALLOW EVAL: DIAGNOSIS
Pt presents w/severe oral dysphagia, negatively impacted upon by reduced arousal & cognitive state. Reduced orientation to presented PO, w/absent acceptance of trial despite max cues, +aversion w/head turning away. BARAK pharyngeal phase of swallow given severity of oral dysphagia.

## 2020-12-22 NOTE — CHART NOTE - NSCHARTNOTEFT_GEN_A_CORE
Event note     called by RN for elevated BP. RN states she administered hydralazine 5mg IV PRN at 2200 for /83 then rechecked 1145 and found /88 pulse 90. IV access infiltrated.   Rectal temp 99.3.     pt seen/examined at bedside NAD, unable to obtain ROS secondary to mental status. Difficult IV access with limited choice secondary to HD fistula LUE, ultrasound guided peripheral IV #18 guage inserted RUE      T(F): 99 (20 @ 22:07)  HR: 75 (20 @ 22:07)  BP: 188/83 (20 @ 22:07)  RR: 21 (20 @ 22:07)  SpO2: 100% (20 @ 22:07)    PHYSICAL EXAM:   Neurological: AAOx3, CNII-XII intact,  strength 5/5 b/l  Cardiovascular: RRR  Respiratory: CTA  Gastrointestinal: soft, NT, ND, BS+  Extremities: warm, no dependent edema  Vascular: no cyanosis/erythema  LABS:        138  |  97<L>  |  41.0<H>  ----------------------------<  31<LL>  4.2   |  22.0  |  4.06<H>    Ca    8.6      21 Dec 2020 12:48    TPro  6.1<L>  /  Alb  3.3  /  TBili  0.2<L>  /  DBili  x   /  AST  14  /  ALT  5   /  AlkPhos  83  12-21  LIVER FUNCTIONS - ( 21 Dec 2020 12:48 )  Alb: 3.3 g/dL / Pro: 6.1 g/dL / ALK PHOS: 83 U/L / ALT: 5 U/L / AST: 14 U/L / GGT: x                               10.2   4.55  )-----------( 225      ( 21 Dec 2020 12:48 )             34.6   PT/INR - ( 20 Dec 2020 14:12 )   PT: 13.1 sec;   INR: 1.14 ratio         PTT - ( 20 Dec 2020 14:12 )  PTT:28.3 secCARDIAC MARKERS ( 20 Dec 2020 14:12 )  x     / 0.10 ng/mL / x     / x     / x        Urinalysis Basic - ( 20 Dec 2020 16:53 )    Color: Yellow / Appearance: Clear / S.010 / pH: x  Gluc: x / Ketone: Trace  / Bili: Negative / Urobili: Negative mg/dL   Blood: x / Protein: 100 mg/dL / Nitrite: Negative   Leuk Esterase: Trace / RBC: 0-2 /HPF / WBC 3-5   Sq Epi: x / Non Sq Epi: Few / Bacteria: Occasional    A/P:  -recheck BP SBP>170  -Lopressor 5mg IV x 1   -repeat BP 1hour, call provider if >160/100      plan of care discussed with RN Event note     called by RN for elevated BP. RN states she administered hydralazine 5mg IV PRN at 2200 for /83 then rechecked 1145 and found /88 pulse 90. IV access infiltrated.   Rectal temp 99.3.     pt seen/examined at bedside NAD, unable to obtain ROS secondary to mental status. Difficult IV access with limited choice secondary to HD fistula LUE, ultrasound guided peripheral IV #18 guage inserted RUE      T(F): 99 (20 @ 22:07)  HR: 75 (20 @ 22:07)  BP: 188/83 (20 @ 22:07)  RR: 21 (20 @ 22:07)  SpO2: 100% (20 @ 22:07)    PHYSICAL EXAM:   Neurological: AAOx3, CNII-XII intact,  strength 5/5 b/l  Cardiovascular: RRR  Respiratory: CTA  Gastrointestinal: soft, NT, ND, BS+  Extremities: warm, no dependent edema  Vascular: no cyanosis/erythema  LABS:        138  |  97<L>  |  41.0<H>  ----------------------------<  31<LL>  4.2   |  22.0  |  4.06<H>    Ca    8.6      21 Dec 2020 12:48    TPro  6.1<L>  /  Alb  3.3  /  TBili  0.2<L>  /  DBili  x   /  AST  14  /  ALT  5   /  AlkPhos  83  12-21  LIVER FUNCTIONS - ( 21 Dec 2020 12:48 )  Alb: 3.3 g/dL / Pro: 6.1 g/dL / ALK PHOS: 83 U/L / ALT: 5 U/L / AST: 14 U/L / GGT: x                               10.2   4.55  )-----------( 225      ( 21 Dec 2020 12:48 )             34.6   PT/INR - ( 20 Dec 2020 14:12 )   PT: 13.1 sec;   INR: 1.14 ratio         PTT - ( 20 Dec 2020 14:12 )  PTT:28.3 secCARDIAC MARKERS ( 20 Dec 2020 14:12 )  x     / 0.10 ng/mL / x     / x     / x        Urinalysis Basic - ( 20 Dec 2020 16:53 )    Color: Yellow / Appearance: Clear / S.010 / pH: x  Gluc: x / Ketone: Trace  / Bili: Negative / Urobili: Negative mg/dL   Blood: x / Protein: 100 mg/dL / Nitrite: Negative   Leuk Esterase: Trace / RBC: 0-2 /HPF / WBC 3-5   Sq Epi: x / Non Sq Epi: Few / Bacteria: Occasional    A/P:  -recheck BP SBP>180  -Lopressor 5mg IV x 1   -repeat BP 1hour, call provider if >180/100      plan of care discussed with RN

## 2020-12-22 NOTE — PHYSICAL THERAPY INITIAL EVALUATION ADULT - ADDITIONAL COMMENTS
Pt was non-verbal and not following commands at time of evaluation. Social history obtained from EMR - [facility] assessment that was completed on 12/21/20 post PT evaluation. Pt lives with her son (unsure if he is home 24/7?? will have to confirm), a ramp to enter (unsure if stairs inside?). Pt owns w/c, commode, and RW Pt was non-verbal and not following commands at time of evaluation. Social history obtained from EMR - [facility] assessment that was completed on 12/21/20 post PT evaluation. Pt lives with her son (unsure if he is home 24/7?? will have to confirm), a ramp to enter (unsure if stairs inside?). Pt owns w/c, commode, and RW. Will have to clarify how much assistance pt receives at home & what her baseline mobility is.

## 2020-12-22 NOTE — SWALLOW BEDSIDE ASSESSMENT ADULT - SLP PERTINENT HISTORY OF CURRENT PROBLEM
As per MD note, "79 yo F w/ PMH Of ESRD on HD (MWF via LUE fistula), CAD s/p remote CABG, severe AS s/p TAVR w/ pacemaker in place, HTN/HLD, recent line infection w/ bacteremia who was recently discharged from [facility] last week on IV CEFEPIME FOR PSEUDOMONAS . Unable to obtain history from patient due to mental status. History obtained from CHART   patient was recently discharged from [facility] last Monday s/p bacteremia and line infection and was doing well. She was noted to become more lethargic and confused after her HD session on Friday. Last night she was having difficulty taking her night time medications.     she woke up at 4AM yelling "HEY HEY HEY." He reports that the patient is normally A&Ox3 however requires assistance w/ daily activities A patient appears in no distress however does not follow commands or answer questions. She constantly yells out "HEY HEY HEY" and per staff she has been doing this since admission".

## 2020-12-22 NOTE — SWALLOW BEDSIDE ASSESSMENT ADULT - SWALLOW EVAL: ORAL MUSCULATURE
limited assessment given comprehension/participation/arousal, though reduced lingual/labial ROM noted

## 2020-12-22 NOTE — PROGRESS NOTE ADULT - ASSESSMENT
81 yo F w/ PMH Of ESRD on HD (MWF via LUE fistula), CAD s/p remote CABG, severe AS s/p TAVR w/ pacemaker in place, HTN/HLD, recent line infection w/ bacteremia who was recently discharged from [facility] last week on IV Abx. Admitted for AMS, r/o CVA. s/p Code stroke called in ED, CTH unremarkable for acute changes. CT perfusion scan: There is an ischemic penumbra in nonvascular distribution in the left hemisphere as described. Unclear significance. Pending MRI head.    AMS of unknown etiology  - As of this morning, pts mental status improving, following simple commands and still intermittently yelling "ay ay ay", A&O x1   - Pts cardiologist (Dr. Saba), cleared PPM for MRI, f/u MRI head   - Neurology recs appreciated   - No clinical s/s of new/acute infection, UA negative, CXR negative, covid negative. Blood cultures pending from 12/20  - folate, B12. TSH wnl  - Enhanced supervision, fall precautions  - Seen by speech and swallow, will keep NPO until mentation improves and will reevaluate   - c/w ASA suppository ordered   -seen by speech  - PT    Recent admission for pseudomonal/E coli bacteremia w/ port infection   - On prior admission. Repeat bcx negative and TTE w/o signs of vegetations. Pt was discharged on 12/14 with directions to complete course of abx as o/p w/ HD through 12/22.   - ID recs appreciated. To continue cefepime on HD days   - will f/u bcx ordered from 12/20, will d/c abx if cx negative     ESRD on HD  - Nephrology consulted for continuation of HD while inpatient   - HD 12/23    HTN, CAD s/p CABG, Severe AS s/p TAVR w/ PPM  - Resume home PO medications once patient tolerates PO (currently NPO due to mentation)  - IV hydralazine PRN  - Trop mildly elevated in setting of ESRD, monitor on tele     Hypoglycemia  -likely due to npo status  -c/w slow infusion D5% NS at 50cc/hr  -monitor for s/s fluid overload in HD Pt  -Hypoglycemia protocol  -accucheck q6b while npo  -S&S to reevaluate   -f/u HbA1c    DVT ppx: Heparin sq    LOS/ancticipation: Pending workup of mental status such as MRI and bcx, pending clinical improvement.     SonBernard [phone] updated and provides information for MRI checklist and consents to test. All questions answered and concerns addressed.     79 yo F w/ PMH Of ESRD on HD (MWF via LUE fistula), CAD s/p remote CABG, severe AS s/p TAVR w/ pacemaker in place, HTN/HLD, recent line infection w/ bacteremia who was recently discharged from [facility] last week on IV Abx. Admitted for AMS, r/o CVA. s/p Code stroke called in ED, CTH unremarkable for acute changes. CT perfusion scan: There is an ischemic penumbra in nonvascular distribution in the left hemisphere as described. Unclear significance. Pending MRI head.    AMS of unknown etiology  - As of this morning, pts mental status improving, following simple commands and still intermittently yelling "ay ay ay", A&O x1   - Pts cardiologist (Dr. Saba) states PPM is MRI compatible but deemed not compatible leads by EP  - Neurology recs appreciated   - No clinical s/s of new/acute infection, UA negative, CXR negative, covid negative. Blood cultures pending from 12/20  - folate, B12. TSH wnl  - Enhanced supervision, fall precautions  - Seen by speech and swallow, will keep NPO until mentation improves and will reevaluate   - c/w ASA suppository ordered   -seen by speech  - PT rec appreciated, home vs VARSHA   - For now will repeat CTH to assess for any evolution    Recent admission for pseudomonal/E coli bacteremia w/ port infection   - On prior admission. Repeat bcx negative and TTE w/o signs of vegetations. Pt was discharged on 12/14 with directions to complete course of abx as o/p w/ HD through 12/22.   - ID recs appreciated. To continue cefepime on HD days   - will f/u bcx ordered from 12/20, will d/c abx if cx negative     ESRD on HD  - Nephrology consulted for continuation of HD while inpatient   - HD 12/23    HTN, CAD s/p CABG, Severe AS s/p TAVR w/ PPM  - Resume home PO medications once patient tolerates PO (currently NPO due to mentation)  - IV hydralazine PRN  - Trop mildly elevated in setting of ESRD, monitor on tele     Hypoglycemia  -likely due to npo status  -c/w slow infusion D5% NS at 50cc/hr  -monitor for s/s fluid overload in HD Pt  -Hypoglycemia protocol added   -accucheck q6b while npo  -S&S to reevaluate   -f/u HbA1c    DVT ppx: Heparin sq    LOS/ancticipation: Pending workup of mental status such as bcx, pending clinical improvement. Per PT- Home with 24/7 assist vs VARSHA    Son, Bernard Seymour [phone] updated by PA and MD. All questions answered and concerns addressed.     81 yo F w/ PMH Of ESRD on HD (MWF via LUE fistula), CAD s/p remote CABG, severe AS s/p TAVR w/ pacemaker in place, HTN/HLD, recent line infection w/ bacteremia who was recently discharged from [facility] last week on IV Abx. Admitted for AMS, r/o CVA. s/p Code stroke called in ED, CTH unremarkable for acute changes. CT perfusion scan: There is an ischemic penumbra in nonvascular distribution in the left hemisphere as described. Unclear significance. Pending MRI head.    AMS of unknown etiology  - As of this morning, pts mental status improving, following simple commands and still intermittently yelling "ay ay ay", A&O x1   - Pts cardiologist (Dr. Saba) states PPM is MRI compatible but deemed not compatible leads by EP and any MRI would be off-label   - Neurology recs appreciated   - No clinical s/s of new/acute infection, UA negative, CXR negative, covid negative. Blood cultures pending from 12/20  - folate, B12. TSH wnl  - Enhanced supervision, fall precautions  - Seen by speech and swallow, will keep NPO until mentation improves and will reevaluate   - c/w ASA suppository ordered   -seen by speech  - PT rec appreciated, home vs VARSHA   - For now will repeat CTH to assess for any evolution    Recent admission for pseudomonal/E coli bacteremia w/ port infection   - On prior admission. Repeat bcx negative and TTE w/o signs of vegetations. Pt was discharged on 12/14 with directions to complete course of abx as o/p w/ HD through 12/22.   - ID recs appreciated. To continue cefepime on HD days   - will f/u bcx ordered from 12/20, will d/c abx if cx negative     ESRD on HD  - Nephrology consulted for continuation of HD while inpatient   - HD 12/23    HTN, CAD s/p CABG, Severe AS s/p TAVR w/ PPM  - Resume home PO medications once patient tolerates PO (currently NPO due to mentation)  - IV hydralazine PRN  - Trop mildly elevated in setting of ESRD, monitor on tele     Hypoglycemia  -likely due to npo status  -c/w slow infusion D5% NS at 50cc/hr  -monitor for s/s fluid overload in HD Pt  -Hypoglycemia protocol added   -accucheck q6b while npo  -S&S to reevaluate   -f/u HbA1c    DVT ppx: Heparin sq    LOS/ancticipation: Pending workup of mental status such as bcx, pending clinical improvement. Per PT- Home with 24/7 assist vs Bernard Robles [phone] updated by PA and MD. All questions answered and concerns addressed.     Pt is an 79 yo F w/ PMHx of ESRD on HD (MWF via LUE fistula), CAD s/p remote CABG, severe AS s/p TAVR w/ pacemaker in place, HTN/HLD, recent line infection w/ bacteremia who was recently discharged from [facility] last week on IV Abx. Admitted for AMS, r/o CVA. s/p Code stroke called in ED, CTH unremarkable for acute changes. CT perfusion scan: There is an ischemic penumbra in nonvascular distribution in the left hemisphere as described. Unclear significance.     AMS of unknown etiology  - As of this morning, pts mental status improving, following simple commands and still intermittently yelling "ay ay ay", A&O x1   - Pts cardiologist (Dr. Saba) states PPM is MRI compatible but deemed not compatible leads by EP and any MRI would be off-label   - Neurology recs appreciated   - No clinical s/s of new/acute infection, UA negative, CXR negative, covid negative. Blood cultures pending from 12/20  - folate, B12. TSH wnl  - Enhanced supervision, fall precautions  - Seen by speech and swallow, will keep NPO until mentation improves and will reevaluate   - c/w ASA suppository ordered   -seen by speech  - PT rec appreciated, home vs VARSHA   - For now will repeat CTH to assess for any evolution    Recent admission for pseudomonal/E coli bacteremia w/ port infection   - On prior admission. Repeat bcx negative and TTE w/o signs of vegetations. Pt was discharged on 12/14 with directions to complete course of abx as o/p w/ HD through 12/22.   - ID recs appreciated. To continue cefepime on HD days   - will f/u bcx ordered from 12/20, will d/c abx if cx negative     ESRD on HD  - Nephrology consulted for continuation of HD while inpatient   - HD 12/23    HTN, CAD s/p CABG, Severe AS s/p TAVR w/ PPM  - Resume home PO medications once patient tolerates PO (currently NPO due to mentation)  - IV hydralazine PRN  - Trop mildly elevated in setting of ESRD, monitor on tele     Hypoglycemia  -likely due to npo status  -c/w slow infusion D5% NS at 50cc/hr  -monitor for s/s fluid overload in HD Pt  -Hypoglycemia protocol added   -accucheck q6b while npo  -S&S to reevaluate   -f/u HbA1c    DVT ppx: Heparin sq    LOS/ancticipation: Pending workup of mental status such as bcx, pending clinical improvement. Per PT- Home with 24/7 assist vs VARSHA    Son, Bernard Seymour [phone] updated by PA and MD. All questions answered and concerns addressed. Updated that Pt will not be getting MRI head as well.    Pt is an 79 yo F w/ PMHx of ESRD on HD (MWF via LUE fistula), CAD s/p remote CABG, severe AS s/p TAVR w/ pacemaker in place, HTN/HLD, recent line infection w/ bacteremia who was recently discharged from [facility] last week on IV Abx. Admitted for AMS, r/o CVA. s/p Code stroke called in ED, CTH unremarkable for acute changes. CT perfusion scan: There is an ischemic penumbra in nonvascular distribution in the left hemisphere as described. Unclear significance.     AMS of unknown etiology  - As of this morning, pts mental status improving, following simple commands and still intermittently yelling "ay ay ay", A&O x1   - Pts cardiologist (Dr. Saba) states PPM is MRI compatible but deemed not compatible leads by EP and any MRI would be off-label   - Neurology recs appreciated   - No clinical s/s of new/acute infection, UA negative, CXR negative, covid negative. Blood cultures pending from 12/20  - folate, B12. TSH wnl  - Enhanced supervision, fall precautions  - Seen by speech and swallow, will keep NPO until mentation improves and will reevaluate   - c/w ASA suppository ordered   -seen by speech  - PT rec appreciated, home vs VARSHA   - For now will repeat CTH to assess for any evolution    Recent admission for pseudomonal/E coli bacteremia w/ port infection   - On prior admission. Repeat bcx negative and TTE w/o signs of vegetations. Pt was discharged on 12/14 with directions to complete course of abx as o/p w/ HD through 12/22.   - ID recs appreciated. To continue cefepime on HD days   - will f/u bcx ordered from 12/20, will d/c abx if cx negative     ESRD on HD  - Nephrology consulted for continuation of HD while inpatient   - HD 12/23    HTN, CAD s/p CABG, Severe AS s/p TAVR w/ PPM  - Resume home PO medications once patient tolerates PO (currently NPO due to mentation)  - IV hydralazine PRN  - Trop mildly elevated in setting of ESRD, monitor on tele     Hypoglycemia  -likely due to npo status  -c/w slow infusion D5% at 50cc/hr  -monitor for s/s fluid overload in HD Pt  -Hypoglycemia protocol added   -accucheck q6b while npo  -S&S to reevaluate   -f/u HbA1c    DVT ppx: Heparin sq    LOS/ancticipation: Pending workup of mental status such as bcx, pending clinical improvement. Per PT- Home with 24/7 assist vs VARSHA.       Son, Bernard Seymour [phone] updated by PA and MD. All questions answered and concerns addressed. Updated that Pt will not be getting MRI head as well. Per Son Pt at baseline needed assistance with ambulation and ADLs and is interested in VARSHA if applicable when reevaluated. Will reevaluate speech and swallow in AM and will determine need for ngtube feeding at that time

## 2020-12-22 NOTE — PHYSICAL THERAPY INITIAL EVALUATION ADULT - MANUAL MUSCLE TESTING RESULTS, REHAB EVAL
Unable to formally assess as pt not following commands at this time, spontaneous movement - pt grossly bilateral LE 3/5.

## 2020-12-22 NOTE — SWALLOW BEDSIDE ASSESSMENT ADULT - SLP GENERAL OBSERVATIONS
Recd Pt asleep in bed, awoken w/verbal prompt, minimally verbal, limited command following, A&A Ox1 w/max cues, able to provide first name only, 0/10 nonverbal pain scale, tolerating RA without overt distress.

## 2020-12-22 NOTE — PROCEDURE NOTE - NSPROCDETAILS_GEN_ALL_CORE
location identified, draped/prepped, sterile technique used/blood seen on insertion/dressing applied/flushes easily/secured in place/sterile technique, catheter placed

## 2020-12-22 NOTE — PROGRESS NOTE ADULT - ASSESSMENT
81 yo F w/ PMH Of ESRD on HD (MWF via LUE fistula), CAD s/p remote CABG, severe AS s/p TAVR w/ pacemaker in place, HTN/HLD, recent line infection w/ bacteremia who was recently discharged from [facility] last week on IV CEFEPIME FOR PSEUDOMONAS . Unable to obtain history from patient due to mental status. History obtained from CHART   patient was recently discharged from [facility] last Monday s/p bacteremia and line infection and was doing well. She was noted to become more lethargic and confused after her HD session on Friday. Last night she was having difficulty taking her night time medications.     she woke up at 4AM yelling "HEY HEY HEY." He reports that the patient is normally A&Ox3 however requires assistance w/ daily activities A patient appears in no distress however does not follow commands or answer questions. She constantly yells out "HEY HEY HEY" and per staff she has been doing this since admission.  AMS UNCLEAR ETIOLOGY ? STROKE    BLOOD CX X 2   PENDING  CONTINUE CEFEPIME ON DIALYSIS   VANCO X1   GIVEN   WILL FOLLOWUP  BLOOD CX  URINE CX NO SIGNIFICANT    WILL Followup  MORE AWAKE TODAY   IF ALL CX NEG WILL D/C ALL ABX

## 2020-12-22 NOTE — SWALLOW BEDSIDE ASSESSMENT ADULT - SWALLOW EVAL: RECOMMENDED DIET
Continuation of NPO w/consideration for short-term non-oral alternative means of nutrition/hydration as per Pt/family wishes

## 2020-12-22 NOTE — PROGRESS NOTE ADULT - ASSESSMENT
ESRD on HD- MWF schedule  AMS of unknown etiology; CTH unremarkable for acute changes. CT perfusion scan: There is an ischemic penumbra in nonvascular distribution in the left hemisphere as described.   Recent admission for pseudomonal/E coli bacteremia w/ port infection;  Repeat bcx negative and TTE w/o signs of vegetations. Pt was discharged on 12/14 with directions to complete course of abx as o/p w/ HD through 12/22  No clinical s/s of new/acute infection, UCx negative, CXR negative, covid negative.  Severe AS s/p TAVR w/ PPM  Hypoglycemia        HD tomorrow  Blood cultures pending from 12/20  Plan for MRI brain - cleared by cards

## 2020-12-22 NOTE — SPEECH LANGUAGE PATHOLOGY EVALUATION - SLP DIAGNOSIS
Evaluation limited given poor arousal/participation. Severe receptive/expressive language deficits noted. Receptively, Pt w/poor success for execution of min complex yes/no questions, despite max cues & repetition. Pt without consistent yes/no reliability, & poor success for response to min complex yes/no prompt. Pt w/limited eye opening, BARAK receptive object ID. Expressive language marked by limited verbal output, ?impact of level of arousal/participation, however Pt only providing first name x 1. Poor success for rote phrase production #'s 1-10, able to provide target in 20% of attempts. BARAK motor speech at this time given limited verbalizations. Cognitive linguistic function to be assessed w/improvement in primary language.

## 2020-12-22 NOTE — SPEECH LANGUAGE PATHOLOGY EVALUATION - COMMENTS
As per MD note, "79 yo F w/ PMH Of ESRD on HD (MWF via LUE fistula), CAD s/p remote CABG, severe AS s/p TAVR w/ pacemaker in place, HTN/HLD, recent line infection w/ bacteremia who was recently discharged from [facility] last week on IV CEFEPIME FOR PSEUDOMONAS . Unable to obtain history from patient due to mental status. History obtained from CHART   patient was recently discharged from [facility] last Monday s/p bacteremia and line infection and was doing well. She was noted to become more lethargic and confused after her HD session on Friday. Last night she was having difficulty taking her night time medications.     she woke up at 4AM yelling "HEY HEY HEY." He reports that the patient is normally A&Ox3 however requires assistance w/ daily activities A patient appears in no distress however does not follow commands or answer questions. She constantly yells out "HEY HEY HEY" and per staff she has been doing this since admission". appears functional for limited vocalizations/verbalizations BARAK motor speech at this time BARAK ABRAK

## 2020-12-22 NOTE — PHYSICAL THERAPY INITIAL EVALUATION ADULT - DISCHARGE DISPOSITION, PT EVAL
Home with 24/7 assist vs VARSHA; pending further clarification of pt's prior level of function and assistance available at home.

## 2020-12-22 NOTE — PROGRESS NOTE ADULT - SUBJECTIVE AND OBJECTIVE BOX
[facility] DIVISION OF KIDNEY DISEASES AND HYPERTENSION -- HEMODIALYSIS NOTE  --------------------------------------------------------------------------------  Chief Complaint: ESRD/Ongoing hemodialysis requirement    24 hour events/subjective:        PAST HISTORY  --------------------------------------------------------------------------------  No significant changes to PMH, PSH, FHx, SHx, unless otherwise noted    ALLERGIES & MEDICATIONS  --------------------------------------------------------------------------------  Allergies    codeine (Other)  Lortab (Other)  morphine (Other)  Percocet 10/325 (Other)  PPM not compatible with  MRI (Other (Fatal))  Reglan (Other; Flushing)  sulfa drugs (Flushing)    Intolerances      Standing Inpatient Medications  aspirin Suppository 300 milliGRAM(s) Rectal daily  chlorhexidine 4% Liquid 1 Application(s) Topical <User Schedule>  dextrose 40% Gel 15 Gram(s) Oral once  dextrose 5% + sodium chloride 0.9%. 1000 milliLiter(s) IV Continuous <Continuous>  dextrose 50% Injectable 25 Gram(s) IV Push once  dextrose 50% Injectable 12.5 Gram(s) IV Push once  dextrose 50% Injectable 25 Gram(s) IV Push once  epoetin vinnie-epbx (RETACRIT) Injectable 8000 Unit(s) IV Push <User Schedule>  glucagon  Injectable 1 milliGRAM(s) IntraMuscular once  heparin   Injectable 5000 Unit(s) SubCutaneous every 8 hours  influenza   Vaccine 0.5 milliLiter(s) IntraMuscular once  isosorbide   mononitrate ER Tablet (IMDUR) 120 milliGRAM(s) Oral daily    PRN Inpatient Medications  hydrALAZINE Injectable 5 milliGRAM(s) IV Push every 6 hours PRN  LORazepam   Injectable 1 milliGRAM(s) IV Push every 4 hours PRN      REVIEW OF SYSTEMS  --------------------------------------------------------------------------------  Gen: No weight changes, fatigue, fevers/chills, weakness  Skin: No rashes  Head/Eyes/Ears/Mouth: No headache  Respiratory: No dyspnea, cough,  CV: No chest pain, orthopnea  GI: No abdominal pain, diarrhea, constipation, nausea, vomiting,  MSK: No joint pain  Neuro: No dizziness/lightheadedness, weakness  Heme: No bleeding  Psych: No significant nervousness, anxiety, stress, depression    All other systems were reviewed and are negative, except as noted.    VITALS/PHYSICAL EXAM  --------------------------------------------------------------------------------  T(C): 36.9 (12-22-20 @ 10:20), Max: 37.4 (12-21-20 @ 23:45)  HR: 66 (12-22-20 @ 10:20) (66 - 96)  BP: 99/67 (12-22-20 @ 10:20) (99/67 - 207/88)  RR: 19 (12-22-20 @ 10:20) (18 - 21)  SpO2: 98% (12-22-20 @ 10:20) (97% - 100%)  Wt(kg): --  Height (cm): 154.9 (12-20-20 @ 13:29)      12-21-20 @ 07:01 - 12-22-20 @ 07:00  --------------------------------------------------------  IN: 0 mL / OUT: 0 mL / NET: 0 mL      Physical Exam:  	Gen: confused  	HEENT: supple neck  	Pulm: CTA B/L  	CV: RRR, S1S2; no rub  	Abd: +BS, soft, nontender  	UE: Warm, intact strength  	LE: Warm, no edema  	Neuro: No focal deficits  	Skin: Warm  	Vascular access: MASHA GLEASON    LABS/STUDIES  --------------------------------------------------------------------------------              11.1   4.09  >-----------<  156      [12-22-20 @ 08:14]              38.7     132  |  97  |  18.0  ----------------------------<  49      [12-22-20 @ 08:14]  3.7   |  24.0  |  2.49        Ca     8.9     [12-22-20 @ 08:14]    TPro  6.4  /  Alb  3.4  /  TBili  0.3  /  DBili  x   /  AST  24  /  ALT  6   /  AlkPhos  87  [12-22-20 @ 08:14]    PT/INR: PT 13.1 , INR 1.14       [12-20-20 @ 14:12]  PTT: 28.3       [12-20-20 @ 14:12]    Troponin 0.10      [12-20-20 @ 14:12]    Iron 83, TIBC 167, %sat --      [08-22-20 @ 10:45]  Ferritin 5890      [12-08-20 @ 01:15]  .7 (Ca --)      [02-02-20 @ 05:15]   --  HbA1c 4.9      [02-02-20 @ 05:15]  TSH 1.61      [12-20-20 @ 14:12]  Lipid: chol 237, TG 91, HDL 53, LDL --      [12-22-20 @ 08:14]

## 2020-12-22 NOTE — PROGRESS NOTE ADULT - SUBJECTIVE AND OBJECTIVE BOX
INFECTIOUS DISEASES AND INTERNAL MEDICINE at [facility]  =======================================================  Rick Kyle MD  Diplomates American Board of Internal Medicine and Infectious Diseases  Telephone [phone]  Fax            [phone]  =======================================================    IRA ACEVEDO [id number]    Follow up: AMS    Allergies:  codeine (Other)  Lortab (Other)  morphine (Other)  Percocet 10/325 (Other)  PPM not compatible with  MRI (Other (Fatal))  Reglan (Other; [place]ushing)  sulfa drugs ([place])      Medications:  aspirin Suppository 300 milliGRAM(s) Rectal daily  chlorhexidine 4% Liquid 1 Application(s) Topical <User Schedule>  dextrose 5% + sodium chloride 0.9%. 1000 milliLiter(s) IV Continuous <Continuous>  epoetin vinnie-epbx (RETACRIT) Injectable 8000 Unit(s) IV Push <User Schedule>  heparin   Injectable 5000 Unit(s) SubCutaneous every 8 hours  hydrALAZINE Injectable 5 milliGRAM(s) IV Push every 6 hours PRN  influenza   Vaccine 0.5 milliLiter(s) IntraMuscular once  isosorbide   mononitrate ER Tablet (IMDUR) 120 milliGRAM(s) Oral daily  LORazepam   Injectable 1 milliGRAM(s) IV Push every 4 hours PRN    SOCIAL       FAMILY   FAMILY HISTORY:  No pertinent family history in first degree relatives      REVIEW OF SYSTEMS:    UNABLE TO OBTAIN            Physical Exam:  ICU Vital Signs Last 24 Hrs  T(C): 36.1 (22 Dec 2020 04:10), Max: 37.4 (21 Dec 2020 23:45)  T(F): 97 (22 Dec 2020 04:10), Max: 99.3 (21 Dec 2020 23:45)  HR: 73 (22 Dec 2020 04:10) (61 - 96)  BP: 164/72 (22 Dec 2020 04:10) (143/60 - 207/88)  BP(mean): --  ABP: --  ABP(mean): --  RR: 19 (22 Dec 2020 04:10) (18 - 21)  SpO2: 97% (22 Dec 2020 04:10) (97% - 100%)    GEN: NAD,   HEENT: normocephalic and atraumatic. EOMI. BRITTANY.    NECK: Supple. No carotid bruits.  No lymphadenopathy or thyromegaly.  LUNGS: Clear to auscultation.  HEART: Regular rate and rhythm without murmur.  ABDOMEN: Soft, nontender, and nondistended.  Positive bowel sounds.    : No CVA tenderness  EXTREMITIES: Without any cyanosis, clubbing, rash, lesions or edema.  MSK: no joint swelling  NEUROLOGIC: ANSWERS TO NAME BU OTHERWISE NON VERBAL      Labs:  Vitals:  ============  T(F): 97 (22 Dec 2020 04:10), Max: 99.3 (21 Dec 2020 23:45)  HR: 73 (22 Dec 2020 04:10)  BP: 164/72 (22 Dec 2020 04:10)  RR: 19 (22 Dec 2020 04:10)  SpO2: 97% (22 Dec 2020 04:10) (97% - 100%)  temp max in last 48H T(F): , Max: 99.3 (12-21-20 @ 23:45)    =======================================================  Current Antibiotics:    Other medications:  aspirin Suppository 300 milliGRAM(s) Rectal daily  chlorhexidine 4% Liquid 1 Application(s) Topical <User Schedule>  dextrose 5% + sodium chloride 0.9%. 1000 milliLiter(s) IV Continuous <Continuous>  epoetin vinnie-epbx (RETACRIT) Injectable 8000 Unit(s) IV Push <User Schedule>  heparin   Injectable 5000 Unit(s) SubCutaneous every 8 hours  influenza   Vaccine 0.5 milliLiter(s) IntraMuscular once  isosorbide   mononitrate ER Tablet (IMDUR) 120 milliGRAM(s) Oral daily      =======================================================  Labs:                        11.1   4.09  )-----------( 156      ( 22 Dec 2020 08:14 )             38.7      12-21    138  |  97<L>  |  41.0<H>  ----------------------------<  31<LL>  4.2   |  22.0  |  4.06<H>    Ca    8.6      21 Dec 2020 12:48    TPro  6.1<L>  /  Alb  3.3  /  TBili  0.2<L>  /  DBili  x   /  AST  14  /  ALT  5   /  AlkPhos  83  12-21      Culture - Urine (collected 12-21-20 @ 03:35)  Source: .Urine Catheterized  Final Report (12-21-20 @ 22:42):    <10,000 CFU/mL Normal Urogenital Skylar    Culture - Catheter (collected 12-10-20 @ 16:30)  Source: .Catheter TIP  Final Report (12-12-20 @ 19:45):    < 15 colonies Pseudomonas aeruginosa  Organism: Pseudomonas aeruginosa (12-12-20 @ 19:45)  Organism: Pseudomonas aeruginosa (12-12-20 @ 19:45)    Sensitivities:      -  Amikacin: S <=16      -  Aztreonam: S <=4      -  Cefepime: S <=2      -  Ceftazidime: S 4      -  Ciprofloxacin: S <=0.25      -  Gentamicin: S <=2      -  Imipenem: S <=1      -  Levofloxacin: S <=0.5      -  Meropenem: S <=1      -  Piperacillin/Tazobactam: S <=8      -  Tobramycin: S <=2      Method Type: LINDA    Culture - Blood (collected 12-10-20 @ 10:42)  Source: .Blood Blood-Venous  Final Report (12-15-20 @ 11:00):    No growth at 5 days.    Culture - Blood (collected 12-10-20 @ 10:42)  Source: .Blood Blood-Peripheral  Final Report (12-15-20 @ 11:00):    No growth at 5 days.    Culture - Blood (collected 12-09-20 @ 07:45)  Source: .Blood Blood-Venous  Final Report (12-14-20 @ 09:00):    No growth at 5 days.    Culture - Blood (collected 12-08-20 @ 14:44)  Source: .Blood Blood-Venous  Gram Stain (12-09-20 @ 10:12):    Growth in aerobic bottle: Gram Negative Rods    Gram Stain performed by:    [facility]    [address] [place] [id number]    .    TYPE: (C=Critical, N=Notification, A=Abnormal) c    TESTS:  _ GS    DATE/TIME CALLED: _ 12/09/2020 10:12:03    CALLED TO: Emerald Choudhary RN    READ BACK (2 Patient Identifiers)(Y/N): _ Y    READ BACK VALUES (Y/N): _ Y    CALLED BY: Emerald Patel  Final Report (12-11-20 @ 14:09):    Growth in aerobic bottle: Pseudomonas aeruginosa  Organism: Pseudomonas aeruginosa (12-11-20 @ 14:09)  Organism: Pseudomonas aeruginosa (12-11-20 @ 14:09)    Sensitivities:      -  Amikacin: S <=16      -  Aztreonam: S 8      -  Cefepime: S 4      -  Ceftazidime: S 8      -  Ciprofloxacin: S <=0.25      -  Gentamicin: S 4      -  Imipenem: I 4      -  Levofloxacin: S <=0.5      -  Meropenem: S <=1      -  Piperacillin/Tazobactam: S <=8      -  Tobramycin: S <=2      Method Type: LINDA      Creatinine, Serum: 4.06 mg/dL (12-21-20 @ 12:48)  Creatinine, Serum: 3.41 mg/dL (12-20-20 @ 14:12)    Procalcitonin, Serum: 2.53 ng/mL (12-21-20 @ 12:48)      Ferritin, Serum: 5890 ng/mL (12-08-20 @ 01:15)      WBC Count: 4.09 K/uL (12-22-20 @ 08:14)  WBC Count: 4.55 K/uL (12-21-20 @ 12:48)  WBC Count: 4.42 K/uL (12-20-20 @ 14:12)      COVID-19 PCR: NotDetec (12-20-20 @ 15:10)  COVID-19 IgG Antibody Interpretation: Negative (12-08-20 @ 07:29)  COVID-19 IgG Antibody Index: <0.10 Index (12-08-20 @ 07:29)  COVID-19 PCR: NotDete (12-07-20 @ 21:31)      Alkaline Phosphatase, Serum: 83 U/L (12-21-20 @ 12:48)  Alkaline Phosphatase, Serum: 83 U/L (12-20-20 @ 14:12)  Alanine Aminotransferase (ALT/SGPT): 5 U/L (12-21-20 @ 12:48)  Alanine Aminotransferase (ALT/SGPT): 9 U/L (12-20-20 @ 14:12)  Aspartate Aminotransferase (AST/SGOT): 14 U/L (12-21-20 @ 12:48)  Aspartate Aminotransferase (AST/SGOT): 38 U/L (12-20-20 @ 14:12)  Bilirubin Total, Serum: 0.2 mg/dL (12-21-20 @ 12:48)  Bilirubin Total, Serum: 0.3 mg/dL (12-20-20 @ 14:12)

## 2020-12-23 DIAGNOSIS — R47.01 APHASIA: ICD-10-CM

## 2020-12-23 LAB
ALBUMIN SERPL ELPH-MCNC: 3.6 G/DL — SIGNIFICANT CHANGE UP (ref 3.3–5.2)
ALP SERPL-CCNC: 92 U/L — SIGNIFICANT CHANGE UP (ref 40–120)
ALT FLD-CCNC: 8 U/L — SIGNIFICANT CHANGE UP
ANION GAP SERPL CALC-SCNC: 13 MMOL/L — SIGNIFICANT CHANGE UP (ref 5–17)
AST SERPL-CCNC: 25 U/L — SIGNIFICANT CHANGE UP
BILIRUB SERPL-MCNC: 0.3 MG/DL — LOW (ref 0.4–2)
BUN SERPL-MCNC: 9 MG/DL — SIGNIFICANT CHANGE UP (ref 8–20)
CALCIUM SERPL-MCNC: 9 MG/DL — SIGNIFICANT CHANGE UP (ref 8.6–10.2)
CHLORIDE SERPL-SCNC: 99 MMOL/L — SIGNIFICANT CHANGE UP (ref 98–107)
CO2 SERPL-SCNC: 25 MMOL/L — SIGNIFICANT CHANGE UP (ref 22–29)
CREAT SERPL-MCNC: 1.73 MG/DL — HIGH (ref 0.5–1.3)
GLUCOSE BLDC GLUCOMTR-MCNC: 111 MG/DL — HIGH (ref 70–99)
GLUCOSE BLDC GLUCOMTR-MCNC: 81 MG/DL — SIGNIFICANT CHANGE UP (ref 70–99)
GLUCOSE SERPL-MCNC: 83 MG/DL — SIGNIFICANT CHANGE UP (ref 70–99)
HCT VFR BLD CALC: 41.9 % — SIGNIFICANT CHANGE UP (ref 34.5–45)
HGB BLD-MCNC: 12.5 G/DL — SIGNIFICANT CHANGE UP (ref 11.5–15.5)
MCHC RBC-ENTMCNC: 25.8 PG — LOW (ref 27–34)
MCHC RBC-ENTMCNC: 29.8 GM/DL — LOW (ref 32–36)
MCV RBC AUTO: 86.4 FL — SIGNIFICANT CHANGE UP (ref 80–100)
PLATELET # BLD AUTO: 196 K/UL — SIGNIFICANT CHANGE UP (ref 150–400)
POTASSIUM SERPL-MCNC: 4 MMOL/L — SIGNIFICANT CHANGE UP (ref 3.5–5.3)
POTASSIUM SERPL-SCNC: 4 MMOL/L — SIGNIFICANT CHANGE UP (ref 3.5–5.3)
PROT SERPL-MCNC: 6.8 G/DL — SIGNIFICANT CHANGE UP (ref 6.6–8.7)
RBC # BLD: 4.85 M/UL — SIGNIFICANT CHANGE UP (ref 3.8–5.2)
RBC # FLD: 16.8 % — HIGH (ref 10.3–14.5)
SODIUM SERPL-SCNC: 136 MMOL/L — SIGNIFICANT CHANGE UP (ref 135–145)
WBC # BLD: 4.25 K/UL — SIGNIFICANT CHANGE UP (ref 3.8–10.5)
WBC # FLD AUTO: 4.25 K/UL — SIGNIFICANT CHANGE UP (ref 3.8–10.5)

## 2020-12-23 PROCEDURE — 99233 SBSQ HOSP IP/OBS HIGH 50: CPT

## 2020-12-23 PROCEDURE — 99232 SBSQ HOSP IP/OBS MODERATE 35: CPT

## 2020-12-23 PROCEDURE — 71045 X-RAY EXAM CHEST 1 VIEW: CPT | Mod: 26

## 2020-12-23 PROCEDURE — 90937 HEMODIALYSIS REPEATED EVAL: CPT

## 2020-12-23 RX ORDER — ASPIRIN/CALCIUM CARB/MAGNESIUM 324 MG
81 TABLET ORAL DAILY
Refills: 0 | Status: DISCONTINUED | OUTPATIENT
Start: 2020-12-23 | End: 2020-12-31

## 2020-12-23 RX ORDER — SODIUM CHLORIDE 9 MG/ML
1000 INJECTION, SOLUTION INTRAVENOUS
Refills: 0 | Status: DISCONTINUED | OUTPATIENT
Start: 2020-12-23 | End: 2020-12-24

## 2020-12-23 RX ORDER — ATORVASTATIN CALCIUM 80 MG/1
40 TABLET, FILM COATED ORAL AT BEDTIME
Refills: 0 | Status: DISCONTINUED | OUTPATIENT
Start: 2020-12-23 | End: 2020-12-28

## 2020-12-23 RX ORDER — CARVEDILOL PHOSPHATE 80 MG/1
12.5 CAPSULE, EXTENDED RELEASE ORAL EVERY 12 HOURS
Refills: 0 | Status: DISCONTINUED | OUTPATIENT
Start: 2020-12-23 | End: 2020-12-31

## 2020-12-23 RX ADMIN — Medication 1 MILLIGRAM(S): at 20:27

## 2020-12-23 RX ADMIN — HEPARIN SODIUM 5000 UNIT(S): 5000 INJECTION INTRAVENOUS; SUBCUTANEOUS at 13:26

## 2020-12-23 RX ADMIN — CHLORHEXIDINE GLUCONATE 1 APPLICATION(S): 213 SOLUTION TOPICAL at 05:28

## 2020-12-23 RX ADMIN — ERYTHROPOIETIN 8000 UNIT(S): 10000 INJECTION, SOLUTION INTRAVENOUS; SUBCUTANEOUS at 10:26

## 2020-12-23 RX ADMIN — HEPARIN SODIUM 5000 UNIT(S): 5000 INJECTION INTRAVENOUS; SUBCUTANEOUS at 05:25

## 2020-12-23 RX ADMIN — ATORVASTATIN CALCIUM 40 MILLIGRAM(S): 80 TABLET, FILM COATED ORAL at 23:33

## 2020-12-23 RX ADMIN — Medication 1 MILLIGRAM(S): at 09:18

## 2020-12-23 RX ADMIN — SODIUM CHLORIDE 50 MILLILITER(S): 9 INJECTION, SOLUTION INTRAVENOUS at 14:18

## 2020-12-23 RX ADMIN — Medication 1 MILLIGRAM(S): at 14:14

## 2020-12-23 RX ADMIN — HEPARIN SODIUM 5000 UNIT(S): 5000 INJECTION INTRAVENOUS; SUBCUTANEOUS at 23:33

## 2020-12-23 NOTE — PROGRESS NOTE ADULT - ASSESSMENT
Pt is an 81 yo F w/ PMHx of ESRD on HD (MWF via LUE fistula), CAD s/p remote CABG, severe AS s/p TAVR w/ pacemaker in place, HTN/HLD, recent line infection w/ bacteremia who was recently discharged from [facility] last week on IV Abx. Admitted for AMS, r/o CVA. s/p Code stroke called in ED, CTH unremarkable for acute changes. CT perfusion scan: There is an ischemic penumbra in nonvascular distribution in the left hemisphere as described. Unclear significance.     AMS of unknown etiology  - Pts cardiologist (Dr. Saba) states PPM is MRI compatible but deemed not compatible leads by EP and any MRI would be off-label   - Neurology recs appreciated   - No clinical s/s of new/acute infection, UA negative, CXR negative, covid negative. Blood cultures negative from 12/20  - folate, B12. TSH wnl  - Enhanced supervision, fall precautions  - Seen by speech and swallow, will keep NPO until mentation improves and will reevaluate   - c/w ASA suppository ordered   - PT rec appreciated, home vs VARSHA   - repeat CTH to assess for any evolution, will need premedication    Recent admission for pseudomonal/E coli bacteremia w/ port infection   - On prior admission. Repeat bcx negative and TTE w/o signs of vegetations. Pt was discharged on 12/14 with directions to complete course of abx as o/p w/ HD through 12/22.   - ID recs appreciated. To continue cefepime on HD days   - BC 12/20 negative, ABX completed    ESRD on HD  - Nephrology consulted for continuation of HD while inpatient   - HD today    HTN, CAD s/p CABG, Severe AS s/p TAVR w/ PPM  - Resume home PO medications once NG tube placement confirmed  - IV hydralazine PRN  - Trop mildly elevated in setting of ESRD, monitor on tele     Hypoglycemia  -likely due to npo status  -c/w slow infusion D5% at 50cc/hr  -monitor for s/s fluid overload in HD Pt  -Hypoglycemia protocol added   -accucheck q6b while npo  -S&S to reevaluate   -NG tube placed, Nutrition consult for tube feeds    DVT ppx: Heparin sq    LOS/ancticipation: Unclear pending Palliative input for GOC Pt is an 81 yo F w/ PMHx of ESRD on HD (MWF via LUE fistula), CAD s/p remote CABG, severe AS s/p TAVR w/ pacemaker in place, HTN/HLD, recent line infection w/ bacteremia who was recently discharged from [facility] last week on IV Abx. Admitted for AMS, r/o CVA. s/p Code stroke called in ED, CTH unremarkable for acute changes. CT perfusion scan: There is an ischemic penumbra in nonvascular distribution in the left hemisphere as described. Unclear significance.     #AMS of unknown etiology  - Pts cardiologist (Dr. Saba) states PPM is MRI compatible but deemed not compatible leads by EP and any MRI would be off-label   - Neurology recs appreciated   - No clinical s/s of new/acute infection, UA negative, CXR negative, covid negative. Blood cultures negative from 12/20  - folate, B12. TSH wnl  - Enhanced supervision, fall precautions  - Seen by speech and swallow, will keep NPO until mentation improves and will reevaluate   - c/w ASA suppository ordered   - PT rec appreciated, home vs VARSHA   - repeat CTH to assess for any evolution, will need premedication    #Recent admission for pseudomonal/E coli bacteremia w/ port infection   - On prior admission. Repeat bcx negative and TTE w/o signs of vegetations. Pt was discharged on 12/14 with directions to complete course of abx as o/p w/ HD through 12/22.   - ID recs appreciated. To continue cefepime on HD days   - BC 12/20 negative, ABX completed    #ESRD on HD  - Nephrology consulted for continuation of HD while inpatient   - HD today    #HTN, CAD s/p CABG, Severe AS s/p TAVR w/ PPM  - Resume home PO medications once NG tube placement confirmed  - IV hydralazine PRN  - Trop mildly elevated in setting of ESRD, monitor on tele     #Hypoglycemia  -likely due to npo status  -c/w slow infusion D5% at 50cc/hr  -monitor for s/s fluid overload in HD Pt  -Hypoglycemia protocol added   -accucheck q6b while npo  -S&S to reevaluate   -NG tube placed, Nutrition consult for tube feeds    DVT ppx: Heparin sq    LOS/ancticipation: Unclear pending Palliative input for GOC

## 2020-12-23 NOTE — PROGRESS NOTE ADULT - ASSESSMENT
81 yo woman with AMS vs aphasia, given decreased nasolabial fold on right suspect small left hemispheric infarct.

## 2020-12-23 NOTE — PROGRESS NOTE ADULT - SUBJECTIVE AND OBJECTIVE BOX
CC: AMS     INTERVAL HPI/OVERNIGHT EVENTS: Patient seen and examined. Confused, combative. NG tube placed by RN, wrist restraints applied, patient pulled out tube and was again replaced. Required ativan during HD and again now for compliance.     Vital Signs Last 24 Hrs  T(C): 37.2 (23 Dec 2020 13:24), Max: 37.2 (23 Dec 2020 13:24)  T(F): 98.9 (23 Dec 2020 13:24), Max: 98.9 (23 Dec 2020 13:24)  HR: 79 (23 Dec 2020 13:24) (71 - 103)  BP: 170/80 (23 Dec 2020 13:24) (102/86 - 193/84)  BP(mean): --  RR: 18 (23 Dec 2020 13:24) (18 - 19)  SpO2: 96% (23 Dec 2020 13:24) (96% - 100%)    ROS:  Unable to assess due to mental status      PHYSICAL EXAM:  GENERAL: Agitated, confused  HEAD:  Atraumatic  EYES: EOMI, PERRL, conjunctiva and sclera clear  ENT: Dry mucous membranes  NECK: No JVD  CHEST/LUNG: Clear to auscultation bilaterally; No rales, rhonchi, wheezing, or rubs. Unlabored respirations  HEART: S1, S2, Regular rate and rhythm   ABDOMEN: Bowel sounds present; Soft, Nontender, Nondistended. No guarding or rigidity   EXTREMITIES:  2+ Peripheral Pulses, brisk capillary refill. No clubbing, cyanosis, or edema, LUE AVF +thrill   NERVOUS SYSTEM:  Confused, COBOS x 4   SKIN: No rashes or lesions    I&O's Detail    23 Dec 2020 07:01  -  23 Dec 2020 14:16  --------------------------------------------------------  IN:  Total IN: 0 mL    OUT:    Other (mL): 500 mL  Total OUT: 500 mL    Total NET: -500 mL                                    11.1   4.09  )-----------( 156      ( 22 Dec 2020 08:14 )             38.7     22 Dec 2020 08:14    132    |  97     |  18.0   ----------------------------<  49     3.7     |  24.0   |  2.49     Ca    8.9        22 Dec 2020 08:14    TPro  6.4    /  Alb  3.4    /  TBili  0.3    /  DBili  x      /  AST  24     /  ALT  6      /  AlkPhos  87     22 Dec 2020 08:14      CAPILLARY BLOOD GLUCOSE      POCT Blood Glucose.: 81 mg/dL (23 Dec 2020 05:23)  POCT Blood Glucose.: 93 mg/dL (22 Dec 2020 23:54)  POCT Blood Glucose.: 73 mg/dL (22 Dec 2020 17:20)    LIVER FUNCTIONS - ( 22 Dec 2020 08:14 )  Alb: 3.4 g/dL / Pro: 6.4 g/dL / ALK PHOS: 87 U/L / ALT: 6 U/L / AST: 24 U/L / GGT: x               MEDICATIONS  (STANDING):  aspirin  chewable 81 milliGRAM(s) Oral daily  atorvastatin 40 milliGRAM(s) Oral at bedtime  chlorhexidine 4% Liquid 1 Application(s) Topical <User Schedule>  dextrose 40% Gel 15 Gram(s) Oral once  dextrose 5%. 1000 milliLiter(s) (50 mL/Hr) IV Continuous <Continuous>  dextrose 50% Injectable 25 Gram(s) IV Push once  dextrose 50% Injectable 12.5 Gram(s) IV Push once  dextrose 50% Injectable 25 Gram(s) IV Push once  epoetin vinnie-epbx (RETACRIT) Injectable 8000 Unit(s) IV Push <User Schedule>  glucagon  Injectable 1 milliGRAM(s) IntraMuscular once  heparin   Injectable 5000 Unit(s) SubCutaneous every 8 hours  influenza   Vaccine 0.5 milliLiter(s) IntraMuscular once  isosorbide   mononitrate ER Tablet (IMDUR) 120 milliGRAM(s) Oral daily    MEDICATIONS  (PRN):  hydrALAZINE Injectable 5 milliGRAM(s) IV Push every 6 hours PRN SBP >185  LORazepam   Injectable 1 milliGRAM(s) IV Push every 4 hours PRN Agitation      RADIOLOGY & ADDITIONAL TESTS:

## 2020-12-23 NOTE — PROGRESS NOTE ADULT - SUBJECTIVE AND OBJECTIVE BOX
CC: f/u AMS of unknown etiology, improving     INTERVAL HPI/ OVERNIGHT EVENTS: Pt seen and examined at bedside this AM by Hospitalist and PA. Pt seen sleeping, wakes easily to voice. Denies fever, headaches, chest pain/pressure, palpitations, shortness of breath or any other complaints. Answers simple yes or no questions but repeats "ay-ay-ay" in between responses. Pts cardiologist cleared MRI for compatibility MRI check list completed with Son Bernard who also provided consent but EP called and since 2 different leads, deemed not MRI compatible and any MRI would be off-label      on tele: NSR, Atrial paced, few PVCs no other alarms     REVIEW OF SYSTEMS:  limited due to mental status, otherwise negative     Allergies  codeine (Other)  Lortab (Other)  morphine (Other)  Percocet 10/325 (Other)  PPM not compatible with  MRI (Other (Fatal))  Reglan (Other; [place])  sulfa drugs (Fl[place])    HEALTH ISSUES - PROBLEM Dx:    PAST MEDICAL & SURGICAL HISTORY:  Thyroid nodule  awaiting FNB in the near future    Severe aortic stenosis  Osteoarthritis  Bilateral knees  CKD (chronic kidney disease) stage 4, GFR 15-29 ml/min  DM2 (diabetes mellitus, type 2)  Arthritis  CAD (coronary artery disease)  Gout  Anemia Associated with Chronic Renal Failure  HTN (Hypertension)  S/P AVR  TAVR  History of pacemaker  A-V fistula  left arm  S/P cholecystectomy  Stented coronary artery  s/p 3 stents, then CABG   S/P CABG (coronary artery bypass graft)  triple in     VITAL SIGNS:  T(C): 36.9 (20 @ 10:20), Max: 37.4 (20 @ 23:45)  HR: 66 (20 @ 10:20) (66 - 96)  BP: 99/67 (20 @ 10:20) (99/67 - 207/88)  RR: 19 (- @ 10:20) (18 - 21)  SpO2: 98% (20 @ 10:20) (97% - 100%)  Wt(kg): --Vital Signs Last 24 Hrs  T(C): 36.9 (22 Dec 2020 10:20), Max: 37.4 (21 Dec 2020 23:45)  T(F): 98.4 (22 Dec 2020 10:20), Max: 99.3 (21 Dec 2020 23:45)  HR: 66 (22 Dec 2020 10:20) (66 - 96)  BP: 99/67 (22 Dec 2020 10:20) (99/67 - 207/88)  RR: 19 (22 Dec 2020 10:20) (18 - 21)  SpO2: 98% (22 Dec 2020 10:20) (97% - 100%)    PHYSICAL EXAM:  GENERAL: Pt lying in bed comfortably in NAD  HEAD:  Atraumatic  EYES: EOMI, PERRL, conjunctiva and sclera clear  ENT: Dry mucous membranes  NECK: No JVD  CHEST/LUNG: Clear to auscultation bilaterally; No rales, rhonchi, wheezing, or rubs. Unlabored respirations  HEART: S1, S2, Regular rate and rhythm   ABDOMEN: Bowel sounds present; Soft, Nontender, Nondistended. No guarding or rigidity   EXTREMITIES:  2+ Peripheral Pulses, brisk capillary refill. No clubbing, cyanosis, or edema, LUE AVF +thrill   NERVOUS SYSTEM:  Alert & Oriented X1 (to name), FROM x 4 extremities. Follows only simple commands   SKIN: No rashes or lesions    LABS:               11.1   4.09  )-----------( 156      ( 22 Dec 2020 08:14 )             38.7     12-22  132<L>  |  97<L>  |  18.0  ----------------------------<  49<LL>  3.7   |  24.0  |  2.49<H>    Ca    8.9      22 Dec 2020 08:14    TPro  6.4<L>  /  Alb  3.4  /  TBili  0.3<L>  /  DBili  x   /  AST  24  /  ALT  6   /  AlkPhos  87  12-22    CAPILLARY BLOOD GLUCOSE  POCT Blood Glucose.: 89 mg/dL (22 Dec 2020 12:54)  POCT Blood Glucose.: 169 mg/dL (22 Dec 2020 10:33)  POCT Blood Glucose.: 66 mg/dL (22 Dec 2020 09:56)    Urinalysis Basic - ( 20 Dec 2020 16:53 )  Color: Yellow / Appearance: Clear / S.010 / pH: x  Gluc: x / Ketone: Trace  / Bili: Negative / Urobili: Negative mg/dL   Blood: x / Protein: 100 mg/dL / Nitrite: Negative   Leuk Esterase: Trace / RBC: 0-2 /HPF / WBC 3-5   Sq Epi: x / Non Sq Epi: Few / Bacteria: Occasional    MEDICATIONS  (STANDING):  aspirin Suppository 300 milliGRAM(s) Rectal daily  chlorhexidine 4% Liquid 1 Application(s) Topical <User Schedule>  dextrose 40% Gel 15 Gram(s) Oral once  dextrose 5% + sodium chloride 0.9%. 1000 milliLiter(s) (50 mL/Hr) IV Continuous <Continuous>  dextrose 50% Injectable 25 Gram(s) IV Push once  dextrose 50% Injectable 12.5 Gram(s) IV Push once  dextrose 50% Injectable 25 Gram(s) IV Push once  epoetin vinnie-epbx (RETACRIT) Injectable 8000 Unit(s) IV Push <User Schedule>  glucagon  Injectable 1 milliGRAM(s) IntraMuscular once  heparin   Injectable 5000 Unit(s) SubCutaneous every 8 hours  influenza   Vaccine 0.5 milliLiter(s) IntraMuscular once  isosorbide   mononitrate ER Tablet (IMDUR) 120 milliGRAM(s) Oral daily    MEDICATIONS  (PRN):  hydrALAZINE Injectable 5 milliGRAM(s) IV Push every 6 hours PRN SBP >185  LORazepam   Injectable 1 milliGRAM(s) IV Push every 4 hours PRN Agitation    Assessment and Plan:   · Assessment	  Pt is an 81 yo F w/ PMHx of ESRD on HD (MWF via LUE fistula), CAD s/p remote CABG, severe AS s/p TAVR w/ pacemaker in place, HTN/HLD, recent line infection w/ bacteremia who was recently discharged from [facility] last week on IV Abx. Admitted for AMS, r/o CVA. s/p Code stroke called in ED, CTH unremarkable for acute changes. CT perfusion scan: There is an ischemic penumbra in nonvascular distribution in the left hemisphere as described. Unclear significance.     AMS of unknown etiology  - As of this morning, pts mental status improving, following simple commands and still intermittently yelling "ay ay ay", A&O x1   - Pts cardiologist (Dr. Saba) states PPM is MRI compatible but deemed not compatible leads by EP and any MRI would be off-label   - Neurology recs appreciated   - No clinical s/s of new/acute infection, UA negative, CXR negative, covid negative. Blood cultures pending from   - folate, B12. TSH wnl  - Enhanced supervision, fall precautions  - Seen by speech and swallow, will keep NPO until mentation improves and will reevaluate   - c/w ASA suppository ordered   -seen by speech  - PT rec appreciated, home vs VARSHA   - For now will repeat CTH to assess for any evolution    Recent admission for pseudomonal/E coli bacteremia w/ port infection   - On prior admission. Repeat bcx negative and TTE w/o signs of vegetations. Pt was discharged on  with directions to complete course of antibiotics  as o/p w/ HD through 20,   - ID recs appreciated. To continue cefepime on HD days   - will f/u bcx ordered from , will d/c abx if cx negative     ESRD on HD  - HD , Given IV Ativan,     HTN, CAD s/p CABG, Severe AS s/p TAVR w/ PPM  - Resume home PO medications once patient tolerates PO (currently NPO due to mentation)  - Trop mildly elevated in setting of ESRD, monitor on tele     Hypoglycemia  -likely due to npo status  -c/w slow infusion D5% at 50cc/hr  -monitor for s/s fluid overload in HD Pt  -Hypoglycemia protocol added   -accucheck q6b while npo  -S&S to reevaluate   -f/u HbA1c    Workup of mental status such as bcx, pending clinical improvement. Per PT-     Home with 24/7 assist vs VARSHA.     Bernard Wise [phone] updated by PA and MD. All questions answered and concerns addressed. Updated that Pt will not be getting MRI head as well. Per Son Pt at baseline needed assistance with ambulation and ADLs and is interested in VARSHA if applicable when reevaluated.    Re evaluated  speech and swallow in AM and will determine need for NG tube feeding at that time

## 2020-12-23 NOTE — PROGRESS NOTE ADULT - PROBLEM SELECTOR PLAN 1
Presumed small left hemispheric infarct  C/w aspirin, statin  Check A1C, lipid panel  Neurochecks q 4 h  TTE w/bubble  PT/OT

## 2020-12-23 NOTE — PROGRESS NOTE ADULT - SUBJECTIVE AND OBJECTIVE BOX
Neurology Progress Note  [facility] [place] [address] [place] [address]  [phone]    Interval History:  79 yo woman admitted with altered mental status vs aphasia. She has remained stable.    Physical Exam:  Vital Signs Last 24 Hrs  T(C): 37.1 (23 Dec 2020 16:47), Max: 37.2 (23 Dec 2020 13:24)  T(F): 98.8 (23 Dec 2020 16:47), Max: 98.9 (23 Dec 2020 13:24)  HR: 74 (23 Dec 2020 16:47) (71 - 103)  BP: 170/80 (23 Dec 2020 13:24) (102/86 - 193/84)  BP(mean): --  RR: 18 (23 Dec 2020 16:47) (18 - 19)  SpO2: 96% (23 Dec 2020 16:47) (96% - 100%)  Mental Status: awake, alert, says "hey hey", but otherwise does not follow commands or answer questions  CN: PERRL, EOMI, VFF, V1-V3 sensation intact, decreased nasolabial fold on the right  Motor/Sensory:  Moves all extremities spontaneously and symmetrically  Coordination: unable to follow command for this  Gait: deferred    12/23/20 at 1600 NIHSS: 4    LABS:  cret                        12.5   4.25  )-----------( 196      ( 23 Dec 2020 14:18 )             41.9     12-23    136  |  99  |  9.0  ----------------------------<  83  4.0   |  25.0  |  1.73<H>    Ca    9.0      23 Dec 2020 14:18    TPro  6.8  /  Alb  3.6  /  TBili  0.3<L>  /  DBili  x   /  AST  25  /  ALT  8   /  AlkPhos  92  12-23    CTA brain: There is no large vessel occlusion or aneurysm involving major proximal intracranial arteries.    CTA NECK: There is no stenosis involving major neck arteries.    CT perfusion: There is an ischemic penumbra in nonvascular distribution in the left hemisphere as described.

## 2020-12-23 NOTE — PROGRESS NOTE ADULT - ASSESSMENT
Patient was seen and evaluated on dialysis.   Patient is tolerating the procedure well.   T(C): 36.5 (12-23-20 @ 11:00), Max: 37.1 (12-23-20 @ 05:33)  HR: 71 (12-23-20 @ 11:00) (71 - 103)  BP: 184/66 (12-23-20 @ 11:00) (102/86 - 193/84)  Continue dialysis: TIW,   Dialyzer: Revaclear 300         QB: 400 ml.,        QD: 500ml.,  Goal UF As [place].,  over  3  Hours     Restless,     Given IV Ativan during HD,

## 2020-12-24 LAB
GLUCOSE BLDC GLUCOMTR-MCNC: 103 MG/DL — HIGH (ref 70–99)
GLUCOSE BLDC GLUCOMTR-MCNC: 107 MG/DL — HIGH (ref 70–99)
GLUCOSE BLDC GLUCOMTR-MCNC: 111 MG/DL — HIGH (ref 70–99)
GLUCOSE BLDC GLUCOMTR-MCNC: 125 MG/DL — HIGH (ref 70–99)
GLUCOSE BLDC GLUCOMTR-MCNC: 86 MG/DL — SIGNIFICANT CHANGE UP (ref 70–99)
MRSA PCR RESULT.: SIGNIFICANT CHANGE UP
S AUREUS DNA NOSE QL NAA+PROBE: SIGNIFICANT CHANGE UP

## 2020-12-24 PROCEDURE — 99233 SBSQ HOSP IP/OBS HIGH 50: CPT

## 2020-12-24 PROCEDURE — 99232 SBSQ HOSP IP/OBS MODERATE 35: CPT

## 2020-12-24 PROCEDURE — 70450 CT HEAD/BRAIN W/O DYE: CPT | Mod: 26

## 2020-12-24 RX ORDER — SODIUM CHLORIDE 9 MG/ML
1000 INJECTION, SOLUTION INTRAVENOUS
Refills: 0 | Status: DISCONTINUED | OUTPATIENT
Start: 2020-12-24 | End: 2020-12-29

## 2020-12-24 RX ADMIN — CARVEDILOL PHOSPHATE 12.5 MILLIGRAM(S): 80 CAPSULE, EXTENDED RELEASE ORAL at 18:05

## 2020-12-24 RX ADMIN — CARVEDILOL PHOSPHATE 12.5 MILLIGRAM(S): 80 CAPSULE, EXTENDED RELEASE ORAL at 05:26

## 2020-12-24 RX ADMIN — SODIUM CHLORIDE 50 MILLILITER(S): 9 INJECTION, SOLUTION INTRAVENOUS at 09:24

## 2020-12-24 RX ADMIN — Medication 0.1 MILLIGRAM(S): at 18:04

## 2020-12-24 RX ADMIN — Medication 81 MILLIGRAM(S): at 14:20

## 2020-12-24 RX ADMIN — ATORVASTATIN CALCIUM 40 MILLIGRAM(S): 80 TABLET, FILM COATED ORAL at 21:08

## 2020-12-24 RX ADMIN — Medication 0.1 MILLIGRAM(S): at 05:27

## 2020-12-24 RX ADMIN — CHLORHEXIDINE GLUCONATE 1 APPLICATION(S): 213 SOLUTION TOPICAL at 05:28

## 2020-12-24 RX ADMIN — ISOSORBIDE MONONITRATE 120 MILLIGRAM(S): 60 TABLET, EXTENDED RELEASE ORAL at 14:20

## 2020-12-24 RX ADMIN — SODIUM CHLORIDE 50 MILLILITER(S): 9 INJECTION, SOLUTION INTRAVENOUS at 21:08

## 2020-12-24 RX ADMIN — HEPARIN SODIUM 5000 UNIT(S): 5000 INJECTION INTRAVENOUS; SUBCUTANEOUS at 05:27

## 2020-12-24 RX ADMIN — HEPARIN SODIUM 5000 UNIT(S): 5000 INJECTION INTRAVENOUS; SUBCUTANEOUS at 21:08

## 2020-12-24 RX ADMIN — HEPARIN SODIUM 5000 UNIT(S): 5000 INJECTION INTRAVENOUS; SUBCUTANEOUS at 14:20

## 2020-12-24 NOTE — PROGRESS NOTE ADULT - PROBLEM SELECTOR PLAN 1
C/w aspirin, statin  Speech reeval when patient more awake, try to avoid sedation if possible  Neurochecks q 4 h  PT/OT

## 2020-12-24 NOTE — PROGRESS NOTE ADULT - ASSESSMENT
81 yo F w/ PMH Of ESRD on HD (MWF via LUE fistula), CAD s/p remote CABG, severe AS s/p TAVR w/ pacemaker in place, HTN/HLD, recent line infection w/ bacteremia who was recently discharged from [facility] last week on IV CEFEPIME FOR PSEUDOMONAS . Unable to obtain history from patient due to mental status. History obtained from CHART   patient was recently discharged from [facility] last Monday s/p bacteremia and line infection and was doing well. She was noted to become more lethargic and confused after her HD session on Friday. Last night she was having difficulty taking her night time medications.     she woke up at 4AM yelling "HEY HEY HEY." He reports that the patient is normally A&Ox3 however requires assistance w/ daily activities A patient appears in no distress however does not follow commands or answer questions. She constantly yells out "HEY HEY HEY" and per staff she has been doing this since admission.  AMS UNCLEAR ETIOLOGY ? STROKE    BLOOD CX X 2   PENDING   HAS COMPLETED CEFEPIME   WILL FOLLOWUP  BLOOD CX  URINE CX NO SIGNIFICANT    WILL Followup  MORE AWAKE TODAY     OBSERVE OFF ABX   81 yo F w/ PMH Of ESRD on HD (MWF via LUE fistula), CAD s/p remote CABG, severe AS s/p TAVR w/ pacemaker in place, HTN/HLD, recent line infection w/ bacteremia who was recently discharged from [facility] last week on IV CEFEPIME FOR PSEUDOMONAS . Unable to obtain history from patient due to mental status. History obtained from CHART   patient was recently discharged from [facility] last Monday s/p bacteremia and line infection and was doing well. She was noted to become more lethargic and confused after her HD session on Friday. Last night she was having difficulty taking her night time medications.     she woke up at 4AM yelling "HEY HEY HEY." He reports that the patient is normally A&Ox3 however requires assistance w/ daily activities A patient appears in no distress however does not follow commands or answer questions. She constantly yells out "HEY HEY HEY" and per staff she has been doing this since admission.  AMS UNCLEAR ETIOLOGY ? STROKE    BLOOD CX X 2   PENDING   HAS COMPLETED CEFEPIME   WILL FOLLOWUP  BLOOD CX  URINE CX NO SIGNIFICANT    WILL Followup  MORE AWAKE TODAY     OBSERVE OFF ABX  WILL FOLLOWUP AS NEEDED PLEASE CALL IF QUESTIONS   79 yo F w/ PMH Of ESRD on HD (MWF via LUE fistula), CAD s/p remote CABG, severe AS s/p TAVR w/ pacemaker in place, HTN/HLD, recent line infection w/ bacteremia who was recently discharged from [facility] last week on IV CEFEPIME FOR PSEUDOMONAS . Unable to obtain history from patient due to mental status. History obtained from CHART   patient was recently discharged from [facility] last Monday s/p bacteremia and line infection and was doing well. She was noted to become more lethargic and confused after her HD session on Friday. Last night she was having difficulty taking her night time medications.     she woke up at 4AM yelling "HEY HEY HEY." He reports that the patient is normally A&Ox3 however requires assistance w/ daily activities A patient appears in no distress however does not follow commands or answer questions. She constantly yells out "HEY HEY HEY" and per staff she has been doing this since admission.  AMS UNCLEAR ETIOLOGY ? STROKE    BLOOD CX X 2   PENDING   HAS COMPLETED CEFEPIME   WILL FOLLOWUP  BLOOD CX  URINE CX NO SIGNIFICANT    WILL Followup  MORE AWAKE TODAY     OBSERVE OFF ABX  WILL FOLLOWUP

## 2020-12-24 NOTE — PROGRESS NOTE ADULT - CONVERSATION DETAILS
Patient's son Benrard was called and given update about hospital course. He was explained that patient's mental status remained unimproved and she has not been able to follow commands. He was informed that repeat CTH imaging was negative, MRI is unable to be done due to PPM, and symptoms may be due to small infarct per Neurology. He is aware that tube feedings will be started via NGT. Goals of care were approached and he states that his mother never had any advanced directives regarding DNR/DNI,. He was informed that if mental status remains unchanged, patient will continue to require non oral feedings however PEG tube is not indicated at this time as she displays agitation. Palliative has been consulted. He states he needs time to process all the information and cannot come to decision at this time regarding goals of care.    Initial advance care planning time spent: 35 mins

## 2020-12-24 NOTE — PROGRESS NOTE ADULT - ASSESSMENT
79 yo woman with AMS vs aphasia, given decreased nasolabial fold on right suspect small left hemispheric infarct.

## 2020-12-24 NOTE — PROGRESS NOTE ADULT - SUBJECTIVE AND OBJECTIVE BOX
INFECTIOUS DISEASES AND INTERNAL MEDICINE at [place]  =======================================================  Rick Kyle MD  Diplomates American Board of Internal Medicine and Infectious Diseases  Telephone [phone]  Fax            [phone]  =======================================================    IRA ACEVEDO [id number]    Follow up: AMS    Allergies:  codeine (Other)  Lortab (Other)  morphine (Other)  Percocet 10/325 (Other)  PPM not compatible with  MRI (Other (Fatal))  Reglan (Other; [place])  sulfa drugs ([place])      Medications:  aspirin Suppository 300 milliGRAM(s) Rectal daily  chlorhexidine 4% Liquid 1 Application(s) Topical <User Schedule>  dextrose 5% + sodium chloride 0.9%. 1000 milliLiter(s) IV Continuous <Continuous>  epoetin vinnie-epbx (RETACRIT) Injectable 8000 Unit(s) IV Push <User Schedule>  heparin   Injectable 5000 Unit(s) SubCutaneous every 8 hours  hydrALAZINE Injectable 5 milliGRAM(s) IV Push every 6 hours PRN  influenza   Vaccine 0.5 milliLiter(s) IntraMuscular once  isosorbide   mononitrate ER Tablet (IMDUR) 120 milliGRAM(s) Oral daily  LORazepam   Injectable 1 milliGRAM(s) IV Push every 4 hours PRN    SOCIAL       FAMILY   FAMILY HISTORY:  No pertinent family history in first degree relatives      REVIEW OF SYSTEMS:    UNABLE TO OBTAIN            Physical Exam:  ICU Vital Signs Last 24 Hrs  T(C): 36.1 (22 Dec 2020 04:10), Max: 37.4 (21 Dec 2020 23:45)  T(F): 97 (22 Dec 2020 04:10), Max: 99.3 (21 Dec 2020 23:45)  HR: 73 (22 Dec 2020 04:10) (61 - 96)  BP: 164/72 (22 Dec 2020 04:10) (143/60 - 207/88)  BP(mean): --  ABP: --  ABP(mean): --  RR: 19 (22 Dec 2020 04:10) (18 - 21)  SpO2: 97% (22 Dec 2020 04:10) (97% - 100%)    GEN: NAD,   HEENT: normocephalic and atraumatic. EOMI. BRITTANY.    NECK: Supple. No carotid bruits.  No lymphadenopathy or thyromegaly.  LUNGS: Clear to auscultation.  HEART: Regular rate and rhythm without murmur.  ABDOMEN: Soft, nontender, and nondistended.  Positive bowel sounds.    : No CVA tenderness  EXTREMITIES: Without any cyanosis, clubbing, rash, lesions or edema.  MSK: no joint swelling  NEUROLOGIC: ANSWERS TO NAME BU OTHERWISE NON VERBAL      Labs:  Vitals:  ============   Vital Signs Last 24 Hrs  T(C): 36.6 (24 Dec 2020 07:47), Max: 37.3 (24 Dec 2020 04:30)  T(F): 97.8 (24 Dec 2020 07:47), Max: 99.1 (24 Dec 2020 04:30)  HR: 62 (24 Dec 2020 07:47) (62 - 85)  BP: 136/76 (24 Dec 2020 07:47) (136/76 - 194/95)  BP(mean): --  RR: 18 (24 Dec 2020 07:47) (18 - 18)  SpO2: 100% (24 Dec 2020 07:47) (96% - 100%)    =======================================================  Current Antibiotics:    Other medications:  aspirin Suppository 300 milliGRAM(s) Rectal daily  chlorhexidine 4% Liquid 1 Application(s) Topical <User Schedule>  dextrose 5% + sodium chloride 0.9%. 1000 milliLiter(s) IV Continuous <Continuous>  epoetin vinnie-epbx (RETACRIT) Injectable 8000 Unit(s) IV Push <User Schedule>  heparin   Injectable 5000 Unit(s) SubCutaneous every 8 hours  influenza   Vaccine 0.5 milliLiter(s) IntraMuscular once  isosorbide   mononitrate ER Tablet (IMDUR) 120 milliGRAM(s) Oral daily      =======================================================  Labs:              1                       12.5   4.25  )-----------( 196      ( 23 Dec 2020 14:18 )             41.9   12-23    136  |  99  |  9.0  ----------------------------<  83  4.0   |  25.0  |  1.73<H>    Ca    9.0      23 Dec 2020 14:18    TPro  6.8  /  Alb  3.6  /  TBili  0.3<L>  /  DBili  x   /  AST  25  /  ALT  8   /  AlkPhos  92  12-23      Culture - Urine (collected 12-21-20 @ 03:35)  Source: .Urine Catheterized  Final Report (12-21-20 @ 22:42):    <10,000 CFU/mL Normal Urogenital Skylar    Culture - Catheter (collected 12-10-20 @ 16:30)  Source: .Catheter TIP  Final Report (12-12-20 @ 19:45):    < 15 colonies Pseudomonas aeruginosa  Organism: Pseudomonas aeruginosa (12-12-20 @ 19:45)  Organism: Pseudomonas aeruginosa (12-12-20 @ 19:45)    Sensitivities:      -  Amikacin: S <=16      -  Aztreonam: S <=4      -  Cefepime: S <=2      -  Ceftazidime: S 4      -  Ciprofloxacin: S <=0.25      -  Gentamicin: S <=2      -  Imipenem: S <=1      -  Levofloxacin: S <=0.5      -  Meropenem: S <=1      -  Piperacillin/Tazobactam: S <=8      -  Tobramycin: S <=2      Method Type: LINDA    Culture - Blood (collected 12-10-20 @ 10:42)  Source: .Blood Blood-Venous  Final Report (12-15-20 @ 11:00):    No growth at 5 days.    Culture - Blood (collected 12-10-20 @ 10:42)  Source: .Blood Blood-Peripheral  Final Report (12-15-20 @ 11:00):    No growth at 5 days.    Culture - Blood (collected 12-09-20 @ 07:45)  Source: .Blood Blood-Venous  Final Report (12-14-20 @ 09:00):    No growth at 5 days.    Culture - Blood (collected 12-08-20 @ 14:44)  Source: .Blood Blood-Venous  Gram Stain (12-09-20 @ 10:12):    Growth in aerobic bottle: Gram Negative Rods    Gram Stain performed by:    [facility]    [address] [place] [id number]    .    TYPE: (C=Critical, N=Notification, A=Abnormal) c    TESTS:  _ GS    DATE/TIME CALLED: _ 12/09/2020 10:12:03    CALLED TO: Emerald Choudhary RN    READ BACK (2 Patient Identifiers)(Y/N): _ Y    READ BACK VALUES (Y/N): _ Y    CALLED BY: Emerald Patel  Final Report (12-11-20 @ 14:09):    Growth in aerobic bottle: Pseudomonas aeruginosa  Organism: Pseudomonas aeruginosa (12-11-20 @ 14:09)  Organism: Pseudomonas aeruginosa (12-11-20 @ 14:09)    Sensitivities:      -  Amikacin: S <=16      -  Aztreonam: S 8      -  Cefepime: S 4      -  Ceftazidime: S 8      -  Ciprofloxacin: S <=0.25      -  Gentamicin: S 4      -  Imipenem: I 4      -  Levofloxacin: S <=0.5      -  Meropenem: S <=1      -  Piperacillin/Tazobactam: S <=8      -  Tobramycin: S <=2      Method Type: LINDA      Creatinine, Serum: 4.06 mg/dL (12-21-20 @ 12:48)  Creatinine, Serum: 3.41 mg/dL (12-20-20 @ 14:12)    Procalcitonin, Serum: 2.53 ng/mL (12-21-20 @ 12:48)      Ferritin, Serum: 5890 ng/mL (12-08-20 @ 01:15)      WBC Count: 4.09 K/uL (12-22-20 @ 08:14)  WBC Count: 4.55 K/uL (12-21-20 @ 12:48)  WBC Count: 4.42 K/uL (12-20-20 @ 14:12)      COVID-19 PCR: NotDetec (12-20-20 @ 15:10)  COVID-19 IgG Antibody Interpretation: Negative (12-08-20 @ 07:29)  COVID-19 IgG Antibody Index: <0.10 Index (12-08-20 @ 07:29)  COVID-19 PCR: NotDetec (12-07-20 @ 21:31)      Alkaline Phosphatase, Serum: 83 U/L (12-21-20 @ 12:48)  Alkaline Phosphatase, Serum: 83 U/L (12-20-20 @ 14:12)  Alanine Aminotransferase (ALT/SGPT): 5 U/L (12-21-20 @ 12:48)  Alanine Aminotransferase (ALT/SGPT): 9 U/L (12-20-20 @ 14:12)  Aspartate Aminotransferase (AST/SGOT): 14 U/L (12-21-20 @ 12:48)  Aspartate Aminotransferase (AST/SGOT): 38 U/L (12-20-20 @ 14:12)  Bilirubin Total, Serum: 0.2 mg/dL (12-21-20 @ 12:48)  Bilirubin Total, Serum: 0.3 mg/dL (12-20-20 @ 14:12)

## 2020-12-24 NOTE — PROGRESS NOTE ADULT - SUBJECTIVE AND OBJECTIVE BOX
Neurology Progress Note  [facility] at [address] [address]  [phone]    Interval History:  81 yo woman with AMS vs aphasia. She has remained stable.    Physical Exam:  Vital Signs Last 24 Hrs  T(C): 36.6 (24 Dec 2020 07:47), Max: 37.3 (24 Dec 2020 04:30)  T(F): 97.8 (24 Dec 2020 07:47), Max: 99.1 (24 Dec 2020 04:30)  HR: 62 (24 Dec 2020 07:47) (62 - 85)  BP: 136/76 (24 Dec 2020 07:47) (136/76 - 194/95)  BP(mean): --  RR: 18 (24 Dec 2020 07:47) (18 - 18)  SpO2: 100% (24 Dec 2020 07:47) (96% - 100%)  Mental Status: pt lying in bed comfortably, lethargic (likely due to receiving ativan for agitation last night), awakens to tactile stimuli, does not follow commands, not speaking  CN: patient resists eye opening, decreased nasolabial fold on the right  Motor/Sensory:  Moves all extremities spontaneously and symmetrically  Coordination: unable to follow command for this  Gait: deferred    LABS:                        12.5   4.25  )-----------( 196      ( 23 Dec 2020 14:18 )             41.9     12-23    136  |  99  |  9.0  ----------------------------<  83  4.0   |  25.0  |  1.73<H>    Ca    9.0      23 Dec 2020 14:18    TPro  6.8  /  Alb  3.6  /  TBili  0.3<L>  /  DBili  x   /  AST  25  /  ALT  8   /  AlkPhos  92  12-23    12/24/20:  CT head:  IMPRESSION:    1)  chronic ischemic changes again noted in conjunction with volume loss and involutional change. No acute abnormality suggested.  2)  chronic pituitary adenoma again noted    TTE     1. Left ventricular ejection fraction, by visual estimation, is 65 to 70%.   2. Normal global left ventricular systolic function.   3. Severely enlarged left atrium.   4. Elevated mean left atrial pressure.   5. Spectral Doppler shows pseudonormal pattern of left ventricular myocardial filling (Grade II diastolic dysfunction).   6. Moderately enlarged right atrium.   7. Trivial pericardial effusion.   8. Mild mitral annular calcification.   9. Mild mitral valve regurgitation.  10. Mild thickening and calcification of the anterior and posterior mitral valve leaflets.  11. Moderate tricuspid regurgitation.  12. Estimated pulmonary artery systolic pressure is 49.5 mmHg assuming a right atrial pressure of 3 mmHg, which is consistent with mild pulmonary hypertension.  13. Mild pulmonic valve regurgitation.  14. Moderately dilated pulmonary artery.  15. TAVR in the aortic position.  16. Peak transaortic gradient equals 10.3 mmHg, mean transaortic gradient equals 4.2 mmHg, the calculated aortic valve area equals 1.99 cm² by the continuity equation consistent with mild aortic stenosis. The dimensionless index of 0.73 and AT <100 ms, which are consistent with normally functioning TAVR.  17. There are no prior studies on this patient for comparison purposes.    PPM interrogated - no arrhythmias    A1C- 4.6    LDL - 166

## 2020-12-24 NOTE — PROGRESS NOTE ADULT - SUBJECTIVE AND OBJECTIVE BOX
[facility] DIVISION OF KIDNEY DISEASES AND HYPERTENSION -- FOLLOW UP NOTE  --------------------------------------------------------------------------------  Chief Complaint:  ESRD HD    24 hour events/subjective:  HD Saturday  On COVID isolation;      PAST HISTORY  --------------------------------------------------------------------------------  No significant changes to PMH, PSH, FHx, SHx, unless otherwise noted    ALLERGIES & MEDICATIONS  --------------------------------------------------------------------------------  Allergies    codeine (Other)  Lortab (Other)  morphine (Other)  Percocet 10/325 (Other)  PPM not compatible with  MRI (Other (Fatal))  Reglan (Other; [place])  sulfa drugs ([place])    Intolerances      Standing Inpatient Medications  aspirin  chewable 81 milliGRAM(s) Oral daily  atorvastatin 40 milliGRAM(s) Oral at bedtime  carvedilol 12.5 milliGRAM(s) Oral every 12 hours  chlorhexidine 4% Liquid 1 Application(s) Topical <User Schedule>  cloNIDine 0.1 milliGRAM(s) Oral every 12 hours  dextrose 40% Gel 15 Gram(s) Oral once  dextrose 5%. 1000 milliLiter(s) IV Continuous <Continuous>  dextrose 50% Injectable 25 Gram(s) IV Push once  dextrose 50% Injectable 12.5 Gram(s) IV Push once  dextrose 50% Injectable 25 Gram(s) IV Push once  epoetin vinnie-epbx (RETACRIT) Injectable 8000 Unit(s) IV Push <User Schedule>  glucagon  Injectable 1 milliGRAM(s) IntraMuscular once  heparin   Injectable 5000 Unit(s) SubCutaneous every 8 hours  influenza   Vaccine 0.5 milliLiter(s) IntraMuscular once  isosorbide   mononitrate ER Tablet (IMDUR) 120 milliGRAM(s) Oral daily    PRN Inpatient Medications  hydrALAZINE Injectable 5 milliGRAM(s) IV Push every 6 hours PRN  LORazepam   Injectable 1 milliGRAM(s) IV Push every 4 hours PRN      REVIEW OF SYSTEMS 2/2 hospiitalist on covid isolation  --------------------------------------------------------------------------------  Unable to obtain    VITALS/PHYSICAL EXAM  --------------------------------------------------------------------------------  T(C): 36.6 (12-24-20 @ 07:47), Max: 37.3 (12-24-20 @ 04:30)  HR: 62 (12-24-20 @ 07:47) (62 - 85)  BP: 136/76 (12-24-20 @ 07:47) (136/76 - 194/95)  RR: 18 (12-24-20 @ 07:47) (18 - 18)  SpO2: 100% (12-24-20 @ 07:47) (96% - 100%)  Wt(kg): --        12-23-20 @ 07:01  -  12-24-20 @ 07:00  --------------------------------------------------------  IN: 0 mL / OUT: 500 mL / NET: -500 mL      Physical Exam: 2/2 hospitalist on covid isolation  GENERAL: Pt lying in bed comfortably in NAD  HEAD:  Atraumatic  EYES: EOMI, PERRL, conjunctiva and sclera clear  ENT: Dry mucous membranes  NECK: No JVD  CHEST/LUNG: Clear to auscultation bilaterally; No rales, rhonchi, wheezing, or rubs. Unlabored respirations  HEART: S1, S2, Regular rate and rhythm   ABDOMEN: Bowel sounds present; Soft, Nontender, Nondistended. No guarding or rigidity   EXTREMITIES:  2+ Peripheral Pulses, brisk capillary refill. No clubbing, cyanosis, or edema, LUE AVF +thrill   NERVOUS SYSTEM:  Alert & Oriented X1 (to name), FROM x 4 extremities. Follows only simple commands   SKIN: No rashes or lesions    LABS/STUDIES  --------------------------------------------------------------------------------              12.5   4.25  >-----------<  196      [12-23-20 @ 14:18]              41.9     136  |  99  |  9.0  ----------------------------<  83      [12-23-20 @ 14:18]  4.0   |  25.0  |  1.73        Ca     9.0     [12-23-20 @ 14:18]    TPro  6.8  /  Alb  3.6  /  TBili  0.3  /  DBili  x   /  AST  25  /  ALT  8   /  AlkPhos  92  [12-23-20 @ 14:18]          Creatinine Trend:  SCr 1.73 [12-23 @ 14:18]  SCr 2.49 [12-22 @ 08:14]  SCr 4.06 [12-21 @ 12:48]  SCr 3.41 [12-20 @ 14:12]  SCr 4.47 [12-14 @ 13:33]    Urinalysis - [12-20-20 @ 16:53]      Color Yellow / Appearance Clear / SG 1.010 / pH 8.0      Gluc Negative / Ketone Trace  / Bili Negative / Urobili Negative       Blood Small / Protein 100 / Leuk Est Trace / Nitrite Negative      RBC 0-2 / WBC 3-5 / Hyaline  / Gran  / Sq Epi  / Non Sq Epi Few / Bacteria Occasional      Iron 83, TIBC 167, %sat --      [08-22-20 @ 10:45]  Ferritin 5890      [12-08-20 @ 01:15]  .7 (Ca --)      [02-02-20 @ 05:15]   --  HbA1c 4.9      [02-02-20 @ 05:15]  TSH 1.61      [12-20-20 @ 14:12]  Lipid: chol 237, TG 91, HDL 53, LDL --      [12-22-20 @ 08:14]

## 2020-12-24 NOTE — GOALS OF CARE CONVERSATION - ADVANCED CARE PLANNING - CONVERSATION DETAILS
palliative care Social Work Note.    SW contacted patients son Bernard to provide support and education regarding palliative care services. Wagner reports he and his wife care for patient at home but that patient was independent with care needs. Patient attending dialysis as per son and was managing in community. Son reports feelings that patient had stroke. SW supported son who is trying to cope with reality of changes for patient. SW addressed with son need to think about decisions on care for long term especially if had stroke and unable to regain functioning. SW also encouraged advance care planning discussions as family . Palliative care to follow.

## 2020-12-24 NOTE — PROGRESS NOTE ADULT - ASSESSMENT
Pt is an 79 yo F w/ PMHx of ESRD on HD (MWF via LUE fistula), CAD s/p remote CABG, severe AS s/p TAVR w/ pacemaker in place, HTN/HLD, recent line infection w/ bacteremia who was recently discharged from [facility] last week on IV Abx. Admitted for AMS, r/o CVA. s/p Code stroke called in ED, CTH unremarkable for acute changes. CT perfusion scan: There is an ischemic penumbra in nonvascular distribution in the left hemisphere as described. Unclear significance.     #AMS of unknown etiology, possible secondary to small left hemispheric infarct  - Pts cardiologist (Dr. Saba) states PPM is MRI compatible but deemed not compatible leads by EP and any MRI would be off-label   - Neurology recs appreciated   - No clinical s/s of new/acute infection, UA negative, CXR negative, covid negative. Blood cultures negative from 12/20  - folate, B12. TSH wnl  - Enhanced supervision, fall precautions  - Seen by speech and swallow, NPO. NGT placed, tube feeds ordered. Will place Nutrition consult.   - c/w ASA suppository  - PT rec appreciated, home  vs VARSHA   - Repeat CTH unremarkable, per Neuro may be small right hemispheric infarct    #Recent admission for pseudomonal/E coli bacteremia w/ port infection   - On prior admission. Repeat bcx negative and TTE w/o signs of vegetations. Pt was discharged on 12/14 with directions to complete course of abx as o/p w/ HD through 12/22.   - ID recs appreciated. To continue cefepime on HD days   - BC 12/20 negative, ABX completed    #ESRD on HD  - Nephrology consulted for continuation of HD while inpatient   - HD per Renal    #HTN, CAD s/p CABG, Severe AS s/p TAVR w/ PPM  - Resume home Clonidine, Coreg, uptitrate as needed  - IV hydralazine PRN  - Trop mildly elevated in setting of ESRD, monitor on tele     #Hypoglycemia, resolved  - Hypoglycemia protocol    DVT ppx: Heparin sq    Palliative consulted for assistance w/ GOC, advanced directives.    LOS/Dispo anticipation: Unclear, family still deciding about goals of care. Palliative consulted for assistance w/ GOC, advanced directives. Patient may be a candidate for hospice if no improvement in mental status.

## 2020-12-25 LAB
ALBUMIN SERPL ELPH-MCNC: 2.8 G/DL — LOW (ref 3.3–5.2)
ALP SERPL-CCNC: 69 U/L — SIGNIFICANT CHANGE UP (ref 40–120)
ALT FLD-CCNC: 5 U/L — SIGNIFICANT CHANGE UP
ANION GAP SERPL CALC-SCNC: 11 MMOL/L — SIGNIFICANT CHANGE UP (ref 5–17)
AST SERPL-CCNC: 17 U/L — SIGNIFICANT CHANGE UP
BILIRUB SERPL-MCNC: 0.2 MG/DL — LOW (ref 0.4–2)
BUN SERPL-MCNC: 17 MG/DL — SIGNIFICANT CHANGE UP (ref 8–20)
CALCIUM SERPL-MCNC: 8.4 MG/DL — LOW (ref 8.6–10.2)
CHLORIDE SERPL-SCNC: 94 MMOL/L — LOW (ref 98–107)
CO2 SERPL-SCNC: 25 MMOL/L — SIGNIFICANT CHANGE UP (ref 22–29)
CREAT SERPL-MCNC: 3.37 MG/DL — HIGH (ref 0.5–1.3)
CULTURE RESULTS: SIGNIFICANT CHANGE UP
CULTURE RESULTS: SIGNIFICANT CHANGE UP
GLUCOSE BLDC GLUCOMTR-MCNC: 109 MG/DL — HIGH (ref 70–99)
GLUCOSE BLDC GLUCOMTR-MCNC: 128 MG/DL — HIGH (ref 70–99)
GLUCOSE BLDC GLUCOMTR-MCNC: 157 MG/DL — HIGH (ref 70–99)
GLUCOSE BLDC GLUCOMTR-MCNC: 188 MG/DL — HIGH (ref 70–99)
GLUCOSE SERPL-MCNC: 98 MG/DL — SIGNIFICANT CHANGE UP (ref 70–99)
HCT VFR BLD CALC: 34.4 % — LOW (ref 34.5–45)
HGB BLD-MCNC: 9.9 G/DL — LOW (ref 11.5–15.5)
MCHC RBC-ENTMCNC: 25.5 PG — LOW (ref 27–34)
MCHC RBC-ENTMCNC: 28.8 GM/DL — LOW (ref 32–36)
MCV RBC AUTO: 88.7 FL — SIGNIFICANT CHANGE UP (ref 80–100)
PLATELET # BLD AUTO: 126 K/UL — LOW (ref 150–400)
POTASSIUM SERPL-MCNC: 3.9 MMOL/L — SIGNIFICANT CHANGE UP (ref 3.5–5.3)
POTASSIUM SERPL-SCNC: 3.9 MMOL/L — SIGNIFICANT CHANGE UP (ref 3.5–5.3)
PROT SERPL-MCNC: 5.2 G/DL — LOW (ref 6.6–8.7)
RBC # BLD: 3.88 M/UL — SIGNIFICANT CHANGE UP (ref 3.8–5.2)
RBC # FLD: 15.9 % — HIGH (ref 10.3–14.5)
SODIUM SERPL-SCNC: 130 MMOL/L — LOW (ref 135–145)
SPECIMEN SOURCE: SIGNIFICANT CHANGE UP
SPECIMEN SOURCE: SIGNIFICANT CHANGE UP
WBC # BLD: 3.11 K/UL — LOW (ref 3.8–10.5)
WBC # FLD AUTO: 3.11 K/UL — LOW (ref 3.8–10.5)

## 2020-12-25 PROCEDURE — 99233 SBSQ HOSP IP/OBS HIGH 50: CPT

## 2020-12-25 PROCEDURE — 99232 SBSQ HOSP IP/OBS MODERATE 35: CPT

## 2020-12-25 RX ADMIN — CARVEDILOL PHOSPHATE 12.5 MILLIGRAM(S): 80 CAPSULE, EXTENDED RELEASE ORAL at 06:00

## 2020-12-25 RX ADMIN — Medication 0.1 MILLIGRAM(S): at 18:53

## 2020-12-25 RX ADMIN — HEPARIN SODIUM 5000 UNIT(S): 5000 INJECTION INTRAVENOUS; SUBCUTANEOUS at 05:59

## 2020-12-25 RX ADMIN — ISOSORBIDE MONONITRATE 120 MILLIGRAM(S): 60 TABLET, EXTENDED RELEASE ORAL at 13:16

## 2020-12-25 RX ADMIN — HEPARIN SODIUM 5000 UNIT(S): 5000 INJECTION INTRAVENOUS; SUBCUTANEOUS at 22:09

## 2020-12-25 RX ADMIN — HEPARIN SODIUM 5000 UNIT(S): 5000 INJECTION INTRAVENOUS; SUBCUTANEOUS at 13:16

## 2020-12-25 RX ADMIN — Medication 81 MILLIGRAM(S): at 13:15

## 2020-12-25 RX ADMIN — ATORVASTATIN CALCIUM 40 MILLIGRAM(S): 80 TABLET, FILM COATED ORAL at 23:09

## 2020-12-25 RX ADMIN — CARVEDILOL PHOSPHATE 12.5 MILLIGRAM(S): 80 CAPSULE, EXTENDED RELEASE ORAL at 18:53

## 2020-12-25 RX ADMIN — Medication 0.1 MILLIGRAM(S): at 05:59

## 2020-12-25 RX ADMIN — CHLORHEXIDINE GLUCONATE 1 APPLICATION(S): 213 SOLUTION TOPICAL at 05:53

## 2020-12-25 NOTE — PROGRESS NOTE ADULT - SUBJECTIVE AND OBJECTIVE BOX
[facility] DIVISION OF KIDNEY DISEASES AND HYPERTENSION -- FOLLOW UP NOTE  --------------------------------------------------------------------------------  Chief Complaint:  ESRD HD    24 hour events/subjective:  HD Saturday  On COVID isolation;    PAST HISTORY  --------------------------------------------------------------------------------  No significant changes to PMH, PSH, FHx, SHx, unless otherwise noted    ALLERGIES & MEDICATIONS  --------------------------------------------------------------------------------  Allergies    codeine (Other)  Lortab (Other)  morphine (Other)  Percocet 10/325 (Other)  PPM not compatible with  MRI (Other (Fatal))  Reglan (Other; Flushing)  sulfa drugs (Flushing)    Intolerances      Standing Inpatient Medications  aspirin  chewable 81 milliGRAM(s) Oral daily  atorvastatin 40 milliGRAM(s) Oral at bedtime  carvedilol 12.5 milliGRAM(s) Oral every 12 hours  chlorhexidine 4% Liquid 1 Application(s) Topical <User Schedule>  cloNIDine 0.1 milliGRAM(s) Oral every 12 hours  dextrose 40% Gel 15 Gram(s) Oral once  dextrose 5%. 1000 milliLiter(s) IV Continuous <Continuous>  dextrose 50% Injectable 25 Gram(s) IV Push once  dextrose 50% Injectable 12.5 Gram(s) IV Push once  dextrose 50% Injectable 25 Gram(s) IV Push once  epoetin vinnie-epbx (RETACRIT) Injectable 8000 Unit(s) IV Push <User Schedule>  glucagon  Injectable 1 milliGRAM(s) IntraMuscular once  heparin   Injectable 5000 Unit(s) SubCutaneous every 8 hours  influenza   Vaccine 0.5 milliLiter(s) IntraMuscular once  isosorbide   mononitrate ER Tablet (IMDUR) 120 milliGRAM(s) Oral daily    PRN Inpatient Medications  hydrALAZINE Injectable 5 milliGRAM(s) IV Push every 6 hours PRN  LORazepam   Injectable 1 milliGRAM(s) IV Push every 4 hours PRN      REVIEW OF SYSTEMS  --------------------------------------------------------------------------------  Gen: No weight changes, fatigue, fevers/chills, weakness  Skin: No rashes  Head/Eyes/Ears/Mouth: No headache; Normal hearing; Normal vision w/o blurriness; No sinus pain/discomfort, sore throat  Respiratory: No dyspnea, cough, wheezing, hemoptysis  CV: No chest pain, PND, orthopnea  GI: No abdominal pain, diarrhea, constipation, nausea, vomiting, melena, hematochezia  : No increased frequency, dysuria, hematuria, nocturia  MSK: No joint pain/swelling; no back pain; no edema  Neuro: No dizziness/lightheadedness, weakness, seizures, numbness, tingling  Heme: No easy bruising or bleeding  Endo: No heat/cold intolerance  Psych: No significant nervousness, anxiety, stress, depression    All other systems were reviewed and are negative, except as noted.    VITALS/PHYSICAL EXAM  --------------------------------------------------------------------------------  T(C): 36.4 (12-25-20 @ 07:44), Max: 36.6 (12-24-20 @ 20:14)  HR: 60 (12-25-20 @ 07:44) (60 - 67)  BP: 136/67 (12-25-20 @ 07:44) (104/66 - 143/78)  RR: 18 (12-25-20 @ 07:44) (18 - 20)  SpO2: 100% (12-25-20 @ 07:44) (97% - 100%)  Wt(kg): --        12-24-20 @ 07:01  -  12-25-20 @ 07:00  --------------------------------------------------------  IN: 1590 mL / OUT: 0 mL / NET: 1590 mL      Physical Exam: 2/2 hospitalist on covid isolation  GENERAL: Pt lying in bed comfortably in NAD  HEAD:  Atraumatic  EYES: EOMI, PERRL, conjunctiva and sclera clear  ENT: Dry mucous membranes  NECK: No JVD  CHEST/LUNG: Clear to auscultation bilaterally; No rales, rhonchi, wheezing, or rubs. Unlabored respirations  HEART: S1, S2, Regular rate and rhythm   ABDOMEN: Bowel sounds present; Soft, Nontender, Nondistended. No guarding or rigidity   EXTREMITIES:  2+ Peripheral Pulses, brisk capillary refill. No clubbing, cyanosis, or edema, LUE AVF +thrill   NERVOUS SYSTEM:  Alert & Oriented X1 (to name), FROM x 4 extremities. Follows only simple commands   SKIN: No rashes or lesions    LABS/STUDIES  --------------------------------------------------------------------------------              9.9    3.11  >-----------<  126      [12-25-20 @ 09:20]              34.4     130  |  94  |  17.0  ----------------------------<  98      [12-25-20 @ 09:20]  3.9   |  25.0  |  3.37        Ca     8.4     [12-25-20 @ 09:20]    TPro  5.2  /  Alb  2.8  /  TBili  0.2  /  DBili  x   /  AST  17  /  ALT  5   /  AlkPhos  69  [12-25-20 @ 09:20]          Creatinine Trend:  SCr 3.37 [12-25 @ 09:20]  SCr 1.73 [12-23 @ 14:18]  SCr 2.49 [12-22 @ 08:14]  SCr 4.06 [12-21 @ 12:48]  SCr 3.41 [12-20 @ 14:12]    Urinalysis - [12-20-20 @ 16:53]      Color Yellow / Appearance Clear / SG 1.010 / pH 8.0      Gluc Negative / Ketone Trace  / Bili Negative / Urobili Negative       Blood Small / Protein 100 / Leuk Est Trace / Nitrite Negative      RBC 0-2 / WBC 3-5 / Hyaline  / Gran  / Sq Epi  / Non Sq Epi Few / Bacteria Occasional      Iron 83, TIBC 167, %sat --      [08-22-20 @ 10:45]  Ferritin 5890      [12-08-20 @ 01:15]  .7 (Ca --)      [02-02-20 @ 05:15]   --  HbA1c 4.9      [02-02-20 @ 05:15]  TSH 1.61      [12-20-20 @ 14:12]  Lipid: chol 237, TG 91, HDL 53, LDL --      [12-22-20 @ 08:14]

## 2020-12-25 NOTE — PROGRESS NOTE ADULT - SUBJECTIVE AND OBJECTIVE BOX
INFECTIOUS DISEASES AND INTERNAL MEDICINE at [place]  =======================================================  Rick Kyle MD  Diplomates American Board of Internal Medicine and Infectious Diseases  Telephone [phone]  Fax            [phone]  =======================================================    IRA ACEVEDO [id number]    Follow up: AMS    Allergies:  codeine (Other)  Lortab (Other)  morphine (Other)  Percocet 10/325 (Other)  PPM not compatible with  MRI (Other (Fatal))  Reglan (Other; Flushing)  sulfa drugs (Flushing)      Medications:  aspirin Suppository 300 milliGRAM(s) Rectal daily  chlorhexidine 4% Liquid 1 Application(s) Topical <User Schedule>  dextrose 5% + sodium chloride 0.9%. 1000 milliLiter(s) IV Continuous <Continuous>  epoetin vinnie-epbx (RETACRIT) Injectable 8000 Unit(s) IV Push <User Schedule>  heparin   Injectable 5000 Unit(s) SubCutaneous every 8 hours  hydrALAZINE Injectable 5 milliGRAM(s) IV Push every 6 hours PRN  influenza   Vaccine 0.5 milliLiter(s) IntraMuscular once  isosorbide   mononitrate ER Tablet (IMDUR) 120 milliGRAM(s) Oral daily  LORazepam   Injectable 1 milliGRAM(s) IV Push every 4 hours PRN    SOCIAL       FAMILY   FAMILY HISTORY:  No pertinent family history in first degree relatives      REVIEW OF SYSTEMS:    UNABLE TO OBTAIN            Physical Exam:  I Vital Signs Last 24 Hrs  T(C): 36.4 (25 Dec 2020 07:44), Max: 36.6 (24 Dec 2020 20:14)  T(F): 97.6 (25 Dec 2020 07:44), Max: 97.9 (25 Dec 2020 00:00)  HR: 60 (25 Dec 2020 07:44) (60 - 67)  BP: 136/67 (25 Dec 2020 07:44) (104/66 - 143/78)  BP(mean): --  RR: 18 (25 Dec 2020 07:44) (18 - 20)  SpO2: 100% (25 Dec 2020 07:44) (97% - 100%)    GEN: NAD,   HEENT: normocephalic and atraumatic. EOMI. BRITTANY.    NECK: Supple. No carotid bruits.  No lymphadenopathy or thyromegaly.  LUNGS: Clear to auscultation.  HEART: Regular rate and rhythm without murmur.  ABDOMEN: Soft, nontender, and nondistended.  Positive bowel sounds.    : No CVA tenderness  EXTREMITIES: Without any cyanosis, clubbing, rash, lesions or edema.  MSK: no joint swelling  NEUROLOGIC: ANSWERS TO NAME BU OTHERWISE NON VERBAL      Labs:  Vitals:  ============   Vital Signs Last 24 Hrs  T(C): 36.6 (24 Dec 2020 07:47), Max: 37.3 (24 Dec 2020 04:30)  T(F): 97.8 (24 Dec 2020 07:47), Max: 99.1 (24 Dec 2020 04:30)  HR: 62 (24 Dec 2020 07:47) (62 - 85)  BP: 136/76 (24 Dec 2020 07:47) (136/76 - 194/95)  BP(mean): --  RR: 18 (24 Dec 2020 07:47) (18 - 18)  SpO2: 100% (24 Dec 2020 07:47) (96% - 100%)    =======================================================  Current Antibiotics:    Other medications:  aspirin Suppository 300 milliGRAM(s) Rectal daily  chlorhexidine 4% Liquid 1 Application(s) Topical <User Schedule>  dextrose 5% + sodium chloride 0.9%. 1000 milliLiter(s) IV Continuous <Continuous>  epoetin vinnie-epbx (RETACRIT) Injectable 8000 Unit(s) IV Push <User Schedule>  heparin   Injectable 5000 Unit(s) SubCutaneous every 8 hours  influenza   Vaccine 0.5 milliLiter(s) IntraMuscular once  isosorbide   mononitrate ER Tablet (IMDUR) 120 milliGRAM(s) Oral daily      =======================================================  Labs:              1                       12.5   4.25  )-----------( 196      ( 23 Dec 2020 14:18 )             41.9   12-23    136  |  99  |  9.0  ----------------------------<  83  4.0   |  25.0  |  1.73<H>    Ca    9.0      23 Dec 2020 14:18    TPro  6.8  /  Alb  3.6  /  TBili  0.3<L>  /  DBili  x   /  AST  25  /  ALT  8   /  AlkPhos  92  12-23      Culture - Urine (collected 12-21-20 @ 03:35)  Source: .Urine Catheterized  Final Report (12-21-20 @ 22:42):    <10,000 CFU/mL Normal Urogenital Skylar    Culture - Catheter (collected 12-10-20 @ 16:30)  Source: .Catheter TIP  Final Report (12-12-20 @ 19:45):    < 15 colonies Pseudomonas aeruginosa  Organism: Pseudomonas aeruginosa (12-12-20 @ 19:45)  Organism: Pseudomonas aeruginosa (12-12-20 @ 19:45)    Sensitivities:      -  Amikacin: S <=16      -  Aztreonam: S <=4      -  Cefepime: S <=2      -  Ceftazidime: S 4      -  Ciprofloxacin: S <=0.25      -  Gentamicin: S <=2      -  Imipenem: S <=1      -  Levofloxacin: S <=0.5      -  Meropenem: S <=1      -  Piperacillin/Tazobactam: S <=8      -  Tobramycin: S <=2      Method Type: LINDA    Culture - Blood (collected 12-10-20 @ 10:42)  Source: .Blood Blood-Venous  Final Report (12-15-20 @ 11:00):    No growth at 5 days.    Culture - Blood (collected 12-10-20 @ 10:42)  Source: .Blood Blood-Peripheral  Final Report (12-15-20 @ 11:00):    No growth at 5 days.    Culture - Blood (collected 12-09-20 @ 07:45)  Source: .Blood Blood-Venous  Final Report (12-14-20 @ 09:00):    No growth at 5 days.    Culture - Blood (collected 12-08-20 @ 14:44)  Source: .Blood Blood-Venous  Gram Stain (12-09-20 @ 10:12):    Growth in aerobic bottle: Gram Negative Rods    Gram Stain performed by:    [facility]    [address] [place] [id number]    .    TYPE: (C=Critical, N=Notification, A=Abnormal) c    TESTS:  _ GS    DATE/TIME CALLED: _ 12/09/2020 10:12:03    CALLED TO: Emerald Choudhary RN    READ BACK (2 Patient Identifiers)(Y/N): _ Y    READ BACK VALUES (Y/N): _ Y    CALLED BY: Emerald Patel  Final Report (12-11-20 @ 14:09):    Growth in aerobic bottle: Pseudomonas aeruginosa  Organism: Pseudomonas aeruginosa (12-11-20 @ 14:09)  Organism: Pseudomonas aeruginosa (12-11-20 @ 14:09)    Sensitivities:      -  Amikacin: S <=16      -  Aztreonam: S 8      -  Cefepime: S 4      -  Ceftazidime: S 8      -  Ciprofloxacin: S <=0.25      -  Gentamicin: S 4      -  Imipenem: I 4      -  Levofloxacin: S <=0.5      -  Meropenem: S <=1      -  Piperacillin/Tazobactam: S <=8      -  Tobramycin: S <=2      Method Type: LINDA      Creatinine, Serum: 4.06 mg/dL (12-21-20 @ 12:48)  Creatinine, Serum: 3.41 mg/dL (12-20-20 @ 14:12)    Procalcitonin, Serum: 2.53 ng/mL (12-21-20 @ 12:48)      Ferritin, Serum: 5890 ng/mL (12-08-20 @ 01:15)      WBC Count: 4.09 K/uL (12-22-20 @ 08:14)  WBC Count: 4.55 K/uL (12-21-20 @ 12:48)  WBC Count: 4.42 K/uL (12-20-20 @ 14:12)      COVID-19 PCR: NotDetec (12-20-20 @ 15:10)  COVID-19 IgG Antibody Interpretation: Negative (12-08-20 @ 07:29)  COVID-19 IgG Antibody Index: <0.10 Index (12-08-20 @ 07:29)  COVID-19 PCR: NotDetec (12-07-20 @ 21:31)      Alkaline Phosphatase, Serum: 83 U/L (12-21-20 @ 12:48)  Alkaline Phosphatase, Serum: 83 U/L (12-20-20 @ 14:12)  Alanine Aminotransferase (ALT/SGPT): 5 U/L (12-21-20 @ 12:48)  Alanine Aminotransferase (ALT/SGPT): 9 U/L (12-20-20 @ 14:12)  Aspartate Aminotransferase (AST/SGOT): 14 U/L (12-21-20 @ 12:48)  Aspartate Aminotransferase (AST/SGOT): 38 U/L (12-20-20 @ 14:12)  Bilirubin Total, Serum: 0.2 mg/dL (12-21-20 @ 12:48)  Bilirubin Total, Serum: 0.3 mg/dL (12-20-20 @ 14:12)

## 2020-12-25 NOTE — PROGRESS NOTE ADULT - ASSESSMENT
Pt is an 81 yo F w/ PMHx of ESRD on HD (MWF via LUE fistula), CAD s/p remote CABG, severe AS s/p TAVR w/ pacemaker in place, HTN/HLD, recent line infection w/ bacteremia who was recently discharged from [facility] last week on IV Abx. Admitted for AMS, r/o CVA. s/p Code stroke called in ED, CTH unremarkable for acute changes. CT perfusion scan: There is an ischemic penumbra in nonvascular distribution in the left hemisphere as described. Unclear significance.     #AMS of unknown etiology, possible secondary to small left hemispheric infarct  - Pts cardiologist (Dr. Saba) states PPM is MRI compatible but deemed not compatible leads by EP and any MRI would be off-label   - Neurology recs appreciated   - No clinical s/s of new/acute infection, UA negative, CXR negative, covid negative. Blood cultures negative from 12/20  - folate, B12. TSH wnl  - Enhanced supervision, fall precautions  - Seen by speech and swallow, NPO. NGT placed, tube feeds ordered. Nutrition consulted appreciated. SLP f/u with improvement mental status.  - c/w ASA, statin  - PT rec appreciated, home  vs VARSHA   - Repeat CTH unremarkable, per Neuro may be small right hemispheric infarct    #Recent admission for pseudomonal/E coli bacteremia w/ port infection   - On prior admission. Repeat bcx negative and TTE w/o signs of vegetations. Pt was discharged on 12/14 with directions to complete course of abx as o/p w/ HD through 12/22.   - ID recs appreciated. To continue cefepime on HD days   - BC 12/20 negative, ABX completed    #ESRD on HD  - Nephrology consulted for continuation of HD while inpatient   - HD per Renal    #HTN, CAD s/p CABG, Severe AS s/p TAVR w/ PPM  - Resume home Clonidine, Coreg, uptitrate as needed  - IV hydralazine PRN  - Trop mildly elevated in setting of ESRD, monitor on tele     #Hypoglycemia, resolved  - Hypoglycemia protocol    DVT ppx: Heparin sq    Palliative consulted for assistance w/ GOC, advanced directives.    LOS/Dispo anticipation: Unclear, family still deciding about goals of care. Palliative consulted for assistance w/ GOC, advanced directives. Patient may be a candidate for hospice if no improvement in mental status, however mental status much improved today. Answering simple questions.    Son Wagner was updated.

## 2020-12-25 NOTE — DIETITIAN INITIAL EVALUATION ADULT. - ENTERAL
As medically feasible, change enteral formula to Nepro initiated at 20 ml/hr and advance 10 ml/hr q4 hrs until goal rate of 40 ml/hr (x20 hrs) to provide 800 ml, 1440 kcal, 65g protein, 582 ml free water, and 80% of RDIs for vitamins/minerals. Additional free water per MD discretion.

## 2020-12-25 NOTE — PROGRESS NOTE ADULT - SUBJECTIVE AND OBJECTIVE BOX
[facility] Division of Hospital Medicine  Robi Gale [phone]    Chief Complaint:  Patient is a 80y old  Female who presents with a chief complaint of AMS (24 Dec 2020 16:21)      SUBJECTIVE / OVERNIGHT EVENTS:  Patient seen and examined at bedside. No acute events reported overnight. Mental status improved. Patient much more alert today, answering simple questions. Able to name her son/grand daughter. Denies complaints.    MEDICATIONS  (STANDING):  aspirin  chewable 81 milliGRAM(s) Oral daily  atorvastatin 40 milliGRAM(s) Oral at bedtime  carvedilol 12.5 milliGRAM(s) Oral every 12 hours  chlorhexidine 4% Liquid 1 Application(s) Topical <User Schedule>  cloNIDine 0.1 milliGRAM(s) Oral every 12 hours  dextrose 40% Gel 15 Gram(s) Oral once  dextrose 5%. 1000 milliLiter(s) (50 mL/Hr) IV Continuous <Continuous>  dextrose 50% Injectable 25 Gram(s) IV Push once  dextrose 50% Injectable 12.5 Gram(s) IV Push once  dextrose 50% Injectable 25 Gram(s) IV Push once  epoetin vinnie-epbx (RETACRIT) Injectable 8000 Unit(s) IV Push <User Schedule>  glucagon  Injectable 1 milliGRAM(s) IntraMuscular once  heparin   Injectable 5000 Unit(s) SubCutaneous every 8 hours  influenza   Vaccine 0.5 milliLiter(s) IntraMuscular once  isosorbide   mononitrate ER Tablet (IMDUR) 120 milliGRAM(s) Oral daily    MEDICATIONS  (PRN):  hydrALAZINE Injectable 5 milliGRAM(s) IV Push every 6 hours PRN SBP >185  LORazepam   Injectable 1 milliGRAM(s) IV Push every 4 hours PRN Agitation        I&O's Summary    23 Dec 2020 07:01  -  24 Dec 2020 07:00  --------------------------------------------------------  IN: 0 mL / OUT: 500 mL / NET: -500 mL        PHYSICAL EXAM:  Vital Signs Last 24 Hrs  T(C): 36.6 (24 Dec 2020 07:47), Max: 37.3 (24 Dec 2020 04:30)  T(F): 97.8 (24 Dec 2020 07:47), Max: 99.1 (24 Dec 2020 04:30)  HR: 62 (24 Dec 2020 07:47) (62 - 85)  BP: 136/76 (24 Dec 2020 07:47) (136/76 - 194/95)  BP(mean): --  RR: 18 (24 Dec 2020 07:47) (18 - 18)  SpO2: 100% (24 Dec 2020 07:47) (96% - 100%)        CONSTITUTIONAL: Elderly female, more alert, answering simple questions, following simple commands, in NAD  HEENT: NC/AT, PERRL, no JVD. NGT in place  RESPIRATORY: CTA bilaterally, normal effort  CARDIOVASCULAR: RRR, S1/S2+, no m/g/r  ABDOMEN: Nontender to palpation, normoactive bowel sounds, no rebound/guarding; No hepatosplenomegaly  MUSCLOSKELETAL: No edema, cyanosis or deformities.  PSYCH: Does not follow commands. A&Ox1 (self)  NEUROLOGY: Unable to assess, moving all extremities  SKIN: No rashes; no palpable lesions  VASC: Distal pulses palpable    LABS:                        12.5   4.25  )-----------( 196      ( 23 Dec 2020 14:18 )             41.9     12-23    136  |  99  |  9.0  ----------------------------<  83  4.0   |  25.0  |  1.73<H>    Ca    9.0      23 Dec 2020 14:18    TPro  6.8  /  Alb  3.6  /  TBili  0.3<L>  /  DBili  x   /  AST  25  /  ALT  8   /  AlkPhos  92  12-23              CAPILLARY BLOOD GLUCOSE      POCT Blood Glucose.: 86 mg/dL (24 Dec 2020 12:22)  POCT Blood Glucose.: 103 mg/dL (24 Dec 2020 05:41)  POCT Blood Glucose.: 125 mg/dL (24 Dec 2020 01:12)  POCT Blood Glucose.: 111 mg/dL (23 Dec 2020 16:48)        RADIOLOGY & ADDITIONAL TESTS:  Results Reviewed:   Imaging Personally Reviewed:  Electrocardiogram Personally Reviewed:

## 2020-12-25 NOTE — PROGRESS NOTE ADULT - ASSESSMENT
80y Female with what appears to be a global aphasia with no clear hemiparesis.     AMS vs aphasia.   Presumed left hemisphere stroke.   Slight improvement.   LDL: 166  Goal: < 70  Continue antiplatelet and statin.     Hypertension   Control blood pressure.  Avoid hypotension.     ESRD   Dialysis per renal.

## 2020-12-25 NOTE — DIETITIAN INITIAL EVALUATION ADULT. - OTHER INFO
Pt is an 81 yo F w/ PMHx of ESRD on HD (MWF via LUE fistula), CAD s/p remote CABG, severe AS s/p TAVR w/ pacemaker in place, HTN/HLD, recent line infection w/ bacteremia who was recently discharged from [facility] last week on IV Abx. Admitted for AMS, r/o CVA. s/p Code stroke called in ED, CTH unremarkable for acute changes. CT perfusion scan: There is an ischemic penumbra in nonvascular distribution in the left hemisphere as described. Unclear significance. Pt confused, combative. NG tube placed by RN, wrist restraints applied, patient pulled out tube and was again replaced. PEG tube is not indicated at this time as she displays agitation. Palliative following. NGT feeds at goal rate per I&Os. Jevity 1.5 at goal rate of 40ml/hr (x20 hrs) providing 800ml, 1200kcal, 51g pro, 608ml free water. K+ and phos WNL. Last documented BM 12/24.

## 2020-12-25 NOTE — PROGRESS NOTE ADULT - ASSESSMENT
79 yo F w/ PMH Of ESRD on HD (MWF via LUE fistula), CAD s/p remote CABG, severe AS s/p TAVR w/ pacemaker in place, HTN/HLD, recent line infection w/ bacteremia who was recently discharged from [facility] last week on IV CEFEPIME FOR PSEUDOMONAS . Unable to obtain history from patient due to mental status. History obtained from CHART   patient was recently discharged from [facility] last Monday s/p bacteremia and line infection and was doing well. She was noted to become more lethargic and confused after her HD session on Friday. Last night she was having difficulty taking her night time medications.     she woke up at 4AM yelling "HEY HEY HEY." He reports that the patient is normally A&Ox3 however requires assistance w/ daily activities A patient appears in no distress however does not follow commands or answer questions. She constantly yells out "HEY HEY HEY" and per staff she has been doing this since admission.  AMS UNCLEAR ETIOLOGY ? STROKE    BLOOD CX X 2   PENDING   HAS COMPLETED CEFEPIME   WILL FOLLOWUP  BLOOD CX  URINE CX NO SIGNIFICANT    WILL Followup  MORE AWAKE TODAY     OBSERVE OFF ABX  WILL FOLLOWUP AS NEEDED PLEASE CALL IF QUESTIONS

## 2020-12-25 NOTE — PROGRESS NOTE ADULT - ASSESSMENT
ESRD on HD    HTN, CAD s/p CABG, Severe AS s/p TAVR w/ PPM    HD on Saturday    ( OP HD : [facility] )   james Gale

## 2020-12-25 NOTE — PROGRESS NOTE ADULT - SUBJECTIVE AND OBJECTIVE BOX
[facility]                                        Neurology at [place]                                 Heidi Alaniz, & Arcenio                                  [address]                                        [place], [place], [address]                                             [phone]        CC: Stroke    HPI:   The patient is a 80y Female who presented with altered mental status.   She was apparently found screaming "Hey, hey, hey" by family. She was not following instructions.   Code stroke was activated upon her arrival however the last known well time was reportedly many hours prior to arrival (around 04:00)    Interim history:  Remains on [address] [place].   Now giving name and following some instructions.     ROS:   Unobtainable due to patient's condition.     MEDICATIONS  (STANDING):  aspirin  chewable 81 milliGRAM(s) Oral daily  atorvastatin 40 milliGRAM(s) Oral at bedtime  carvedilol 12.5 milliGRAM(s) Oral every 12 hours  chlorhexidine 4% Liquid 1 Application(s) Topical <User Schedule>  cloNIDine 0.1 milliGRAM(s) Oral every 12 hours  dextrose 40% Gel 15 Gram(s) Oral once  dextrose 5%. 1000 milliLiter(s) (50 mL/Hr) IV Continuous <Continuous>  dextrose 50% Injectable 25 Gram(s) IV Push once  dextrose 50% Injectable 12.5 Gram(s) IV Push once  dextrose 50% Injectable 25 Gram(s) IV Push once  epoetin vinnie-epbx (RETACRIT) Injectable 8000 Unit(s) IV Push <User Schedule>  glucagon  Injectable 1 milliGRAM(s) IntraMuscular once  heparin   Injectable 5000 Unit(s) SubCutaneous every 8 hours  influenza   Vaccine 0.5 milliLiter(s) IntraMuscular once  isosorbide   mononitrate ER Tablet (IMDUR) 120 milliGRAM(s) Oral daily      Vital Signs Last 24 Hrs  T(C): 36.4 (25 Dec 2020 07:44), Max: 36.6 (24 Dec 2020 20:14)  T(F): 97.6 (25 Dec 2020 07:44), Max: 97.9 (25 Dec 2020 00:00)  HR: 60 (25 Dec 2020 07:44) (60 - 67)  BP: 136/67 (25 Dec 2020 07:44) (104/66 - 143/78)  RR: 18 (25 Dec 2020 07:44) (18 - 20)  SpO2: 100% (25 Dec 2020 07:44) (97% - 100%)    Detailed Neurologic Exam:    Mental status: The patient is awake and alert. Gives name and follows simple instructions.   Unable to answer further questions.     Cranial nerves: Pupils react symmetrically to light. There is no visual field deficit to confrontation. Extraocular motion: She tracks with gaze. There is no ptosis. Facial sensation is intact. Facial musculature is symmetric. Palate elevates symmetrically. Tongue is midline.    Motor/Sensory: There is normal bulk and tone.  Not following formal manual motor testing. Moving all extremity symmetrically to stimuli. Some spontaneous movement and to command.    Reflexes: Trace throughout and plantar responses are flexor.    Cerebellar: Unable to assess.     Labs:     12-25    130<L>  |  94<L>  |  17.0  ----------------------------<  98  3.9   |  25.0  |  3.37<H>    Ca    8.4<L>      25 Dec 2020 09:20    TPro  5.2<L>  /  Alb  2.8<L>  /  TBili  0.2<L>  /  DBili  x   /  AST  17  /  ALT  5   /  AlkPhos  69  12-25                            9.9    3.11  )-----------( 126      ( 25 Dec 2020 09:20 )             34.4       Rad:   ***

## 2020-12-26 PROCEDURE — 99233 SBSQ HOSP IP/OBS HIGH 50: CPT

## 2020-12-26 PROCEDURE — 99232 SBSQ HOSP IP/OBS MODERATE 35: CPT

## 2020-12-26 RX ADMIN — CARVEDILOL PHOSPHATE 12.5 MILLIGRAM(S): 80 CAPSULE, EXTENDED RELEASE ORAL at 17:44

## 2020-12-26 RX ADMIN — Medication 81 MILLIGRAM(S): at 14:06

## 2020-12-26 RX ADMIN — CARVEDILOL PHOSPHATE 12.5 MILLIGRAM(S): 80 CAPSULE, EXTENDED RELEASE ORAL at 05:17

## 2020-12-26 RX ADMIN — ISOSORBIDE MONONITRATE 120 MILLIGRAM(S): 60 TABLET, EXTENDED RELEASE ORAL at 14:07

## 2020-12-26 RX ADMIN — Medication 5 MILLIGRAM(S): at 22:30

## 2020-12-26 RX ADMIN — CHLORHEXIDINE GLUCONATE 1 APPLICATION(S): 213 SOLUTION TOPICAL at 05:17

## 2020-12-26 RX ADMIN — Medication 0.1 MILLIGRAM(S): at 17:44

## 2020-12-26 RX ADMIN — HEPARIN SODIUM 5000 UNIT(S): 5000 INJECTION INTRAVENOUS; SUBCUTANEOUS at 05:17

## 2020-12-26 RX ADMIN — HEPARIN SODIUM 5000 UNIT(S): 5000 INJECTION INTRAVENOUS; SUBCUTANEOUS at 22:05

## 2020-12-26 RX ADMIN — ATORVASTATIN CALCIUM 40 MILLIGRAM(S): 80 TABLET, FILM COATED ORAL at 22:05

## 2020-12-26 RX ADMIN — Medication 0.1 MILLIGRAM(S): at 05:17

## 2020-12-26 RX ADMIN — HEPARIN SODIUM 5000 UNIT(S): 5000 INJECTION INTRAVENOUS; SUBCUTANEOUS at 14:10

## 2020-12-26 NOTE — PROGRESS NOTE ADULT - ASSESSMENT
Pt is an 81 yo F w/ PMHx of ESRD on HD (MWF via LUE fistula), CAD s/p remote CABG, severe AS s/p TAVR w/ pacemaker in place (compatible with MRI however per leads, would be off-label if MRI done), HTN/HLD, recent line infection w/ bacteremia who was recently discharged from [facility] last week on IV Abx. Admitted for AMS, r/o CVA. s/p Code stroke called in ED, CTH unremarkable for acute changes. CT perfusion scan: There is an ischemic penumbra in nonvascular distribution in the left hemisphere as described. Unclear significance.     On last admission, patient was admitted for line infection/bacteremia.Repeat bcx negative and TTE w/o signs of vegetations. Pt was discharged on 12/14 with directions to complete course of abx as o/p w/ HD through 12/22    AMS of unknown etiology, possible secondary to small left hemispheric infarct  However, repeat ct scans are negative   + dysphagia   - Neurology recs appreciated   -no e/o infection, blood cultures negative and cxr negative along with covid negative   -ngt in place for inability to swallow, speech seeing patient   -c/w feeds for now   -mitten restraints, untied   -home vs [place] on discharge   -per Neuro may be small right hemispheric infarct  - c/w ASA, statin    Pancytopenia likely due to BM suppression with recent hospital stays and infection   -tsh, b12/folate wnl   -follow cbc     Recent admission for pseudomonal/E coli bacteremia w/ port infection   - ID recs appreciated, observe off all abx   - BC 12/20 negative, ABX completed    ESRD on HD  - Nephrology consulted for continuation of HD while inpatient   - HD per Renal    #HTN, CAD s/p CABG, Severe AS s/p TAVR w/ PPM  - Resume home Clonidine, Coreg, uptitrate as needed  - IV hydralazine PRN  - Trop mildly elevated in setting of ESRD, monitor on tele     #Hypoglycemia, resolved  - Hypoglycemia protocol    DVT ppx: Heparin sq    Palliative consulted for assistance w/ GOC, advanced directives.    LOS/Dispo anticipation: Unclear, family still deciding about goals of care. Palliative consulted for assistance w/ GOC, advanced directives. Patient may be a candidate for hospice if no improvement in mental status, however mental status much improved today. Answering simple questions.    Son Bernard was updated.     Pt is an 79 yo F w/ PMHx of ESRD on HD (MWF via LUE fistula), CAD s/p remote CABG, severe AS s/p TAVR w/ pacemaker in place (compatible with MRI however per leads, would be off-label if MRI done), HTN/HLD, recent line infection w/ bacteremia who was recently discharged from [facility] last week on IV Abx. Admitted for AMS, r/o CVA. s/p Code stroke called in ED, CTH unremarkable for acute changes. CT perfusion scan: There is an ischemic penumbra in nonvascular distribution in the left hemisphere as described. Unclear significance.     On last admission, patient was admitted for line infection/bacteremia.Repeat bcx negative and TTE w/o signs of vegetations. Pt was discharged on 12/14 with directions to complete course of abx as o/p w/ HD through 12/22    AMS of unknown etiology, possible secondary to small left hemispheric infarct  However, repeat ct scans are negative   + dysphagia   - Neurology recs appreciated   -no e/o infection, blood cultures negative and cxr negative along with covid negative   -ngt in place for inability to swallow, speech seeing patient   -c/w feeds for now   -mitten restraints, untied   -home vs [place] on discharge   -per Neuro may be small right hemispheric infarct  - c/w ASA, statin    Pancytopenia likely due to BM suppression with recent hospital stays and infection   -tsh, b12/folate wnl   -follow cbc     Recent admission for pseudomonal/E coli bacteremia w/ port infection   - ID recs appreciated, observe off all abx   - BC 12/20 negative, ABX completed    ESRD on HD with normocytic anemia (within pancytopenia)  - Nephrology consulted for continuation of HD while inpatient   - HD per Renal, c/w retacrit    HTN, CAD s/p CABG, Severe AS s/p TAVR w/ PPM  - c/w  Clonidine, Coreg, uptitrate as needed  - IV hydralazine PRN  - Trop mildly elevated in setting of ESRD, off tele     Hypoglycemia, resolved  - Hypoglycemia protocol, dextrose solution given     DVT ppx: Heparin sq    Dispo - GOC and advanced directives d/w prior hospitalist and family - still decided. Updated son Wagner on plan of care. c/w NGT for now, f/up speech and monitor. Prognosis poor. D/c planning depends on whether family will go with peg or not

## 2020-12-26 NOTE — PROGRESS NOTE ADULT - SUBJECTIVE AND OBJECTIVE BOX
Patient is a 80y old  Female who presents with a chief complaint of AMS (26 Dec 2020 14:37)    Patient seen and examined at bedside. No acute distress and no acute overnight events. States that she is doing okay. Is aware of her name,  and that she is not at home.     ROS: Not able to go over a full ROS but does state that she is not having any pain at this time.     Vital Signs Last 24 Hrs  T(C): 36.8 (26 Dec 2020 10:10), Max: 37.3 (26 Dec 2020 04:59)  T(F): 98.3 (26 Dec 2020 10:10), Max: 99.2 (26 Dec 2020 04:59)  HR: 61 (26 Dec 2020 10:10) (60 - 62)  BP: 143/64 (26 Dec 2020 10:10) (129/64 - 150/75)  RR: 18 (26 Dec 2020 10:10) (18 - 20)  SpO2: 100% (26 Dec 2020 10:10) (98% - 100%)  c/s 188    Exam:   Gen: elderly woman, alert, nad   HEENT; eomi, perrla, NGT in place   CVS: S1S2nl no m/r/g  RRR  Lungs: clear   GI: soft, nt, bs +. no suprapubic tenderness   Ext: no c/c/e. LUE AVF  Neuro: aaox2 (knows that she is not at home). Moves all 4 extremities   Skin: grossly intact on anterior assessment, was not able to evaluate posterior skin at the time     LABS:                        9.9    3.11  )-----------( 126      ( 25 Dec 2020 09:20 )             34.4   12-25    130<L>  |  94<L>  |  17.0  ----------------------------<  98  3.9   |  25.0  |  3.37<H>    Ca    8.4<L>      25 Dec 2020 09:20    TPro  5.2<L>  /  Alb  2.8<L>  /  TBili  0.2<L>  /  DBili  x   /  AST  17  /  ALT  5   /  AlkPhos  69  12-25    MEDICATIONS  (STANDING):  aspirin  chewable 81 milliGRAM(s) Oral daily  atorvastatin 40 milliGRAM(s) Oral at bedtime  carvedilol 12.5 milliGRAM(s) Oral every 12 hours  chlorhexidine 4% Liquid 1 Application(s) Topical <User Schedule>  cloNIDine 0.1 milliGRAM(s) Oral every 12 hours  dextrose 40% Gel 15 Gram(s) Oral once  dextrose 5%. 1000 milliLiter(s) (50 mL/Hr) IV Continuous <Continuous>  dextrose 50% Injectable 25 Gram(s) IV Push once  dextrose 50% Injectable 12.5 Gram(s) IV Push once  dextrose 50% Injectable 25 Gram(s) IV Push once  epoetin vinnie-epbx (RETACRIT) Injectable 8000 Unit(s) IV Push <User Schedule>  glucagon  Injectable 1 milliGRAM(s) IntraMuscular once  heparin   Injectable 5000 Unit(s) SubCutaneous every 8 hours  influenza   Vaccine 0.5 milliLiter(s) IntraMuscular once  isosorbide   mononitrate ER Tablet (IMDUR) 120 milliGRAM(s) Oral daily    MEDICATIONS  (PRN):  hydrALAZINE Injectable 5 milliGRAM(s) IV Push every 6 hours PRN SBP >185  LORazepam   Injectable 1 milliGRAM(s) IV Push every 4 hours PRN Agitation

## 2020-12-26 NOTE — PROGRESS NOTE ADULT - SUBJECTIVE AND OBJECTIVE BOX
[facility]                                        [facility] [place]                                 Heidi Alaniz, & Arcenio                                  [address]                                        [place], [place], [address]                                             [phone]        CC: Stroke    HPI:   The patient is a 80y Female who presented with altered mental status.   She was apparently found screaming "Hey, hey, hey" by family. She was not following instructions.   Code stroke was activated upon her arrival however the last known well time was reportedly many hours prior to arrival (around 04:00)    Interim history:  Remains on [address] [place].   Now speech improving and following some instructions.     ROS:   Unobtainable due to patient's condition.     MEDICATIONS  (STANDING):  aspirin  chewable 81 milliGRAM(s) Oral daily  atorvastatin 40 milliGRAM(s) Oral at bedtime  carvedilol 12.5 milliGRAM(s) Oral every 12 hours  chlorhexidine 4% Liquid 1 Application(s) Topical <User Schedule>  cloNIDine 0.1 milliGRAM(s) Oral every 12 hours  dextrose 40% Gel 15 Gram(s) Oral once  dextrose 5%. 1000 milliLiter(s) (50 mL/Hr) IV Continuous <Continuous>  dextrose 50% Injectable 25 Gram(s) IV Push once  dextrose 50% Injectable 12.5 Gram(s) IV Push once  dextrose 50% Injectable 25 Gram(s) IV Push once  epoetin vinnie-epbx (RETACRIT) Injectable 8000 Unit(s) IV Push <User Schedule>  glucagon  Injectable 1 milliGRAM(s) IntraMuscular once  heparin   Injectable 5000 Unit(s) SubCutaneous every 8 hours  influenza   Vaccine 0.5 milliLiter(s) IntraMuscular once  isosorbide   mononitrate ER Tablet (IMDUR) 120 milliGRAM(s) Oral daily      Vital Signs Last 24 Hrs  T(C): 36.8 (26 Dec 2020 10:10), Max: 37.3 (26 Dec 2020 04:59)  T(F): 98.3 (26 Dec 2020 10:10), Max: 99.2 (26 Dec 2020 04:59)  HR: 61 (26 Dec 2020 10:10) (60 - 62)  BP: 143/64 (26 Dec 2020 10:10) (129/64 - 150/75)  RR: 18 (26 Dec 2020 10:10) (18 - 20)  SpO2: 100% (26 Dec 2020 10:10) (98% - 100%)    Detailed Neurologic Exam:    Mental status: The patient is awake and alert. Answers simple questions with short phrases and follows simple instructions.     Cranial nerves: Pupils react symmetrically to light. There is no visual field deficit to confrontation. Extraocular motion: She tracks with gaze. There is no ptosis. Facial sensation is intact. Facial musculature is symmetric. Palate elevates symmetrically. Tongue is midline.    Motor/Sensory: There is normal bulk and tone.  Moving all extremity symmetrically to stimuli. Some spontaneous movement and to command.    Reflexes: Trace throughout and plantar responses are flexor.    Cerebellar: Unable to assess.     Labs:     12-25    130<L>  |  94<L>  |  17.0  ----------------------------<  98  3.9   |  25.0  |  3.37<H>    Ca    8.4<L>      25 Dec 2020 09:20    TPro  5.2<L>  /  Alb  2.8<L>  /  TBili  0.2<L>  /  DBili  x   /  AST  17  /  ALT  5   /  AlkPhos  69  12-25                            9.9    3.11  )-----------( 126      ( 25 Dec 2020 09:20 )             34.4

## 2020-12-26 NOTE — PROGRESS NOTE ADULT - ASSESSMENT
ESRD on HD    HTN, CAD s/p CABG, Severe AS s/p TAVR w/ PPM    HD on Sunday; orders in place for tomorrow    ( OP HD : [facility] )     james Villafuerte

## 2020-12-26 NOTE — PROGRESS NOTE ADULT - SUBJECTIVE AND OBJECTIVE BOX
[facility] DIVISION OF KIDNEY DISEASES AND HYPERTENSION -- FOLLOW UP NOTE  --------------------------------------------------------------------------------  Chief Complaint:ESRD HD    24 hour events/subjective:  Pt on COVID isolation  HD planned for tomorrow;       PAST HISTORY  --------------------------------------------------------------------------------  No significant changes to PMH, PSH, FHx, SHx, unless otherwise noted    ALLERGIES & MEDICATIONS  --------------------------------------------------------------------------------  Allergies    codeine (Other)  Lortab (Other)  morphine (Other)  Percocet 10/325 (Other)  PPM not compatible with  MRI (Other (Fatal))  Reglan (Other; [place])  sulfa drugs ([place])    Intolerances      Standing Inpatient Medications  aspirin  chewable 81 milliGRAM(s) Oral daily  atorvastatin 40 milliGRAM(s) Oral at bedtime  carvedilol 12.5 milliGRAM(s) Oral every 12 hours  chlorhexidine 4% Liquid 1 Application(s) Topical <User Schedule>  cloNIDine 0.1 milliGRAM(s) Oral every 12 hours  dextrose 40% Gel 15 Gram(s) Oral once  dextrose 5%. 1000 milliLiter(s) IV Continuous <Continuous>  dextrose 50% Injectable 25 Gram(s) IV Push once  dextrose 50% Injectable 12.5 Gram(s) IV Push once  dextrose 50% Injectable 25 Gram(s) IV Push once  epoetin vinnie-epbx (RETACRIT) Injectable 8000 Unit(s) IV Push <User Schedule>  glucagon  Injectable 1 milliGRAM(s) IntraMuscular once  heparin   Injectable 5000 Unit(s) SubCutaneous every 8 hours  influenza   Vaccine 0.5 milliLiter(s) IntraMuscular once  isosorbide   mononitrate ER Tablet (IMDUR) 120 milliGRAM(s) Oral daily    PRN Inpatient Medications  hydrALAZINE Injectable 5 milliGRAM(s) IV Push every 6 hours PRN  LORazepam   Injectable 1 milliGRAM(s) IV Push every 4 hours PRN      REVIEW OF SYSTEMS  --------------------------------------------------------------------------------  Unable to obtain    VITALS/PHYSICAL EXAM  --------------------------------------------------------------------------------  T(C): 36.8 (12-26-20 @ 10:10), Max: 37.3 (12-26-20 @ 04:59)  HR: 61 (12-26-20 @ 10:10) (60 - 62)  BP: 143/64 (12-26-20 @ 10:10) (129/64 - 150/75)  RR: 18 (12-26-20 @ 10:10) (18 - 20)  SpO2: 100% (12-26-20 @ 10:10) (98% - 100%)  Wt(kg): --        12-25-20 @ 07:01  -  12-26-20 @ 07:00  --------------------------------------------------------  IN: 360 mL / OUT: 0 mL / NET: 360 mL      Physical Exam: 2/2 hospitalist on covid isolation  CONSTITUTIONAL: Elderly female, more alert, answering simple questions, following simple commands, in NAD  HEENT: NC/AT, PERRL, no JVD. NGT in place  RESPIRATORY: CTA bilaterally, normal effort  CARDIOVASCULAR: RRR, S1/S2+, no m/g/r  ABDOMEN: Nontender to palpation, normoactive bowel sounds, no rebound/guarding; No hepatosplenomegaly  MUSCLOSKELETAL: No edema, cyanosis or deformities.  PSYCH: Does not follow commands. A&Ox1 (self)  NEUROLOGY: Unable to assess, moving all extremities  SKIN: No rashes; no palpable lesions  VASC: Distal pulses palpable    LABS/STUDIES  --------------------------------------------------------------------------------              9.9    3.11  >-----------<  126      [12-25-20 @ 09:20]              34.4     130  |  94  |  17.0  ----------------------------<  98      [12-25-20 @ 09:20]  3.9   |  25.0  |  3.37        Ca     8.4     [12-25-20 @ 09:20]    TPro  5.2  /  Alb  2.8  /  TBili  0.2  /  DBili  x   /  AST  17  /  ALT  5   /  AlkPhos  69  [12-25-20 @ 09:20]          Creatinine Trend:  SCr 3.37 [12-25 @ 09:20]  SCr 1.73 [12-23 @ 14:18]  SCr 2.49 [12-22 @ 08:14]  SCr 4.06 [12-21 @ 12:48]  SCr 3.41 [12-20 @ 14:12]    Urinalysis - [12-20-20 @ 16:53]      Color Yellow / Appearance Clear / SG 1.010 / pH 8.0      Gluc Negative / Ketone Trace  / Bili Negative / Urobili Negative       Blood Small / Protein 100 / Leuk Est Trace / Nitrite Negative      RBC 0-2 / WBC 3-5 / Hyaline  / Gran  / Sq Epi  / Non Sq Epi Few / Bacteria Occasional      Iron 83, TIBC 167, %sat --      [08-22-20 @ 10:45]  Ferritin 5890      [12-08-20 @ 01:15]  .7 (Ca --)      [02-02-20 @ 05:15]   --  HbA1c 4.9      [02-02-20 @ 05:15]  TSH 1.61      [12-20-20 @ 14:12]  Lipid: chol 237, TG 91, HDL 53, LDL --      [12-22-20 @ 08:14]

## 2020-12-27 DIAGNOSIS — G93.40 ENCEPHALOPATHY, UNSPECIFIED: ICD-10-CM

## 2020-12-27 DIAGNOSIS — R13.10 DYSPHAGIA, UNSPECIFIED: ICD-10-CM

## 2020-12-27 DIAGNOSIS — Z71.89 OTHER SPECIFIED COUNSELING: ICD-10-CM

## 2020-12-27 DIAGNOSIS — R53.2 FUNCTIONAL QUADRIPLEGIA: ICD-10-CM

## 2020-12-27 DIAGNOSIS — Z51.5 ENCOUNTER FOR PALLIATIVE CARE: ICD-10-CM

## 2020-12-27 DIAGNOSIS — N18.6 END STAGE RENAL DISEASE: ICD-10-CM

## 2020-12-27 LAB
APPEARANCE UR: CLEAR — SIGNIFICANT CHANGE UP
BACTERIA # UR AUTO: ABNORMAL
BILIRUB UR-MCNC: NEGATIVE — SIGNIFICANT CHANGE UP
COLOR SPEC: YELLOW — SIGNIFICANT CHANGE UP
DIFF PNL FLD: ABNORMAL
EPI CELLS # UR: ABNORMAL
GLUCOSE UR QL: NEGATIVE MG/DL — SIGNIFICANT CHANGE UP
KETONES UR-MCNC: NEGATIVE — SIGNIFICANT CHANGE UP
LEUKOCYTE ESTERASE UR-ACNC: ABNORMAL
NITRITE UR-MCNC: NEGATIVE — SIGNIFICANT CHANGE UP
PH UR: 9 — HIGH (ref 5–8)
PROT UR-MCNC: 100 MG/DL
RBC CASTS # UR COMP ASSIST: SIGNIFICANT CHANGE UP /HPF (ref 0–4)
SP GR SPEC: 1.01 — SIGNIFICANT CHANGE UP (ref 1.01–1.02)
UROBILINOGEN FLD QL: NEGATIVE MG/DL — SIGNIFICANT CHANGE UP
WBC UR QL: SIGNIFICANT CHANGE UP

## 2020-12-27 PROCEDURE — 99223 1ST HOSP IP/OBS HIGH 75: CPT

## 2020-12-27 PROCEDURE — 99233 SBSQ HOSP IP/OBS HIGH 50: CPT

## 2020-12-27 PROCEDURE — 90937 HEMODIALYSIS REPEATED EVAL: CPT

## 2020-12-27 RX ADMIN — Medication 0.1 MILLIGRAM(S): at 17:02

## 2020-12-27 RX ADMIN — CARVEDILOL PHOSPHATE 12.5 MILLIGRAM(S): 80 CAPSULE, EXTENDED RELEASE ORAL at 05:02

## 2020-12-27 RX ADMIN — HEPARIN SODIUM 5000 UNIT(S): 5000 INJECTION INTRAVENOUS; SUBCUTANEOUS at 13:49

## 2020-12-27 RX ADMIN — ERYTHROPOIETIN 8000 UNIT(S): 10000 INJECTION, SOLUTION INTRAVENOUS; SUBCUTANEOUS at 10:49

## 2020-12-27 RX ADMIN — HEPARIN SODIUM 5000 UNIT(S): 5000 INJECTION INTRAVENOUS; SUBCUTANEOUS at 05:02

## 2020-12-27 RX ADMIN — ISOSORBIDE MONONITRATE 120 MILLIGRAM(S): 60 TABLET, EXTENDED RELEASE ORAL at 12:21

## 2020-12-27 RX ADMIN — Medication 0.1 MILLIGRAM(S): at 05:02

## 2020-12-27 RX ADMIN — ATORVASTATIN CALCIUM 40 MILLIGRAM(S): 80 TABLET, FILM COATED ORAL at 21:30

## 2020-12-27 RX ADMIN — CARVEDILOL PHOSPHATE 12.5 MILLIGRAM(S): 80 CAPSULE, EXTENDED RELEASE ORAL at 17:02

## 2020-12-27 RX ADMIN — Medication 81 MILLIGRAM(S): at 12:21

## 2020-12-27 RX ADMIN — HEPARIN SODIUM 5000 UNIT(S): 5000 INJECTION INTRAVENOUS; SUBCUTANEOUS at 21:30

## 2020-12-27 RX ADMIN — Medication 5 MILLIGRAM(S): at 17:02

## 2020-12-27 RX ADMIN — CHLORHEXIDINE GLUCONATE 1 APPLICATION(S): 213 SOLUTION TOPICAL at 05:02

## 2020-12-27 NOTE — CONSULT NOTE ADULT - PROBLEM SELECTOR RECOMMENDATION 2
.  Unclear etiology, imaging-negative CVA vs general debility due to medical co-morbidities vs progression of vascular dementia  -the inability to give the son a concrete answer is making it difficult for him to make any decisions  -c/w supportive care

## 2020-12-27 NOTE — PROGRESS NOTE ADULT - SUBJECTIVE AND OBJECTIVE BOX
Patient is a 80y old  Female who presents with a chief complaint of AMS (26 Dec 2020 14:37)    Patient seen and examined at bedside. No acute distress and no acute overnight events. States that she is doing okay. Was seen in HD. Alert and awake, knows name and , but does not know the place that she is in.    ROS: Not able to go over a full ROS but does state that she is not having any pain at this time.     Vital Signs Last 24 Hrs  T(C): 36.8 (27 Dec 2020 10:50), Max: 37.4 (27 Dec 2020 00:14)  T(F): 98.3 (27 Dec 2020 10:50), Max: 99.3 (27 Dec 2020 00:14)  HR: 78 (27 Dec 2020 16:36) (60 - 78)  BP: 185/71 (27 Dec 2020 16:36) (132/60 - 185/71)  RR: 18 (27 Dec 2020 16:36) (18 - 18)  SpO2: 96% (27 Dec 2020 16:36) (96% - 100%)    Exam:   Gen: elderly woman, alert, nad   HEENT; eomi, perrla, NGT in place   CVS: S1S2nl no m/r/g  RRR  Lungs: clear   GI: soft, nt, bs +. no suprapubic tenderness   Ext: no c/c/e. LUE AVF  Neuro: aaox2 (knows that she is not at home). Moves all 4 extremities   Skin: grossly intact on anterior assessment, was not able to evaluate posterior skin at the time     LABS: No new labs                         9.9    3.11  )-----------( 126      ( 25 Dec 2020 09:20 )             34.4   12-25    130<L>  |  94<L>  |  17.0  ----------------------------<  98  3.9   |  25.0  |  3.37<H>    Ca    8.4<L>      25 Dec 2020 09:20    TPro  5.2<L>  /  Alb  2.8<L>  /  TBili  0.2<L>  /  DBili  x   /  AST  17  /  ALT  5   /  AlkPhos  69  12-25    MEDICATIONS  (STANDING):  aspirin  chewable 81 milliGRAM(s) Oral daily  atorvastatin 40 milliGRAM(s) Oral at bedtime  carvedilol 12.5 milliGRAM(s) Oral every 12 hours  chlorhexidine 4% Liquid 1 Application(s) Topical <User Schedule>  cloNIDine 0.1 milliGRAM(s) Oral every 12 hours  dextrose 40% Gel 15 Gram(s) Oral once  dextrose 5%. 1000 milliLiter(s) (50 mL/Hr) IV Continuous <Continuous>  dextrose 50% Injectable 25 Gram(s) IV Push once  dextrose 50% Injectable 12.5 Gram(s) IV Push once  dextrose 50% Injectable 25 Gram(s) IV Push once  epoetin vinnie-epbx (RETACRIT) Injectable 8000 Unit(s) IV Push <User Schedule>  glucagon  Injectable 1 milliGRAM(s) IntraMuscular once  heparin   Injectable 5000 Unit(s) SubCutaneous every 8 hours  influenza   Vaccine 0.5 milliLiter(s) IntraMuscular once  isosorbide   mononitrate ER Tablet (IMDUR) 120 milliGRAM(s) Oral daily    MEDICATIONS  (PRN):  hydrALAZINE Injectable 5 milliGRAM(s) IV Push every 6 hours PRN SBP >185  LORazepam   Injectable 1 milliGRAM(s) IV Push every 4 hours PRN Agitation                                         Patient is a 80y old  Female who presents with a chief complaint of AMS (26 Dec 2020 14:37)    Patient seen and examined at bedside. No acute distress and no acute overnight events. States that she is doing okay. Was seen in HD. Alert and awake, knows name and , but does not know the place that she is in.    ROS: Not able to go over a full ROS but does state that she is not having any pain at this time.     Vital Signs Last 24 Hrs  T(C): 36.8 (27 Dec 2020 10:50), Max: 37.4 (27 Dec 2020 00:14)  T(F): 98.3 (27 Dec 2020 10:50), Max: 99.3 (27 Dec 2020 00:14)  HR: 78 (27 Dec 2020 16:36) (60 - 78)  BP: 185/71 (27 Dec 2020 16:36) (132/60 - 185/71)  RR: 18 (27 Dec 2020 16:36) (18 - 18)  SpO2: 96% (27 Dec 2020 16:36) (96% - 100%)    Exam:   Gen: elderly woman, alert, nad   HEENT; eomi, perrla, NGT in place   CVS: S1S2nl no m/r/g  RRR  Lungs: clear   GI: soft, nt, bs +. no suprapubic tenderness   Ext: no c/c/e. LUE AVF  Neuro: aaox2 (knows that she is not at home). Moves all 4 extremities   Skin: grossly intact on anterior assessment, was not able to evaluate posterior skin at the time (was in dialysis) - d/w RN who did skin assessment - no open wounds on posterior skin     LABS: No new labs                         9.9    3.11  )-----------( 126      ( 25 Dec 2020 09:20 )             34.4   12-25    130<L>  |  94<L>  |  17.0  ----------------------------<  98  3.9   |  25.0  |  3.37<H>    Ca    8.4<L>      25 Dec 2020 09:20    TPro  5.2<L>  /  Alb  2.8<L>  /  TBili  0.2<L>  /  DBili  x   /  AST  17  /  ALT  5   /  AlkPhos  69  12-25    MEDICATIONS  (STANDING):  aspirin  chewable 81 milliGRAM(s) Oral daily  atorvastatin 40 milliGRAM(s) Oral at bedtime  carvedilol 12.5 milliGRAM(s) Oral every 12 hours  chlorhexidine 4% Liquid 1 Application(s) Topical <User Schedule>  cloNIDine 0.1 milliGRAM(s) Oral every 12 hours  dextrose 40% Gel 15 Gram(s) Oral once  dextrose 5%. 1000 milliLiter(s) (50 mL/Hr) IV Continuous <Continuous>  dextrose 50% Injectable 25 Gram(s) IV Push once  dextrose 50% Injectable 12.5 Gram(s) IV Push once  dextrose 50% Injectable 25 Gram(s) IV Push once  epoetin vinnie-epbx (RETACRIT) Injectable 8000 Unit(s) IV Push <User Schedule>  glucagon  Injectable 1 milliGRAM(s) IntraMuscular once  heparin   Injectable 5000 Unit(s) SubCutaneous every 8 hours  influenza   Vaccine 0.5 milliLiter(s) IntraMuscular once  isosorbide   mononitrate ER Tablet (IMDUR) 120 milliGRAM(s) Oral daily    MEDICATIONS  (PRN):  hydrALAZINE Injectable 5 milliGRAM(s) IV Push every 6 hours PRN SBP >185  LORazepam   Injectable 1 milliGRAM(s) IV Push every 4 hours PRN Agitation

## 2020-12-27 NOTE — CONSULT NOTE ADULT - SUBJECTIVE AND OBJECTIVE BOX
[facility] Geriatrics and Palliative Care  Christo Calero, Palliative Care Attending  Contact Info: message on Microsoft Teams (Christo Calero)    HPI:  79 yo F w/ PMH Of ESRD on HD (MWF via LUE fistula), CAD s/p remote CABG, severe AS s/p TAVR w/ pacemaker in place, HTN/HLD, recent line infection w/ bacteremia who was recently discharged from [facility] last week on IV Abx. Unable to obtain history from patient due to mental status. History obtained from and son. As per son, patient was recently discharged from [facility] last Monday s/p bacteremia and line infection and was doing well. She was noted to become more lethargic and confused after her HD session on Friday. Last night she was having difficulty taking her night time medications. This morning she woke up at 4AM yelling "HEY HEY HEY." He reports that the patient is normally A&Ox3 however requires assistance w/ daily activities At time of my assessment, patient appears in no distress however does not follow commands or answer questions. She constantly yells out "HEY HEY HEY" and per staff she has been doing this since admission. Code stroke was activated in the ED.   (20 Dec 2020 17:11)    PERTINENT PM/SXH:   Thyroid nodule    Severe aortic stenosis    Osteoarthritis    CKD (chronic kidney disease) stage 4, GFR 15-29 ml/min    DM2 (diabetes mellitus, type 2)    Arthritis    CAD (coronary artery disease)    Gout    Anemia Associated with Chronic Renal Failure    HTN (Hypertension)    HTN (Hypertension)    Diabetes      S/P AVR    History of pacemaker    A-V fistula    S/P cholecystectomy    Stented coronary artery    S/P CABG (coronary artery bypass graft)      FAMILY HISTORY:  No pertinent family history in first degree relatives      ITEMS NOT CHECKED ARE NOT PRESENT    SOCIAL HISTORY:   Significant other/partner:  []  Children:  []  Yazidism/Spirituality:  Substance hx:  []   Tobacco hx:  []   Alcohol hx: []   Home Opioid hx:  [] I-Stop Reference No: [id number]  -no active Rx's  Living Situation: []Home  []Long term care  []Rehab []Other    ADVANCE DIRECTIVES:    DNR  []MOLST  []Living Will  DECISION MAKER(s):  [] Health Care Proxy(s)  [] Surrogate(s)  [] Guardian           Name(s):              Phone Number(s):    BASELINE (I)ADL(s) (prior to admission):  Daisy: []Total  [] Moderate []Dependent    ALLERGIES:  codeine (Other)  Lortab (Other)  morphine (Other)  Percocet 10/325 (Other)  PPM not compatible with  MRI (Other (Fatal))  Reglan (Other; Flushing)  sulfa drugs (Flushing)    MEDICATIONS  (STANDING):  aspirin  chewable 81 milliGRAM(s) Oral daily  atorvastatin 40 milliGRAM(s) Oral at bedtime  carvedilol 12.5 milliGRAM(s) Oral every 12 hours  chlorhexidine 4% Liquid 1 Application(s) Topical <User Schedule>  cloNIDine 0.1 milliGRAM(s) Oral every 12 hours  dextrose 40% Gel 15 Gram(s) Oral once  dextrose 5%. 1000 milliLiter(s) (50 mL/Hr) IV Continuous <Continuous>  dextrose 50% Injectable 25 Gram(s) IV Push once  dextrose 50% Injectable 12.5 Gram(s) IV Push once  dextrose 50% Injectable 25 Gram(s) IV Push once  epoetin vinnie-epbx (RETACRIT) Injectable 8000 Unit(s) IV Push <User Schedule>  glucagon  Injectable 1 milliGRAM(s) IntraMuscular once  heparin   Injectable 5000 Unit(s) SubCutaneous every 8 hours  influenza   Vaccine 0.5 milliLiter(s) IntraMuscular once  isosorbide   mononitrate ER Tablet (IMDUR) 120 milliGRAM(s) Oral daily    MEDICATIONS  (PRN):  hydrALAZINE Injectable 5 milliGRAM(s) IV Push every 6 hours PRN SBP >185  LORazepam   Injectable 1 milliGRAM(s) IV Push every 4 hours PRN Agitation    PRESENT SYMPTOMS: []Unable to obtain due to poor mentation/encephalopathy  Source if other than patient:  []Family   []Team     Pain: [ ] yes [ ] no  QOL impact -   Location -                    Aggravating factors -  Quality -  Radiation -  Timing -  Severity (0-10 scale):  Minimal acceptable level (0-10 scale):    PAIN AD Score:  [email or web address] (press ctrl +  left click to view)    Dyspnea:                           []Mild  []Moderate []Severe  Anxiety:                             []Mild []Moderate []Severe  Fatigue:                             []Mild []Moderate []Severe  Nausea:                             []Mild []Moderate []Severe  Loss of appetite:              []Mild []Moderate []Severe  Constipation:                    []Mild []Moderate []Severe    Other Symptoms:  []All other review of systems negative     Palliative Performance Status Version 2:  %    http://npcrc.org/files/news/palliative_performance_scale_[email or web address].pdf    PHYSICAL EXAM:  Vital Signs Last 24 Hrs  T(C): 36.8 (27 Dec 2020 10:50), Max: 37.4 (27 Dec 2020 00:14)  T(F): 98.3 (27 Dec 2020 10:50), Max: 99.3 (27 Dec 2020 00:14)  HR: 64 (27 Dec 2020 14:00) (60 - 70)  BP: 156/67 (27 Dec 2020 14:00) (132/60 - 170/70)  BP(mean): --  RR: 18 (27 Dec 2020 10:50) (18 - 18)  SpO2: 98% (27 Dec 2020 10:50) (96% - 100%) I&O's Summary    27 Dec 2020 07:01  -  27 Dec 2020 16:07  --------------------------------------------------------  IN: 500 mL / OUT: 0 mL / NET: 500 mL    GENERAL:  []Alert  []Oriented x   []Lethargic  []Cachexia  []Unarousable  []Verbal  []Non-Verbal  Behavioral:   [] Anxiety  [] Delirium [] Agitation [] Other  HEENT:  []Normal   []Dry mouth   []ET Tube/Trach  []Oral lesions  PULMONARY:   []Clear []Tachypnea  []Audible excessive secretions   []Rhonchi        []Right []Left []Bilateral  []Crackles        []Right []Left []Bilateral  []Wheezing     []Right []Left []Bilateral  CARDIOVASCULAR:    []Regular []Irregular []Tachy  []Danny []Murmur []Other  GASTROINTESTINAL:  []Soft  []Distended   []+BS  []Non tender []Tender  []PEG []OGT/ NGT  Last BM:     GENITOURINARY:  []Normal [] Incontinent   []Oliguria/Anuria   []Morris  MUSCULOSKELETAL:   []Normal   []Weakness  []Bed/Wheelchair bound []Edema  NEUROLOGIC:   []No focal deficits  [] Cognitive impairment  [] Dysphagia []Dysarthria [] Paresis []Other   SKIN:   []Normal   []Pressure ulcer(s)  []Rash    CRITICAL CARE:  [ ] Shock Present  [ ]Septic [ ]Cardiogenic [ ]Neurologic [ ]Hypovolemic  [ ]  Vasopressors [ ]  Inotropes   [ ] Respiratory failure present [ ] Mechanical Ventilation [ ] Non-invasive ventilatory support [ ] High-Flow  [ ] Acute  [ ] Chronic [ ] Hypoxic  [ ] Hypercarbic [ ] Other  [ ] Other organ failure    LABS:    RADIOLOGY & ADDITIONAL STUDIES:  < from: CT Head No Cont (12.24.20 @ 11:08) >  1)  chronic ischemic changes again noted in conjunction with volume loss and involutional change. No acute abnormality suggested.  2)  chronic pituitary adenoma    PROTEIN CALORIE MALNUTRITION PRESENT: [ ]mild [ ]moderate [ ]severe [ ]underweight [ ]morbid obesity  []PPSV2 < or = to 30% []significant weight loss  []poor nutritional intake []catabolic state []anasarca     Albumin, Serum: 2.8 g/dL (12-25-20 @ 09:20)  Artificial Nutrition []     REFERRALS:   []Chaplaincy  []Hospice  []Child Life  [x]Social Work  []Case management []Holistic Therapy     Goals of Care Document: ELEANOR Jefferson (12-24-20 @ 14:14)  Goals of Care Conversation:   Advance Directives:  · Caregiver:  declines    Conversation Discussion:  · Conversation Details  palliative care Social Work Note.    SW contacted patients son Bernard to provide support and education regarding palliative care services. Wagner reports he and his wife care for patient at home but that patient was independent with care needs. Patient attending dialysis as per son and was managing in community. Son reports feelings that patient had stroke. SW supported son who is trying to cope with reality of changes for patient. SW addressed with son need to think about decisions on care for long term especially if had stroke and unable to regain functioning. SW also encouraged advance care planning discussions as family . Palliative care to follow.      Electronic Signatures:  Andreina Jefferson ([facility])  (Signed 24-Dec-2020 14:17)  	Authored: Goals of Care Conversation      Last Updated: 24-Dec-2020 14:17 by Andreina Jefferson (MSW)      Care Coordination Assessment 201 [C. Provider] (12-21-20 @ 11:53)    [facility] Geriatrics and Palliative Care  Christo Calero, Palliative Care Attending  Contact Info: message on Microsoft Teams (Christo Calero)    HPI:  79 yo F w/ PMH Of ESRD on HD (MWF via LUE fistula), CAD s/p remote CABG, severe AS s/p TAVR w/ pacemaker in place, HTN/HLD, recent line infection w/ bacteremia who was recently discharged from [facility] last week on IV Abx. Unable to obtain history from patient due to mental status. History obtained from and son. As per son, patient was recently discharged from [facility] last Monday s/p bacteremia and line infection and was doing well. She was noted to become more lethargic and confused after her HD session on Friday. Last night she was having difficulty taking her night time medications. This morning she woke up at 4AM yelling "HEY HEY HEY." He reports that the patient is normally A&Ox3 however requires assistance w/ daily activities At time of my assessment, patient appears in no distress however does not follow commands or answer questions. She constantly yells out "HEY HEY HEY" and per staff she has been doing this since admission. Code stroke was activated in the ED.   (20 Dec 2020 17:11)    Patient seen and examined at bedside. Awake and conversant but still confused. Unable to remember that she went to HD earlier today and unable to tell that she is in the hospital. Denies any pain or dyspnea. Collateral obtained from chart and from patient's son.    PERTINENT PM/SXH:   Thyroid nodule    Severe aortic stenosis    Osteoarthritis    CKD (chronic kidney disease) stage 4, GFR 15-29 ml/min    DM2 (diabetes mellitus, type 2)    Arthritis    CAD (coronary artery disease)    Gout    Anemia Associated with Chronic Renal Failure    HTN (Hypertension)    HTN (Hypertension)    Diabetes      S/P AVR    History of pacemaker    A-V fistula    S/P cholecystectomy    Stented coronary artery    S/P CABG (coronary artery bypass graft)      FAMILY HISTORY:  No pertinent family history in first degree relatives      ITEMS NOT CHECKED ARE NOT PRESENT    SOCIAL HISTORY:   Significant other/partner:  []  Children:  [x]  Religious/Spirituality:  Substance hx:  []   Tobacco hx:  []   Alcohol hx: []   Home Opioid hx:  [] I-Stop Reference No: [id number]  -no active Rx's  Living Situation: [x]Home  []Long term care  []Rehab []Other  -patient's son and his wife are primary caregivers    ADVANCE DIRECTIVES:    DNR  []MOLST  []Living Will  DECISION MAKER(s):  [] Health Care Proxy(s)  [x] Surrogate(s)  [] Guardian           Name(s): Bernard Seymour             Phone Number(s): [phone]    BASELINE (I)ADL(s) (prior to admission):  [place]: []Total  [] Moderate [x]Dependent    ALLERGIES:  codeine (Other)  Lortab (Other)  morphine (Other)  Percocet 10/325 (Other)  PPM not compatible with  MRI (Other (Fatal))  Reglan (Other; Flushing)  sulfa drugs (Flushing)    MEDICATIONS  (STANDING):  aspirin  chewable 81 milliGRAM(s) Oral daily  atorvastatin 40 milliGRAM(s) Oral at bedtime  carvedilol 12.5 milliGRAM(s) Oral every 12 hours  chlorhexidine 4% Liquid 1 Application(s) Topical <User Schedule>  cloNIDine 0.1 milliGRAM(s) Oral every 12 hours  dextrose 40% Gel 15 Gram(s) Oral once  dextrose 5%. 1000 milliLiter(s) (50 mL/Hr) IV Continuous <Continuous>  dextrose 50% Injectable 25 Gram(s) IV Push once  dextrose 50% Injectable 12.5 Gram(s) IV Push once  dextrose 50% Injectable 25 Gram(s) IV Push once  epoetin vinnie-epbx (RETACRIT) Injectable 8000 Unit(s) IV Push <User Schedule>  glucagon  Injectable 1 milliGRAM(s) IntraMuscular once  heparin   Injectable 5000 Unit(s) SubCutaneous every 8 hours  influenza   Vaccine 0.5 milliLiter(s) IntraMuscular once  isosorbide   mononitrate ER Tablet (IMDUR) 120 milliGRAM(s) Oral daily    MEDICATIONS  (PRN):  hydrALAZINE Injectable 5 milliGRAM(s) IV Push every 6 hours PRN SBP >185  LORazepam   Injectable 1 milliGRAM(s) IV Push every 4 hours PRN Agitation    PRESENT SYMPTOMS: []Unable to obtain due to poor mentation/encephalopathy  Source if other than patient:  []Family   []Team     Pain: [ ] yes [x] no  QOL impact -   Location -                    Aggravating factors -  Quality -  Radiation -  Timing -  Severity (0-10 scale):  Minimal acceptable level (0-10 scale):    PAIN AD Score: 0  [email or web address] (press ctrl +  left click to view)    Dyspnea:                           []Mild  []Moderate []Severe  Anxiety:                             []Mild []Moderate []Severe  Fatigue:                             []Mild []Moderate []Severe  Nausea:                             []Mild []Moderate []Severe  Loss of appetite:              []Mild []Moderate []Severe  Constipation:                    []Mild []Moderate []Severe    Other Symptoms:  [x]All other review of systems negative     Palliative Performance Status Version 2:  30%    [email or web address]/files/[email or web address]_performance_scale_[email or web address]    PHYSICAL EXAM:  Vital Signs Last 24 Hrs  T(C): 36.8 (27 Dec 2020 10:50), Max: 37.4 (27 Dec 2020 00:14)  T(F): 98.3 (27 Dec 2020 10:50), Max: 99.3 (27 Dec 2020 00:14)  HR: 64 (27 Dec 2020 14:00) (60 - 70)  BP: 156/67 (27 Dec 2020 14:00) (132/60 - 170/70)  BP(mean): --  RR: 18 (27 Dec 2020 10:50) (18 - 18)  SpO2: 98% (27 Dec 2020 10:50) (96% - 100%) I&O's Summary    27 Dec 2020 07:01  -  27 Dec 2020 16:07  --------------------------------------------------------  IN: 500 mL / OUT: 0 mL / NET: 500 mL    GENERAL:  [x]Alert  [x]Oriented x1   []Lethargic  []Cachexia  []Unarousable  [x]Verbal  []Non-Verbal  Behavioral:   [] Anxiety  [x] Delirium [] Agitation [] Other  HEENT:  []Normal   [x]Dry mouth   []ET Tube/Trach  []Oral lesions  PULMONARY:   [x]Clear []Tachypnea  []Audible excessive secretions   []Rhonchi        []Right []Left []Bilateral  []Crackles        []Right []Left []Bilateral  []Wheezing     []Right []Left []Bilateral  CARDIOVASCULAR:    [x]Regular []Irregular []Tachy  []Danny []Murmur []Other  GASTROINTESTINAL:  [x]Soft  []Distended   []+BS  []Non tender []Tender  []PEG [x]NGT  Last BM:   GENITOURINARY:  []Normal [] Incontinent   [x]Oliguria/Anuria   []Morris  MUSCULOSKELETAL:   []Normal   [x]Weakness  []Bed/Wheelchair bound []Edema  NEUROLOGIC:   []No focal deficits  [x] Cognitive impairment  [] Dysphagia []Dysarthria [] Paresis []Other   SKIN:   [x]Normal   []Pressure ulcer(s)  []Rash    CRITICAL CARE:  [ ] Shock Present  [ ]Septic [ ]Cardiogenic [ ]Neurologic [ ]Hypovolemic  [ ]  Vasopressors [ ]  Inotropes   [ ] Respiratory failure present [ ] Mechanical Ventilation [ ] Non-invasive ventilatory support [ ] High-Flow  [ ] Acute  [ ] Chronic [ ] Hypoxic  [ ] Hypercarbic [ ] Other  [ ] Other organ failure    LABS: Personally Reviewed    RADIOLOGY & ADDITIONAL STUDIES:  < from: CT Head No Cont (12.24.20 @ 11:08) >  1)  chronic ischemic changes again noted in conjunction with volume loss and involutional change. No acute abnormality suggested.  2)  chronic pituitary adenoma    PROTEIN CALORIE MALNUTRITION PRESENT: [ ]mild [ ]moderate [ ]severe [ ]underweight [ ]morbid obesity  []PPSV2 < or = to 30% []significant weight loss  []poor nutritional intake []catabolic state []anasarca     Albumin, Serum: 2.8 g/dL (12-25-20 @ 09:20)  Artificial Nutrition []     REFERRALS:   []Chaplaincy  []Hospice  []Child Life  [x]Social Work  []Case management []Holistic Therapy     Goals of Care Document: ELEANOR Jefferson (12-24-20 @ 14:14)  Goals of Care Conversation:   Advance Directives:  · Caregiver:  declines    Conversation Discussion:  · Conversation Details  palliative care Social Work Note.    GWENDOLYN contacted patients son Bernard to provide support and education regarding palliative care services. Wagner reports he and his wife care for patient at home but that patient was independent with care needs. Patient attending dialysis as per son and was managing in community. Son reports feelings that patient had stroke. SW supported son who is trying to cope with reality of changes for patient. GWENDOLYN addressed with son need to think about decisions on care for long term especially if had stroke and unable to regain functioning. SW also encouraged advance care planning discussions as family . Palliative care to follow.      Electronic Signatures:  Andreina Jefferson ([facility])  (Signed 24-Dec-2020 14:17)  	Authored: Goals of Care Conversation      Last Updated: 24-Dec-2020 14:17 by Andreina Jefferson ([facility])      Care Coordination Assessment 201 [C. Provider] (12-21-20 @ 11:53)

## 2020-12-27 NOTE — CONSULT NOTE ADULT - CONSULT REASON
AMS
ESRD
Complex Medical Decision Making/GOC in the setting of Advanced Illness and Dysphagia
stroke

## 2020-12-27 NOTE — CONSULT NOTE ADULT - PROBLEM SELECTOR PROBLEM 2
Benefits, risks, and possible complications of procedure explained to patient/caregiver who verbalized understanding and gave verbal consent. Acute encephalopathy

## 2020-12-27 NOTE — CONSULT NOTE ADULT - PROBLEM SELECTOR RECOMMENDATION 9
.  In the setting of encephalopathy  -remains on TF through NGT  -discussed with son, he is asking for re-evaluation from [facility]  -explained that NGT is temporary solution, if S&S still recommends non-oral nutrition then a decision between PEG vs pleasure feeds will need to be made in order to transition patient from the hospital  -son seems to be leaning towards giving the patient an opportunity to recover so this may result in request for PEG

## 2020-12-27 NOTE — CONSULT NOTE ADULT - PROBLEM SELECTOR RECOMMENDATION 5
.  Patient remains FULL CODE, discussed again with son  -he states that the patient never discussed a DNR so he will continue to accept all medical interventions at this time  -he states that if the patient were decompensating further, then he would re-evaluate this decision

## 2020-12-27 NOTE — CONSULT NOTE ADULT - PROBLEM SELECTOR RECOMMENDATION 4
.  Chronically on HD  -no intention of stopping HD  -if hospice were pursued, patient would likely have to forego HD which is not in line with current GOC

## 2020-12-27 NOTE — PROGRESS NOTE ADULT - SUBJECTIVE AND OBJECTIVE BOX
[facility] DIVISION OF KIDNEY DISEASES AND HYPERTENSION -- FOLLOW UP NOTE  --------------------------------------------------------------------------------  Chief Complaint:  ESRD HD    24 hour events/subjective:  Pt on COVID isolation  HD today reevaluated    PAST HISTORY  --------------------------------------------------------------------------------  No significant changes to PMH, PSH, FHx, SHx, unless otherwise noted    ALLERGIES & MEDICATIONS  --------------------------------------------------------------------------------  Allergies    codeine (Other)  Lortab (Other)  morphine (Other)  Percocet 10/325 (Other)  PPM not compatible with  MRI (Other (Fatal))  Reglan (Other; Flushing)  sulfa drugs (Flushing)    Intolerances      Standing Inpatient Medications  aspirin  chewable 81 milliGRAM(s) Oral daily  atorvastatin 40 milliGRAM(s) Oral at bedtime  carvedilol 12.5 milliGRAM(s) Oral every 12 hours  chlorhexidine 4% Liquid 1 Application(s) Topical <User Schedule>  cloNIDine 0.1 milliGRAM(s) Oral every 12 hours  dextrose 40% Gel 15 Gram(s) Oral once  dextrose 5%. 1000 milliLiter(s) IV Continuous <Continuous>  dextrose 50% Injectable 25 Gram(s) IV Push once  dextrose 50% Injectable 12.5 Gram(s) IV Push once  dextrose 50% Injectable 25 Gram(s) IV Push once  epoetin vinnie-epbx (RETACRIT) Injectable 8000 Unit(s) IV Push <User Schedule>  glucagon  Injectable 1 milliGRAM(s) IntraMuscular once  heparin   Injectable 5000 Unit(s) SubCutaneous every 8 hours  influenza   Vaccine 0.5 milliLiter(s) IntraMuscular once  isosorbide   mononitrate ER Tablet (IMDUR) 120 milliGRAM(s) Oral daily    PRN Inpatient Medications  hydrALAZINE Injectable 5 milliGRAM(s) IV Push every 6 hours PRN  LORazepam   Injectable 1 milliGRAM(s) IV Push every 4 hours PRN      REVIEW OF SYSTEMS  --------------------------------------------------------------------------------  Gen: No weight changes, fatigue, fevers/chills, weakness  Skin: No rashes  Head/Eyes/Ears/Mouth: No headache; Normal hearing; Normal vision w/o blurriness; No sinus pain/discomfort, sore throat  Respiratory: No dyspnea, cough, wheezing, hemoptysis  CV: No chest pain, PND, orthopnea  GI: No abdominal pain, diarrhea, constipation, nausea, vomiting, melena, hematochezia  : No increased frequency, dysuria, hematuria, nocturia  MSK: No joint pain/swelling; no back pain; no edema  Neuro: No dizziness/lightheadedness, weakness, seizures, numbness, tingling  Heme: No easy bruising or bleeding  Endo: No heat/cold intolerance  Psych: No significant nervousness, anxiety, stress, depression    All other systems were reviewed and are negative, except as noted.    VITALS/PHYSICAL EXAM  --------------------------------------------------------------------------------  T(C): 36.8 (12-27-20 @ 10:50), Max: 37.4 (12-27-20 @ 00:14)  HR: 68 (12-27-20 @ 10:50) (60 - 70)  BP: 151/64 (12-27-20 @ 10:50) (132/60 - 170/70)  RR: 18 (12-27-20 @ 10:50) (18 - 18)  SpO2: 98% (12-27-20 @ 10:50) (96% - 100%)  Wt(kg): --        12-27-20 @ 07:01  -  12-27-20 @ 12:39  --------------------------------------------------------  IN: 500 mL / OUT: 0 mL / NET: 500 mL      Physical Exam: 2/2 hospitalist on covid isolation  CONSTITUTIONAL: Elderly female, more alert, answering simple questions, following simple commands, in NAD  HEENT: NC/AT, PERRL, no JVD. NGT in place  RESPIRATORY: CTA bilaterally, normal effort  CARDIOVASCULAR: RRR, S1/S2+, no m/g/r  ABDOMEN: Nontender to palpation, normoactive bowel sounds, no rebound/guarding; No hepatosplenomegaly  MUSCLOSKELETAL: No edema, cyanosis or deformities.  PSYCH: Does not follow commands. A&Ox1 (self)  NEUROLOGY: Unable to assess, moving all extremities  SKIN: No rashes; no palpable lesions  VASC: Distal pulses palpable      LABS/STUDIES  --------------------------------------------------------------------------------    Creatinine Trend:  SCr 3.37 [12-25 @ 09:20]  SCr 1.73 [12-23 @ 14:18]  SCr 2.49 [12-22 @ 08:14]  SCr 4.06 [12-21 @ 12:48]  SCr 3.41 [12-20 @ 14:12]    Urinalysis - [12-20-20 @ 16:53]      Color Yellow / Appearance Clear / SG 1.010 / pH 8.0      Gluc Negative / Ketone Trace  / Bili Negative / Urobili Negative       Blood Small / Protein 100 / Leuk Est Trace / Nitrite Negative      RBC 0-2 / WBC 3-5 / Hyaline  / Gran  / Sq Epi  / Non Sq Epi Few / Bacteria Occasional      Iron 83, TIBC 167, %sat --      [08-22-20 @ 10:45]  Ferritin 5890      [12-08-20 @ 01:15]  .7 (Ca --)      [02-02-20 @ 05:15]   --  HbA1c 4.9      [02-02-20 @ 05:15]  TSH 1.61      [12-20-20 @ 14:12]  Lipid: chol 237, TG 91, HDL 53, LDL --      [12-22-20 @ 08:14]

## 2020-12-27 NOTE — PROGRESS NOTE ADULT - ASSESSMENT
Pt is an 79 yo F w/ PMHx of ESRD on HD (MWF via LUE fistula), CAD s/p remote CABG, severe AS s/p TAVR w/ pacemaker in place (compatible with MRI however per leads, would be off-label if MRI done), HTN/HLD, recent line infection w/ bacteremia who was recently discharged from [facility] last week on IV Abx. Admitted for AMS, r/o CVA. s/p Code stroke called in ED, CTH unremarkable for acute changes. CT perfusion scan: There is an ischemic penumbra in nonvascular distribution in the left hemisphere as described. Unclear significance.     On last admission, patient was admitted for line infection/bacteremia. Repeat bcx negative and TTE w/o signs of vegetations. Pt was discharged on 12/14 with directions to complete course of abx as o/p w/ HD through 12/22    AMS of unknown etiology, possible secondary to small left hemispheric infarct  However, repeat ct scans are negative   + dysphagia   - Neurology recs appreciated   -no e/o infection, blood cultures negative and cxr negative along with covid negative   -ngt in place for inability to swallow, speech seeing patient; will request f/up in am   -c/w feeds for now   -mitten restraints, untied - reordered   -home vs Angel on discharge   -per Neuro may be small left hemispheric infarct  - c/w ASA, statin    Pancytopenia likely due to BM suppression with recent hospital stays and infection   -tsh, b12/folate wnl   -follow cbc in am    Recent admission for pseudomonal/E coli bacteremia w/ port infection   - ID recs appreciated, observe off all abx   - BC 12/20 negative, ABX completed    ESRD on HD with normocytic anemia (within pancytopenia)  - Nephrology consulted for continuation of HD while inpatient   - HD per Renal, c/w retacrit    HTN, CAD s/p CABG, Severe AS s/p TAVR w/ PPM  - c/w  Clonidine, Coreg, uptitrate as needed  -BP not well controlled this am, will request dose of hydralazine and f/up bp   - IV hydralazine PRN    Hypoglycemia, resolved  - Hypoglycemia protocol, dextrose solution given     DVT ppx: Heparin sq    Dispo - GOC and advanced directives d/w son today by Donna POSADA. Spoke to son over the phone, discussed speech reeval and then decision on peg or not depending on re-eval.  Prognosis poor. Full code. Unknown final d/c plan until tomorrow       Pt is an 81 yo F w/ PMHx of ESRD on HD (MWF via LUE fistula), CAD s/p remote CABG, severe AS s/p TAVR w/ pacemaker in place (compatible with MRI however per leads, would be off-label if MRI done), HTN/HLD, recent line infection w/ bacteremia who was recently discharged from [facility] last week on IV Abx. Admitted for AMS, r/o CVA. s/p Code stroke called in ED, CTH unremarkable for acute changes. CT perfusion scan: There is an ischemic penumbra in nonvascular distribution in the left hemisphere as described. Unclear significance.     On last admission, patient was admitted for line infection/bacteremia. Repeat bcx negative and TTE w/o signs of vegetations. Pt was discharged on 12/14 with directions to complete course of abx as o/p w/ HD through 12/22    AMS of unknown etiology, possible secondary to small left hemispheric infarct  However, repeat ct scans are negative   + dysphagia   - Neurology recs appreciated   -no e/o infection, blood cultures negative and cxr negative along with covid negative   -ngt in place for inability to swallow, speech seeing patient; will request f/up in am   -c/w feeds for now   -mitten restraints, untied - reordered   -home vs Angel on discharge   -per Neuro may be small left hemispheric infarct  - c/w ASA, statin  -will add free water (small amt)     Pancytopenia likely due to BM suppression with recent hospital stays and infection   -tsh, b12/folate wnl   -follow cbc in am    Recent admission for pseudomonal/E coli bacteremia w/ port infection   - ID recs appreciated, observe off all abx   - BC 12/20 negative, ABX completed    ESRD on HD with normocytic anemia (within pancytopenia)  - Nephrology consulted for continuation of HD while inpatient   - HD per Renal, c/w retacrit    HTN, CAD s/p CABG, Severe AS s/p TAVR w/ PPM  - c/w  Clonidine, Coreg, uptitrate as needed  -BP not well controlled this am, will request dose of hydralazine and f/up bp   - IV hydralazine PRN    Hypoglycemia, resolved  - Hypoglycemia protocol, dextrose solution given     DVT ppx: Heparin sq    Dispo - GOC and advanced directives d/w son today by Donna POSADA. Spoke to son over the phone, discussed speech reeval and then decision on peg or not depending on re-eval.  Prognosis poor. Full code. Unknown final d/c plan until tomorrow

## 2020-12-27 NOTE — PROGRESS NOTE ADULT - ASSESSMENT
ESRD on HD    HTN, CAD s/p CABG, Severe AS s/p TAVR w/ PPM    HD today ; reevaluated    ( OP HD : [facility] )     james Villafuerte

## 2020-12-27 NOTE — CONSULT NOTE ADULT - PROBLEM SELECTOR RECOMMENDATION 6
.  Complex medical decision making in the setting of advanced illness and dysphagia.    Will continue to follow for ongoing GOC discussion.   Emotional support provided, questions answered.  Active Psychosocial Referrals: CAR SNOW, Palliative Care Attending  Contact Info: Message on Microsoft Teams (Christo Calero)

## 2020-12-27 NOTE — CONSULT NOTE ADULT - ASSESSMENT
Full Note to Follow.    ·	patient awake but still confused, could not recall going to HD this AM and did not know she was in the hospital  ·	discussed nutritional GOC with son: he would like another Speech&Swallow eval if possible before making a decision re: PEG  ·	broached code status; patient's son states the patient never discussed this and would like her to be FULL CODE at this time Full Note to Follow.    ·	patient awake but still confused, could not recall going to HD this AM and did not know she was in the hospital  ·	discussed nutritional GOC with son: he would like another Speech&Swallow eval if possible before making a decision re: PEG  ·	broached code status; patient's son states the patient never discussed this and would like her to be FULL CODE at this time    Christo Calero, Palliative Care Attending  Contact Info: Message on Microsoft Teams (Christo Calero 79yo F with PMH of ESRD (on HD), CAD (s/p CABG), severe AS (s/p TAVR), HTN, HLD, and recent line-associated bacteremia p/w acute encephalopathy. Palliative consulted for complex medical decision making in the setting of advanced illness.    ·	patient awake but still confused, could not recall going to HD this AM and did not know she was in the hospital  ·	discussed nutritional GOC with son: he would like another Speech&Swallow eval if possible before making a decision re: PEG  ·	broached code status; patient's son states the patient never discussed this and would like her to be FULL CODE at this time    Christo Calero, Palliative Care Attending  Contact Info: Message on Microsoft Teams (Christo Calero 79yo F with PMH of ESRD (on HD), CAD (s/p CABG), severe AS (s/p TAVR), HTN, HLD, and recent line-associated bacteremia p/w acute encephalopathy. Palliative consulted for complex medical decision making in the setting of advanced illness.    ·	patient awake but still confused, could not recall going to HD this AM and did not know she was in the hospital  ·	discussed nutritional GOC with son: he would like another Speech&Swallow eval if possible before making a decision re: PEG  ·	broached code status; patient's son states the patient never discussed this and would like her to be FULL CODE at this time

## 2020-12-28 LAB
ANION GAP SERPL CALC-SCNC: 9 MMOL/L — SIGNIFICANT CHANGE UP (ref 5–17)
BUN SERPL-MCNC: 25 MG/DL — HIGH (ref 8–20)
CALCIUM SERPL-MCNC: 8.9 MG/DL — SIGNIFICANT CHANGE UP (ref 8.6–10.2)
CHLORIDE SERPL-SCNC: 96 MMOL/L — LOW (ref 98–107)
CO2 SERPL-SCNC: 28 MMOL/L — SIGNIFICANT CHANGE UP (ref 22–29)
CREAT SERPL-MCNC: 2.66 MG/DL — HIGH (ref 0.5–1.3)
GLUCOSE SERPL-MCNC: 113 MG/DL — HIGH (ref 70–99)
HCT VFR BLD CALC: 33.5 % — LOW (ref 34.5–45)
HGB BLD-MCNC: 9.7 G/DL — LOW (ref 11.5–15.5)
MAGNESIUM SERPL-MCNC: 2.1 MG/DL — SIGNIFICANT CHANGE UP (ref 1.6–2.6)
MCHC RBC-ENTMCNC: 25.3 PG — LOW (ref 27–34)
MCHC RBC-ENTMCNC: 29 GM/DL — LOW (ref 32–36)
MCV RBC AUTO: 87.2 FL — SIGNIFICANT CHANGE UP (ref 80–100)
PHOSPHATE SERPL-MCNC: 3.5 MG/DL — SIGNIFICANT CHANGE UP (ref 2.4–4.7)
PLATELET # BLD AUTO: 168 K/UL — SIGNIFICANT CHANGE UP (ref 150–400)
POTASSIUM SERPL-MCNC: 4.5 MMOL/L — SIGNIFICANT CHANGE UP (ref 3.5–5.3)
POTASSIUM SERPL-SCNC: 4.5 MMOL/L — SIGNIFICANT CHANGE UP (ref 3.5–5.3)
RBC # BLD: 3.84 M/UL — SIGNIFICANT CHANGE UP (ref 3.8–5.2)
RBC # FLD: 15.9 % — HIGH (ref 10.3–14.5)
SODIUM SERPL-SCNC: 133 MMOL/L — LOW (ref 135–145)
WBC # BLD: 3.94 K/UL — SIGNIFICANT CHANGE UP (ref 3.8–10.5)
WBC # FLD AUTO: 3.94 K/UL — SIGNIFICANT CHANGE UP (ref 3.8–10.5)

## 2020-12-28 PROCEDURE — 86769 SARS-COV-2 COVID-19 ANTIBODY: CPT

## 2020-12-28 PROCEDURE — 82565 ASSAY OF CREATININE: CPT

## 2020-12-28 PROCEDURE — 87640 STAPH A DNA AMP PROBE: CPT

## 2020-12-28 PROCEDURE — 99285 EMERGENCY DEPT VISIT HI MDM: CPT | Mod: 25

## 2020-12-28 PROCEDURE — 84681 ASSAY OF C-PEPTIDE: CPT

## 2020-12-28 PROCEDURE — 82962 GLUCOSE BLOOD TEST: CPT

## 2020-12-28 PROCEDURE — P9016: CPT

## 2020-12-28 PROCEDURE — 83735 ASSAY OF MAGNESIUM: CPT

## 2020-12-28 PROCEDURE — 99233 SBSQ HOSP IP/OBS HIGH 50: CPT

## 2020-12-28 PROCEDURE — 84100 ASSAY OF PHOSPHORUS: CPT

## 2020-12-28 PROCEDURE — 85027 COMPLETE CBC AUTOMATED: CPT

## 2020-12-28 PROCEDURE — 99261: CPT

## 2020-12-28 PROCEDURE — 85610 PROTHROMBIN TIME: CPT

## 2020-12-28 PROCEDURE — 86900 BLOOD TYPING SEROLOGIC ABO: CPT

## 2020-12-28 PROCEDURE — 99232 SBSQ HOSP IP/OBS MODERATE 35: CPT

## 2020-12-28 PROCEDURE — U0003: CPT

## 2020-12-28 PROCEDURE — 84145 PROCALCITONIN (PCT): CPT

## 2020-12-28 PROCEDURE — 85014 HEMATOCRIT: CPT

## 2020-12-28 PROCEDURE — 96374 THER/PROPH/DIAG INJ IV PUSH: CPT

## 2020-12-28 PROCEDURE — 83605 ASSAY OF LACTIC ACID: CPT

## 2020-12-28 PROCEDURE — 82247 BILIRUBIN TOTAL: CPT

## 2020-12-28 PROCEDURE — 36415 COLL VENOUS BLD VENIPUNCTURE: CPT

## 2020-12-28 PROCEDURE — 36430 TRANSFUSION BLD/BLD COMPNT: CPT

## 2020-12-28 PROCEDURE — 87186 SC STD MICRODIL/AGAR DIL: CPT

## 2020-12-28 PROCEDURE — 83525 ASSAY OF INSULIN: CPT

## 2020-12-28 PROCEDURE — 84132 ASSAY OF SERUM POTASSIUM: CPT

## 2020-12-28 PROCEDURE — 71045 X-RAY EXAM CHEST 1 VIEW: CPT

## 2020-12-28 PROCEDURE — 84295 ASSAY OF SERUM SODIUM: CPT

## 2020-12-28 PROCEDURE — 82528 ASSAY OF CORTICOSTERONE: CPT

## 2020-12-28 PROCEDURE — 80053 COMPREHEN METABOLIC PANEL: CPT

## 2020-12-28 PROCEDURE — 82803 BLOOD GASES ANY COMBINATION: CPT

## 2020-12-28 PROCEDURE — 87641 MR-STAPH DNA AMP PROBE: CPT

## 2020-12-28 PROCEDURE — 82435 ASSAY OF BLOOD CHLORIDE: CPT

## 2020-12-28 PROCEDURE — 86140 C-REACTIVE PROTEIN: CPT

## 2020-12-28 PROCEDURE — 87070 CULTURE OTHR SPECIMN AEROBIC: CPT

## 2020-12-28 PROCEDURE — 85018 HEMOGLOBIN: CPT

## 2020-12-28 PROCEDURE — 93306 TTE W/DOPPLER COMPLETE: CPT

## 2020-12-28 PROCEDURE — 87040 BLOOD CULTURE FOR BACTERIA: CPT

## 2020-12-28 PROCEDURE — 82330 ASSAY OF CALCIUM: CPT

## 2020-12-28 PROCEDURE — 93005 ELECTROCARDIOGRAM TRACING: CPT

## 2020-12-28 PROCEDURE — 87150 DNA/RNA AMPLIFIED PROBE: CPT

## 2020-12-28 PROCEDURE — 85025 COMPLETE CBC W/AUTO DIFF WBC: CPT

## 2020-12-28 PROCEDURE — 86923 COMPATIBILITY TEST ELECTRIC: CPT

## 2020-12-28 PROCEDURE — 80048 BASIC METABOLIC PNL TOTAL CA: CPT

## 2020-12-28 PROCEDURE — 86901 BLOOD TYPING SEROLOGIC RH(D): CPT

## 2020-12-28 PROCEDURE — 74176 CT ABD & PELVIS W/O CONTRAST: CPT

## 2020-12-28 PROCEDURE — 86850 RBC ANTIBODY SCREEN: CPT

## 2020-12-28 PROCEDURE — 85730 THROMBOPLASTIN TIME PARTIAL: CPT

## 2020-12-28 PROCEDURE — 71250 CT THORAX DX C-: CPT

## 2020-12-28 PROCEDURE — 82728 ASSAY OF FERRITIN: CPT

## 2020-12-28 PROCEDURE — 82947 ASSAY GLUCOSE BLOOD QUANT: CPT

## 2020-12-28 PROCEDURE — 85379 FIBRIN DEGRADATION QUANT: CPT

## 2020-12-28 RX ORDER — ATORVASTATIN CALCIUM 80 MG/1
40 TABLET, FILM COATED ORAL AT BEDTIME
Refills: 0 | Status: DISCONTINUED | OUTPATIENT
Start: 2020-12-28 | End: 2020-12-31

## 2020-12-28 RX ADMIN — CHLORHEXIDINE GLUCONATE 1 APPLICATION(S): 213 SOLUTION TOPICAL at 05:06

## 2020-12-28 RX ADMIN — HEPARIN SODIUM 5000 UNIT(S): 5000 INJECTION INTRAVENOUS; SUBCUTANEOUS at 14:33

## 2020-12-28 RX ADMIN — HEPARIN SODIUM 5000 UNIT(S): 5000 INJECTION INTRAVENOUS; SUBCUTANEOUS at 21:30

## 2020-12-28 RX ADMIN — Medication 0.1 MILLIGRAM(S): at 05:06

## 2020-12-28 RX ADMIN — ATORVASTATIN CALCIUM 40 MILLIGRAM(S): 80 TABLET, FILM COATED ORAL at 21:30

## 2020-12-28 RX ADMIN — Medication 81 MILLIGRAM(S): at 11:09

## 2020-12-28 RX ADMIN — Medication 0.1 MILLIGRAM(S): at 17:20

## 2020-12-28 RX ADMIN — Medication 1 TABLET(S): at 18:26

## 2020-12-28 RX ADMIN — HEPARIN SODIUM 5000 UNIT(S): 5000 INJECTION INTRAVENOUS; SUBCUTANEOUS at 05:06

## 2020-12-28 RX ADMIN — ISOSORBIDE MONONITRATE 120 MILLIGRAM(S): 60 TABLET, EXTENDED RELEASE ORAL at 11:09

## 2020-12-28 RX ADMIN — CARVEDILOL PHOSPHATE 12.5 MILLIGRAM(S): 80 CAPSULE, EXTENDED RELEASE ORAL at 17:20

## 2020-12-28 RX ADMIN — CARVEDILOL PHOSPHATE 12.5 MILLIGRAM(S): 80 CAPSULE, EXTENDED RELEASE ORAL at 05:06

## 2020-12-28 NOTE — PROGRESS NOTE ADULT - ASSESSMENT
79yo AA Female with what appears to be a global aphasia with no clear hemiparesis.     Vital Signs Last 24 Hrs  T(C): 37.1 (28 Dec 2020 08:54), Max: 37.4 (28 Dec 2020 04:38)  T(F): 98.7 (28 Dec 2020 08:54), Max: 99.3 (28 Dec 2020 04:38)  HR: 66 (28 Dec 2020 08:54) (64 - 78)  BP: 100/68 (28 Dec 2020 08:54) (100/68 - 185/71)  RR: 18 (28 Dec 2020 08:54) (18 - 18)  SpO2: 100% (28 Dec 2020 08:54) (96% - 100%)    133<L>  |  96<L>  |  25.0<H>  ----------------------------<  113<H>  Ca:8.9   (28 Dec 2020 08:03)  4.5     |  28.0   |  2.66<H>    eGFR if Non : 16 <L>  eGFR if : 19 <L>                        9.7<L>  3.94  )-----------( 168      ( 28 Dec 2020 08:03 )             33.5<L>    Phos:3.5 mg/dL M.1 mg/dL  ( @ 08:03)    Urinalysis Basic - ( 27 Dec 2020 18:47 )  Color: Yellow / Appearance: Clear / S.015 / pH: x  Gluc: x / Ketone: Negative  / Bili: Negative / Urobili: Negative mg/dL   Blood: x / Protein: 100 mg/dL<!> / Nitrite: Negative   Leuk Esterase: Moderate<!> / RBC: 0-2 /HPF / WBC 3-5   Sq Epi: x / Non Sq Epi: Moderate<!> / Bacteria: Occasional<!>    Per Neurology :    AMS vs aphasia.   Presumed left hemisphere stroke.   Improving.   LDL: 166 mg.,   Goal: < 70 mg.,   On  antiplatelet Drugs  and statin.     Hypertension - On Meds,    Hold EPO,   HD in AM,

## 2020-12-28 NOTE — PROGRESS NOTE ADULT - ASSESSMENT
Pt is an 81 yo F w/ PMHx of ESRD on HD (MWF via LUE fistula), CAD s/p remote CABG, severe AS s/p TAVR w/ pacemaker in place (compatible with MRI however per leads, would be off-label if MRI done), HTN/HLD, recent line infection w/ bacteremia who was recently discharged from [facility] last week on IV Abx. Admitted for AMS, r/o CVA. s/p Code stroke called in ED, CTH unremarkable for acute changes. CT perfusion scan: There is an ischemic penumbra in nonvascular distribution in the left hemisphere as described. Unclear significance.     On last admission, patient was admitted for line infection/bacteremia. Repeat bcx negative and TTE w/o signs of vegetations. Pt was discharged on 12/14 with directions to complete course of abx as o/p w/ HD through 12/22    AMS of unknown etiology, possible secondary to small left hemispheric infarct  However, repeat ct scans are negative   + dysphagia   - Neurology recs appreciated   -initially on admission, no e/o infection, blood cultures negative and cxr negative along with covid negative   -seen by speech, rec puree with thin liquids  -seen by nutrition - rec nephrovite and nepro tid   -per Neuro may be small left hemispheric infarct  - c/w ASA, statin  -d/c all ngt water/feeds  -UA noted, contaminated sample - patient denies x 2 any dysuria/increased urinary frequency and did not have any lower abdominal pain on my exam today. Furthermore, no fevers nor elevation in wbc - hold off further tx     Pancytopenia likely due to BM suppression with recent hospital stays and infection   -tsh, b12/folate wnl   -follow cbc in am    Recent admission for pseudomonal/E coli bacteremia w/ port infection   - ID recs appreciated, observe off all abx   - BC 12/20 negative, ABX completed    ESRD on HD with normocytic anemia (within pancytopenia)  - Nephrology consulted for continuation of HD while inpatient   - HD per Renal, hold retacrit per renal     HTN, CAD s/p CABG, Severe AS s/p TAVR w/ PPM  - c/w  Clonidine, Coreg, uptitrate as needed  -BP better controlled, closely monitor   - IV hydralazine PRN    Hypoglycemia, resolved  - Hypoglycemia protocol, dextrose solution given     DVT ppx: Heparin sq    Dispo - Full code. Son Bernard updated on the phone. d/c planning - he may go with [facility], will let me know in am

## 2020-12-28 NOTE — CHART NOTE - NSCHARTNOTEFT_GEN_A_CORE
Source: Patient [ ]  Family [ ]   other [x ]EMR    Current Diet: NPO with G tube feeds    Enteral /Parenteral Nutrition: Nepro at 40cchr (x20 hrs) provides 800 ml, 1440 kcal, 65g protein, 582 ml free water, and 80% of RDIs for vitamins/minerals.    Current Weight: 109.1# 12/27    % Weight Change     Pertinent Medications: MEDICATIONS  (STANDING):  aspirin  chewable 81 milliGRAM(s) Oral daily  atorvastatin 40 milliGRAM(s) Oral at bedtime  carvedilol 12.5 milliGRAM(s) Oral every 12 hours  chlorhexidine 4% Liquid 1 Application(s) Topical <User Schedule>  cloNIDine 0.1 milliGRAM(s) Oral every 12 hours  dextrose 40% Gel 15 Gram(s) Oral once  dextrose 5%. 1000 milliLiter(s) (50 mL/Hr) IV Continuous <Continuous>  dextrose 50% Injectable 25 Gram(s) IV Push once  dextrose 50% Injectable 12.5 Gram(s) IV Push once  dextrose 50% Injectable 25 Gram(s) IV Push once  glucagon  Injectable 1 milliGRAM(s) IntraMuscular once  heparin   Injectable 5000 Unit(s) SubCutaneous every 8 hours  influenza   Vaccine 0.5 milliLiter(s) IntraMuscular once  isosorbide   mononitrate ER Tablet (IMDUR) 120 milliGRAM(s) Oral daily    MEDICATIONS  (PRN):  hydrALAZINE Injectable 5 milliGRAM(s) IV Push every 6 hours PRN SBP >185  LORazepam   Injectable 1 milliGRAM(s) IV Push every 4 hours PRN Agitation    Pertinent Labs: CBC Full  -  ( 28 Dec 2020 08:03 )  WBC Count : 3.94 K/uL  RBC Count : 3.84 M/uL  Hemoglobin : 9.7 g/dL  Hematocrit : 33.5 %  Platelet Count - Automated : 168 K/uL  Mean Cell Volume : 87.2 fl  Mean Cell Hemoglobin : 25.3 pg  Mean Cell Hemoglobin Concentration : 29.0 gm/dL  Auto Neutrophil # : x  Auto Lymphocyte # : x  Auto Monocyte # : x  Auto Eosinophil # : x  Auto Basophil # : x  Auto Neutrophil % : x  Auto Lymphocyte % : x  Auto Monocyte % : x  Auto Eosinophil % : x  Auto Basophil % : x      12-28 Na133 mmol/L<L> Glu 113 mg/dL<H> K+ 4.5 mmol/L Cr  2.66 mg/dL<H> BUN 25.0 mg/dL<H> Phos 3.5 mg/dL Alb n/a   PAB n/a           Skin:     Nutrition focused physical exam not conducted - found signs of malnutrition [ ]absent [ ]present    Subcutaneous fat loss: [ ] Orbital fat pads region, [ ]Buccal fat region, [ ]Triceps region,  [ ]Ribs region    Muscle wasting: [ ]Temples region, [ ]Clavicle region, [ ]Shoulder region, [ ]Scapula region, [ ]Interosseous region,  [ ]thigh region, [ ]Calf region    Estimated Needs:   [ x] no change since previous assessment  [ ] recalculated:     Current Nutrition Diagnosis:   Increased Nutrient Needs related to increased physiological demand for nutrient as evidenced by ESRD on HD. Pt meeting needs with appropriate formula as ordered. Swallow eval today recommending puree diet with thin liquids.  Palliative following. Pt remaining full code.         Recommendations: Rec Renal replacement, consistent CHO puree diet as per ST. Rec Nepro supplement TID  Rec Nephrovite    Monitoring and Evaluation:   [x ] PO intake [ x] Tolerance to diet prescription [X] Weights  [X] Follow up per protocol [X] Labs:

## 2020-12-28 NOTE — PROGRESS NOTE ADULT - SUBJECTIVE AND OBJECTIVE BOX
Patient is a 80y old  Female who presents with a chief complaint of AMS (26 Dec 2020 14:37)    Patient seen and examined at bedside. No acute distress and no acute overnight events. Was angry this morning and wanted to go home. States that she is hungry and wants to eat.    ROS: No chest pain, palpitations, sob, light headedness/dizziness, difficulty breathing/cough, fevers/chills, abdominal pain, n/v, diarrhea/constipation, dysuria or increased urinary frequency. All other ROS negative     Vital Signs Last 24 Hrs  T(C): 37.1 (28 Dec 2020 08:54), Max: 37.4 (28 Dec 2020 04:38)  T(F): 98.7 (28 Dec 2020 08:54), Max: 99.3 (28 Dec 2020 04:38)  HR: 66 (28 Dec 2020 08:54) (66 - 78)  BP: 100/68 (28 Dec 2020 08:54) (100/68 - 185/71)  RR: 18 (28 Dec 2020 08:54) (18 - 18)  SpO2: 100% (28 Dec 2020 08:54) (96% - 100%)    Exam:   Gen: elderly woman, alert, nad   HEENT; eomi, perrla, NGT in place   CVS: S1S2nl no m/r/g  RRR  Lungs: clear   GI: soft, nt, bs +. no suprapubic tenderness   Ext: no c/c/e. LUE AVF  Neuro: aaox3. Moves all 4 extremities   Skin: grossly intact on anterior assessment, posteriorly healed prior wound w/o any further broken skin     LABS:                         9.7    3.94  )-----------( 168      ( 28 Dec 2020 08:03 )             33.5   12-28    133<L>  |  96<L>  |  25.0<H>  ----------------------------<  113<H>  4.5   |  28.0  |  2.66<H>    Ca    8.9      28 Dec 2020 08:03  Phos  3.5     12-28  Mg     2.1     12-28        MEDICATIONS  (STANDING):  aspirin  chewable 81 milliGRAM(s) Oral daily  atorvastatin 40 milliGRAM(s) Oral at bedtime  carvedilol 12.5 milliGRAM(s) Oral every 12 hours  chlorhexidine 4% Liquid 1 Application(s) Topical <User Schedule>  cloNIDine 0.1 milliGRAM(s) Oral every 12 hours  dextrose 40% Gel 15 Gram(s) Oral once  dextrose 5%. 1000 milliLiter(s) (50 mL/Hr) IV Continuous <Continuous>  dextrose 50% Injectable 25 Gram(s) IV Push once  dextrose 50% Injectable 12.5 Gram(s) IV Push once  dextrose 50% Injectable 25 Gram(s) IV Push once  glucagon  Injectable 1 milliGRAM(s) IntraMuscular once  heparin   Injectable 5000 Unit(s) SubCutaneous every 8 hours  influenza   Vaccine 0.5 milliLiter(s) IntraMuscular once  isosorbide   mononitrate ER Tablet (IMDUR) 120 milliGRAM(s) Oral daily  Nephro-john 1 Tablet(s) Oral daily    MEDICATIONS  (PRN):  hydrALAZINE Injectable 5 milliGRAM(s) IV Push every 6 hours PRN SBP >185  LORazepam   Injectable 1 milliGRAM(s) IV Push every 4 hours PRN Agitation

## 2020-12-28 NOTE — PROGRESS NOTE ADULT - ASSESSMENT
80y Female with what appears to be a global aphasia with no clear hemiparesis.     AMS vs aphasia.   Presumed left hemisphere stroke.   Improving.   LDL: 166  Goal: < 70  Continue antiplatelet and statin.     Hypertension   Control blood pressure.  Avoid hypotension.     ESRD   Dialysis per renal.

## 2020-12-28 NOTE — PROGRESS NOTE ADULT - SUBJECTIVE AND OBJECTIVE BOX
[facility] DIVISION OF KIDNEY DISEASES AND HYPERTENSION -- FOLLOW UP NOTE  --------------------------------------------------------------------------------  Chief Complaint:    24 hour events/subjective:        PAST HISTORY  --------------------------------------------------------------------------------  No significant changes to PMH, PSH, FHx, SHx, unless otherwise noted    ALLERGIES & MEDICATIONS  --------------------------------------------------------------------------------  Allergies    codeine (Other)  Lortab (Other)  morphine (Other)  Percocet 10/325 (Other)  PPM not compatible with  MRI (Other (Fatal))  Reglan (Other; Flushing)  sulfa drugs (Flushing)    Intolerances      Standing Inpatient Medications  aspirin  chewable 81 milliGRAM(s) Oral daily  atorvastatin 40 milliGRAM(s) Oral at bedtime  carvedilol 12.5 milliGRAM(s) Oral every 12 hours  chlorhexidine 4% Liquid 1 Application(s) Topical <User Schedule>  cloNIDine 0.1 milliGRAM(s) Oral every 12 hours  dextrose 40% Gel 15 Gram(s) Oral once  dextrose 5%. 1000 milliLiter(s) IV Continuous <Continuous>  dextrose 50% Injectable 25 Gram(s) IV Push once  dextrose 50% Injectable 12.5 Gram(s) IV Push once  dextrose 50% Injectable 25 Gram(s) IV Push once  epoetin vinnie-epbx (RETACRIT) Injectable 8000 Unit(s) IV Push <User Schedule>  glucagon  Injectable 1 milliGRAM(s) IntraMuscular once  heparin   Injectable 5000 Unit(s) SubCutaneous every 8 hours  influenza   Vaccine 0.5 milliLiter(s) IntraMuscular once  isosorbide   mononitrate ER Tablet (IMDUR) 120 milliGRAM(s) Oral daily    PRN Inpatient Medications  hydrALAZINE Injectable 5 milliGRAM(s) IV Push every 6 hours PRN  LORazepam   Injectable 1 milliGRAM(s) IV Push every 4 hours PRN      REVIEW OF SYSTEMS  --------------------------------------------------------------------------------  Gen: No weight changes, fatigue, fevers/chills, weakness  Skin: No rashes  Head/Eyes/Ears/Mouth: No headache; Normal hearing; Normal vision w/o blurriness; No sinus pain/discomfort, sore throat  Respiratory: No dyspnea, cough, wheezing, hemoptysis  CV: No chest pain, PND, orthopnea  GI: No abdominal pain, diarrhea, constipation, nausea, vomiting, melena, hematochezia  : No increased frequency, dysuria, hematuria, nocturia  MSK: No joint pain/swelling; no back pain; no edema  Neuro: No dizziness/lightheadedness, weakness, seizures, numbness, tingling  Heme: No easy bruising or bleeding  Endo: No heat/cold intolerance  Psych: No significant nervousness, anxiety, stress, depression    All other systems were reviewed and are negative, except as noted.    VITALS/PHYSICAL EXAM  --------------------------------------------------------------------------------  T(C): 37.1 (12-28-20 @ 08:54), Max: 37.4 (12-28-20 @ 04:38)  HR: 66 (12-28-20 @ 08:54) (64 - 78)  BP: 100/68 (12-28-20 @ 08:54) (100/68 - 185/71)  RR: 18 (12-28-20 @ 08:54) (18 - 18)  SpO2: 100% (12-28-20 @ 08:54) (96% - 100%)    12-27-20 @ 07:01  -  12-28-20 @ 07:00  --------------------------------------------------------  IN: 700 mL / OUT: 0 mL / NET: 700 mL    Chief Complaint:    ESRD HD    24 hour events/subjective:  Pt on COVID isolation    PAST HISTORY  --------------------------------------------------------------------------------  No significant changes to PMH, PSH, FHx, SHx, unless otherwise noted    ALLERGIES & MEDICATIONS  --------------------------------------------------------------------------------  Allergies    codeine (Other)  Lortab (Other)  morphine (Other)  Percocet 10/325 (Other)  PPM not compatible with  MRI (Other (Fatal))  Reglan (Other; Flushing)  sulfa drugs (Flushing)    Standing Inpatient Medications  aspirin  chewable 81 milliGRAM(s) Oral daily  atorvastatin 40 milliGRAM(s) Oral at bedtime  carvedilol 12.5 milliGRAM(s) Oral every 12 hours  chlorhexidine 4% Liquid 1 Application(s) Topical <User Schedule>  cloNIDine 0.1 milliGRAM(s) Oral every 12 hours  dextrose 40% Gel 15 Gram(s) Oral once  dextrose 5%. 1000 milliLiter(s) IV Continuous <Continuous>  dextrose 50% Injectable 25 Gram(s) IV Push once  dextrose 50% Injectable 12.5 Gram(s) IV Push once  dextrose 50% Injectable 25 Gram(s) IV Push once  epoetin vinnie-epbx (RETACRIT) Injectable 8000 Unit(s) IV Push <User Schedule>  glucagon  Injectable 1 milliGRAM(s) IntraMuscular once  heparin   Injectable 5000 Unit(s) SubCutaneous every 8 hours  influenza   Vaccine 0.5 milliLiter(s) IntraMuscular once  isosorbide   mononitrate ER Tablet (IMDUR) 120 milliGRAM(s) Oral daily    PRN Inpatient Medications  hydrALAZINE Injectable 5 milliGRAM(s) IV Push every 6 hours PRN  LORazepam   Injectable 1 milliGRAM(s) IV Push every 4 hours PRN    REVIEW OF SYSTEMS : Not Reliable,   --------------------------------------------------------------------------------  All other systems were reviewed and are negative, except as noted.    VITALS/PHYSICAL EXAM  --------------------------------------------------------------------------------  T(C): 36.8 (12-27-20 @ 10:50), Max: 37.4 (12-27-20 @ 00:14)  HR: 68 (12-27-20 @ 10:50) (60 - 70)  BP: 151/64 (12-27-20 @ 10:50) (132/60 - 170/70)  RR: 18 (12-27-20 @ 10:50) (18 - 18)  SpO2: 98% (12-27-20 @ 10:50) (96% - 100%)    12-27-20 @ 07:01  -  12-27-20 @ 12:39  --------------------------------------------------------  IN: 500 mL / OUT: 0 mL / NET: 500 mL    Physical Exam: 2/2 hospitalist on covid isolation  CONSTITUTIONAL: Elderly female, more alert, answering simple questions, following simple commands, in NAD  HEENT: NC/AT, PERRL, no JVD. NGT in place  RESPIRATORY: CTA bilaterally, normal effort  CARDIOVASCULAR: RRR, S1/S2+, no m/g/r  ABDOMEN: Nontender to palpation, normoactive bowel sounds, no rebound/guarding; No hepatosplenomegaly  MUSCULO SKELETAL: No edema, cyanosis or deformities.  PSYCH: Does not follow commands. A&Ox1 (self)  NEUROLOGY: Unable to assess, moving all extremities  SKIN: No rashes; no palpable lesions  VASC: Distal pulses palpable    LABS/STUDIES  --------------------------------------------------------------------------------  Creatinine Trend:  SCr 3.37 [12-25 @ 09:20]  SCr 1.73 [12-23 @ 14:18]  SCr 2.49 [12-22 @ 08:14]  SCr 4.06 [12-21 @ 12:48]  SCr 3.41 [12-20 @ 14:12]    Urinalysis - [12-20-20 @ 16:53]      Color Yellow / Appearance Clear / SG 1.010 / pH 8.0      Gluc Negative / Ketone Trace  / Bili Negative / Urobili Negative       Blood Small / Protein 100 / Leuk Est Trace / Nitrite Negative      RBC 0-2 / WBC 3-5 / Hyaline  / Gran  / Sq Epi  / Non Sq Epi Few / Bacteria Occasional    Iron 83, TIBC 167, %sat --      [08-22-20 @ 10:45]  Ferritin 5890      [12-08-20 @ 01:15]  .7 (Ca --)      [02-02-20 @ 05:15]   --  HbA1c 4.9      [02-02-20 @ 05:15]  TSH 1.61      [12-20-20 @ 14:12]  Lipid: chol 237, TG 91, HDL 53, LDL --      [12-22-20 @ 08:14]      ESRD on HD    HTN, CAD s/p CABG, Severe AS s/p TAVR w/ PPM    ( OP HD : [facility] )     LABS/STUDIES  --------------------------------------------------------------------------------              9.7    3.94  >-----------<  168      [12-28-20 @ 08:03]              33.5     133  |  96  |  25.0  ----------------------------<  113      [12-28-20 @ 08:03]  4.5   |  28.0  |  2.66        Ca     8.9     [12-28-20 @ 08:03]      Mg     2.1     [12-28-20 @ 08:03]      Phos  3.5     [12-28-20 @ 08:03]    Creatinine Trend:  SCr 2.66 [12-28 @ 08:03]  SCr 3.37 [12-25 @ 09:20]  SCr 1.73 [12-23 @ 14:18]  SCr 2.49 [12-22 @ 08:14]  SCr 4.06 [12-21 @ 12:48]    Urinalysis - [12-27-20 @ 18:47]      Color Yellow / Appearance Clear / SG 1.015 / pH 9.0      Gluc Negative / Ketone Negative  / Bili Negative / Urobili Negative       Blood Small / Protein 100 / Leuk Est Moderate / Nitrite Negative      RBC 0-2 / WBC 3-5 / Hyaline  / Gran  / Sq Epi  / Non Sq Epi Moderate / Bacteria Occasional    Iron 83, TIBC 167,      [08-22-20 @ 10:45]  Ferritin 5890      [12-08-20 @ 01:15]  .7 (Ca --)   [02-02-20 @ 05:15]     HbA1c 4.9      [02-02-20 @ 05:15]  TSH 1.61      [12-20-20 @ 14:12]  Lipid: chol 237, TG 91, HDL 53, [12-22-20 @ 08:14]

## 2020-12-28 NOTE — PROGRESS NOTE ADULT - ASSESSMENT
79yo F with PMH of ESRD (on HD), CAD (s/p CABG), severe AS (s/p TAVR), HTN, HLD, and recent line-associated bacteremia presented with acute encephalopathy.    PLAN    Acute Encephalopathy/Aphasia  - etiology unclear: ?CVA vs vascular dementia in setting of multiple comorbidities  - supportive care, reorientation, sleep hygiene  - neurology following    Oropharyngeal Dysphagia  - remains NPO, pending SLP evaluation and recommendation today  - aspiration precaution    Advanced Dementia/ Likely Vascular  - CT noted with chronic small vessel ischemic changes     Functional Quadraplegia  - PPSV 30% and full assist with all ADLs at baseline  - PT following, recommended home with PT vs VARSHA    ESRD on HD  - mgnt per Nephrology  - family wishes to continue HD at this time which unfortunately preclude hospice eligibility     Advance Care Planning  - surrogate decision maker is Frandy Wagner  - Son wishes for FULL CODE at this time     Palliative Care Encounter   GOC established and held by palliative care Dr. Calero with son Bernard on 12/27. Son wishes to continue all medical interventions at this time including CPR/intubation if warranted. Currently pt is NPO and is pending SLP evaluation for her dysphagia. As per prior conversation held by PCT with son, family appears to want aggressive interventions including PEG if needed as alternative nutrition.     Will follow along for ongoing goc pending repeat swallow eval.

## 2020-12-28 NOTE — PROGRESS NOTE ADULT - SUBJECTIVE AND OBJECTIVE BOX
PALLIATIVE FOLLOW UP VISIT    INTERVAL HPI/OVERNIGHT EVENTS:  No acute events overnight.   Remains on enhanced supervision.    Present Symptoms:   Dyspnea:  No   Nausea/Vomiting:  No  Anxiety:   No  Fatigue:  No  Loss of appetite: NPO  Pain: No    Limited ROS due to mental status.    MEDICATIONS  (STANDING):  aspirin  chewable 81 milliGRAM(s) Oral daily  atorvastatin 40 milliGRAM(s) Oral at bedtime  carvedilol 12.5 milliGRAM(s) Oral every 12 hours  chlorhexidine 4% Liquid 1 Application(s) Topical <User Schedule>  cloNIDine 0.1 milliGRAM(s) Oral every 12 hours  dextrose 40% Gel 15 Gram(s) Oral once  dextrose 5%. 1000 milliLiter(s) (50 mL/Hr) IV Continuous <Continuous>  dextrose 50% Injectable 25 Gram(s) IV Push once  dextrose 50% Injectable 12.5 Gram(s) IV Push once  dextrose 50% Injectable 25 Gram(s) IV Push once  glucagon  Injectable 1 milliGRAM(s) IntraMuscular once  heparin   Injectable 5000 Unit(s) SubCutaneous every 8 hours  influenza   Vaccine 0.5 milliLiter(s) IntraMuscular once  isosorbide   mononitrate ER Tablet (IMDUR) 120 milliGRAM(s) Oral daily    MEDICATIONS  (PRN):  hydrALAZINE Injectable 5 milliGRAM(s) IV Push every 6 hours PRN SBP >185  LORazepam   Injectable 1 milliGRAM(s) IV Push every 4 hours PRN Agitation      PHYSICAL EXAM:    Vital Signs Last 24 Hrs  T(C): 37.1 (28 Dec 2020 08:54), Max: 37.4 (28 Dec 2020 04:38)  T(F): 98.7 (28 Dec 2020 08:54), Max: 99.3 (28 Dec 2020 04:38)  HR: 66 (28 Dec 2020 08:54) (64 - 78)  BP: 100/68 (28 Dec 2020 08:54) (100/68 - 185/71)  BP(mean): --  RR: 18 (28 Dec 2020 08:54) (18 - 18)  SpO2: 100% (28 Dec 2020 08:54) (96% - 100%)    General: elderly. cachetic. Resting comfortably. No acute distress.    HEENT: mucous membrane moist. +NGT  Lungs: comfortable. non-labored.   CV: +s1/s2. Regular rate and rhythm.     GI: +bowel sound. abdomen soft, non-tender, non-distended,   MSK: Moves all 4 extremities. No cyanosis or edema. weakness.   Neuro: Awake and oriented to person and place ([place]). follows limited commands   Skin: warm and dry.        LABS:                          9.7    3.94  )-----------( 168      ( 28 Dec 2020 08:03 )             33.5     12-28    133<L>  |  96<L>  |  25.0<H>  ----------------------------<  113<H>  4.5   |  28.0  |  2.66<H>    Ca    8.9      28 Dec 2020 08:03  Phos  3.5     12-28  Mg     2.1     12-        Urinalysis Basic - ( 27 Dec 2020 18:47 )    Color: Yellow / Appearance: Clear / S.015 / pH: x  Gluc: x / Ketone: Negative  / Bili: Negative / Urobili: Negative mg/dL   Blood: x / Protein: 100 mg/dL / Nitrite: Negative   Leuk Esterase: Moderate / RBC: 0-2 /HPF / WBC 3-5   Sq Epi: x / Non Sq Epi: Moderate / Bacteria: Occasional    RADIOLOGY & ADDITIONAL STUDIES: Reviewed

## 2020-12-28 NOTE — PROGRESS NOTE ADULT - SUBJECTIVE AND OBJECTIVE BOX
[facility]                                        Ne[facility] [place]                                 Heidi Alaniz, & Arcenio                                  [address]                                        [place], [place], [address]                                             [phone]        CC: Stroke    HPI:   The patient is a 80y Female who presented with altered mental status.   She was apparently found screaming "Hey, hey, hey" by family. She was not following instructions.   Code stroke was activated upon her arrival however the last known well time was reportedly many hours prior to arrival (around 04:00)    Interim history:  Remains on [address] [place].   Now speech improving and following some instructions.     ROS:   Denies headache or dizziness.  Denies chest pain.  Denies shortness of breath.    MEDICATIONS  (STANDING):  aspirin  chewable 81 milliGRAM(s) Oral daily  atorvastatin 40 milliGRAM(s) Oral at bedtime  carvedilol 12.5 milliGRAM(s) Oral every 12 hours  chlorhexidine 4% Liquid 1 Application(s) Topical <User Schedule>  cloNIDine 0.1 milliGRAM(s) Oral every 12 hours  dextrose 40% Gel 15 Gram(s) Oral once  dextrose 5%. 1000 milliLiter(s) (50 mL/Hr) IV Continuous <Continuous>  epoetin vinnie-epbx (RETACRIT) Injectable 8000 Unit(s) IV Push <User Schedule>  glucagon  Injectable 1 milliGRAM(s) IntraMuscular once  heparin   Injectable 5000 Unit(s) SubCutaneous every 8 hours  influenza   Vaccine 0.5 milliLiter(s) IntraMuscular once  isosorbide   mononitrate ER Tablet (IMDUR) 120 milliGRAM(s) Oral daily      Vital Signs Last 24 Hrs  T(C): 37.1 (28 Dec 2020 08:54), Max: 37.4 (28 Dec 2020 04:38)  T(F): 98.7 (28 Dec 2020 08:54), Max: 99.3 (28 Dec 2020 04:38)  HR: 66 (28 Dec 2020 08:54) (64 - 78)  BP: 100/68 (28 Dec 2020 08:54) (100/68 - 185/71)  RR: 18 (28 Dec 2020 08:54) (18 - 18)  SpO2: 100% (28 Dec 2020 08:54) (96% - 100%)    Detailed Neurologic Exam:    Mental status: The patient is awake and alert. Answers questions with short phrases and follows simple instructions.     Cranial nerves: Pupils react symmetrically to light. There is no visual field deficit to confrontation. Extraocular motion: She tracks with gaze. There is no ptosis. Facial sensation is intact. Facial musculature is symmetric. Palate elevates symmetrically. Tongue is midline.    Motor/Sensory: There is normal bulk and tone.  Moving all extremity symmetrically to stimuli. Some spontaneous movement and to command.    Reflexes: Trace throughout and plantar responses are flexor.    Cerebellar: Unable to assess.     Labs:     12-28    133<L>  |  96<L>  |  25.0<H>  ----------------------------<  113<H>  4.5   |  28.0  |  2.66<H>    Ca    8.9      28 Dec 2020 08:03  Phos  3.5     12-28  Mg     2.1     12-28                              9.7    3.94  )-----------( 168      ( 28 Dec 2020 08:03 )             33.5

## 2020-12-29 ENCOUNTER — TRANSCRIPTION ENCOUNTER (OUTPATIENT)
Age: 80
End: 2020-12-29

## 2020-12-29 LAB
ANION GAP SERPL CALC-SCNC: 12 MMOL/L — SIGNIFICANT CHANGE UP (ref 5–17)
BUN SERPL-MCNC: 38 MG/DL — HIGH (ref 8–20)
CALCIUM SERPL-MCNC: 9.4 MG/DL — SIGNIFICANT CHANGE UP (ref 8.6–10.2)
CHLORIDE SERPL-SCNC: 94 MMOL/L — LOW (ref 98–107)
CO2 SERPL-SCNC: 28 MMOL/L — SIGNIFICANT CHANGE UP (ref 22–29)
CREAT SERPL-MCNC: 3.54 MG/DL — HIGH (ref 0.5–1.3)
GLUCOSE SERPL-MCNC: 69 MG/DL — LOW (ref 70–99)
HCT VFR BLD CALC: 38.5 % — SIGNIFICANT CHANGE UP (ref 34.5–45)
HGB BLD-MCNC: 11 G/DL — LOW (ref 11.5–15.5)
MCHC RBC-ENTMCNC: 25.3 PG — LOW (ref 27–34)
MCHC RBC-ENTMCNC: 28.6 GM/DL — LOW (ref 32–36)
MCV RBC AUTO: 88.5 FL — SIGNIFICANT CHANGE UP (ref 80–100)
PLATELET # BLD AUTO: 171 K/UL — SIGNIFICANT CHANGE UP (ref 150–400)
POTASSIUM SERPL-MCNC: 4.8 MMOL/L — SIGNIFICANT CHANGE UP (ref 3.5–5.3)
POTASSIUM SERPL-SCNC: 4.8 MMOL/L — SIGNIFICANT CHANGE UP (ref 3.5–5.3)
RBC # BLD: 4.35 M/UL — SIGNIFICANT CHANGE UP (ref 3.8–5.2)
RBC # FLD: 15.9 % — HIGH (ref 10.3–14.5)
SARS-COV-2 RNA SPEC QL NAA+PROBE: SIGNIFICANT CHANGE UP
SODIUM SERPL-SCNC: 134 MMOL/L — LOW (ref 135–145)
WBC # BLD: 3.49 K/UL — LOW (ref 3.8–10.5)
WBC # FLD AUTO: 3.49 K/UL — LOW (ref 3.8–10.5)

## 2020-12-29 PROCEDURE — 99233 SBSQ HOSP IP/OBS HIGH 50: CPT

## 2020-12-29 PROCEDURE — 90937 HEMODIALYSIS REPEATED EVAL: CPT

## 2020-12-29 PROCEDURE — 99232 SBSQ HOSP IP/OBS MODERATE 35: CPT

## 2020-12-29 RX ORDER — ATORVASTATIN CALCIUM 80 MG/1
1 TABLET, FILM COATED ORAL
Qty: 0 | Refills: 0 | DISCHARGE
Start: 2020-12-29

## 2020-12-29 RX ORDER — ACETAMINOPHEN 500 MG
650 TABLET ORAL EVERY 6 HOURS
Refills: 0 | Status: DISCONTINUED | OUTPATIENT
Start: 2020-12-29 | End: 2020-12-31

## 2020-12-29 RX ORDER — ALLOPURINOL 300 MG
1 TABLET ORAL
Qty: 0 | Refills: 0 | DISCHARGE

## 2020-12-29 RX ORDER — CARVEDILOL PHOSPHATE 80 MG/1
1 CAPSULE, EXTENDED RELEASE ORAL
Qty: 0 | Refills: 0 | DISCHARGE
Start: 2020-12-29

## 2020-12-29 RX ORDER — MIRTAZAPINE 45 MG/1
1 TABLET, ORALLY DISINTEGRATING ORAL
Qty: 0 | Refills: 0 | DISCHARGE

## 2020-12-29 RX ORDER — ACETAMINOPHEN 500 MG
700 TABLET ORAL ONCE
Refills: 0 | Status: DISCONTINUED | OUTPATIENT
Start: 2020-12-29 | End: 2020-12-31

## 2020-12-29 RX ADMIN — Medication 0.1 MILLIGRAM(S): at 17:57

## 2020-12-29 RX ADMIN — Medication 650 MILLIGRAM(S): at 15:50

## 2020-12-29 RX ADMIN — CARVEDILOL PHOSPHATE 12.5 MILLIGRAM(S): 80 CAPSULE, EXTENDED RELEASE ORAL at 05:07

## 2020-12-29 RX ADMIN — HEPARIN SODIUM 5000 UNIT(S): 5000 INJECTION INTRAVENOUS; SUBCUTANEOUS at 22:23

## 2020-12-29 RX ADMIN — Medication 81 MILLIGRAM(S): at 11:09

## 2020-12-29 RX ADMIN — Medication 0.1 MILLIGRAM(S): at 05:07

## 2020-12-29 RX ADMIN — ISOSORBIDE MONONITRATE 120 MILLIGRAM(S): 60 TABLET, EXTENDED RELEASE ORAL at 11:09

## 2020-12-29 RX ADMIN — CHLORHEXIDINE GLUCONATE 1 APPLICATION(S): 213 SOLUTION TOPICAL at 05:07

## 2020-12-29 RX ADMIN — CARVEDILOL PHOSPHATE 12.5 MILLIGRAM(S): 80 CAPSULE, EXTENDED RELEASE ORAL at 17:57

## 2020-12-29 RX ADMIN — Medication 1 TABLET(S): at 11:09

## 2020-12-29 RX ADMIN — ATORVASTATIN CALCIUM 40 MILLIGRAM(S): 80 TABLET, FILM COATED ORAL at 22:23

## 2020-12-29 RX ADMIN — HEPARIN SODIUM 5000 UNIT(S): 5000 INJECTION INTRAVENOUS; SUBCUTANEOUS at 05:07

## 2020-12-29 NOTE — DISCHARGE NOTE PROVIDER - NSDCFUADDINST_GEN_ALL_CORE_FT
11.0   3.49  )-----------( 171      ( 29 Dec 2020 11:02 )             38.5     29 Dec 2020 11:02    134    |  94     |  38.0   ----------------------------<  69     4.8     |  28.0   |  3.54     Ca    9.4        29 Dec 2020 11:02  Phos  3.5       28 Dec 2020 08:03  Mg     2.1       28 Dec 2020 08:03  Urinalysis Basic - ( 27 Dec 2020 18:47 )    Color: Yellow / Appearance: Clear / S.015 / pH: x  Gluc: x / Ketone: Negative  / Bili: Negative / Urobili: Negative mg/dL   Blood: x / Protein: 100 mg/dL / Nitrite: Negative   Leuk Esterase: Moderate / RBC: 0-2 /HPF / WBC 3-5   Sq Epi: x / Non Sq Epi: Moderate / Bacteria: Occasional    covid scren negative

## 2020-12-29 NOTE — PROGRESS NOTE ADULT - ASSESSMENT
81yo F with PMH of ESRD (on HD), CAD (s/p CABG), severe AS (s/p TAVR), HTN, HLD, and recent line-associated bacteremia presented with acute encephalopathy complicated by dysphagia.    PLAN    Acute Encephalopathy/Aphasia  - etiology unclear: ?CVA vs vascular dementia in setting of multiple comorbidities  - mentation improving slowly  - supportive care, reorientation, sleep hygiene  - neurology following    Oropharyngeal Dysphagia  - tolerating modified diet, pureed/thin  - aspiration precaution    Advanced Dementia/ Likely Vascular  - CT noted with chronic small vessel ischemic changes     Functional Quadraplegia  - PPSV 30% and full assist with all ADLs at baseline  - PT following, recommended home with PT vs VARSHA    ESRD on HD  - mgnt per Nephrology  - family wishes to continue HD at this time which unfortunately preclude hospice eligibility     Advance Care Planning  - surrogate decision maker is Frandy Wagner  - Son wishes for FULL CODE at this time     Palliative Care Encounter   GOC established and held by palliative care Dr. Calero with son Bernard on 12/27. Son wishes to continue all medical interventions at this time including CPR/intubation if warranted. Pt currently tolerating oral diet and remains hemodynamically stable. Discharge planning per CCC. No further recommendations from a palliative standpoint. Will sign off. Please reconsult PRN.

## 2020-12-29 NOTE — DISCHARGE NOTE PROVIDER - NSDCMRMEDTOKEN_GEN_ALL_CORE_FT
albuterol 90 mcg/inh inhalation aerosol: 2 puff(s) inhaled every 6 hours x 30 days, As Needed -Bronchospasm   aspirin 81 mg oral delayed release tablet: 1 tab(s) orally once a day  atorvastatin 40 mg oral tablet: 1 tab(s) orally once a day (at bedtime)  carvedilol 25 mg oral tablet: 1 tab(s) orally 2 times a day  cloNIDine 0.1 mg oral tablet: 1 tab(s) orally every 12 hours  isosorbide mononitrate 120 mg oral tablet, extended release: 1 tab(s) orally once a day  multivitamin: 1 tab(s) orally once a day   albuterol 90 mcg/inh inhalation aerosol: 2 puff(s) inhaled every 6 hours x 30 days, As Needed -Bronchospasm   aspirin 81 mg oral delayed release tablet: 1 tab(s) orally once a day  atorvastatin 40 mg oral tablet: 1 tab(s) orally once a day (at bedtime)  carvedilol 12.5 mg oral tablet: 1 tab(s) orally every 12 hours  cloNIDine 0.1 mg oral tablet: 1 tab(s) orally every 12 hours  isosorbide mononitrate 120 mg oral tablet, extended release: 1 tab(s) orally once a day  multivitamin: 1 tab(s) orally once a day   albuterol 90 mcg/inh inhalation aerosol: 2 puff(s) inhaled every 6 hours x 30 days, As Needed -Bronchospasm   amLODIPine 5 mg oral tablet: 1 tab(s) orally once  aspirin 81 mg oral delayed release tablet: 1 tab(s) orally once a day  atorvastatin 40 mg oral tablet: 1 tab(s) orally once a day (at bedtime)  carvedilol 12.5 mg oral tablet: 1 tab(s) orally every 12 hours  cloNIDine 0.1 mg oral tablet: 1 tab(s) orally every 8 hours  isosorbide mononitrate 120 mg oral tablet, extended release: 1 tab(s) orally once a day  multivitamin: 1 tab(s) orally once a day

## 2020-12-29 NOTE — DISCHARGE NOTE PROVIDER - CARE PROVIDERS DIRECT ADDRESSES
[email or web address],DirectAddress_Unknown

## 2020-12-29 NOTE — PROGRESS NOTE ADULT - SUBJECTIVE AND OBJECTIVE BOX
CONSULT  ==============================================================================================================  HPI: 80y Female  HPI:  81 yo F w/ PMH Of ESRD on HD (MWF via LUE fistula), CAD s/p remote CABG, severe AS s/p TAVR w/ pacemaker in place, HTN/HLD, recent line infection w/ bacteremia who was recently discharged from [facility] last week on IV Abx. Unable to obtain history from patient due to mental status. History obtained from and son. As per son, patient was recently discharged from [facility] last Monday s/p bacteremia and line infection and was doing well. She was noted to become more lethargic and confused after her HD session on Friday. Last night she was having difficulty taking her night time medications. This morning she woke up at 4AM yelling "HEY HEY HEY." He reports that the patient is normally A&Ox3 however requires assistance w/ daily activities At time of my assessment, patient appears in no distress however does not follow commands or answer questions. She constantly yells out "HEY HEY HEY" and per staff she has been doing this since admission. Code stroke was activated in the ED.   (20 Dec 2020 17:11)      PAST MEDICAL & SURGICAL HISTORY:  Thyroid nodule  awaiting FNB in the near future    Severe aortic stenosis    Osteoarthritis  bilateral knees    CKD (chronic kidney disease) stage 4, GFR 15-29 ml/min    DM2 (diabetes mellitus, type 2)    Arthritis    CAD (coronary artery disease)    Gout    Anemia Associated with Chronic Renal Failure    HTN (Hypertension)    S/P AVR  TAVR    History of pacemaker    A-V fistula  left arm    S/P cholecystectomy    Stented coronary artery  s/p 3 stents, then CABG     S/P CABG (coronary artery bypass graft)  triple in     Home Meds: Home Medications:  allopurinol 100 mg oral tablet: 1 tab(s) orally once a day (08 Dec 2020 09:00)  cefepime 2 g intravenous injection: 2 gram(s) intravenous 3 times a week after HD until , with weekly CBC and CMP (14 Dec 2020 15:11)  cloNIDine 0.1 mg oral tablet: 1 tab(s) orally every 12 hours (14 Dec 2020 15:11)  lactobacillus acidophilus oral capsule: 1 cap(s) orally 2 times a day (14 Dec 2020 15:11)  mirtazapine 15 mg oral tablet: 1 tab(s) orally once a day (at bedtime) (08 Dec 2020 09:00)    Allergies: Allergies    codeine (Other)  Lortab (Other)  morphine (Other)  Percocet 10/325 (Other)  PPM not compatible with  MRI (Other (Fatal))  Reglan (Other; [place])  sulfa drugs ([place])    Soc:   Advanced Directives: Presumed Full Code     CURRENT MEDICATIONS:   --------------------------------------------------------------------------------------  Neurologic Medications    Respiratory Medications    Cardiovascular Medications  carvedilol 12.5 milliGRAM(s) Oral every 12 hours  cloNIDine 0.1 milliGRAM(s) Oral every 12 hours  hydrALAZINE Injectable 5 milliGRAM(s) IV Push every 6 hours PRN SBP >185  isosorbide   mononitrate ER Tablet (IMDUR) 120 milliGRAM(s) Oral daily    Gastrointestinal Medications  dextrose 5%. 1000 milliLiter(s) IV Continuous <Continuous>  Nephro-john 1 Tablet(s) Oral daily    Genitourinary Medications    Hematologic/Oncologic Medications  aspirin  chewable 81 milliGRAM(s) Oral daily  heparin   Injectable 5000 Unit(s) SubCutaneous every 8 hours  influenza   Vaccine 0.5 milliLiter(s) IntraMuscular once    Antimicrobial/Immunologic Medications    Endocrine/Metabolic Medications  atorvastatin 40 milliGRAM(s) Oral at bedtime  dextrose 40% Gel 15 Gram(s) Oral once  dextrose 50% Injectable 25 Gram(s) IV Push once  dextrose 50% Injectable 12.5 Gram(s) IV Push once  dextrose 50% Injectable 25 Gram(s) IV Push once  glucagon  Injectable 1 milliGRAM(s) IntraMuscular once    Topical/Other Medications  chlorhexidine 4% Liquid 1 Application(s) Topical <User Schedule>    --------------------------------------------------------------------------------------    VITAL SIGNS, INS/OUTS (last 24 hours):  --------------------------------------------------------------------------------------  ICU Vital Signs Last 24 Hrs  T(C): 37.1 (29 Dec 2020 08:30), Max: 37.1 (28 Dec 2020 21:27)  T(F): 98.7 (29 Dec 2020 08:30), Max: 98.7 (28 Dec 2020 21:27)  HR: 60 (29 Dec 2020 08:30) (60 - 84)  BP: 147/75 (29 Dec 2020 08:30) (132/46 - 170/90)  RR: 18 (29 Dec 2020 08:30) (18 - 18)  SpO2: 99% (29 Dec 2020 08:30) (92% - 99%)    I&O's Summary    28 Dec 2020 07:01  -  29 Dec 2020 07:00  --------------------------------------------------------  IN: 0 mL / OUT: 100 mL / NET: -100 mL  --------------------------------------------------------------------------------------    EXAM:  CONSTITUTIONAL: Elderly female, more alert, answering simple questions, following simple commands, in NAD  HEENT: NC/AT, PERRL, no JVD. NGT in place  RESPIRATORY: CTA bilaterally, normal effort  CARDIOVASCULAR: RRR, S1/S2+, no m/g/r, no pedal edema  ABDOMEN: Nontender to palpation, normoactive bowel sounds, no rebound/guarding; No hepatosplenomegaly  MUSCULO SKELETAL: No edema, cyanosis or deformities.  PSYCH: Does not follow commands. A&Ox1 (self)  NEUROLOGY: Unable to assess, moving all extremities  SKIN: No rashes; no palpable lesions  VASC: Distal pulses palpable    LABS  --------------------------------------------------------------------------------------  Labs:  CAPILLARY BLOOD GLUCOSE                        11.0   3.49  )-----------( 171      ( 29 Dec 2020 11:02 )             38.5       12-  134<L>  |  94<L>  |  38.0<H>  ----------------------------<  69<L>  4.8   |  28.0  |  3.54<H>    Calcium, Total Serum: 9.4 mg/dL (20 @ 11:02)    Urinalysis Basic - ( 27 Dec 2020 18:47 )  Color: Yellow / Appearance: Clear / S.015 / pH: x  Gluc: x / Ketone: Negative  / Bili: Negative / Urobili: Negative mg/dL   Blood: x / Protein: 100 mg/dL / Nitrite: Negative   Leuk Esterase: Moderate / RBC: 0-2 /HPF / WBC 3-5   Sq Epi: x / Non Sq Epi: Moderate / Bacteria: Occasional

## 2020-12-29 NOTE — PROGRESS NOTE ADULT - SUBJECTIVE AND OBJECTIVE BOX
CC: AMS (29 Dec 2020 14:28)    HPI:  81 yo F w/ PMH Of ESRD on HD (MWF via LUE fistula), CAD s/p remote CABG, severe AS s/p TAVR w/ pacemaker in place, HTN/HLD, recent line infection w/ bacteremia who was recently discharged from [facility] on IV Abx. She was noted to become more lethargic and confused after her HD session.  Code stroke was activated in the ED.   (20 Dec 2020 17:11)    INTERVAL HPI/OVERNIGHT EVENTS: Patient seen and examined lying in bed in am.  Patient tolerated diet this am.  Patient denies any headache, dizziness, SOB, CP, abdominal pain, nausea, vomiting, dysuria.      Vital Signs Last 24 Hrs  T(C): 36.8 (29 Dec 2020 13:06), Max: 37.1 (28 Dec 2020 21:27)  T(F): 98.3 (29 Dec 2020 13:06), Max: 98.7 (28 Dec 2020 21:27)  HR: 60 (29 Dec 2020 13:06) (60 - 84)  BP: 134/55 (29 Dec 2020 13:06) (132/46 - 170/90)  BP(mean): --  RR: 18 (29 Dec 2020 13:06) (18 - 18)  SpO2: 100% (29 Dec 2020 13:06) (92% - 100%)  I&O's Detail    28 Dec 2020 07:01  -  29 Dec 2020 07:00  --------------------------------------------------------  IN:  Total IN: 0 mL    OUT:    Voided (mL): 100 mL  Total OUT: 100 mL    Total NET: -100 mL    PHYSICAL EXAM:  GENERAL: NAD  HEAD:  Atraumatic, Normocephalic  NECK: Supple, No JVD, Normal thyroid  NERVOUS SYSTEM:  Alert, Good concentration; generalized weakness  CHEST/LUNG: Clear to auscultation bilaterally; No rales, rhonchi, wheezing, or rubs  HEART: Regular rate and rhythm; No murmurs, rubs, or gallops  ABDOMEN: Soft, Nontender, Nondistended; Bowel sounds present  EXTREMITIES:  2+ Peripheral Pulses, No clubbing, cyanosis, or edema                                  11.0   3.49  )-----------( 171      ( 29 Dec 2020 11:02 )             38.5     29 Dec 2020 11:02    134    |  94     |  38.0   ----------------------------<  69     4.8     |  28.0   |  3.54     Ca    9.4        29 Dec 2020 11:02  Phos  3.5       28 Dec 2020 08:03  Mg     2.1       28 Dec 2020 08:03      Urinalysis Basic - ( 27 Dec 2020 18:47 )    Color: Yellow / Appearance: Clear / S.015 / pH: x  Gluc: x / Ketone: Negative  / Bili: Negative / Urobili: Negative mg/dL   Blood: x / Protein: 100 mg/dL / Nitrite: Negative   Leuk Esterase: Moderate / RBC: 0-2 /HPF / WBC 3-5   Sq Epi: x / Non Sq Epi: Moderate / Bacteria: Occasional        MEDICATIONS  (STANDING):  acetaminophen    Suspension .. 700 milliGRAM(s) Oral once  aspirin  chewable 81 milliGRAM(s) Oral daily  atorvastatin 40 milliGRAM(s) Oral at bedtime  carvedilol 12.5 milliGRAM(s) Oral every 12 hours  chlorhexidine 4% Liquid 1 Application(s) Topical <User Schedule>  cloNIDine 0.1 milliGRAM(s) Oral every 12 hours  dextrose 40% Gel 15 Gram(s) Oral once  dextrose 50% Injectable 25 Gram(s) IV Push once  dextrose 50% Injectable 12.5 Gram(s) IV Push once  dextrose 50% Injectable 25 Gram(s) IV Push once  glucagon  Injectable 1 milliGRAM(s) IntraMuscular once  heparin   Injectable 5000 Unit(s) SubCutaneous every 8 hours  influenza   Vaccine 0.5 milliLiter(s) IntraMuscular once  isosorbide   mononitrate ER Tablet (IMDUR) 120 milliGRAM(s) Oral daily  Nephro-john 1 Tablet(s) Oral daily    MEDICATIONS  (PRN):  hydrALAZINE Injectable 5 milliGRAM(s) IV Push every 6 hours PRN SBP >185     CC: AMS (29 Dec 2020 14:28)    HPI:  81 yo F w/ PMH Of ESRD on HD (MWF via LUE fistula), CAD s/p remote CABG, severe AS s/p TAVR w/ pacemaker in place, HTN/HLD, recent line infection w/ bacteremia who was recently discharged from [facility] on IV Abx. She was noted to become more lethargic and confused after her HD session.  Code stroke was activated in the ED.   (20 Dec 2020 17:11)    INTERVAL HPI/OVERNIGHT EVENTS: Patient seen and examined lying in bed in am.  Patient tolerated diet this am.  Patient denies any headache, dizziness, SOB, CP, abdominal pain, nausea, vomiting, dysuria.  All other ros negative     Vital Signs Last 24 Hrs  T(C): 36.8 (29 Dec 2020 13:06), Max: 37.1 (28 Dec 2020 21:27)  T(F): 98.3 (29 Dec 2020 13:06), Max: 98.7 (28 Dec 2020 21:27)  HR: 60 (29 Dec 2020 13:06) (60 - 84)  BP: 134/55 (29 Dec 2020 13:06) (132/46 - 170/90)  RR: 18 (29 Dec 2020 13:06) (18 - 18)  SpO2: 100% (29 Dec 2020 13:06) (92% - 100%)  I&O's Detail    28 Dec 2020 07:01  -  29 Dec 2020 07:00  --------------------------------------------------------  IN:  Total IN: 0 mL    OUT:    Voided (mL): 100 mL  Total OUT: 100 mL    Total NET: -100 mL    PHYSICAL EXAM:  GENERAL: NAD  HEAD:  Atraumatic, Normocephalic  NECK: Supple, No JVD, Normal thyroid  NERVOUS SYSTEM:  Alert, Good concentration; generalized weakness  CHEST/LUNG: Clear to auscultation bilaterally; No rales, rhonchi, wheezing, or rubs  HEART: Regular rate and rhythm; No murmurs, rubs, or gallops  ABDOMEN: Soft, Nontender, Nondistended; Bowel sounds present  EXTREMITIES:  2+ Peripheral Pulses, No clubbing, cyanosis, or edema  Skin: grossly intact     LABS:                             11.0   3.49  )-----------( 171      ( 29 Dec 2020 11:02 )             38.5     29 Dec 2020 11:02    134    |  94     |  38.0   ----------------------------<  69     4.8     |  28.0   |  3.54     Ca    9.4        29 Dec 2020 11:02  Phos  3.5       28 Dec 2020 08:03  Mg     2.1       28 Dec 2020 08:03      Urinalysis Basic - ( 27 Dec 2020 18:47 )    Color: Yellow / Appearance: Clear / S.015 / pH: x  Gluc: x / Ketone: Negative  / Bili: Negative / Urobili: Negative mg/dL   Blood: x / Protein: 100 mg/dL / Nitrite: Negative   Leuk Esterase: Moderate / RBC: 0-2 /HPF / WBC 3-5   Sq Epi: x / Non Sq Epi: Moderate / Bacteria: Occasional    MEDICATIONS  (STANDING):  acetaminophen    Suspension .. 700 milliGRAM(s) Oral once  aspirin  chewable 81 milliGRAM(s) Oral daily  atorvastatin 40 milliGRAM(s) Oral at bedtime  carvedilol 12.5 milliGRAM(s) Oral every 12 hours  chlorhexidine 4% Liquid 1 Application(s) Topical <User Schedule>  cloNIDine 0.1 milliGRAM(s) Oral every 12 hours  dextrose 40% Gel 15 Gram(s) Oral once  dextrose 50% Injectable 25 Gram(s) IV Push once  dextrose 50% Injectable 12.5 Gram(s) IV Push once  dextrose 50% Injectable 25 Gram(s) IV Push once  glucagon  Injectable 1 milliGRAM(s) IntraMuscular once  heparin   Injectable 5000 Unit(s) SubCutaneous every 8 hours  influenza   Vaccine 0.5 milliLiter(s) IntraMuscular once  isosorbide   mononitrate ER Tablet (IMDUR) 120 milliGRAM(s) Oral daily  Nephro-john 1 Tablet(s) Oral daily    MEDICATIONS  (PRN):  hydrALAZINE Injectable 5 milliGRAM(s) IV Push every 6 hours PRN SBP >185

## 2020-12-29 NOTE — DISCHARGE NOTE PROVIDER - NSDCCPCAREPLAN_GEN_ALL_CORE_FT
PRINCIPAL DISCHARGE DIAGNOSIS  Diagnosis: CVA (cerebrovascular accident)  Assessment and Plan of Treatment: Continue current medications as prescribed.      SECONDARY DISCHARGE DIAGNOSES  Diagnosis: ESRD (end stage renal disease)  Assessment and Plan of Treatment: Continue HD.    Diagnosis: Dysphagia  Assessment and Plan of Treatment: Continue Puree diet with thin liquids.     PRINCIPAL DISCHARGE DIAGNOSIS  Diagnosis: Aphasia  Assessment and Plan of Treatment: AMS of unknown etiology, possible secondary to small left hemispheric infarct  However, repeat ct scans are negative   + dysphagia (resolved)   -c/w PO diet, nephrovite and nepro tid   -per Neuro may be small left hemispheric infarct however imaging negative, perhaps more likely a TIA   - c/w ASA, statin  UA abnormal, no complaints from patient, not treated, consistent with lack of symptoms. MS improved irrespective   resolved      SECONDARY DISCHARGE DIAGNOSES  Diagnosis: Hypertension  Assessment and Plan of Treatment: HTN, CAD s/p CABG, Severe AS s/p TAVR w/ PPM  BP intermittently elevated  - c/w  Clonidine (increased to 0.1mg tid) , Coreg, uptitrate as needed  -monitor bp   - IV hydralazine PRN given in the hospital. On the day of discharge, started on norvasc as well    Diagnosis: Pancytopenia  Assessment and Plan of Treatment: Pancytopenia likely due to BM suppression with recent hospital stays and infection   -tsh, b12/folate wnl, h/h stable    Diagnosis: History of recent acute infection  Assessment and Plan of Treatment: Recent admission for pseudomonal/E coli bacteremia w/ port infection   - ID recs appreciated, observe off all abx   - BC 12/20 negative, ABX completed    Diagnosis: Dysphagia  Assessment and Plan of Treatment: Continue Puree diet with thin liquids.    Diagnosis: Functional quadriplegia  Assessment and Plan of Treatment: plan to go to rehab    Diagnosis: ESRD (end stage renal disease)  Assessment and Plan of Treatment: continue with supplements and dialysis per protocol

## 2020-12-29 NOTE — DISCHARGE NOTE PROVIDER - NSDCFUADDAPPT_GEN_ALL_CORE_FT
Follow up with primary doctor and nephrology. Follow up with primary doctor in 2-3 days after discharge and nephrology per routine. Can see Neurology Dr. Camacho in 2-4 weeks after discharge

## 2020-12-29 NOTE — PROGRESS NOTE ADULT - ASSESSMENT
Pt is an 81 yo F w/ PMHx of ESRD on HD (MWF via LUE fistula), CAD s/p remote CABG, severe AS s/p TAVR w/ pacemaker in place (compatible with MRI however per leads, would be off-label if MRI done), HTN/HLD, recent line infection w/ bacteremia who was recently discharged from [facility] last week on IV Abx. Admitted for AMS, r/o CVA. s/p Code stroke called in ED, CTH unremarkable for acute changes. CT perfusion scan: There is an ischemic penumbra in nonvascular distribution in the left hemisphere as described. Unclear significance.     On last admission, patient was admitted for line infection/bacteremia. Repeat bcx negative and TTE w/o signs of vegetations. Pt was discharged on 12/14 with directions to complete course of abx as o/p w/ HD through 12/22    AMS of unknown etiology, possible secondary to small left hemispheric infarct  However, repeat ct scans are negative   + dysphagia   - Neurology recs appreciated   -initially on admission, no e/o infection, blood cultures negative and cxr negative along with covid negative   -seen by speech, rec puree with thin liquids - patient tolerating  -seen by nutrition - rec nephrovite and nepro tid   -per Neuro may be small left hemispheric infarct  - c/w ASA, statin  -d/c all ngt water/feeds  -UA noted, contaminated sample - patient denies x 2 any dysuria/increased urinary frequency and did not have any lower abdominal pain on my exam today. Furthermore, no fevers nor elevation in wbc - hold off further tx     Pancytopenia likely due to BM suppression with recent hospital stays and infection   -tsh, b12/folate wnl   -follow cbc in am    Recent admission for pseudomonal/E coli bacteremia w/ port infection   - ID recs appreciated, observe off all abx   - BC 12/20 negative, ABX completed    ESRD on HD with normocytic anemia (within pancytopenia)  - Nephrology consulted for continuation of HD while inpatient   - HD per Renal, hold retacrit per renal     HTN, CAD s/p CABG, Severe AS s/p TAVR w/ PPM  - c/w  Clonidine, Coreg, uptitrate as needed  -BP better controlled, closely monitor   - IV hydralazine PRN    Hypoglycemia, resolved  - Hypoglycemia protocol, dextrose solution given     DVT ppx: Heparin sq    Dispo - Full code. Son Bernard updated on the phone. d/c planning to [facility] when arranged.     Pt is an 81 yo F w/ PMHx of ESRD on HD (MWF via LUE fistula), CAD s/p remote CABG, severe AS s/p TAVR w/ pacemaker in place (compatible with MRI however per leads, would be off-label if MRI done), HTN/HLD, recent line infection w/ bacteremia who was recently discharged from [facility] last week on IV Abx. Admitted for AMS, r/o CVA. s/p Code stroke called in ED, CTH unremarkable for acute changes. CT perfusion scan: There is an ischemic penumbra in nonvascular distribution in the left hemisphere as described. Unclear significance.     On last admission, patient was admitted for line infection/bacteremia. Repeat bcx negative and TTE w/o signs of vegetations. Pt was discharged on 12/14 with directions to complete course of abx as o/p w/ HD through 12/22. She had dysphagia, s/p ngt and then upgrade of diet on 12/28 with removal of ngt. Now d/c planning to a facility with a HD seat     AMS of unknown etiology, possible secondary to small left hemispheric infarct  However, repeat ct scans are negative   + dysphagia   - Neurology recs appreciated   -initially on admission, no e/o infection, blood cultures negative and cxr negative along with covid negative   -seen by speech, rec puree with thin liquids - patient tolerating  -seen by nutrition - rec nephrovite and nepro tid   -per Neuro may be small left hemispheric infarct  - c/w ASA, statin  -UA noted, contaminated sample - patient denies dysuria/increased urinary frequency. Furthermore, no fevers nor elevation in wbc - hold off further tx     Pancytopenia likely due to BM suppression with recent hospital stays and infection   -tsh, b12/folate wnl   -follow cbc in am    Recent admission for pseudomonal/E coli bacteremia w/ port infection   - ID recs appreciated, observe off all abx   - BC 12/20 negative, ABX completed    ESRD on HD with normocytic anemia (within pancytopenia)  - Nephrology consulted for continuation of HD while inpatient   - HD per Renal, hold retacrit per renal     HTN, CAD s/p CABG, Severe AS s/p TAVR w/ PPM  - c/w  Clonidine, Coreg, uptitrate as needed  -BP better controlled, closely monitor   - IV hydralazine PRN    DVT ppx: Heparin sq    Dispo - Full code. Son Bernard updated on the phone. d/c planning to [facility] when arranged.

## 2020-12-29 NOTE — PROGRESS NOTE ADULT - ATTENDING COMMENTS
Thank you for allowing me to participate in this patient's care. Please recall if further questions.
Patient seen and examined at bedside. Still agitated and altered. PPM is MRI incompatible, per Neuro repeat CT. NGT placed for feedings. Son was updated, states his mother had no advance directives. Agreeable for Palliative consult for further assistance w/ goals of care.
Note addended where needed. Plan discussed with patient at bedside and son Wagner over the phone   full code

## 2020-12-29 NOTE — DISCHARGE NOTE PROVIDER - CARE PROVIDER_API CALL
Blu Camacho  NEUROLOGY  [address], [address]  [place] [address]  Phone: [phone]  Fax: [phone]  Follow Up Time:     Celina Hernández  INTERNAL MEDICINE  [address] [place] [address]  Phone: [phone]  Fax: [phone]  Follow Up Time:     ROSALIO Knott  Phone: (   )    -  Fax: (   )    -  Follow Up Time:

## 2020-12-29 NOTE — DISCHARGE NOTE PROVIDER - PROVIDER TOKENS
PROVIDER:[TOKEN:[[id number]]],PROVIDER:[TOKEN:[[id number]]],FREE:[LAST:[Knott],FIRST:[PMD],PHONE:[(   )    -],FAX:[(   )    -]]

## 2020-12-29 NOTE — DISCHARGE NOTE PROVIDER - HOSPITAL COURSE
Pt is an 79 yo F w/ PMHx of ESRD on HD (MWF via LUE fistula), CAD s/p remote CABG, severe AS s/p TAVR w/ pacemaker in place (compatible with MRI however per leads, would be off-label if MRI done), HTN/HLD, recent line infection w/ bacteremia who was recently discharged from [facility] last week on IV Abx. Admitted for AMS, r/o CVA. s/p Code stroke called in ED, CTH unremarkable for acute changes. CT perfusion scan: There is an ischemic penumbra in nonvascular distribution in the left hemisphere as described. Unclear significance.     On last admission, patient was admitted for line infection/bacteremia. Repeat bcx negative and TTE w/o signs of vegetations. Pt was discharged on 12/14 with directions to complete course of abx as o/p w/ HD through 12/22    Repeat CT scans negative.  Neurology following, may be small left hemispheric infarct.  Blood cx negative.  CXR negative.  COVID negative.  Speech following regarding dysphagia.  Patient initially required NGT and then was able to advance to puree with thin liquids.  NGT removed.  UA was noted to be positive, likely contaminated, as patient without s/s of UTI. Observe off antibiotics.     Nephrology following for HD.  PT evaluated patient and [place] [facility]  Patient stable for discharge to [facility]      Vital Signs Last 24 Hrs  T(C): 36.8 (29 Dec 2020 13:06), Max: 37.1 (28 Dec 2020 21:27)  T(F): 98.3 (29 Dec 2020 13:06), Max: 98.7 (28 Dec 2020 21:27)  HR: 60 (29 Dec 2020 13:06) (60 - 84)  BP: 134/55 (29 Dec 2020 13:06) (132/46 - 170/90)  BP(mean): --  RR: 18 (29 Dec 2020 13:06) (18 - 18)  SpO2: 100% (29 Dec 2020 13:06) (92% - 100%)    PHYSICAL EXAM:  GENERAL: NAD  HEAD:  Atraumatic, Normocephalic  NECK: Supple, No JVD, Normal thyroid  NERVOUS SYSTEM:  Alert & Oriented X3, Good concentration; generalized weakness  CHEST/LUNG: Clear to auscultation bilaterally; No rales, rhonchi, wheezing, or rubs  HEART: Regular rate and rhythm; No murmurs, rubs, or gallops  ABDOMEN: Soft, Nontender, Nondistended; Bowel sounds present  EXTREMITIES:  2+ Peripheral Pulses, No clubbing, cyanosis, or edema   Pt is an 81 yo F w/ PMHx of ESRD on HD (MWF via LUE fistula), CAD s/p remote CABG, severe AS s/p TAVR w/ pacemaker in place   (compatible with MRI however per leads, would be off-label if MRI done), HTN/HLD, recent line infection w/ bacteremia who was recently   discharged from [facility] the week prior to admission on IV Abx. Admitted for AMS, r/o CVA. s/p Code stroke called in ED, CTH unremarkable for   acute changes. CT perfusion scan: There is an ischemic penumbra in nonvascular distribution in the left hemisphere as described. Unclear significance.     On last admission, patient was admitted for line infection/bacteremia. Repeat bcx negative and TTE w/o signs of vegetations.   Pt was discharged on 12/14 with directions to complete course of abx as o/p w/ HD through 12/22    Repeat CT scans negative.  Neurology following, may be small left hemispheric infarct.  Blood cx negative.  CXR negative.  COVID negative.    Speech following regarding dysphagia.  Patient initially required NGT and then was able to advance to puree with thin liquids.  NGT removed.    UA was noted to be positive, likely contaminated, as patient without s/s of UTI. Observe off antibiotics.     Nephrology following for HD.    PT evaluated patient and recommended [facility].  Patient stable for discharge to [facility].      Total time coordinating this discharge was 40 minutes. Plan d/w patient and son over the phone.     On the day of discharge:   Patient seen and examined at bedside. No acute distress and no acute overnight events. No complaints     ROS: No chest pain, palpitations, sob, light headedness/dizziness, difficulty breathing/cough, fevers/chills, abdominal pain, n/v, diarrhea/constipation, dysuria or increased urinary frequency. All other ROS negative     Vital Signs Last 24 Hrs  T(C): 36.8 (29 Dec 2020 13:06), Max: 37.1 (28 Dec 2020 21:27)  T(F): 98.3 (29 Dec 2020 13:06), Max: 98.7 (28 Dec 2020 21:27)  HR: 60 (29 Dec 2020 13:06) (60 - 84)  BP: 134/55 (29 Dec 2020 13:06) (132/46 - 170/90)  RR: 18 (29 Dec 2020 13:06) (18 - 18)  SpO2: 100% (29 Dec 2020 13:06) (92% - 100%)    PHYSICAL EXAM:  GENERAL: NAD  HEAD:  Atraumatic, Normocephalic  NECK: Supple, No JVD, Normal thyroid  NERVOUS SYSTEM:  Alert & Oriented X3, Good concentration; generalized weakness but motor 4/5 all extremities   CHEST/LUNG: Clear to auscultation bilaterally; No rales, rhonchi, wheezing, or rubs  HEART: Regular rate and rhythm; No murmurs, rubs, or gallops  ABDOMEN: Soft, Nontender, Nondistended; Bowel sounds present  EXTREMITIES:  2+ Peripheral Pulses, No clubbing, cyanosis, or edema  SKIN: grossly intact. Healed prior scar noted in sacrum

## 2020-12-29 NOTE — PROGRESS NOTE ADULT - ASSESSMENT
79yo AA Female with what appears to be a global aphasia with no clear hemiparesis.     Per Neurology :    AMS vs aphasia.   Presumed left hemisphere stroke.   Improving.   LDL: 166 mg.,   Goal: < 70 mg.,   On  antiplatelet Drugs  and statin.     HTN, CAD s/p CABG, Severe AS s/p TAVR w/ PPM  Hypertension - On Meds,    ESRD on HD  c/w HD today  Cr improving  c/ monitoring    Hold EPO    Attending aware and agrees with plan

## 2020-12-29 NOTE — PROGRESS NOTE ADULT - SUBJECTIVE AND OBJECTIVE BOX
PALLIATIVE FOLLOW UP VISIT    INTERVAL HPI/OVERNIGHT EVENTS:  No acute events overnight.   Passed swallow eval yesterday and diet advanced pureed, thin. Tolerating diet.     Present Symptoms:   Dyspnea:  No   Nausea/Vomiting:  No  Anxiety:   No  Fatigue:  No  Loss of appetite: No  Pain: No    Limited ROS due to mental status.    MEDICATIONS  (STANDING):  aspirin  chewable 81 milliGRAM(s) Oral daily  atorvastatin 40 milliGRAM(s) Oral at bedtime  carvedilol 12.5 milliGRAM(s) Oral every 12 hours  chlorhexidine 4% Liquid 1 Application(s) Topical <User Schedule>  cloNIDine 0.1 milliGRAM(s) Oral every 12 hours  dextrose 40% Gel 15 Gram(s) Oral once  dextrose 5%. 1000 milliLiter(s) (50 mL/Hr) IV Continuous <Continuous>  dextrose 50% Injectable 25 Gram(s) IV Push once  dextrose 50% Injectable 12.5 Gram(s) IV Push once  dextrose 50% Injectable 25 Gram(s) IV Push once  glucagon  Injectable 1 milliGRAM(s) IntraMuscular once  heparin   Injectable 5000 Unit(s) SubCutaneous every 8 hours  influenza   Vaccine 0.5 milliLiter(s) IntraMuscular once  isosorbide   mononitrate ER Tablet (IMDUR) 120 milliGRAM(s) Oral daily  Nephro-john 1 Tablet(s) Oral daily    MEDICATIONS  (PRN):  hydrALAZINE Injectable 5 milliGRAM(s) IV Push every 6 hours PRN SBP >185      PHYSICAL EXAM:  Vital Signs Last 24 Hrs  T(C): 36.8 (29 Dec 2020 13:06), Max: 37.1 (28 Dec 2020 21:27)  T(F): 98.3 (29 Dec 2020 13:06), Max: 98.7 (28 Dec 2020 21:27)  HR: 60 (29 Dec 2020 13:06) (60 - 84)  BP: 134/55 (29 Dec 2020 13:06) (132/46 - 170/90)  BP(mean): --  RR: 18 (29 Dec 2020 13:06) (18 - 18)  SpO2: 100% (29 Dec 2020 13:06) (92% - 100%)    General: elderly. cachetic. Resting comfortably. No acute distress.    HEENT: mucous membrane moist. +NGT  Lungs: comfortable. non-labored.   CV: +s1/s2. Regular rate and rhythm.     GI: +bowel sound. abdomen soft, non-tender, non-distended,   MSK: Moves all 4 extremities. No cyanosis or edema. weakness.   Neuro: Awake and Oriented to person/hospital/year. Follows simple commands   Skin: warm and dry.        LABS:                        11.0   3.49  )-----------( 171      ( 29 Dec 2020 11:02 )             38.5     12-29    134<L>  |  94<L>  |  38.0<H>  ----------------------------<  69<L>  4.8   |  28.0  |  3.54<H>    Ca    9.4      29 Dec 2020 11:02  Phos  3.5     12-28  Mg     2.1     12-28      RADIOLOGY & ADDITIONAL STUDIES: Reviewed

## 2020-12-30 PROCEDURE — 99232 SBSQ HOSP IP/OBS MODERATE 35: CPT

## 2020-12-30 PROCEDURE — 99233 SBSQ HOSP IP/OBS HIGH 50: CPT

## 2020-12-30 RX ADMIN — CARVEDILOL PHOSPHATE 12.5 MILLIGRAM(S): 80 CAPSULE, EXTENDED RELEASE ORAL at 05:40

## 2020-12-30 RX ADMIN — Medication 81 MILLIGRAM(S): at 11:01

## 2020-12-30 RX ADMIN — CHLORHEXIDINE GLUCONATE 1 APPLICATION(S): 213 SOLUTION TOPICAL at 05:41

## 2020-12-30 RX ADMIN — Medication 650 MILLIGRAM(S): at 05:41

## 2020-12-30 RX ADMIN — HEPARIN SODIUM 5000 UNIT(S): 5000 INJECTION INTRAVENOUS; SUBCUTANEOUS at 15:24

## 2020-12-30 RX ADMIN — Medication 0.1 MILLIGRAM(S): at 21:14

## 2020-12-30 RX ADMIN — ISOSORBIDE MONONITRATE 120 MILLIGRAM(S): 60 TABLET, EXTENDED RELEASE ORAL at 11:01

## 2020-12-30 RX ADMIN — HEPARIN SODIUM 5000 UNIT(S): 5000 INJECTION INTRAVENOUS; SUBCUTANEOUS at 05:41

## 2020-12-30 RX ADMIN — Medication 0.1 MILLIGRAM(S): at 15:24

## 2020-12-30 RX ADMIN — HEPARIN SODIUM 5000 UNIT(S): 5000 INJECTION INTRAVENOUS; SUBCUTANEOUS at 21:14

## 2020-12-30 RX ADMIN — Medication 1 TABLET(S): at 11:01

## 2020-12-30 RX ADMIN — ATORVASTATIN CALCIUM 40 MILLIGRAM(S): 80 TABLET, FILM COATED ORAL at 21:14

## 2020-12-30 RX ADMIN — Medication 650 MILLIGRAM(S): at 06:40

## 2020-12-30 RX ADMIN — Medication 0.1 MILLIGRAM(S): at 05:41

## 2020-12-30 RX ADMIN — CARVEDILOL PHOSPHATE 12.5 MILLIGRAM(S): 80 CAPSULE, EXTENDED RELEASE ORAL at 17:30

## 2020-12-30 NOTE — PROGRESS NOTE ADULT - SUBJECTIVE AND OBJECTIVE BOX
CC: AMS (29 Dec 2020 14:28)    Patient seen and examined at bedside. No acute distress and no acute overnight events. No complaints. Wants to go home.     ROS: No chest pain, palpitations, sob, light headedness/dizziness, difficulty breathing/cough, fevers/chills, abdominal pain, n/v, diarrhea/constipation, dysuria or increased urinary frequency. All other ROS negative     Vital Signs Last 24 Hrs  T(C): 36.7 (30 Dec 2020 10:29), Max: 36.9 (30 Dec 2020 04:36)  T(F): 98 (30 Dec 2020 10:29), Max: 98.4 (30 Dec 2020 04:36)  HR: 60 (30 Dec 2020 10:) (60 - 68)  BP: 155/70 (30 Dec 2020 10:29) (145/62 - 170/74)  RR: 18 (30 Dec 2020 10:29) (17 - 18)  SpO2: 99% (30 Dec 2020 10:29) (95% - 100%)    PHYSICAL EXAM:  GENERAL: NAD  HEAD:  Atraumatic, Normocephalic  NECK: Supple, No JVD, Normal thyroid  NERVOUS SYSTEM:  Alert, Good concentration; generalized weakness  CHEST/LUNG: Clear to auscultation bilaterally; No rales, rhonchi, wheezing, or rubs  HEART: Regular rate and rhythm; No murmurs, rubs, or gallops  ABDOMEN: Soft, Nontender, Nondistended; Bowel sounds present  EXTREMITIES:  2+ Peripheral Pulses, No clubbing, cyanosis, or edema  Skin: grossly intact, diffusely dry skin     LABS: No new labs. Covid screen negative                         11.0   3.49  )-----------( 171      ( 29 Dec 2020 11:02 )             38.5     29 Dec 2020 11:02    134    |  94     |  38.0   ----------------------------<  69     4.8     |  28.0   |  3.54     Ca    9.4        29 Dec 2020 11:02  Phos  3.5       28 Dec 2020 08:03  Mg     2.1       28 Dec 2020 08:03  Urinalysis Basic - ( 27 Dec 2020 18:47 )    Color: Yellow / Appearance: Clear / S.015 / pH: x  Gluc: x / Ketone: Negative  / Bili: Negative / Urobili: Negative mg/dL   Blood: x / Protein: 100 mg/dL / Nitrite: Negative   Leuk Esterase: Moderate / RBC: 0-2 /HPF / WBC 3-5   Sq Epi: x / Non Sq Epi: Moderate / Bacteria: Occasional    MEDICATIONS  (STANDING):  acetaminophen    Suspension .. 700 milliGRAM(s) Oral once  aspirin  chewable 81 milliGRAM(s) Oral daily  atorvastatin 40 milliGRAM(s) Oral at bedtime  carvedilol 12.5 milliGRAM(s) Oral every 12 hours  chlorhexidine 4% Liquid 1 Application(s) Topical <User Schedule>  cloNIDine 0.1 milliGRAM(s) Oral every 8 hours  dextrose 40% Gel 15 Gram(s) Oral once  dextrose 50% Injectable 25 Gram(s) IV Push once  dextrose 50% Injectable 12.5 Gram(s) IV Push once  dextrose 50% Injectable 25 Gram(s) IV Push once  glucagon  Injectable 1 milliGRAM(s) IntraMuscular once  heparin   Injectable 5000 Unit(s) SubCutaneous every 8 hours  influenza   Vaccine 0.5 milliLiter(s) IntraMuscular once  isosorbide   mononitrate ER Tablet (IMDUR) 120 milliGRAM(s) Oral daily  Nephro-john 1 Tablet(s) Oral daily    MEDICATIONS  (PRN):  acetaminophen    Suspension .. 650 milliGRAM(s) Oral every 6 hours PRN Temp greater or equal to 38C (100.4F), Mild Pain (1 - 3)  hydrALAZINE Injectable 5 milliGRAM(s) IV Push every 6 hours PRN SBP >185

## 2020-12-30 NOTE — PROGRESS NOTE ADULT - ASSESSMENT
80y Female with what appears to be a global aphasia with no clear hemiparesis.     AMS vs aphasia.   Unclear etiology of transient aphasia  Improving.   LDL: 166  Goal: < 70  Continue antiplatelet and statin.     Hypertension   Control blood pressure.  Avoid hypotension.     ESRD   Dialysis per renal.     Thank you for allowing me to participate in the care of your patient    Joon Gordon MD, PhD   [id number]

## 2020-12-30 NOTE — PROGRESS NOTE ADULT - SUBJECTIVE AND OBJECTIVE BOX
[facility]                                        [facility] [place]                                 Heidi Alaniz, & Arcenio                                  [address]                                        [place], [place], [address]                                             [phone]        CC: Stroke    HPI:   The patient is a 80y Female who presented with altered mental status.   She was apparently found screaming "Hey, hey, hey" by family. She was not following instructions.   Code stroke was activated upon her arrival however the last known well time was reportedly many hours prior to arrival (around 04:00)    Interim history: language has recovered to normal    Review of systems (neurology): Denies headache or dizziness. Denies weakness/numbness.  Denies speech/language deficits. Denies diplopia/blurred vision.  Denies confusion    MEDICATIONS  (STANDING):  acetaminophen    Suspension .. 700 milliGRAM(s) Oral once  aspirin  chewable 81 milliGRAM(s) Oral daily  atorvastatin 40 milliGRAM(s) Oral at bedtime  carvedilol 12.5 milliGRAM(s) Oral every 12 hours  chlorhexidine 4% Liquid 1 Application(s) Topical <User Schedule>  cloNIDine 0.1 milliGRAM(s) Oral every 8 hours  dextrose 40% Gel 15 Gram(s) Oral once  dextrose 50% Injectable 25 Gram(s) IV Push once  dextrose 50% Injectable 12.5 Gram(s) IV Push once  dextrose 50% Injectable 25 Gram(s) IV Push once  glucagon  Injectable 1 milliGRAM(s) IntraMuscular once  heparin   Injectable 5000 Unit(s) SubCutaneous every 8 hours  influenza   Vaccine 0.5 milliLiter(s) IntraMuscular once  isosorbide   mononitrate ER Tablet (IMDUR) 120 milliGRAM(s) Oral daily  Nephro-john 1 Tablet(s) Oral daily    MEDICATIONS  (PRN):  acetaminophen    Suspension .. 650 milliGRAM(s) Oral every 6 hours PRN Temp greater or equal to 38C (100.4F), Mild Pain (1 - 3)  hydrALAZINE Injectable 5 milliGRAM(s) IV Push every 6 hours PRN SBP >185      Vital Signs Last 24 Hrs  T(C): 36.7 (30 Dec 2020 10:29), Max: 36.9 (30 Dec 2020 04:36)  T(F): 98 (30 Dec 2020 10:29), Max: 98.4 (30 Dec 2020 04:36)  HR: 60 (30 Dec 2020 10:29) (60 - 68)  BP: 155/70 (30 Dec 2020 10:29) (145/62 - 170/74)  BP(mean): --  RR: 18 (30 Dec 2020 10:29) (17 - 18)  SpO2: 99% (30 Dec 2020 10:29) (95% - 100%)    Detailed Neurologic Exam:    Mental status: The patient is awake and alert. Oriented x 3, no aphasia.     Cranial nerves: Pupils react symmetrically to light. There is no visual field deficit to confrontation. Extraocular motion: She tracks with gaze. There is no ptosis. Facial sensation is intact. Facial musculature is symmetric. Palate elevates symmetrically. Tongue is midline.    Motor/Sensory: There is normal bulk and tone.  Moving all extremity symmetrically to stimuli. Some spontaneous movement and to command.    Reflexes: Trace throughout and plantar responses are flexor.    Cerebellar: Unable to assess.     Labs:                           11.0   3.49  )-----------( 171      ( 29 Dec 2020 11:02 )             38.5     12-29    134<L>  |  94<L>  |  38.0<H>  ----------------------------<  69<L>  4.8   |  28.0  |  3.54<H>    Ca    9.4      29 Dec 2020 11:02

## 2020-12-30 NOTE — PROGRESS NOTE ADULT - SUBJECTIVE AND OBJECTIVE BOX
[facility] DIVISION OF KIDNEY DISEASES AND HYPERTENSION -- FOLLOW UP NOTE  --------------------------------------------------------------------------------  Chief Complaint:  ESRD HD    24 hour events/subjective:  Pt on CoVID isolation  HD in AM      PAST HISTORY  --------------------------------------------------------------------------------  No significant changes to PMH, PSH, FHx, SHx, unless otherwise noted    ALLERGIES & MEDICATIONS  --------------------------------------------------------------------------------  Allergies    codeine (Other)  Lortab (Other)  morphine (Other)  Percocet 10/325 (Other)  PPM not compatible with  MRI (Other (Fatal))  Reglan (Other; [place])  sulfa drugs ([place])    Intolerances      Standing Inpatient Medications  acetaminophen    Suspension .. 700 milliGRAM(s) Oral once  aspirin  chewable 81 milliGRAM(s) Oral daily  atorvastatin 40 milliGRAM(s) Oral at bedtime  carvedilol 12.5 milliGRAM(s) Oral every 12 hours  chlorhexidine 4% Liquid 1 Application(s) Topical <User Schedule>  cloNIDine 0.1 milliGRAM(s) Oral every 12 hours  dextrose 40% Gel 15 Gram(s) Oral once  dextrose 50% Injectable 25 Gram(s) IV Push once  dextrose 50% Injectable 12.5 Gram(s) IV Push once  dextrose 50% Injectable 25 Gram(s) IV Push once  glucagon  Injectable 1 milliGRAM(s) IntraMuscular once  heparin   Injectable 5000 Unit(s) SubCutaneous every 8 hours  influenza   Vaccine 0.5 milliLiter(s) IntraMuscular once  isosorbide   mononitrate ER Tablet (IMDUR) 120 milliGRAM(s) Oral daily  Nephro-john 1 Tablet(s) Oral daily    PRN Inpatient Medications  acetaminophen    Suspension .. 650 milliGRAM(s) Oral every 6 hours PRN  hydrALAZINE Injectable 5 milliGRAM(s) IV Push every 6 hours PRN      REVIEW OF SYSTEMS on CoVID isolation  --------------------------------------------------------------------------------  Gen: No weight changes, fatigue, fevers/chills, weakness  Skin: No rashes  Head/Eyes/Ears/Mouth: No headache; Normal hearing; Normal vision w/o blurriness; No sinus pain/discomfort, sore throat  Respiratory: No dyspnea, cough, wheezing, hemoptysis  CV: No chest pain, PND, orthopnea  GI: No abdominal pain, diarrhea, constipation, nausea, vomiting, melena, hematochezia  : No increased frequency, dysuria, hematuria, nocturia  MSK: No joint pain/swelling; no back pain; no edema  Neuro: No dizziness/lightheadedness, weakness, seizures, numbness, tingling  Heme: No easy bruising or bleeding  Endo: No heat/cold intolerance  Psych: No significant nervousness, anxiety, stress, depression    All other systems were reviewed and are negative, except as noted.    VITALS/PHYSICAL EXAM  --------------------------------------------------------------------------------  T(C): 36.7 (12-30-20 @ 10:29), Max: 36.9 (12-30-20 @ 04:36)  HR: 60 (12-30-20 @ 10:29) (60 - 68)  BP: 155/70 (12-30-20 @ 10:29) (134/55 - 170/74)  RR: 18 (12-30-20 @ 10:29) (17 - 18)  SpO2: 99% (12-30-20 @ 10:29) (95% - 100%)  Wt(kg): --    Weight (kg): 70 (12-29-20 @ 14:44)      12-29-20 @ 07:01  -  12-30-20 @ 07:00  --------------------------------------------------------  IN: 0 mL / OUT: 700 mL / NET: -700 mL      Physical Exam: 2/2 [facility] hospitalist  CONSTITUTIONAL: Elderly female, more alert, answering simple questions, following simple commands, in NAD  HEENT: NC/AT, PERRL, no JVD. NGT in place  RESPIRATORY: CTA bilaterally, normal effort  CARDIOVASCULAR: RRR, S1/S2+, no m/g/r, no pedal edema  ABDOMEN: Nontender to palpation, normoactive bowel sounds, no rebound/guarding; No hepatosplenomegaly  MUSCULO SKELETAL: No edema, cyanosis or deformities.  PSYCH: Does not follow commands. A&Ox1 (self)  NEUROLOGY: Unable to assess, moving all extremities  SKIN: No rashes; no palpable lesions  VASC: Distal pulses palpable    LABS/STUDIES  --------------------------------------------------------------------------------              11.0   3.49  >-----------<  171      [12-29-20 @ 11:02]              38.5     134  |  94  |  38.0  ----------------------------<  69      [12-29-20 @ 11:02]  4.8   |  28.0  |  3.54        Ca     9.4     [12-29-20 @ 11:02]            Creatinine Trend:  SCr 3.54 [12-29 @ 11:02]  SCr 2.66 [12-28 @ 08:03]  SCr 3.37 [12-25 @ 09:20]  SCr 1.73 [12-23 @ 14:18]  SCr 2.49 [12-22 @ 08:14]    Urinalysis - [12-27-20 @ 18:47]      Color Yellow / Appearance Clear / SG 1.015 / pH 9.0      Gluc Negative / Ketone Negative  / Bili Negative / Urobili Negative       Blood Small / Protein 100 / Leuk Est Moderate / Nitrite Negative      RBC 0-2 / WBC 3-5 / Hyaline  / Gran  / Sq Epi  / Non Sq Epi Moderate / Bacteria Occasional      Iron 83, TIBC 167, %sat --      [08-22-20 @ 10:45]  Ferritin 5890      [12-08-20 @ 01:15]  .7 (Ca --)      [02-02-20 @ 05:15]   --  HbA1c 4.9      [02-02-20 @ 05:15]  TSH 1.61      [12-20-20 @ 14:12]  Lipid: chol 237, TG 91, HDL 53, LDL --      [12-22-20 @ 08:14]

## 2020-12-30 NOTE — PROGRESS NOTE ADULT - NUTRITIONAL ASSESSMENT
This patient has been assessed with a concern for Malnutrition and has been determined to have a diagnosis/diagnoses of Moderate protein-calorie malnutrition.    This patient is being managed with:   Diet DASH/TLC-  Sodium & Cholesterol Restricted  For patients receiving Renal Replacement - No Protein Restr No Conc K No Conc Phos Low  Sodium (RENAL)  Supplement Feeding Modality:  Oral  Ensure Enlive Cans or Servings Per Day:  3       Frequency:  Three Times a day  Entered: Dec 13 2020  8:32AM    
Nutrition Note:   Rec Nepro BID, consistent CHO, renal replacement, puree diet.

## 2020-12-30 NOTE — PROGRESS NOTE ADULT - ASSESSMENT
Pt is an 81 yo F w/ PMHx of ESRD on HD (MWF via LUE fistula), CAD s/p remote CABG, severe AS s/p TAVR w/ pacemaker in place (compatible with MRI however per leads, would be off-label if MRI done), HTN/HLD, recent line infection w/ bacteremia who was recently discharged from [facility] last week on IV Abx. Admitted for AMS, r/o CVA. s/p Code stroke called in ED, CTH unremarkable for acute changes. CT perfusion scan: There is an ischemic penumbra in nonvascular distribution in the left hemisphere as described. Unclear significance. No e/o infection on admission, cultures negative     On last admission, patient was admitted for line infection/bacteremia. Repeat bcx negative and TTE w/o signs of vegetations. Pt was discharged on 12/14 with directions to complete course of abx as o/p w/ HD through 12/22. She had dysphagia, s/p ngt and then upgrade of diet on 12/28 with removal of ngt. Now d/c planning to a facility with a HD seat     AMS of unknown etiology, possible secondary to small left hemispheric infarct  However, repeat ct scans are negative   + dysphagia (resolved)   - Neurology recs appreciated   -c/w PO diet, nephrovite and nepro tid   -per Neuro may be small left hemispheric infarct  - c/w ASA, statin    Pancytopenia likely due to BM suppression with recent hospital stays and infection   -tsh, b12/folate wnl, h/h stable     Recent admission for pseudomonal/E coli bacteremia w/ port infection   - ID recs appreciated, observe off all abx   - BC 12/20 negative, ABX completed    ESRD on HD with normocytic anemia (within pancytopenia)  - Nephrology consulted for continuation of HD while inpatient   - HD per Renal, repeat hepatitis screen for HD placement in facility     HTN, CAD s/p CABG, Severe AS s/p TAVR w/ PPM  BP intermittently elevated  - c/w  Clonidine (increased to 0.1mg tid) , Coreg, uptitrate as needed  -monitor bp   - IV hydralazine PRN    DVT ppx: Heparin sq    Dispo - Full code. Son Wagner updated on the phone. d/c planning to [facility] with a HD seat

## 2020-12-30 NOTE — PROGRESS NOTE ADULT - ASSESSMENT
ESRD on HD    HTN, CAD s/p CABG, Severe AS s/p TAVR w/ PPM    HD AM    ( OP HD : [facility] )     james Villafuerte

## 2020-12-31 ENCOUNTER — TRANSCRIPTION ENCOUNTER (OUTPATIENT)
Age: 80
End: 2020-12-31

## 2020-12-31 VITALS
DIASTOLIC BLOOD PRESSURE: 73 MMHG | SYSTOLIC BLOOD PRESSURE: 145 MMHG | HEART RATE: 64 BPM | RESPIRATION RATE: 18 BRPM | OXYGEN SATURATION: 97 % | TEMPERATURE: 98 F

## 2020-12-31 LAB
ANION GAP SERPL CALC-SCNC: 12 MMOL/L — SIGNIFICANT CHANGE UP (ref 5–17)
BUN SERPL-MCNC: 46 MG/DL — HIGH (ref 8–20)
CALCIUM SERPL-MCNC: 9 MG/DL — SIGNIFICANT CHANGE UP (ref 8.6–10.2)
CHLORIDE SERPL-SCNC: 96 MMOL/L — LOW (ref 98–107)
CO2 SERPL-SCNC: 27 MMOL/L — SIGNIFICANT CHANGE UP (ref 22–29)
CREAT SERPL-MCNC: 3.33 MG/DL — HIGH (ref 0.5–1.3)
GLUCOSE SERPL-MCNC: 85 MG/DL — SIGNIFICANT CHANGE UP (ref 70–99)
HAV IGM SER-ACNC: SIGNIFICANT CHANGE UP
HBV CORE IGM SER-ACNC: SIGNIFICANT CHANGE UP
HBV SURFACE AB SER-ACNC: REACTIVE
HBV SURFACE AG SER-ACNC: SIGNIFICANT CHANGE UP
HCT VFR BLD CALC: 33.9 % — LOW (ref 34.5–45)
HCV AB S/CO SERPL IA: 0.08 S/CO — SIGNIFICANT CHANGE UP (ref 0–0.99)
HCV AB SERPL-IMP: SIGNIFICANT CHANGE UP
HGB BLD-MCNC: 9.8 G/DL — LOW (ref 11.5–15.5)
MCHC RBC-ENTMCNC: 25.4 PG — LOW (ref 27–34)
MCHC RBC-ENTMCNC: 28.9 GM/DL — LOW (ref 32–36)
MCV RBC AUTO: 87.8 FL — SIGNIFICANT CHANGE UP (ref 80–100)
PHOSPHATE SERPL-MCNC: 3.6 MG/DL — SIGNIFICANT CHANGE UP (ref 2.4–4.7)
PLATELET # BLD AUTO: 183 K/UL — SIGNIFICANT CHANGE UP (ref 150–400)
POTASSIUM SERPL-MCNC: 4.3 MMOL/L — SIGNIFICANT CHANGE UP (ref 3.5–5.3)
POTASSIUM SERPL-SCNC: 4.3 MMOL/L — SIGNIFICANT CHANGE UP (ref 3.5–5.3)
RBC # BLD: 3.86 M/UL — SIGNIFICANT CHANGE UP (ref 3.8–5.2)
RBC # FLD: 15.6 % — HIGH (ref 10.3–14.5)
SODIUM SERPL-SCNC: 135 MMOL/L — SIGNIFICANT CHANGE UP (ref 135–145)
WBC # BLD: 3.49 K/UL — LOW (ref 3.8–10.5)
WBC # FLD AUTO: 3.49 K/UL — LOW (ref 3.8–10.5)

## 2020-12-31 PROCEDURE — 99261: CPT

## 2020-12-31 PROCEDURE — 71045 X-RAY EXAM CHEST 1 VIEW: CPT

## 2020-12-31 PROCEDURE — 97163 PT EVAL HIGH COMPLEX 45 MIN: CPT

## 2020-12-31 PROCEDURE — 97116 GAIT TRAINING THERAPY: CPT

## 2020-12-31 PROCEDURE — 80053 COMPREHEN METABOLIC PANEL: CPT

## 2020-12-31 PROCEDURE — 36415 COLL VENOUS BLD VENIPUNCTURE: CPT

## 2020-12-31 PROCEDURE — 87086 URINE CULTURE/COLONY COUNT: CPT

## 2020-12-31 PROCEDURE — 92610 EVALUATE SWALLOWING FUNCTION: CPT

## 2020-12-31 PROCEDURE — U0003: CPT

## 2020-12-31 PROCEDURE — 80048 BASIC METABOLIC PNL TOTAL CA: CPT

## 2020-12-31 PROCEDURE — 82607 VITAMIN B-12: CPT

## 2020-12-31 PROCEDURE — 87641 MR-STAPH DNA AMP PROBE: CPT

## 2020-12-31 PROCEDURE — 83605 ASSAY OF LACTIC ACID: CPT

## 2020-12-31 PROCEDURE — 70496 CT ANGIOGRAPHY HEAD: CPT

## 2020-12-31 PROCEDURE — 82140 ASSAY OF AMMONIA: CPT

## 2020-12-31 PROCEDURE — 84484 ASSAY OF TROPONIN QUANT: CPT

## 2020-12-31 PROCEDURE — 80061 LIPID PANEL: CPT

## 2020-12-31 PROCEDURE — 82746 ASSAY OF FOLIC ACID SERUM: CPT

## 2020-12-31 PROCEDURE — 99239 HOSP IP/OBS DSCHRG MGMT >30: CPT

## 2020-12-31 PROCEDURE — 70450 CT HEAD/BRAIN W/O DYE: CPT

## 2020-12-31 PROCEDURE — 96374 THER/PROPH/DIAG INJ IV PUSH: CPT

## 2020-12-31 PROCEDURE — 92526 ORAL FUNCTION THERAPY: CPT

## 2020-12-31 PROCEDURE — 87640 STAPH A DNA AMP PROBE: CPT

## 2020-12-31 PROCEDURE — 83735 ASSAY OF MAGNESIUM: CPT

## 2020-12-31 PROCEDURE — 70498 CT ANGIOGRAPHY NECK: CPT

## 2020-12-31 PROCEDURE — 99233 SBSQ HOSP IP/OBS HIGH 50: CPT

## 2020-12-31 PROCEDURE — 82962 GLUCOSE BLOOD TEST: CPT

## 2020-12-31 PROCEDURE — 93005 ELECTROCARDIOGRAM TRACING: CPT

## 2020-12-31 PROCEDURE — 92523 SPEECH SOUND LANG COMPREHEN: CPT

## 2020-12-31 PROCEDURE — 90937 HEMODIALYSIS REPEATED EVAL: CPT

## 2020-12-31 PROCEDURE — 85027 COMPLETE CBC AUTOMATED: CPT

## 2020-12-31 PROCEDURE — 97530 THERAPEUTIC ACTIVITIES: CPT

## 2020-12-31 PROCEDURE — 86769 SARS-COV-2 COVID-19 ANTIBODY: CPT

## 2020-12-31 PROCEDURE — 85610 PROTHROMBIN TIME: CPT

## 2020-12-31 PROCEDURE — 85730 THROMBOPLASTIN TIME PARTIAL: CPT

## 2020-12-31 PROCEDURE — 99232 SBSQ HOSP IP/OBS MODERATE 35: CPT

## 2020-12-31 PROCEDURE — 87040 BLOOD CULTURE FOR BACTERIA: CPT

## 2020-12-31 PROCEDURE — 84145 PROCALCITONIN (PCT): CPT

## 2020-12-31 PROCEDURE — 0042T: CPT

## 2020-12-31 PROCEDURE — 84100 ASSAY OF PHOSPHORUS: CPT

## 2020-12-31 PROCEDURE — 86706 HEP B SURFACE ANTIBODY: CPT

## 2020-12-31 PROCEDURE — 81001 URINALYSIS AUTO W/SCOPE: CPT

## 2020-12-31 PROCEDURE — 99285 EMERGENCY DEPT VISIT HI MDM: CPT | Mod: 25

## 2020-12-31 PROCEDURE — 84443 ASSAY THYROID STIM HORMONE: CPT

## 2020-12-31 PROCEDURE — 85025 COMPLETE CBC W/AUTO DIFF WBC: CPT

## 2020-12-31 PROCEDURE — 80074 ACUTE HEPATITIS PANEL: CPT

## 2020-12-31 RX ORDER — AMLODIPINE BESYLATE 2.5 MG/1
5 TABLET ORAL ONCE
Refills: 0 | Status: COMPLETED | OUTPATIENT
Start: 2020-12-31 | End: 2020-12-31

## 2020-12-31 RX ORDER — AMLODIPINE BESYLATE 2.5 MG/1
1 TABLET ORAL
Qty: 0 | Refills: 0 | DISCHARGE
Start: 2020-12-31

## 2020-12-31 RX ADMIN — Medication 0.1 MILLIGRAM(S): at 05:14

## 2020-12-31 RX ADMIN — HEPARIN SODIUM 5000 UNIT(S): 5000 INJECTION INTRAVENOUS; SUBCUTANEOUS at 13:10

## 2020-12-31 RX ADMIN — AMLODIPINE BESYLATE 5 MILLIGRAM(S): 2.5 TABLET ORAL at 16:45

## 2020-12-31 RX ADMIN — ISOSORBIDE MONONITRATE 120 MILLIGRAM(S): 60 TABLET, EXTENDED RELEASE ORAL at 13:10

## 2020-12-31 RX ADMIN — Medication 81 MILLIGRAM(S): at 13:10

## 2020-12-31 RX ADMIN — HEPARIN SODIUM 5000 UNIT(S): 5000 INJECTION INTRAVENOUS; SUBCUTANEOUS at 05:14

## 2020-12-31 RX ADMIN — CHLORHEXIDINE GLUCONATE 1 APPLICATION(S): 213 SOLUTION TOPICAL at 05:14

## 2020-12-31 RX ADMIN — CARVEDILOL PHOSPHATE 12.5 MILLIGRAM(S): 80 CAPSULE, EXTENDED RELEASE ORAL at 16:45

## 2020-12-31 RX ADMIN — Medication 1 TABLET(S): at 13:10

## 2020-12-31 RX ADMIN — Medication 0.1 MILLIGRAM(S): at 13:10

## 2020-12-31 RX ADMIN — CARVEDILOL PHOSPHATE 12.5 MILLIGRAM(S): 80 CAPSULE, EXTENDED RELEASE ORAL at 05:14

## 2020-12-31 NOTE — PROGRESS NOTE ADULT - ASSESSMENT
80y Female with what appears to be a global aphasia with no clear hemiparesis.     AMS vs aphasia.   Unclear etiology of transient aphasia  resolved  LDL: 166  Goal: < 70  Continue ASA 81 and lipitor 40    Hypertension   Control blood pressure.  Avoid hypotension.     ESRD   Dialysis per renal.     Thank you for allowing me to participate in the care of your patient    Joon Gordon MD, PhD   [id number]

## 2020-12-31 NOTE — CHART NOTE - NSCHARTNOTEFT_GEN_A_CORE
Patient seen and examined at bedside. No acute distress and no acute overnight events   Planned for d/c  BP trending a little high, will add additional agents   Please see discharge papers for details.

## 2020-12-31 NOTE — PROGRESS NOTE ADULT - SUBJECTIVE AND OBJECTIVE BOX
[facility]                                        Neurology at [place]                                 Heidi Alaniz, & Arcenio                                  [address]                                        [place], [place], [address]                                             [phone]        CC: Stroke    HPI:   The patient is a 80y Female who presented with altered mental status.   She was apparently found screaming "Hey, hey, hey" by family. She was not following instructions.   Code stroke was activated upon her arrival however the last known well time was reportedly many hours prior to arrival (around 04:00) (PATT)    Interim history: language has recovered to normal, she is eating lunch independently    Review of systems (neurology): Denies headache or dizziness. Denies weakness/numbness.  Denies speech/language deficits. Denies diplopia/blurred vision.  Denies confusion    MEDICATIONS  (STANDING):  acetaminophen    Suspension .. 700 milliGRAM(s) Oral once  aspirin  chewable 81 milliGRAM(s) Oral daily  atorvastatin 40 milliGRAM(s) Oral at bedtime  carvedilol 12.5 milliGRAM(s) Oral every 12 hours  chlorhexidine 4% Liquid 1 Application(s) Topical <User Schedule>  cloNIDine 0.1 milliGRAM(s) Oral every 8 hours  dextrose 40% Gel 15 Gram(s) Oral once  dextrose 50% Injectable 25 Gram(s) IV Push once  dextrose 50% Injectable 12.5 Gram(s) IV Push once  dextrose 50% Injectable 25 Gram(s) IV Push once  glucagon  Injectable 1 milliGRAM(s) IntraMuscular once  heparin   Injectable 5000 Unit(s) SubCutaneous every 8 hours  influenza   Vaccine 0.5 milliLiter(s) IntraMuscular once  isosorbide   mononitrate ER Tablet (IMDUR) 120 milliGRAM(s) Oral daily  Nephro-john 1 Tablet(s) Oral daily    MEDICATIONS  (PRN):  acetaminophen    Suspension .. 650 milliGRAM(s) Oral every 6 hours PRN Temp greater or equal to 38C (100.4F), Mild Pain (1 - 3)  hydrALAZINE Injectable 5 milliGRAM(s) IV Push every 6 hours PRN SBP >185      Vital Signs Last 24 Hrs  T(C): 36.7 (31 Dec 2020 13:13), Max: 37 (31 Dec 2020 05:13)  T(F): 98 (31 Dec 2020 13:13), Max: 98.6 (31 Dec 2020 05:13)  HR: 61 (31 Dec 2020 13:13) (60 - 63)  BP: 165/65 (31 Dec 2020 13:13) (135/53 - 166/64)  BP(mean): --  RR: 18 (31 Dec 2020 13:13) (18 - 19)  SpO2: 97% (31 Dec 2020 13:13) (97% - 100%)    Detailed Neurologic Exam:    Mental status: The patient is awake and alert. Oriented x 3, no aphasia.     Cranial nerves: Pupils react symmetrically to light. There is no visual field deficit to confrontation. Extraocular motion: She tracks with gaze. There is no ptosis. Facial sensation is intact. Facial musculature is symmetric. Palate elevates symmetrically. Tongue is midline.    Motor/Sensory: There is normal bulk and tone.  Moving all extremity symmetrically to stimuli. Some spontaneous movement and to command.    Reflexes: Trace throughout and plantar responses are flexor.    Cerebellar: Unable to assess.     Labs:                           9.8    3.49  )-----------( 183      ( 31 Dec 2020 09:04 )             33.9     12-31    135  |  96<L>  |  46.0<H>  ----------------------------<  85  4.3   |  27.0  |  3.33<H>    Ca    9.0      31 Dec 2020 09:04  Phos  3.6     12-31

## 2020-12-31 NOTE — DISCHARGE NOTE NURSING/CASE MANAGEMENT/SOCIAL WORK - PATIENT PORTAL LINK FT
You can access the FollowMyHealth Patient Portal offered by [facility] by registering at the following website: [email or web address]. By joining [facility]’s FollowMyHealth portal, you will also be able to view your health information using other applications (apps) compatible with our system.

## 2020-12-31 NOTE — PROGRESS NOTE ADULT - PROVIDER SPECIALTY LIST ADULT
Hospitalist
Nephrology
Neurology
Neurology
Hospitalist
Infectious Disease
Nephrology
Palliative Care
Hospitalist
Infectious Disease
Nephrology
Neurology
Hospitalist
Hospitalist
Infectious Disease
Neurology
Neurology
Palliative Care
Hospitalist
Neurology

## 2020-12-31 NOTE — PROGRESS NOTE ADULT - REASON FOR ADMISSION
AMS

## 2020-12-31 NOTE — DISCHARGE NOTE NURSING/CASE MANAGEMENT/SOCIAL WORK - NSDCFUADDAPPT_GEN_ALL_CORE_FT
Follow up with primary doctor in 2-3 days after discharge and nephrology per routine. Can see Neurology Dr. Camacho in 2-4 weeks after discharge

## 2021-01-06 NOTE — CDI QUERY NOTE - NSCDIOTHERTXTBX_GEN_ALL_CORE_HH
H&P- Admitted for AMS, r/o CVA.  AMS of unknown etiology  - Code stroke called in ED, CTH unremarkable for acute changes. CT perfusion: There is an ischemic penumbra in nonvascular distribution in the left hemisphere as described.    Neuro- Stroke.   Presumed left hemisphere stroke.     12/28 Hospitalist-   AMS of unknown etiology, possible secondary to small left hemispheric infarct  However, repeat ct scans are negative     D/C note- PRINCIPAL DISCHARGE DIAGNOSIS  Diagnosis: Aphasia  Assessment and Plan of Treatment: AMS of unknown etiology, possible secondary to small left hemispheric infarct  However, repeat ct scans are negative   + dysphagia (resolved)   -c/w PO diet, nephrovite and nepro tid   -per Neuro may be small left hemispheric infarct however imaging negative, perhaps more likely a TIA   - c/w ASA, statin  UA abnormal, no complaints from patient, not treated, consistent with lack of symptoms. MS improved irrespective   resolved    Please clarify after study, the suspected etiology of AMS    1) AMS likely due to CVA  2) AMS likely due to TIA  3) Other ( please specify)  4) Not clinically significant

## 2021-01-28 NOTE — ED ADULT TRIAGE NOTE - CADM TRG TX PRIOR TO ARRIVAL
see ambulance record
Price (Use Numbers Only, No Special Characters Or $): 150
Consent: Written consent obtained and the risks of skin tag removal was reviewed with the patient including but not limited to bleeding, pigmentary change, infection, pain, and remote possibility of scarring.
Hemostasis: Drysol
Removed With: scissors
Anesthesia Type: 1% Xylocaine with epinephrine
Detail Level: Zone
Anesthesia Volume In Cc: 7.5

## 2021-02-02 ENCOUNTER — RX RENEWAL (OUTPATIENT)
Age: 81
End: 2021-02-02

## 2021-02-02 RX ORDER — ISOSORBIDE MONONITRATE 120 MG/1
120 TABLET, EXTENDED RELEASE ORAL DAILY
Qty: 90 | Refills: 0 | Status: ACTIVE | COMMUNITY
Start: 2020-10-29 | End: 1900-01-01

## 2021-02-04 ENCOUNTER — NON-APPOINTMENT (OUTPATIENT)
Age: 81
End: 2021-02-04

## 2021-04-01 NOTE — PROGRESS NOTE ADULT - I WAS PHYSICALLY PRESENT FOR THE KEY PORTIONS OF THE EVALUATION AND MANAGEMENT (E/M) SERVICE PROVIDED.  I AGREE WITH THE ABOVE HISTORY, PHYSICAL, AND PLAN WHICH I HAVE REVIEWED AND EDITED WHERE APPROPRIATE
Statement Selected
WDL except

## 2021-08-11 NOTE — DIETITIAN INITIAL EVALUATION ADULT. - WEIGHT IN LBS
BARIATRIC CONSULT-INITIAL - BARIATRIC SURGERY  Lena Ray 44 y o  female MRN: [id number]  Unit/Bed#:  Encounter: [id number]      HPI:  Lena Ray is a 44 y o  female who presents with morbid obesity to discuss weight loss options  Review of Systems    Historical Information   Past Medical History:   Diagnosis Date    Asthma     Diabetes mellitus ([address] )     GERD (gastroesophageal reflux disease)     Hypertension     IBS (irritable bowel syndrome)     Kidney stones     Kidney stones     Psychiatric disorder     anxiety, depression, PTSD    PTSD (post-traumatic stress disorder)      Past Surgical History:   Procedure Laterality Date     SECTION  2018    TONSILECTOMY AND ADNOIDECTOMY      TONSILECTOMY AND ADNOIDECTOMY      WISDOM TOOTH EXTRACTION       Social History   Social History     Substance and Sexual Activity   Alcohol Use Yes    Comment: social     Social History     Substance and Sexual Activity   Drug Use No     Social History     Tobacco Use   Smoking Status Current Every Day Smoker    Packs/day: 0 25   Smokeless Tobacco Never Used     Family History: non-contributory    Meds/Allergies   all medications and allergies reviewed  Allergies   Allergen Reactions    Bee Venom Anaphylaxis    Sulfamethoxazole-Trimethoprim Anaphylaxis    Penicillins Hives       Objective       Current Vitals:   Blood Pressure: 110/78 (21 1306)  Pulse: 92 (21 1306)  Temperature: 98 °F (36 7 °C) (21 1306)  Temp Source: Tympanic (21 1306)  Height: 5' 4 5" (163 8 cm) (21 1306)  Weight - Scale: 96 4 kg (212 lb 8 oz) (21 1306)  Body mass index is 35 91 kg/m²  Invasive Devices     None                 Physical Exam    Lab Results: I have personally reviewed pertinent lab results  Imaging: I have personally reviewed pertinent reports  EKG, Pathology, and Other Studies: I have personally reviewed pertinent reports        Code Status: [unfilled]  Advance Directive and Living Will:      Power of :    POLST:      Assessment/PLAN:            Patient has a long history of morbid obesity and is presenting to discuss the surgical weight loss options  Despite the patient best efforts patient was unable to lose any meaningful or sustainable weight using nonsurgical means  We had a long discussion regarding all the surgical weight-loss options at our disposal at this point and reviewed the risks and benefits of each procedure in details as it relates to her age, BMI and medical conditions  Patient elected to undergo Sleeve   PATIENT NEEDS TO QUIT SMOKING HOWEVER DESPITE ME EXPLAINING TO HER IN DETAILS THE REASONS WHY WE REQUIRE PATIENTS TO QUIT SHE WAS VERY RESISTANT BUT EVENTUALLY SHE AGREED TO TRY TO QUIT TO BE ABLE TO UNDERGO THE PROCEDURE     Risks and benefits were explained to the patient  We also discussed the importance and need of a preoperative workup to make sure that the patient can undergo the procedure safely  Preoperative workup includes sleep apnea screening, cardiac evaluation, nutrition/psych and preoperative EGD  Risks and benefits of all the preoperative diagnostic tests were discussed with the patient including but not limited to the upper endoscopy  Alternatives to surgery and alternative forms of surgery were also explained  Postsurgical commitment and aftercare programs were discussed and explained to the patient in details       In terms of comorbidities patient suffers mostly of   Past Medical History:   Diagnosis Date    Asthma     Diabetes mellitus ([address] )     GERD (gastroesophageal reflux disease)     Hypertension     IBS (irritable bowel syndrome)     Kidney stones     Kidney stones     Psychiatric disorder     anxiety, depression, PTSD    PTSD (post-traumatic stress disorder)        I informed the patient that the rate of resolution of comorbid conditions following weight loss surgery is between 60 and 90% 141.9 depending on the severity of the specific medical condition  I discussed and educated the patient regarding the different components of our multidisciplinary program and the importance of compliance and follow-up in the postoperative period  All questions answered  Patient understands risks and benefits  A videotape of the procedure was also shown to the patient  After showing the video we discussed all the technical aspects of the procedure and also the potential complications including but not limited to gastrointestinal perforation, leak, obstruction, stricture and hemorrhage  I spent 45 min with the patient more than 50% of the time was spent educating the patient and coordinating care  136.6

## 2021-12-15 NOTE — H&P PST ADULT - NS PRO PASSIVE SMOKE EXP
No Plastic Surgeon Procedure Text (A): After obtaining clear surgical margins the patient was sent to plastics for surgical repair.  The patient understands they will receive post-surgical care and follow-up from the referring physician's office.

## 2022-01-19 NOTE — PROVIDER CONTACT NOTE (CRITICAL VALUE NOTIFICATION) - NAME OF MD/NP/PA/DO NOTIFIED:
Modify Regimen: Betamethasone 0.05% cream apply 1 x weekly for scalp, HC 2.5% cream bid prn flares for nose Continue Regimen: Heliocare bid (discussed switch to heliocare advanced) Detail Level: Zone Nasreen vanessa ADS Plan: Still doing well with Betamethasone, HC 2.5% and Heliocare. Again discussed checking BERNARDA regularly with PCP and importance of sun protection. If symptoms show consistency with SLE consider seeing a rheumatologist.

## 2022-02-17 NOTE — OB HISTORY
[Currently In Menopause] : currently in menopause Show Applicator Variable?: Yes Number Of Freeze-Thaw Cycles: 1 freeze-thaw cycle Duration Of Freeze Thaw-Cycle (Seconds): 3 Render Note In Bullet Format When Appropriate: No Post-Care Instructions: I reviewed with the patient in detail post-care instructions. Patient is to wear sunprotection, and avoid picking at any of the treated lesions. Pt may apply Vaseline to crusted or scabbing areas. Detail Level: Detailed Consent: The patient's consent was obtained including but not limited to risks of crusting, scabbing, blistering, scarring, darker or lighter pigmentary change, recurrence, incomplete removal and infection.\\n\\nDiscussed justification for treatment: precancerous with potential for malignant degeneration.

## 2022-03-24 NOTE — DIETITIAN INITIAL EVALUATION ADULT. - PROBLEM/PLAN-7
Subjective   Patient ID: Angie is a 7 year old female who is accompanied by:mother     Well Child Assessment:  History was provided by the mother. Angie lives with her mother and father. Interval problems do not include caregiver depression, caregiver stress, lack of social support, marital discord or recent illness.   Nutrition  Types of intake include cereals, cow's milk, fish, fruits, eggs, meats and non-nutritional.   Dental  The patient brushes teeth regularly. The patient does not floss regularly. Last dental exam was more than a year ago.   Elimination  Elimination problems do not include constipation, diarrhea or urinary symptoms. Toilet training is complete. There is no bed wetting.   Behavioral  Behavioral issues do not include biting, hitting, lying frequently, misbehaving with peers, misbehaving with siblings or performing poorly at school. Disciplinary methods include consistency among caregivers.   Sleep  The patient does not snore. There are no sleep problems.   Safety  There is no smoking in the home. Home has working smoke alarms? don't know. Home has working carbon monoxide alarms? don't know. There is no gun in home.   School  Current grade level is 2nd. There are no signs of learning disabilities. Child is doing well in school.   Screening  Immunizations are up-to-date. There are no risk factors for hearing loss. There are risk factors for anemia. There are no risk factors for dyslipidemia. There are no risk factors for tuberculosis. There are no risk factors for lead toxicity.   Social  The caregiver enjoys the child. After school, the child is at home with a parent.       HPI  Additional concerns today: None     Review of Systems   Constitutional: Negative.    HENT: Negative.    Eyes: Negative.    Respiratory: Negative.  Negative for snoring.    Cardiovascular: Negative.    Gastrointestinal: Negative.  Negative for constipation and diarrhea.   Endocrine: Negative.    Genitourinary:  Negative.    Musculoskeletal: Negative.    Allergic/Immunologic: Negative.    Neurological: Negative.    Hematological: Negative.    Psychiatric/Behavioral: Negative for sleep disturbance.       Patient's medications, allergies, past medical, surgical, social and family histories were reviewed and updated as appropriate.    Objective   Vitals: /60   Pulse 68   Temp 97.4 °F (36.3 °C) (Temporal)   Ht 4' 1.72\" (1.263 m)   Wt 25.7 kg (56 lb 10.5 oz)   BMI 16.11 kg/m²   BSA 0.95 m²   Growth parameters are noted and are appropriate for age.    Physical Exam  Vitals and nursing note reviewed.   Constitutional:       General: She is not in acute distress.     Appearance: Normal appearance. She is well-developed.   HENT:      Head: Normocephalic and atraumatic.      Right Ear: Tympanic membrane normal.      Left Ear: Tympanic membrane is erythematous.      Nose: Nose normal.      Mouth/Throat:      Mouth: Mucous membranes are moist.      Pharynx: Oropharynx is clear.      Tonsils: No tonsillar exudate.   Eyes:      General:         Right eye: Stye present. No discharge.         Left eye: No discharge.      Conjunctiva/sclera: Conjunctivae normal.      Pupils: Pupils are equal, round, and reactive to light.      Comments: Rt eye upper lid small bump   Cardiovascular:      Rate and Rhythm: Normal rate and regular rhythm.      Pulses: Normal pulses.      Heart sounds: Normal heart sounds, S1 normal and S2 normal. No murmur heard.  Pulmonary:      Effort: Pulmonary effort is normal. No respiratory distress or retractions.      Breath sounds: Normal breath sounds.   Abdominal:      General: Bowel sounds are normal. There is no distension.      Palpations: Abdomen is soft.      Tenderness: There is no abdominal tenderness. There is no guarding.   Genitourinary:     Exam position: Prone.      Vagina: No vaginal discharge.   Musculoskeletal:         General: Normal range of motion.   Lymphadenopathy:      Cervical: No  cervical adenopathy.   Skin:     General: Skin is warm and moist.      Capillary Refill: Capillary refill takes less than 2 seconds.      Findings: No rash.   Neurological:      General: No focal deficit present.      Mental Status: She is alert.         Assessment   Problem List Items Addressed This Visit        ENT    Acute otitis media    Relevant Medications    amoxicillin-clavulanate (AUGMENTIN-ES) 600-42.9 MG/5ML suspension       Eye    Chalazion of right upper eyelid       Health Encounters    Encounter for routine child health examination without abnormal findings - Primary    Relevant Orders    INFLUENZA QUADRIVALENT SPLIT PRES FREE 0.5 ML VACC, IM (FLULAVAL,FLUARIX,FLUZONE)    COVID 19 5-11Y [employer]          Counseling  Nutrition/Weight Management:  Assessment and discussion of current Nutrition behaviors performed:  Yes  Assessment and discussion of current Physical Activity behaviors performed: Yes    Immunizations: per orders.  History of previous adverse reactions to immunizations? no  Immunizations given today: Covid and Flu vacines today    Follow-up yearly for a well visit, or sooner as needed.   DISPLAY PLAN FREE TEXT

## 2022-03-26 NOTE — ED PROVIDER NOTE - MDM ORDERS SUBMITTED SELECTION
Monitor: The problem is stable.  Evaluation: Labs/tests ordered, see encounter summary.  Assessment/Treatment:  Continue current treatment/monitoring regimen.   Medications/Labs/Imaging Studies

## 2022-04-19 NOTE — CONSULT NOTE ADULT - PROVIDER SPECIALTY LIST ADULT
Infectious Disease
Endocrinology
Surgery
Vascular Surgery
Nephrology
Opioid Pregnancy And Lactation Text: These medications can lead to premature delivery and should be avoided during pregnancy. These medications are also present in breast milk in small amounts.

## 2022-10-31 NOTE — PRE-OP CHECKLIST - ISOLATION PRECAUTIONS
Dylan Neal presents to clinic today at the request of Rica Ennis MD (ordering provider) for Allergy Immunotherapy injection(s).       This service provided today was under the care of Rica Ennis MD; the supervising provider of the day; who was available if needed.    Joanne Méndez MA    
none
none

## 2022-11-17 NOTE — ED PROVIDER NOTE - CPE EDP GU CVA TENDER
Methadone Rx refill request.  PDMP checked- last fill 10/18 for 30 day supply. Orders Placed This Encounter    methadone (DOLOPHINE) 5 mg/5 mL oral solution     Sig: Take 3.4 mL by mouth every eight (8) hours for 30 days. Max Daily Amount: 10.2 mg.  Indications: severe chronic pain requiring long-term opioid treatment     Dispense:  310 mL     Refill:  [id number], NP  Pediatric Nurse Practitioner  Yarely Vidal  O:[phone]
no tenderness

## 2022-12-01 NOTE — PRE-OP CHECKLIST - SURGICAL CONSENT
Scheduled procedure with Patient at      Telephone Information:   Mobile [phone]      Scheduled Via: Case Request Order for  [facility]  Did patient request a different provider then the referred to:No  Procedure date: 2-24-23   Procedure time: TBD ; Did patient request this specific time? No    -If a MAC pt is scheduled, pt was notified a family member/friend is need to stay with the patient over night after their procedure: Yes                Notified patient about receiving an animated Letty program? Yes    The following have been confirmed:  Insurance name confirmed as [facility], will be the same at time of procedure?: Yes Ins Accepted at Facility? Yes  Latex Allergy No  Diabetic No  Sleep Apnea No if yes CPAP  No  Diuretic/Water pill No  Defibrillator/Pacemaker No  MRSA hx No  On Blood thinners and told to hold : Coumadin (Warfarin) or Plavix No   Phentermine (diet pill) No      Prep required? Yes, Pharmacy is [facility] in [place] ; Briefly reviewed? Yes;   If procedure is scheduled 7 days or less, patient was told to  prep letter?: n/a  
done
done

## 2022-12-22 NOTE — ED PROVIDER NOTE - RESPIRATORY [+], MLM
I spoke with the patient and informed him that Dr. Karina Singh is out sick and not able to see him sooner than 12/27/22. The patient is concerned about his right knee pain. He was instructed to discuss this with Dr. Karina Singh at his appointment.
cxr today    abuterol every 4-6 hours as  Needed    Follow up in 4-5 months      
SHORTNESS OF BREATH

## 2023-03-07 NOTE — ED ADULT NURSE NOTE - AS SC BRADEN NUTRITION
Notified Dr. Katelyn Roberto about drop in BP, new order received for NS bolus of 1000mL. 500mL NS bolus already running wide open. (4) excellent

## 2023-04-30 NOTE — PROGRESS NOTE ADULT - PROBLEM SELECTOR PROBLEM 4
CAD (coronary artery disease)
DM2 (diabetes mellitus, type 2)
DM2 (diabetes mellitus, type 2)
HTN (Hypertension)
Hyperlipidemia, unspecified hyperlipidemia type
Shoulder pain, left
Shoulder pain, left
gradual onset

## 2023-05-25 NOTE — ED ADULT NURSE NOTE - EXTENSIONS OF SELF_ADULT
None Attending MD Hurtado.  Agree with above.  PT is a 18 yo fem with no stated PMHx on OCP's being txed with doxy for chlamydia since 1 wk ago.  Pt is now on day 4-5 of meds.  Original suprapubic discomfort has improved but yesterday developed severe lower abdominal pain radiating to lower back.  Endorses urinary frequency without chills/n/v/d/dysuria.  Pain localizes periumbilical/suprapubic.  No discharge on exam.  No CMT.  No adnexal TTP.  Suspect persistent chlamydial infection.  Planned labs, urine, transvag sono and further risk stratification with referral to close gyn f/u/return precautions if non-actionable.  Will pursue further imaging/silva if labs/urine indicate concern for complication. Attending MD Hurtado.  Agree with above.  PT is a 18 yo fem with no stated PMHx on OCP's being txed with doxy for chlamydia since 1 wk ago.  Pt is now on day 4-5 of meds.  Original suprapubic discomfort has improved but yesterday developed severe lower abdominal pain radiating to lower back.  Endorses urinary frequency without chills/n/v/d/dysuria.  Pain localizes periumbilical/suprapubic.  No discharge on exam.  No CMT.  No adnexal TTP.  Suspect persistent chlamydial infection.  Planned labs, urine, transvag sono and further risk stratification with referral to close gyn f/u/return precautions if non-actionable.  Will pursue further imaging/silva if labs/urine indicate concern for complication..

## 2023-08-02 NOTE — PROGRESS NOTE ADULT - PROBLEM/PLAN-1
8/2/2023        RE: Alejandrina Whatley  [address] [facility] [address]        [facility] GERIATRICS    PRIMARY CARE PROVIDER AND CLINIC:  Sara Britt PA-C, [address] / [place] [address]  Chief Complaint   Patient presents with     Hospital F/U      [facility] Medical Record Number:  [id number]  Place of Service where encounter took place:  [facility] ([facility] [[id number]]    Alejandrina Whatley  is a 89 year old  (8/22/1923), returned to the above facility from  [facility]. Hospital stay 7/28/23 - 7/31/23. .   HPI:      Notes from hospitalization reviewed including H&P  and D/c summary    Alejandrina Whatley is a 89 yo female with a PMH of dementia, depression, HTN, recurrent UTI, and previous CVA who was recently hospitalized for encephalopathy in the setting of UTI.    Diagnosed at facility 7/25 with UTI after developing hallucinations. Received ceftriaxone x 1 and started on keflex. UC with aerococcus sensitive to cephalosporins. Ultimately presented to the ER for persistent hallucinations. Laboratory evaluation reassuring but admitted to observation and treated with ceftriaxone. Antibiotics adjusted to Augmentin at discharge. Seroquel added for restlessness/agitation    Spoke with SW and bedside RN. Last week not thought to qualify for hospice but [facility] now plans to admit later this am. Patient current resting comfortably but has had some restlessness in the evening.    Alejandrina Noble is evaluated in her room. Sleeping comfortably. Denies pain    CODE STATUS/ADVANCE DIRECTIVES DISCUSSION:  No CPR- Do NOT Intubate  DNR / DNI  ALLERGIES:   Allergies   Allergen Reactions     Actonel [Bisphosphonates] Rash     Conjugated Estrogens Rash     Estrogens Conjugated Rash     Macrobid [Nitrofuran Derivatives] Rash     Nitrofurantoin Rash     Sulfa Antibiotics Rash     Hydrocodone Nausea and Vomiting     Oxycodone Nausea and Vomiting      Risedronate      leg pain      PAST MEDICAL HISTORY:   Past Medical History:   Diagnosis Date     Abdominal aortic aneurysm 2001    had a stent placed 2009     AK (actinic keratosis) 11/11/2009     Cataract 02/22/2011     Compression Fractures of Lumbar Vertebra 02/04/2010     CVA (cerebral vascular accident) (H)     Dec 2019 and Jan 2020     Dementia (H)      DJD (degenerative joint disease)      Generalised Weakness and Fatigue 03/03/2011     Glaucoma (increased eye pressure) 2009    Dr. 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  FAMILY HISTORY: family history includes Heart Disease (age of onset: 79) in her father; Hypertension in her mother.  SOCIAL HISTORY:   reports that she has never smoked. She has never used smokeless tobacco. She reports that she does not drink alcohol and does not use drugs.  Patient's living condition: lives in a nursing home    Post Discharge Medication Reconciliation Status:   MED REC REQUIRED  Post Medication Reconciliation Status: discharge medications reconciled and changed, per note/orders       Current Outpatient Medications   Medication Sig     hyoscyamine 0.125 MG TBDP Take 0.125 mg by mouth every 4 hours as needed for other     latanoprost  "(XALATAN) 0.005 % ophthalmic solution Place 1 drop into both eyes daily     LORazepam (ATIVAN) 0.5 MG tablet Take 0.5 mg by mouth 4 times daily as needed for anxiety     menthol-zinc oxide (CALMOSEPTINE) 0.44-20.6 % OINT ointment Apply topically as needed     morphine sulfate (ROXANOL) 20 mg/mL (HIGH CONC) soln Take 5 mg by mouth every 2 hours as needed for shortness of breath or breakthrough pain     morphine sulfate (ROXANOL) 20 mg/mL (HIGH CONC) soln Take 5 mg by mouth 3 times daily     ondansetron (ZOFRAN ODT) 4 MG ODT tab Take 1 tablet (4 mg) by mouth every 6 hours as needed for nausea or vomiting     polyethylene glycol (MIRALAX) 17 g packet Take 1 packet by mouth every other day Odd days     QUEtiapine (SEROQUEL) 25 MG tablet Take 1 tablet (25 mg) by mouth 2 times daily as needed (agitation)     traMADol (ULTRAM) 50 MG tablet Take 0.5 tablets (25 mg) by mouth daily At 1900     atorvastatin (LIPITOR) 40 MG tablet Take 40 mg by mouth every evening     dorzolamide-timolol (COSOPT) 2-0.5 % ophthalmic solution Place 1 drop into both eyes 2 times daily     No current facility-administered medications for this visit.       ROS:  Limited secondary to cognitive impairment but today pt reports denies pain    Vitals:  /75   Pulse 75   Temp 97.9  F (36.6  C)   Resp 17   Ht 1.549 m (5' 1\")   Wt 59.9 kg (132 lb)   SpO2 94%   BMI 24.94 kg/m    Exam:  Physical Exam  Vitals (Facility EMR) reviewed.   Constitutional:       General: She is not in acute distress.  HENT:      Head: Normocephalic and atraumatic.   Eyes:      General: No scleral icterus.  Cardiovascular:      Rate and Rhythm: Normal rate and regular rhythm.      Heart sounds: No murmur heard.  Pulmonary:      Effort: Pulmonary effort is normal.   Musculoskeletal:      Right lower leg: No edema.      Left lower leg: No edema.   Skin:     General: Skin is warm and dry.      Findings: No rash.   Neurological:      Mental Status: She is alert. Mental " status is at baseline.   Psychiatric:         Behavior: Behavior normal.         Lab/Diagnostic data:  Recent labs in [facility] reviewed by me today.  and   Recent Results (from the past 240 hour(s))   UA with Microscopic    Collection Time: 07/25/23  9:46 AM   Result Value Ref Range    Color Urine Light Yellow Colorless, Straw, Light Yellow, Yellow    Appearance Urine Clear Clear    Glucose Urine Negative Negative mg/dL    Bilirubin Urine Negative Negative    Ketones Urine Negative Negative mg/dL    Specific Gravity Urine 1.019 1.003 - 1.035    Blood Urine Small (A) Negative    pH Urine 6.0 5.0 - 7.0    Protein Albumin Urine 20 (A) Negative mg/dL    Urobilinogen Urine Normal Normal, 2.0 mg/dL    Nitrite Urine Negative Negative    Leukocyte Esterase Urine Moderate (A) Negative    Mucus Urine Present (A) None Seen /LPF    RBC Urine 21 (H) <=2 /HPF    WBC Urine 30 (H) <=5 /HPF   Urine Culture    Collection Time: 07/25/23  9:46 AM    Specimen: Urine, NOS   Result Value Ref Range    Culture >100,000 CFU/mL Aerococcus urinae (A)        Susceptibility    Aerococcus urinae - JOSEMANUEL     Penicillin <=0.03 Susceptible ug/mL     Cefotaxime <=0.25 Susceptible ug/mL     Ceftriaxone <=0.25 Susceptible ug/mL     Vancomycin 0.25 Susceptible ug/mL     Tetracycline <=0.5 Susceptible ug/mL     Levofloxacin 0.5 Susceptible ug/mL   UA with Microscopic reflex to Culture    Collection Time: 07/28/23  1:46 PM    Specimen: Urine, Catheter   Result Value Ref Range    Color Urine Light Yellow Colorless, Straw, Light Yellow, Yellow    Appearance Urine Clear Clear    Glucose Urine Negative Negative mg/dL    Bilirubin Urine Negative Negative    Ketones Urine Negative Negative mg/dL    Specific Gravity Urine 1.024 1.003 - 1.035    Blood Urine Negative Negative    pH Urine 6.5 5.0 - 7.0    Protein Albumin Urine 20 (A) Negative mg/dL    Urobilinogen Urine Normal Normal, 2.0 mg/dL    Nitrite Urine Negative Negative    Leukocyte Esterase Urine Trace (A)  DISPLAY PLAN FREE TEXT Negative    Mucus Urine Present (A) None Seen /LPF    RBC Urine 3 (H) <=2 /HPF    WBC Urine 11 (H) <=5 /HPF    Squamous Epithelials Urine <1 <=1 /HPF   Urine Culture    Collection Time: 07/28/23  1:46 PM    Specimen: Urine, Catheter   Result Value Ref Range    Culture No Growth    Basic metabolic panel (BMP)    Collection Time: 07/28/23  3:54 PM   Result Value Ref Range    Sodium 144 136 - 145 mmol/L    Potassium 4.5 3.4 - 5.3 mmol/L    Chloride 107 98 - 107 mmol/L    Carbon Dioxide (CO2) 25 22 - 29 mmol/L    Anion Gap 12 7 - 15 mmol/L    Urea Nitrogen 23.3 (H) 8.0 - 23.0 mg/dL    Creatinine 0.85 0.51 - 0.95 mg/dL    Calcium 9.5 8.2 - 9.6 mg/dL    Glucose 166 (H) 70 - 99 mg/dL    GFR Estimate 61 >60 mL/min/1.73m2   CBC with platelets and differential    Collection Time: 07/28/23  3:54 PM   Result Value Ref Range    WBC Count 10.6 4.0 - 11.0 10e3/uL    RBC Count 3.61 (L) 3.80 - 5.20 10e6/uL    Hemoglobin 12.2 11.7 - 15.7 g/dL    Hematocrit 37.3 35.0 - 47.0 %     (H) 78 - 100 fL    MCH 33.8 (H) 26.5 - 33.0 pg    MCHC 32.7 31.5 - 36.5 g/dL    RDW 14.1 10.0 - 15.0 %    Platelet Count 160 150 - 450 10e3/uL    % Neutrophils 71 %    % Lymphocytes 19 %    % Monocytes 8 %    % Eosinophils 1 %    % Basophils 0 %    % Immature Granulocytes 1 %    NRBCs per 100 WBC 0 <1 /100    Absolute Neutrophils 7.5 1.6 - 8.3 10e3/uL    Absolute Lymphocytes 2.0 0.8 - 5.3 10e3/uL    Absolute Monocytes 0.9 0.0 - 1.3 10e3/uL    Absolute Eosinophils 0.1 0.0 - 0.7 10e3/uL    Absolute Basophils 0.0 0.0 - 0.2 10e3/uL    Absolute Immature Granulocytes 0.1 <=0.4 10e3/uL    Absolute NRBCs 0.0 10e3/uL   iStat Gases (lactate) venous, POCT    Collection Time: 07/28/23  3:59 PM   Result Value Ref Range    Lactic Acid POCT 1.9 <=2.0 mmol/L    Bicarbonate Venous POCT 27 21 - 28 mmol/L    O2 Sat, Venous POCT 56 (L) 94 - 100 %    pCO2 Venous POCT 48 40 - 50 mm Hg    pH Venous POCT 7.36 7.32 - 7.43    pO2 Venous POCT 31 25 - 47 mm Hg        ASSESSMENT/PLAN:  Hospice Care Patient  Aerococcus UTI  Dementia  Acute Encephalopathy  Recurrent UTI: Tx for UTI at facility 7/25 after developing hallucinations and abnormal UA. Presented to the ER for persistent hallucinations. Treated with Ceftriaxone and transitioned to Augmentin  - Continue Augmentin though 8/2  - Continue Seroquel nightly and prn  - Planned enrollment with [facility] later this am. Will defer comfort med initiation to hospice team      Orders:  NNO      Electronically signed by:  Sara Britt PA-C                   Sincerely,        Sara Britt PA-C

## 2023-10-13 NOTE — PROGRESS NOTE ADULT - PROVIDER SPECIALTY LIST ADULT
Cardiology
Hospitalist
Nephrology
Vascular Surgery
<-- Click to add NO significant Past Surgical History

## 2023-10-14 NOTE — DISCHARGE NOTE ADULT - FUNCTIONAL SCREEN CURRENT LEVEL: BATHING, MLM
It's 3 months between varicella vaccines at this age so not until after 12/18    It's 4 weeks between MMR vaccines to I placed a nurse order for that.
Mother contacted  Notified Mother of Dr. Orlin Coronado message below. Nurse Visit scheduled for 10/19/2023 for MMR vaccine (future order placed by Dr. Chino Doll)  Nurse Visit scheduled for 12/19/2023 for Varicella vaccine (future order needed). Mother will be calling back with the school fax number. Mother would like a new note sent to school via fax with new vaccine appointment dates. Last physical with Dr. Janina Layne 9/18/2023    Message routed to Dr. Janina Layne to sign off on Future Varicella immunization order for Nurse Visit 12/19/2023. Order pended.
Noted. Thank you.
Patient is scheduled today for a Nurse Visit for MMR and Varicella immunizations today. First MMR and Varicella immunization was 9/18/2023. 2nd dose of MMR and Varicella must be no less than 4 weeks (28 days) later. Too soon for patient to receive MMR and Varicella immunizations today. Nurse Visit canceled today. Mother contacted. Mother will call back to schedule a Nurse Visit for second MMR and Varicella immunizations for after 10/16/2023. When Mother calls back, after she schedules another Nurse Visit, a new appointment letter needs to be written and faxed to school. Mother will provide fax number for the school when she calls back to schedule the Nurse Visit. Last physical with Dr. Martin Hinojosa 9/18/2023    Message routed to Dr. Maegan Rojas (On-Call/Coverage for DMM while he is not in office) for Dr. Martin Hinojosa who is out of the office-please place future orders for MMR and Varicella. Received Proquad 9/18/2023 (which were patient's first doses of MMR and Varicella vaccines).
Reviewed and signed.
(0) independent

## 2023-10-30 NOTE — CHART NOTE - NSCHARTNOTESELECT_GEN_ALL_CORE
Goals group  Focus: Pt reviews progress on daily/evening/weekend goal(s).  Pt learns to set achievable goals to address treatment needs in daily groups.   Pt was not in goals group due to actively participating in intake session with Izabela Lindsay RN.    Event Note

## 2023-10-31 NOTE — ED PROVIDER NOTE - NS ED ROS FT
Cardiac Catheterization report    10/31/2023  10:02 AM    Primary Care Provider: Asael Pink M.D.    Indication for procedure: low flow low gradient aortic stenosis    Procedures performed:  Coronary arteriograms  Left heart catheterization   Distal abdominal aortogram with iliac run off    Final impression:  Chronically occluded RCA fills through collaterals from left coronary system.  Vein graft to RCA is chronically occluded.  Moderate bilateral common iliac artery stenosis.    Findings:  1.  Left main coronary artery:  Normal.  2.  Left anterior descending artery: Luminal irregularities no significant disease.    3.  Left circumflex coronary artery: Luminal irregularities no significant disease.    4.  Right coronary artery: Occluded in midportion.  Distal RCA fills through collaterals from left system.  This is a right dominant system.  5.  Left ventricular end diastolic pressure:  15 mmHg. pullback gradient across the aortic valve mean 20 mmHg  6.  Distal abdominal aortogram with bilateral iliac runoff showed moderate bilateral common iliac artery stenosis, calcified .      EBL: <10 CC    Specimens: None    Procedure details:  The risks and benefits of cardiac catheterization and possible intervention were explained to the patient including death, heart attack, stroke, and emergency surgery.  The patient verbalized understanding and wished to proceed.  The patient was brought to the cardiac catheterization laboratory in the fasting state and prepped and draped in the usual sterile fashion.  The left groin was locally anesthetized with lidocaine and the right femoral artery was cannulated with 4 Fr equipment.  Unable to engage left coronary artery with JL4, JL 4.5 diagnostic catheters due to anterior takeoff, and AL-1 diagnostic catheter was used to engage left main, left coronary arteriograms was performed.  JR4 catheter was used to perform right coronary artery angiogram, vein graft angiogram.  AL-1  catheter was used to cross the aortic valve, pullback gradient was obtained.  Pigtail catheter was used to perform distal abdominal aortogram with iliac runoff.  Once all the views were obtained, all wires and catheters were removed from the patient without difficulty.  A Vasc-Band was placed over the right radial artery and the radial artery sheath was removed without difficulty.      Complications:  None    Sedation time:  I supervised moderate sedation over a trained independent nursing staff,  Sedation Start time:09:03      Sedation Stop time: 09:40    BALTAZAR Pozo  [facility]      CONSTITUTIONAL: No fevers, lightheadedness, dizziness, weakness  EYES: No loss of vision, double vision, blurry vision  CV: No chest pain, palpitations  PULM: No cough, shortness of breath  GI: No abdominal pain, nausea, vomiting  : No dysuria, hematuria  SKIN: No rashes  MSK: (+) left hip and wrist pain  NEURO: no headache, numbness, tingling

## 2024-01-17 NOTE — PATIENT PROFILE ADULT - BRADEN MOISTURE
Winlevi Pregnancy And Lactation Text: This medication is considered safe during pregnancy and breastfeeding. Aklief counseling:  Patient advised to apply a pea-sized amount only at bedtime and wait 30 minutes after washing their face before applying.  If too drying, patient may add a non-comedogenic moisturizer.  The most commonly reported side effects including irritation, redness, scaling, dryness, stinging, burning, itching, and increased risk of sunburn.  The patient verbalized understanding of the proper use and possible adverse effects of retinoids.  All of the patient's questions and concerns were addressed. Topical Sulfur Applications Counseling: Topical Sulfur Counseling: Patient counseled that this medication may cause skin irritation or allergic reactions.  In the event of skin irritation, the patient was advised to reduce the amount of the drug applied or use it less frequently.   The patient verbalized understanding of the proper use and possible adverse effects of topical sulfur application.  All of the patient's questions and concerns were addressed. High Dose Vitamin A Pregnancy And Lactation Text: High dose vitamin A therapy is contraindicated during pregnancy and breast feeding. Tazorac Pregnancy And Lactation Text: This medication is not safe during pregnancy. It is unknown if this medication is excreted in breast milk. Sarecycline Pregnancy And Lactation Text: This medication is Pregnancy Category D and not consider safe during pregnancy. It is also excreted in breast milk. Spironolactone Pregnancy And Lactation Text: This medication can cause feminization of the male fetus and should be avoided during pregnancy. The active metabolite is also found in breast milk. Isotretinoin Pregnancy And Lactation Text: This medication is Pregnancy Category X and is considered extremely dangerous during pregnancy. It is unknown if it is excreted in breast milk. Benzoyl Peroxide Pregnancy And Lactation Text: This medication is Pregnancy Category C. It is unknown if benzoyl peroxide is excreted in breast milk. Bactrim Counseling:  I discussed with the patient the risks of sulfa antibiotics including but not limited to GI upset, allergic reaction, drug rash, diarrhea, dizziness, photosensitivity, and yeast infections.  Rarely, more serious reactions can occur including but not limited to aplastic anemia, agranulocytosis, methemoglobinemia, blood dyscrasias, liver or kidney failure, lung infiltrates or desquamative/blistering drug rashes. Dapsone Pregnancy And Lactation Text: This medication is Pregnancy Category C and is not considered safe during pregnancy or breast feeding. Topical Clindamycin Counseling: Patient counseled that this medication may cause skin irritation or allergic reactions.  In the event of skin irritation, the patient was advised to reduce the amount of the drug applied or use it less frequently.   The patient verbalized understanding of the proper use and possible adverse effects of clindamycin.  All of the patient's questions and concerns were addressed. Doxycycline Pregnancy And Lactation Text: This medication is Pregnancy Category D and not consider safe during pregnancy. It is also excreted in breast milk but is considered safe for shorter treatment courses. Sunscreen Recommendation Label Override: samples of sunscreen provided Tazorac Counseling:  Patient advised that medication is irritating and drying.  Patient may need to apply sparingly and wash off after an hour before eventually leaving it on overnight.  The patient verbalized understanding of the proper use and possible adverse effects of tazorac.  All of the patient's questions and concerns were addressed. Azelaic Acid Counseling: Patient counseled that medicine may cause skin irritation and to avoid applying near the eyes.  In the event of skin irritation, the patient was advised to reduce the amount of the drug applied or use it less frequently.   The patient verbalized understanding of the proper use and possible adverse effects of azelaic acid.  All of the patient's questions and concerns were addressed. Spironolactone Counseling: Patient advised regarding risks of diarrhea, abdominal pain, hyperkalemia, birth defects (for female patients), liver toxicity and renal toxicity. The patient may need blood work to monitor liver and kidney function and potassium levels while on therapy. The patient verbalized understanding of the proper use and possible adverse effects of spironolactone.  All of the patient's questions and concerns were addressed. Erythromycin Pregnancy And Lactation Text: This medication is Pregnancy Category B and is considered safe during pregnancy. It is also excreted in breast milk. Isotretinoin Counseling: Patient should get monthly blood tests, not donate blood, not drive at night if vision affected, not share medication, and not undergo elective surgery for 6 months after tx completed. Side effects reviewed, pt to contact office should one occur. Winlevi Counseling:  I discussed with the patient the risks of topical clascoterone including but not limited to erythema, scaling, itching, and stinging. Patient voiced their understanding. High Dose Vitamin A Counseling: Side effects reviewed, pt to contact office should one occur. Detail Level: Detailed Topical Sulfur Applications Pregnancy And Lactation Text: This medication is Pregnancy Category C and has an unknown safety profile during pregnancy. It is unknown if this topical medication is excreted in breast milk. Birth Control Pills Pregnancy And Lactation Text: This medication should be avoided if pregnant and for the first 30 days post-partum. Dapsone Counseling: I discussed with the patient the risks of dapsone including but not limited to hemolytic anemia, agranulocytosis, rashes, methemoglobinemia, kidney failure, peripheral neuropathy, headaches, GI upset, and liver toxicity.  Patients who start dapsone require monitoring including baseline LFTs and weekly CBCs for the first month, then every month thereafter.  The patient verbalized understanding of the proper use and possible adverse effects of dapsone.  All of the patient's questions and concerns were addressed. Include Pregnancy/Lactation Warning?: No Aklief Pregnancy And Lactation Text: It is unknown if this medication is safe to use during pregnancy.  It is unknown if this medication is excreted in breast milk.  Breastfeeding women should use the topical cream on the smallest area of the skin for the shortest time needed while breastfeeding.  Do not apply to nipple and areola. (3) occasionally moist Minocycline Counseling: Patient advised regarding possible photosensitivity and discoloration of the teeth, skin, lips, tongue and gums.  Patient instructed to avoid sunlight, if possible.  When exposed to sunlight, patients should wear protective clothing, sunglasses, and sunscreen.  The patient was instructed to call the office immediately if the following severe adverse effects occur:  hearing changes, easy bruising/bleeding, severe headache, or vision changes.  The patient verbalized understanding of the proper use and possible adverse effects of minocycline.  All of the patient's questions and concerns were addressed. Topical Clindamycin Pregnancy And Lactation Text: This medication is Pregnancy Category B and is considered safe during pregnancy. It is unknown if it is excreted in breast milk. Doxycycline Counseling:  Patient counseled regarding possible photosensitivity and increased risk for sunburn.  Patient instructed to avoid sunlight, if possible.  When exposed to sunlight, patients should wear protective clothing, sunglasses, and sunscreen.  The patient was instructed to call the office immediately if the following severe adverse effects occur:  hearing changes, easy bruising/bleeding, severe headache, or vision changes.  The patient verbalized understanding of the proper use and possible adverse effects of doxycycline.  All of the patient's questions and concerns were addressed. Azithromycin Counseling:  I discussed with the patient the risks of azithromycin including but not limited to GI upset, allergic reaction, drug rash, diarrhea, and yeast infections. Sarecycline Counseling: Patient advised regarding possible photosensitivity and discoloration of the teeth, skin, lips, tongue and gums.  Patient instructed to avoid sunlight, if possible.  When exposed to sunlight, patients should wear protective clothing, sunglasses, and sunscreen.  The patient was instructed to call the office immediately if the following severe adverse effects occur:  hearing changes, easy bruising/bleeding, severe headache, or vision changes.  The patient verbalized understanding of the proper use and possible adverse effects of sarecycline.  All of the patient's questions and concerns were addressed. Azelaic Acid Pregnancy And Lactation Text: This medication is considered safe during pregnancy and breast feeding. Benzoyl Peroxide Counseling: Patient counseled that medicine may cause skin irritation and bleach clothing.  In the event of skin irritation, the patient was advised to reduce the amount of the drug applied or use it less frequently.   The patient verbalized understanding of the proper use and possible adverse effects of benzoyl peroxide.  All of the patient's questions and concerns were addressed. Bactrim Pregnancy And Lactation Text: This medication is Pregnancy Category D and is known to cause fetal risk.  It is also excreted in breast milk. Patient Specific Counseling (Will Not Stick From Patient To Patient): Lip care, UV protection, risks of drinking alcohol.\\nAdverse effects due to Isotretinoin: *Dry lips\\nFollow up in 1 month Topical Retinoid counseling:  Patient advised to apply a pea-sized amount only at bedtime and wait 30 minutes after washing their face before applying.  If too drying, patient may add a non-comedogenic moisturizer. The patient verbalized understanding of the proper use and possible adverse effects of retinoids.  All of the patient's questions and concerns were addressed. Azithromycin Pregnancy And Lactation Text: This medication is considered safe during pregnancy and is also secreted in breast milk. Erythromycin Counseling:  I discussed with the patient the risks of erythromycin including but not limited to GI upset, allergic reaction, drug rash, diarrhea, increase in liver enzymes, and yeast infections. Topical Retinoid Pregnancy And Lactation Text: This medication is Pregnancy Category C. It is unknown if this medication is excreted in breast milk. Tetracycline Counseling: Patient counseled regarding possible photosensitivity and increased risk for sunburn.  Patient instructed to avoid sunlight, if possible.  When exposed to sunlight, patients should wear protective clothing, sunglasses, and sunscreen.  The patient was instructed to call the office immediately if the following severe adverse effects occur:  hearing changes, easy bruising/bleeding, severe headache, or vision changes.  The patient verbalized understanding of the proper use and possible adverse effects of tetracycline.  All of the patient's questions and concerns were addressed. Patient understands to avoid pregnancy while on therapy due to potential birth defects. Birth Control Pills Counseling: Birth Control Pill Counseling: I discussed with the patient the potential side effects of OCPs including but not limited to increased risk of stroke, heart attack, thrombophlebitis, deep venous thrombosis, hepatic adenomas, breast changes, GI upset, headaches, and depression.  The patient verbalized understanding of the proper use and possible adverse effects of OCPs. All of the patient's questions and concerns were addressed. Patient Specific Counseling (Will Not Stick From Patient To Patient): Warts are caused by HPV.  They can be spread through direct contact. Some warts will resolve within 2-3 years but may take longer. \\nTreatment options were discussed.\\nThe best interval for treating warts is every 2-4 weeks.\\nIt can take multiple in-office treatments to eradicate warts. Detail Level: Zone

## 2024-02-02 NOTE — DISCHARGE NOTE PROVIDER - NSDCMRMEDTOKEN_GEN_ALL_CORE_FT
Mounjaro increased to 7.5 mg   
albuterol 90 mcg/inh inhalation aerosol: 2 puff(s) inhaled every 6 hours x 30 days, As Needed -Bronchospasm   allopurinol 100 mg oral tablet: 1 tab(s) orally once a day  aspirin 81 mg oral delayed release tablet: 1 tab(s) orally once a day  carvedilol 25 mg oral tablet: 1 tab(s) orally 2 times a day  cloNIDine 0.2 mg oral tablet: 1 tab(s) orally once a day  furosemide 40 mg oral tablet: 1 tab(s) orally once a day  hydrALAZINE 100 mg oral tablet: 1 tab(s) orally every 8 hours  isosorbide mononitrate 120 mg oral tablet, extended release: 1 tab(s) orally once a day  Lokelma 10 g oral powder for reconstitution: 10 gram(s) orally every other day NEXT DOSE DUE 8/31/20  mirtazapine 15 mg oral tablet: 1 tab(s) orally once a day (at bedtime)  sodium bicarbonate 650 mg oral tablet: 1 tab(s) orally 3 times a day

## 2024-04-26 NOTE — ED PROVIDER NOTE - NS ED ROS FT
OT evaluate and treat General: Denies fever, chills  HEENT: Denies sensory changes, sore throat  Neck: Denies neck pain, neck stiffness  Resp: Denies coughing, SOB  Cardiovascular: Denies CP, palpitations, LE edema  GI: Denies abdominal pain, nausea, vomiting, diarrhea  : Denies dysuria, hematuria, incontinence  MSK: Denies back pain  Neuro: Denies HA, dizziness, numbness, weakness  Skin: Denies rashes

## 2024-06-25 NOTE — PROVIDER CONTACT NOTE (OTHER) - ASSESSMENT
5
Patient complaining of headache, 5/10 pain.
Patient stable; VSS. no c/o pain/ discomfort noted
Pt states she is unsure if it is gout pain. Pt claims this is new pain.
Pt. hypertensive but asymptomatic. Electronic BP was 189/71. Manual /70. Pt. requiring 1 unit RBC blood transfusion

## 2024-11-26 NOTE — ED ADULT NURSE NOTE - DOES PATIENT HAVE ADVANCE DIRECTIVE
Sucrose breath test 11/10/2024:Normal sucrase activity.  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 Pathology: Stomach biopsies demonstrated mild chronic gastritis, nonspecific without evidence of H. pylori or intestinal metaplasia.  Labs 8/27/2024:CBC: WBC 7.1, hemoglobin 12.4, MCV 91, platelet 251. BMP normal with exception of glucose 279, BUN 35, creatinine 1.36. LFTs: TB less than 0.2, , ALT 21, AST 19. Cholesterol panel normal. Hemoglobin A1c 8.6. TSH 1.760. Magnesium 2.2.
No

## 2025-06-17 NOTE — PATIENT PROFILE ADULT - NSPROHMDIABETMGMTSTRAT_GEN_A_NUR
PROCEDURE NOTE  Date: 6/17/2025   Name: Valdemar Solis  YOB: 1951    Insert Arterial Line    Date/Time: 6/17/2025 12:49 AM    Performed by: Jaye Epps MD  Authorized by: Jaye Epps MD  Consent: Verbal consent obtained.  Risks and benefits: risks, benefits and alternatives were discussed  Consent given by: patient  Patient understanding: patient states understanding of the procedure being performed  Patient consent: the patient's understanding of the procedure matches consent given  Procedure consent: procedure consent matches procedure scheduled  Test results: test results available and properly labeled  Site marked: the operative site was marked  Imaging studies: imaging studies available  Required items: required blood products, implants, devices, and special equipment available  Patient identity confirmed: verbally with patient  Time out: Immediately prior to procedure a \"time out\" was called to verify the correct patient, procedure, equipment, support staff and site/side marked as required.  Preparation: Patient was prepped and draped in the usual sterile fashion.  Indications: hemodynamic monitoring  Location: left radial    Sedation:  Patient sedated: no    Roman's test normal: yes  Needle gauge: 20  Seldinger technique: Seldinger technique used  Number of attempts: 1  Post-procedure: dressing applied  Post-procedure CMS: normal and unchanged  Patient tolerance: patient tolerated the procedure well with no immediate complications  Comments: EBL 2 ml                 blood glucose testing